# Patient Record
Sex: MALE | Race: WHITE | Employment: OTHER | URBAN - METROPOLITAN AREA
[De-identification: names, ages, dates, MRNs, and addresses within clinical notes are randomized per-mention and may not be internally consistent; named-entity substitution may affect disease eponyms.]

---

## 2017-01-03 ENCOUNTER — TELEPHONE (OUTPATIENT)
Dept: FAMILY MEDICINE CLINIC | Age: 77
End: 2017-01-03

## 2017-01-03 DIAGNOSIS — E11.9 TYPE 2 DIABETES MELLITUS WITHOUT COMPLICATION, WITHOUT LONG-TERM CURRENT USE OF INSULIN (HCC): ICD-10-CM

## 2017-01-03 RX ORDER — LANCETS
EACH MISCELLANEOUS
Qty: 100 EACH | Refills: 3 | Status: SHIPPED | OUTPATIENT
Start: 2017-01-03 | End: 2018-03-01 | Stop reason: SDUPTHER

## 2017-06-15 ENCOUNTER — OFFICE VISIT (OUTPATIENT)
Dept: FAMILY MEDICINE CLINIC | Age: 77
End: 2017-06-15

## 2017-06-15 VITALS
HEIGHT: 69 IN | WEIGHT: 173 LBS | OXYGEN SATURATION: 98 % | DIASTOLIC BLOOD PRESSURE: 70 MMHG | BODY MASS INDEX: 25.62 KG/M2 | RESPIRATION RATE: 13 BRPM | TEMPERATURE: 98.2 F | HEART RATE: 82 BPM | SYSTOLIC BLOOD PRESSURE: 128 MMHG

## 2017-06-15 DIAGNOSIS — E11.9 TYPE 2 DIABETES MELLITUS WITHOUT COMPLICATION, WITHOUT LONG-TERM CURRENT USE OF INSULIN (HCC): Primary | ICD-10-CM

## 2017-06-15 DIAGNOSIS — E78.5 HYPERLIPIDEMIA, UNSPECIFIED HYPERLIPIDEMIA TYPE: ICD-10-CM

## 2017-06-15 DIAGNOSIS — I10 ESSENTIAL HYPERTENSION: ICD-10-CM

## 2017-06-15 LAB
ALBUMIN SERPL-MCNC: 4.9 G/DL (ref 3.4–5)
ALP BLD-CCNC: 64 U/L (ref 40–129)
ALT SERPL-CCNC: 18 U/L (ref 10–40)
ANION GAP SERPL CALCULATED.3IONS-SCNC: 17 MMOL/L (ref 3–16)
AST SERPL-CCNC: 18 U/L (ref 15–37)
BILIRUB SERPL-MCNC: 1.8 MG/DL (ref 0–1)
BILIRUBIN DIRECT: 0.3 MG/DL (ref 0–0.3)
BILIRUBIN, INDIRECT: 1.5 MG/DL (ref 0–1)
BUN BLDV-MCNC: 26 MG/DL (ref 7–20)
CALCIUM SERPL-MCNC: 9.9 MG/DL (ref 8.3–10.6)
CHLORIDE BLD-SCNC: 100 MMOL/L (ref 99–110)
CHOLESTEROL, TOTAL: 183 MG/DL (ref 0–199)
CO2: 27 MMOL/L (ref 21–32)
CREAT SERPL-MCNC: 0.9 MG/DL (ref 0.8–1.3)
CREATININE URINE: 14 MG/DL (ref 39–259)
GFR AFRICAN AMERICAN: >60
GFR NON-AFRICAN AMERICAN: >60
GLUCOSE BLD-MCNC: 128 MG/DL (ref 70–99)
HDLC SERPL-MCNC: 45 MG/DL (ref 40–60)
LDL CHOLESTEROL CALCULATED: 121 MG/DL
MICROALBUMIN UR-MCNC: <1.2 MG/DL
MICROALBUMIN/CREAT UR-RTO: ABNORMAL MG/G (ref 0–30)
POTASSIUM SERPL-SCNC: 4.2 MMOL/L (ref 3.5–5.1)
SODIUM BLD-SCNC: 144 MMOL/L (ref 136–145)
TOTAL PROTEIN: 7.7 G/DL (ref 6.4–8.2)
TRIGL SERPL-MCNC: 83 MG/DL (ref 0–150)
VLDLC SERPL CALC-MCNC: 17 MG/DL

## 2017-06-15 PROCEDURE — 99214 OFFICE O/P EST MOD 30 MIN: CPT | Performed by: FAMILY MEDICINE

## 2017-06-15 PROCEDURE — 36415 COLL VENOUS BLD VENIPUNCTURE: CPT | Performed by: FAMILY MEDICINE

## 2017-06-15 RX ORDER — FUROSEMIDE 40 MG/1
TABLET ORAL
Qty: 90 TABLET | Refills: 1 | Status: SHIPPED | OUTPATIENT
Start: 2017-06-15 | End: 2018-01-30 | Stop reason: SDUPTHER

## 2017-06-15 RX ORDER — SPIRONOLACTONE 25 MG/1
TABLET ORAL
Qty: 90 TABLET | Refills: 1 | Status: SHIPPED | OUTPATIENT
Start: 2017-06-15 | End: 2018-01-30 | Stop reason: SDUPTHER

## 2017-06-15 RX ORDER — LISINOPRIL 40 MG/1
TABLET ORAL
Qty: 90 TABLET | Refills: 1 | Status: SHIPPED | OUTPATIENT
Start: 2017-06-15 | End: 2018-01-30 | Stop reason: SDUPTHER

## 2017-06-15 RX ORDER — AMLODIPINE BESYLATE 5 MG/1
TABLET ORAL
Qty: 90 TABLET | Refills: 1 | Status: CANCELLED | OUTPATIENT
Start: 2017-06-15

## 2017-06-15 RX ORDER — METOPROLOL SUCCINATE 50 MG/1
50 TABLET, EXTENDED RELEASE ORAL DAILY
Qty: 90 TABLET | Refills: 1 | Status: SHIPPED | OUTPATIENT
Start: 2017-06-15 | End: 2018-01-30 | Stop reason: SDUPTHER

## 2017-06-16 LAB
ESTIMATED AVERAGE GLUCOSE: 131.2 MG/DL
HBA1C MFR BLD: 6.2 %

## 2017-11-13 ENCOUNTER — OFFICE VISIT (OUTPATIENT)
Dept: FAMILY MEDICINE CLINIC | Age: 77
End: 2017-11-13

## 2017-11-13 VITALS
HEART RATE: 74 BPM | HEIGHT: 69 IN | WEIGHT: 173.6 LBS | DIASTOLIC BLOOD PRESSURE: 80 MMHG | SYSTOLIC BLOOD PRESSURE: 158 MMHG | BODY MASS INDEX: 25.71 KG/M2 | OXYGEN SATURATION: 97 % | RESPIRATION RATE: 16 BRPM

## 2017-11-13 DIAGNOSIS — I10 ESSENTIAL HYPERTENSION: Primary | ICD-10-CM

## 2017-11-13 PROCEDURE — 1123F ACP DISCUSS/DSCN MKR DOCD: CPT | Performed by: FAMILY MEDICINE

## 2017-11-13 PROCEDURE — G8427 DOCREV CUR MEDS BY ELIG CLIN: HCPCS | Performed by: FAMILY MEDICINE

## 2017-11-13 PROCEDURE — 1036F TOBACCO NON-USER: CPT | Performed by: FAMILY MEDICINE

## 2017-11-13 PROCEDURE — G8598 ASA/ANTIPLAT THER USED: HCPCS | Performed by: FAMILY MEDICINE

## 2017-11-13 PROCEDURE — 99213 OFFICE O/P EST LOW 20 MIN: CPT | Performed by: FAMILY MEDICINE

## 2017-11-13 PROCEDURE — 4040F PNEUMOC VAC/ADMIN/RCVD: CPT | Performed by: FAMILY MEDICINE

## 2017-11-13 PROCEDURE — G8417 CALC BMI ABV UP PARAM F/U: HCPCS | Performed by: FAMILY MEDICINE

## 2017-11-13 PROCEDURE — G8484 FLU IMMUNIZE NO ADMIN: HCPCS | Performed by: FAMILY MEDICINE

## 2017-11-13 RX ORDER — AMLODIPINE BESYLATE 5 MG/1
TABLET ORAL
Qty: 90 TABLET | Refills: 1 | Status: SHIPPED
Start: 2017-11-13 | End: 2018-01-30 | Stop reason: SDUPTHER

## 2017-11-13 ASSESSMENT — PATIENT HEALTH QUESTIONNAIRE - PHQ9
2. FEELING DOWN, DEPRESSED OR HOPELESS: 0
SUM OF ALL RESPONSES TO PHQ QUESTIONS 1-9: 0
SUM OF ALL RESPONSES TO PHQ9 QUESTIONS 1 & 2: 0
1. LITTLE INTEREST OR PLEASURE IN DOING THINGS: 0

## 2017-11-13 NOTE — PROGRESS NOTES
SUBJECTIVE:  F/U HTN. States his BP's became higher at home, so he resumed amlodipine 5 mg, but BP still elevated. No chest pain or dyspnea. Current tobacco use: None. OBJECTIVE:  BP (!) 158/80 (Site: Right Arm, Position: Sitting, Cuff Size: Medium Adult)   Pulse 74   Resp 16   Ht 5' 9\" (1.753 m)   Wt 173 lb 9.6 oz (78.7 kg)   SpO2 97%   BMI 25.64 kg/m²     Heart: Regular rhythm without murmur. Lungs: Clear to auscultation. Extremities: No edema. ASSESSMENT:  1. Essential hypertension        PLAN:  Treatment options discussed. He states he has large supply of amlodipine 5 mg, so will try 5 mg bid for now and monitor BP. 15 minutes were spent with patient . Greater than 50% continuity of care or counseling.

## 2018-01-30 ENCOUNTER — OFFICE VISIT (OUTPATIENT)
Dept: FAMILY MEDICINE CLINIC | Age: 78
End: 2018-01-30

## 2018-01-30 VITALS
RESPIRATION RATE: 16 BRPM | OXYGEN SATURATION: 91 % | HEIGHT: 69 IN | DIASTOLIC BLOOD PRESSURE: 78 MMHG | BODY MASS INDEX: 26.51 KG/M2 | SYSTOLIC BLOOD PRESSURE: 162 MMHG | HEART RATE: 86 BPM | WEIGHT: 179 LBS

## 2018-01-30 DIAGNOSIS — Z12.5 PROSTATE CANCER SCREENING: ICD-10-CM

## 2018-01-30 DIAGNOSIS — Z23 NEED FOR TDAP VACCINATION: Primary | ICD-10-CM

## 2018-01-30 DIAGNOSIS — I10 ESSENTIAL HYPERTENSION: ICD-10-CM

## 2018-01-30 PROCEDURE — G8417 CALC BMI ABV UP PARAM F/U: HCPCS | Performed by: FAMILY MEDICINE

## 2018-01-30 PROCEDURE — 4040F PNEUMOC VAC/ADMIN/RCVD: CPT | Performed by: FAMILY MEDICINE

## 2018-01-30 PROCEDURE — 90471 IMMUNIZATION ADMIN: CPT | Performed by: FAMILY MEDICINE

## 2018-01-30 PROCEDURE — 99214 OFFICE O/P EST MOD 30 MIN: CPT | Performed by: FAMILY MEDICINE

## 2018-01-30 PROCEDURE — 1123F ACP DISCUSS/DSCN MKR DOCD: CPT | Performed by: FAMILY MEDICINE

## 2018-01-30 PROCEDURE — G8484 FLU IMMUNIZE NO ADMIN: HCPCS | Performed by: FAMILY MEDICINE

## 2018-01-30 PROCEDURE — G8598 ASA/ANTIPLAT THER USED: HCPCS | Performed by: FAMILY MEDICINE

## 2018-01-30 PROCEDURE — 90715 TDAP VACCINE 7 YRS/> IM: CPT | Performed by: FAMILY MEDICINE

## 2018-01-30 PROCEDURE — 1036F TOBACCO NON-USER: CPT | Performed by: FAMILY MEDICINE

## 2018-01-30 PROCEDURE — G8427 DOCREV CUR MEDS BY ELIG CLIN: HCPCS | Performed by: FAMILY MEDICINE

## 2018-01-30 RX ORDER — FUROSEMIDE 40 MG/1
TABLET ORAL
Qty: 90 TABLET | Refills: 1 | Status: SHIPPED | OUTPATIENT
Start: 2018-01-30 | End: 2018-07-05 | Stop reason: SDUPTHER

## 2018-01-30 RX ORDER — METOPROLOL SUCCINATE 50 MG/1
50 TABLET, EXTENDED RELEASE ORAL DAILY
Qty: 90 TABLET | Refills: 1 | Status: SHIPPED | OUTPATIENT
Start: 2018-01-30 | End: 2018-07-05 | Stop reason: SDUPTHER

## 2018-01-30 RX ORDER — SPIRONOLACTONE 25 MG/1
TABLET ORAL
Qty: 90 TABLET | Refills: 1 | Status: SHIPPED | OUTPATIENT
Start: 2018-01-30 | End: 2018-07-05 | Stop reason: SDUPTHER

## 2018-01-30 RX ORDER — LISINOPRIL 40 MG/1
TABLET ORAL
Qty: 90 TABLET | Refills: 1 | Status: SHIPPED | OUTPATIENT
Start: 2018-01-30 | End: 2018-07-05 | Stop reason: SDUPTHER

## 2018-01-30 RX ORDER — AMLODIPINE BESYLATE 5 MG/1
TABLET ORAL
Qty: 90 TABLET | Refills: 1 | Status: SHIPPED | OUTPATIENT
Start: 2018-01-30 | End: 2018-07-05 | Stop reason: SDUPTHER

## 2018-01-30 ASSESSMENT — ENCOUNTER SYMPTOMS: BACK PAIN: 1

## 2018-01-30 NOTE — PROGRESS NOTES
TAKE 1 TABLET EVERY DAY Yes Derian Sullivan, DO   metoprolol succinate (TOPROL XL) 50 MG extended release tablet Take 1 tablet by mouth daily Yes Derian Sullivan, DO   spironolactone (ALDACTONE) 25 MG tablet TAKE 1 TABLET EVERY DAY Yes Derian Sullivan, DO   Accu-Chek Multiclix Lancets MISC Test blood sugar qd Yes Katelin Caldera MD   glucose blood VI test strips (ACCU-CHEK ION) strip Test blood sugar qd. Yes Katelin Caldera MD   [facility] OIL Take 1 tablet by mouth daily. Yes Historical Provider, MD   aspirin 325 MG tablet Take 325 mg by mouth daily. Yes Historical Provider, MD   Multiple Vitamin (MULTIVITAMIN PO) Take 1 tablet by mouth daily.  Yes Historical Provider, MD

## 2018-02-16 DIAGNOSIS — I10 ESSENTIAL HYPERTENSION: ICD-10-CM

## 2018-02-16 DIAGNOSIS — Z12.5 PROSTATE CANCER SCREENING: ICD-10-CM

## 2018-02-16 LAB
A/G RATIO: 2.2 (ref 1.1–2.2)
ALBUMIN SERPL-MCNC: 4.6 G/DL (ref 3.4–5)
ALP BLD-CCNC: 47 U/L (ref 40–129)
ALT SERPL-CCNC: 14 U/L (ref 10–40)
ANION GAP SERPL CALCULATED.3IONS-SCNC: 11 MMOL/L (ref 3–16)
AST SERPL-CCNC: 18 U/L (ref 15–37)
BASOPHILS ABSOLUTE: 0.1 K/UL (ref 0–0.2)
BASOPHILS RELATIVE PERCENT: 1.3 %
BILIRUB SERPL-MCNC: 1.7 MG/DL (ref 0–1)
BUN BLDV-MCNC: 27 MG/DL (ref 7–20)
CALCIUM SERPL-MCNC: 9.6 MG/DL (ref 8.3–10.6)
CHLORIDE BLD-SCNC: 103 MMOL/L (ref 99–110)
CHOLESTEROL, TOTAL: 197 MG/DL (ref 0–199)
CO2: 28 MMOL/L (ref 21–32)
CREAT SERPL-MCNC: 1 MG/DL (ref 0.8–1.3)
EOSINOPHILS ABSOLUTE: 0.2 K/UL (ref 0–0.6)
EOSINOPHILS RELATIVE PERCENT: 3.7 %
GFR AFRICAN AMERICAN: >60
GFR NON-AFRICAN AMERICAN: >60
GLOBULIN: 2.1 G/DL
GLUCOSE BLD-MCNC: 134 MG/DL (ref 70–99)
HCT VFR BLD CALC: 41.4 % (ref 40.5–52.5)
HDLC SERPL-MCNC: 41 MG/DL (ref 40–60)
HEMOGLOBIN: 14.2 G/DL (ref 13.5–17.5)
LDL CHOLESTEROL CALCULATED: 132 MG/DL
LYMPHOCYTES ABSOLUTE: 2.5 K/UL (ref 1–5.1)
LYMPHOCYTES RELATIVE PERCENT: 36 %
MCH RBC QN AUTO: 31.1 PG (ref 26–34)
MCHC RBC AUTO-ENTMCNC: 34.2 G/DL (ref 31–36)
MCV RBC AUTO: 90.8 FL (ref 80–100)
MONOCYTES ABSOLUTE: 0.7 K/UL (ref 0–1.3)
MONOCYTES RELATIVE PERCENT: 10.2 %
NEUTROPHILS ABSOLUTE: 3.3 K/UL (ref 1.7–7.7)
NEUTROPHILS RELATIVE PERCENT: 48.8 %
PDW BLD-RTO: 15.1 % (ref 12.4–15.4)
PLATELET # BLD: 185 K/UL (ref 135–450)
PMV BLD AUTO: 8.5 FL (ref 5–10.5)
POTASSIUM SERPL-SCNC: 4.5 MMOL/L (ref 3.5–5.1)
PROSTATE SPECIFIC ANTIGEN: 0.52 NG/ML (ref 0–4)
RBC # BLD: 4.56 M/UL (ref 4.2–5.9)
SODIUM BLD-SCNC: 142 MMOL/L (ref 136–145)
TOTAL PROTEIN: 6.7 G/DL (ref 6.4–8.2)
TRIGL SERPL-MCNC: 122 MG/DL (ref 0–150)
TSH SERPL DL<=0.05 MIU/L-ACNC: 0.64 UIU/ML (ref 0.27–4.2)
VLDLC SERPL CALC-MCNC: 24 MG/DL
WBC # BLD: 6.8 K/UL (ref 4–11)

## 2018-03-01 ENCOUNTER — OFFICE VISIT (OUTPATIENT)
Dept: FAMILY MEDICINE CLINIC | Age: 78
End: 2018-03-01

## 2018-03-01 ENCOUNTER — TELEPHONE (OUTPATIENT)
Dept: FAMILY MEDICINE CLINIC | Age: 78
End: 2018-03-01

## 2018-03-01 VITALS
HEIGHT: 67 IN | TEMPERATURE: 97.7 F | OXYGEN SATURATION: 95 % | SYSTOLIC BLOOD PRESSURE: 171 MMHG | WEIGHT: 180 LBS | BODY MASS INDEX: 28.25 KG/M2 | HEART RATE: 66 BPM | DIASTOLIC BLOOD PRESSURE: 86 MMHG | RESPIRATION RATE: 16 BRPM

## 2018-03-01 DIAGNOSIS — Z00.00 ENCOUNTER FOR MEDICARE ANNUAL WELLNESS EXAM: Primary | ICD-10-CM

## 2018-03-01 DIAGNOSIS — Z00.00 ROUTINE GENERAL MEDICAL EXAMINATION AT A HEALTH CARE FACILITY: ICD-10-CM

## 2018-03-01 PROCEDURE — G0438 PPPS, INITIAL VISIT: HCPCS | Performed by: FAMILY MEDICINE

## 2018-03-01 PROCEDURE — G8598 ASA/ANTIPLAT THER USED: HCPCS | Performed by: FAMILY MEDICINE

## 2018-03-01 RX ORDER — LANCETS
EACH MISCELLANEOUS
Qty: 100 EACH | Refills: 3 | Status: SHIPPED | OUTPATIENT
Start: 2018-03-01

## 2018-03-01 ASSESSMENT — PATIENT HEALTH QUESTIONNAIRE - PHQ9: SUM OF ALL RESPONSES TO PHQ QUESTIONS 1-9: 0

## 2018-03-01 ASSESSMENT — LIFESTYLE VARIABLES: HOW OFTEN DO YOU HAVE A DRINK CONTAINING ALCOHOL: 0

## 2018-03-01 ASSESSMENT — ANXIETY QUESTIONNAIRES: GAD7 TOTAL SCORE: 0

## 2018-03-01 NOTE — PROGRESS NOTES
Medicare Annual Wellness Visit  Name: Wanda Casey Date: 3/1/2018   MRN: [id number] Sex: Male   Age: 68 y.o. Ethnicity: Non-/Non    : 1940 Race: Gwynne Hammans is here for Medicare AWV    Screenings for behavioral, psychosocial and functional/safety risks, and cognitive dysfunction are all negative except as indicated below. These results, as well as other patient data from the [address] form, are documented in Flowsheets linked to this Encounter. Allergies   Allergen Reactions    Coumadin [Warfarin]     Lipitor     Omeprazole Other (See Comments)     headaches    Vioxx        Prior to Visit Medications    Medication Sig Taking? Authorizing Provider   amLODIPine (NORVASC) 5 MG tablet TAKE 1 TABLET TWICE DAILY Yes Natalie Faustin DO   furosemide (LASIX) 40 MG tablet TAKE 1 TABLET EVERY DAY Yes Cindy Gonzalez,    lisinopril (PRINIVIL;ZESTRIL) 40 MG tablet TAKE 1 TABLET EVERY DAY Yes Natalie Faustin DO   metoprolol succinate (TOPROL XL) 50 MG extended release tablet Take 1 tablet by mouth daily Yes Natalie Faustin DO   spironolactone (ALDACTONE) 25 MG tablet TAKE 1 TABLET EVERY DAY Yes Natalie Faustin DO   Accu-Chek Multiclix Lancets MISC Test blood sugar qd Yes Lucila Montoya MD   glucose blood VI test strips (ACCU-CHEK ION) strip Test blood sugar qd. Yes Lucila Montoya MD   [facility] OIL Take 1 tablet by mouth daily. Yes Historical Provider, MD   aspirin 325 MG tablet Take 325 mg by mouth daily. Yes Historical Provider, MD   Multiple Vitamin (MULTIVITAMIN PO) Take 1 tablet by mouth daily.  Yes Historical Provider, MD       Past Medical History:   Diagnosis Date    Aortic aneurysm, abdominal ([address] 2011    Appendicitis 11    Arthritis 11    Bruises easily     CAD (coronary artery disease) 2001    MI     CHF (congestive heart failure) (HCC)     Following CABG    Chronic back pain     Chronic pain of both shoulders following problems were reviewed today and where indicated follow up appointments were made and/or referrals ordered. Positive Risk Factor Screenings with Interventions:               Health Habits/Nutrition:  Health Habits/Nutrition  Do you exercise for at least 20 minutes 2-3 times per week?: Yes  Have you lost any weight without trying in the past 3 months?: (!) Yes (over a yr and a half lost 40 lbs)  Do you eat fewer than 2 meals per day?: No  Have you seen a dentist within the past year?: (!) No (False teeth)  Body mass index is 28.19 kg/m². Health Habits/Nutrition Interventions:  · Pt was on a diet and DID NOT lose the weight unintentionally            Personalized Preventive Plan   Current Health Maintenance Status  Immunization History   Administered Date(s) Administered    Influenza Vaccine, unspecified formulation 11/02/2016    Influenza Virus Vaccine 09/25/2013    Influenza Whole 09/23/2010    Influenza, High Dose 10/17/2014, 10/08/2017    Pneumococcal Conjugate 7-valent 01/26/2007    Tdap (Boostrix, Adacel) 01/30/2018        Health Maintenance   Topic Date Due    Shingles Vaccine (1 of 2 - 2 Dose Series) 10/02/1990    Pneumococcal low/med risk (1 of 2 - PCV13) 10/02/2005    Potassium monitoring  02/16/2019    Creatinine monitoring  02/16/2019    Lipid screen  02/16/2023    DTaP/Tdap/Td vaccine (2 - Td) 01/30/2028    Flu vaccine  Completed     Recommendations for Preventive Services Due: see orders.   Recommended screening schedule for the next 5-10 years is provided to the patient in written form: see Patient Instructions/AVS.

## 2018-03-06 ENCOUNTER — TELEPHONE (OUTPATIENT)
Dept: FAMILY MEDICINE CLINIC | Age: 78
End: 2018-03-06

## 2018-03-06 NOTE — TELEPHONE ENCOUNTER
Patient upset that he received a bill for his Tdap. His insurance  requires him to bill thru Medicare part D which is thru his pharmacy benefits. We do not bill thru pharmacy benefits. He will be responsible for the bill. Patient understood.

## 2018-04-05 ENCOUNTER — OFFICE VISIT (OUTPATIENT)
Dept: FAMILY MEDICINE CLINIC | Age: 78
End: 2018-04-05

## 2018-04-05 VITALS
HEIGHT: 67 IN | SYSTOLIC BLOOD PRESSURE: 148 MMHG | DIASTOLIC BLOOD PRESSURE: 66 MMHG | BODY MASS INDEX: 27.78 KG/M2 | RESPIRATION RATE: 14 BRPM | WEIGHT: 177 LBS | OXYGEN SATURATION: 94 % | HEART RATE: 74 BPM

## 2018-04-05 DIAGNOSIS — E11.9 TYPE 2 DIABETES MELLITUS WITHOUT COMPLICATION, WITHOUT LONG-TERM CURRENT USE OF INSULIN (HCC): Primary | ICD-10-CM

## 2018-04-05 DIAGNOSIS — I50.9 CHRONIC CONGESTIVE HEART FAILURE, UNSPECIFIED CONGESTIVE HEART FAILURE TYPE: ICD-10-CM

## 2018-04-05 LAB — HBA1C MFR BLD: 6.2 %

## 2018-04-05 PROCEDURE — G8427 DOCREV CUR MEDS BY ELIG CLIN: HCPCS | Performed by: FAMILY MEDICINE

## 2018-04-05 PROCEDURE — G8598 ASA/ANTIPLAT THER USED: HCPCS | Performed by: FAMILY MEDICINE

## 2018-04-05 PROCEDURE — 99214 OFFICE O/P EST MOD 30 MIN: CPT | Performed by: FAMILY MEDICINE

## 2018-04-05 PROCEDURE — 1036F TOBACCO NON-USER: CPT | Performed by: FAMILY MEDICINE

## 2018-04-05 PROCEDURE — 83036 HEMOGLOBIN GLYCOSYLATED A1C: CPT | Performed by: FAMILY MEDICINE

## 2018-04-05 PROCEDURE — 1123F ACP DISCUSS/DSCN MKR DOCD: CPT | Performed by: FAMILY MEDICINE

## 2018-04-05 PROCEDURE — 4040F PNEUMOC VAC/ADMIN/RCVD: CPT | Performed by: FAMILY MEDICINE

## 2018-04-05 PROCEDURE — G8417 CALC BMI ABV UP PARAM F/U: HCPCS | Performed by: FAMILY MEDICINE

## 2018-06-28 DIAGNOSIS — E11.9 TYPE 2 DIABETES MELLITUS WITHOUT COMPLICATION, WITHOUT LONG-TERM CURRENT USE OF INSULIN (HCC): ICD-10-CM

## 2018-06-28 LAB
A/G RATIO: 2 (ref 1.1–2.2)
ALBUMIN SERPL-MCNC: 4.3 G/DL (ref 3.4–5)
ALP BLD-CCNC: 62 U/L (ref 40–129)
ALT SERPL-CCNC: 22 U/L (ref 10–40)
ANION GAP SERPL CALCULATED.3IONS-SCNC: 17 MMOL/L (ref 3–16)
AST SERPL-CCNC: 19 U/L (ref 15–37)
BILIRUB SERPL-MCNC: 0.7 MG/DL (ref 0–1)
BUN BLDV-MCNC: 23 MG/DL (ref 7–20)
CALCIUM SERPL-MCNC: 8.9 MG/DL (ref 8.3–10.6)
CHLORIDE BLD-SCNC: 102 MMOL/L (ref 99–110)
CHOLESTEROL, TOTAL: 204 MG/DL (ref 0–199)
CO2: 25 MMOL/L (ref 21–32)
CREAT SERPL-MCNC: 1 MG/DL (ref 0.8–1.3)
GFR AFRICAN AMERICAN: >60
GFR NON-AFRICAN AMERICAN: >60
GLOBULIN: 2.2 G/DL
GLUCOSE BLD-MCNC: 115 MG/DL (ref 70–99)
HDLC SERPL-MCNC: 55 MG/DL (ref 40–60)
LDL CHOLESTEROL CALCULATED: 129 MG/DL
POTASSIUM SERPL-SCNC: 4.6 MMOL/L (ref 3.5–5.1)
SODIUM BLD-SCNC: 144 MMOL/L (ref 136–145)
TOTAL PROTEIN: 6.5 G/DL (ref 6.4–8.2)
TRIGL SERPL-MCNC: 102 MG/DL (ref 0–150)
TSH SERPL DL<=0.05 MIU/L-ACNC: 0.77 UIU/ML (ref 0.27–4.2)
VLDLC SERPL CALC-MCNC: 20 MG/DL

## 2018-07-05 ENCOUNTER — OFFICE VISIT (OUTPATIENT)
Dept: FAMILY MEDICINE CLINIC | Age: 78
End: 2018-07-05

## 2018-07-05 VITALS
BODY MASS INDEX: 27 KG/M2 | DIASTOLIC BLOOD PRESSURE: 78 MMHG | HEART RATE: 73 BPM | HEIGHT: 67 IN | RESPIRATION RATE: 14 BRPM | OXYGEN SATURATION: 94 % | WEIGHT: 172 LBS | SYSTOLIC BLOOD PRESSURE: 172 MMHG

## 2018-07-05 DIAGNOSIS — I10 ESSENTIAL HYPERTENSION: ICD-10-CM

## 2018-07-05 DIAGNOSIS — E11.9 TYPE 2 DIABETES MELLITUS WITHOUT COMPLICATION, WITHOUT LONG-TERM CURRENT USE OF INSULIN (HCC): Primary | ICD-10-CM

## 2018-07-05 LAB
CREATININE URINE POCT: NORMAL
HBA1C MFR BLD: 6.6 %
MICROALBUMIN/CREAT 24H UR: NORMAL MG/G{CREAT}
MICROALBUMIN/CREAT UR-RTO: NORMAL

## 2018-07-05 PROCEDURE — 1036F TOBACCO NON-USER: CPT | Performed by: FAMILY MEDICINE

## 2018-07-05 PROCEDURE — 4040F PNEUMOC VAC/ADMIN/RCVD: CPT | Performed by: FAMILY MEDICINE

## 2018-07-05 PROCEDURE — G8427 DOCREV CUR MEDS BY ELIG CLIN: HCPCS | Performed by: FAMILY MEDICINE

## 2018-07-05 PROCEDURE — G8598 ASA/ANTIPLAT THER USED: HCPCS | Performed by: FAMILY MEDICINE

## 2018-07-05 PROCEDURE — G8417 CALC BMI ABV UP PARAM F/U: HCPCS | Performed by: FAMILY MEDICINE

## 2018-07-05 PROCEDURE — 1123F ACP DISCUSS/DSCN MKR DOCD: CPT | Performed by: FAMILY MEDICINE

## 2018-07-05 PROCEDURE — 99213 OFFICE O/P EST LOW 20 MIN: CPT | Performed by: FAMILY MEDICINE

## 2018-07-05 PROCEDURE — 83036 HEMOGLOBIN GLYCOSYLATED A1C: CPT | Performed by: FAMILY MEDICINE

## 2018-07-05 PROCEDURE — 82044 UR ALBUMIN SEMIQUANTITATIVE: CPT | Performed by: FAMILY MEDICINE

## 2018-07-05 RX ORDER — METOPROLOL SUCCINATE 50 MG/1
50 TABLET, EXTENDED RELEASE ORAL DAILY
Qty: 90 TABLET | Refills: 1 | Status: SHIPPED | OUTPATIENT
Start: 2018-07-05 | End: 2018-10-05 | Stop reason: SDUPTHER

## 2018-07-05 RX ORDER — LISINOPRIL 40 MG/1
TABLET ORAL
Qty: 90 TABLET | Refills: 1 | Status: SHIPPED | OUTPATIENT
Start: 2018-07-05 | End: 2018-10-05 | Stop reason: SDUPTHER

## 2018-07-05 RX ORDER — SPIRONOLACTONE 25 MG/1
TABLET ORAL
Qty: 90 TABLET | Refills: 1 | Status: SHIPPED | OUTPATIENT
Start: 2018-07-05 | End: 2018-10-05 | Stop reason: SDUPTHER

## 2018-07-05 RX ORDER — FUROSEMIDE 40 MG/1
TABLET ORAL
Qty: 90 TABLET | Refills: 1 | Status: SHIPPED | OUTPATIENT
Start: 2018-07-05 | End: 2018-10-05 | Stop reason: SDUPTHER

## 2018-07-05 RX ORDER — BLOOD PRESSURE TEST KIT
1 KIT MISCELLANEOUS 2 TIMES DAILY
Qty: 1 KIT | Refills: 0 | Status: SHIPPED | OUTPATIENT
Start: 2018-07-05 | End: 2020-01-08

## 2018-07-05 RX ORDER — AMLODIPINE BESYLATE 5 MG/1
TABLET ORAL
Qty: 90 TABLET | Refills: 1 | Status: SHIPPED | OUTPATIENT
Start: 2018-07-05 | End: 2018-10-05 | Stop reason: SDUPTHER

## 2018-07-16 ENCOUNTER — TELEPHONE (OUTPATIENT)
Dept: FAMILY MEDICINE CLINIC | Age: 78
End: 2018-07-16

## 2018-07-17 NOTE — TELEPHONE ENCOUNTER
Wife is now calling in regards to this. Pt is trying to get a bp kit ordered today and is waiting on Dr. Angel Elena recommendation. Please advise.  Thank you

## 2018-09-28 DIAGNOSIS — E11.9 TYPE 2 DIABETES MELLITUS WITHOUT COMPLICATION, WITHOUT LONG-TERM CURRENT USE OF INSULIN (HCC): ICD-10-CM

## 2018-09-28 LAB
A/G RATIO: 2.1 (ref 1.1–2.2)
ALBUMIN SERPL-MCNC: 4.5 G/DL (ref 3.4–5)
ALP BLD-CCNC: 53 U/L (ref 40–129)
ALT SERPL-CCNC: 14 U/L (ref 10–40)
ANION GAP SERPL CALCULATED.3IONS-SCNC: 15 MMOL/L (ref 3–16)
AST SERPL-CCNC: 17 U/L (ref 15–37)
BILIRUB SERPL-MCNC: 1 MG/DL (ref 0–1)
BUN BLDV-MCNC: 18 MG/DL (ref 7–20)
CALCIUM SERPL-MCNC: 9 MG/DL (ref 8.3–10.6)
CHLORIDE BLD-SCNC: 103 MMOL/L (ref 99–110)
CHOLESTEROL, TOTAL: 163 MG/DL (ref 0–199)
CO2: 24 MMOL/L (ref 21–32)
CREAT SERPL-MCNC: 0.9 MG/DL (ref 0.8–1.3)
GFR AFRICAN AMERICAN: >60
GFR NON-AFRICAN AMERICAN: >60
GLOBULIN: 2.1 G/DL
GLUCOSE BLD-MCNC: 117 MG/DL (ref 70–99)
HDLC SERPL-MCNC: 43 MG/DL (ref 40–60)
LDL CHOLESTEROL CALCULATED: 97 MG/DL
POTASSIUM SERPL-SCNC: 4.3 MMOL/L (ref 3.5–5.1)
SODIUM BLD-SCNC: 142 MMOL/L (ref 136–145)
TOTAL PROTEIN: 6.6 G/DL (ref 6.4–8.2)
TRIGL SERPL-MCNC: 116 MG/DL (ref 0–150)
TSH SERPL DL<=0.05 MIU/L-ACNC: 0.98 UIU/ML (ref 0.27–4.2)
VLDLC SERPL CALC-MCNC: 23 MG/DL

## 2018-10-05 ENCOUNTER — OFFICE VISIT (OUTPATIENT)
Dept: FAMILY MEDICINE CLINIC | Age: 78
End: 2018-10-05
Payer: MEDICARE

## 2018-10-05 VITALS
HEIGHT: 67 IN | SYSTOLIC BLOOD PRESSURE: 175 MMHG | WEIGHT: 188 LBS | HEART RATE: 59 BPM | DIASTOLIC BLOOD PRESSURE: 80 MMHG | BODY MASS INDEX: 29.51 KG/M2

## 2018-10-05 DIAGNOSIS — E11.9 TYPE 2 DIABETES MELLITUS WITHOUT COMPLICATION, WITHOUT LONG-TERM CURRENT USE OF INSULIN (HCC): Primary | ICD-10-CM

## 2018-10-05 PROCEDURE — G8417 CALC BMI ABV UP PARAM F/U: HCPCS | Performed by: FAMILY MEDICINE

## 2018-10-05 PROCEDURE — 99213 OFFICE O/P EST LOW 20 MIN: CPT | Performed by: FAMILY MEDICINE

## 2018-10-05 PROCEDURE — 1101F PT FALLS ASSESS-DOCD LE1/YR: CPT | Performed by: FAMILY MEDICINE

## 2018-10-05 PROCEDURE — 4040F PNEUMOC VAC/ADMIN/RCVD: CPT | Performed by: FAMILY MEDICINE

## 2018-10-05 PROCEDURE — G8427 DOCREV CUR MEDS BY ELIG CLIN: HCPCS | Performed by: FAMILY MEDICINE

## 2018-10-05 PROCEDURE — G8598 ASA/ANTIPLAT THER USED: HCPCS | Performed by: FAMILY MEDICINE

## 2018-10-05 PROCEDURE — 1123F ACP DISCUSS/DSCN MKR DOCD: CPT | Performed by: FAMILY MEDICINE

## 2018-10-05 PROCEDURE — 83036 HEMOGLOBIN GLYCOSYLATED A1C: CPT | Performed by: FAMILY MEDICINE

## 2018-10-05 PROCEDURE — 1036F TOBACCO NON-USER: CPT | Performed by: FAMILY MEDICINE

## 2018-10-05 PROCEDURE — G8482 FLU IMMUNIZE ORDER/ADMIN: HCPCS | Performed by: FAMILY MEDICINE

## 2018-10-05 RX ORDER — SPIRONOLACTONE 25 MG/1
TABLET ORAL
Qty: 90 TABLET | Refills: 1 | Status: SHIPPED | OUTPATIENT
Start: 2018-10-05 | End: 2019-04-05 | Stop reason: SDUPTHER

## 2018-10-05 RX ORDER — LISINOPRIL 40 MG/1
TABLET ORAL
Qty: 90 TABLET | Refills: 1 | Status: SHIPPED | OUTPATIENT
Start: 2018-10-05 | End: 2019-04-05 | Stop reason: SDUPTHER

## 2018-10-05 RX ORDER — FUROSEMIDE 40 MG/1
TABLET ORAL
Qty: 90 TABLET | Refills: 1 | Status: SHIPPED | OUTPATIENT
Start: 2018-10-05 | End: 2019-04-05 | Stop reason: SDUPTHER

## 2018-10-05 RX ORDER — METOPROLOL SUCCINATE 50 MG/1
50 TABLET, EXTENDED RELEASE ORAL DAILY
Qty: 90 TABLET | Refills: 1 | Status: SHIPPED | OUTPATIENT
Start: 2018-10-05 | End: 2019-04-05 | Stop reason: SDUPTHER

## 2018-10-05 RX ORDER — AMLODIPINE BESYLATE 5 MG/1
TABLET ORAL
Qty: 90 TABLET | Refills: 1 | Status: SHIPPED | OUTPATIENT
Start: 2018-10-05 | End: 2019-01-11 | Stop reason: SDUPTHER

## 2018-11-09 ENCOUNTER — OFFICE VISIT (OUTPATIENT)
Dept: FAMILY MEDICINE CLINIC | Age: 78
End: 2018-11-09
Payer: MEDICARE

## 2018-11-09 VITALS
BODY MASS INDEX: 28.52 KG/M2 | DIASTOLIC BLOOD PRESSURE: 80 MMHG | OXYGEN SATURATION: 95 % | HEIGHT: 68 IN | HEART RATE: 78 BPM | SYSTOLIC BLOOD PRESSURE: 140 MMHG | WEIGHT: 188.2 LBS

## 2018-11-09 DIAGNOSIS — H26.9 CATARACT OF LEFT EYE, UNSPECIFIED CATARACT TYPE: Primary | ICD-10-CM

## 2018-11-09 DIAGNOSIS — I10 ESSENTIAL HYPERTENSION: ICD-10-CM

## 2018-11-09 DIAGNOSIS — Z01.818 PREOP EXAMINATION: ICD-10-CM

## 2018-11-09 PROCEDURE — G8427 DOCREV CUR MEDS BY ELIG CLIN: HCPCS | Performed by: PHYSICIAN ASSISTANT

## 2018-11-09 PROCEDURE — G8598 ASA/ANTIPLAT THER USED: HCPCS | Performed by: PHYSICIAN ASSISTANT

## 2018-11-09 PROCEDURE — 4040F PNEUMOC VAC/ADMIN/RCVD: CPT | Performed by: PHYSICIAN ASSISTANT

## 2018-11-09 PROCEDURE — G8482 FLU IMMUNIZE ORDER/ADMIN: HCPCS | Performed by: PHYSICIAN ASSISTANT

## 2018-11-09 PROCEDURE — 1123F ACP DISCUSS/DSCN MKR DOCD: CPT | Performed by: PHYSICIAN ASSISTANT

## 2018-11-09 PROCEDURE — 93000 ELECTROCARDIOGRAM COMPLETE: CPT | Performed by: PHYSICIAN ASSISTANT

## 2018-11-09 PROCEDURE — G8417 CALC BMI ABV UP PARAM F/U: HCPCS | Performed by: PHYSICIAN ASSISTANT

## 2018-11-09 PROCEDURE — 99213 OFFICE O/P EST LOW 20 MIN: CPT | Performed by: PHYSICIAN ASSISTANT

## 2018-11-09 PROCEDURE — 1036F TOBACCO NON-USER: CPT | Performed by: PHYSICIAN ASSISTANT

## 2018-11-09 PROCEDURE — 1101F PT FALLS ASSESS-DOCD LE1/YR: CPT | Performed by: PHYSICIAN ASSISTANT

## 2018-11-11 NOTE — OP NOTE
Elidia Salomon    OPERATIVE NOTE    Preoperative Diagnosis: Cataract left eye    Postoperative Diagnosis: Cataract left eye     Procedure: Phacoemulsification with intraocular lens inplantation, left eye    Surgeon: Audra Rosen M.D., Ph.D. Anesthesia: MAC with topical    Complications: none    Specimens: none    Indications for procedure: The patient is a 66y.o. year old with decreased vision, glare and halos around lights, and trouble with activities of daily living. Examination revealed a visually significant cataract in the left eye. Risks, benefits, and alternatives to surgery were discussed with the patient and the patient elected to proceed with phacoemulsification with lens implantation. Details of the procedure: Following informed consent, the patient was taken to the operating room and placed in the supine position. The eye was prepped and draped in the usual sterile fashion using aseptic technique for cataract surgery. Following topical tetracaine drops a side port incision was made in the inferotemporal cornea. The eye was filled with Trypan blue followed by balanced salt solution. The eye was filled with 1% non-preserved lidocaine. The eye was filled with viscoelastic and a 2.2 mm keratome blade was used to make a 3-plane clear corneal incision in the superotemporal cornea. The cystitome was used to make a tear in the anterior capsule and a Utrata forceps was used to make a complete curvilinear capsulorrhexis. The lens was hydrodissected and freely rotated. Phacoemulsification was performed. Irrigation/aspiration was used to remove all cortical material from the capsular bag. The eye was filled with viscoelastic and a foldable posterior chamber intraocular lens was injected into the capsular bag. Irrigation/aspiration was used to remove all excess viscoelastic. The eye was pressurized and the wounds were check for leaks and none were found.   The patient had Betadine and Alphagan

## 2018-11-11 NOTE — H&P
The H&P was reviewed, the patient was examined, and no change has occurred in the patient's condition since the H&P was completed.     Audra Rosen MD, PhD, FACS

## 2018-11-12 ENCOUNTER — ANESTHESIA EVENT (OUTPATIENT)
Dept: OPERATING ROOM | Age: 78
End: 2018-11-12
Payer: MEDICARE

## 2018-11-13 ENCOUNTER — HOSPITAL ENCOUNTER (OUTPATIENT)
Age: 78
Setting detail: OUTPATIENT SURGERY
Discharge: HOME | End: 2018-11-13
Attending: OPHTHALMOLOGY | Admitting: OPHTHALMOLOGY
Payer: MEDICARE

## 2018-11-13 ENCOUNTER — ANESTHESIA (OUTPATIENT)
Dept: OPERATING ROOM | Age: 78
End: 2018-11-13
Payer: MEDICARE

## 2018-11-13 VITALS — OXYGEN SATURATION: 99 % | SYSTOLIC BLOOD PRESSURE: 163 MMHG | DIASTOLIC BLOOD PRESSURE: 62 MMHG

## 2018-11-13 VITALS
OXYGEN SATURATION: 96 % | BODY MASS INDEX: 28.04 KG/M2 | HEART RATE: 64 BPM | RESPIRATION RATE: 18 BRPM | TEMPERATURE: 97.1 F | SYSTOLIC BLOOD PRESSURE: 157 MMHG | HEIGHT: 68 IN | DIASTOLIC BLOOD PRESSURE: 60 MMHG | WEIGHT: 185 LBS

## 2018-11-13 PROCEDURE — 6370000000 HC RX 637 (ALT 250 FOR IP): Performed by: OPHTHALMOLOGY

## 2018-11-13 PROCEDURE — 7100000010 HC PHASE II RECOVERY - FIRST 15 MIN: Performed by: OPHTHALMOLOGY

## 2018-11-13 PROCEDURE — V2632 POST CHMBR INTRAOCULAR LENS: HCPCS | Performed by: OPHTHALMOLOGY

## 2018-11-13 PROCEDURE — 6360000002 HC RX W HCPCS: Performed by: NURSE ANESTHETIST, CERTIFIED REGISTERED

## 2018-11-13 PROCEDURE — 2500000003 HC RX 250 WO HCPCS: Performed by: OPHTHALMOLOGY

## 2018-11-13 PROCEDURE — 3700000000 HC ANESTHESIA ATTENDED CARE: Performed by: OPHTHALMOLOGY

## 2018-11-13 PROCEDURE — 3600000003 HC SURGERY LEVEL 3 BASE: Performed by: OPHTHALMOLOGY

## 2018-11-13 PROCEDURE — 2580000003 HC RX 258: Performed by: OPHTHALMOLOGY

## 2018-11-13 PROCEDURE — 2580000003 HC RX 258: Performed by: ANESTHESIOLOGY

## 2018-11-13 PROCEDURE — 7100000011 HC PHASE II RECOVERY - ADDTL 15 MIN: Performed by: OPHTHALMOLOGY

## 2018-11-13 PROCEDURE — 2709999900 HC NON-CHARGEABLE SUPPLY: Performed by: OPHTHALMOLOGY

## 2018-11-13 PROCEDURE — 6360000002 HC RX W HCPCS: Performed by: OPHTHALMOLOGY

## 2018-11-13 DEVICE — ACRYSOF(R) IQ ASPHERIC IOL SP ACRYLIC FOLDABLELENS WULTRASERT(TM) DELIVERY SYSTEM UV WBLUE LIGHT FILTER. 13.0MM LENGTH 6.0MM ANTERIORASYMMETRIC BICONVEX OPTIC PLANAR HAPTICS.
Type: IMPLANTABLE DEVICE | Status: FUNCTIONAL
Brand: ACRYSOF ULTRASERT

## 2018-11-13 RX ORDER — SODIUM CHLORIDE, SODIUM LACTATE, POTASSIUM CHLORIDE, CALCIUM CHLORIDE 600; 310; 30; 20 MG/100ML; MG/100ML; MG/100ML; MG/100ML
INJECTION, SOLUTION INTRAVENOUS CONTINUOUS
Status: DISCONTINUED | OUTPATIENT
Start: 2018-11-13 | End: 2018-11-13 | Stop reason: HOSPADM

## 2018-11-13 RX ORDER — LIDOCAINE HYDROCHLORIDE 10 MG/ML
1 INJECTION, SOLUTION EPIDURAL; INFILTRATION; INTRACAUDAL; PERINEURAL
Status: DISCONTINUED | OUTPATIENT
Start: 2018-11-13 | End: 2018-11-13 | Stop reason: HOSPADM

## 2018-11-13 RX ORDER — MAGNESIUM HYDROXIDE 1200 MG/15ML
LIQUID ORAL PRN
Status: DISCONTINUED | OUTPATIENT
Start: 2018-11-13 | End: 2018-11-13 | Stop reason: HOSPADM

## 2018-11-13 RX ORDER — MIDAZOLAM HYDROCHLORIDE 1 MG/ML
INJECTION INTRAMUSCULAR; INTRAVENOUS PRN
Status: DISCONTINUED | OUTPATIENT
Start: 2018-11-13 | End: 2018-11-13 | Stop reason: SDUPTHER

## 2018-11-13 RX ORDER — SODIUM CHLORIDE 0.9 % (FLUSH) 0.9 %
10 SYRINGE (ML) INJECTION PRN
Status: DISCONTINUED | OUTPATIENT
Start: 2018-11-13 | End: 2018-11-13 | Stop reason: HOSPADM

## 2018-11-13 RX ORDER — SODIUM CHLORIDE 0.9 % (FLUSH) 0.9 %
10 SYRINGE (ML) INJECTION EVERY 12 HOURS SCHEDULED
Status: DISCONTINUED | OUTPATIENT
Start: 2018-11-13 | End: 2018-11-13 | Stop reason: HOSPADM

## 2018-11-13 RX ORDER — FENTANYL CITRATE 50 UG/ML
INJECTION, SOLUTION INTRAMUSCULAR; INTRAVENOUS PRN
Status: DISCONTINUED | OUTPATIENT
Start: 2018-11-13 | End: 2018-11-13 | Stop reason: SDUPTHER

## 2018-11-13 RX ORDER — TETRACAINE HYDROCHLORIDE 5 MG/ML
SOLUTION OPHTHALMIC PRN
Status: DISCONTINUED | OUTPATIENT
Start: 2018-11-13 | End: 2018-11-13 | Stop reason: HOSPADM

## 2018-11-13 RX ADMIN — Medication 0.3 ML: at 07:05

## 2018-11-13 RX ADMIN — Medication 0.3 ML: at 07:15

## 2018-11-13 RX ADMIN — SODIUM CHLORIDE, POTASSIUM CHLORIDE, SODIUM LACTATE AND CALCIUM CHLORIDE: 600; 310; 30; 20 INJECTION, SOLUTION INTRAVENOUS at 07:29

## 2018-11-13 RX ADMIN — MIDAZOLAM HYDROCHLORIDE 1 MG: 2 INJECTION, SOLUTION INTRAMUSCULAR; INTRAVENOUS at 07:38

## 2018-11-13 RX ADMIN — Medication 0.3 ML: at 07:25

## 2018-11-13 RX ADMIN — FENTANYL CITRATE 50 MCG: 50 INJECTION INTRAMUSCULAR; INTRAVENOUS at 07:38

## 2018-11-13 ASSESSMENT — PULMONARY FUNCTION TESTS
PIF_VALUE: 0
PIF_VALUE: 1
PIF_VALUE: 0

## 2018-11-13 ASSESSMENT — PAIN SCALES - GENERAL: PAINLEVEL_OUTOF10: 0

## 2018-11-13 ASSESSMENT — PAIN - FUNCTIONAL ASSESSMENT: PAIN_FUNCTIONAL_ASSESSMENT: 0-10

## 2018-11-13 NOTE — ANESTHESIA PRE PROCEDURE
Department of Anesthesiology  Preprocedure Note       Name:  La Maria   Age:  66 y.o.  :  1940                                          MRN:  [id number]         Date:  2018      Surgeon: Candance Pottier):  Daija Soliman MD    Procedure: PHACOEMULSIFICATION OF CATARACT LEFT EYE WITH INTRAOCULAR LENS IMPLANT (Left Eye)    Medications prior to admission:   Prior to Admission medications    Medication Sig Start Date End Date Taking? Authorizing Provider   spironolactone (ALDACTONE) 25 MG tablet TAKE 1 TABLET EVERY DAY 10/5/18  Yes Marino Francois DO   lisinopril (PRINIVIL;ZESTRIL) 40 MG tablet TAKE 1 TABLET EVERY DAY 10/5/18  Yes Marino Francois, DO   furosemide (LASIX) 40 MG tablet TAKE 1 TABLET EVERY DAY 10/5/18  Yes Marino Francois DO   metoprolol succinate (TOPROL XL) 50 MG extended release tablet Take 1 tablet by mouth daily 10/5/18  Yes Marino Francois,    amLODIPine (NORVASC) 5 MG tablet TAKE 1 TABLET TWICE DAILY  Patient taking differently: daily  10/5/18  Yes Marino Francois DO   aspirin 325 MG tablet Take 325 mg by mouth daily. Yes Historical Provider, MD   Multiple Vitamin (MULTIVITAMIN PO) Take 1 tablet by mouth daily. 11  Yes Historical Provider, MD   Blood Pressure KIT 1 kit by Does not apply route 2 times daily 18   Marino Francois, DO   ACCU-CHEK MULTICLIX LANCETS MISC Test blood sugar qd 3/1/18   Marino Francois DO   glucose blood VI test strips (ACCU-CHEK ION) strip Test blood sugar qd.  3/1/18   Marino Francois, DO       Current medications:    Current Facility-Administered Medications   Medication Dose Route Frequency Provider Last Rate Last Dose    bupivacaine 0.75%, phenylephrine 10%, tropicamide 1%, cyclopentolate 1%, moxifloxacin 0.5%, ketorolac 0.5% in lidocaine 2% jelly ophthalmic solution  0.3 mL Left Eye Q10 Min Daija Soliman MD        lactated ringers infusion   Intravenous Continuous Tono Nguyen MD        sodium chloride flush 0.9 %

## 2018-11-13 NOTE — ANESTHESIA POSTPROCEDURE EVALUATION
09/28/2018 08:47 AM        CREATININE               0.9                 09/28/2018 08:47 AM        GLUCOSE                  117 (H)             09/28/2018 08:47 AM   COAGS  Lab Results       Component                Value               Date/Time                  PROTIME                  11.2                05/02/2011 10:10 PM        INR                      1.03                05/02/2011 10:10 PM        APTT                     25.7                05/02/2011 10:10 PM     Intake & Output:  In: 250 (I.V.:250)  Out: -     Nausea & Vomiting:  No    Level of Consciousness:  Awake    Pain Assessment:  Adequate analgesia    Anesthesia Complications:  No apparent anesthetic complications    SUMMARY      Vital signs stable  OK to discharge from Stage I post anesthesia care.   Care transferred from Anesthesiology department on discharge from perioperative area

## 2018-11-28 NOTE — PROGRESS NOTES
Carito Simple    Age 66 y.o.    male    1940    MRN [id number]    Date___________   Arrival Time_____________  OR Time____________Duration____     Procedure(s):  PHACOEMULSIFICATION OF CATARACT RIGHT  EYE WITH INTRAOCULAR LENS IMPLANT    Surgeon  ________________________________  Dacia Canard   General   [place]       Phone [phone] (home)       [address]  Cell Work  ______________________________________________________________________________________________________________________________________________________________________________________________________________________________________________________________________________________________________________________________________________________________    PCP__________________________Phone__________________________________      H&P__________________Bringing      Chart              Epic    DOS           Called_______  EKG__________________Bringing      Chart              Epic    DOS           Called_______  LAB__________________ Bringing      Chart              Epic    DOS           Called_______  CardiacClearance _______Bringing      Chart              Epic    DOS           Called_______      Cardiologist________________________ Phone___________________________      ? Confucianist concerns / Waiver on Chart            PAT Communications________________  ? Pre-op Instructions Given [place]          _________________________________  ? Directions to Surgery Center                          _________________________________  ? Transportation Home_______________      _________________________________  ?  Crutches/Walker__________________        _________________________________      ________Pre-op Orders   _______Transcribed    _______Wt.  ________Pharmacy          _______SCD  ______VTE     ______Beta Blocker  ________Consent

## 2018-12-06 ENCOUNTER — ANESTHESIA EVENT (OUTPATIENT)
Dept: OPERATING ROOM | Age: 78
End: 2018-12-06
Payer: MEDICARE

## 2018-12-06 ENCOUNTER — HOSPITAL ENCOUNTER (OUTPATIENT)
Age: 78
Setting detail: OUTPATIENT SURGERY
Discharge: HOME | End: 2018-12-06
Attending: OPHTHALMOLOGY | Admitting: OPHTHALMOLOGY
Payer: MEDICARE

## 2018-12-06 ENCOUNTER — ANESTHESIA (OUTPATIENT)
Dept: OPERATING ROOM | Age: 78
End: 2018-12-06
Payer: MEDICARE

## 2018-12-06 VITALS
TEMPERATURE: 96.8 F | DIASTOLIC BLOOD PRESSURE: 58 MMHG | HEIGHT: 68 IN | RESPIRATION RATE: 16 BRPM | OXYGEN SATURATION: 95 % | HEART RATE: 45 BPM | WEIGHT: 188 LBS | SYSTOLIC BLOOD PRESSURE: 160 MMHG | BODY MASS INDEX: 28.49 KG/M2

## 2018-12-06 VITALS — DIASTOLIC BLOOD PRESSURE: 71 MMHG | OXYGEN SATURATION: 98 % | SYSTOLIC BLOOD PRESSURE: 148 MMHG

## 2018-12-06 PROCEDURE — 2580000003 HC RX 258: Performed by: ANESTHESIOLOGY

## 2018-12-06 PROCEDURE — 6370000000 HC RX 637 (ALT 250 FOR IP): Performed by: OPHTHALMOLOGY

## 2018-12-06 PROCEDURE — 3600000003 HC SURGERY LEVEL 3 BASE: Performed by: OPHTHALMOLOGY

## 2018-12-06 PROCEDURE — 7100000011 HC PHASE II RECOVERY - ADDTL 15 MIN: Performed by: OPHTHALMOLOGY

## 2018-12-06 PROCEDURE — 2709999900 HC NON-CHARGEABLE SUPPLY: Performed by: OPHTHALMOLOGY

## 2018-12-06 PROCEDURE — 2580000003 HC RX 258: Performed by: OPHTHALMOLOGY

## 2018-12-06 PROCEDURE — 3700000001 HC ADD 15 MINUTES (ANESTHESIA): Performed by: OPHTHALMOLOGY

## 2018-12-06 PROCEDURE — 7100000010 HC PHASE II RECOVERY - FIRST 15 MIN: Performed by: OPHTHALMOLOGY

## 2018-12-06 PROCEDURE — 6360000002 HC RX W HCPCS: Performed by: NURSE ANESTHETIST, CERTIFIED REGISTERED

## 2018-12-06 PROCEDURE — 3600000013 HC SURGERY LEVEL 3 ADDTL 15MIN: Performed by: OPHTHALMOLOGY

## 2018-12-06 PROCEDURE — 3700000000 HC ANESTHESIA ATTENDED CARE: Performed by: OPHTHALMOLOGY

## 2018-12-06 PROCEDURE — 2500000003 HC RX 250 WO HCPCS: Performed by: OPHTHALMOLOGY

## 2018-12-06 PROCEDURE — V2632 POST CHMBR INTRAOCULAR LENS: HCPCS | Performed by: OPHTHALMOLOGY

## 2018-12-06 DEVICE — ACRYSOF(R) IQ ASPHERIC IOL SP ACRYLIC FOLDABLELENS WULTRASERT(TM) DELIVERY SYSTEM UV WBLUE LIGHT FILTER. 13.0MM LENGTH 6.0MM ANTERIORASYMMETRIC BICONVEX OPTIC PLANAR HAPTICS.
Type: IMPLANTABLE DEVICE | Status: FUNCTIONAL
Brand: ACRYSOF ULTRASERT

## 2018-12-06 RX ORDER — MIDAZOLAM HYDROCHLORIDE 1 MG/ML
INJECTION INTRAMUSCULAR; INTRAVENOUS PRN
Status: DISCONTINUED | OUTPATIENT
Start: 2018-12-06 | End: 2018-12-06 | Stop reason: SDUPTHER

## 2018-12-06 RX ORDER — SODIUM CHLORIDE 0.9 % (FLUSH) 0.9 %
10 SYRINGE (ML) INJECTION PRN
Status: DISCONTINUED | OUTPATIENT
Start: 2018-12-06 | End: 2018-12-06 | Stop reason: HOSPADM

## 2018-12-06 RX ORDER — SODIUM CHLORIDE 0.9 % (FLUSH) 0.9 %
10 SYRINGE (ML) INJECTION EVERY 12 HOURS SCHEDULED
Status: DISCONTINUED | OUTPATIENT
Start: 2018-12-06 | End: 2018-12-06 | Stop reason: HOSPADM

## 2018-12-06 RX ORDER — FENTANYL CITRATE 50 UG/ML
INJECTION, SOLUTION INTRAMUSCULAR; INTRAVENOUS PRN
Status: DISCONTINUED | OUTPATIENT
Start: 2018-12-06 | End: 2018-12-06 | Stop reason: SDUPTHER

## 2018-12-06 RX ORDER — SODIUM CHLORIDE, SODIUM LACTATE, POTASSIUM CHLORIDE, CALCIUM CHLORIDE 600; 310; 30; 20 MG/100ML; MG/100ML; MG/100ML; MG/100ML
INJECTION, SOLUTION INTRAVENOUS CONTINUOUS
Status: DISCONTINUED | OUTPATIENT
Start: 2018-12-06 | End: 2018-12-06 | Stop reason: HOSPADM

## 2018-12-06 RX ORDER — LIDOCAINE HYDROCHLORIDE 10 MG/ML
0.3 INJECTION, SOLUTION EPIDURAL; INFILTRATION; INTRACAUDAL; PERINEURAL
Status: DISCONTINUED | OUTPATIENT
Start: 2018-12-06 | End: 2018-12-06 | Stop reason: HOSPADM

## 2018-12-06 RX ORDER — MAGNESIUM HYDROXIDE 1200 MG/15ML
LIQUID ORAL PRN
Status: DISCONTINUED | OUTPATIENT
Start: 2018-12-06 | End: 2018-12-06 | Stop reason: HOSPADM

## 2018-12-06 RX ORDER — TETRACAINE HYDROCHLORIDE 5 MG/ML
SOLUTION OPHTHALMIC PRN
Status: DISCONTINUED | OUTPATIENT
Start: 2018-12-06 | End: 2018-12-06 | Stop reason: HOSPADM

## 2018-12-06 RX ADMIN — MIDAZOLAM HYDROCHLORIDE 2 MG: 2 INJECTION, SOLUTION INTRAMUSCULAR; INTRAVENOUS at 08:57

## 2018-12-06 RX ADMIN — SODIUM CHLORIDE, POTASSIUM CHLORIDE, SODIUM LACTATE AND CALCIUM CHLORIDE: 600; 310; 30; 20 INJECTION, SOLUTION INTRAVENOUS at 08:34

## 2018-12-06 RX ADMIN — Medication 0.3 ML: at 08:36

## 2018-12-06 RX ADMIN — Medication 0.3 ML: at 08:17

## 2018-12-06 RX ADMIN — Medication 0.3 ML: at 08:26

## 2018-12-06 RX ADMIN — FENTANYL CITRATE 25 MCG: 50 INJECTION INTRAMUSCULAR; INTRAVENOUS at 08:57

## 2018-12-06 ASSESSMENT — PULMONARY FUNCTION TESTS
PIF_VALUE: 0
PIF_VALUE: 1
PIF_VALUE: 0
PIF_VALUE: 1

## 2018-12-06 ASSESSMENT — PAIN - FUNCTIONAL ASSESSMENT: PAIN_FUNCTIONAL_ASSESSMENT: 0-10

## 2018-12-06 ASSESSMENT — PAIN SCALES - GENERAL: PAINLEVEL_OUTOF10: 0

## 2019-01-11 ENCOUNTER — OFFICE VISIT (OUTPATIENT)
Dept: FAMILY MEDICINE CLINIC | Age: 79
End: 2019-01-11
Payer: MEDICARE

## 2019-01-11 VITALS
BODY MASS INDEX: 27.85 KG/M2 | OXYGEN SATURATION: 96 % | DIASTOLIC BLOOD PRESSURE: 68 MMHG | SYSTOLIC BLOOD PRESSURE: 164 MMHG | WEIGHT: 188 LBS | HEIGHT: 69 IN | RESPIRATION RATE: 16 BRPM | HEART RATE: 53 BPM | TEMPERATURE: 97.8 F

## 2019-01-11 DIAGNOSIS — E11.9 TYPE 2 DIABETES MELLITUS WITHOUT COMPLICATION, WITHOUT LONG-TERM CURRENT USE OF INSULIN (HCC): Primary | ICD-10-CM

## 2019-01-11 DIAGNOSIS — Z12.5 PROSTATE CANCER SCREENING: Primary | ICD-10-CM

## 2019-01-11 DIAGNOSIS — Z00.00 ENCOUNTER FOR MEDICARE ANNUAL WELLNESS EXAM: ICD-10-CM

## 2019-01-11 LAB — HBA1C MFR BLD: 6.5 %

## 2019-01-11 PROCEDURE — 99213 OFFICE O/P EST LOW 20 MIN: CPT | Performed by: FAMILY MEDICINE

## 2019-01-11 PROCEDURE — 1036F TOBACCO NON-USER: CPT | Performed by: FAMILY MEDICINE

## 2019-01-11 PROCEDURE — 1101F PT FALLS ASSESS-DOCD LE1/YR: CPT | Performed by: FAMILY MEDICINE

## 2019-01-11 PROCEDURE — 1123F ACP DISCUSS/DSCN MKR DOCD: CPT | Performed by: FAMILY MEDICINE

## 2019-01-11 PROCEDURE — G8417 CALC BMI ABV UP PARAM F/U: HCPCS | Performed by: FAMILY MEDICINE

## 2019-01-11 PROCEDURE — G8482 FLU IMMUNIZE ORDER/ADMIN: HCPCS | Performed by: FAMILY MEDICINE

## 2019-01-11 PROCEDURE — 4040F PNEUMOC VAC/ADMIN/RCVD: CPT | Performed by: FAMILY MEDICINE

## 2019-01-11 PROCEDURE — G8598 ASA/ANTIPLAT THER USED: HCPCS | Performed by: FAMILY MEDICINE

## 2019-01-11 PROCEDURE — 83036 HEMOGLOBIN GLYCOSYLATED A1C: CPT | Performed by: FAMILY MEDICINE

## 2019-01-11 PROCEDURE — G8427 DOCREV CUR MEDS BY ELIG CLIN: HCPCS | Performed by: FAMILY MEDICINE

## 2019-01-11 RX ORDER — AMLODIPINE BESYLATE 5 MG/1
TABLET ORAL
Qty: 90 TABLET | Refills: 1 | Status: SHIPPED | OUTPATIENT
Start: 2019-01-11 | End: 2019-04-05 | Stop reason: SDUPTHER

## 2019-01-11 ASSESSMENT — PATIENT HEALTH QUESTIONNAIRE - PHQ9
1. LITTLE INTEREST OR PLEASURE IN DOING THINGS: 0
SUM OF ALL RESPONSES TO PHQ9 QUESTIONS 1 & 2: 0
2. FEELING DOWN, DEPRESSED OR HOPELESS: 0
SUM OF ALL RESPONSES TO PHQ QUESTIONS 1-9: 0
SUM OF ALL RESPONSES TO PHQ QUESTIONS 1-9: 0

## 2019-03-28 DIAGNOSIS — E11.9 TYPE 2 DIABETES MELLITUS WITHOUT COMPLICATION, WITHOUT LONG-TERM CURRENT USE OF INSULIN (HCC): ICD-10-CM

## 2019-03-28 DIAGNOSIS — Z12.5 PROSTATE CANCER SCREENING: ICD-10-CM

## 2019-03-28 DIAGNOSIS — Z00.00 ENCOUNTER FOR MEDICARE ANNUAL WELLNESS EXAM: ICD-10-CM

## 2019-03-28 LAB
A/G RATIO: 1.6 (ref 1.1–2.2)
ALBUMIN SERPL-MCNC: 4.4 G/DL (ref 3.4–5)
ALP BLD-CCNC: 67 U/L (ref 40–129)
ALT SERPL-CCNC: 24 U/L (ref 10–40)
ANION GAP SERPL CALCULATED.3IONS-SCNC: 16 MMOL/L (ref 3–16)
AST SERPL-CCNC: 23 U/L (ref 15–37)
BASOPHILS ABSOLUTE: 0 K/UL (ref 0–0.2)
BASOPHILS RELATIVE PERCENT: 0.5 %
BILIRUB SERPL-MCNC: 0.7 MG/DL (ref 0–1)
BUN BLDV-MCNC: 19 MG/DL (ref 7–20)
CALCIUM SERPL-MCNC: 9.7 MG/DL (ref 8.3–10.6)
CHLORIDE BLD-SCNC: 103 MMOL/L (ref 99–110)
CHOLESTEROL, TOTAL: 197 MG/DL (ref 0–199)
CO2: 26 MMOL/L (ref 21–32)
CREAT SERPL-MCNC: 0.8 MG/DL (ref 0.8–1.3)
EOSINOPHILS ABSOLUTE: 0.2 K/UL (ref 0–0.6)
EOSINOPHILS RELATIVE PERCENT: 3.1 %
GFR AFRICAN AMERICAN: >60
GFR NON-AFRICAN AMERICAN: >60
GLOBULIN: 2.8 G/DL
GLUCOSE BLD-MCNC: 129 MG/DL (ref 70–99)
HCT VFR BLD CALC: 46.9 % (ref 40.5–52.5)
HDLC SERPL-MCNC: 42 MG/DL (ref 40–60)
HEMOGLOBIN: 15.4 G/DL (ref 13.5–17.5)
LDL CHOLESTEROL CALCULATED: 125 MG/DL
LYMPHOCYTES ABSOLUTE: 2.5 K/UL (ref 1–5.1)
LYMPHOCYTES RELATIVE PERCENT: 34.9 %
MCH RBC QN AUTO: 30.1 PG (ref 26–34)
MCHC RBC AUTO-ENTMCNC: 32.9 G/DL (ref 31–36)
MCV RBC AUTO: 91.5 FL (ref 80–100)
MONOCYTES ABSOLUTE: 0.6 K/UL (ref 0–1.3)
MONOCYTES RELATIVE PERCENT: 8.6 %
NEUTROPHILS ABSOLUTE: 3.9 K/UL (ref 1.7–7.7)
NEUTROPHILS RELATIVE PERCENT: 52.9 %
PDW BLD-RTO: 14.6 % (ref 12.4–15.4)
PLATELET # BLD: 218 K/UL (ref 135–450)
PMV BLD AUTO: 8.3 FL (ref 5–10.5)
POTASSIUM SERPL-SCNC: 4.4 MMOL/L (ref 3.5–5.1)
PROSTATE SPECIFIC ANTIGEN: 0.73 NG/ML (ref 0–4)
RBC # BLD: 5.13 M/UL (ref 4.2–5.9)
SODIUM BLD-SCNC: 145 MMOL/L (ref 136–145)
TOTAL PROTEIN: 7.2 G/DL (ref 6.4–8.2)
TRIGL SERPL-MCNC: 151 MG/DL (ref 0–150)
TSH SERPL DL<=0.05 MIU/L-ACNC: 1.35 UIU/ML (ref 0.27–4.2)
VLDLC SERPL CALC-MCNC: 30 MG/DL
WBC # BLD: 7.3 K/UL (ref 4–11)

## 2019-04-05 ENCOUNTER — OFFICE VISIT (OUTPATIENT)
Dept: FAMILY MEDICINE CLINIC | Age: 79
End: 2019-04-05
Payer: MEDICARE

## 2019-04-05 ENCOUNTER — TELEPHONE (OUTPATIENT)
Dept: FAMILY MEDICINE CLINIC | Age: 79
End: 2019-04-05

## 2019-04-05 VITALS
HEART RATE: 84 BPM | DIASTOLIC BLOOD PRESSURE: 86 MMHG | SYSTOLIC BLOOD PRESSURE: 136 MMHG | HEIGHT: 69 IN | OXYGEN SATURATION: 97 % | BODY MASS INDEX: 26.81 KG/M2 | WEIGHT: 181 LBS

## 2019-04-05 DIAGNOSIS — E11.9 TYPE 2 DIABETES MELLITUS WITHOUT COMPLICATION, WITHOUT LONG-TERM CURRENT USE OF INSULIN (HCC): ICD-10-CM

## 2019-04-05 DIAGNOSIS — I25.10 CORONARY ARTERY DISEASE INVOLVING NATIVE CORONARY ARTERY OF NATIVE HEART WITHOUT ANGINA PECTORIS: ICD-10-CM

## 2019-04-05 DIAGNOSIS — Z00.00 ROUTINE GENERAL MEDICAL EXAMINATION AT A HEALTH CARE FACILITY: Primary | ICD-10-CM

## 2019-04-05 DIAGNOSIS — Z79.82 ASPIRIN LONG-TERM USE: Primary | ICD-10-CM

## 2019-04-05 PROCEDURE — G8598 ASA/ANTIPLAT THER USED: HCPCS | Performed by: FAMILY MEDICINE

## 2019-04-05 PROCEDURE — 4040F PNEUMOC VAC/ADMIN/RCVD: CPT | Performed by: FAMILY MEDICINE

## 2019-04-05 PROCEDURE — G0439 PPPS, SUBSEQ VISIT: HCPCS | Performed by: FAMILY MEDICINE

## 2019-04-05 RX ORDER — FUROSEMIDE 40 MG/1
TABLET ORAL
Qty: 90 TABLET | Refills: 1 | Status: SHIPPED | OUTPATIENT
Start: 2019-04-05 | End: 2019-10-08 | Stop reason: SDUPTHER

## 2019-04-05 RX ORDER — MULTIVIT-MIN/IRON FUM/FOLIC AC 7.5 MG-4
1 TABLET ORAL DAILY
Qty: 30 TABLET | Refills: 3 | Status: SHIPPED | OUTPATIENT
Start: 2019-04-05

## 2019-04-05 RX ORDER — SPIRONOLACTONE 25 MG/1
TABLET ORAL
Qty: 90 TABLET | Refills: 1 | Status: SHIPPED | OUTPATIENT
Start: 2019-04-05 | End: 2019-10-08 | Stop reason: SDUPTHER

## 2019-04-05 RX ORDER — METOPROLOL SUCCINATE 50 MG/1
50 TABLET, EXTENDED RELEASE ORAL DAILY
Qty: 90 TABLET | Refills: 1 | Status: SHIPPED | OUTPATIENT
Start: 2019-04-05 | End: 2019-10-08 | Stop reason: SDUPTHER

## 2019-04-05 RX ORDER — ASPIRIN 325 MG
325 TABLET ORAL DAILY
Qty: 30 TABLET | Refills: 2 | Status: ON HOLD | OUTPATIENT
Start: 2019-04-05 | End: 2022-09-11 | Stop reason: HOSPADM

## 2019-04-05 RX ORDER — AMLODIPINE BESYLATE 5 MG/1
TABLET ORAL
Qty: 90 TABLET | Refills: 1 | Status: SHIPPED | OUTPATIENT
Start: 2019-04-05 | End: 2019-07-09 | Stop reason: SDUPTHER

## 2019-04-05 RX ORDER — LISINOPRIL 40 MG/1
TABLET ORAL
Qty: 90 TABLET | Refills: 1 | Status: SHIPPED | OUTPATIENT
Start: 2019-04-05 | End: 2019-10-08 | Stop reason: SDUPTHER

## 2019-04-05 RX ORDER — CALCIUM CARB/VITAMIN D3/VIT K1 500-500-40
TABLET,CHEWABLE ORAL DAILY
Status: CANCELLED | OUTPATIENT
Start: 2019-04-05

## 2019-04-05 ASSESSMENT — LIFESTYLE VARIABLES: HOW OFTEN DO YOU HAVE A DRINK CONTAINING ALCOHOL: 0

## 2019-04-05 ASSESSMENT — PATIENT HEALTH QUESTIONNAIRE - PHQ9
SUM OF ALL RESPONSES TO PHQ QUESTIONS 1-9: 0
SUM OF ALL RESPONSES TO PHQ QUESTIONS 1-9: 0

## 2019-04-05 ASSESSMENT — ANXIETY QUESTIONNAIRES: GAD7 TOTAL SCORE: 0

## 2019-04-05 NOTE — TELEPHONE ENCOUNTER
Spoke to Dr. Crys Sesay office. They stated he is no longer a patient.  He has not been seen since 2011

## 2019-04-05 NOTE — PROGRESS NOTES
Medicare Annual Wellness Visit  Name: Reyes Dodson Date: 2019   MRN: [id number] Sex: Male   Age: 66 y.o. Ethnicity: Non-/Non    : 1940 Race: Heriberto Fuller is here for Medicare AWV    Screenings for behavioral, psychosocial and functional/safety risks, and cognitive dysfunction are all negative except as indicated below. These results, as well as other patient data from the [address] form, are documented in Flowsheets linked to this Encounter. Allergies   Allergen Reactions    Coumadin [Warfarin] Other (See Comments)     Makes feel funny    Lipitor      Muscle aches    Omeprazole Other (See Comments)     headaches    Vioxx      Prior to Visit Medications    Medication Sig Taking? Authorizing Provider   amLODIPine (NORVASC) 5 MG tablet TAKE 1 TABLET TWICE DAILY Yes Bola Alexis, DO   spironolactone (ALDACTONE) 25 MG tablet TAKE 1 TABLET EVERY DAY Yes Jemal Gonzalez, DO   lisinopril (PRINIVIL;ZESTRIL) 40 MG tablet TAKE 1 TABLET EVERY DAY Yes Bola Alexis DO   furosemide (LASIX) 40 MG tablet TAKE 1 TABLET EVERY DAY Yes Bola Alexis DO   metoprolol succinate (TOPROL XL) 50 MG extended release tablet Take 1 tablet by mouth daily Yes Bola Alexis, DO   Blood Pressure KIT 1 kit by Does not apply route 2 times daily Yes Bola Alexis DO   ACCU-CHEK MULTICLIX LANCETS MISC Test blood sugar qd Yes Bola Alexis DO   glucose blood VI test strips (ACCU-CHEK ION) strip Test blood sugar qd. Yes Bola Alexis DO   aspirin 325 MG tablet Take 325 mg by mouth daily. Yes Historical Provider, MD   Multiple Vitamin (MULTIVITAMIN PO) Take 1 tablet by mouth daily.  Yes Historical Provider, MD     Past Medical History:   Diagnosis Date    Aortic aneurysm, abdominal ([facility] [address] 2011    Appendicitis 11    Arthritis 11    Bruises easily     CAD (coronary artery disease) 2001    MI     CHF (congestive heart failure) ([facility] [address] Following CABG    Chronic back pain     Chronic pain of both shoulders 12-06-11    joint - the shoulders hurt    Complication of anesthesia     woke up during appendectomy    Heart disease 12-06-11    Hernia 12-    HIGH CHOLESTEROL 12-6-11    Hyperlipidemia     Hypertension     Measles 12-    Pancreatitis 2006    History of     Persistent cough     Ringing in ears     Sinus disorder     Sleep deprivation 12-    loss of sleep     Past Surgical History:   Procedure Laterality Date    ABDOMINAL AORTIC ANEURYSM REPAIR      ABDOMINAL AORTIC ANEURYSM REPAIR, ENDOVASCULAR  1/28/2011    Endomvascular abdominal aortic aneurysm repair with insertion of cardiomems pressure sensor    APPENDECTOMY      40-50 years ago   [address]  2008    CABG    CHOLECYSTECTOMY, LAPAROSCOPIC  2006    CORONARY ANGIOPLASTY WITH STENT PLACEMENT  2001 and 2002    CORONARY ARTERY BYPASS GRAFT      GALLBLADDER SURGERY      gallbladder/pancreatitis    HERNIA REPAIR      20+ years ago    INTRACAPSULAR CATARACT EXTRACTION Right 12/6/2018    PHACOEMULSIFICATION OF CATARACT RIGHT  EYE WITH INTRAOCULAR LENS IMPLANT performed by Sukhjinder Tellez MD at [place] [address]      panceastitis [place]     [facility] CATARACT EXTRACAP,INSERT LENS Left 11/13/2018    PHACOEMULSIFICATION OF CATARACT LEFT EYE WITH INTRAOCULAR LENS IMPLANT performed by Sukhjinder Tellez MD at [address] [place] [address]      as child     Family History   Problem Relation Age of Onset    Heart Disease Father     Heart Failure Father     Diabetes Mother     Heart Failure Mother     Cancer Mother     Heart Disease Mother     High Blood Pressure Mother     Heart Disease Paternal Uncle     Stroke Sister     Stroke Brother        CareTeam (Including outside providers/suppliers regularly involved in providing care):   Patient Care Team:  Nohemi Field DO as PCP - General (Family Medicine)  Brodie Connor MD as 7-valent 01/26/2007    Tdap (Boostrix, Adacel) 01/30/2018        Health Maintenance   Topic Date Due    Shingles Vaccine (1 of 2) 10/02/1990    Pneumococcal 65+ years Vaccine (2 of 2 - PPSV23) 09/20/2019    Potassium monitoring  03/28/2020    Creatinine monitoring  03/28/2020    DTaP/Tdap/Td vaccine (2 - Td) 01/30/2028    Flu vaccine  Completed     Recommendations for Preventive Services Due: see orders and patient instructions/AVS.  .   Recommended screening schedule for the next 5-10 years is provided to the patient in written form: see Patient Instructions/AVS.

## 2019-04-05 NOTE — TELEPHONE ENCOUNTER
Please call Dr. Allison Gave office and ask if he still wants him to take aspirin 325 mg. Can he be switched to 81 mg? Thank you!

## 2019-04-09 NOTE — TELEPHONE ENCOUNTER
Please call pt and let him know that, even though he hasn't seen cardiology in 8 years and is asymptomatic, it would be a good idea to touch base with them and to have them evaluate the use of the 325 mg aspirin use vs 81 mg. I have placed a referral. Thank you!

## 2019-04-16 ENCOUNTER — HOSPITAL ENCOUNTER (OUTPATIENT)
Age: 79
Discharge: HOME | End: 2019-04-16
Payer: MEDICARE

## 2019-04-16 LAB — URIC ACID, SERUM: 7.6 MG/DL (ref 3.5–7.2)

## 2019-04-16 PROCEDURE — 84550 ASSAY OF BLOOD/URIC ACID: CPT

## 2019-04-16 PROCEDURE — 36415 COLL VENOUS BLD VENIPUNCTURE: CPT

## 2019-07-02 DIAGNOSIS — E11.9 TYPE 2 DIABETES MELLITUS WITHOUT COMPLICATION, WITHOUT LONG-TERM CURRENT USE OF INSULIN (HCC): ICD-10-CM

## 2019-07-02 LAB — GLUCOSE BLD-MCNC: 127 MG/DL (ref 70–99)

## 2019-07-09 ENCOUNTER — OFFICE VISIT (OUTPATIENT)
Dept: FAMILY MEDICINE CLINIC | Age: 79
End: 2019-07-09
Payer: MEDICARE

## 2019-07-09 VITALS
WEIGHT: 188 LBS | SYSTOLIC BLOOD PRESSURE: 156 MMHG | RESPIRATION RATE: 14 BRPM | OXYGEN SATURATION: 93 % | HEART RATE: 78 BPM | HEIGHT: 69 IN | BODY MASS INDEX: 27.85 KG/M2 | DIASTOLIC BLOOD PRESSURE: 74 MMHG

## 2019-07-09 DIAGNOSIS — E11.9 TYPE 2 DIABETES MELLITUS WITHOUT COMPLICATION, WITHOUT LONG-TERM CURRENT USE OF INSULIN (HCC): Primary | ICD-10-CM

## 2019-07-09 DIAGNOSIS — M1A.9XX0 CHRONIC GOUT INVOLVING TOE WITHOUT TOPHUS, UNSPECIFIED CAUSE, UNSPECIFIED LATERALITY: ICD-10-CM

## 2019-07-09 LAB — HBA1C MFR BLD: 6.3 %

## 2019-07-09 PROCEDURE — 1036F TOBACCO NON-USER: CPT | Performed by: FAMILY MEDICINE

## 2019-07-09 PROCEDURE — 1123F ACP DISCUSS/DSCN MKR DOCD: CPT | Performed by: FAMILY MEDICINE

## 2019-07-09 PROCEDURE — 83036 HEMOGLOBIN GLYCOSYLATED A1C: CPT | Performed by: FAMILY MEDICINE

## 2019-07-09 PROCEDURE — G8417 CALC BMI ABV UP PARAM F/U: HCPCS | Performed by: FAMILY MEDICINE

## 2019-07-09 PROCEDURE — G8598 ASA/ANTIPLAT THER USED: HCPCS | Performed by: FAMILY MEDICINE

## 2019-07-09 PROCEDURE — 99214 OFFICE O/P EST MOD 30 MIN: CPT | Performed by: FAMILY MEDICINE

## 2019-07-09 PROCEDURE — 4040F PNEUMOC VAC/ADMIN/RCVD: CPT | Performed by: FAMILY MEDICINE

## 2019-07-09 PROCEDURE — G8427 DOCREV CUR MEDS BY ELIG CLIN: HCPCS | Performed by: FAMILY MEDICINE

## 2019-07-09 RX ORDER — AMLODIPINE BESYLATE 5 MG/1
TABLET ORAL
Qty: 90 TABLET | Refills: 1 | Status: SHIPPED | OUTPATIENT
Start: 2019-07-09 | End: 2019-10-08 | Stop reason: SDUPTHER

## 2019-07-09 ASSESSMENT — PATIENT HEALTH QUESTIONNAIRE - PHQ9
SUM OF ALL RESPONSES TO PHQ9 QUESTIONS 1 & 2: 0
SUM OF ALL RESPONSES TO PHQ QUESTIONS 1-9: 0
SUM OF ALL RESPONSES TO PHQ QUESTIONS 1-9: 0
1. LITTLE INTEREST OR PLEASURE IN DOING THINGS: 0
2. FEELING DOWN, DEPRESSED OR HOPELESS: 0

## 2019-07-09 ASSESSMENT — ENCOUNTER SYMPTOMS: ABDOMINAL PAIN: 0

## 2019-07-09 NOTE — PROGRESS NOTES
7/9/2019    This is a 66 y.o. male   Chief Complaint   Patient presents with    Hyperglycemia     Needs A1C Last 6.2 in 2018   . HPI  Presents today for glucose follow-up. He is currently taking no medication for elevated glucose and his last glucose lab reading from the eye second 2019 was 127 his previous glucose lab from March 20, 2019 was 129. Has a diagnoses of type 2 diabetes from 3 years ago from Dr. Tanya Crowder. States that he had 2 episodes of gout since his last visit and Stevena wants to ut him on Uloric acid but his cost will be $217.02. Has Indomethacin at home. Denie EtoH use and eats staeak occasionally. Pt's glucose morning readings have ranged from 99 to 135 - average 115. States that his home BP readings have averaged 115/74. Walks in the morning and has switched to a bicycle in the afternoon. Eats oats fr breakfast with strawberries, blue berries, and blackberries.      Today;s A1C = 6.3    Past Medical History:   Diagnosis Date    Aortic aneurysm, abdominal ([address] 1/2011    Appendicitis 12-06-11    Arthritis 12-06-11    Bruises easily     CAD (coronary artery disease) 9/2001    MI     CHF (congestive heart failure) (HCC)     Following CABG    Chronic back pain     Chronic pain of both shoulders 12-06-11    joint - the shoulders hurt    Complication of anesthesia     woke up during appendectomy    Gout     Heart disease 12-06-11    Hernia 12-    HIGH CHOLESTEROL 12-6-11    Hyperlipidemia     Hypertension     Measles 12-    Pancreatitis 2006    History of     Persistent cough     Ringing in ears     Sinus disorder     Sleep deprivation 12-    loss of sleep       Past Surgical History:   Procedure Laterality Date    ABDOMINAL AORTIC ANEURYSM REPAIR      ABDOMINAL AORTIC ANEURYSM REPAIR, ENDOVASCULAR  1/28/2011    Endomvascular abdominal aortic aneurysm repair with insertion of cardiomems pressure sensor    APPENDECTOMY      40-50 years ago flare ups since his last visit                            Return in about 3 months (around 10/9/2019) for Diabetis F/U. Prior to Visit Medications    Medication Sig Taking? Authorizing Provider   amLODIPine (NORVASC) 5 MG tablet TAKE 1 TABLET TWICE DAILY Yes Nereyda Bread, DO   furosemide (LASIX) 40 MG tablet TAKE 1 TABLET EVERY DAY Yes Mikie Gonzalez, DO   lisinopril (PRINIVIL;ZESTRIL) 40 MG tablet TAKE 1 TABLET EVERY DAY Yes Nereyda Bread, DO   metoprolol succinate (TOPROL XL) 50 MG extended release tablet Take 1 tablet by mouth daily Yes Nereyda Bread, DO   spironolactone (ALDACTONE) 25 MG tablet TAKE 1 TABLET EVERY DAY Yes Nereyda Bread, DO   aspirin 325 MG tablet Take 1 tablet by mouth daily Yes Nereyda Bread, DO   Multiple Vitamins-Minerals (MULTIVITAMIN WITH MINERALS) tablet Take 1 tablet by mouth daily Yes Nereyda Bread, DO   Blood Pressure KIT 1 kit by Does not apply route 2 times daily Yes Nereyda Bread, DO   ACCU-CHEK MULTICLIX LANCETS MISC Test blood sugar qd Yes Nereyda Bread, DO   glucose blood VI test strips (ACCU-CHEK ION) strip Test blood sugar qd. Yes Nereyda Bread, DO   Multiple Vitamin (MULTIVITAMIN PO) Take 1 tablet by mouth daily.  Yes Historical Provider, MD

## 2019-07-19 ENCOUNTER — OFFICE VISIT (OUTPATIENT)
Dept: ENDOCRINOLOGY | Age: 79
End: 2019-07-19
Payer: MEDICARE

## 2019-07-19 DIAGNOSIS — E11.9 TYPE 2 DIABETES MELLITUS WITHOUT COMPLICATION, WITHOUT LONG-TERM CURRENT USE OF INSULIN (HCC): Primary | ICD-10-CM

## 2019-07-19 PROCEDURE — 97802 MEDICAL NUTRITION INDIV IN: CPT | Performed by: DIETITIAN, REGISTERED

## 2019-10-01 DIAGNOSIS — E11.9 TYPE 2 DIABETES MELLITUS WITHOUT COMPLICATION, WITHOUT LONG-TERM CURRENT USE OF INSULIN (HCC): ICD-10-CM

## 2019-10-01 LAB
A/G RATIO: 1.9 (ref 1.1–2.2)
ALBUMIN SERPL-MCNC: 4.3 G/DL (ref 3.4–5)
ALP BLD-CCNC: 61 U/L (ref 40–129)
ALT SERPL-CCNC: 16 U/L (ref 10–40)
ANION GAP SERPL CALCULATED.3IONS-SCNC: 17 MMOL/L (ref 3–16)
AST SERPL-CCNC: 17 U/L (ref 15–37)
BILIRUB SERPL-MCNC: 0.9 MG/DL (ref 0–1)
BUN BLDV-MCNC: 21 MG/DL (ref 7–20)
CALCIUM SERPL-MCNC: 9.5 MG/DL (ref 8.3–10.6)
CHLORIDE BLD-SCNC: 99 MMOL/L (ref 99–110)
CHOLESTEROL, TOTAL: 166 MG/DL (ref 0–199)
CO2: 23 MMOL/L (ref 21–32)
CREAT SERPL-MCNC: 1 MG/DL (ref 0.8–1.3)
GFR AFRICAN AMERICAN: >60
GFR NON-AFRICAN AMERICAN: >60
GLOBULIN: 2.3 G/DL
GLUCOSE BLD-MCNC: 127 MG/DL (ref 70–99)
HDLC SERPL-MCNC: 42 MG/DL (ref 40–60)
LDL CHOLESTEROL CALCULATED: 107 MG/DL
POTASSIUM SERPL-SCNC: 4.5 MMOL/L (ref 3.5–5.1)
SODIUM BLD-SCNC: 139 MMOL/L (ref 136–145)
TOTAL PROTEIN: 6.6 G/DL (ref 6.4–8.2)
TRIGL SERPL-MCNC: 85 MG/DL (ref 0–150)
TSH SERPL DL<=0.05 MIU/L-ACNC: 1.18 UIU/ML (ref 0.27–4.2)
VLDLC SERPL CALC-MCNC: 17 MG/DL

## 2019-10-08 ENCOUNTER — OFFICE VISIT (OUTPATIENT)
Dept: FAMILY MEDICINE CLINIC | Age: 79
End: 2019-10-08
Payer: MEDICARE

## 2019-10-08 VITALS
TEMPERATURE: 97.9 F | RESPIRATION RATE: 14 BRPM | DIASTOLIC BLOOD PRESSURE: 78 MMHG | HEART RATE: 69 BPM | SYSTOLIC BLOOD PRESSURE: 180 MMHG | WEIGHT: 179 LBS | BODY MASS INDEX: 26.51 KG/M2 | HEIGHT: 69 IN | OXYGEN SATURATION: 93 %

## 2019-10-08 DIAGNOSIS — E11.9 TYPE 2 DIABETES MELLITUS WITHOUT COMPLICATION, WITHOUT LONG-TERM CURRENT USE OF INSULIN (HCC): Primary | ICD-10-CM

## 2019-10-08 DIAGNOSIS — I10 ESSENTIAL HYPERTENSION: Primary | ICD-10-CM

## 2019-10-08 DIAGNOSIS — Z12.5 PROSTATE CANCER SCREENING: ICD-10-CM

## 2019-10-08 DIAGNOSIS — E78.5 HYPERLIPIDEMIA, UNSPECIFIED HYPERLIPIDEMIA TYPE: ICD-10-CM

## 2019-10-08 LAB
CREATININE URINE POCT: 300
HBA1C MFR BLD: 6.9 %
MICROALBUMIN/CREAT 24H UR: 150 MG/G{CREAT}
MICROALBUMIN/CREAT UR-RTO: ABNORMAL

## 2019-10-08 PROCEDURE — G8417 CALC BMI ABV UP PARAM F/U: HCPCS | Performed by: FAMILY MEDICINE

## 2019-10-08 PROCEDURE — G8598 ASA/ANTIPLAT THER USED: HCPCS | Performed by: FAMILY MEDICINE

## 2019-10-08 PROCEDURE — 82044 UR ALBUMIN SEMIQUANTITATIVE: CPT | Performed by: FAMILY MEDICINE

## 2019-10-08 PROCEDURE — 83036 HEMOGLOBIN GLYCOSYLATED A1C: CPT | Performed by: FAMILY MEDICINE

## 2019-10-08 PROCEDURE — G8484 FLU IMMUNIZE NO ADMIN: HCPCS | Performed by: FAMILY MEDICINE

## 2019-10-08 PROCEDURE — 1036F TOBACCO NON-USER: CPT | Performed by: FAMILY MEDICINE

## 2019-10-08 PROCEDURE — 4040F PNEUMOC VAC/ADMIN/RCVD: CPT | Performed by: FAMILY MEDICINE

## 2019-10-08 PROCEDURE — 99214 OFFICE O/P EST MOD 30 MIN: CPT | Performed by: FAMILY MEDICINE

## 2019-10-08 PROCEDURE — 1123F ACP DISCUSS/DSCN MKR DOCD: CPT | Performed by: FAMILY MEDICINE

## 2019-10-08 PROCEDURE — G8427 DOCREV CUR MEDS BY ELIG CLIN: HCPCS | Performed by: FAMILY MEDICINE

## 2019-10-08 RX ORDER — METOPROLOL SUCCINATE 50 MG/1
50 TABLET, EXTENDED RELEASE ORAL DAILY
Qty: 90 TABLET | Refills: 1 | Status: SHIPPED | OUTPATIENT
Start: 2019-10-08 | End: 2020-01-08 | Stop reason: SDUPTHER

## 2019-10-08 RX ORDER — FUROSEMIDE 40 MG/1
TABLET ORAL
Qty: 90 TABLET | Refills: 1 | Status: SHIPPED | OUTPATIENT
Start: 2019-10-08 | End: 2020-01-08 | Stop reason: SDUPTHER

## 2019-10-08 RX ORDER — AMLODIPINE BESYLATE 5 MG/1
TABLET ORAL
Qty: 90 TABLET | Refills: 1 | Status: SHIPPED | OUTPATIENT
Start: 2019-10-08 | End: 2020-01-08 | Stop reason: SDUPTHER

## 2019-10-08 RX ORDER — SPIRONOLACTONE 25 MG/1
TABLET ORAL
Qty: 90 TABLET | Refills: 1 | Status: SHIPPED | OUTPATIENT
Start: 2019-10-08 | End: 2020-01-08 | Stop reason: SDUPTHER

## 2019-10-08 RX ORDER — INDOMETHACIN 50 MG/1
50 CAPSULE ORAL
COMMUNITY
End: 2020-07-14 | Stop reason: ALTCHOICE

## 2019-10-08 RX ORDER — LISINOPRIL 40 MG/1
TABLET ORAL
Qty: 90 TABLET | Refills: 1 | Status: SHIPPED | OUTPATIENT
Start: 2019-10-08 | End: 2020-01-08 | Stop reason: SDUPTHER

## 2019-10-08 ASSESSMENT — PATIENT HEALTH QUESTIONNAIRE - PHQ9
1. LITTLE INTEREST OR PLEASURE IN DOING THINGS: 0
2. FEELING DOWN, DEPRESSED OR HOPELESS: 0
SUM OF ALL RESPONSES TO PHQ9 QUESTIONS 1 & 2: 0
SUM OF ALL RESPONSES TO PHQ QUESTIONS 1-9: 0
SUM OF ALL RESPONSES TO PHQ QUESTIONS 1-9: 0

## 2020-01-07 DIAGNOSIS — Z12.5 PROSTATE CANCER SCREENING: ICD-10-CM

## 2020-01-07 DIAGNOSIS — E78.5 HYPERLIPIDEMIA, UNSPECIFIED HYPERLIPIDEMIA TYPE: ICD-10-CM

## 2020-01-07 DIAGNOSIS — I10 ESSENTIAL HYPERTENSION: ICD-10-CM

## 2020-01-07 LAB
A/G RATIO: 2.1 (ref 1.1–2.2)
ALBUMIN SERPL-MCNC: 4.4 G/DL (ref 3.4–5)
ALP BLD-CCNC: 49 U/L (ref 40–129)
ALT SERPL-CCNC: 18 U/L (ref 10–40)
ANION GAP SERPL CALCULATED.3IONS-SCNC: 19 MMOL/L (ref 3–16)
AST SERPL-CCNC: 22 U/L (ref 15–37)
BASOPHILS ABSOLUTE: 0 K/UL (ref 0–0.2)
BASOPHILS RELATIVE PERCENT: 0.6 %
BILIRUB SERPL-MCNC: 1.6 MG/DL (ref 0–1)
BUN BLDV-MCNC: 22 MG/DL (ref 7–20)
CALCIUM SERPL-MCNC: 9.5 MG/DL (ref 8.3–10.6)
CHLORIDE BLD-SCNC: 101 MMOL/L (ref 99–110)
CHOLESTEROL, TOTAL: 183 MG/DL (ref 0–199)
CO2: 21 MMOL/L (ref 21–32)
CREAT SERPL-MCNC: 1 MG/DL (ref 0.8–1.3)
EOSINOPHILS ABSOLUTE: 0.3 K/UL (ref 0–0.6)
EOSINOPHILS RELATIVE PERCENT: 4.9 %
GFR AFRICAN AMERICAN: >60
GFR NON-AFRICAN AMERICAN: >60
GLOBULIN: 2.1 G/DL
GLUCOSE BLD-MCNC: 143 MG/DL (ref 70–99)
HCT VFR BLD CALC: 43.3 % (ref 40.5–52.5)
HDLC SERPL-MCNC: 39 MG/DL (ref 40–60)
HEMOGLOBIN: 14.5 G/DL (ref 13.5–17.5)
LDL CHOLESTEROL CALCULATED: 120 MG/DL
LYMPHOCYTES ABSOLUTE: 2.2 K/UL (ref 1–5.1)
LYMPHOCYTES RELATIVE PERCENT: 33.9 %
MCH RBC QN AUTO: 30.4 PG (ref 26–34)
MCHC RBC AUTO-ENTMCNC: 33.6 G/DL (ref 31–36)
MCV RBC AUTO: 90.4 FL (ref 80–100)
MONOCYTES ABSOLUTE: 0.7 K/UL (ref 0–1.3)
MONOCYTES RELATIVE PERCENT: 10.8 %
NEUTROPHILS ABSOLUTE: 3.2 K/UL (ref 1.7–7.7)
NEUTROPHILS RELATIVE PERCENT: 49.8 %
PDW BLD-RTO: 16.7 % (ref 12.4–15.4)
PLATELET # BLD: 192 K/UL (ref 135–450)
PMV BLD AUTO: 8.4 FL (ref 5–10.5)
POTASSIUM SERPL-SCNC: 4.2 MMOL/L (ref 3.5–5.1)
PROSTATE SPECIFIC ANTIGEN: 0.47 NG/ML (ref 0–4)
RBC # BLD: 4.78 M/UL (ref 4.2–5.9)
SODIUM BLD-SCNC: 141 MMOL/L (ref 136–145)
TOTAL PROTEIN: 6.5 G/DL (ref 6.4–8.2)
TRIGL SERPL-MCNC: 118 MG/DL (ref 0–150)
TSH SERPL DL<=0.05 MIU/L-ACNC: 1.33 UIU/ML (ref 0.27–4.2)
VLDLC SERPL CALC-MCNC: 24 MG/DL
WBC # BLD: 6.4 K/UL (ref 4–11)

## 2020-01-08 ENCOUNTER — OFFICE VISIT (OUTPATIENT)
Dept: FAMILY MEDICINE CLINIC | Age: 80
End: 2020-01-08
Payer: MEDICARE

## 2020-01-08 VITALS
BODY MASS INDEX: 28.58 KG/M2 | RESPIRATION RATE: 16 BRPM | WEIGHT: 193 LBS | OXYGEN SATURATION: 94 % | SYSTOLIC BLOOD PRESSURE: 172 MMHG | HEIGHT: 69 IN | DIASTOLIC BLOOD PRESSURE: 80 MMHG | HEART RATE: 56 BPM | TEMPERATURE: 97 F

## 2020-01-08 PROCEDURE — G0439 PPPS, SUBSEQ VISIT: HCPCS | Performed by: FAMILY MEDICINE

## 2020-01-08 PROCEDURE — 4040F PNEUMOC VAC/ADMIN/RCVD: CPT | Performed by: FAMILY MEDICINE

## 2020-01-08 PROCEDURE — 1123F ACP DISCUSS/DSCN MKR DOCD: CPT | Performed by: FAMILY MEDICINE

## 2020-01-08 PROCEDURE — G8482 FLU IMMUNIZE ORDER/ADMIN: HCPCS | Performed by: FAMILY MEDICINE

## 2020-01-08 RX ORDER — LISINOPRIL 40 MG/1
TABLET ORAL
Qty: 90 TABLET | Refills: 1 | Status: SHIPPED | OUTPATIENT
Start: 2020-01-08 | End: 2020-07-14 | Stop reason: SDUPTHER

## 2020-01-08 RX ORDER — FUROSEMIDE 40 MG/1
TABLET ORAL
Qty: 90 TABLET | Refills: 1 | Status: SHIPPED | OUTPATIENT
Start: 2020-01-08 | End: 2020-07-14 | Stop reason: SDUPTHER

## 2020-01-08 RX ORDER — METFORMIN HYDROCHLORIDE 500 MG/1
500 TABLET, EXTENDED RELEASE ORAL
Qty: 90 TABLET | Refills: 1 | Status: SHIPPED | OUTPATIENT
Start: 2020-01-08 | End: 2020-07-14 | Stop reason: SDUPTHER

## 2020-01-08 RX ORDER — METOPROLOL SUCCINATE 50 MG/1
50 TABLET, EXTENDED RELEASE ORAL DAILY
Qty: 90 TABLET | Refills: 1 | Status: SHIPPED | OUTPATIENT
Start: 2020-01-08 | End: 2020-07-14 | Stop reason: SDUPTHER

## 2020-01-08 RX ORDER — AMLODIPINE BESYLATE 5 MG/1
TABLET ORAL
Qty: 180 TABLET | Refills: 1 | Status: SHIPPED | OUTPATIENT
Start: 2020-01-08 | End: 2020-07-14 | Stop reason: SDUPTHER

## 2020-01-08 RX ORDER — SPIRONOLACTONE 25 MG/1
TABLET ORAL
Qty: 90 TABLET | Refills: 1 | Status: SHIPPED | OUTPATIENT
Start: 2020-01-08 | End: 2020-07-14 | Stop reason: SDUPTHER

## 2020-01-08 ASSESSMENT — LIFESTYLE VARIABLES: HOW OFTEN DO YOU HAVE A DRINK CONTAINING ALCOHOL: 0

## 2020-01-08 ASSESSMENT — PATIENT HEALTH QUESTIONNAIRE - PHQ9
SUM OF ALL RESPONSES TO PHQ QUESTIONS 1-9: 0
SUM OF ALL RESPONSES TO PHQ QUESTIONS 1-9: 0

## 2020-01-08 NOTE — PROGRESS NOTES
Medicare Annual Wellness Visit  Name: Diaz Field Date: 2020   MRN: <[id number]> Sex: Male   Age: 78 y.o. Ethnicity: Non-/Non    : 1940 Race: Xin Mosqueda is here for Medicare AWV    Screenings for behavioral, psychosocial and functional/safety risks, and cognitive dysfunction are all negative except as indicated below. These results, as well as other patient data from the [address] form, are documented in Flowsheets linked to this Encounter. Allergies   Allergen Reactions    Coumadin [Warfarin] Other (See Comments)     Makes feel funny    Lipitor      Muscle aches    Omeprazole Other (See Comments)     headaches    Vioxx          Prior to Visit Medications    Medication Sig Taking? Authorizing Provider   amLODIPine (NORVASC) 5 MG tablet TAKE 1 TABLET TWICE DAILY Yes Amanuel Ward, DO   spironolactone (ALDACTONE) 25 MG tablet TAKE 1 TABLET EVERY DAY Yes Amanuel Ward, DO   furosemide (LASIX) 40 MG tablet TAKE 1 TABLET EVERY DAY Yes Amanuel Ward DO   metoprolol succinate (TOPROL XL) 50 MG extended release tablet Take 1 tablet by mouth daily Yes Amanuel Ward DO   lisinopril (PRINIVIL;ZESTRIL) 40 MG tablet TAKE 1 TABLET EVERY DAY Yes Edson Gonzalez DO   metFORMIN (GLUCOPHAGE-XR) 500 MG extended release tablet Take 1 tablet by mouth daily (with breakfast) Yes Amanuel Ward DO   indomethacin (INDOCIN) 50 MG capsule Take 50 mg by mouth 3 times daily (with meals) Yes Historical Provider, MD   aspirin 325 MG tablet Take 1 tablet by mouth daily Yes Amanuel Ward DO   Multiple Vitamins-Minerals (MULTIVITAMIN WITH MINERALS) tablet Take 1 tablet by mouth daily Yes Amanuel Ward DO   ACCU-CHEK MULTICLIX LANCETS MISC Test blood sugar qd Yes Amanuel Ward DO   glucose blood VI test strips (ACCU-CHEK ION) strip Test blood sugar qd.  Yes Amanuel Ward DO         Past Medical History:   Diagnosis Date    Aortic aneurysm, abdominal ([address] 1/2011    Appendicitis 12-06-11    Arthritis 12-06-11    Bruises easily     CAD (coronary artery disease) 9/2001    MI     CHF [facility]congestive heart failure) ([facility])     Following CABG    Chronic back pain     Chronic pain of both shoulders 12-06-11    joint - the shoulders hurt    Complication of anesthesia     woke up during appendectomy    Gout     Heart disease 12-06-11    Hernia 12-    HIGH CHOLESTEROL 12-6-11    Hyperlipidemia     Hypertension     Measles 12-    Pancreatitis 2006    History of     Persistent cough     Ringing in ears     Sinus disorder     Sleep deprivation 12-    loss of sleep       Past Surgical History:   Procedure Laterality Date    ABDOMINAL AORTIC ANEURYSM REPAIR      ABDOMINAL AORTIC ANEURYSM REPAIR, ENDOVASCULAR  1/28/2011    Endomvascular abdominal aortic aneurysm repair with insertion of cardiomems pressure sensor    APPENDECTOMY      40-50 years ago   [address]  2008    CABG    CHOLECYSTECTOMY, LAPAROSCOPIC  2006    CORONARY ANGIOPLASTY WITH STENT PLACEMENT  2001 and 2002    CORONARY ARTERY BYPASS GRAFT      GALLBLADDER SURGERY      gallbladder/pancreatitis    HERNIA REPAIR      20+ years ago    INTRACAPSULAR CATARACT EXTRACTION Right 12/6/2018    PHACOEMULSIFICATION OF CATARACT RIGHT  EYE WITH INTRAOCULAR LENS IMPLANT performed by Radha Gallego MD at [place] [address]      panceastitis Keegan Minphillip    [place] XCAPSL CTRC RMVL INSJ IO LENS PROSTH W/O ECP Left 11/13/2018    PHACOEMULSIFICATION OF CATARACT LEFT EYE WITH INTRAOCULAR LENS IMPLANT performed by Radha Gallego MD at [address] [place] [address]      as child         Family History   Problem Relation Age of Onset    Heart Disease Father     Heart Failure Father     Diabetes Mother     Heart Failure Mother     Cancer Mother     Heart Disease Mother     High Blood Pressure Mother     Heart Disease Paternal Luis F Saltness Stroke Sister

## 2020-01-08 NOTE — PATIENT INSTRUCTIONS
Personalized Preventive Plan for Ursula Shen - 1/8/2020  Medicare offers a range of preventive health benefits. Some of the tests and screenings are paid in full while other may be subject to a deductible, co-insurance, and/or copay. Some of these benefits include a comprehensive review of your medical history including lifestyle, illnesses that may run in your family, and various assessments and screenings as appropriate. After reviewing your medical record and screening and assessments performed today your provider may have ordered immunizations, labs, imaging, and/or referrals for you. A list of these orders (if applicable) as well as your Preventive Care list are included within your After Visit Summary for your review. Other Preventive Recommendations:    · A preventive eye exam performed by an eye specialist is recommended every 1-2 years to screen for glaucoma; cataracts, macular degeneration, and other eye disorders. · A preventive dental visit is recommended every 6 months. · Try to get at least 150 minutes of exercise per week or 10,000 steps per day on a pedometer . · Order or download the FREE \"Exercise & Physical Activity: Your Everyday Guide\" from The [employer]. Call 3-361.300.3961 or search The [employer] online. · You need 3633-5353 mg of calcium and 7805-0411 IU of vitamin D per day. It is possible to meet your calcium requirement with diet alone, but a vitamin D supplement is usually necessary to meet this goal.  · When exposed to the sun, use a sunscreen that protects against both UVA and UVB radiation with an SPF of 30 or greater. Reapply every 2 to 3 hours or after sweating, drying off with a towel, or swimming. · Always wear a seat belt when traveling in a car. Always wear a helmet when riding a bicycle or motorcycle.

## 2020-02-06 NOTE — PROGRESS NOTES
Obstructive Sleep Apnea (SUNDEEP) Screening     Patient:  Shanna Lee    YOB: 1940      Medical Record #:  [id number]                     Date:  11/6/2018     1. Are you a loud and/or regular snorer? []  Yes       [x] No    2. Have you been observed to gasp or stop breathing during sleep? []  Yes       [x] No    3. Do you feel tired or groggy upon awakening or do you awaken with a headache?           []  Yes       [] No    4. Are you often tired or fatigued during the wake time hours? []  Yes       [] No    5. Do you fall asleep sitting, reading, watching TV or driving? []  Yes       [] No    6. Do you often have problems with memory or concentration? []  Yes       [] No    **If patient's score is ? 3 they are considered high risk for SUNDEEP. Notify the anesthesiologist of the high risk and document in focus note. Note:  If the patient's BMI is more than 35 kg m¯² , has neck circumference > 40 cm, and/or high blood pressure the risk is greater (© American Sleep Apnea Association, 2006).
0

## 2020-02-10 NOTE — PROGRESS NOTES
Yakov Mi is a 78 y.o. male who presents for follow-up for diabetes. Current symptoms include: headaches and weight gain since starting Metformin. Patient denies fatigue, polydipsia, or polyphagia. Evaluation to date has been: fasting blood sugar, fasting lipid panel, hemoglobin A1C and microalbuminuria. Home sugars: morning glucose ranging between 100-120. Current treatments: Metformin 500 mg XR daily  Last dilated eye exam - January 2020 - negative for DR   Last foot exam 10/07/19. Last urine microalbumin 10/07/19. Today's A1C = 6.3  10/08/19 A1C = 6.9  07/09/19 A1C = 6.3    States hat his BP has averaged 115/70 at home.   Past Medical History:   Diagnosis Date    Aortic aneurysm, abdominal ([address] 1/2011    Appendicitis 12-06-11    Arthritis 12-06-11    Bruises easily     CAD (coronary artery disease) 9/2001    MI     CHF (congestive heart failure) (HCC)     Following CABG    Chronic back pain     Chronic pain of both shoulders 12-06-11    joint - the shoulders hurt    Complication of anesthesia     woke up during appendectomy    Gout     Heart disease 12-06-11    Hernia 12-    HIGH CHOLESTEROL 12-6-11    Hyperlipidemia     Hypertension     Measles 12-    Pancreatitis 2006    History of     Persistent cough     Ringing in ears     Sinus disorder     Sleep deprivation 12-    loss of sleep       Current Outpatient Medications   Medication Sig Dispense Refill    amLODIPine (NORVASC) 5 MG tablet TAKE 1 TABLET TWICE DAILY 180 tablet 1    spironolactone (ALDACTONE) 25 MG tablet TAKE 1 TABLET EVERY DAY 90 tablet 1    furosemide (LASIX) 40 MG tablet TAKE 1 TABLET EVERY DAY 90 tablet 1    metoprolol succinate (TOPROL XL) 50 MG extended release tablet Take 1 tablet by mouth daily 90 tablet 1    lisinopril (PRINIVIL;ZESTRIL) 40 MG tablet TAKE 1 TABLET EVERY DAY 90 tablet 1    metFORMIN (GLUCOPHAGE-XR) 500 MG extended release tablet Take 1 tablet by mouth daily

## 2020-02-11 ENCOUNTER — OFFICE VISIT (OUTPATIENT)
Dept: FAMILY MEDICINE CLINIC | Age: 80
End: 2020-02-11
Payer: MEDICARE

## 2020-02-11 VITALS
BODY MASS INDEX: 27.99 KG/M2 | DIASTOLIC BLOOD PRESSURE: 84 MMHG | SYSTOLIC BLOOD PRESSURE: 210 MMHG | OXYGEN SATURATION: 96 % | TEMPERATURE: 97.6 F | HEART RATE: 61 BPM | HEIGHT: 69 IN | RESPIRATION RATE: 16 BRPM | WEIGHT: 189 LBS

## 2020-02-11 LAB — HBA1C MFR BLD: 6.3 %

## 2020-02-11 PROCEDURE — G8417 CALC BMI ABV UP PARAM F/U: HCPCS | Performed by: FAMILY MEDICINE

## 2020-02-11 PROCEDURE — 1123F ACP DISCUSS/DSCN MKR DOCD: CPT | Performed by: FAMILY MEDICINE

## 2020-02-11 PROCEDURE — 99214 OFFICE O/P EST MOD 30 MIN: CPT | Performed by: FAMILY MEDICINE

## 2020-02-11 PROCEDURE — G8482 FLU IMMUNIZE ORDER/ADMIN: HCPCS | Performed by: FAMILY MEDICINE

## 2020-02-11 PROCEDURE — 83036 HEMOGLOBIN GLYCOSYLATED A1C: CPT | Performed by: FAMILY MEDICINE

## 2020-02-11 PROCEDURE — 4040F PNEUMOC VAC/ADMIN/RCVD: CPT | Performed by: FAMILY MEDICINE

## 2020-02-11 PROCEDURE — G8427 DOCREV CUR MEDS BY ELIG CLIN: HCPCS | Performed by: FAMILY MEDICINE

## 2020-02-11 PROCEDURE — 1036F TOBACCO NON-USER: CPT | Performed by: FAMILY MEDICINE

## 2020-02-11 ASSESSMENT — ENCOUNTER SYMPTOMS
ABDOMINAL PAIN: 0
DIARRHEA: 0

## 2020-03-11 NOTE — PATIENT INSTRUCTIONS
Chief Complaint   Patient presents with    Arthritis     1. Have you been to the ER, urgent care clinic since your last visit? Hospitalized since your last visit? No    2. Have you seen or consulted any other health care providers outside of the [address] [place] [address] since your last visit? Include any pap smears or colon screening.  No Personalized Preventive Plan for Jeferson Basurto - 4/5/2019  Medicare offers a range of preventive health benefits. Some of the tests and screenings are paid in full while other may be subject to a deductible, co-insurance, and/or copay. Some of these benefits include a comprehensive review of your medical history including lifestyle, illnesses that may run in your family, and various assessments and screenings as appropriate. After reviewing your medical record and screening and assessments performed today your provider may have ordered immunizations, labs, imaging, and/or referrals for you. A list of these orders (if applicable) as well as your Preventive Care list are included within your After Visit Summary for your review. Other Preventive Recommendations:    · A preventive eye exam performed by an eye specialist is recommended every 1-2 years to screen for glaucoma; cataracts, macular degeneration, and other eye disorders. · A preventive dental visit is recommended every 6 months. · Try to get at least 150 minutes of exercise per week or 10,000 steps per day on a pedometer . · Order or download the FREE \"Exercise & Physical Activity: Your Everyday Guide\" from [employer]. Call 7-143.454.6348 or search The [employer] online. · You need 6341-8514 mg of calcium and 5016-5114 IU of vitamin D per day. It is possible to meet your calcium requirement with diet alone, but a vitamin D supplement is usually necessary to meet this goal.  · When exposed to the sun, use a sunscreen that protects against both UVA and UVB radiation with an SPF of 30 or greater. Reapply every 2 to 3 hours or after sweating, drying off with a towel, or swimming. · Always wear a seat belt when traveling in a car. Always wear a helmet when riding a bicycle or motorcycle.

## 2020-07-14 ENCOUNTER — OFFICE VISIT (OUTPATIENT)
Dept: FAMILY MEDICINE CLINIC | Age: 80
End: 2020-07-14
Payer: MEDICARE

## 2020-07-14 VITALS
DIASTOLIC BLOOD PRESSURE: 70 MMHG | SYSTOLIC BLOOD PRESSURE: 208 MMHG | BODY MASS INDEX: 27.91 KG/M2 | TEMPERATURE: 97.5 F | OXYGEN SATURATION: 95 % | HEART RATE: 60 BPM | RESPIRATION RATE: 16 BRPM | WEIGHT: 189 LBS

## 2020-07-14 LAB — HBA1C MFR BLD: 6.7 %

## 2020-07-14 PROCEDURE — 99214 OFFICE O/P EST MOD 30 MIN: CPT | Performed by: FAMILY MEDICINE

## 2020-07-14 PROCEDURE — 1036F TOBACCO NON-USER: CPT | Performed by: FAMILY MEDICINE

## 2020-07-14 PROCEDURE — 83036 HEMOGLOBIN GLYCOSYLATED A1C: CPT | Performed by: FAMILY MEDICINE

## 2020-07-14 PROCEDURE — G8417 CALC BMI ABV UP PARAM F/U: HCPCS | Performed by: FAMILY MEDICINE

## 2020-07-14 PROCEDURE — 1123F ACP DISCUSS/DSCN MKR DOCD: CPT | Performed by: FAMILY MEDICINE

## 2020-07-14 PROCEDURE — 4040F PNEUMOC VAC/ADMIN/RCVD: CPT | Performed by: FAMILY MEDICINE

## 2020-07-14 PROCEDURE — G8427 DOCREV CUR MEDS BY ELIG CLIN: HCPCS | Performed by: FAMILY MEDICINE

## 2020-07-14 RX ORDER — METOPROLOL SUCCINATE 50 MG/1
50 TABLET, EXTENDED RELEASE ORAL DAILY
Qty: 90 TABLET | Refills: 1 | Status: SHIPPED | OUTPATIENT
Start: 2020-07-14 | End: 2020-11-30

## 2020-07-14 RX ORDER — FUROSEMIDE 40 MG/1
TABLET ORAL
Qty: 90 TABLET | Refills: 1 | Status: SHIPPED | OUTPATIENT
Start: 2020-07-14 | End: 2020-11-30

## 2020-07-14 RX ORDER — METFORMIN HYDROCHLORIDE 500 MG/1
500 TABLET, EXTENDED RELEASE ORAL
Qty: 90 TABLET | Refills: 1 | Status: SHIPPED | OUTPATIENT
Start: 2020-07-14 | End: 2020-12-16 | Stop reason: SINTOL

## 2020-07-14 RX ORDER — AMLODIPINE BESYLATE 5 MG/1
TABLET ORAL
Qty: 180 TABLET | Refills: 1 | Status: SHIPPED | OUTPATIENT
Start: 2020-07-14 | End: 2020-11-30

## 2020-07-14 RX ORDER — LISINOPRIL 40 MG/1
TABLET ORAL
Qty: 90 TABLET | Refills: 1 | Status: SHIPPED | OUTPATIENT
Start: 2020-07-14 | End: 2020-11-30

## 2020-07-14 RX ORDER — SPIRONOLACTONE 25 MG/1
TABLET ORAL
Qty: 90 TABLET | Refills: 1 | Status: SHIPPED | OUTPATIENT
Start: 2020-07-14 | End: 2020-11-30

## 2020-07-14 NOTE — PROGRESS NOTES
7/14/2020    This is a 78 y.o. male   Chief Complaint   Patient presents with    Diabetes   . HPI  Pt presents today for a diabetic follow-up. Currently not taking diabetes medication, Formerly taking Metformin 500 mg XR daily but gave him HA's. Morning glucose readings:     Last dilated eye exam - January 2020 - negative for DR   Last foot exam 10/07/19. Last urine microalbumin 10/07/19.     Today's A1C = 6.7  02/11/2020 A1C = 6.3  10/08/19 A1C = 6.9  07/09/19 A1C = 6.3    Blood pressure elevated today, 208/70. Patient currently taking amlodipine 5 mg Spironolactone 25 mg Lasix 40 mg metoprolol 50 mg and lisinopril 40 mg, states that the highest it has been at home is 140/80, admits to [facility] Coat Syndrome.   Past Medical History:   Diagnosis Date    Aortic aneurysm, abdominal ([address] 1/2011    Appendicitis 12-06-11    Arthritis 12-06-11    Bruises easily     CAD (coronary artery disease) 9/2001    MI     CHF (congestive heart failure) (HCC)     Following CABG    Chronic back pain     Chronic pain of both shoulders 12-06-11    joint - the shoulders hurt    Complication of anesthesia     woke up during appendectomy    Gout     Heart disease 12-06-11    Hernia 12-    HIGH CHOLESTEROL 12-6-11    Hyperlipidemia     Hypertension     Measles 12-    Pancreatitis 2006    History of     Persistent cough     Ringing in ears     Sinus disorder     Sleep deprivation 12-    loss of sleep       Past Surgical History:   Procedure Laterality Date    ABDOMINAL AORTIC ANEURYSM REPAIR      ABDOMINAL AORTIC ANEURYSM REPAIR, ENDOVASCULAR  1/28/2011    Endomvascular abdominal aortic aneurysm repair with insertion of cardiomems pressure sensor    APPENDECTOMY      40-50 years ago   [address]  2008    CABG    CHOLECYSTECTOMY, LAPAROSCOPIC  2006    CORONARY ANGIOPLASTY WITH STENT PLACEMENT  2001 and 2002    CORONARY ARTERY BYPASS GRAFT      GALLBLADDER SURGERY gallbladder/pancreatitis    HERNIA REPAIR      20+ years ago    INTRACAPSULAR CATARACT EXTRACTION Right 2018    PHACOEMULSIFICATION OF CATARACT RIGHT  EYE WITH INTRAOCULAR LENS IMPLANT performed by Surjit Valiente MD at [place] [address]      panceastitis Viviana Rad    [place] XCAPSL CTRC RMVL INSJ IO LENS PROSTH W/O ECP Left 2018    PHACOEMULSIFICATION OF CATARACT LEFT EYE WITH INTRAOCULAR LENS IMPLANT performed by Surjit Valiente MD at [address] [place] [address]      as child       Social History     Socioeconomic History    Marital status:      Spouse name: Not on file    Number of children: Not on file    Years of education: Not on file    Highest education level: Not on file   Occupational History    Not on file   Social Needs    Financial resource strain: Not on file    Food insecurity     Worry: Not on file     Inability: Not on file    Transportation needs     Medical: Not on file     Non-medical: Not on file   Tobacco Use    Smoking status: Former Smoker     Packs/day: 3.00     Years: 15.00     Pack years: 45.00     Last attempt to quit: 1978     Years since quittin.4    Smokeless tobacco: Never Used   Substance and Sexual Activity    Alcohol use: No     Comment: patient drinks coffee/caffeine 2 cups a day     Drug use: No    Sexual activity: Yes     Partners: Female   Lifestyle    Physical activity     Days per week: Not on file     Minutes per session: Not on file    Stress: Not on file   Relationships    Social connections     Talks on phone: Not on file     Gets together: Not on file     Attends [employer] service: Not on file     Active member of club or organization: Not on file     Attends meetings of clubs or organizations: Not on file     Relationship status: Not on file    Intimate partner violence     Fear of current or ex partner: Not on file     Emotionally abused: Not on file     Physically abused: Not on file     Forced sexual activity: Not on file   Other Topics Concern    Not on file   Social History Narrative    Not on file       Family History   Problem Relation Age of Onset    Heart Disease Father     Heart Failure Father     Diabetes Mother     Heart Failure Mother     Cancer Mother     Heart Disease Mother     High Blood Pressure Mother     Heart Disease Paternal Uncle     Stroke Sister     Stroke Brother        Current Outpatient Medications   Medication Sig Dispense Refill    amLODIPine (NORVASC) 5 MG tablet TAKE 1 TABLET TWICE DAILY 180 tablet 1    spironolactone (ALDACTONE) 25 MG tablet TAKE 1 TABLET EVERY DAY 90 tablet 1    furosemide (LASIX) 40 MG tablet TAKE 1 TABLET EVERY DAY 90 tablet 1    metoprolol succinate (TOPROL XL) 50 MG extended release tablet Take 1 tablet by mouth daily 90 tablet 1    lisinopril (PRINIVIL;ZESTRIL) 40 MG tablet TAKE 1 TABLET EVERY DAY 90 tablet 1    metFORMIN (GLUCOPHAGE-XR) 500 MG extended release tablet Take 1 tablet by mouth daily (with breakfast) 90 tablet 1    aspirin 325 MG tablet Take 1 tablet by mouth daily 30 tablet 2    Multiple Vitamins-Minerals (MULTIVITAMIN WITH MINERALS) tablet Take 1 tablet by mouth daily 30 tablet 3    ACCU-CHEK MULTICLIX LANCETS MISC Test blood sugar qd 100 each 3    glucose blood VI test strips (ACCU-CHEK ION) strip Test blood sugar qd. 100 strip 3     No current facility-administered medications for this visit.         Immunization History   Administered Date(s) Administered    Influenza 09/25/2013    Influenza Vaccine, unspecified formulation 11/02/2016    Influenza Whole 09/23/2010    Influenza, High Dose (Fluzone 65 yrs and older) 10/17/2014, 10/08/2017, 09/20/2018    Influenza, Triv, inactivated, subunit, adjuvanted, IM (Fluad 65 yrs and older) 09/20/2018, 09/19/2019    Pneumococcal Conjugate 13-valent ([id number]) 09/20/2018    Pneumococcal Conjugate 7-valent (Prevnar7) 01/26/2007    Tdap (Boostrix, Adacel) 01/30/2018       Allergies Allergen Reactions    Coumadin [Warfarin] Other (See Comments)     Makes feel funny    Lipitor      Muscle aches    Omeprazole Other (See Comments)     headaches    Vioxx        Office Visit on 02/11/2020   Component Date Value Ref Range Status    Hemoglobin A1C 02/11/2020 6.3  % Final       Review of Systems   Constitutional: Negative for fatigue. Eyes: Negative for visual disturbance. Endocrine: Negative for polydipsia, polyphagia and polyuria. Genitourinary: Negative for difficulty urinating. Neurological: Negative for dizziness, light-headedness and numbness. Psychiatric/Behavioral: Positive for sleep disturbance. Positive for increased stress d/t wife's health       BP (!) 208/70 (Site: Right Upper Arm, Position: Sitting)   Pulse 60   Temp 97.5 °F (36.4 °C) (Temporal)   Resp 16   Wt 189 lb (85.7 kg)   SpO2 95%   BMI 27.91 kg/m²     Physical Exam  Vitals signs reviewed. Constitutional:       Appearance: He is well-developed. HENT:      Head: Normocephalic and atraumatic. Eyes:      Pupils: Pupils are equal, round, and reactive to light. Neck:      Musculoskeletal: Normal range of motion. Cardiovascular:      Rate and Rhythm: Normal rate and regular rhythm. Heart sounds: Normal heart sounds. Pulmonary:      Effort: Pulmonary effort is normal.      Breath sounds: Normal breath sounds. Abdominal:      General: Bowel sounds are normal.   Neurological:      Mental Status: He is alert and oriented to person, place, and time. Psychiatric:         Behavior: Behavior normal.         Thought Content: Thought content normal.         Judgment: Judgment normal.         Plan   Diagnosis Orders   1. Essential hypertension  Controlled at home, continue current regimen, BP BID x 1 week   2.  Type 2 diabetes mellitus without complication, without long-term current use of insulin (HCC)  metFORMIN (GLUCOPHAGE-XR) 500 MG extended release tablet    POCT glycosylated hemoglobin (Hb A1C)       Return in about 3 months (around 10/14/2020) for Diabetis F/U. Prior to Visit Medications    Medication Sig Taking? Authorizing Provider   amLODIPine (NORVASC) 5 MG tablet TAKE 1 TABLET TWICE DAILY Yes Porsche Marking, DO   spironolactone (ALDACTONE) 25 MG tablet TAKE 1 TABLET EVERY DAY Yes Porsche Marking, DO   furosemide (LASIX) 40 MG tablet TAKE 1 TABLET EVERY DAY Yes Porsche Marking, DO   metoprolol succinate (TOPROL XL) 50 MG extended release tablet Take 1 tablet by mouth daily Yes Porsche Marking, DO   lisinopril (PRINIVIL;ZESTRIL) 40 MG tablet TAKE 1 TABLET EVERY DAY Yes Porsche Marking, DO   metFORMIN (GLUCOPHAGE-XR) 500 MG extended release tablet Take 1 tablet by mouth daily (with breakfast) Yes Porsche Marking, DO   aspirin 325 MG tablet Take 1 tablet by mouth daily Yes Porsche Marking, DO   Multiple Vitamins-Minerals (MULTIVITAMIN WITH MINERALS) tablet Take 1 tablet by mouth daily Yes Porsche Marking, DO   ACCU-CHEK MULTICLIX LANCETS MISC Test blood sugar qd Yes Porsche Marking, DO   glucose blood VI test strips (ACCU-CHEK ION) strip Test blood sugar qd.  Yes Porsche Marking, DO

## 2020-11-30 RX ORDER — SPIRONOLACTONE 25 MG/1
TABLET ORAL
Qty: 90 TABLET | Refills: 1 | Status: SHIPPED | OUTPATIENT
Start: 2020-11-30 | End: 2021-09-07 | Stop reason: SDUPTHER

## 2020-11-30 RX ORDER — METOPROLOL SUCCINATE 50 MG/1
TABLET, EXTENDED RELEASE ORAL
Qty: 90 TABLET | Refills: 1 | Status: SHIPPED | OUTPATIENT
Start: 2020-11-30 | End: 2021-09-07 | Stop reason: SDUPTHER

## 2020-11-30 RX ORDER — AMLODIPINE BESYLATE 5 MG/1
TABLET ORAL
Qty: 180 TABLET | Refills: 1 | Status: SHIPPED | OUTPATIENT
Start: 2020-11-30 | End: 2021-09-07 | Stop reason: SDUPTHER

## 2020-11-30 RX ORDER — FUROSEMIDE 40 MG/1
TABLET ORAL
Qty: 90 TABLET | Refills: 1 | Status: SHIPPED | OUTPATIENT
Start: 2020-11-30 | End: 2021-09-07 | Stop reason: SDUPTHER

## 2020-11-30 RX ORDER — LISINOPRIL 40 MG/1
TABLET ORAL
Qty: 90 TABLET | Refills: 1 | Status: SHIPPED | OUTPATIENT
Start: 2020-11-30 | End: 2021-09-07 | Stop reason: SDUPTHER

## 2020-11-30 NOTE — TELEPHONE ENCOUNTER
Please let pt know that I refilled their medication, and that I'd like for him to make a video or office appointment for a diabetes follow-up and have his fasting labs, including A1C, done beforehand. Thank you.

## 2020-11-30 NOTE — TELEPHONE ENCOUNTER
Future Appointments   Date Time Provider Leticia Michelle   12/16/2020 11:30 AM DO GRIFFIN Palma  [address]

## 2020-12-08 DIAGNOSIS — E11.9 TYPE 2 DIABETES MELLITUS WITHOUT COMPLICATION, WITHOUT LONG-TERM CURRENT USE OF INSULIN (HCC): ICD-10-CM

## 2020-12-08 LAB
A/G RATIO: 1.8 (ref 1.1–2.2)
ALBUMIN SERPL-MCNC: 4.3 G/DL (ref 3.4–5)
ALP BLD-CCNC: 61 U/L (ref 40–129)
ALT SERPL-CCNC: 27 U/L (ref 10–40)
ANION GAP SERPL CALCULATED.3IONS-SCNC: 16 MMOL/L (ref 3–16)
AST SERPL-CCNC: 24 U/L (ref 15–37)
BILIRUB SERPL-MCNC: 1 MG/DL (ref 0–1)
BUN BLDV-MCNC: 22 MG/DL (ref 7–20)
CALCIUM SERPL-MCNC: 9.7 MG/DL (ref 8.3–10.6)
CHLORIDE BLD-SCNC: 100 MMOL/L (ref 99–110)
CHOLESTEROL, TOTAL: 206 MG/DL (ref 0–199)
CO2: 24 MMOL/L (ref 21–32)
CREAT SERPL-MCNC: 1.1 MG/DL (ref 0.8–1.3)
GFR AFRICAN AMERICAN: >60
GFR NON-AFRICAN AMERICAN: >60
GLOBULIN: 2.4 G/DL
GLUCOSE BLD-MCNC: 152 MG/DL (ref 70–99)
HDLC SERPL-MCNC: 46 MG/DL (ref 40–60)
LDL CHOLESTEROL CALCULATED: 125 MG/DL
POTASSIUM SERPL-SCNC: 4.4 MMOL/L (ref 3.5–5.1)
SODIUM BLD-SCNC: 140 MMOL/L (ref 136–145)
TOTAL PROTEIN: 6.7 G/DL (ref 6.4–8.2)
TRIGL SERPL-MCNC: 176 MG/DL (ref 0–150)
TSH SERPL DL<=0.05 MIU/L-ACNC: 1.31 UIU/ML (ref 0.27–4.2)
VLDLC SERPL CALC-MCNC: 35 MG/DL

## 2020-12-16 ENCOUNTER — VIRTUAL VISIT (OUTPATIENT)
Dept: FAMILY MEDICINE CLINIC | Age: 80
End: 2020-12-16
Payer: MEDICARE

## 2020-12-16 PROCEDURE — G2012 BRIEF CHECK IN BY MD/QHP: HCPCS | Performed by: FAMILY MEDICINE

## 2020-12-16 ASSESSMENT — PATIENT HEALTH QUESTIONNAIRE - PHQ9
SUM OF ALL RESPONSES TO PHQ QUESTIONS 1-9: 0
SUM OF ALL RESPONSES TO PHQ QUESTIONS 1-9: 0
2. FEELING DOWN, DEPRESSED OR HOPELESS: 0
SUM OF ALL RESPONSES TO PHQ QUESTIONS 1-9: 0
SUM OF ALL RESPONSES TO PHQ9 QUESTIONS 1 & 2: 0
1. LITTLE INTEREST OR PLEASURE IN DOING THINGS: 0

## 2020-12-16 NOTE — PROGRESS NOTES
Trina Mcgregor is a [de-identified] y.o. male evaluated via telephone on 12/16/2020. Consent:  He and/or health care decision maker is aware that that he may receive a bill for this telephone service, depending on his insurance coverage, and has provided verbal consent to proceed: Yes      Documentation:  I communicated with the patient and/or health care decision maker about Diabetes F/U. Details of this discussion including any medical advice provided:     Pt presents today via telephone visit for a diabetes follow-up. Currently not taking diabetic medication. Morning glucose readings: hasn't checked often but averaging 100-130    Recent labs from 12/08/2020 wnl except for glucose of 152, total cholesterol 206, , and . Denies fatigue, Vision changes, polydipsia/uria/phagia, or foot numbness. Still walking but not as much d/t a fracture 2 a few months ago. Doesn't want to medication for his cholesterol or diabetes, admits to eating cookies. A/P:  1) X2CS without complication, without long-term current use of insulin  - pt wishes to make dietary changes vs start cholesterol/diabetes medication  - will recheck in 3 months  - Follow-up in 3 months for Diabetes    I affirm this is a Patient Initiated Episode with a Patient who has not had a related appointment within my department in the past 7 days or scheduled within the next 24 hours.     Patient identification was verified at the start of the visit: Yes    Total Time: minutes: 11-20 minutes    Note: not billable if this call serves to triage the patient into an appointment for the relevant concern      Harman Meth

## 2021-09-07 ENCOUNTER — OFFICE VISIT (OUTPATIENT)
Dept: FAMILY MEDICINE CLINIC | Age: 81
End: 2021-09-07
Payer: MEDICARE

## 2021-09-07 VITALS
DIASTOLIC BLOOD PRESSURE: 80 MMHG | OXYGEN SATURATION: 94 % | TEMPERATURE: 97.3 F | RESPIRATION RATE: 16 BRPM | HEIGHT: 68 IN | SYSTOLIC BLOOD PRESSURE: 180 MMHG | BODY MASS INDEX: 30.01 KG/M2 | WEIGHT: 198 LBS | HEART RATE: 78 BPM

## 2021-09-07 DIAGNOSIS — I10 ESSENTIAL HYPERTENSION: ICD-10-CM

## 2021-09-07 DIAGNOSIS — Z12.5 PROSTATE CANCER SCREENING: ICD-10-CM

## 2021-09-07 DIAGNOSIS — E11.9 TYPE 2 DIABETES MELLITUS WITHOUT COMPLICATION, WITHOUT LONG-TERM CURRENT USE OF INSULIN (HCC): ICD-10-CM

## 2021-09-07 DIAGNOSIS — Z00.00 ROUTINE GENERAL MEDICAL EXAMINATION AT A HEALTH CARE FACILITY: Primary | ICD-10-CM

## 2021-09-07 LAB
A/G RATIO: 1.6 (ref 1.1–2.2)
ALBUMIN SERPL-MCNC: 4.7 G/DL (ref 3.4–5)
ALP BLD-CCNC: 67 U/L (ref 40–129)
ALT SERPL-CCNC: 21 U/L (ref 10–40)
ANION GAP SERPL CALCULATED.3IONS-SCNC: 15 MMOL/L (ref 3–16)
AST SERPL-CCNC: 22 U/L (ref 15–37)
BASOPHILS ABSOLUTE: 0 K/UL (ref 0–0.2)
BASOPHILS RELATIVE PERCENT: 0.4 %
BILIRUB SERPL-MCNC: 1.2 MG/DL (ref 0–1)
BUN BLDV-MCNC: 20 MG/DL (ref 7–20)
CALCIUM SERPL-MCNC: 10.5 MG/DL (ref 8.3–10.6)
CHLORIDE BLD-SCNC: 96 MMOL/L (ref 99–110)
CHOLESTEROL, TOTAL: 218 MG/DL (ref 0–199)
CO2: 26 MMOL/L (ref 21–32)
CREAT SERPL-MCNC: 1 MG/DL (ref 0.8–1.3)
EOSINOPHILS ABSOLUTE: 0.2 K/UL (ref 0–0.6)
EOSINOPHILS RELATIVE PERCENT: 2.4 %
GFR AFRICAN AMERICAN: >60
GFR NON-AFRICAN AMERICAN: >60
GLOBULIN: 3 G/DL
GLUCOSE BLD-MCNC: 154 MG/DL (ref 70–99)
HCT VFR BLD CALC: 46.2 % (ref 40.5–52.5)
HDLC SERPL-MCNC: 46 MG/DL (ref 40–60)
HEMOGLOBIN: 15.5 G/DL (ref 13.5–17.5)
LDL CHOLESTEROL CALCULATED: 144 MG/DL
LYMPHOCYTES ABSOLUTE: 2.1 K/UL (ref 1–5.1)
LYMPHOCYTES RELATIVE PERCENT: 26.4 %
MCH RBC QN AUTO: 31.2 PG (ref 26–34)
MCHC RBC AUTO-ENTMCNC: 33.6 G/DL (ref 31–36)
MCV RBC AUTO: 92.7 FL (ref 80–100)
MONOCYTES ABSOLUTE: 0.8 K/UL (ref 0–1.3)
MONOCYTES RELATIVE PERCENT: 9.8 %
NEUTROPHILS ABSOLUTE: 4.9 K/UL (ref 1.7–7.7)
NEUTROPHILS RELATIVE PERCENT: 61 %
PDW BLD-RTO: 15.2 % (ref 12.4–15.4)
PLATELET # BLD: 206 K/UL (ref 135–450)
PMV BLD AUTO: 8.8 FL (ref 5–10.5)
POTASSIUM SERPL-SCNC: 4.8 MMOL/L (ref 3.5–5.1)
PROSTATE SPECIFIC ANTIGEN: 0.45 NG/ML (ref 0–4)
RBC # BLD: 4.99 M/UL (ref 4.2–5.9)
SODIUM BLD-SCNC: 137 MMOL/L (ref 136–145)
TOTAL PROTEIN: 7.7 G/DL (ref 6.4–8.2)
TRIGL SERPL-MCNC: 139 MG/DL (ref 0–150)
TSH SERPL DL<=0.05 MIU/L-ACNC: 1.17 UIU/ML (ref 0.27–4.2)
VLDLC SERPL CALC-MCNC: 28 MG/DL
WBC # BLD: 8.1 K/UL (ref 4–11)

## 2021-09-07 PROCEDURE — 4040F PNEUMOC VAC/ADMIN/RCVD: CPT | Performed by: FAMILY MEDICINE

## 2021-09-07 PROCEDURE — 1123F ACP DISCUSS/DSCN MKR DOCD: CPT | Performed by: FAMILY MEDICINE

## 2021-09-07 PROCEDURE — 36415 COLL VENOUS BLD VENIPUNCTURE: CPT | Performed by: FAMILY MEDICINE

## 2021-09-07 PROCEDURE — G0439 PPPS, SUBSEQ VISIT: HCPCS | Performed by: FAMILY MEDICINE

## 2021-09-07 RX ORDER — LISINOPRIL 40 MG/1
TABLET ORAL
Qty: 90 TABLET | Refills: 1 | Status: SHIPPED | OUTPATIENT
Start: 2021-09-07 | End: 2022-02-08

## 2021-09-07 RX ORDER — SPIRONOLACTONE 25 MG/1
TABLET ORAL
Qty: 90 TABLET | Refills: 1 | Status: SHIPPED | OUTPATIENT
Start: 2021-09-07 | End: 2022-02-08

## 2021-09-07 RX ORDER — FUROSEMIDE 40 MG/1
TABLET ORAL
Qty: 90 TABLET | Refills: 1 | Status: SHIPPED | OUTPATIENT
Start: 2021-09-07 | End: 2022-02-08

## 2021-09-07 RX ORDER — AMLODIPINE BESYLATE 5 MG/1
TABLET ORAL
Qty: 180 TABLET | Refills: 1 | Status: SHIPPED | OUTPATIENT
Start: 2021-09-07 | End: 2022-02-08

## 2021-09-07 RX ORDER — METOPROLOL SUCCINATE 50 MG/1
TABLET, EXTENDED RELEASE ORAL
Qty: 90 TABLET | Refills: 1 | Status: SHIPPED | OUTPATIENT
Start: 2021-09-07 | End: 2022-02-08

## 2021-09-07 SDOH — ECONOMIC STABILITY: TRANSPORTATION INSECURITY
IN THE PAST 12 MONTHS, HAS THE LACK OF TRANSPORTATION KEPT YOU FROM MEDICAL APPOINTMENTS OR FROM GETTING MEDICATIONS?: NO

## 2021-09-07 SDOH — ECONOMIC STABILITY: INCOME INSECURITY: IN THE LAST 12 MONTHS, WAS THERE A TIME WHEN YOU WERE NOT ABLE TO PAY THE MORTGAGE OR RENT ON TIME?: NO

## 2021-09-07 SDOH — ECONOMIC STABILITY: FOOD INSECURITY: WITHIN THE PAST 12 MONTHS, THE FOOD YOU BOUGHT JUST DIDN'T LAST AND YOU DIDN'T HAVE MONEY TO GET MORE.: NEVER TRUE

## 2021-09-07 SDOH — ECONOMIC STABILITY: TRANSPORTATION INSECURITY
IN THE PAST 12 MONTHS, HAS LACK OF TRANSPORTATION KEPT YOU FROM MEETINGS, WORK, OR FROM GETTING THINGS NEEDED FOR DAILY LIVING?: NO

## 2021-09-07 SDOH — ECONOMIC STABILITY: FOOD INSECURITY: WITHIN THE PAST 12 MONTHS, YOU WORRIED THAT YOUR FOOD WOULD RUN OUT BEFORE YOU GOT MONEY TO BUY MORE.: NEVER TRUE

## 2021-09-07 SDOH — ECONOMIC STABILITY: HOUSING INSECURITY
IN THE LAST 12 MONTHS, WAS THERE A TIME WHEN YOU DID NOT HAVE A STEADY PLACE TO SLEEP OR SLEPT IN A SHELTER (INCLUDING NOW)?: NO

## 2021-09-07 ASSESSMENT — PATIENT HEALTH QUESTIONNAIRE - PHQ9
1. LITTLE INTEREST OR PLEASURE IN DOING THINGS: 0
SUM OF ALL RESPONSES TO PHQ QUESTIONS 1-9: 0
2. FEELING DOWN, DEPRESSED OR HOPELESS: 0
SUM OF ALL RESPONSES TO PHQ QUESTIONS 1-9: 0
SUM OF ALL RESPONSES TO PHQ9 QUESTIONS 1 & 2: 0
SUM OF ALL RESPONSES TO PHQ QUESTIONS 1-9: 0

## 2021-09-07 ASSESSMENT — SOCIAL DETERMINANTS OF HEALTH (SDOH): HOW HARD IS IT FOR YOU TO PAY FOR THE VERY BASICS LIKE FOOD, HOUSING, MEDICAL CARE, AND HEATING?: NOT HARD AT ALL

## 2021-09-07 NOTE — PATIENT INSTRUCTIONS
Personalized Preventive Plan for Soren Ramírez - 9/7/2021  Medicare offers a range of preventive health benefits. Some of the tests and screenings are paid in full while other may be subject to a deductible, co-insurance, and/or copay. Some of these benefits include a comprehensive review of your medical history including lifestyle, illnesses that may run in your family, and various assessments and screenings as appropriate. After reviewing your medical record and screening and assessments performed today your provider may have ordered immunizations, labs, imaging, and/or referrals for you. A list of these orders (if applicable) as well as your Preventive Care list are included within your After Visit Summary for your review. Other Preventive Recommendations:    · A preventive eye exam performed by an eye specialist is recommended every 1-2 years to screen for glaucoma; cataracts, macular degeneration, and other eye disorders. · A preventive dental visit is recommended every 6 months. · Try to get at least 150 minutes of exercise per week or 10,000 steps per day on a pedometer . · Order or download the FREE \"Exercise & Physical Activity: Your Everyday Guide\" from [employer]. Call 7-343.431.9562 or search [employer] online. · You need 8519-3517 mg of calcium and 4438-5501 IU of vitamin D per day. It is possible to meet your calcium requirement with diet alone, but a vitamin D supplement is usually necessary to meet this goal.  · When exposed to the sun, use a sunscreen that protects against both UVA and UVB radiation with an SPF of 30 or greater. Reapply every 2 to 3 hours or after sweating, drying off with a towel, or swimming. · Always wear a seat belt when traveling in a car. Always wear a helmet when riding a bicycle or motorcycle.

## 2021-09-07 NOTE — PROGRESS NOTES
Medicare Annual Wellness Visit  Name: Inessa Duncan Date: 2021   MRN: <[id number]> Sex: Male   Age: [de-identified] y.o. Ethnicity: Non- / Non    : 1940 Race: White (non-)      Sheba Elizabeth is here for Medicare AWV    Screenings for behavioral, psychosocial and functional/safety risks, and cognitive dysfunction are all negative except as indicated below. These results, as well as other patient data from the [address] form, are documented in Flowsheets linked to this Encounter. Allergies   Allergen Reactions    Coumadin [Warfarin] Other (See Comments)     Makes feel funny    Lipitor      Muscle aches    Omeprazole Other (See Comments)     headaches    Vioxx        Prior to Visit Medications    Medication Sig Taking? Authorizing Provider   metoprolol succinate (TOPROL XL) 50 MG extended release tablet TAKE 1 TABLET EVERY DAY Yes Arnold Ganser, DO   spironolactone (ALDACTONE) 25 MG tablet TAKE 1 TABLET EVERY DAY Yes Aris Gonzalez DO   amLODIPine (NORVASC) 5 MG tablet TAKE 1 TABLET TWICE DAILY Yes Arnold Ganser, DO   lisinopril (PRINIVIL;ZESTRIL) 40 MG tablet TAKE 1 TABLET EVERY DAY Yes Arnold Ganser, DO   furosemide (LASIX) 40 MG tablet TAKE 1 TABLET EVERY DAY Yes Arnold Ganser, DO   aspirin 325 MG tablet Take 1 tablet by mouth daily Yes Arnold Ganser, DO   Multiple Vitamins-Minerals (MULTIVITAMIN WITH MINERALS) tablet Take 1 tablet by mouth daily Yes Arnold Ganser, DO   ACCU-CHEK MULTICLIX LANCETS MISC Test blood sugar qd Yes Arnold Ganser, DO   glucose blood VI test strips (ACCU-CHEK ION) strip Test blood sugar qd.  Yes Arnold Ganser, DO       Past Medical History:   Diagnosis Date    Aortic aneurysm, abdominal ([facility] [address] 2011    Appendicitis 11    Arthritis 11    Bruises easily     CAD (coronary artery disease) 2001    MI     CHF (congestive heart failure) (HCC)     Following CABG    Chronic back pain     Chronic Provider  Priscilla Mata MD as Consulting Physician (Cardiology)    Wt Readings from Last 3 Encounters:   09/07/21 198 lb (89.8 kg)   07/14/20 189 lb (85.7 kg)   02/11/20 189 lb (85.7 kg)     Vitals:    09/07/21 0931 09/07/21 0941   BP: (!) 178/84 (!) 180/80   Site: Left Upper Arm Right Upper Arm   Position: Sitting Sitting   Pulse: 78    Resp: 16    Temp: 97.3 °F (36.3 °C)    TempSrc: Temporal    SpO2: 94%    Weight: 198 lb (89.8 kg)    Height: 5' 8\" (1.727 m)      Body mass index is 30.11 kg/m². Based upon direct observation of the patient, evaluation of cognition reveals recent and remote memory intact. Patient's complete Health Risk Assessment and screening values have been reviewed and are found in Flowsheets. The following problems were reviewed today and where indicated follow up appointments were made and/or referrals ordered. Positive Risk Factor Screenings with Interventions:          General Health and ACP:  General  In general, how would you say your health is?: Good  In the past 7 days, have you experienced any of the following? New or Increased Pain, New or Increased Fatigue, Loneliness, Social Isolation, Stress or Anger?: None of These  Do you get the social and emotional support that you need?: Yes  Do you have a Living Will?: Yes  Advance Directives     Power of [address] [place] [address] Will ACP-Advance Directive ACP-Power of     Not on File Not on File Not on File Not on File      General Health Risk Interventions:  · No interventions needed in this area at this time.     Health Habits/Nutrition:  Health Habits/Nutrition  Do you exercise for at least 20 minutes 2-3 times per week?: (!) No (not since covid)  Have you lost any weight without trying in the past 3 months?: No  Do you eat only one meal per day?: No  Have you seen the dentist within the past year?: N/A - wear dentures  Body mass index: (!) 30.1  Health Habits/Nutrition Interventions:  · Inadequate physical activity:  Encouraged patient to start exercising again as he formally did       Personalized Preventive Plan   Current Health Maintenance Status  Immunization History   Administered Date(s) Administered    COVID-19, Moderna, PF, 100mcg/0.5mL 01/21/2021, 02/24/2021    Influenza 09/25/2013    Influenza Vaccine, unspecified formulation 11/02/2016    Influenza Whole 09/23/2010    Influenza, High Dose (Fluzone 65 yrs and older) 10/17/2014, 10/08/2017, 09/20/2018    Influenza, Quadv, adjuvanted, 65 yrs +, IM, PF (Fluad) 09/14/2020    Influenza, Triv, inactivated, subunit, adjuvanted, IM (Fluad 65 yrs and older) 09/20/2018, 09/19/2019    Pneumococcal Conjugate 13-valent ([id number]) 09/20/2018    Pneumococcal Conjugate 7-valent (Prevnar7) 01/26/2007    Tdap (Boostrix, Adacel) 01/30/2018        Health Maintenance   Topic Date Due    Shingles Vaccine (1 of 2) Never done   [facility] Annual Wellness Visit (AWV)  Never done    Pneumococcal 65+ years Vaccine (2 of 2 - PPSV23) 09/20/2019    Flu vaccine (1) 09/01/2021    Potassium monitoring  12/08/2021    Creatinine monitoring  12/08/2021    DTaP/Tdap/Td vaccine (2 - Td or Tdap) 01/30/2028    COVID-19 Vaccine  Completed    Hepatitis A vaccine  Aged Out    Hib vaccine  Aged Out    Meningococcal (ACWY) vaccine  Aged Out     Recommendations for [employer] Due: see orders and patient instructions/AVS.  . Recommended screening schedule for the next 5-10 years is provided to the patient in written form: see Patient Instructions/AVS.    Seth Wall was seen today for medicare awv.     Diagnoses and all orders for this visit:    Routine general medical examination at a health care facility    Other orders  -     metoprolol succinate (TOPROL XL) 50 MG extended release tablet; TAKE 1 TABLET EVERY DAY  -     spironolactone (ALDACTONE) 25 MG tablet; TAKE 1 TABLET EVERY DAY  -     amLODIPine (NORVASC) 5 MG tablet; TAKE 1 TABLET TWICE DAILY  -     lisinopril (PRINIVIL;ZESTRIL) 40 MG tablet; TAKE 1

## 2022-02-08 RX ORDER — SPIRONOLACTONE 25 MG/1
TABLET ORAL
Qty: 90 TABLET | Refills: 1 | Status: SHIPPED | OUTPATIENT
Start: 2022-02-08

## 2022-02-08 RX ORDER — AMLODIPINE BESYLATE 5 MG/1
TABLET ORAL
Qty: 180 TABLET | Refills: 1 | Status: ON HOLD | OUTPATIENT
Start: 2022-02-08 | End: 2022-09-11 | Stop reason: SDUPTHER

## 2022-02-08 RX ORDER — FUROSEMIDE 40 MG/1
TABLET ORAL
Qty: 90 TABLET | Refills: 1 | Status: ON HOLD | OUTPATIENT
Start: 2022-02-08 | End: 2022-09-11 | Stop reason: SDUPTHER

## 2022-02-08 RX ORDER — LISINOPRIL 40 MG/1
TABLET ORAL
Qty: 90 TABLET | Refills: 1 | Status: SHIPPED | OUTPATIENT
Start: 2022-02-08 | End: 2022-09-30 | Stop reason: ALTCHOICE

## 2022-02-08 RX ORDER — METOPROLOL SUCCINATE 50 MG/1
TABLET, EXTENDED RELEASE ORAL
Qty: 90 TABLET | Refills: 1 | Status: ON HOLD | OUTPATIENT
Start: 2022-02-08 | End: 2022-09-11 | Stop reason: HOSPADM

## 2022-08-15 NOTE — PROGRESS NOTES
8/16/2022    This is a 80 y.o. male   Chief Complaint   Patient presents with    Dizziness     Unsteady on feet, Vomiting, cramping in L leg,    . HPI  Presents today for unsteadiness on his feet. Home blood pressures averaging between 140 a-150/75, fasting glucose ranging from 112 to 127. Sx began: 1 weeks ago while helping neighbor work in his yard, had left LE cramp when he got home, took a nap and instantly hit the door when he got up but denies feeling dizzy or LH, vomited in bathroom for 2-3 hours, took Dramamine, went out and cut grass and felt fine, worked in the the yard the next morning and sx returned afterwards, was drinking a lot of water. Admits to sinus issues with post nasal drainage and sinus drainage, using Neti Pot. Productive cough in the AM. Last episode of dizziness was last Thursday, not currently dizzy, denies CP or unusual SOB. No longer walks daily d/t foot pain, sees podiatry. Had bypass in 2008, heart good since then.  States that sometime BP was 100/80 and 100/60 when he was doing a lot of walking, has hx of bradycardia    Previous episodes: had vertigo several years ago but current sx did not feel like vertigo  SOB/CP: no   Dietary/lifestyle changes: no    Today's A1C = 6.3  EKG today - Sinus bradycardia, ST depression, non-specific T wave abnormality,   Past Medical History:   Diagnosis Date    Aortic aneurysm, abdominal ([address] 1/2011    Appendicitis 12-06-11    Arthritis 12-06-11    Bruises easily     CAD (coronary artery disease) 9/2001    MI     CHF (congestive heart failure) (HCC)     Following CABG    Chronic back pain     Chronic pain of both shoulders 12-06-11    joint - the shoulders hurt    Complication of anesthesia     woke up during appendectomy    Gout     Heart disease 12-06-11    Hernia 12-    HIGH CHOLESTEROL 12-6-11    Hyperlipidemia     Hypertension     Measles 12-    Pancreatitis 2006    History of     Persistent cough     Ringing in ears Sinus disorder     Sleep deprivation 2001    loss of sleep       Past Surgical History:   Procedure Laterality Date    ABDOMINAL AORTIC ANEURYSM REPAIR      ABDOMINAL AORTIC ANEURYSM REPAIR, ENDOVASCULAR  2011    Endomvascular abdominal aortic aneurysm repair with insertion of cardiomems pressure sensor    APPENDECTOMY      40-50 years ago    CARDIAC SURGERY      CABG    CHOLECYSTECTOMY, LAPAROSCOPIC  2006    CORONARY ANGIOPLASTY WITH STENT PLACEMENT   and     CORONARY ARTERY BYPASS GRAFT      GALLBLADDER SURGERY      gallbladder/pancreatitis    HERNIA REPAIR      20+ years ago    INTRACAPSULAR CATARACT EXTRACTION Right 2018    PHACOEMULSIFICATION OF CATARACT RIGHT  EYE WITH INTRAOCULAR LENS IMPLANT performed by Jackie Penaloza MD at [address]      panceastitis Viann Corrente    [place] XCAPSL CTRC RMVL INSJ IO LENS PROSTH W/O ECP Left 2018    PHACOEMULSIFICATION OF CATARACT LEFT EYE WITH INTRAOCULAR LENS IMPLANT performed by Jackie Penaloza MD at [facility]      as child       Social History     Socioeconomic History    Marital status:      Spouse name: Not on file    Number of children: Not on file    Years of education: Not on file    Highest education level: Not on file   Occupational History    Not on file   Tobacco Use    Smoking status: Former     Packs/day: 3.00     Years: 15.00     Pack years: 45.00     Types: Cigarettes     Quit date: 1978     Years since quittin.5    Smokeless tobacco: Never   Vaping Use    Vaping Use: Never used   Substance and Sexual Activity    Alcohol use: No     Comment: patient drinks coffee/caffeine 2 cups a day     Drug use: No    Sexual activity: Yes     Partners: Female   Other Topics Concern    Not on file   Social History Narrative    Not on file     Social Determinants of Health     Financial Resource Strain: Low Risk     Difficulty of Paying Living Expenses: Not hard at all   Food Insecurity: No Food Insecurity    Worried About Running Out of Food in the Last Year: Never true    Ran Out of Food in the Last Year: Never true   Transportation Needs: No Transportation Needs    Lack of Transportation (Medical): No    Lack of Transportation (Non-Medical): No   Physical Activity: Not on file   Stress: Not on file   Social Connections: Not on file   Intimate Partner Violence: Not on file   Housing Stability: Unknown    Unable to Pay for Housing in the Last Year: No    Number of Places Lived in the Last Year: Not on file    Unstable Housing in the Last Year: No       Family History   Problem Relation Age of Onset    Heart Disease Father     Heart Failure Father     Diabetes Mother     Heart Failure Mother     Cancer Mother     Heart Disease Mother     High Blood Pressure Mother     Heart Disease Paternal Uncle     Stroke Sister     Stroke Brother        Current Outpatient Medications   Medication Sig Dispense Refill    furosemide (LASIX) 40 MG tablet TAKE 1 TABLET EVERY DAY 90 tablet 1    lisinopril (PRINIVIL;ZESTRIL) 40 MG tablet TAKE 1 TABLET EVERY DAY 90 tablet 1    metoprolol succinate (TOPROL XL) 50 MG extended release tablet TAKE 1 TABLET EVERY DAY 90 tablet 1    amLODIPine (NORVASC) 5 MG tablet TAKE 1 TABLET TWICE DAILY 180 tablet 1    spironolactone (ALDACTONE) 25 MG tablet TAKE 1 TABLET EVERY DAY 90 tablet 1    aspirin 325 MG tablet Take 1 tablet by mouth daily 30 tablet 2    Multiple Vitamins-Minerals (MULTIVITAMIN WITH MINERALS) tablet Take 1 tablet by mouth daily 30 tablet 3    ACCU-CHEK MULTICLIX LANCETS MISC Test blood sugar qd 100 each 3    glucose blood VI test strips (ACCU-CHEK ION) strip Test blood sugar qd. 100 strip 3     No current facility-administered medications for this visit.        Immunization History   Administered Date(s) Administered    COVID-19, MODERNA BLUE border, Primary or Immunocompromised, (age 12y+), IM, 100 mcg/0.5mL 02/24/2021    Influenza 09/25/2013    Influenza Vaccine, unspecified formulation 11/02/2016    Influenza Whole 09/23/2010    Influenza, High Dose (Fluzone 65 yrs and older) 10/17/2014, 10/08/2017, 09/20/2018    Influenza, Quadv, adjuvanted, 65 yrs +, IM, PF (Fluad) 09/14/2020    Influenza, Triv, inactivated, subunit, adjuvanted, IM (Fluad 65 yrs and older) 09/20/2018, 09/19/2019    Pneumococcal Conjugate 13-valent ([id number]) 09/20/2018    Pneumococcal Conjugate 7-valent (Prevnar7) 01/26/2007    Tdap (Boostrix, Adacel) 01/30/2018       Allergies   Allergen Reactions    Coumadin [Warfarin] Other (See Comments)     Makes feel funny    Lipitor      Muscle aches    Omeprazole Other (See Comments)     headaches    Vioxx        Office Visit on 09/07/2021   Component Date Value Ref Range Status    WBC 09/07/2021 8.1  4.0 - 11.0 K/uL Final    RBC 09/07/2021 4.99  4.20 - 5.90 M/uL Final    Hemoglobin 09/07/2021 15.5  13.5 - 17.5 g/dL Final    Hematocrit 09/07/2021 46.2  40.5 - 52.5 % Final    MCV 09/07/2021 92.7  80.0 - 100.0 fL Final    MCH 09/07/2021 31.2  26.0 - 34.0 pg Final    MCHC 09/07/2021 33.6  31.0 - 36.0 g/dL Final    RDW 09/07/2021 15.2  12.4 - 15.4 % Final    Platelets 05/67/5143 206  135 - 450 K/uL Final    MPV 09/07/2021 8.8  5.0 - 10.5 fL Final    Neutrophils % 09/07/2021 61.0  % Final    Lymphocytes % 09/07/2021 26.4  % Final    Monocytes % 09/07/2021 9.8  % Final    Eosinophils % 09/07/2021 2.4  % Final    Basophils % 09/07/2021 0.4  % Final    Neutrophils Absolute 09/07/2021 4.9  1.7 - 7.7 K/uL Final    Lymphocytes Absolute 09/07/2021 2.1  1.0 - 5.1 K/uL Final    Monocytes Absolute 09/07/2021 0.8  0.0 - 1.3 K/uL Final    Eosinophils Absolute 09/07/2021 0.2  0.0 - 0.6 K/uL Final    Basophils Absolute 09/07/2021 0.0  0.0 - 0.2 K/uL Final    PSA 09/07/2021 0.45  0.00 - 4.00 ng/mL Final    Sodium 09/07/2021 137  136 - 145 mmol/L Final    Potassium 09/07/2021 4.8  3.5 - 5.1 mmol/L Final    Chloride 09/07/2021 96 (A) 99 - 110 mmol/L Final    CO2 09/07/2021 26  21 - 32 mmol/L Final    Anion Gap 09/07/2021 15  3 - 16 Final    Glucose 09/07/2021 154 (A) 70 - 99 mg/dL Final    BUN 09/07/2021 20  7 - 20 mg/dL Final    Creatinine 09/07/2021 1.0  0.8 - 1.3 mg/dL Final    GFR Non- 09/07/2021 >60  >60 Final    Comment: >60 mL/min/1.73m2 EGFR, calc. for ages 25 and older using the  MDRD formula (not corrected for weight), is valid for stable  renal function. GFR  09/07/2021 >60  >60 Final    Comment: Chronic Kidney Disease: less than 60 ml/min/1.73 sq.m. Kidney Failure: less than 15 ml/min/1.73 sq.m. Results valid for patients 18 years and older. Calcium 09/07/2021 10.5  8.3 - 10.6 mg/dL Final    Total Protein 09/07/2021 7.7  6.4 - 8.2 g/dL Final    Albumin 09/07/2021 4.7  3.4 - 5.0 g/dL Final    Albumin/Globulin Ratio 09/07/2021 1.6  1.1 - 2.2 Final    Total Bilirubin 09/07/2021 1.2 (A) 0.0 - 1.0 mg/dL Final    Alkaline Phosphatase 09/07/2021 67  40 - 129 U/L Final    ALT 09/07/2021 21  10 - 40 U/L Final    AST 09/07/2021 22  15 - 37 U/L Final    Globulin 09/07/2021 3.0  g/dL Final    Cholesterol, Total 09/07/2021 218 (A) 0 - 199 mg/dL Final    Triglycerides 09/07/2021 139  0 - 150 mg/dL Final    HDL 09/07/2021 46  40 - 60 mg/dL Final    LDL Calculated 09/07/2021 144 (A) <100 mg/dL Final    VLDL Cholesterol Calculated 09/07/2021 28  Not Established mg/dL Final    TSH 09/07/2021 1.17  0.27 - 4.20 uIU/mL Final       Review of Systems   Respiratory:  Positive for cough. Gastrointestinal:  Negative for nausea and vomiting. Neurological:  Negative for dizziness and light-headedness. BP (!) 145/80 Comment: Home reading  Pulse 58   Temp 97.1 °F (36.2 °C) (Temporal)   Wt 190 lb (86.2 kg)   SpO2 96%   BMI 28.89 kg/m²     Physical Exam  Vitals reviewed. Constitutional:       General: He is not in acute distress. Appearance: Normal appearance. He is well-developed. HENT:      Head: Normocephalic and atraumatic.    Eyes: Pupils: Pupils are equal, round, and reactive to light. Cardiovascular:      Rate and Rhythm: Normal rate and regular rhythm. Heart sounds: Normal heart sounds. No murmur heard. Pulmonary:      Effort: Pulmonary effort is normal.      Breath sounds: Normal breath sounds. No wheezing. Abdominal:      General: Bowel sounds are normal.      Tenderness: There is no abdominal tenderness. Musculoskeletal:      Cervical back: Normal range of motion. Neurological:      Mental Status: He is alert and oriented to person, place, and time. Psychiatric:         Behavior: Behavior normal.         Thought Content: Thought content normal.         Judgment: Judgment normal.       Plan   Diagnosis Orders   1. Type 2 diabetes mellitus without complication, without long-term current use of insulin (HCC)  POCT glycosylated hemoglobin (Hb A1C)      2. Unsteadiness  EKG 12 Lead    [facility]      3. Non-intractable vomiting with nausea, unspecified vomiting type  EKG 12 [facility]is[facility]ruggs [facility]      4. Coronary artery disease involving native heart without angina pectoris, unspecified vessel or lesion type  Wal[facility]        - no medication changes until pt sees Cardiology  - BP BID x 1 week and send readings via [email or web address]  - counseled pt to go to ED for evaluation with any future similar sx that do not resolve quickly. Return in about 1 month (around 9/16/2022). Prior to Visit Medications    Medication Sig Taking?  Authorizing Provider   furosemide (LASIX) 40 MG tablet TAKE 1 TABLET EVERY DAY Yes Lebron Gonzalez, DO   lisinopril (PRINIVIL;ZESTRIL) 40 MG tablet TAKE 1 TABLET EVERY DAY Yes Сергей Almeida, DO   metoprolol succinate (TOPROL XL) 50 MG extended release tablet TAKE 1 TABLET EVERY DAY Yes Lebron Gonzalez, DO   amLODIPine (NORVASC) 5 MG tablet TAKE 1 TABLET TWICE DAILY Yes Donny Stevens, DO   spironolactone (ALDACTONE) 25 MG tablet TAKE 1 TABLET EVERY DAY Yes Donny Stevens DO   aspirin 325 MG tablet Take 1 tablet by mouth daily Yes Donny Stevens DO   Multiple Vitamins-Minerals (MULTIVITAMIN WITH MINERALS) tablet Take 1 tablet by mouth daily Yes Donny Stevens DO   ACCU-CHEK MULTICLIX LANCETS MISC Test blood sugar qd Yes Donny Stevens DO   glucose blood VI test strips (ACCU-CHEK ION) strip Test blood sugar qd.  Yes Donny Stevens DO

## 2022-08-16 ENCOUNTER — OFFICE VISIT (OUTPATIENT)
Dept: FAMILY MEDICINE CLINIC | Age: 82
End: 2022-08-16
Payer: MEDICARE

## 2022-08-16 VITALS
DIASTOLIC BLOOD PRESSURE: 80 MMHG | WEIGHT: 190 LBS | SYSTOLIC BLOOD PRESSURE: 145 MMHG | HEART RATE: 58 BPM | OXYGEN SATURATION: 96 % | BODY MASS INDEX: 28.89 KG/M2 | TEMPERATURE: 97.1 F

## 2022-08-16 DIAGNOSIS — R11.2 NON-INTRACTABLE VOMITING WITH NAUSEA, UNSPECIFIED VOMITING TYPE: ICD-10-CM

## 2022-08-16 DIAGNOSIS — R26.81 UNSTEADINESS: ICD-10-CM

## 2022-08-16 DIAGNOSIS — E11.9 TYPE 2 DIABETES MELLITUS WITHOUT COMPLICATION, WITHOUT LONG-TERM CURRENT USE OF INSULIN (HCC): Primary | ICD-10-CM

## 2022-08-16 DIAGNOSIS — I25.10 CORONARY ARTERY DISEASE INVOLVING NATIVE HEART WITHOUT ANGINA PECTORIS, UNSPECIFIED VESSEL OR LESION TYPE: ICD-10-CM

## 2022-08-16 LAB — HBA1C MFR BLD: 6.3 %

## 2022-08-16 PROCEDURE — G8427 DOCREV CUR MEDS BY ELIG CLIN: HCPCS | Performed by: FAMILY MEDICINE

## 2022-08-16 PROCEDURE — 3044F HG A1C LEVEL LT 7.0%: CPT | Performed by: FAMILY MEDICINE

## 2022-08-16 PROCEDURE — G8417 CALC BMI ABV UP PARAM F/U: HCPCS | Performed by: FAMILY MEDICINE

## 2022-08-16 PROCEDURE — 1036F TOBACCO NON-USER: CPT | Performed by: FAMILY MEDICINE

## 2022-08-16 PROCEDURE — 1123F ACP DISCUSS/DSCN MKR DOCD: CPT | Performed by: FAMILY MEDICINE

## 2022-08-16 PROCEDURE — 83036 HEMOGLOBIN GLYCOSYLATED A1C: CPT | Performed by: FAMILY MEDICINE

## 2022-08-16 PROCEDURE — 93000 ELECTROCARDIOGRAM COMPLETE: CPT | Performed by: FAMILY MEDICINE

## 2022-08-16 PROCEDURE — 99213 OFFICE O/P EST LOW 20 MIN: CPT | Performed by: FAMILY MEDICINE

## 2022-08-16 ASSESSMENT — PATIENT HEALTH QUESTIONNAIRE - PHQ9
SUM OF ALL RESPONSES TO PHQ QUESTIONS 1-9: 0
2. FEELING DOWN, DEPRESSED OR HOPELESS: 0
SUM OF ALL RESPONSES TO PHQ9 QUESTIONS 1 & 2: 0
SUM OF ALL RESPONSES TO PHQ QUESTIONS 1-9: 0
1. LITTLE INTEREST OR PLEASURE IN DOING THINGS: 0

## 2022-08-16 ASSESSMENT — ENCOUNTER SYMPTOMS
NAUSEA: 0
VOMITING: 0
COUGH: 1

## 2022-09-08 ENCOUNTER — APPOINTMENT (OUTPATIENT)
Dept: CT IMAGING | Age: 82
DRG: 065 | End: 2022-09-08
Payer: MEDICARE

## 2022-09-08 ENCOUNTER — APPOINTMENT (OUTPATIENT)
Dept: GENERAL RADIOLOGY | Age: 82
DRG: 065 | End: 2022-09-08
Payer: MEDICARE

## 2022-09-08 ENCOUNTER — HOSPITAL ENCOUNTER (INPATIENT)
Age: 82
LOS: 3 days | Discharge: HOME | DRG: 065 | End: 2022-09-11
Attending: EMERGENCY MEDICINE | Admitting: INTERNAL MEDICINE
Payer: MEDICARE

## 2022-09-08 ENCOUNTER — APPOINTMENT (OUTPATIENT)
Dept: MRI IMAGING | Age: 82
DRG: 065 | End: 2022-09-08
Payer: MEDICARE

## 2022-09-08 DIAGNOSIS — R29.90 STROKE-LIKE SYMPTOMS: Primary | ICD-10-CM

## 2022-09-08 DIAGNOSIS — R00.1 BRADYCARDIA: ICD-10-CM

## 2022-09-08 DIAGNOSIS — G45.9 TIA (TRANSIENT ISCHEMIC ATTACK): ICD-10-CM

## 2022-09-08 DIAGNOSIS — I48.91 ATRIAL FIBRILLATION, UNSPECIFIED TYPE (HCC): ICD-10-CM

## 2022-09-08 PROBLEM — E87.6 HYPOKALEMIA: Status: ACTIVE | Noted: 2022-09-08

## 2022-09-08 LAB
A/G RATIO: 1.5 (ref 1.1–2.2)
ALBUMIN SERPL-MCNC: 4.7 G/DL (ref 3.4–5)
ALP BLD-CCNC: 80 U/L (ref 40–129)
ALT SERPL-CCNC: 32 U/L (ref 10–40)
ANION GAP SERPL CALCULATED.3IONS-SCNC: 14 MMOL/L (ref 3–16)
AST SERPL-CCNC: 26 U/L (ref 15–37)
BASOPHILS ABSOLUTE: 0.1 K/UL (ref 0–0.2)
BASOPHILS RELATIVE PERCENT: 0.6 %
BILIRUB SERPL-MCNC: 1.2 MG/DL (ref 0–1)
BUN BLDV-MCNC: 23 MG/DL (ref 7–20)
CALCIUM SERPL-MCNC: 9.5 MG/DL (ref 8.3–10.6)
CHLORIDE BLD-SCNC: 100 MMOL/L (ref 99–110)
CO2: 24 MMOL/L (ref 21–32)
CREAT SERPL-MCNC: 1.1 MG/DL (ref 0.8–1.3)
EKG ATRIAL RATE: 41 BPM
EKG DIAGNOSIS: NORMAL
EKG Q-T INTERVAL: 518 MS
EKG QRS DURATION: 76 MS
EKG QTC CALCULATION (BAZETT): 477 MS
EKG R AXIS: -16 DEGREES
EKG T AXIS: -15 DEGREES
EKG VENTRICULAR RATE: 51 BPM
EOSINOPHILS ABSOLUTE: 0.2 K/UL (ref 0–0.6)
EOSINOPHILS RELATIVE PERCENT: 1.6 %
GFR AFRICAN AMERICAN: >60
GFR NON-AFRICAN AMERICAN: >60
GLUCOSE BLD-MCNC: 121 MG/DL (ref 70–99)
GLUCOSE BLD-MCNC: 181 MG/DL (ref 70–99)
GLUCOSE BLD-MCNC: 184 MG/DL (ref 70–99)
HCT VFR BLD CALC: 40.7 % (ref 40.5–52.5)
HEMOGLOBIN: 13.9 G/DL (ref 13.5–17.5)
INR BLD: 1.17 (ref 0.87–1.14)
LYMPHOCYTES ABSOLUTE: 2.8 K/UL (ref 1–5.1)
LYMPHOCYTES RELATIVE PERCENT: 26.8 %
MAGNESIUM: 1.9 MG/DL (ref 1.8–2.4)
MCH RBC QN AUTO: 31.4 PG (ref 26–34)
MCHC RBC AUTO-ENTMCNC: 34.1 G/DL (ref 31–36)
MCV RBC AUTO: 92 FL (ref 80–100)
MONOCYTES ABSOLUTE: 1.1 K/UL (ref 0–1.3)
MONOCYTES RELATIVE PERCENT: 10.5 %
NEUTROPHILS ABSOLUTE: 6.2 K/UL (ref 1.7–7.7)
NEUTROPHILS RELATIVE PERCENT: 60.5 %
PDW BLD-RTO: 14.5 % (ref 12.4–15.4)
PERFORMED ON: ABNORMAL
PERFORMED ON: ABNORMAL
PLATELET # BLD: 184 K/UL (ref 135–450)
PMV BLD AUTO: 8 FL (ref 5–10.5)
POTASSIUM REFLEX MAGNESIUM: 3.4 MMOL/L (ref 3.5–5.1)
PROTHROMBIN TIME: 14.8 SEC (ref 11.7–14.5)
RBC # BLD: 4.42 M/UL (ref 4.2–5.9)
SODIUM BLD-SCNC: 138 MMOL/L (ref 136–145)
TOTAL PROTEIN: 7.8 G/DL (ref 6.4–8.2)
TROPONIN: <0.01 NG/ML
WBC # BLD: 10.3 K/UL (ref 4–11)

## 2022-09-08 PROCEDURE — 85025 COMPLETE CBC W/AUTO DIFF WBC: CPT

## 2022-09-08 PROCEDURE — 6360000002 HC RX W HCPCS: Performed by: INTERNAL MEDICINE

## 2022-09-08 PROCEDURE — 94761 N-INVAS EAR/PLS OXIMETRY MLT: CPT

## 2022-09-08 PROCEDURE — 1200000000 HC SEMI PRIVATE

## 2022-09-08 PROCEDURE — 4A03X5D MEASUREMENT OF ARTERIAL FLOW, INTRACRANIAL, EXTERNAL APPROACH: ICD-10-PCS | Performed by: RADIOLOGY

## 2022-09-08 PROCEDURE — 93005 ELECTROCARDIOGRAM TRACING: CPT | Performed by: INTERNAL MEDICINE

## 2022-09-08 PROCEDURE — 70496 CT ANGIOGRAPHY HEAD: CPT

## 2022-09-08 PROCEDURE — 84484 ASSAY OF TROPONIN QUANT: CPT

## 2022-09-08 PROCEDURE — 70551 MRI BRAIN STEM W/O DYE: CPT

## 2022-09-08 PROCEDURE — 93005 ELECTROCARDIOGRAM TRACING: CPT | Performed by: EMERGENCY MEDICINE

## 2022-09-08 PROCEDURE — 80053 COMPREHEN METABOLIC PANEL: CPT

## 2022-09-08 PROCEDURE — 2580000003 HC RX 258: Performed by: INTERNAL MEDICINE

## 2022-09-08 PROCEDURE — 85610 PROTHROMBIN TIME: CPT

## 2022-09-08 PROCEDURE — 83735 ASSAY OF MAGNESIUM: CPT

## 2022-09-08 PROCEDURE — 6360000004 HC RX CONTRAST MEDICATION: Performed by: EMERGENCY MEDICINE

## 2022-09-08 PROCEDURE — 70450 CT HEAD/BRAIN W/O DYE: CPT

## 2022-09-08 PROCEDURE — 99285 EMERGENCY DEPT VISIT HI MDM: CPT

## 2022-09-08 PROCEDURE — 2700000000 HC OXYGEN THERAPY PER DAY

## 2022-09-08 PROCEDURE — 71045 X-RAY EXAM CHEST 1 VIEW: CPT

## 2022-09-08 RX ORDER — SPIRONOLACTONE 25 MG/1
25 TABLET ORAL DAILY
Status: DISCONTINUED | OUTPATIENT
Start: 2022-09-09 | End: 2022-09-11 | Stop reason: HOSPADM

## 2022-09-08 RX ORDER — ENOXAPARIN SODIUM 100 MG/ML
40 INJECTION SUBCUTANEOUS DAILY
Status: DISCONTINUED | OUTPATIENT
Start: 2022-09-08 | End: 2022-09-10

## 2022-09-08 RX ORDER — FUROSEMIDE 20 MG/1
20 TABLET ORAL DAILY
Status: DISCONTINUED | OUTPATIENT
Start: 2022-09-09 | End: 2022-09-11 | Stop reason: HOSPADM

## 2022-09-08 RX ORDER — AMLODIPINE BESYLATE 5 MG/1
5 TABLET ORAL DAILY
Status: DISCONTINUED | OUTPATIENT
Start: 2022-09-09 | End: 2022-09-11

## 2022-09-08 RX ORDER — DIAZEPAM 5 MG/ML
5 INJECTION, SOLUTION INTRAMUSCULAR; INTRAVENOUS ONCE
Status: COMPLETED | OUTPATIENT
Start: 2022-09-08 | End: 2022-09-08

## 2022-09-08 RX ORDER — ONDANSETRON 2 MG/ML
4 INJECTION INTRAMUSCULAR; INTRAVENOUS EVERY 6 HOURS PRN
Status: DISCONTINUED | OUTPATIENT
Start: 2022-09-08 | End: 2022-09-11 | Stop reason: HOSPADM

## 2022-09-08 RX ORDER — METOPROLOL SUCCINATE 50 MG/1
50 TABLET, EXTENDED RELEASE ORAL DAILY
Status: DISCONTINUED | OUTPATIENT
Start: 2022-09-09 | End: 2022-09-08

## 2022-09-08 RX ORDER — ASPIRIN 325 MG
325 TABLET ORAL DAILY
Status: DISCONTINUED | OUTPATIENT
Start: 2022-09-09 | End: 2022-09-10

## 2022-09-08 RX ORDER — SODIUM CHLORIDE 9 MG/ML
INJECTION, SOLUTION INTRAVENOUS PRN
Status: DISCONTINUED | OUTPATIENT
Start: 2022-09-08 | End: 2022-09-11 | Stop reason: HOSPADM

## 2022-09-08 RX ORDER — SODIUM CHLORIDE 0.9 % (FLUSH) 0.9 %
5-40 SYRINGE (ML) INJECTION EVERY 12 HOURS SCHEDULED
Status: DISCONTINUED | OUTPATIENT
Start: 2022-09-08 | End: 2022-09-11 | Stop reason: HOSPADM

## 2022-09-08 RX ORDER — ACETAMINOPHEN 325 MG/1
650 TABLET ORAL EVERY 6 HOURS PRN
Status: DISCONTINUED | OUTPATIENT
Start: 2022-09-08 | End: 2022-09-11 | Stop reason: HOSPADM

## 2022-09-08 RX ORDER — POLYETHYLENE GLYCOL 3350 17 G/17G
17 POWDER, FOR SOLUTION ORAL DAILY PRN
Status: DISCONTINUED | OUTPATIENT
Start: 2022-09-08 | End: 2022-09-11 | Stop reason: HOSPADM

## 2022-09-08 RX ORDER — ACETAMINOPHEN 650 MG/1
650 SUPPOSITORY RECTAL EVERY 6 HOURS PRN
Status: DISCONTINUED | OUTPATIENT
Start: 2022-09-08 | End: 2022-09-11 | Stop reason: HOSPADM

## 2022-09-08 RX ORDER — LISINOPRIL 20 MG/1
40 TABLET ORAL DAILY
Status: DISCONTINUED | OUTPATIENT
Start: 2022-09-09 | End: 2022-09-11 | Stop reason: HOSPADM

## 2022-09-08 RX ORDER — ONDANSETRON 4 MG/1
4 TABLET, ORALLY DISINTEGRATING ORAL EVERY 8 HOURS PRN
Status: DISCONTINUED | OUTPATIENT
Start: 2022-09-08 | End: 2022-09-11 | Stop reason: HOSPADM

## 2022-09-08 RX ORDER — SODIUM CHLORIDE 0.9 % (FLUSH) 0.9 %
5-40 SYRINGE (ML) INJECTION PRN
Status: DISCONTINUED | OUTPATIENT
Start: 2022-09-08 | End: 2022-09-11 | Stop reason: HOSPADM

## 2022-09-08 RX ADMIN — ENOXAPARIN SODIUM 40 MG: 100 INJECTION SUBCUTANEOUS at 14:49

## 2022-09-08 RX ADMIN — SODIUM CHLORIDE, PRESERVATIVE FREE 10 ML: 5 INJECTION INTRAVENOUS at 21:32

## 2022-09-08 RX ADMIN — DIAZEPAM 5 MG: 5 INJECTION, SOLUTION INTRAMUSCULAR; INTRAVENOUS at 16:03

## 2022-09-08 RX ADMIN — IOPAMIDOL 75 ML: 755 INJECTION, SOLUTION INTRAVENOUS at 09:12

## 2022-09-08 ASSESSMENT — PAIN SCALES - GENERAL
PAINLEVEL_OUTOF10: 0

## 2022-09-08 ASSESSMENT — PAIN - FUNCTIONAL ASSESSMENT: PAIN_FUNCTIONAL_ASSESSMENT: NONE - DENIES PAIN

## 2022-09-08 NOTE — PROGRESS NOTES
Patient admitted to room 364 from ER. Patient oriented to room, call light, bed rails, phone, lights and bathroom. Patient instructed about the schedule of the day including: vital sign frequency, lab draws, possible tests, frequency of MD and staff rounds, including RN/MD rounding together at bedside, daily weights, and I &O's. Patient instructed about prescribed diet, how to use 8MENU, and television. Bed alarm in place, patient aware of placement and reason. Telemetry box 39 in place, patient aware of placement and reason. Bed locked, in lowest position, side rails up 2/4, call light within reach. Will continue to monitor.

## 2022-09-08 NOTE — PLAN OF CARE
Problem: Discharge Planning  Goal: Discharge to home or other facility with appropriate resources  Outcome: Progressing  Flowsheets  Taken 9/8/2022 1357  Discharge to home or other facility with appropriate resources: Identify barriers to discharge with patient and caregiver  Taken 9/8/2022 1350  Discharge to home or other facility with appropriate resources: Identify barriers to discharge with patient and caregiver     Problem: Chronic Conditions and Co-morbidities  Goal: Patient's chronic conditions and co-morbidity symptoms are monitored and maintained or improved  9/8/2022 1819 by Emely Blanco RN  Outcome: Progressing     Problem: ABCDS Injury Assessment  Goal: Absence of physical injury  Outcome: Progressing  Flowsheets (Taken 9/8/2022 1405)  Absence of Physical Injury: Implement safety measures based on patient assessment       Pt will have accuchecks before meals and at bedtime with sliding scale insulin in place for coverage. Will continue to monitor for signs and symptoms of hypoglycemia and hyperglycemia throughout shift.

## 2022-09-08 NOTE — H&P
Hospital Medicine History & Physical      PCP: Gavino Robles DO    Date of Admission: 9/8/2022    Date of Service: Pt seen/examined on 9 Sept and Admitted to Inpatient with expected LOS greater than two midnights due to medical therapy. Chief Complaint:  Aphasia      History Of Present Illness:      80 y.o. male w/ hx HTN/CAD who presented to [facility] with acute onset difficulty speaking, lasting approx 20 minutes then resolved w/out other associated focal neuro deficits other than mild dizziness. He has no hx of CVA/TIA. The patient denies any fever/chills or other signs/sxs of systemic illness or identifiable aggravating/alleviating factors.       Past Medical History:          Diagnosis Date    Aortic aneurysm, abdominal ([address] 1/2011    Appendicitis 12-06-11    Arthritis 12-06-11    Bruises easily     CAD (coronary artery disease) 9/2001    MI     CHF (congestive heart failure) (HCC)     Following CABG    Chronic back pain     Chronic pain of both shoulders 12-06-11    joint - the shoulders hurt    Complication of anesthesia     woke up during appendectomy    Gout     Heart disease 12-06-11    Hernia 12-    HIGH CHOLESTEROL 12-6-11    Hyperlipidemia     Hypertension     Measles 12-    Pancreatitis 2006    History of     Persistent cough     Ringing in ears     Sinus disorder     Sleep deprivation 12-    loss of sleep       Past Surgical History:          Procedure Laterality Date    ABDOMINAL AORTIC ANEURYSM REPAIR      ABDOMINAL AORTIC ANEURYSM REPAIR, ENDOVASCULAR  1/28/2011    Endomvascular abdominal aortic aneurysm repair with insertion of cardiomems pressure sensor    APPENDECTOMY      40-50 years ago    CARDIAC SURGERY  2008    CABG    CHOLECYSTECTOMY, LAPAROSCOPIC  2006    CORONARY ANGIOPLASTY WITH STENT PLACEMENT  2001 and 2002    CORONARY ARTERY BYPASS GRAFT      GALLBLADDER SURGERY      gallbladder/pancreatitis    HERNIA REPAIR      20+ years ago INTRACAPSULAR CATARACT EXTRACTION Right 12/6/2018    PHACOEMULSIFICATION OF CATARACT RIGHT  EYE WITH INTRAOCULAR LENS IMPLANT performed by Susan Gonzalez MD at [address]      panceastitis Gweneth Lawrence    MS XCAPSL CTRC RMVL INSJ IO LENS PROSTH W/O ECP Left 11/13/2018    PHACOEMULSIFICATION OF CATARACT LEFT EYE WITH INTRAOCULAR LENS IMPLANT performed by Susan Gonzalez MD at [facility]      as child       Medications Prior to Admission:      Prior to Admission medications    Medication Sig Start Date End Date Taking? Authorizing Provider   furosemide (LASIX) 40 MG tablet TAKE 1 TABLET EVERY DAY 2/8/22  Yes Hali Cruz DO   lisinopril (PRINIVIL;ZESTRIL) 40 MG tablet TAKE 1 TABLET EVERY DAY 2/8/22  Yes Hali Cruz DO   metoprolol succinate (TOPROL XL) 50 MG extended release tablet TAKE 1 TABLET EVERY DAY 2/8/22  Yes Hali Cruz DO   amLODIPine (NORVASC) 5 MG tablet TAKE 1 TABLET TWICE DAILY 2/8/22  Yes Hali Cruz DO   spironolactone (ALDACTONE) 25 MG tablet TAKE 1 TABLET EVERY DAY 2/8/22  Yes Hali Cruz DO   aspirin 325 MG tablet Take 1 tablet by mouth daily 4/5/19  Yes Hali Cruz DO   Multiple Vitamins-Minerals (MULTIVITAMIN WITH MINERALS) tablet Take 1 tablet by mouth daily 4/5/19  Yes Hali Cruz DO   ACCU-CHEK MULTICLIX LANCETS MISC Test blood sugar qd 3/1/18  Yes Hali Cruz DO   glucose blood VI test strips (ACCU-CHEK ION) strip Test blood sugar qd. 3/1/18  Yes Hali Cruz DO       Allergies:  Coumadin [warfarin], Lipitor, Omeprazole, and Vioxx    Social History:      The patient currently lives independently    TOBACCO:   reports that he quit smoking about 44 years ago. His smoking use included cigarettes. He has a 45.00 pack-year smoking history. He has never used smokeless tobacco.  ETOH:   reports no history of alcohol use.       Family History:      Reviewed in detail and positive as follows:        Problem Relation Age of Onset    Heart Disease Father     Heart Failure Father     Diabetes Mother     Heart Failure Mother     Cancer Mother     Heart Disease Mother     High Blood Pressure Mother     Heart Disease Paternal Uncle     Stroke Sister     Stroke Brother        REVIEW OF SYSTEMS:   Pertinent positives as noted in the HPI. All other systems reviewed and negative. PHYSICAL EXAM:    BP (!) 175/76   Pulse 50   Temp 99.1 °F (37.3 °C) (Oral)   Resp 18   Ht 5' 8\" (1.727 m)   Wt 188 lb 9.6 oz (85.5 kg)   SpO2 97%   BMI 28.68 kg/m²     General appearance:  No apparent distress, appears stated age and cooperative. HEENT:  Normal cephalic, atraumatic without obvious deformity. Pupils equal, round, and reactive to light. Extra ocular muscles intact. Conjunctivae/corneas clear. Neck: Supple, with full range of motion. No jugular venous distention. Trachea midline. Respiratory:  Normal respiratory effort. Clear to auscultation, bilaterally without Rales/Wheezes/Rhonchi. Cardiovascular:  Regular rate and rhythm with normal S1/S2 without murmurs, rubs or gallops. Abdomen: Soft, non-tender, non-distended with normal bowel sounds. Musculoskeletal:  No clubbing, cyanosis or edema bilaterally. Full range of motion without deformity. Skin: Skin color, texture, turgor normal.  No rashes or lesions. Neurologic:  Neurovascularly intact without any focal sensory/motor deficits. Cranial nerves: II-XII intact, grossly non-focal.  Psychiatric:  Alert and oriented, thought content appropriate, normal insight  Capillary Refill: Brisk,< 3 seconds   Peripheral Pulses: +2 palpable, equal bilaterally       CXR:  I have reviewed the CXR with the following interpretation: no acute ASD  EKG:  I have reviewed the EKG with the following interpretation: Lima Hurst w/out acute ischemic changes.      Labs:     Recent Labs     09/08/22  0900 09/09/22  0717   WBC 10.3 9.2   HGB 13.9 13.4*   HCT 40.7 40.2*    159     Recent Labs 09/08/22  0900 09/09/22  0717    138   K 3.4* 4.1    100   CO2 24 24   BUN 23* 17   CREATININE 1.1 1.0   CALCIUM 9.5 9.9   PHOS  --  2.9     Recent Labs     09/08/22  0900   AST 26   ALT 32   BILITOT 1.2*   ALKPHOS 80     Recent Labs     09/08/22  0900   INR 1.17*     Recent Labs     09/08/22  0900   TROPONINI <0.01       Urinalysis:    No results found for: NITRU, WBCUA, BACTERIA, RBCUA, BLOODU, SPECGRAV, GLUCOSEU      Consults:    IP CONSULT TO PHARMACY  PHARMACY TO CHANGE BASE FLUIDS  IP CONSULT TO NEUROLOGY  IP CONSULT TO CARDIOLOGY  IP CONSULT TO NEUROLOGY      ASSESSMENT:    Active Hospital Problems    Diagnosis Date Noted    GERD (gastroesophageal reflux disease) [K21.9] 12/20/2012     Priority: High    Hypertension [I10]      Priority: High    Hypokalemia [E87.6] 09/08/2022     Priority: Medium    TIA (transient ischemic attack) [G45.9] 09/08/2022     Priority: Medium    CHF (congestive heart failure) ([facility]) [I50.9]     Hyperlipidemia [E78.5]     CAD (coronary artery disease) [I25.10] 09/01/2001       PLAN:      CVA - acute thromboembolic likely. MRI confirmed small acute L Parietal lobe infarct. Echo ordered 8 Sept and pending. CTA H/N in ED demonstrated no flow-limiting stenosis. Neurology consulted and appreciated in advance pending further w/up. Continue ASA/Statin for 2nd prevention and risk reduction. Afib - new dx w/ bradycardia, possibly symptomatic on admission w/ etiology clinically unable to determine etiology. Normally rate controlled on Dig/BBlocker - continued/held respectively. NOT Anticoagulated at baseline. Monitored on tele. Cardiology consulted and appreciated in advance. HTN/CAD - w/ known CAD but no evidence of active signs/sxs of ischemia/failure. Currently controlled on home meds w/ vitals reviewed and documented as above. HyperLipidemia - controlled on home Statin.  Continue, w/ f/u and med adjustment w/ PCP    CHF - chronic diastolic failure w/ unknown EF - ordered and pending. Likely due to hypertensive heart disease. Patient is euvolemic w/ no evidence of acute decompensation. Continue current medical mgt on home Lasix/Aldactone w/ ACEi/BBlocker. GERD - w/out active signs/sxs of dysphagia/odynophagia. No evidence of active PUD or hx of GI bleed. Controlled on home PPI - continue. DVT Prophylaxis: LMWH  Diet: Diet NPO Exceptions are: Sips of Water with Meds  Code Status: Full Code      PT/OT Eval Status: ordered and pending. Dispo - Patient is likely to remain in-house at least until Sat/Steven 10/11 Sept pending clinical course, subspecialty recs and eventual PT/OT eval/recs. Ken Reddy MD    Thank you Vazquez Mancera DO for the opportunity to be involved in this patient's care. If you have any questions or concerns please feel free to contact me at [phone].

## 2022-09-08 NOTE — CONSULTS
Clinical Pharmacy Consult Note  IV fluids in 0.9% Sodium Chloride    Pharmacy has been asked by  to  adjust all drips to normal saline as appropriate based on compatibility to avoid fluid shifts since D5 is osmotically active. The following intermittent IV drips/infusions have been adjusted to saline:  - None at this time    The following medications must remain in D5W due to incompatibility with normal saline if ordered :   Amphotericin  Bactrim  Mycophenolate  Nitroprusside    h will follow daily to ensure all new IVPBs + drips are in NS.

## 2022-09-08 NOTE — PLAN OF CARE
Problem: Discharge Planning  Goal: Discharge to home or other facility with appropriate resources  9/8/2022 1900 by Jason Carpenter RN  Outcome: Progressing     Problem: Chronic Conditions and Co-morbidities  Goal: Patient's chronic conditions and co-morbidity symptoms are monitored and maintained or improved  9/8/2022 1900 by Jason Carpenter RN  Outcome: Progressing     Problem: ABCDS Injury Assessment  Goal: Absence of physical injury  9/8/2022 1900 by Jason Carpenter RN  Outcome: Progressing

## 2022-09-08 NOTE — PROGRESS NOTES
TODAYS DATE:  9/8/2022    Discussed personal risk factors for Stroke /TIA with patient/family, and ways to reduce the risk for a recurrent stroke. Patient's personal risk factors which were identified are:     [] Alcohol Abuse: check with your physician before any alcohol consumption. [x] Atrial fibrillation: may cause blood clots. [] Drug Abuse: Seek help, talk with your doctor  [] Clotting Disorder  [x] Diabetes  [x] Family history of stroke or heart disease  [x] High Blood Pressure/Hypertension: work with your physician.  [] High cholesterol: monitor cholesterol levels with your physician. [x] Overweight/Obesity: work with your physician for your ideal body weight.  [] Physical Inactivity: get regular exercise as directed by your physician. [] Personal history of previous TIA or stroke  [x] Poor Diet; decrease salt (sodium) in your diet, follow diet directed by physician. [] Smoking: Cigarette/Cigar: stop smoking. Reviewed the Following Education with Patient and/or Family:   -Signs and Symptoms of Stroke:     (facial droop, weakness/numbness especially on one side, speech difficulty, sudden confusion, sudden loss of vision, sudden severe headache,       sudden loss of balance or having difficulty walking, syncope or seizure)  -How to activate EMS (911)   -Importance of Follow Up Appointment at Discharge   -Importance of Compliance with Medications Prescribed at Discharge     Pt verbalized understanding.      Family Present during Education: no     Stroke Education Booklet at Bedside: yes     Electronically signed by Colleen Duran RN on 9/8/2022 at 7:27 PM

## 2022-09-08 NOTE — ED PROVIDER NOTES
CHIEF COMPLAINT  Aphasia (Starting at 0730 this morning after morning walk. States he had trouble formulating words, lasted roughly 20 minutes and resolved on EMS arrival.) and Dizziness (Starting at 0730 this morning, still persisting.)      HISTORY OF PRESENT ILLNESS  Yasmin Wheeler is a 80 y.o. male with a history of CHF, hypertension, hyperlipidemia presenting for evaluation of difficulty speaking, dizziness. Patient states that he was on his morning walk at the end of it, he had difficulty speaking. This lasted about 20 minutes. He has no history of stroke. He feels at baseline at this point in time. No chest pain, difficulty breathing. He is not on anticoagulation. No other complaints, modifying factors or associated symptoms. I have reviewed the following from the nursing documentation.    Past Medical History:   Diagnosis Date    Aortic aneurysm, abdominal ([address] 1/2011    Appendicitis 12-06-11    Arthritis 12-06-11    Bruises easily     CAD (coronary artery disease) 9/2001    MI     CHF (congestive heart failure) (HCC)     Following CABG    Chronic back pain     Chronic pain of both shoulders 12-06-11    joint - the shoulders hurt    Complication of anesthesia     woke up during appendectomy    Gout     Heart disease 12-06-11    Hernia 12-    HIGH CHOLESTEROL 12-6-11    Hyperlipidemia     Hypertension     Measles 12-    Pancreatitis 2006    History of     Persistent cough     Ringing in ears     Sinus disorder     Sleep deprivation 12-    loss of sleep     Past Surgical History:   Procedure Laterality Date    ABDOMINAL AORTIC ANEURYSM REPAIR      ABDOMINAL AORTIC ANEURYSM REPAIR, ENDOVASCULAR  1/28/2011    Endomvascular abdominal aortic aneurysm repair with insertion of cardiomems pressure sensor    APPENDECTOMY      40-50 years ago    CARDIAC SURGERY  2008    CABG    CHOLECYSTECTOMY, LAPAROSCOPIC  2006    CORONARY ANGIOPLASTY WITH STENT PLACEMENT  2001 and 2002    CORONARY Medication Dose Route Frequency Provider Last Rate Last Admin    [START ON 9/9/2022] amLODIPine (NORVASC) tablet 5 mg  5 mg Oral Daily Malvin Das MD        [START ON 9/9/2022] aspirin tablet 325 mg  325 mg Oral Daily Malvin Das MD        [START ON 9/9/2022] furosemide (LASIX) tablet 20 mg  20 mg Oral Daily Malvin Das MD        [START ON 9/9/2022] lisinopril (PRINIVIL;ZESTRIL) tablet 40 mg  40 mg Oral Daily Malvin Das MD        [START ON 9/9/2022] metoprolol succinate (TOPROL XL) extended release tablet 50 mg  50 mg Oral Daily Malvin Das MD        [START ON 9/9/2022] spironolactone (ALDACTONE) tablet 25 mg  25 mg Oral Daily Malvin Das MD        sodium chloride flush 0.9 % injection 5-40 mL  5-40 mL IntraVENous 2 times per day Malvin Das MD        sodium chloride flush 0.9 % injection 5-40 mL  5-40 mL IntraVENous PRN Malvin Das MD        0.9 % sodium chloride infusion   IntraVENous PRN Malvin Das MD        enoxaparin (LOVENOX) injection 40 mg  40 mg SubCUTAneous Daily Malvin Das MD        ondansetron (ZOFRAN-ODT) disintegrating tablet 4 mg  4 mg Oral Q8H PRN Malvin Das MD        Or    ondansetron [facility]) injection 4 mg  4 mg IntraVENous Q6H PRN Malvin Das MD        polyethylene glycol (GLYCOLAX) packet 17 g  17 g Oral Daily PRN Malvin Das MD        acetaminophen (TYLENOL) tablet 650 mg  650 mg Oral Q6H PRN Malvin Das MD        Or    acetaminophen (TYLENOL) suppository 650 mg  650 mg Rectal Q6H PRN Malvin Das MD         Allergies   Allergen Reactions    Coumadin [Warfarin] Other (See Comments)     Makes feel funny    Lipitor      Muscle aches    Omeprazole Other (See Comments)     headaches    Vioxx        REVIEW OF SYSTEMS  Positive and pertinent negatives as per HPI. All other systems were reviewed and are negative.     PHYSICAL EXAM  BP (!) 167/71   Pulse 58   Temp 97.6 °F (36.4 °C) (Oral)   Resp 18   Ht 5' 8\" (1.727 m)   Wt 191 lb 11.2 oz (87 kg)   SpO2 91%   BMI 29.15 kg/m²   GENERAL APPEARANCE: Awake and alert. Cooperative. HEAD: Normocephalic. Atraumatic. EYES: PERRL. EOM's grossly intact. HEART: RRR. No harsh murmurs. Intact radial pulses 2+ bilaterally. LUNGS: Respirations unlabored without accessory muscle use. Speaking comfortably in full sentences. ABDOMEN: Soft. Non-distended. Non-tender. No guarding or rebound. EXTREMITIES: No peripheral edema. No acute deformities. SKIN: Warm and dry. No acute rashes. NEUROLOGICAL: Alert and oriented X 3. CN II-XII intact. There is a very very slight right-sided angulation to the angle of the mouth but no flattening of the nasolabial fold no gross facial drooping. Strength 5/5, sensation intact. No pronator drift. No focal deficits  PSYCHIATRIC: Normal mood and affect. LABS  I have reviewed all labs for this visit.    Results for orders placed or performed during the hospital encounter of 09/08/22   CBC with Auto Differential   Result Value Ref Range    WBC 10.3 4.0 - 11.0 K/uL    RBC 4.42 4.20 - 5.90 M/uL    Hemoglobin 13.9 13.5 - 17.5 g/dL    Hematocrit 40.7 40.5 - 52.5 %    MCV 92.0 80.0 - 100.0 fL    MCH 31.4 26.0 - 34.0 pg    MCHC 34.1 31.0 - 36.0 g/dL    RDW 14.5 12.4 - 15.4 %    Platelets 497 464 - 454 K/uL    MPV 8.0 5.0 - 10.5 fL    Neutrophils % 60.5 %    Lymphocytes % 26.8 %    Monocytes % 10.5 %    Eosinophils % 1.6 %    Basophils % 0.6 %    Neutrophils Absolute 6.2 1.7 - 7.7 K/uL    Lymphocytes Absolute 2.8 1.0 - 5.1 K/uL    Monocytes Absolute 1.1 0.0 - 1.3 K/uL    Eosinophils Absolute 0.2 0.0 - 0.6 K/uL    Basophils Absolute 0.1 0.0 - 0.2 K/uL   Comprehensive Metabolic Panel w/ Reflex to MG   Result Value Ref Range    Sodium 138 136 - 145 mmol/L    Potassium reflex Magnesium 3.4 (L) 3.5 - 5.1 mmol/L    Chloride 100 99 - 110 mmol/L    CO2 24 21 - 32 mmol/L    Anion Gap 14 3 - 16    Glucose 184 (H) 70 - 99 mg/dL    BUN 23 (H) 7 - 20 mg/dL    Creatinine 1.1 0.8 - 1.3 mg/dL    GFR Non-African American >60 >60    GFR African American >60 >60    Calcium 9.5 8.3 - 10.6 mg/dL    Total Protein 7.8 6.4 - 8.2 g/dL    Albumin 4.7 3.4 - 5.0 g/dL    Albumin/Globulin Ratio 1.5 1.1 - 2.2    Total Bilirubin 1.2 (H) 0.0 - 1.0 mg/dL    Alkaline Phosphatase 80 40 - 129 U/L    ALT 32 10 - 40 U/L    AST 26 15 - 37 U/L   Troponin   Result Value Ref Range    Troponin <0.01 <0.01 ng/mL   Protime-INR   Result Value Ref Range    Protime 14.8 (H) 11.7 - 14.5 sec    INR 1.17 (H) 0.87 - 1.14   Magnesium   Result Value Ref Range    Magnesium 1.90 1.80 - 2.40 mg/dL   POCT Glucose   Result Value Ref Range    POC Glucose 181 (H) 70 - 99 mg/dl    Performed on ACCU-CHEK    EKG 12 Lead   Result Value Ref Range    Ventricular Rate 51 BPM    Atrial Rate 41 BPM    QRS Duration 76 ms    Q-T Interval 518 ms    QTc Calculation (Bazett) 477 ms    R Axis -16 degrees    T Axis -15 degrees    Diagnosis       Atrial fibrillation with slow ventricular responseInferior infarct , age undeterminedAbnormal ECGWhen compared with ECG of 02-MAY-2011 23:50,Atrial fibrillation has replaced Sinus rhythmInferior infarct is now PresentST now depressed in Inferior leadsT wave inversion now evident in Inferior leads         EKG  The Ekg interpreted by myself in the emergency department in the absence of a cardiologist.  atrial fibrillation with a rate of 51  Axis is   Normal  QTc is   477  Intervals and Durations are unremarkable. No specific ST-T wave changes appreciated. No evidence of acute ischemia. No significant change from prior EKG dated 8/16/2022      RADIOLOGY  X-RAYS:  I have reviewed radiologic plain film image(s). ALL OTHER NON-PLAIN FILM IMAGES SUCH AS CT, ULTRASOUND AND MRI HAVE BEEN READ BY THE RADIOLOGIST. CTA HEAD NECK W CONTRAST   Final Result   1. Atherosclerosis contributes to less than 50% stenosis involving the   proximal right cervical ICA by NASCET criteria.    2. Atherosclerosis at the origin of both vertebral arteries, which   contributes to mild stenosis on the right and moderate stenosis on the left. 3. No significant stenosis seen of the [id number]. 4. Nonspecific borderline prominent precarinal mediastinal lymph node   measuring 13 mm. CT HEAD WO CONTRAST   Final Result   No hemorrhage or mass identified      Atrophy and moderate to severe small-vessel ischemic change, increased   compared to 2013      Results discussed with [facility] by Timur Francois. Delvin Jefferson MD at 9:19 am on   9/8/2022         XR CHEST PORTABLE   Final Result   1. No acute abnormality. MRI BRAIN WO CONTRAST    (Results Pending)          No results found. Critical Care: Total critical care time is 25 minutes, which excludes separately billable procedures and updating family. Time spent is specifically for management of the presenting complaint and symptoms initially, direct bedside care, reevaluation, review of records, and consultation. There was a high probability of clinically significant life-threatening deterioration in the patient's condition, which required my urgent intervention.            During the patient's ED course, the patient was given:  Medications   amLODIPine (NORVASC) tablet 5 mg (has no administration in time range)   aspirin tablet 325 mg (has no administration in time range)   furosemide (LASIX) tablet 20 mg (has no administration in time range)   lisinopril (PRINIVIL;ZESTRIL) tablet 40 mg (has no administration in time range)   metoprolol succinate (TOPROL XL) extended release tablet 50 mg (has no administration in time range)   spironolactone (ALDACTONE) tablet 25 mg (has no administration in time range)   sodium chloride flush 0.9 % injection 5-40 mL (has no administration in time range)   sodium chloride flush 0.9 % injection 5-40 mL (has no administration in time range)   0.9 % sodium chloride infusion (has no administration in time range)   enoxaparin (LOVENOX) injection 40 mg (has no administration in time range)   ondansetron (ZOFRAN-ODT) disintegrating tablet 4 mg (has no administration in time range)     Or   ondansetron (ZOFRAN) injection 4 mg (has no administration in time range)   polyethylene glycol (GLYCOLAX) packet 17 g (has no administration in time range)   acetaminophen (TYLENOL) tablet 650 mg (has no administration in time range)     Or   acetaminophen (TYLENOL) suppository 650 mg (has no administration in time range)   iopamidol (ISOVUE-370) 76 % injection 75 mL (75 mLs IntraVENous Given 9/8/22 0912)        ED COURSE/MDM  Patient seen and evaluated. Old records reviewed. Labs and imaging reviewed and results discussed with patient. 80-year-old male presenting for evaluation of TIA symptoms, strokelike symptoms that last for about 20 minutes now asymptomatic. He may be has a very slight right-sided facial droop. Code stroke was called, I spoke with the  stroke team agreed on CT head, CTA of the head and neck. This was unremarkable except for small vessel disease no large vessel occlusion. He has moderately elevated ABCD squared score of 5 which recommends inpatient hospitalization because of increased risk of stroke in the next several weeks. Patient is agreeable with plan for admission. Patient was given scripts for the following medications. I counseled patient how to take these medications. Current Discharge Medication List          CLINICAL IMPRESSION  1. Stroke-like symptoms    2. TIA (transient ischemic attack)        Blood pressure (!) 167/71, pulse 58, temperature 97.6 °F (36.4 °C), temperature source Oral, resp. rate 18, height 5' 8\" (1.727 m), weight 191 lb 11.2 oz (87 kg), SpO2 91 %. DISPOSITION  Sangeetha Rubi was admitted in stable condition.      This chart was generated in part by using Dragon Dictation system and may contain errors related to that system including errors in grammar, punctuation, and spelling, as well as words and phrases that may be inappropriate. If there are any questions or concerns please feel free to contact the dictating provider for clarification.        Elle Rabago MD  09/08/22 [id number]

## 2022-09-08 NOTE — ED NOTES
Report given to FRAN Ba on B3. Pt transported by RN to room, NIHSS hand-off completed at bedside.      Jose Cartagena RN  09/08/22 [id number]

## 2022-09-08 NOTE — ED NOTES
Pt returned to room from CT via stretcher. Pt informed RN in CT that he was feeling dizzy, worse when standing. Pt states he has episodes of dizziness intermittently for last month. Episode this morning started at the same time as aphasia. Pt resting a this time in bed with monitors in place and in position of comfort.      Kirt Laguerre RN  09/08/22 [id number]

## 2022-09-08 NOTE — ED NOTES
Pt NIHSS scored at 1045, new facial droop to right side of mouth noted by RN. Pt's score remains otherwise unchanged. Pt denies difficulty speaking, states minimal lightheadedness. Dr Muñoz informed.      Amina Hardin RN  09/08/22 1715

## 2022-09-08 NOTE — PLAN OF CARE
STROKE TEAM PLAN OF CARE    Name:  Sangeetha Rubi  : 1940  MRN:  [id number]  Today's Date: 2022    Stroke team contacted at: 08:56  History obtained from: emergency physician    Sangeetha Rubi is a 80 y.o. male who presented with difficulty speaking. Pt reports that he was LKW at 07:30 when he had a sudden onset of difficulty speaking. This lasted 20 minutes, and then he returned to his baseline. In the ED, no focal deficits noted. CTA HEAD NECK W CONTRAST   Final Result   1. Atherosclerosis contributes to less than 50% stenosis involving the   proximal right cervical ICA by NASCET criteria. 2. Atherosclerosis at the origin of both vertebral arteries, which   contributes to mild stenosis on the right and moderate stenosis on the left. 3. No significant stenosis seen of the eevsrd-uz-Wcikym. 4. Nonspecific borderline prominent precarinal mediastinal lymph node   measuring 13 mm. CT HEAD WO CONTRAST   Final Result   No hemorrhage or mass identified      Atrophy and moderate to severe small-vessel ischemic change, increased   compared to 2013      Results discussed with [facility] by Michael Regan. Araceli Angel MD at 9:19 am on   2022       Acute Ischemic Stroke Clinical Decision Making:    (1) Intravenous thrombolysis:    The patient is not a candidate for IV thrombolysis based on:  Symptoms nondisabling / resolved    (2) Angiography / Thrombectomy:  The patient does not have evidence of a proximal large vessel occlusion on CTA, and therefore is not an EVT candidate. For any additional questions regarding acute stroke care, please feel free to contact the  Stroke Team at [phone]. Consultation performed with stroke fellow, Dr. Ashleigh Ribera.      MD Anel   Stroke Team  Late entry at:   2022 3:35 PM

## 2022-09-09 ENCOUNTER — APPOINTMENT (OUTPATIENT)
Dept: NUCLEAR MEDICINE | Age: 82
DRG: 065 | End: 2022-09-09
Payer: MEDICARE

## 2022-09-09 PROBLEM — R00.1 BRADYCARDIA: Status: ACTIVE | Noted: 2022-09-09

## 2022-09-09 PROBLEM — R29.90 STROKE-LIKE SYMPTOMS: Status: ACTIVE | Noted: 2022-09-09

## 2022-09-09 PROBLEM — I63.9 CVA (CEREBRAL VASCULAR ACCIDENT) (HCC): Status: ACTIVE | Noted: 2022-09-09

## 2022-09-09 LAB
ALBUMIN SERPL-MCNC: 3.9 G/DL (ref 3.4–5)
ANION GAP SERPL CALCULATED.3IONS-SCNC: 14 MMOL/L (ref 3–16)
BUN BLDV-MCNC: 17 MG/DL (ref 7–20)
CALCIUM SERPL-MCNC: 9.9 MG/DL (ref 8.3–10.6)
CHLORIDE BLD-SCNC: 100 MMOL/L (ref 99–110)
CO2: 24 MMOL/L (ref 21–32)
CREAT SERPL-MCNC: 1 MG/DL (ref 0.8–1.3)
EKG ATRIAL RATE: 326 BPM
EKG DIAGNOSIS: NORMAL
EKG Q-T INTERVAL: 550 MS
EKG QRS DURATION: 86 MS
EKG QTC CALCULATION (BAZETT): 511 MS
EKG R AXIS: -16 DEGREES
EKG T AXIS: 161 DEGREES
EKG VENTRICULAR RATE: 52 BPM
GFR AFRICAN AMERICAN: >60
GFR NON-AFRICAN AMERICAN: >60
GLUCOSE BLD-MCNC: 137 MG/DL (ref 70–99)
HCT VFR BLD CALC: 40.2 % (ref 40.5–52.5)
HEMOGLOBIN: 13.4 G/DL (ref 13.5–17.5)
LV EF: 53 %
LV EF: 60 %
LVEF MODALITY: NORMAL
LVEF MODALITY: NORMAL
MAGNESIUM: 1.8 MG/DL (ref 1.8–2.4)
MCH RBC QN AUTO: 31.2 PG (ref 26–34)
MCHC RBC AUTO-ENTMCNC: 33.4 G/DL (ref 31–36)
MCV RBC AUTO: 93.5 FL (ref 80–100)
PDW BLD-RTO: 14.3 % (ref 12.4–15.4)
PHOSPHORUS: 2.9 MG/DL (ref 2.5–4.9)
PLATELET # BLD: 159 K/UL (ref 135–450)
PMV BLD AUTO: 8.4 FL (ref 5–10.5)
POTASSIUM SERPL-SCNC: 4.1 MMOL/L (ref 3.5–5.1)
RBC # BLD: 4.3 M/UL (ref 4.2–5.9)
SODIUM BLD-SCNC: 138 MMOL/L (ref 136–145)
TSH REFLEX FT4: 0.52 UIU/ML (ref 0.27–4.2)
WBC # BLD: 9.2 K/UL (ref 4–11)

## 2022-09-09 PROCEDURE — 93306 TTE W/DOPPLER COMPLETE: CPT

## 2022-09-09 PROCEDURE — 99223 1ST HOSP IP/OBS HIGH 75: CPT | Performed by: NURSE PRACTITIONER

## 2022-09-09 PROCEDURE — 3430000000 HC RX DIAGNOSTIC RADIOPHARMACEUTICAL: Performed by: INTERNAL MEDICINE

## 2022-09-09 PROCEDURE — 2580000003 HC RX 258: Performed by: INTERNAL MEDICINE

## 2022-09-09 PROCEDURE — 84443 ASSAY THYROID STIM HORMONE: CPT

## 2022-09-09 PROCEDURE — 85027 COMPLETE CBC AUTOMATED: CPT

## 2022-09-09 PROCEDURE — 93017 CV STRESS TEST TRACING ONLY: CPT

## 2022-09-09 PROCEDURE — 6370000000 HC RX 637 (ALT 250 FOR IP): Performed by: NURSE PRACTITIONER

## 2022-09-09 PROCEDURE — 97166 OT EVAL MOD COMPLEX 45 MIN: CPT

## 2022-09-09 PROCEDURE — 6360000002 HC RX W HCPCS: Performed by: INTERNAL MEDICINE

## 2022-09-09 PROCEDURE — 97535 SELF CARE MNGMENT TRAINING: CPT

## 2022-09-09 PROCEDURE — 78452 HT MUSCLE IMAGE SPECT MULT: CPT

## 2022-09-09 PROCEDURE — A9502 TC99M TETROFOSMIN: HCPCS | Performed by: INTERNAL MEDICINE

## 2022-09-09 PROCEDURE — 99223 1ST HOSP IP/OBS HIGH 75: CPT | Performed by: INTERNAL MEDICINE

## 2022-09-09 PROCEDURE — 1200000000 HC SEMI PRIVATE

## 2022-09-09 PROCEDURE — 2700000000 HC OXYGEN THERAPY PER DAY

## 2022-09-09 PROCEDURE — 94761 N-INVAS EAR/PLS OXIMETRY MLT: CPT

## 2022-09-09 PROCEDURE — 83735 ASSAY OF MAGNESIUM: CPT

## 2022-09-09 PROCEDURE — 97110 THERAPEUTIC EXERCISES: CPT

## 2022-09-09 PROCEDURE — 6360000002 HC RX W HCPCS: Performed by: NURSE PRACTITIONER

## 2022-09-09 PROCEDURE — 97530 THERAPEUTIC ACTIVITIES: CPT

## 2022-09-09 PROCEDURE — 6370000000 HC RX 637 (ALT 250 FOR IP): Performed by: INTERNAL MEDICINE

## 2022-09-09 PROCEDURE — 36415 COLL VENOUS BLD VENIPUNCTURE: CPT

## 2022-09-09 PROCEDURE — 97161 PT EVAL LOW COMPLEX 20 MIN: CPT

## 2022-09-09 PROCEDURE — 80069 RENAL FUNCTION PANEL: CPT

## 2022-09-09 RX ORDER — FUROSEMIDE 10 MG/ML
20 INJECTION INTRAMUSCULAR; INTRAVENOUS ONCE
Status: COMPLETED | OUTPATIENT
Start: 2022-09-09 | End: 2022-09-09

## 2022-09-09 RX ADMIN — TETROFOSMIN 31 MILLICURIE: 1.38 INJECTION, POWDER, LYOPHILIZED, FOR SOLUTION INTRAVENOUS at 14:00

## 2022-09-09 RX ADMIN — SODIUM CHLORIDE, PRESERVATIVE FREE 10 ML: 5 INJECTION INTRAVENOUS at 21:32

## 2022-09-09 RX ADMIN — FUROSEMIDE 20 MG: 10 INJECTION, SOLUTION INTRAMUSCULAR; INTRAVENOUS at 01:12

## 2022-09-09 RX ADMIN — REGADENOSON 0.4 MG: 0.08 INJECTION, SOLUTION INTRAVENOUS at 14:00

## 2022-09-09 RX ADMIN — TETROFOSMIN 11.3 MILLICURIE: 1.38 INJECTION, POWDER, LYOPHILIZED, FOR SOLUTION INTRAVENOUS at 12:33

## 2022-09-09 RX ADMIN — SPIRONOLACTONE 25 MG: 25 TABLET ORAL at 08:32

## 2022-09-09 RX ADMIN — SODIUM CHLORIDE, PRESERVATIVE FREE 10 ML: 5 INJECTION INTRAVENOUS at 08:34

## 2022-09-09 RX ADMIN — FUROSEMIDE 20 MG: 20 TABLET ORAL at 08:33

## 2022-09-09 RX ADMIN — AMLODIPINE BESYLATE 5 MG: 5 TABLET ORAL at 08:33

## 2022-09-09 RX ADMIN — LISINOPRIL 40 MG: 20 TABLET ORAL at 08:33

## 2022-09-09 RX ADMIN — ASPIRIN 325 MG: 325 TABLET ORAL at 10:22

## 2022-09-09 RX ADMIN — POTASSIUM BICARBONATE 40 MEQ: 782 TABLET, EFFERVESCENT ORAL at 01:12

## 2022-09-09 ASSESSMENT — PAIN SCALES - GENERAL
PAINLEVEL_OUTOF10: 0

## 2022-09-09 NOTE — PLAN OF CARE
Patient's EF (Ejection Fraction) is greater than 40%    Heart Failure Medications:  Diuretics[de-identified] Furosemide and Spironolactone    (One of the following REQUIRED for EF </= 40%/SYSTOLIC FAILURE but MAY be used in EF% >40%/DIASTOLIC FAILURE)        ACE[de-identified] Lisinopril        ARB[de-identified] None         ARNI[de-identified] None    (Beta Blockers)  NON- Evidenced Based Beta Blocker (for EF% >40%/DIASTOLIC FAILURE): None    Evidenced Based Beta Blocker::(REQUIRED for EF% <40%/SYSTOLIC FAILURE) Metoprolol SUCCinate- Toprol XL- HELD FOR LOW HR  . .................................................................................................................................................. Patient's weights and intake/output reviewed: No    Patient's Last Weight: 188 lbs obtained by standing scale. Difference of 3 lbs less than last documented weight. Intake/Output Summary (Last 24 hours) at 9/9/2022 1225  Last data filed at 9/9/2022 0442  Gross per 24 hour   Intake 480 ml   Output 900 ml   Net -420 ml       Comorbidities Reviewed Yes    Patient has a past medical history of Aortic aneurysm, abdominal ([address] Appendicitis, Arthritis, Bruises easily, CAD (coronary artery disease), CHF (congestive heart failure) (HCC), Chronic back pain, Chronic pain of both shoulders, Complication of anesthesia, Gout, Heart disease, Hernia, HIGH CHOLESTEROL, Hyperlipidemia, Hypertension, Measles, Pancreatitis, Persistent cough, Ringing in ears, Sinus disorder, and Sleep deprivation. >>For CHF and Comorbidity documentation on Education Time and Topics, please see Education Tab    Progressive Mobility Assessment:  What is this patient's Current Level of Mobility?: Ambulatory-Up Ad Marisol  How was this patient Mobilized today?: Mario of Bed, Up to Chair, and Up in Room, ambulated 20 ft                 With Whom? Nurse and Self                 Level of Difficulty/Assistance: Independent     Pt resting in bed at this time on  2 L O2. Pt denies shortness of breath. Pt without lower extremity edema.      Patient and/or Family's stated Goal of Care this Admission: reduce shortness of breath, better understand heart failure and disease management, and be more comfortable prior to discharge        :

## 2022-09-09 NOTE — CARE COORDINATION
CASE MANAGEMENT INITIAL ASSESSMENT      Reviewed chart and completed assessment with patient:bedside  Family present: none  Explained Case Management role/services. Primary contact information:Elma/wife    Health Care Decision Maker :   Primary Decision Maker: New [place] - Spouse - [phone]          Can this person be reached and be able to respond quickly, such as within a few minutes or hours? Yes      Admit date/status:9/8/22 INPT  Diagnosis:TIA, bradycardia   Is this a Readmission?:  No      Insurance:[employer]   Precert required for SNF: Yes       3 night stay required: No    Living arrangements, Adls, care needs, prior to admission:lives in a 1 story house with wife. 2 JANNETTE    Durable Medical Equipment at home:  cane for standing support only  Services in the home and/or outpatient, prior to admission:none    Current PCP:Foreign Gonzalez, DO                                Medications: Prescription coverage? Yes Will pt require financial assistance with medications No     Transportation needs: none. Private vehicle. Pt is an active          PT/OT recs:none    Hospital Exemption Notification (HEN):needed for SNF    Barriers to discharge:none    Plan/comments: pt having periods of bradycardia. Needs cardiac workup. Pt from home with wife. Pt is active at home, still cuts his lawn and does metal detecting as a hobby. Currently requiring oxygen, does not wear home O2. CM will follow for O2 needs, and any other dcp needs that may arise.  Tammy Taft Severs, RN     ECOC on chart for MD signature

## 2022-09-09 NOTE — CONSULTS
[address] [address]                                  CONSULTATION    PATIENT NAME: Viky Mahoney                       :        1940  MED REC NO:   [id number]                          ROOM:       2147  ACCOUNT NO:   [de-identified]                           ADMIT DATE: 2022  PROVIDER:     Christianne Price MD    CONSULT DATE:  2022    CARDIAC ELECTROPHYSIOLOGY CONSULTATION    REASON FOR CONSULTATION:  Atrial fibrillation. HISTORY OF PRESENT ILLNESS:  The patient is an 79-year-old man with  history of hypertension, hyperlipidemia, and coronary artery disease who  is admitted for evaluation of a brief episode of aphasia on 2022. He has sought attention about 3 weeks ago for an episode of imbalance  and ataxia. This seemed to resolve spontaneously. On 2022 while  walking, he had an episode of aphasia, which was evident to him _____. He then presented to the emergency department. By the time he arrived,  his symptoms had resolved. ECG at the time of admission demonstrates  atrial fibrillation with an average ventricular rate of 51 beats per  minute. There are ST-segment abnormalities in the anterolateral leads. ECG from 2022 also demonstrates atrial fibrillation with abnormal  ventricular conduction, heart rate of 46 beats per minute. The  previously mentioned ST-segment abnormalities are evident on that ECG as  well. ECG from 2018 demonstrates sinus rhythm at 57 beats per  minute with a markedly prolonged MI interval at 350 milliseconds. The  patient _____ chest pain. He has had some recent increase in dyspnea on  exertion. Laboratory evaluation at the time of admission demonstrates a  troponin level of less than 0.01. The patient underwent coronary artery bypass surgery in . He has  not had cardiology followup since then.     PAST MEDICAL HISTORY:  1. Hypertension. 2.  Hyperlipidemia. 3.  Coronary artery disease status post remote coronary artery bypass  surgery. 4.  Osteoarthritis. 5.  Long MT interval.    MEDICATIONS:  At the time of admission include, amlodipine 5 mg p.o.  b.i.d., aspirin 325 mg p.o. daily, Lasix 40 mg p.o. daily, lisinopril 40  mg p.o. daily, metoprolol succinate 50 mg p.o. daily, and Aldactone 25  mg p.o. daily. ALLERGIES:  Include WARFARIN, LIPITOR, VIOXX and OMEPRAZOLE. SOCIAL HISTORY:  The patient is a former cigarette smoker having stopped  smoking about 50 years ago. He does not consume alcohol at this time. FAMILY HISTORY:  Remarkable for hypertension, diabetes and heart disease  in the mother. There is history of heart disease and heart failure in  the father. History of stroke in two siblings. REVIEW OF SYSTEMS:  Demonstrates no recent change in appetite or change  in weight. The patient has not had recent fever or chills. He does not  describe chest pain. He does have some increasing shortness of breath  recently. He maintains very active lifestyle and walks one to two miles  per day. All other components of the 14-system review are negative. PHYSICAL EXAMINATION:  VITAL SIGNS:  Blood pressure is 175/76, heart rate is 50 beats per  minute and irregular. The patient has a temperature of 99.1 degrees  Fahrenheit. HEENT:  Exam demonstrates normocephalic and atraumatic head. There is  no scleral icterus. Pupils are round and reactive. NECK:  Supple without thyromegaly. There is no cervical  lymphadenopathy. LUNGS:  Clear to auscultation. CARDIOVASCULAR:  Exam reveals an irregular rhythm. The apical impulse  is discrete. S1 and S2 are normal.  There is no audible murmur or  gallop. The jugular venous pressure does not appear elevated. ABDOMEN:  Lean, soft and nontender. EXTREMITIES:  Demonstrate no pitting, pretibial or dependent edema. There is no cyanosis.   There is no evidence for chronic venous stasis. SKIN:  Otherwise warm and dry without skin rash. DIAGNOSTIC DATA:  A 12-lead ECG from 09/08/2022 at 06:40 p.m.  demonstrates atrial fibrillation with an average ventricular rate of 52  beats per minute. There are ST-segment depressions in the anterolateral  leads, which may be consistent with myocardial ischemia. IMPRESSIONS:  1. Transient aphasia suggesting TIA. 2.  Atrial fibrillation of unknown duration. 3.  AV block. 4.  Abnormal ECG. 5.  History of coronary artery disease. The patient presents after an episode of aphasia, which lasted about 20  minutes and resolved spontaneously. He is in atrial fibrillation at the  time of admission, the duration of which is not known. He also has some  evidence for AV block with heart rates in the 40 to 50 beat per minute  range. He is on a small dose of metoprolol succinate, which he last  took in the morning of 09/08/2022. We will continue to watch his AV  conduction as this medication clears his system. He will require full  dose anticoagulation for stroke prophylaxis. Based on his history of  coronary artery disease and abnormal ECG, stress testing will be of  benefit. This will also allow us to evaluate his AV conduction with  exercise. RECOMMENDATIONS:  1. Initiate anticoagulation with factor Xa inhibitor. 2.  Myoview GXT to evaluate for inducible myocardial ischemia and  evaluate AV conduction. 3.  2-D echo. Thanks for the opportunity to assist in the care of the patient. Please  contact me, if you have any questions regarding his evaluation.         Luci Ayon MD    D: 09/09/2022 10:34:17       T: 09/09/2022 10:39:17     [email or web address]  Job#: [id number]     Doc#: [id number]    CC:

## 2022-09-09 NOTE — PROGRESS NOTES
Patient stopped on treadmill at 2min d/t 9/10 chest pain, mid sternum. Afib on monitor with increased ectopy, BP stable. Dr. Francheska Graf reviewed ekg, Dr at bedside to assess patient - ok to proceed with Anita Ojeda per MD.    Liliam Cornelia stress test was completed on this patient. The patient tolerated the procedure well. Awaiting stress imaging at this time.

## 2022-09-09 NOTE — PROGRESS NOTES
2040: Patient's oxygen saturation in the low 80s on RA at vital check. Oxygen titrated up to 5L NC to achieve 92% saturation. HR low in the 30s-40s. Patient asymptomatic. Crackles noted in the bases, not charted previously. Incentive spirometer given to patient and educated on use - patient verbalized and demonstrated understanding     0015: Patient continues to need 5L of oxygen to maintain saturation, states he hasn't missed a dose of lasix. NP Clarissa Mcfadden messaged - IV lasix ordered along with K replacement. Cardiology already consulted. Will continue to monitor and wean oxygen as tolerated.

## 2022-09-09 NOTE — PROGRESS NOTES
Physical Therapy  Facility/Department: [place] [id number] - [facility] SURG  Physical Therapy Initial Assessment/ Discharge    Name: Gary Guy  : 1940  MRN: [id number]  Date of Service: 2022    Discharge Recommendations:  Home with assist PRN   PT Equipment Recommendations  Equipment Needed: No      Patient Diagnosis(es): The primary encounter diagnosis was Stroke-like symptoms. A diagnosis of TIA (transient ischemic attack) was also pertinent to this visit. Past Medical History:  has a past medical history of Aortic aneurysm, abdominal ([address] Appendicitis, Arthritis, Bruises easily, CAD (coronary artery disease), CHF (congestive heart failure) ([facility]), Chronic back pain, Chronic pain of both shoulders, Complication of anesthesia, Gout, Heart disease, Hernia, HIGH CHOLESTEROL, Hyperlipidemia, Hypertension, Measles, Pancreatitis, Persistent cough, Ringing in ears, Sinus disorder, and Sleep deprivation. Past Surgical History:  has a past surgical history that includes Coronary angioplasty with stent ( and ); Cardiac surgery (); Cholecystectomy, laparoscopic (); Appendectomy; Tonsillectomy; hernia repair; AAA repair, endovascular (2011); Pancreas surgery; Gallbladder surgery; Coronary artery bypass graft; Abdominal aortic aneurysm repair; pr xcapsl ctrc rmvl insj io lens prosth w/o ecp (Left, 2018); and Intracapsular cataract extraction (Right, 2018). Assessment   Body Structures, Functions, Activity Limitations Requiring Skilled Therapeutic Intervention: Decreased endurance (need for oxygen at 4L)  Assessment: 79 yo male adm for TIA with difficulty speaking. Pt also requiring 4L O2 and having episodes of bradycardia. He does not use O2 at baseline. PLOF: lives with wife, indep ambulating, owns cane. Speech WNL today.  Today pt demonstrates baseline independence with mobility and ambulation, manages O2 tube well, demonstrates good safety awareness, voices understanding of signs/symptoms of stroke and importance of HEP. No additional PT needed. Recommend home assist as needed at discharge  Treatment Diagnosis: decreased endurance  Therapy Prognosis: Good  Decision Making: Low Complexity  Barriers to Learning: none  Requires PT Follow-Up: No  Activity Tolerance  Activity Tolerance: Patient tolerated evaluation without incident;Patient tolerated treatment well  Activity Tolerance Comments: During session on 4L O2 , SpO2 remained 94 to 98%,  HR 68 to 79     Plan   Plan  Plan: Discharge  Current Treatment Recommendations: Safety education & training  Safety Devices  Type of Devices:  All fall risk precautions in place, Call light within reach, Left in bed, Patient at risk for falls, Nurse notified (Face to face handoff to RN)     Restrictions  Restrictions/Precautions  Restrictions/Precautions: Up as Tolerated, General Precautions     Subjective   Chart Reviewed: Yes  Patient assessed for rehabilitation services?: Yes  Family / Caregiver Present: No  Referring Practitioner: Malathi Echevarria  Referral Date : 09/09/22  Diagnosis: TIA, difficulty speaking  Follows Commands: Within Functional Limits  Comments: RN cleared pt for therapy, pt sitting EOB on approach  Subjective: Pt agrees to therapy, denies pain         Social/Functional History  Social/Functional History  Lives With: Spouse  Type of Home: House  Home Layout: One level  Home Access: Stairs to enter with rails  Entrance Stairs - Number of Steps: 2-3  Bathroom Shower/Tub: Tub/Shower unit  Bathroom Toilet: Standard  Home Equipment: Cane  Has the patient had two or more falls in the past year or any fall with injury in the past year?: Yes (fell one month ago between bed and door when fatigued after mowing lawn)  ADL Assistance: Independent  Ambulation Assistance: Independent  Transfer Assistance: Independent  Active : Yes  Vision/Hearing  Vision: Impaired: Wears glasses for reading  Hearing: Within functional limits    Cognition   Overall Orientation Status: Within Normal Limits  Overall Cognitive Status: WNL     Objective   Heart Rate: 50  Heart Rate Source: Monitor  BP: (!) 175/76  BP Location: Left upper arm  BP Method: Automatic  Patient Position: Sitting  MAP (Calculated): 109  Resp: 18  SpO2: 97 %  O2 Device: Nasal cannula        Gross Assessment  AROM: Within functional limits  Strength: Within functional limits      Bed mobility  Supine to Sit: Independent  Sit to Supine: Independent    Transfers  Sit to Stand: Independent  Stand to sit:  Independent    Ambulation  Surface: level tile  Device: No Device  Assistance: Independent  Quality of Gait: WFL  Distance: 120 ft pacing in room  Comments: pt managed O2 tubing well, good safety awareness     Balance  Sitting - Static: Good  Sitting - Dynamic: Good  Standing - Static: Good  Standing - Dynamic: Good  Comments: No loss of balance turning in [place], picking up object from floor , reaching overhead, or to sternal nudge eyes open/closed    Exercise Treatment:   Sit><stand 5 x in succession within 15 seconds     Standing B heel raise 10 x     Standing slow march 10 x B     Standing side steps 10 x B                 AM-PAC Score  -PAC Inpatient Mobility Raw Score : 24 (09/09/22 1128)  AM-PAC Inpatient T-Scale Score : 61.14 (09/09/22 1128)  Mobility Inpatient CMS 0-100% Score: 0 (09/09/22 1128)  Mobility Inpatient CMS G-Code Modifier : CH (09/09/22 1128)     Goals  Short Term Goals  Time Frame for Short term goals: 1 visit  Short term goal 1: pt to voice understanding of signs/symptoms of stroke and need to seek immediate medical attn: MET  Short term goal 2: pt to demonstrate indep supine><sit><stand: MET  Short term goal 3: pt to demonstrate indep amb at least 80 ft: MET  Short term goal 4: pt to voice understanding of basic HEP/walking: MET  Patient Goals   Patient goals : \"to return to my usual walking exercise program\"     Education  Patient Education  Education Given To: Patient  Education Provided: Role of Therapy;Plan of Care  Education Provided Comments: Pt educated in signs/symptoms of stroke, he voices understanding  Barriers to Learning: None    Therapy Time   Individual Concurrent Group Co-treatment   Time In 1052         Time Out 1125         Minutes 33         Timed Code Treatment Minutes: [address] [employer] [place] NIMA Garcia

## 2022-09-09 NOTE — PROGRESS NOTES
Spoke with Dr. Inge Smith regarding orders on GXT stress test.  Patient is on 4L O2 and is afib on monitor. Dr. Inge Smith wants patient walked on treadmill, ok to convert to [address] [facility] if unable to reach target HR.

## 2022-09-09 NOTE — CONSULTS
Consult placed    Who: Leticia Hoyos   Date:9/9/2022,  Time:7:38 AM        Electronically signed by Sotero Herrera on 9/9/2022 at 7:38 AM

## 2022-09-09 NOTE — PLAN OF CARE
TODAYS DATE:  9/9/2022    Discussed personal risk factors for Stroke /TIA with patient/family, and ways to reduce the risk for a recurrent stroke. Patient's personal risk factors which were identified are:     [] Alcohol Abuse: check with your physician before any alcohol consumption. [x] Atrial fibrillation: may cause blood clots. [] Drug Abuse: Seek help, talk with your doctor  [] Clotting Disorder  [x] Diabetes  [x] Family history of stroke or heart disease  [x] High Blood Pressure/Hypertension: work with your physician. [x] High cholesterol: monitor cholesterol levels with your physician.   [] Overweight/Obesity: work with your physician for your ideal body weight.  [] Physical Inactivity: get regular exercise as directed by your physician. [] Personal history of previous TIA or stroke  [] Poor Diet; decrease salt (sodium) in your diet, follow diet directed by physician. [x] Smoking: Cigarette/Cigar: stop smoking. HAS QUIT FOR 50 YEARS      Reviewed the Following Education with Patient and/or Family:   -Signs and Symptoms of Stroke:     (facial droop, weakness/numbness especially on one side, speech difficulty, sudden confusion, sudden loss of vision, sudden severe headache,       sudden loss of balance or having difficulty walking, syncope or seizure)  -How to activate EMS (911)   -Importance of Follow Up Appointment at Discharge   -Importance of Compliance with Medications Prescribed at Discharge     Pt verbalized understanding.      Family Present during Education: no     Stroke Education Booklet at Bedside: no     Electronically signed by Ashlee Davidson RN on 9/9/2022 at 8:49 AM

## 2022-09-09 NOTE — PROGRESS NOTES
Occupational Therapy  Facility/Department: [place] [id number] - MED SURG  Occupational Therapy Initial Assessment and Treatment Note 1x    Name: Deann Llamas  : 1940  MRN: [id number]  Date of Service: 2022    Discharge Recommendations:  Home with assist PRN     Patient Diagnosis(es): The primary encounter diagnosis was Stroke-like symptoms. A diagnosis of TIA (transient ischemic attack) was also pertinent to this visit. Past Medical History:  has a past medical history of Aortic aneurysm, abdominal ([address] Appendicitis, Arthritis, Bruises easily, CAD (coronary artery disease), CHF (congestive heart failure) ([facility]), Chronic back pain, Chronic pain of both shoulders, Complication of anesthesia, Gout, Heart disease, Hernia, HIGH CHOLESTEROL, Hyperlipidemia, Hypertension, Measles, Pancreatitis, Persistent cough, Ringing in ears, Sinus disorder, and Sleep deprivation. Past Surgical History:  has a past surgical history that includes Coronary angioplasty with stent ( and ); Cardiac surgery (); Cholecystectomy, laparoscopic (); Appendectomy; Tonsillectomy; hernia repair; AAA repair, endovascular (2011); Pancreas surgery; Gallbladder surgery; Coronary artery bypass graft; Abdominal aortic aneurysm repair; pr xcapsl ctrc rmvl insj io lens prosth w/o ecp (Left, 2018); and Intracapsular cataract extraction (Right, 2018). Assessment   Assessment: OT eval and tx completed. Pt admitted for CVA with c/o dizziness and aphasia. During OT session, pt demonstrated independence with basic self-care. E is [facility] for strength and coordination. Pt is able to stand to complete ADL activity. Supervision provided for mobility primarily d/t O2 line. No further OT needed.   Decision Making: Medium Complexity  Activity Tolerance  Activity Tolerance: Patient Tolerated treatment well      Restrictions  Restrictions/Precautions  Restrictions/Precautions: Up as Tolerated, General Precautions  Position Activity Restriction  Other position/activity restrictions: 2LO2  Subjective   General  Chart Reviewed: Yes  Patient assessed for rehabilitation services?: Yes  Family / Caregiver Present: No  Referring Practitioner: CHACHA Norton  Diagnosis: CVA  Subjective  Subjective: Pt is agreeable to OT. Denies pain  General Comment  Comments: RN approved therapy     Social/Functional History  Social/Functional History  Lives With: Spouse  Type of Home: House  Home Layout: One level  Home Access: Stairs to enter with rails  Entrance Stairs - Number of Steps: 2-3  Bathroom Shower/Tub: Tub/Shower unit  Bathroom Toilet: Standard  Home Equipment: Cane  Has the patient had two or more falls in the past year or any fall with injury in the past year?: Yes (fell one month ago between bed and door when fatigued after mowing lawn)  ADL Assistance: Independent  Ambulation Assistance: Independent  Transfer Assistance: Independent  Active : Yes     Objective   Heart Rate: 54  Heart Rate Source: Monitor  BP: (!) 167/57  BP Location: Left Arm  BP Method: Automatic  Patient Position: Sitting  MAP (Calculated): 93.67  Resp: 18  SpO2: 95 %  O2 Device: Nasal cannula     Safety Devices  Type of Devices: All fall risk precautions in place;Call light within reach; Left in bed;Patient at risk for falls;Nurse notified     Toilet Transfers  Toilet - Technique: Ambulating  Equipment Used: Standard toilet  Toilet Transfer: Supervision  AROM: Within functional limits  Strength:  Within functional limits  Coordination: Within functional limits  Tone: Normal  Sensation: Intact  ADL  Feeding: Independent  Grooming: Independent  LE Dressing: Independent  Toileting: Independent     Activity Tolerance  Activity Tolerance: Patient tolerated evaluation without incident;Patient tolerated treatment well  Activity Tolerance Comments: During session on 4L O2 , SpO2 remained 94 to 98%,  HR 68 to 79  Bed mobility  Supine to Sit: Modified independent (HOB elevated)  Sit to Supine: Modified independent  Transfers  Stand Pivot Transfers: Supervision (Supervision)  Sit to stand: Supervision  Stand to sit: Supervision  Transfer Comments: no device  Vision  Vision: Impaired  Vision Exceptions: Wears glasses for reading  Hearing  Hearing: Within functional limits  Cognition  Overall Cognitive Status: WNL  Orientation  Overall Orientation Status: Within Normal Limits  Orientation Level: Oriented X4      Education Given To: Patient  Education Provided: Role of Therapy;IADL Safety; Fall Prevention Strategies  Education Method: Verbal  Education Outcome: Verbalized understanding      Disease Specific Education: Pt educated on importance of OOB mobility, prevention of complications of bedrest, and general safety during hospitalization.  Pt verbalized understanding  AM-PAC Score      AM-[facility] Inpatient Daily Activity Raw Score: 24 (09/09/22 1353)  -PAC Inpatient ADL T-Scale Score : 57.54 (09/09/22 1353)  ADL Inpatient CMS 0-100% Score: 0 (09/09/22 1353)  ADL Inpatient CMS G-Code Modifier : CH (09/09/22 1353)    Goals  Short Term Goals  Time Frame for Short term goals: 1x  Short Term Goal 1: Perform functional transfers with supervision--goal met 9/9  Short Term Goal 2: Perform UE/LE ADL tasks independently--goal met 9/9     Therapy Time   Individual Concurrent Group Co-treatment   Time In 1130         Time Out 1204         Minutes 34         Timed Code Treatment Minutes: 24 Minutes (10 min eval)     Aliza Grissom OT

## 2022-09-09 NOTE — PROGRESS NOTES
TODAYS DATE:  9/9/2022    Discussed personal risk factors for Stroke /TIA with patient/family, and ways to reduce the risk for a recurrent stroke. Patient's personal risk factors which were identified are:     [] Alcohol Abuse: check with your physician before any alcohol consumption. [x] Atrial fibrillation: may cause blood clots. [] Drug Abuse: Seek help, talk with your doctor  [] Clotting Disorder  [] Diabetes  [x] Family history of stroke or heart disease  [x] High Blood Pressure/Hypertension: work with your physician. [x] High cholesterol: monitor cholesterol levels with your physician.   [] Overweight/Obesity: work with your physician for your ideal body weight.  [] Physical Inactivity: get regular exercise as directed by your physician. [] Personal history of previous TIA or stroke  [] Poor Diet; decrease salt (sodium) in your diet, follow diet directed by physician. [] Smoking: Cigarette/Cigar: stop smoking. Reviewed the Following Education with Patient and/or Family:   -Signs and Symptoms of Stroke:     (facial droop, weakness/numbness especially on one side, speech difficulty, sudden confusion, sudden loss of vision, sudden severe headache,       sudden loss of balance or having difficulty walking, syncope or seizure)  -How to activate EMS (911)   -Importance of Follow Up Appointment at Discharge   -Importance of Compliance with Medications Prescribed at Discharge     Pt verbalized understanding.      Family Present during Education: no     Stroke Education Booklet at Bedside: yes     Electronically signed by Wendy Reyes RN on 9/9/2022 at 4:38 AM

## 2022-09-09 NOTE — CONSULTS
In patient Neurology consult        [facility] Neurology      MD Caitlin Lo  1940    Date of Service: 9/9/2022    Referring Physician: Mary Lazar MD      Reason for the consult and CC: Acute expressive aphasia    HPI:   The patient is a 80 y.o.   male, with a past medical history of hypertension, hyperlipidemia, CAD, atrial fibrillation not on anticoagulation, who presented to [facility] with acute expressive aphasia. Patient is a very active gentleman, walking 2 miles twice a day almost every day. He had just finished walking with a friend, when suddenly he was unable to speak. What did come out was gibberish. He went ahead and drove home and when he arrived his wife noticed the symptoms. The degree was severe and symptoms were persistent. He notes the entire episode lasted approximately 2 hours. He has never had anything like this before. He denies fever, chills, headache, dizziness, vision changes,  dysphagia, tinnitus, focal weakness, and paresthesias.         Family History   Problem Relation Age of Onset    Heart Disease Father     Heart Failure Father     Diabetes Mother     Heart Failure Mother     Cancer Mother     Heart Disease Mother     High Blood Pressure Mother     Heart Disease Paternal Uncle     Stroke Sister     Stroke Brother      Past Surgical History:   Procedure Laterality Date    ABDOMINAL AORTIC ANEURYSM REPAIR      ABDOMINAL AORTIC ANEURYSM REPAIR, ENDOVASCULAR  1/28/2011    Endomvascular abdominal aortic aneurysm repair with insertion of cardiomems pressure sensor    APPENDECTOMY      40-50 years ago    CARDIAC SURGERY  2008    CABG    CHOLECYSTECTOMY, LAPAROSCOPIC  2006    CORONARY ANGIOPLASTY WITH STENT PLACEMENT  2001 and 2002    CORONARY ARTERY BYPASS GRAFT      GALLBLADDER SURGERY      gallbladder/pancreatitis    HERNIA REPAIR      20+ years ago    INTRACAPSULAR CATARACT EXTRACTION Right 12/6/2018    PHACOEMULSIFICATION OF CATARACT RIGHT  EYE WITH INTRAOCULAR LENS IMPLANT performed by Catherine Black MD at [address]      panceastitis Leopoldo Clas    SC XCAPSL [place] RMVL INSJ IO LENS PROSTH W/O ECP Left 2018    PHACOEMULSIFICATION OF CATARACT LEFT EYE WITH INTRAOCULAR LENS IMPLANT performed by Catherine Black MD at [address] [facility] [address]      as child        Past Medical History:   Diagnosis Date    Aortic aneurysm, abdominal ([address] 2011    Appendicitis 11    Arthritis 11    Bruises easily     CAD (coronary artery disease) 2001    MI     CHF (congestive heart failure) ([facility])     Following CABG    Chronic back pain     Chronic pain of both shoulders 11    joint - the shoulders hurt    Complication of anesthesia     woke up during appendectomy    Gout     Heart disease 11    Hernia 2011    HIGH CHOLESTEROL 11    Hyperlipidemia     Hypertension     Measles 2011    Pancreatitis 2006    History of     Persistent cough     Ringing in ears     Sinus disorder     Sleep deprivation 2001    loss of sleep     Social History     Tobacco Use    Smoking status: Former     Packs/day: 3.00     Years: 15.00     Pack years: 45.00     Types: Cigarettes     Quit date: 1978     Years since quittin.6    Smokeless tobacco: Never   Vaping Use    Vaping Use: Never used   Substance Use Topics    Alcohol use: No     Comment: patient drinks coffee/caffeine 2 cups a day     Drug use: No     Allergies   Allergen Reactions    Coumadin [Warfarin] Other (See Comments)     Makes feel funny    Lipitor      Muscle aches    Omeprazole Other (See Comments)     headaches    Vioxx      Current Facility-Administered Medications   Medication Dose Route Frequency Provider Last Rate Last Admin    potassium bicarb-citric acid (EFFER-K) effervescent tablet 40 mEq  40 mEq Oral Daily RUPERTO Schreiber - CNP   40 mEq at 22 0112    amLODIPine (NORVASC) tablet 5 mg  5 mg Oral Daily Pal Casey MD   5 mg at 09/09/22 7983    aspirin tablet 325 mg  325 mg Oral Daily Hansen , MD   325 mg at 09/09/22 1022    furosemide (LASIX) tablet 20 mg  20 mg Oral Daily Hansen , MD   20 mg at 09/09/22 0833    lisinopril (PRINIVIL;ZESTRIL) tablet 40 mg  40 mg Oral Daily Hansen , MD   40 mg at 09/09/22 7104    spironolactone (ALDACTONE) tablet 25 mg  25 mg Oral Daily Hansen , MD   25 mg at 09/09/22 0967    sodium chloride flush 0.9 % injection 5-40 mL  5-40 mL IntraVENous 2 times per day Hansen , MD   10 mL at 09/09/22 0834    sodium chloride flush 0.9 % injection 5-40 mL  5-40 mL IntraVENous PRN Hansen , MD        0.9 % sodium chloride infusion   IntraVENous PRN Hansen , MD        enoxaparin (LOVENOX) injection 40 mg  40 mg SubCUTAneous Daily Hansen , MD   40 mg at 09/08/22 1449    ondansetron (ZOFRAN-ODT) disintegrating tablet 4 mg  4 mg Oral Q8H PRN Hansen , MD        Or    ondansetron [facility]) injection 4 mg  4 mg IntraVENous Q6H PRN Hansen , MD        polyethylene glycol (GLYCOLAX) packet 17 g  17 g Oral Daily PRN Hansen , MD        acetaminophen (TYLENOL) tablet 650 mg  650 mg Oral Q6H PRN Hansen , MD        Or    acetaminophen (TYLENOL) suppository 650 mg  650 mg Rectal Q6H PRN Hansen , MD        perflutren lipid microspheres (DEFINITY) injection 1.65 mg  1.5 mL IntraVENous ONCE PRN Hansen , MD           ROS : A 10-14 system review of constitutional, cardiovascular, respiratory, eyes, musculoskeletal, endocrine, GI, ENT, skin, hematological, genitourinary, psychiatric and neurologic systems was obtained and updated today and is unremarkable except as mentioned in my HPI      Exam:     Constitutional:   Vitals:    09/09/22 0442 09/09/22 0730 09/09/22 1134 09/09/22 1738   BP: (!) 168/74 (!) 175/76 (!) 167/57 (!) 152/65   Pulse: (!) 49 50 54 59   Resp: 18 18  18   Temp: 98.3 °F (36.8 °C) 99.1 °F (37.3 °C) 97.7 °F (36.5 °C) 98.2 °F (36.8 °C)   TempSrc: Oral Oral Oral Oral   SpO2: 96% 97% 95% 94%   Weight: 188 lb 9.6 oz (85.5 kg)      Height:           General appearance and observation: Normal development and appear in no acute distress. Eye:  Fundus: No blurring of optic disc. Neck: supple  Cardiovascular: No lower leg edema with good pulsation. Irregularly irregular. Mental Status:   Oriented to person, place, problem, and time. Memory: Good immediate recall. Intact remote memory  Normal attention span and concentration. Language: intact naming, repeating and fluency   Good fund of Knowledge. Aware of current events and vocabulary   Cranial Nerves:   II: Visual fields: Full. Pupils: equal, round, reactive to light  III,IV,VI: Extra Ocular Movements are intact. No nystagmus  V: Facial sensation is intact  VII: Facial strength and movements: Mild right facial droop. VIII: Hearing: Intact  IX: Palate elevation is symmetric  XI: Shoulder shrug is intact  XII: Tongue movements are normal  Musculoskeletal: 5/5 in all 4 extremities. Tone: Normal tone. Reflexes: Symmetric 2+ in the arms and 2+ in the legs   Planters: flexor bilaterally. Coordination: no pronator drift, no dysmetria with FNF in upper extremities. Normal REM. Sensation: normal to all modalities in both arms and legs. Gait/Posture: steady gait and normal posturing and station.      Data:  LABS:   Lab Results   Component Value Date/Time     09/09/2022 07:17 AM    K 4.1 09/09/2022 07:17 AM    K 3.4 09/08/2022 09:00 AM     09/09/2022 07:17 AM    CO2 24 09/09/2022 07:17 AM    BUN 17 09/09/2022 07:17 AM    CREATININE 1.0 09/09/2022 07:17 AM    GFRAA >60 09/09/2022 07:17 AM    GFRAA >60 01/15/2013 09:09 AM    LABGLOM >60 09/09/2022 07:17 AM    GLUCOSE 137 09/09/2022 07:17 AM    PHOS 2.9 09/09/2022 07:17 AM    MG 1.80 09/09/2022 07:17 AM    CALCIUM 9.9 09/09/2022 07:17 AM     Lab Results   Component Value Date/Time    WBC 9.2 09/09/2022 07:17 AM    RBC 4.30 09/09/2022 07:17 AM    HGB 13.4 09/09/2022 07:17 AM    HCT 40.2 09/09/2022 07:17 AM    MCV 93.5 09/09/2022 07:17 AM    RDW 14.3 09/09/2022 07:17 AM     09/09/2022 07:17 AM     Lab Results   Component Value Date    INR 1.17 (H) 09/08/2022    PROTIME 14.8 (H) 09/08/2022       Neuroimaging was independently reviewed by myself and discussed results with the patient and/or family  Reviewed notes from different physicians  Reviewed lab and blood testing    Impression:     Acute infarct left parietal lobe -likely cardioembolic from atrial fibrillation. 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 Hypertension, controlled  Hyperlipidemia  A. Fib with slow ventricular response    Recommendation:     Echo. Cardiology consulted for A. Fib with slow ventricular response. Monitor on telemetry  Continue aspirin and statin. Continue blood pressure medications  Follow-up A1c and lipid panel  PT/OT/SLP    [id number] Score for Atrial Fibrillation Stroke Risk is 7. Recommend anticoagulation to reduce risk of stroke in the setting of A. fib. His stroke is small and his neurological symptoms have resolved, so okay to start anticoagulation when okay with cardiology. We will follow. Thank you for referring such patient. If you have any questions regarding my consult note, please don't hesitate to call me. Valdez Gregorio CNP    Attending Supervising Physician's Attestation Statement   I reviewed and agree with the findings and plan as documented in NP consult note   Patient was admitted for acute aphasia. TIA/CVA  History of A. fib was not taking anticoagulation  Agree with starting anticoagulation  MRI  Will follow    Electronically signed by Nubia Ronquillo MD on 9/10/22 at 2:36 PM EDT      This dictation was generated by voice recognition computer software.  Although all attempts are made to edit the dictation for accuracy, there may be errors in the  transcription that are not intended

## 2022-09-09 NOTE — PLAN OF CARE
Problem: Discharge Planning  Goal: Discharge to home or other facility with appropriate resources  9/9/2022 0619 by Alan Singh RN  Outcome: Progressing  9/8/2022 1900 by Jewell Mandel RN  Outcome: Progressing  9/8/2022 1819 by Jewell Mandel RN  Outcome: Progressing  Flowsheets  Taken 9/8/2022 1357  Discharge to home or other facility with appropriate resources: Identify barriers to discharge with patient and caregiver  Taken 9/8/2022 1350  Discharge to home or other facility with appropriate resources: Identify barriers to discharge with patient and caregiver     Problem: Chronic Conditions and Co-morbidities  Goal: Patient's chronic conditions and co-morbidity symptoms are monitored and maintained or improved  9/9/2022 0619 by Alan Singh RN  Outcome: Progressing  9/8/2022 1900 by Jewell Mandel RN  Outcome: Progressing  9/8/2022 1819 by Jewell Mandel RN  Outcome: Progressing  9/8/2022 1818 by Jewell Mandel RN  Outcome: Progressing  Flowsheets (Taken 9/8/2022 1350)  Care Plan - Patient's Chronic Conditions and Co-Morbidity Symptoms are Monitored and Maintained or Improved: Monitor and assess patient's chronic conditions and comorbid symptoms for stability, deterioration, or improvement     Problem: ABCDS Injury Assessment  Goal: Absence of physical injury  9/9/2022 0619 by Alan Singh RN  Outcome: Progressing  9/8/2022 1900 by Jewell Mandel RN  Outcome: Progressing  9/8/2022 1819 by Jewell Mandel RN  Outcome: Progressing  Flowsheets (Taken 9/8/2022 1405)  Absence of Physical Injury: Implement safety measures based on patient assessment     Problem: Pain  Goal: Verbalizes/displays adequate comfort level or baseline comfort level  Outcome: Progressing

## 2022-09-09 NOTE — CONSULTS
Consult placed    [facility]: [address] Cardiology   Date:9/9/2022,  Dorinda Queen AM        Electronically signed by Farhan Moseley on 9/9/2022 at 7:36 AM

## 2022-09-09 NOTE — PLAN OF CARE
Problem: Discharge Planning  Goal: Discharge to home or other facility with appropriate resources  9/9/2022 1054 by Charleen Zaragoza RN  Outcome: Progressing  9/9/2022 1054 by Charleen Zaragoza RN  Outcome: Progressing  Flowsheets (Taken 9/9/2022 7383)  Discharge to home or other facility with appropriate resources: Identify barriers to discharge with patient and caregiver  9/9/2022 0619 by Wanda Hassan RN  Outcome: Progressing     Problem: Chronic Conditions and Co-morbidities  Goal: Patient's chronic conditions and co-morbidity symptoms are monitored and maintained or improved  9/9/2022 1054 by Charleen Zaragoza RN  Outcome: Progressing  9/9/2022 1054 by Charleen Zaragoza RN  Outcome: Progressing  Flowsheets (Taken 9/9/2022 0071)  Care Plan - Patient's Chronic Conditions and Co-Morbidity Symptoms are Monitored and Maintained or Improved: Monitor and assess patient's chronic conditions and comorbid symptoms for stability, deterioration, or improvement  9/9/2022 0619 by Wanda Hassan RN  Outcome: Progressing     Problem: ABCDS Injury Assessment  Goal: Absence of physical injury  9/9/2022 1054 by Charleen Zaragoza RN  Outcome: Progressing  9/9/2022 1054 by Charleen Zaragoza RN  Outcome: Progressing  Flowsheets (Taken 9/9/2022 1045)  Absence of Physical Injury: Implement safety measures based on patient assessment  9/9/2022 0619 by Wanda Hassan RN  Outcome: Progressing     Problem: Pain  Goal: Verbalizes/displays adequate comfort level or baseline comfort level  9/9/2022 1054 by Charleen Zaragoza RN  Outcome: Progressing  9/9/2022 1054 by Charleen Zaragoza RN  Outcome: Progressing  Flowsheets (Taken 9/9/2022 0730)  Verbalizes/displays adequate comfort level or baseline comfort level: Encourage patient to monitor pain and request assistance  9/9/2022 0619 by Wanda Hassan RN  Outcome: Progressing     Problem: Neurosensory - Adult  Goal: Achieves stable or improved neurological status  Outcome: Progressing  Goal: Achieves maximal functionality and self care  Outcome: Progressing

## 2022-09-10 PROBLEM — I48.91 ATRIAL FIBRILLATION (HCC): Status: ACTIVE | Noted: 2022-09-10

## 2022-09-10 LAB
ALBUMIN SERPL-MCNC: 3.6 G/DL (ref 3.4–5)
ANION GAP SERPL CALCULATED.3IONS-SCNC: 14 MMOL/L (ref 3–16)
BUN BLDV-MCNC: 20 MG/DL (ref 7–20)
CALCIUM SERPL-MCNC: 9.5 MG/DL (ref 8.3–10.6)
CHLORIDE BLD-SCNC: 99 MMOL/L (ref 99–110)
CO2: 24 MMOL/L (ref 21–32)
CREAT SERPL-MCNC: 1 MG/DL (ref 0.8–1.3)
GFR AFRICAN AMERICAN: >60
GFR NON-AFRICAN AMERICAN: >60
GLUCOSE BLD-MCNC: 123 MG/DL (ref 70–99)
HCT VFR BLD CALC: 36.5 % (ref 40.5–52.5)
HEMOGLOBIN: 12.4 G/DL (ref 13.5–17.5)
MCH RBC QN AUTO: 31.6 PG (ref 26–34)
MCHC RBC AUTO-ENTMCNC: 33.9 G/DL (ref 31–36)
MCV RBC AUTO: 93 FL (ref 80–100)
PDW BLD-RTO: 14.2 % (ref 12.4–15.4)
PHOSPHORUS: 2.7 MG/DL (ref 2.5–4.9)
PLATELET # BLD: 161 K/UL (ref 135–450)
PMV BLD AUTO: 8.2 FL (ref 5–10.5)
POTASSIUM SERPL-SCNC: 3.7 MMOL/L (ref 3.5–5.1)
RBC # BLD: 3.93 M/UL (ref 4.2–5.9)
SODIUM BLD-SCNC: 137 MMOL/L (ref 136–145)
WBC # BLD: 8.8 K/UL (ref 4–11)

## 2022-09-10 PROCEDURE — 2580000003 HC RX 258: Performed by: INTERNAL MEDICINE

## 2022-09-10 PROCEDURE — 80069 RENAL FUNCTION PANEL: CPT

## 2022-09-10 PROCEDURE — 6370000000 HC RX 637 (ALT 250 FOR IP): Performed by: INTERNAL MEDICINE

## 2022-09-10 PROCEDURE — 6360000002 HC RX W HCPCS: Performed by: INTERNAL MEDICINE

## 2022-09-10 PROCEDURE — 99233 SBSQ HOSP IP/OBS HIGH 50: CPT | Performed by: PSYCHIATRY & NEUROLOGY

## 2022-09-10 PROCEDURE — 1200000000 HC SEMI PRIVATE

## 2022-09-10 PROCEDURE — 6370000000 HC RX 637 (ALT 250 FOR IP)

## 2022-09-10 PROCEDURE — 85027 COMPLETE CBC AUTOMATED: CPT

## 2022-09-10 PROCEDURE — 6370000000 HC RX 637 (ALT 250 FOR IP): Performed by: NURSE PRACTITIONER

## 2022-09-10 PROCEDURE — 94761 N-INVAS EAR/PLS OXIMETRY MLT: CPT

## 2022-09-10 PROCEDURE — 99233 SBSQ HOSP IP/OBS HIGH 50: CPT

## 2022-09-10 PROCEDURE — 2700000000 HC OXYGEN THERAPY PER DAY

## 2022-09-10 PROCEDURE — 36415 COLL VENOUS BLD VENIPUNCTURE: CPT

## 2022-09-10 RX ORDER — HEPARIN SODIUM 10000 [USP'U]/100ML
5-30 INJECTION, SOLUTION INTRAVENOUS CONTINUOUS
Status: CANCELLED | OUTPATIENT
Start: 2022-09-10

## 2022-09-10 RX ORDER — ASPIRIN 81 MG/1
81 TABLET, CHEWABLE ORAL DAILY
Status: DISCONTINUED | OUTPATIENT
Start: 2022-09-11 | End: 2022-09-11 | Stop reason: HOSPADM

## 2022-09-10 RX ORDER — HEPARIN SODIUM 1000 [USP'U]/ML
30 INJECTION, SOLUTION INTRAVENOUS; SUBCUTANEOUS PRN
Status: CANCELLED | OUTPATIENT
Start: 2022-09-10

## 2022-09-10 RX ORDER — HEPARIN SODIUM 1000 [USP'U]/ML
60 INJECTION, SOLUTION INTRAVENOUS; SUBCUTANEOUS PRN
Status: CANCELLED | OUTPATIENT
Start: 2022-09-10

## 2022-09-10 RX ORDER — HEPARIN SODIUM 1000 [USP'U]/ML
60 INJECTION, SOLUTION INTRAVENOUS; SUBCUTANEOUS ONCE
Status: CANCELLED | OUTPATIENT
Start: 2022-09-10 | End: 2022-09-10

## 2022-09-10 RX ADMIN — FUROSEMIDE 20 MG: 20 TABLET ORAL at 09:51

## 2022-09-10 RX ADMIN — APIXABAN 5 MG: 5 TABLET, FILM COATED ORAL at 20:52

## 2022-09-10 RX ADMIN — ASPIRIN 325 MG: 325 TABLET ORAL at 09:51

## 2022-09-10 RX ADMIN — SPIRONOLACTONE 25 MG: 25 TABLET ORAL at 09:52

## 2022-09-10 RX ADMIN — SODIUM CHLORIDE, PRESERVATIVE FREE 10 ML: 5 INJECTION INTRAVENOUS at 09:54

## 2022-09-10 RX ADMIN — ENOXAPARIN SODIUM 40 MG: 100 INJECTION SUBCUTANEOUS at 09:51

## 2022-09-10 RX ADMIN — SODIUM CHLORIDE, PRESERVATIVE FREE 10 ML: 5 INJECTION INTRAVENOUS at 20:52

## 2022-09-10 RX ADMIN — LISINOPRIL 40 MG: 20 TABLET ORAL at 09:51

## 2022-09-10 RX ADMIN — AMLODIPINE BESYLATE 5 MG: 5 TABLET ORAL at 09:52

## 2022-09-10 RX ADMIN — APIXABAN 5 MG: 5 TABLET, FILM COATED ORAL at 13:19

## 2022-09-10 RX ADMIN — POTASSIUM BICARBONATE 40 MEQ: 782 TABLET, EFFERVESCENT ORAL at 09:52

## 2022-09-10 ASSESSMENT — PAIN SCALES - GENERAL: PAINLEVEL_OUTOF10: 0

## 2022-09-10 NOTE — PROGRESS NOTES
[facility]     Electrophysiology                                     Progress Note    Admission date:  2022    Reason for follow up visit: AF    HPI/CC: Luis F Reyes was admitted on 2022 following an episode of aphasia. Symptoms had resolved by the time he arrived to the ED. EKG at admission showed AF. On 2022 echo showed EF of 50-55% with basal inferior hypokinesis, grade II DD, moderate MR, mild AR, moderate TR, moderate HTN, and small PFO. Stress test showed paradoxic septal motion, enlarged RV and small to moderate area of anterior and basal inferior ischemia. Neurology following for acute CVA. Rhythm has been AF with rates in the 40s-50s. Subjective: He denies chest pain, palpitations, shortness of breath, and dizziness. Vitals:  Blood pressure (!) 144/65, pulse 66, temperature 98 °F (36.7 °C), temperature source Oral, resp. rate 18, height 5' 8\" (1.727 m), weight 186 lb 11.2 oz (84.7 kg), SpO2 92 %.   Temp  Av.3 °F (36.8 °C)  Min: 98 °F (36.7 °C)  Max: 98.7 °F (37.1 °C)  Pulse  Av.3  Min: 49  Max: 66  BP  Min: 144/65  Max: 155/62  SpO2  Av.4 %  Min: 92 %  Max: 94 %    24 hour I/O    Intake/Output Summary (Last 24 hours) at 9/10/2022 1501  Last data filed at 9/10/2022 0957  Gross per 24 hour   Intake 480 ml   Output --   Net 480 ml     Current Facility-Administered Medications   Medication Dose Route Frequency Provider Last Rate Last Admin    apixaban (ELIQUIS) tablet 5 mg  5 mg Oral BID RUPERTO Tolentino CNP   5 mg at 09/10/22 1319    [START ON 2022] aspirin chewable tablet 81 mg  81 mg Oral Daily RUPERTO Flynn CNP        potassium bicarb-citric acid (EFFER-K) effervescent tablet 40 mEq  40 mEq Oral Daily RUPERTO Gutierrez - CNP   40 mEq at 09/10/22 0980    amLODIPine (NORVASC) tablet 5 mg  5 mg Oral Daily Sophia Barrera MD   5 mg at 09/10/22 8819    furosemide (LASIX) tablet 20 mg  20 mg Oral Daily Sophia Barrera MD   20 mg at 09/10/22 3482    lisinopril (PRINIVIL;ZESTRIL) tablet 40 mg  40 mg Oral Daily Yaa Villanueva MD   40 mg at 09/10/22 3342    spironolactone (ALDACTONE) tablet 25 mg  25 mg Oral Daily Yaa Villanueva MD   25 mg at 09/10/22 4641    sodium chloride flush 0.9 % injection 5-40 mL  5-40 mL IntraVENous 2 times per day Yaa Villanueva MD   10 mL at 09/10/22 0954    sodium chloride flush 0.9 % injection 5-40 mL  5-40 mL IntraVENous PRN Yaa Villanueva MD        0.9 % sodium chloride infusion   IntraVENous PRN Yaa Villanueva MD        ondansetron (ZOFRAN-ODT) disintegrating tablet 4 mg  4 mg Oral Q8H PRN Yaa Villanueva MD        Or    ondansetron [facility]) injection 4 mg  4 mg IntraVENous Q6H PRN Yaa Villanueva MD        polyethylene glycol (GLYCOLAX) packet 17 g  17 g Oral Daily PRN Yaa Villanueva MD        acetaminophen (TYLENOL) tablet 650 mg  650 mg Oral Q6H PRN Yaa Villanueva MD        Or    acetaminophen (TYLENOL) suppository 650 mg  650 mg Rectal Q6H PRN Yaa Villanueva MD        perflutren lipid microspheres (DEFINITY) injection 1.65 mg  1.5 mL IntraVENous ONCE PRN Yaa Villanueva MD           Objective:     Telemetry monitor: AF    Physical Exam:  Constitutional and general appearance: alert, cooperative, no distress, and appears stated age  HEENT: PERRL, no cervical lymphadenopathy. No masses palpable. Normal oral mucosa  Respiratory:  Normal excursion and expansion without use of accessory muscles  Resp auscultation: Normal breath sounds without wheezing, rhonchi, and rales  Cardiovascular:   The apical impulse is not displaced  Heart tones are crisp and normal. irregular S1 and S2.  Jugular venous pulsation Normal  The carotid upstroke is normal in amplitude and contour without delay or bruit  Peripheral pulses are symmetrical and full   Abdomen:  No masses or tenderness  Bowel sounds present  Extremities:   No cyanosis or clubbing   No lower extremity edema   Skin: warm and dry  Neurological:  Alert and oriented  Moves all extremities well  No abnormalities of mood, affect, memory, mentation, or behavior are noted    Data    Stress test 9/09/2022:  Conclusions        Summary    Normal LVEF >60%    Paradoxic septal motion    Enlarged RV    Small to moderate area of anterior and basal inferior ischemia        Overall, this would be considered, intermediate risk study         Echo 9/09/2022:   Conclusions      Summary   Left ventricular systolic function is normal with ejection fraction   estimated at 50-55%. Basal inferior hypokinesis   There is mild concentric left ventricular hypertrophy. Grade II diastolic dysfunction with elevated left ventricular filling   pressure. The left atrium is severely dilated. The right atrium is mildly dilated. Moderate mitral regurgitation. Mild aortic regurgitation. Moderate tricuspid regurgitation. Systolic pulmonary artery pressure (SPAP) is estimated at 58 mmHg consistent   with moderate pulmonary hypertension (Right atrial pressure of 3 mmHg). Small PFO       All labs and testing reviewed.   Lab Review     Renal Profile:   Lab Results   Component Value Date/Time    CREATININE 1.0 09/10/2022 06:35 AM    BUN 20 09/10/2022 06:35 AM     09/10/2022 06:35 AM    K 3.7 09/10/2022 06:35 AM    K 3.4 09/08/2022 09:00 AM    CL 99 09/10/2022 06:35 AM    CO2 24 09/10/2022 06:35 AM     CBC:    Lab Results   Component Value Date/Time    WBC 8.8 09/10/2022 06:35 AM    RBC 3.93 09/10/2022 06:35 AM    HGB 12.4 09/10/2022 06:35 AM    HCT 36.5 09/10/2022 06:35 AM    MCV 93.0 09/10/2022 06:35 AM    RDW 14.2 09/10/2022 06:35 AM     09/10/2022 06:35 AM     BNP:    Lab Results   Component Value Date/Time    BNP 92.9 02/03/2010 07:06 PM     Fasting Lipid Panel:    Lab Results   Component Value Date/Time    CHOL 218 09/07/2021 10:07 AM    HDL 46 09/07/2021 10:07 AM    HDL 37 03/21/2011 09:43 AM    TRIG 139 09/07/2021 10:07 AM     Cardiac Enzymes:  CK/MbTroponin  Lab Results   Component Value Date/Time    TROPONINI <0.01 09/08/2022 09:00 AM     PT/ INR   Lab Results   Component Value Date/Time    INR 1.17 09/08/2022 09:00 AM    INR 1.03 05/02/2011 10:10 PM    INR 1.00 01/26/2011 10:00 AM    PROTIME 14.8 09/08/2022 09:00 AM    PROTIME 11.2 05/02/2011 10:10 PM    PROTIME 10.9 01/26/2011 10:00 AM     PTT No results found for: PTT   Lab Results   Component Value Date/Time    MG 1.80 09/09/2022 07:17 AM      Lab Results   Component Value Date/Time    TSH 1.17 09/07/2021 10:07 AM     Assessment:  Atrial fibrillation of unknown duration:    -[id number] score: 5 (age, HTN, CVA)    -TSH normal   AV block: stable  Acute CVA  HTN: suboptimal  HLD  CAD:   -s/p CABG 2008   -Lost to cardiology follow up  OA      Plan:   1. We discussed stroke risk related to atrial fibrillation. Reviewed options for anticoagulation. He opted to start Eliquis. Will start Eliquis 5 mg BID. 2. Decrease aspirin to 81 mg PO daily  3. Due to acute CVA, will defer left heart catheterization this admission. Will plan to have him follow up with interventional cardiology outpatient to discuss timing of procedure   4. Continue amlodipine, lisinopril, Lasix, and aldactone   5. Will monitor heart rates overnight, if they remain stable, he will be able to discharge home tomorrow  6.  Will order ambulatory cardiac monitor at discharge to monitor heart rates       Discussed plan of care with Dr. Saran Ambrocio and Dr. Rojelio Chan, APRN-CNP  [address]  [phone]

## 2022-09-10 NOTE — PLAN OF CARE
Problem: Discharge Planning  Goal: Discharge to home or other facility with appropriate resources  Outcome: Progressing     Problem: Chronic Conditions and Co-morbidities  Goal: Patient's chronic conditions and co-morbidity symptoms are monitored and maintained or improved  Outcome: Progressing     Problem: ABCDS Injury Assessment  Goal: Absence of physical injury  Outcome: Progressing     Problem: Pain  Goal: Verbalizes/displays adequate comfort level or baseline comfort level  Outcome: Progressing  Flowsheets (Taken 9/9/2022 1738 by Lan Parekh RN)  Verbalizes/displays adequate comfort level or baseline comfort level: Encourage patient to monitor pain and request assistance     Problem: Neurosensory - Adult  Goal: Achieves stable or improved neurological status  Outcome: Progressing  Goal: Achieves maximal functionality and self care  Outcome: Progressing     Problem: Cardiovascular - Adult  Goal: Maintains optimal cardiac output and hemodynamic stability  Outcome: Progressing  Goal: Absence of cardiac dysrhythmias or at baseline  Outcome: Progressing

## 2022-09-10 NOTE — PLAN OF CARE
Problem: Chronic Conditions and Co-morbidities  Goal: Patient's chronic conditions and co-morbidity symptoms are monitored and maintained or improved  9/10/2022 1405 by Jesus Branham RN  Outcome: Progressing  Flowsheets (Taken 9/10/2022 1229)  Care Plan - Patient's Chronic Conditions and Co-Morbidity Symptoms are Monitored and Maintained or Improved: Monitor and assess patient's chronic conditions and comorbid symptoms for stability, deterioration, or improvement     Problem: Pain  Goal: Verbalizes/displays adequate comfort level or baseline comfort level  9/10/2022 1405 by Jesus Branham RN  Outcome: Progressing

## 2022-09-10 NOTE — PROGRESS NOTES
[facility]  Neurology Follow-up  [facility]    Date of Service: 9/10/2022    Subjective:   CC: Follow up today regarding: Acute aphasia and acute stroke    Events noted. Chart and lab reviewed. The patient denies any new symptoms today. Feels back to his baseline. MRI showed a small left ischemic parietal stroke. He was started on Eliquis. He denies any headache, dysphagia or dysarthria, weakness or numbness or tingling or chest pain. Other review of system was unremarkable. ROS : A 10-12 system review obtained and updated today and is unremarkable except as mentioned  in my interval history. family history includes Cancer in his mother; Diabetes in his mother; Heart Disease in his father, mother, and paternal uncle; Heart Failure in his father and mother; High Blood Pressure in his mother; Stroke in his brother and sister.     Past Medical History:   Diagnosis Date    Aortic aneurysm, abdominal ([facility]) 1/2011    Appendicitis 12-06-11    Arthritis 12-06-11    Bruises easily     CAD (coronary artery disease) 9/2001    MI     CHF (congestive heart failure) (HCC)     Following CABG    Chronic back pain     Chronic pain of both shoulders 12-06-11    joint - the shoulders hurt    Complication of anesthesia     woke up during appendectomy    Gout     Heart disease 12-06-11    Hernia 12-    HIGH CHOLESTEROL 12-6-11    Hyperlipidemia     Hypertension     Measles 12-    Pancreatitis 2006    History of     Persistent cough     Ringing in ears     Sinus disorder     Sleep deprivation 12-    loss of sleep     Current Facility-Administered Medications   Medication Dose Route Frequency Provider Last Rate Last Admin    apixaban (ELIQUIS) tablet 5 mg  5 mg Oral BID RUPERTO Marte CNP   5 mg at 09/10/22 1319    [START ON 9/11/2022] aspirin chewable tablet 81 mg  81 mg Oral Daily RUPERTO Flynn CNP        potassium bicarb-citric acid (EFFER-K) effervescent tablet 40 mEq  40 mEq Oral Daily Christ Manners, APRN - CNP   40 mEq at 09/10/22 0952    amLODIPine (NORVASC) tablet 5 mg  5 mg Oral Daily Sunita Chase MD   5 mg at 09/10/22 9707    furosemide (LASIX) tablet 20 mg  20 mg Oral Daily Sunita Chase MD   20 mg at 09/10/22 0951    lisinopril (PRINIVIL;ZESTRIL) tablet 40 mg  40 mg Oral Daily Sunita Chase MD   40 mg at 09/10/22 2492    spironolactone (ALDACTONE) tablet 25 mg  25 mg Oral Daily Sunita Chase MD   25 mg at 09/10/22 5854    sodium chloride flush 0.9 % injection 5-40 mL  5-40 mL IntraVENous 2 times per day Sunita Chase MD   10 mL at 09/10/22 0954    sodium chloride flush 0.9 % injection 5-40 mL  5-40 mL IntraVENous PRN Sunita Chase MD        0.9 % sodium chloride infusion   IntraVENous PRN Sunita Chase MD        ondansetron (ZOFRAN-ODT) disintegrating tablet 4 mg  4 mg Oral Q8H PRN Sunita Chase MD        Or    ondansetron [facility]) injection 4 mg  4 mg IntraVENous Q6H PRN Sunita Chase MD        polyethylene glycol (GLYCOLAX) packet 17 g  17 g Oral Daily PRN Sunita Chase MD        acetaminophen (TYLENOL) tablet 650 mg  650 mg Oral Q6H PRN Sunita Chase MD        Or    acetaminophen (TYLENOL) suppository 650 mg  650 mg Rectal Q6H PRN Sunita Chase MD        perflutren lipid microspheres (DEFINITY) injection 1.65 mg  1.5 mL IntraVENous ONCE PRN Sunita Chase MD         Allergies   Allergen Reactions    Coumadin [Warfarin] Other (See Comments)     Makes feel funny    Lipitor      Muscle aches    Omeprazole Other (See Comments)     headaches    Vioxx       reports that he quit smoking about 44 years ago. His smoking use included cigarettes. He has a 45.00 pack-year smoking history. He has never used smokeless tobacco. He reports that he does not drink alcohol and does not use drugs.        Objective:  Exam:   Constitutional:   Vitals:    09/10/22 0302 09/10/22 0732 09/10/22 0945 09/10/22 1214   BP: (!) 154/67  (!) 155/62 (!) 144/65   Pulse: (!) 31 82 66   Resp: 18  20 18   Temp: 98.5 °F (36.9 °C)  98.2 °F (36.8 °C) 98 °F (36.7 °C)   TempSrc: Oral  Oral Oral   SpO2: 92% 92% 92% 92%   Weight: 186 lb 11.2 oz (84.7 kg)      Height:         General appearance:  Normal development and appear in no acute distress. Mental Status:   Oriented to person, place, problem, and time. Memory: Good immediate recall. Intact remote memory  Normal attention span and concentration. Language: intact naming, repeating and fluency   Good fund of Knowledge. Cranial Nerves:   II: Visual fields: Full. Pupils: equal, round, reactive to light  III,IV,VI: Extra Ocular Movements are intact. No nystagmus  V: Facial sensation is intact  VII: Facial strength and movements: intact and symmetric  IX: Palate elevation is symmetric  XI:/ shrug is intact  XII: Tongue movements are normal  Musculoskeletal: 5/5 in all 4 extremities. Tone: Normal tone. Reflexes: Symmetric 2+ in both arms and legs. Coordination: no pronator drift, no dysmetria with FNF  Sensation: normal to all modalities in both arms and legs.   Gait/Posture: steady gait        Data:  LABS:   Lab Results   Component Value Date/Time     09/10/2022 06:35 AM    K 3.7 09/10/2022 06:35 AM    K 3.4 09/08/2022 09:00 AM    CL 99 09/10/2022 06:35 AM    CO2 24 09/10/2022 06:35 AM    BUN 20 09/10/2022 06:35 AM    CREATININE 1.0 09/10/2022 06:35 AM    GFRAA >60 09/10/2022 06:35 AM    GFRAA >60 01/15/2013 09:09 AM    LABGLOM >60 09/10/2022 06:35 AM    GLUCOSE 123 09/10/2022 06:35 AM    PHOS 2.7 09/10/2022 06:35 AM    MG 1.80 09/09/2022 07:17 AM    CALCIUM 9.5 09/10/2022 06:35 AM     Lab Results   Component Value Date/Time    WBC 8.8 09/10/2022 06:35 AM    RBC 3.93 09/10/2022 06:35 AM    HGB 12.4 09/10/2022 06:35 AM    HCT 36.5 09/10/2022 06:35 AM    MCV 93.0 09/10/2022 06:35 AM    RDW 14.2 09/10/2022 06:35 AM     09/10/2022 06:35 AM     Lab Results   Component Value Date    INR 1.17 (H) 09/08/2022    PROTIME 14.8 (H) 09/08/2022       Neuroimaging was independently reviewed by me and discussed results with the patient  I reviewed blood testing and other test results and discussed results with the patient      Impression:  Acute aphasia secondary to small left ischemic MCA stroke  A. fib  Hypertension  Hyperlipidemia      Recommendation  Agree with Eliquis  Speech  PT and OT  Statin  Continue current blood pressure medications  Telemetry  Stroke education and secondary prevention discussed today with the patient  Discussed risk of recurrence and risk of bleeding or falling on blood thinners  DC planning when medically stable             Sarah Leroy MD   [phone]      This dictation was generated by voice recognition computer software. Although all attempts are made to edit the dictation for accuracy, there may be errors in the transcription that are not intended.

## 2022-09-11 VITALS
DIASTOLIC BLOOD PRESSURE: 74 MMHG | BODY MASS INDEX: 28.3 KG/M2 | TEMPERATURE: 98.1 F | WEIGHT: 186.7 LBS | HEIGHT: 68 IN | RESPIRATION RATE: 18 BRPM | HEART RATE: 60 BPM | SYSTOLIC BLOOD PRESSURE: 169 MMHG | OXYGEN SATURATION: 93 %

## 2022-09-11 LAB
CHOLESTEROL, TOTAL: 154 MG/DL (ref 0–199)
HDLC SERPL-MCNC: 35 MG/DL (ref 40–60)
LDL CHOLESTEROL CALCULATED: 102 MG/DL
TRIGL SERPL-MCNC: 83 MG/DL (ref 0–150)
VLDLC SERPL CALC-MCNC: 17 MG/DL

## 2022-09-11 PROCEDURE — 6370000000 HC RX 637 (ALT 250 FOR IP)

## 2022-09-11 PROCEDURE — 80061 LIPID PANEL: CPT

## 2022-09-11 PROCEDURE — 6370000000 HC RX 637 (ALT 250 FOR IP): Performed by: INTERNAL MEDICINE

## 2022-09-11 PROCEDURE — 6370000000 HC RX 637 (ALT 250 FOR IP): Performed by: NURSE PRACTITIONER

## 2022-09-11 PROCEDURE — 36415 COLL VENOUS BLD VENIPUNCTURE: CPT

## 2022-09-11 PROCEDURE — 2580000003 HC RX 258: Performed by: INTERNAL MEDICINE

## 2022-09-11 PROCEDURE — 99233 SBSQ HOSP IP/OBS HIGH 50: CPT

## 2022-09-11 RX ORDER — AMLODIPINE BESYLATE 5 MG/1
10 TABLET ORAL DAILY
Status: DISCONTINUED | OUTPATIENT
Start: 2022-09-11 | End: 2022-09-11 | Stop reason: HOSPADM

## 2022-09-11 RX ORDER — ATORVASTATIN CALCIUM 40 MG/1
40 TABLET, FILM COATED ORAL NIGHTLY
Status: DISCONTINUED | OUTPATIENT
Start: 2022-09-11 | End: 2022-09-11 | Stop reason: HOSPADM

## 2022-09-11 RX ORDER — ATORVASTATIN CALCIUM 40 MG/1
40 TABLET, FILM COATED ORAL NIGHTLY
Qty: 30 TABLET | Refills: 3 | Status: SHIPPED | OUTPATIENT
Start: 2022-09-11 | End: 2022-09-22 | Stop reason: SDUPTHER

## 2022-09-11 RX ORDER — FUROSEMIDE 20 MG/1
20 TABLET ORAL DAILY
Qty: 30 TABLET | Refills: 1 | Status: SHIPPED | OUTPATIENT
Start: 2022-09-11 | End: 2022-09-30 | Stop reason: SDUPTHER

## 2022-09-11 RX ORDER — ASPIRIN 81 MG/1
81 TABLET, CHEWABLE ORAL DAILY
Qty: 30 TABLET | Refills: 3 | Status: SHIPPED | OUTPATIENT
Start: 2022-09-12

## 2022-09-11 RX ORDER — AMLODIPINE BESYLATE 10 MG/1
10 TABLET ORAL DAILY
Qty: 30 TABLET | Refills: 1 | Status: SHIPPED | OUTPATIENT
Start: 2022-09-11

## 2022-09-11 RX ADMIN — SODIUM CHLORIDE, PRESERVATIVE FREE 10 ML: 5 INJECTION INTRAVENOUS at 08:08

## 2022-09-11 RX ADMIN — AMLODIPINE BESYLATE 10 MG: 5 TABLET ORAL at 08:08

## 2022-09-11 RX ADMIN — SPIRONOLACTONE 25 MG: 25 TABLET ORAL at 08:08

## 2022-09-11 RX ADMIN — LISINOPRIL 40 MG: 20 TABLET ORAL at 08:08

## 2022-09-11 RX ADMIN — FUROSEMIDE 20 MG: 20 TABLET ORAL at 08:08

## 2022-09-11 RX ADMIN — POTASSIUM BICARBONATE 40 MEQ: 782 TABLET, EFFERVESCENT ORAL at 08:08

## 2022-09-11 RX ADMIN — ASPIRIN 81 MG 81 MG: 81 TABLET ORAL at 08:08

## 2022-09-11 RX ADMIN — APIXABAN 5 MG: 5 TABLET, FILM COATED ORAL at 08:08

## 2022-09-11 NOTE — PROGRESS NOTES
upstroke is normal in amplitude and contour without delay or bruit  Peripheral pulses are symmetrical and full   Abdomen:  No masses or tenderness  Bowel sounds present  Extremities:   No cyanosis or clubbing   No lower extremity edema   Skin: warm and dry  Neurological:  Alert and oriented  Moves all extremities well  No abnormalities of mood, affect, memory, mentation, or behavior are noted    Data    Stress test 9/09/2022:  Conclusions        Summary    Normal LVEF >60%    Paradoxic septal motion    Enlarged RV    Small to moderate area of anterior and basal inferior ischemia        Overall, this would be considered, intermediate risk study         Echo 9/09/2022:   Conclusions      Summary   Left ventricular systolic function is normal with ejection fraction   estimated at 50-55%. Basal inferior hypokinesis   There is mild concentric left ventricular hypertrophy. Grade II diastolic dysfunction with elevated left ventricular filling   pressure. The left atrium is severely dilated. The right atrium is mildly dilated. Moderate mitral regurgitation. Mild aortic regurgitation. Moderate tricuspid regurgitation. Systolic pulmonary artery pressure (SPAP) is estimated at 58 mmHg consistent   with moderate pulmonary hypertension (Right atrial pressure of 3 mmHg). Small PFO     All labs and testing reviewed.   Lab Review     Renal Profile:   Lab Results   Component Value Date/Time    CREATININE 1.0 09/10/2022 06:35 AM    BUN 20 09/10/2022 06:35 AM     09/10/2022 06:35 AM    K 3.7 09/10/2022 06:35 AM    K 3.4 09/08/2022 09:00 AM    CL 99 09/10/2022 06:35 AM    CO2 24 09/10/2022 06:35 AM     CBC:    Lab Results   Component Value Date/Time    WBC 8.8 09/10/2022 06:35 AM    RBC 3.93 09/10/2022 06:35 AM    HGB 12.4 09/10/2022 06:35 AM    HCT 36.5 09/10/2022 06:35 AM    MCV 93.0 09/10/2022 06:35 AM    RDW 14.2 09/10/2022 06:35 AM     09/10/2022 06:35 AM     BNP:    Lab Results   Component Value Date/Time    BNP 92.9 02/03/2010 07:06 PM     Fasting Lipid Panel:    Lab Results   Component Value Date/Time    CHOL 218 09/07/2021 10:07 AM    HDL 46 09/07/2021 10:07 AM    HDL 37 03/21/2011 09:43 AM    TRIG 139 09/07/2021 10:07 AM     Cardiac Enzymes:  CK/MbTroponin  Lab Results   Component Value Date/Time    TROPONINI <0.01 09/08/2022 09:00 AM     PT/ INR   Lab Results   Component Value Date/Time    INR 1.17 09/08/2022 09:00 AM    INR 1.03 05/02/2011 10:10 PM    INR 1.00 01/26/2011 10:00 AM    PROTIME 14.8 09/08/2022 09:00 AM    PROTIME 11.2 05/02/2011 10:10 PM    PROTIME 10.9 01/26/2011 10:00 AM     PTT No results found for: PTT   Lab Results   Component Value Date/Time    MG 1.80 09/09/2022 07:17 AM      Lab Results   Component Value Date/Time    TSH 1.17 09/07/2021 10:07 AM     Assessment:  Atrial fibrillation of unknown duration:    -[id number] score: 5 (age, HTN, CVA)    -TSH normal   AV block: stable  Acute CVA  HTN: suboptimal  HLD  CAD:   -s/p CABG 2008   -Lost to cardiology follow up  OA      Plan:   1. Increase amlodipine to 10 mg due to elevated BP  2. Reviewed most recent lipid panels. LDL has been > 100 mg/dL. We discussed restarting atorvastatin. He reports he believes he experienced headaches in the past with this, but he was willing to re-try the medication. Can discuss PCSK9 inhibitors at follow up appointment if atorvastatin is not well tolerated. Updated lipid panel pending but will not result until later in the day. 3. Continue Eliquis 5 mg BID, aspirin, Lasix, lisinopril and aldactone  4. We discussed the importance of him monitoring BP and keeping a log. He is to bring this his next appointments. 5. Cardiology follow up arranged with Dr. Jimi Verdin on 9/30/2022 to further discuss left heart catheterization  6. 2 week cardiac monitor ordered at discharge to further assess heart rates  7. Office to arrange EP follow up   8.  Okay for discharge from a cardiology standpoint       Discussed plan of care with Dr. Arnie Santillan and Dr. Ban Reyez, APRN-CNP  [address]  [phone]

## 2022-09-11 NOTE — DISCHARGE SUMMARY
Hospital Medicine Discharge Summary    Patient ID: Jyothi Gregorio      Patient's PCP: Britany Heath DO    Admit Date: 9/8/2022     Discharge Date: 09/11/22    Admitting Provider: Carmin Lesch, MD     Discharge Provider: Reny De La Garza MD     Discharge Diagnoses: Active Hospital Problems    Diagnosis     GERD (gastroesophageal reflux disease) [K21.9]      Priority: High    Hypertension [I10]      Priority: High    Atrial fibrillation (HCC) [I48.91]      Priority: Medium    CVA (cerebral vascular accident) ([address] [I63.9]      Priority: Medium    Bradycardia [R00.1]      Priority: Medium    Stroke-like symptoms [R29.90]      Priority: Medium    Hypokalemia [E87.6]      Priority: Medium    CHF (congestive heart failure) (HCC) [I50.9]     Hyperlipidemia [E78.5]     CAD (coronary artery disease) [I25.10]        The patient was seen and examined on day of discharge and this discharge summary is in conjunction with any daily progress note from day of discharge. Hospital Course: By problem list  CVA - acute thromboembolic likely. MRI confirmed small acute L Parietal lobe infarct. Echo ordered 8 Sept -showed LVEF 50 to 55%; basal inferior hypokinesis; grade 2 diastolic dysfunction. CTA H/N in ED demonstrated no flow-limiting stenosis. Neurology followed and assisted with management. Continue ASA/Statin for 2nd prevention and risk reduction. Afib - new dx w/ bradycardia, possibly symptomatic on admission w/ etiology clinically unable to determine  . Normally rate controlled on Dig/BBlocker - continued/held respectively. NOT Anticoagulated at baseline. Monitored on tele. Cardiology consulted and started on Eliquis 5 mg twice daily on 9/10/2022.  -Obtained NM stress test on 9/9/2022 which showed small to moderate area of anterior and basal inferior ischemia. Intermediate risk study.   Cardiology felt that patient not a candidate for C at this time and recommended event monitor at discharge and outpatient follow-up with Dr. Dani Broussard on 9/30/2022 to further discuss treatment options. HTN/CAD - w/ known CAD but no evidence of active signs/sxs of ischemia/failure. Currently controlled on home meds w/ vitals reviewed and documented as above. HyperLipidemia - controlled on home Statin. Continue, w/ f/u and med adjustment w/ PCP     CHF - chronic diastolic failure w/ unknown EF - ordered and pending. Likely due to hypertensive heart disease. Patient is euvolemic w/ no evidence of acute decompensation. Continue current medical mgt on home Lasix/Aldactone w/ ACEi/no beta-blocker secondary to bradycardia     GERD - w/out active signs/sxs of dysphagia/odynophagia. No evidence of active PUD or hx of GI bleed. Controlled on home PPI - continue. Patient discharged home in stable medical condition. Physical Exam Performed:   BP (!) 169/74   Pulse 60   Temp 98.1 °F (36.7 °C) (Oral)   Resp 18   Ht 5' 8\" (1.727 m)   Wt 186 lb 11.2 oz (84.7 kg)   SpO2 93%   BMI 28.39 kg/m²       General appearance:  No apparent distress, appears stated age and cooperative. HEENT:  Normal cephalic, atraumatic without obvious deformity. Pupils equal, round, and reactive to light. Extra ocular muscles intact. Conjunctivae/corneas clear. Neck: Supple, with full range of motion. No jugular venous distention. Trachea midline. Respiratory:  Normal respiratory effort. Clear to auscultation, bilaterally without Rales/Wheezes/Rhonchi. Cardiovascular:  Regular rate and rhythm with normal S1/S2 without murmurs, rubs or gallops. Abdomen: Soft, non-tender, non-distended with normal bowel sounds. Musculoskeletal:  No clubbing, cyanosis or edema bilaterally. Full range of motion without deformity. Skin: Skin color, texture, turgor normal.  No rashes or lesions. Neurologic:  Neurovascularly intact without any focal sensory/motor deficits.  Cranial nerves: II-XII intact, grossly non-focal.  Psychiatric:  Alert and oriented, thought content appropriate, normal insight  Capillary Refill: Brisk,< 3 seconds   Peripheral Pulses: +2 palpable, equal bilaterally       Labs: For convenience and continuity at follow-up the following most recent labs are provided:      CBC:    Lab Results   Component Value Date/Time    WBC 8.8 09/10/2022 06:35 AM    HGB 12.4 09/10/2022 06:35 AM    HCT 36.5 09/10/2022 06:35 AM     09/10/2022 06:35 AM       Renal:    Lab Results   Component Value Date/Time     09/10/2022 06:35 AM    K 3.7 09/10/2022 06:35 AM    K 3.4 09/08/2022 09:00 AM    CL 99 09/10/2022 06:35 AM    CO2 24 09/10/2022 06:35 AM    BUN 20 09/10/2022 06:35 AM    CREATININE 1.0 09/10/2022 06:35 AM    CALCIUM 9.5 09/10/2022 06:35 AM    PHOS 2.7 09/10/2022 06:35 AM         Significant Diagnostic Studies    Radiology:   NM Cardiac Stress Test Nuclear Imaging   Final Result      MRI BRAIN WO CONTRAST   Final Result   1. Small acute infarct of the left parietal lobe, series 4, image 20.   2. Cerebral parenchymal volume loss with moderate-severe chronic   microvascular white matter ischemic disease. 3. Chronic infarcts are noted in the right occipital lobe and left cerebellar   hemisphere. CTA HEAD NECK W CONTRAST   Final Result   1. Atherosclerosis contributes to less than 50% stenosis involving the   proximal right cervical ICA by NASCET criteria. 2. Atherosclerosis at the origin of both vertebral arteries, which   contributes to mild stenosis on the right and moderate stenosis on the left. 3. No significant stenosis seen of the [id number]-Llemcn. 4. Nonspecific borderline prominent precarinal mediastinal lymph node   measuring 13 mm. CT HEAD WO CONTRAST   Final Result   No hemorrhage or mass identified      Atrophy and moderate to severe small-vessel ischemic change, increased   compared to 2013      Results discussed with [facility] [place] Shepherd.  Shana Moreira MD at 9:19 am on   9/8/2022         XR CHEST PORTABLE   Final Result   1. No acute abnormality. Consults:   IP CONSULT TO PHARMACY  PHARMACY TO CHANGE BASE FLUIDS  IP CONSULT TO NEUROLOGY  IP CONSULT TO CARDIOLOGY    Disposition: Home    Condition at Discharge: Stable    Discharge Instructions/Follow-up: PCP and cardiology as instructed    Code Status:  Full Code    Activity: activity as tolerated    Diet: cardiac diet      Discharge Medications:   Current Discharge Medication List             Details   apixaban (ELIQUIS) 5 MG TABS tablet Take 1 tablet by mouth 2 times daily  Qty: 120 tablet, Refills: 2      aspirin 81 MG chewable tablet Take 1 tablet by mouth daily  Qty: 30 tablet, Refills: 3      atorvastatin (LIPITOR) 40 MG tablet Take 1 tablet by mouth nightly  Qty: 30 tablet, Refills: 3                Details   amLODIPine (NORVASC) 10 MG tablet Take 1 tablet by mouth daily  Qty: 30 tablet, Refills: 1      furosemide (LASIX) 20 MG tablet Take 1 tablet by mouth daily  Qty: 30 tablet, Refills: 1                Details   lisinopril (PRINIVIL;ZESTRIL) 40 MG tablet TAKE 1 TABLET EVERY DAY  Qty: 90 tablet, Refills: 1      spironolactone (ALDACTONE) 25 MG tablet TAKE 1 TABLET EVERY DAY  Qty: 90 tablet, Refills: 1      Multiple Vitamins-Minerals (MULTIVITAMIN WITH MINERALS) tablet Take 1 tablet by mouth daily  Qty: 30 tablet, Refills: 3      ACCU-CHEK MULTICLIX LANCETS MISC Test blood sugar qd  Qty: 100 each, Refills: 3    Comments: DX: E11.9      glucose blood VI test strips (ACCU-CHEK ION) strip Test blood sugar qd. Qty: 100 strip, Refills: 3    Comments: DX: E11.9             Time Spent on discharge is more than 30 minutes in the examination, evaluation, counseling and review of medications and discharge plan. Signed:    Pavel Robb MD   9/11/2022      Thank you Vielka Hughes DO for the opportunity to be involved in this patient's care.  If you have any questions or concerns, please feel free to contact me at [phone].

## 2022-09-11 NOTE — DISCHARGE INSTRUCTIONS
Check your blood pressure at least a couple times a week. Keep a log and bring to next appointment    Will re-try atorvastatin for cholesterol.  If you start having headaches, can discuss other options at appointment with Dr. Hardeep Quiroz Health Maintenance Due   Topic Date Due    Influenza Vaccine  08/01/2017

## 2022-09-11 NOTE — PROGRESS NOTES
Tele removed and returned. CMU aware. IVs removed without complication. Event monitor placed. Patient and wife verbalize understanding of event monitor usage. Aware of medication changes. Patient and wife deny further needs at this time.

## 2022-09-11 NOTE — PLAN OF CARE
Problem: Discharge Planning  Goal: Discharge to home or other facility with appropriate resources  Outcome: Progressing  Flowsheets (Taken 9/10/2022 1229 by Pancho Sheppard RN)  Discharge to home or other facility with appropriate resources:   Identify barriers to discharge with patient and caregiver   Arrange for needed discharge resources and transportation as appropriate   Identify discharge learning needs (meds, wound care, etc)     Problem: Chronic Conditions and Co-morbidities  Goal: Patient's chronic conditions and co-morbidity symptoms are monitored and maintained or improved  9/11/2022 0037 by Carol Thorpe RN  Outcome: Progressing  9/10/2022 1405 by Pancho Sheppard RN  Outcome: Progressing  Flowsheets (Taken 9/10/2022 1229)  Care Plan - Patient's Chronic Conditions and Co-Morbidity Symptoms are Monitored and Maintained or Improved: Monitor and assess patient's chronic conditions and comorbid symptoms for stability, deterioration, or improvement     Problem: ABCDS Injury Assessment  Goal: Absence of physical injury  Outcome: Progressing     Problem: Pain  Goal: Verbalizes/displays adequate comfort level or baseline comfort level  9/11/2022 0037 by Carol Thorpe RN  Outcome: Progressing  9/10/2022 1405 by Pancho Sheppard RN  Outcome: Progressing     Problem: Neurosensory - Adult  Goal: Achieves stable or improved neurological status  Outcome: Progressing  Flowsheets (Taken 9/10/2022 1229 by Pancho Sheppard RN)  Achieves stable or improved neurological status: Assess for and report changes in neurological status  Goal: Achieves maximal functionality and self care  Outcome: Progressing  Flowsheets (Taken 9/10/2022 1229 by Pancho Sheppard RN)  Achieves maximal functionality and self care: Encourage and assist patient to increase activity and self care with guidance from physical therapy/occupational therapy     Problem: Cardiovascular - Adult  Goal: Maintains optimal cardiac output and hemodynamic stability  Outcome: Progressing  Flowsheets (Taken 9/10/2022 1229 by Raleigh Robin RN)  Maintains optimal cardiac output and hemodynamic stability: Monitor blood pressure and heart rate  Goal: Absence of cardiac dysrhythmias or at baseline  Outcome: Progressing  Flowsheets (Taken 9/10/2022 1229 by Raleigh Robin RN)  Absence of cardiac dysrhythmias or at baseline: Monitor cardiac rate and rhythm     Problem: Respiratory - Adult  Goal: Achieves optimal ventilation and oxygenation  Outcome: Progressing     Problem: Metabolic/Fluid and Electrolytes - Adult  Goal: Electrolytes maintained within normal limits  Outcome: Progressing  Goal: Hemodynamic stability and optimal renal function maintained  Outcome: Progressing  Goal: Glucose maintained within prescribed range  Outcome: Progressing     Problem: Hematologic - Adult  Goal: Maintains hematologic stability  Outcome: Progressing

## 2022-09-15 ENCOUNTER — OFFICE VISIT (OUTPATIENT)
Dept: FAMILY MEDICINE CLINIC | Age: 82
End: 2022-09-15
Payer: MEDICARE

## 2022-09-15 ENCOUNTER — TELEPHONE (OUTPATIENT)
Dept: FAMILY MEDICINE CLINIC | Age: 82
End: 2022-09-15

## 2022-09-15 DIAGNOSIS — Z09 HOSPITAL DISCHARGE FOLLOW-UP: Primary | ICD-10-CM

## 2022-09-15 PROCEDURE — 1111F DSCHRG MED/CURRENT MED MERGE: CPT | Performed by: FAMILY MEDICINE

## 2022-09-15 PROCEDURE — 99215 OFFICE O/P EST HI 40 MIN: CPT | Performed by: FAMILY MEDICINE

## 2022-09-15 NOTE — TELEPHONE ENCOUNTER
----- Message from Jonathan Sutherland sent at 9/15/2022  1:40 PM EDT -----  Subject: Message to Provider    QUESTIONS  Information for Provider? Pt called in to verify his appt should be on   11/15. Pt stated on his office discharge papers it states to be seen in   one month around 10/15 but the office scheduled him for 11/15. Pt would   like clarification if his appts needs moved to 10/15. Please advise   ---------------------------------------------------------------------------  --------------  Aly ORNELAS  [phone]; OK to leave message on voicemail  ---------------------------------------------------------------------------  --------------  SCRIPT ANSWERS  Relationship to Patient?  Self

## 2022-09-15 NOTE — TELEPHONE ENCOUNTER
LVM to change appt. to approx 10/15. Scheduled for 11/15/22 and should be 10/15/22.     Future Appointments   Date Time Provider Leticia Michelle   9/30/2022  8:30 AM MD ANTWAN Quintanilla [facility]   10/31/2022 10:30 AM MD Thiago Stock [facility]   11/15/2022  1:00 PM Pal Cisneros DO [place] [address] [facility]

## 2022-09-15 NOTE — TELEPHONE ENCOUNTER
----- Message from Allison Coburn sent at 9/15/2022  1:40 PM EDT -----  Subject: Message to Provider    QUESTIONS  Information for Provider? Pt called in to verify his appt should be on   11/15. Pt stated on his office discharge papers it states to be seen in   one month around 10/15 but the office scheduled him for 11/15. Pt would   like clarification if his appts needs moved to 10/15. Please advise   ---------------------------------------------------------------------------  --------------  Zofia Bee INFO  [phone]; OK to leave message on voicemail  ---------------------------------------------------------------------------  --------------  SCRIPT ANSWERS  Relationship to Patient?  Self

## 2022-09-15 NOTE — TELEPHONE ENCOUNTER
----- Message from Shilpa Louis sent at 9/15/2022  1:40 PM EDT -----  Subject: Message to Provider    QUESTIONS  Information for Provider? Pt called in to verify his appt should be on   11/15. Pt stated on his office discharge papers it states to be seen in   one month around 10/15 but the office scheduled him for 11/15. Pt would   like clarification if his appts needs moved to 10/15. Please advise   ---------------------------------------------------------------------------  --------------  Tova Gaviria INFO  [phone]; OK to leave message on voicemail  ---------------------------------------------------------------------------  --------------  SCRIPT ANSWERS  Relationship to Patient?  Self

## 2022-09-15 NOTE — TELEPHONE ENCOUNTER
Spoke with patient. It is to be 2 months. Dr. Joseph Norton had originally said 1 month but then said to make it 2. Patient notified. Keep appointment as scheduled.   Future Appointments   Date Time Provider Leticia Michelle   9/30/2022  8:30 AM Walt Negron MD [facility]   10/31/2022 10:30 AM MD Agapito Zamora [facility]   11/15/2022  1:00 PM DO FRANKI Palafox [id number] [address] [facility]

## 2022-09-15 NOTE — PROGRESS NOTES
Post-Discharge Transitional Care  Follow Up      Gary Guy   YOB: 1940    Date of Office Visit:  9/15/2022  Date of Hospital Admission: 9/8/22  Date of Hospital Discharge: 9/11/22  Risk of hospital readmission (high >=14%. Medium >=10%) :Readmission Risk Score: 9.2      Care management risk score Rising risk (score 2-5) and Complex Care (Scores >=6): No Risk Score On File     Non face to face  following discharge, date last encounter closed (first attempt may have been earlier): *No documented post hospital discharge outreach found in the last 14 days    Call initiated 2 business days of discharge: *No response recorded in the last 14 days    ASSESSMENT/PLAN:   Hospital discharge follow-up  -     [place] DISCHARGE MEDS RECONCILED W/ CURRENT OUTPATIENT MED LIST    Medical Decision Making: moderate complexity  Return in 1 month (on 10/15/2022). Subjective:   HPI:  Follow up of Hospital problems/diagnosis(es): CVA    Inpatient course: Discharge summary reviewed- see chart. Interval history/Current status: Stable, denies fatigue, walked today, home BP readings avg 150's/70's AM and 140/60's PM    Repeat BP in office today 178/80  Patient Active Problem List   Diagnosis    Hypertension    CAD (coronary artery disease)    Hyperlipidemia    CHF (congestive heart failure) (HCC)    Aortic aneurysm, abdominal (HCC)    Sleep deprivation    Chronic pain of both shoulders    Arthritis    Hernia    Ringing in ears    GERD (gastroesophageal reflux disease)    Leg length inequality    DM (diabetes mellitus) ([address]    Hypokalemia    TIA (transient ischemic attack)    CVA (cerebral vascular accident) ([address]    Bradycardia    Stroke-like symptoms    Atrial fibrillation ([address]       Medications listed as ordered at the time of discharge from hospital     Medication List            Accurate as of September 15, 2022 12:27 PM. If you have any questions, ask your nurse or doctor.                 CONTINUE taking these medications      Accu-Chek Multiclix Lancets Misc  Test blood sugar qd     amLODIPine 10 MG tablet  Commonly known as: NORVASC  Take 1 tablet by mouth daily     apixaban 5 MG Tabs tablet  Commonly known as: ELIQUIS  Take 1 tablet by mouth 2 times daily     aspirin 81 MG chewable tablet  Take 1 tablet by mouth daily     atorvastatin 40 MG tablet  Commonly known as: LIPITOR  Take 1 tablet by mouth nightly     blood glucose test strips strip  Commonly known as: Accu-Chek Dulce  Test blood sugar qd.     furosemide 20 MG tablet  Commonly known as: LASIX  Take 1 tablet by mouth daily     lisinopril 40 MG tablet  Commonly known as: PRINIVIL;ZESTRIL  TAKE 1 TABLET EVERY DAY     multivitamin with minerals tablet  Take 1 tablet by mouth daily     spironolactone 25 MG tablet  Commonly known as: ALDACTONE  TAKE 1 TABLET EVERY DAY                Medications marked \"taking\" at this time  No outpatient medications have been marked as taking for the 9/15/22 encounter (Office Visit) with Karen Rayo DO.        Medications patient taking as of now reconciled against medications ordered at time of hospital discharge: Yes    A comprehensive review of systems was negative except for what was noted in the HPI. Objective: There were no vitals taken for this visit. General Appearance: alert and oriented to person, place and time, well-developed and well-nourished, in no acute distress  Pulmonary/Chest: clear to auscultation bilaterally- no wheezes, rales or rhonchi, normal air movement, no respiratory distress  Cardiovascular: normal rate and normal S1 and S2  Abdomen: soft, non-tender, non-distended, normal bowel sounds, no masses or organomegaly  Extremities: +1 pitting edema right ankle, taking Lasix 20 mg daily      -Pt Instructions:  Continue current med regimen  - elevated LE's when not standing  - continue to monitor and wear higher athletic socks  -continue avoiding salt.   - BP BID x 1 week and send readings via [employer]    An electronic signature was used to authenticate this note.   --Christopher Kumari, DO

## 2022-09-15 NOTE — TELEPHONE ENCOUNTER
----- Message from Omega Naranjo sent at 9/15/2022  1:40 PM EDT -----  Subject: Message to Provider    QUESTIONS  Information for Provider? Pt called in to verify his appt should be on   11/15. Pt stated on his office discharge papers it states to be seen in   one month around 10/15 but the office scheduled him for 11/15. Pt would   like clarification if his appts needs moved to 10/15. Please advise   ---------------------------------------------------------------------------  --------------  Amanda ORNELAS  [phone]; OK to leave message on voicemail  ---------------------------------------------------------------------------  --------------  SCRIPT ANSWERS  Relationship to Patient?  Self

## 2022-09-16 ENCOUNTER — TELEPHONE (OUTPATIENT)
Dept: CARDIOLOGY | Age: 82
End: 2022-09-16

## 2022-09-16 NOTE — TELEPHONE ENCOUNTER
9/11/22  Monitor company [employer]  Length of monitor 14 days  Monitor ordered by Chen Carlos CNP  Patch ID [id number]  Device number [id number]  Zachary Hinson EKG tech registered and given to  to Nurse Iris Vera. Nurse to apply at the time of discharge.

## 2022-09-21 NOTE — TELEPHONE ENCOUNTER
NPNR OOT     Pt requesting Rx's be sent to new pharmacy     Last saw [facility] [place] 9/11/22   Upcoming OV [facility] 10/31/22  EKG 9/8/22  CBC 9/10/22  Lipids 9/11/22

## 2022-09-22 RX ORDER — ATORVASTATIN CALCIUM 40 MG/1
40 TABLET, FILM COATED ORAL NIGHTLY
Qty: 30 TABLET | Refills: 3 | Status: SHIPPED | OUTPATIENT
Start: 2022-09-22

## 2022-09-29 NOTE — PROGRESS NOTES
[address]   Cardiac Consult     Referring Provider:  Vazquez Mancera DO     Chief Complaint   Patient presents with    Abnormal Test Results     Abnormal EKG    Palpitations     While at the hospital heart rate would go down. New Patient      Sammy Cobos   1940    History of Present Illness:    Sammy Cobos is a 80 y.o. male who is here today as a new patient consult at the request of Dr. William Waters for an abnormal stress test and echocardiogram. He has a past medical history of hypertension, hyperlipidemia, and coronary artery disease- CABG surgery 2008, who is admitted for evaluation of a brief episode of aphasia on 09/08/2022. Symptoms had resolved by the time he arrived to the ED. EKG at admission showed AF. On 9/09/2022 echo showed EF of 50-55% with basal inferior hypokinesis, grade II DD, moderate MR, mild AR, moderate TR, moderate HTN, and small PFO. Stress test showed paradoxic septal motion, enlarged RV and small to moderate area of anterior and basal inferior ischemia. Brain MRI showed a small acute infarct of the left parietal lobe and chronic infarcts in right occipital lobe and left cerebellar hemisphere. Today he states prior to going to the hospital he started to have trouble speaking while walking with a friend. States he feels like he is mostly back to normal. Blood pressures at home is running 140-160's/80's. He states he is back to walking 1.5 miles daily. A few years ago he had been walking around 5 miles daily. Some fatigue in the afternoon. Notes some VIGIL and decreased exercise capacity last few months. He continues to do yard work and weed eat. He states he feels fine when he walks. He has occasional upper chest pressure and numbness that does not radiate. Started a few months ago, occurs 1-2 times per week, last for less than one minute and resolves. No assoc SOB noted. He just sent back his cardiac event monitor. Scheduled to see EP 10/31/2022.  He is tolerating his medications and is taking them as prescribed. Patient currently denies any weight gain, edema, shortness of breath, dizziness, and syncope. States he does not sleep well. Was told in the hospital his heart rate was low at night. Past Medical History:   has a past medical history of Aortic aneurysm, abdominal ([address] Appendicitis, Arthritis, Bruises easily, CAD (coronary artery disease), CHF (congestive heart failure) (formerly [facility]), Chronic back pain, Chronic pain of both shoulders, Complication of anesthesia, Gout, Heart disease, Hernia, HIGH CHOLESTEROL, Hyperlipidemia, Hypertension, Measles, Pancreatitis, Persistent cough, Ringing in ears, Sinus disorder, and Sleep deprivation. Surgical History:   has a past surgical history that includes Coronary angioplasty with stent (2001 and 2002); Cardiac surgery (2008); Cholecystectomy, laparoscopic (2006); Appendectomy; Tonsillectomy; hernia repair; AAA repair, endovascular (1/28/2011); Pancreas surgery; Gallbladder surgery; Coronary artery bypass graft; Abdominal aortic aneurysm repair; pr xcapsl ctrc rmvl insj io lens prosth w/o ecp (Left, 11/13/2018); and Intracapsular cataract extraction (Right, 12/6/2018). Social History:   reports that he quit smoking about 44 years ago. His smoking use included cigarettes. He has a 45.00 pack-year smoking history. He has never used smokeless tobacco. He reports that he does not drink alcohol and does not use drugs. Family History:  family history includes Cancer in his mother; Diabetes in his mother; Heart Disease in his father, mother, and paternal uncle; Heart Failure in his father and mother; Heart Surgery in his father and paternal uncle; High Blood Pressure in his mother; Stroke in his brother and sister. Home Medications:  Prior to Admission medications    Medication Sig Start Date End Date Taking?  Authorizing Provider   atorvastatin (LIPITOR) 40 MG tablet Take 1 tablet by mouth nightly 9/22/22  Yes Niecy gait, balance, coordination, mood, affect, memory, mentation, behavior. Psychiatric: No anxiety, no depression. Endocrine: No malaise, fatigue or temperature intolerance. No excessive thirst, fluid intake, or urination. No tremor. Hematologic/Lymphatic: No abnormal bruising or bleeding, blood clots or swollen lymph nodes. Allergic/Immunologic: No nasal congestion or hives. Physical Examination:    BP (!) 182/64   Pulse 64   Ht 5' 8\" (1.727 m)   Wt 193 lb (87.5 kg)   SpO2 98%   BMI 29.35 kg/m²    Vitals:    09/30/22 0813   BP: (!) 182/64   Pulse:    SpO2:         Constitutional and General Appearance: NAD   Respiratory:  Normal excursion and expansion without use of accessory muscles  Resp Auscultation: Normal breath sounds without dullness  Cardiovascular: The apical impulses not displaced  Heart tones are crisp and normal, irregular rhythm   Cervical veins are not engorged  The carotid upstroke is normal in amplitude and contour without delay or bruit  Normal S1S2, No S3, No Murmur  Peripheral pulses are symmetrical and full  There is no clubbing, cyanosis of the extremities. No edema  Femoral Arteries: 2+ and equal  Pedal Pulses: 2+ and equal   Abdomen:  No masses or tenderness  Liver/Spleen: No Abnormalities Noted  Neurological/Psychiatric:  Alert and oriented in all spheres  Moves all extremities well  Exhibits normal gait balance and coordination  No abnormalities of mood, affect, memory, mentation, or behavior are noted    CTA head and neck 9/8/2022  1. Atherosclerosis contributes to less than 50% stenosis involving the proximal right cervical ICA by NASCET criteria. 2. Atherosclerosis at the origin of both vertebral arteries, which contributes to mild stenosis on the right and moderate stenosis on the left. 3. No significant stenosis seen of the [id number]. 4. Nonspecific borderline prominent precarinal mediastinal lymph node measuring 13 mm.      Echocardiogram 9/9/2022  Summary   Left ventricular systolic function is normal with ejection fraction   estimated at 50-55%. Basal inferior hypokinesis   There is mild concentric left ventricular hypertrophy. Grade II diastolic dysfunction with elevated left ventricular filling   pressure. The left atrium is severely dilated. The right atrium is mildly dilated. Moderate mitral regurgitation. Mild aortic regurgitation. Moderate tricuspid regurgitation. Systolic pulmonary artery pressure (SPAP) is estimated at 58 mmHg consistent   with moderate pulmonary hypertension (Right atrial pressure of 3 mmHg). Small PFO    Stress test 9/9/2022  Conclusions    Summary  Normal LVEF >60%  Paradoxic septal motion  Enlarged RV  Small to moderate area of anterior and basal inferior ischemia    Overall, this would be considered, intermediate risk study     MRI brain 9/8/2022  1. Small acute infarct of the left parietal lobe, series 4, image 20. 2. Cerebral parenchymal volume loss with moderate-severe chronic microvascular white matter ischemic disease. 3. Chronic infarcts are noted in the right occipital lobe and left cerebellar hemisphere. Latest Reference Range & Units 9/11/22 05:54   CHOLESTEROL, TOTAL, 513578 0 - 199 mg/dL 154   HDL Cholesterol 40 - 60 mg/dL 35 (L)   LDL Calculated <100 mg/dL 102 (H)   Triglycerides 0 - 150 mg/dL 83   VLDL Cholesterol Calculated Not Established mg/dL 17         Assessment:   Chest pain - typical/atypical featyes  VIGIL - possible angina equivalent vs AF vs Poor control HTN vs  diastolic CHF   Abnormal stress test - discussed med mgmt vs cath. R/B/A/E discussed. Prefers cath   Coronary artery disease- history of CABG surgery 2008  Carotid artery diease   Atrial fibrillation - new onset 9/2022  Abnormal EKG  Moderate mitral regurgitation. Mild aortic regurgitation. Moderate tricuspid regurgitation. Moderate pulmonary hypertension  Small PFO - now on [facility]  CVA 9/8/22. Suspect due to AF.  Recovering  Hypertension -

## 2022-09-30 ENCOUNTER — OFFICE VISIT (OUTPATIENT)
Dept: CARDIOLOGY CLINIC | Age: 82
End: 2022-09-30
Payer: MEDICARE

## 2022-09-30 VITALS
DIASTOLIC BLOOD PRESSURE: 64 MMHG | BODY MASS INDEX: 29.25 KG/M2 | HEIGHT: 68 IN | WEIGHT: 193 LBS | SYSTOLIC BLOOD PRESSURE: 182 MMHG | HEART RATE: 64 BPM | OXYGEN SATURATION: 98 %

## 2022-09-30 DIAGNOSIS — I10 PRIMARY HYPERTENSION: ICD-10-CM

## 2022-09-30 DIAGNOSIS — G47.30 SLEEP APNEA, UNSPECIFIED TYPE: ICD-10-CM

## 2022-09-30 DIAGNOSIS — I25.10 CORONARY ARTERY DISEASE INVOLVING NATIVE CORONARY ARTERY OF NATIVE HEART WITHOUT ANGINA PECTORIS: Primary | ICD-10-CM

## 2022-09-30 DIAGNOSIS — E78.5 HYPERLIPIDEMIA, UNSPECIFIED HYPERLIPIDEMIA TYPE: ICD-10-CM

## 2022-09-30 PROCEDURE — 1036F TOBACCO NON-USER: CPT | Performed by: INTERNAL MEDICINE

## 2022-09-30 PROCEDURE — 99204 OFFICE O/P NEW MOD 45 MIN: CPT | Performed by: INTERNAL MEDICINE

## 2022-09-30 PROCEDURE — 1123F ACP DISCUSS/DSCN MKR DOCD: CPT | Performed by: INTERNAL MEDICINE

## 2022-09-30 PROCEDURE — 1111F DSCHRG MED/CURRENT MED MERGE: CPT | Performed by: INTERNAL MEDICINE

## 2022-09-30 PROCEDURE — G8427 DOCREV CUR MEDS BY ELIG CLIN: HCPCS | Performed by: INTERNAL MEDICINE

## 2022-09-30 PROCEDURE — G8417 CALC BMI ABV UP PARAM F/U: HCPCS | Performed by: INTERNAL MEDICINE

## 2022-09-30 RX ORDER — OLMESARTAN MEDOXOMIL 40 MG/1
40 TABLET ORAL DAILY
Qty: 90 TABLET | Refills: 3 | Status: SHIPPED | OUTPATIENT
Start: 2022-09-30

## 2022-09-30 RX ORDER — CLONIDINE HYDROCHLORIDE 0.1 MG/1
0.1 TABLET ORAL 2 TIMES DAILY
Qty: 180 TABLET | Refills: 3 | Status: SHIPPED
Start: 2022-09-30 | End: 2022-10-03 | Stop reason: SINTOL

## 2022-09-30 RX ORDER — FUROSEMIDE 40 MG/1
40 TABLET ORAL DAILY
Qty: 90 TABLET | Refills: 3 | Status: SHIPPED | OUTPATIENT
Start: 2022-09-30

## 2022-09-30 NOTE — PATIENT INSTRUCTIONS
Plan:  Stop Lisinopril   Start Olmesartan 40 mg daily   Start clonidine 0.1 mg twice a day   Increase Lasix to 40 mg daily   Referral for sleep study   Discussed angiogram VS medication therapy. Plan for angiogram in 2 weeks. Hold Eliquis for 24 hours (2 doses) prior to the procedure. Also hold Lasix the morning of the procedure   Cardiac medications reviewed including indications and pertinent side effects. Medication list updated at this visit.    Check blood pressure and heart rate at home a few times per week- keep a log with dates and times and bring to office visit   Regular exercise and following a healthy diet encouraged   Follow up with me in mid November   Follow up with EP as scheduled 10/31/2022

## 2022-10-02 ENCOUNTER — PATIENT MESSAGE (OUTPATIENT)
Dept: CARDIOLOGY CLINIC | Age: 82
End: 2022-10-02

## 2022-10-03 RX ORDER — DOXAZOSIN 2 MG/1
2 TABLET ORAL NIGHTLY
Qty: 90 TABLET | Refills: 3 | Status: SHIPPED | OUTPATIENT
Start: 2022-10-03 | End: 2022-10-14

## 2022-10-03 NOTE — TELEPHONE ENCOUNTER
From: Bhavin Man  To: Dr. Jersey Sauceda: 10/2/2022 5:20 PM EDT  Subject: Heart Rate    After changing my meds Friday 9/30 heart rate dropped to mid 30's while at rest and middle 60's while walking. Risa Fleming Lower than before.-------Has dropped blood pressure below 140/90   Change made  Stop Lisinopril  Start Olmesartan 40 mg daily  Start clonidine 0.1 mg twice a day  Increase Lasix to 40mg daily    Regards  Fabiana Carrasquillo

## 2022-10-06 ENCOUNTER — TELEPHONE (OUTPATIENT)
Dept: CARDIOLOGY CLINIC | Age: 82
End: 2022-10-06

## 2022-10-06 ENCOUNTER — OFFICE VISIT (OUTPATIENT)
Dept: PULMONOLOGY | Age: 82
End: 2022-10-06
Payer: MEDICARE

## 2022-10-06 VITALS
BODY MASS INDEX: 29.46 KG/M2 | TEMPERATURE: 98.2 F | WEIGHT: 194.4 LBS | HEIGHT: 68 IN | SYSTOLIC BLOOD PRESSURE: 140 MMHG | HEART RATE: 72 BPM | DIASTOLIC BLOOD PRESSURE: 63 MMHG | RESPIRATION RATE: 16 BRPM | OXYGEN SATURATION: 97 %

## 2022-10-06 DIAGNOSIS — G47.33 OSA (OBSTRUCTIVE SLEEP APNEA): Primary | ICD-10-CM

## 2022-10-06 DIAGNOSIS — I50.9 CONGESTIVE HEART FAILURE, UNSPECIFIED HF CHRONICITY, UNSPECIFIED HEART FAILURE TYPE (HCC): ICD-10-CM

## 2022-10-06 DIAGNOSIS — I63.9 CEREBROVASCULAR ACCIDENT (CVA), UNSPECIFIED MECHANISM (HCC): ICD-10-CM

## 2022-10-06 DIAGNOSIS — I48.91 ATRIAL FIBRILLATION, UNSPECIFIED TYPE (HCC): ICD-10-CM

## 2022-10-06 DIAGNOSIS — E66.3 OVERWEIGHT (BMI 25.0-29.9): ICD-10-CM

## 2022-10-06 PROCEDURE — 1123F ACP DISCUSS/DSCN MKR DOCD: CPT | Performed by: NURSE PRACTITIONER

## 2022-10-06 PROCEDURE — G8417 CALC BMI ABV UP PARAM F/U: HCPCS | Performed by: NURSE PRACTITIONER

## 2022-10-06 PROCEDURE — G8484 FLU IMMUNIZE NO ADMIN: HCPCS | Performed by: NURSE PRACTITIONER

## 2022-10-06 PROCEDURE — 99213 OFFICE O/P EST LOW 20 MIN: CPT | Performed by: NURSE PRACTITIONER

## 2022-10-06 PROCEDURE — 1111F DSCHRG MED/CURRENT MED MERGE: CPT | Performed by: NURSE PRACTITIONER

## 2022-10-06 PROCEDURE — 93272 ECG/REVIEW INTERPRET ONLY: CPT | Performed by: INTERNAL MEDICINE

## 2022-10-06 PROCEDURE — 1036F TOBACCO NON-USER: CPT | Performed by: NURSE PRACTITIONER

## 2022-10-06 PROCEDURE — G8427 DOCREV CUR MEDS BY ELIG CLIN: HCPCS | Performed by: NURSE PRACTITIONER

## 2022-10-06 ASSESSMENT — ENCOUNTER SYMPTOMS
SORE THROAT: 0
SHORTNESS OF BREATH: 0
RHINORRHEA: 0
ABDOMINAL PAIN: 0
CHEST TIGHTNESS: 0
COUGH: 0
WHEEZING: 0
EYE PAIN: 0

## 2022-10-06 ASSESSMENT — SLEEP AND FATIGUE QUESTIONNAIRES
HOW LIKELY ARE YOU TO NOD OFF OR FALL ASLEEP WHILE LYING DOWN TO REST IN THE AFTERNOON WHEN CIRCUMSTANCES PERMIT: 3
NECK CIRCUMFERENCE (INCHES): 16
ESS TOTAL SCORE: 5
HOW LIKELY ARE YOU TO NOD OFF OR FALL ASLEEP WHILE SITTING AND READING: 0
HOW LIKELY ARE YOU TO NOD OFF OR FALL ASLEEP WHILE SITTING AND TALKING TO SOMEONE: 0
HOW LIKELY ARE YOU TO NOD OFF OR FALL ASLEEP WHILE SITTING INACTIVE IN A PUBLIC PLACE: 0
HOW LIKELY ARE YOU TO NOD OFF OR FALL ASLEEP WHILE SITTING QUIETLY AFTER LUNCH WITHOUT ALCOHOL: 0
HOW LIKELY ARE YOU TO NOD OFF OR FALL ASLEEP IN A CAR, WHILE STOPPED FOR A FEW MINUTES IN TRAFFIC: 0
HOW LIKELY ARE YOU TO NOD OFF OR FALL ASLEEP WHILE WATCHING TV: 2
HOW LIKELY ARE YOU TO NOD OFF OR FALL ASLEEP WHEN YOU ARE A PASSENGER IN A CAR FOR AN HOUR WITHOUT A BREAK: 0

## 2022-10-06 NOTE — PROGRESS NOTES
Subjective: New sleep evaluation         Preeti Romero is a 80y.o. year old,male, with PMH significant for recent CVA, HTN,  Afib, DM,  CHF with hx of CABG in 2008. He  presents today for initial sleep evaluation. Patient was referred by Lurdes Hollingsworth MD, cardiologist. Pt reports poor sleep quality for many years and that it is getting worse. He is on Eliquis for his Afib. States he is weighing himself daily, no recent weight changes or increased edema. Pt reports does snore mild for for many years. Patient's partner does not complain of pt snoring however she does sleep in another room due to her snoring. Pt's partner does not notice that he   stops breathing during sleep. Pt does wake themselves with the  feeling of drowning. Pt does report fatigue or tiredness frequently. Pt sleeps more than 7 hours, and  overwhelming sleepiness attacks. Wakes multiple times a night. Has a hard time going back to sleep. Pt  does doze unintentionally while watching TV or on the computer. While driving  does not feel drowsy / nod off / fall sleep at stop lights. Pt does not have h/o sleepiness associated wrecks/near wrecks. Pt does nod off while  sitting quietly and naps almost daily. Pt does report having restless legs 0-1 times a week. This is often accompanied by leg jerks during sleep, numbness in legs or feet, aches/burning/cramps in legs, feet. He does report having nasal congestion. Negative for use of nasal sprays, nose or sinus surgery. Positive for broken nose, positive tonsillectomy, sleeping w/ chest raised. He does not  sleep with oxygen. Pt does not have a dental appliance or braces on teeth. Denies teeth grinding. He does not report sleep walking, dreaming during naps. Does have a recurrent nightmare at times. When angry or laughing Preeti Romero does not report cataplexy. He  does report hallucinations when dozing off or immediately upon awakening. does not report sleep paralysis. does not have parasomnia. Patient's [place] Sleepiness score: 5. Patient  is not CONSISTENT with mild daytime sleepiness. [place] Sleepiness Scale:     Sleep Medicine 10/6/2022   Sitting and reading 0   Watching TV 2   Sitting, inactive in a public place (e.g. a theatre or a meeting) 0   As a passenger in a car for an hour without a break 0   Lying down to rest in the afternoon when circumstances permit 3   Sitting and talking to someone 0   Sitting quietly after a lunch without alcohol 0   In a car, while stopped for a few minutes in traffic 0   [place] Sleepiness Score 5   Neck circumference (Inches) 16     0 = no chance of dozing  1 = slight chance of dozing  2 = moderate chance of dozing  3 = high chance of dozing    Interpretation:   0-7: It is unlikely that you are abnormally sleepy. 8-9:     You have an average amount of daytime sleepiness. 10-15: You may be excessively sleepy depending on the situation. You may want to consider seeking medical attention. 16-24: You are excessively sleepy and should consider seeking medical attention    Exercise/diet:Walks 2.5 miles every morning.       Past Medical History:   Diagnosis Date    Aortic aneurysm, abdominal 1/2011    Appendicitis 12-06-11    Arthritis 12-06-11    Bruises easily     CAD (coronary artery disease) 9/2001    MI     CHF (congestive heart failure) (HCC)     Following CABG    Chronic back pain     Chronic pain of both shoulders 12-06-11    joint - the shoulders hurt    Complication of anesthesia     woke up during appendectomy    Gout     Heart disease 12-06-11    Hernia 12-    HIGH CHOLESTEROL 12-6-11    Hyperlipidemia     Hypertension     Measles 12-    Pancreatitis 2006    History of     Persistent cough     Ringing in ears     Sinus disorder     Sleep deprivation 12-    loss of sleep        Past Surgical History:   Procedure Laterality Date    ABDOMINAL AORTIC ANEURYSM REPAIR      ABDOMINAL AORTIC ANEURYSM REPAIR, ENDOVASCULAR  1/28/2011    Endomvascular abdominal aortic aneurysm repair with insertion of cardiomems pressure sensor    APPENDECTOMY      40-50 years ago    CARDIAC SURGERY  2008    CABG    CHOLECYSTECTOMY, LAPAROSCOPIC  2006    CORONARY ANGIOPLASTY WITH STENT PLACEMENT  2001 and 2002    CORONARY ARTERY BYPASS GRAFT      GALLBLADDER SURGERY      gallbladder/pancreatitis    HERNIA REPAIR      20+ years ago    INTRACAPSULAR CATARACT EXTRACTION Right 12/6/2018    PHACOEMULSIFICATION OF CATARACT RIGHT  EYE WITH INTRAOCULAR LENS IMPLANT performed by Yane Mancia MD at [address]      panceastitis Jennie Pock    ME XCAPSL [place] RMVL INSJ IO LENS PROSTH W/O ECP Left 11/13/2018    PHACOEMULSIFICATION OF CATARACT LEFT EYE WITH INTRAOCULAR LENS IMPLANT performed by Yane Mancia MD at [address]      as child        Family History   Problem Relation Age of Onset    Diabetes Mother     Heart Failure Mother     Cancer Mother     Heart Disease Mother     High Blood Pressure Mother     Heart Surgery Father     Heart Disease Father     Heart Failure Father     Stroke Sister     Stroke Brother     Heart Surgery Paternal Uncle     Heart Disease Paternal Uncle         Allergies   Allergen Reactions    Coumadin [Warfarin] Other (See Comments)     Makes feel funny    Omeprazole Other (See Comments)     headaches    Vioxx        Current Outpatient Medications   Medication Sig Dispense Refill    doxazosin (CARDURA) 2 MG tablet Take 1 tablet by mouth nightly 90 tablet 3    olmesartan (BENICAR) 40 MG tablet Take 1 tablet by mouth daily 90 tablet 3    furosemide (LASIX) 40 MG tablet Take 1 tablet by mouth daily 90 tablet 3    atorvastatin (LIPITOR) 40 MG tablet Take 1 tablet by mouth nightly 30 tablet 3    apixaban (ELIQUIS) 5 MG TABS tablet Take 1 tablet by mouth 2 times daily 120 tablet 2    aspirin 81 MG chewable tablet Take 1 tablet by mouth daily 30 tablet 3    amLODIPine (NORVASC) 10 MG tablet Take 1 tablet by mouth daily 30 tablet 1    spironolactone (ALDACTONE) 25 MG tablet TAKE 1 TABLET EVERY DAY 90 tablet 1    Multiple Vitamins-Minerals (MULTIVITAMIN WITH MINERALS) tablet Take 1 tablet by mouth daily 30 tablet 3    ACCU-CHEK MULTICLIX LANCETS MISC Test blood sugar qd (Patient taking differently: Test blood sugar qd    AS NEEDED) 100 each 3    glucose blood VI test strips (ACCU-CHEK ION) strip Test blood sugar qd. (Patient taking differently: daily as needed Test blood sugar qd.) 100 strip 3     No current facility-administered medications for this visit. Review of Systems   Constitutional:  Negative for chills, fatigue and fever. HENT:  Negative for congestion, postnasal drip, rhinorrhea and sore throat. Eyes:  Negative for pain and visual disturbance. Respiratory:  Negative for cough, chest tightness, shortness of breath and wheezing. Cardiovascular:  Negative for chest pain, palpitations and leg swelling. Gastrointestinal:  Negative for abdominal pain. Genitourinary:  Negative for difficulty urinating. Up 1-3 times a night to urinate. Musculoskeletal: Negative. Skin: Negative. Neurological:  Positive for dizziness (with low HR). Negative for numbness and headaches. Psychiatric/Behavioral:  The patient is not nervous/anxious. BP (!) 140/63 (Site: Left Upper Arm, Position: Sitting, Cuff Size: Medium Adult)   Pulse 72   Temp 98.2 °F (36.8 °C) (Temporal)   Resp 16   Ht 5' 8\" (1.727 m)   Wt 194 lb 6.4 oz (88.2 kg)   SpO2 97%   BMI 29.56 kg/m²     Additional Measurements    10/06/22 1259   Neck circumference (Inches): 16       Physical Exam  Vitals reviewed. Constitutional:       General: He is not in acute distress. Appearance: Normal appearance. He is not ill-appearing, toxic-appearing or diaphoretic. HENT:      Head: Normocephalic and atraumatic. Nose: Nose normal. No congestion or rhinorrhea.       Mouth/Throat: Mouth: Mucous membranes are moist.      Pharynx: Oropharynx is clear. No oropharyngeal exudate or posterior oropharyngeal erythema. Comments: Mallampati 3   Eyes:      Pupils: Pupils are equal, round, and reactive to light. Cardiovascular:      Rate and Rhythm: Normal rate. Pulmonary:      Effort: Pulmonary effort is normal. No respiratory distress. Breath sounds: Normal breath sounds. No stridor. No wheezing, rhonchi or rales. Chest:      Chest wall: No tenderness. Abdominal:      Palpations: Abdomen is soft. Tenderness: There is no abdominal tenderness. There is no guarding or rebound. Musculoskeletal:         General: Normal range of motion. Cervical back: Neck supple. Right lower leg: Edema (+1 to knee) present. Left lower leg: Edema (+1 to knee) present. Lymphadenopathy:      Cervical: No cervical adenopathy. Skin:     General: Skin is warm and dry. Capillary Refill: Capillary refill takes less than 2 seconds. Neurological:      Mental Status: He is alert and oriented to person, place, and time. Psychiatric:         Mood and Affect: Mood normal.         Behavior: Behavior normal.         Thought Content: Thought content normal.         Judgment: Judgment normal.        Assessment/Plan:     1. SUNDEEP (obstructive sleep apnea)  2. Cerebrovascular accident (CVA), unspecified mechanism ([address]  3. Overweight (BMI 25.0-29.9)  4. Atrial fibrillation, unspecified type ([address]  5. Congestive heart failure, unspecified HF chronicity, unspecified heart failure type [facility])            RECOMMENDATIONS:     Val Gerard will be scheduled for polysomnography in order to evaluate for the presence and severity of obstructive sleep apnea. He was given a discussion of the pathophysiology, evaluation and treatment of apnea. Declines in lab study. Discussed differences of HST and in lab testing. May need to consider in lab if HST is negative. He understands this.       Advised to avoid driving when too sleepy to function safely. Discussed the risks of untreated apnea such as accidents, cognitive impairment, mood impairment, high blood pressure, various cardiac diseases and stroke.      Dr. Clemente Quiroz will call you with the results     Return to clinic in 4 months with Dr. Jake Montemayor, APRN - CNP

## 2022-10-06 NOTE — PATIENT INSTRUCTIONS
Jeni Tomas will be scheduled for polysomnography in order to evaluate for the presence and severity of obstructive sleep apnea. He was given a discussion of the pathophysiology, evaluation and treatment of apnea. Advised to avoid driving when too sleepy to function safely. Discussed the risks of untreated apnea such as accidents, cognitive impairment, mood impairment, high blood pressure, various cardiac diseases and stroke.      Dr. Kt Rico will call you with the results     Return to clinic in 4 months with Dr. Kt Rico

## 2022-10-06 NOTE — PROGRESS NOTES
MA Communication:   The following orders are received by verbal communication from Jonny Rivera MD    Orders include:  HST     FOLLOW UP IN 4 MONTHS

## 2022-10-06 NOTE — TELEPHONE ENCOUNTER
Pt stated [place] [facility] [place] discussed medication therapy or a cath. At that time pt proceeded with a cath. Cath is scheduled for 10/17. After talking with family pt has decided that he does not want to proceed with the cath at this time and will continue with medication therapy. If we need to write a new prescription the [facility] in [place] is preferred [phone].

## 2022-10-06 NOTE — TELEPHONE ENCOUNTER
Call  [id number]  Already on the appropriate meds  Sched F/U in 3 months is fine

## 2022-10-07 NOTE — TELEPHONE ENCOUNTER
Spoke with pt relayed message per [address]. Pt cancelled 11/18 appt. He scheudled 3 month follow up in [place] in January. He would like to make sure cath gets cancelled. I will send this to RN Becka Monroy to get it cancelled.

## 2022-10-11 DIAGNOSIS — I63.9 CEREBROVASCULAR ACCIDENT (CVA), UNSPECIFIED MECHANISM (HCC): ICD-10-CM

## 2022-10-11 DIAGNOSIS — I48.91 ATRIAL FIBRILLATION, UNSPECIFIED TYPE (HCC): Primary | ICD-10-CM

## 2022-10-13 ENCOUNTER — PATIENT MESSAGE (OUTPATIENT)
Dept: CARDIOLOGY CLINIC | Age: 82
End: 2022-10-13

## 2022-10-14 NOTE — TELEPHONE ENCOUNTER
From: Cheli Jacobs  To: Dr. Frye Stake: 10/13/2022 9:24 PM EDT  Subject: Blood pressure drop    After morning walk I was working in yard, got what I call light headed. Jaspreet Bui Took blood pressure----As about noon    12 noon 79/43 Heart rate 43  12.:15 87/56 HR 48  12:30 91/52 HR 52  1:00 101/60 HR 52  1:30  101/59 HR 57  1:45 112/62 HR 60  Blood pressures was doubled checked by wife with 2nd blood pressure cuff.   Morning meds was taken at 7:30 am  Tejas Arriaza

## 2022-10-18 ENCOUNTER — TELEPHONE (OUTPATIENT)
Dept: CARDIOLOGY CLINIC | Age: 82
End: 2022-10-18

## 2022-10-18 NOTE — TELEPHONE ENCOUNTER
Spoke with the patient. Relayed cardiac event monitor results. Patient told to follow up as scheduled.  They V/U.

## 2022-10-31 ENCOUNTER — OFFICE VISIT (OUTPATIENT)
Dept: CARDIOLOGY CLINIC | Age: 82
End: 2022-10-31
Payer: MEDICARE

## 2022-10-31 VITALS
WEIGHT: 192 LBS | DIASTOLIC BLOOD PRESSURE: 60 MMHG | HEART RATE: 59 BPM | BODY MASS INDEX: 29.1 KG/M2 | SYSTOLIC BLOOD PRESSURE: 160 MMHG | OXYGEN SATURATION: 97 % | HEIGHT: 68 IN

## 2022-10-31 DIAGNOSIS — I48.91 ATRIAL FIBRILLATION, UNSPECIFIED TYPE (HCC): ICD-10-CM

## 2022-10-31 DIAGNOSIS — R00.1 BRADYCARDIA: Primary | ICD-10-CM

## 2022-10-31 DIAGNOSIS — I63.9 CEREBROVASCULAR ACCIDENT (CVA), UNSPECIFIED MECHANISM (HCC): ICD-10-CM

## 2022-10-31 PROCEDURE — 93000 ELECTROCARDIOGRAM COMPLETE: CPT | Performed by: INTERNAL MEDICINE

## 2022-10-31 PROCEDURE — 3078F DIAST BP <80 MM HG: CPT | Performed by: INTERNAL MEDICINE

## 2022-10-31 PROCEDURE — G8417 CALC BMI ABV UP PARAM F/U: HCPCS | Performed by: INTERNAL MEDICINE

## 2022-10-31 PROCEDURE — 99214 OFFICE O/P EST MOD 30 MIN: CPT | Performed by: INTERNAL MEDICINE

## 2022-10-31 PROCEDURE — G8427 DOCREV CUR MEDS BY ELIG CLIN: HCPCS | Performed by: INTERNAL MEDICINE

## 2022-10-31 PROCEDURE — 1036F TOBACCO NON-USER: CPT | Performed by: INTERNAL MEDICINE

## 2022-10-31 PROCEDURE — G8484 FLU IMMUNIZE NO ADMIN: HCPCS | Performed by: INTERNAL MEDICINE

## 2022-10-31 PROCEDURE — 3074F SYST BP LT 130 MM HG: CPT | Performed by: INTERNAL MEDICINE

## 2022-10-31 PROCEDURE — 1123F ACP DISCUSS/DSCN MKR DOCD: CPT | Performed by: INTERNAL MEDICINE

## 2022-10-31 NOTE — PATIENT INSTRUCTIONS
Plan:  Recommend rescheduling cardiac cath. We will discuss with Dr Breanna Charles. Can consider cardioversion after cardiac cath procedure. Continue Eliquis. Follow up in 3 months.

## 2022-11-02 NOTE — PROGRESS NOTES
[facility]   Cardiac Consultation    Date: 11/2/22  Patient Name: Anastasia Ramsey  YOB: 1940    Primary Care Physician: Ofelia Rose DO    CHIEF COMPLAINT:   Chief Complaint   Patient presents with    Follow-up    Atrial Fibrillation    Other     Bradycardia      HPI:  Anastasia Ramsey is a 80 y.o. male with a PMH significant for HTN, CAD. Patient was admitted in 9/2022 with aphasia. His ECG in the emergency department showed AF with controlled rate. On 9/9/2020, an echo showed EF of 50-55%, small PFO. A stress test showed paradoxic septal motion, enlarged RV an small to moderate area of anterior and basal inferior ischemia. Brain MRI showed a small acute infarct. Rhythm was AF with rates 40-60 while inpatient. Patient wore a cardiac event monitor from 9/11/2022 to 9/25/2022 which demonstrated  AF with an average HR of 48 (33-93). Occasional PVC's, brief NSVT which was polymorphic in nature. Today, 10/31/2022, he reports that he has had dizziness. He denies any chest pain. He stopped doxazosin on 10/13 due to low BP. He opted for medical management instead of cardiac cath. He is taking his medications as prescribed. Patient denies current edema, chest pain, sob, palpitations, or syncope. Past Medical History:   has a past medical history of Aortic aneurysm, abdominal, Appendicitis, Arthritis, Bruises easily, CAD (coronary artery disease), CHF (congestive heart failure) (HCC), Chronic back pain, Chronic pain of both shoulders, Complication of anesthesia, Gout, Heart disease, Hernia, HIGH CHOLESTEROL, Hyperlipidemia, Hypertension, Measles, Pancreatitis, Persistent cough, Ringing in ears, Sinus disorder, and Sleep deprivation. Surgical History:   has a past surgical history that includes Coronary angioplasty with stent (2001 and 2002); Cardiac surgery (2008); Cholecystectomy, laparoscopic (2006); Appendectomy;  Tonsillectomy; hernia repair; AAA repair, endovascular (1/28/2011); Pancreas surgery; Gallbladder surgery; Coronary artery bypass graft; Abdominal aortic aneurysm repair; pr xcapsl ctrc rmvl insj io lens prosth w/o ecp (Left, 11/13/2018); and Intracapsular cataract extraction (Right, 12/6/2018). Social History:   reports that he quit smoking about 44 years ago. His smoking use included cigarettes. He has a 45.00 pack-year smoking history. He has never used smokeless tobacco. He reports that he does not drink alcohol and does not use drugs. Family History:  family history includes Cancer in his mother; Diabetes in his mother; Heart Disease in his father, mother, and paternal uncle; Heart Failure in his father and mother; Heart Surgery in his father and paternal uncle; High Blood Pressure in his mother; Stroke in his brother and sister. Home Medications:  Outpatient Encounter Medications as of 10/31/2022   Medication Sig Dispense Refill    olmesartan (BENICAR) 40 MG tablet Take 1 tablet by mouth daily 90 tablet 3    furosemide (LASIX) 40 MG tablet Take 1 tablet by mouth daily 90 tablet 3    atorvastatin (LIPITOR) 40 MG tablet Take 1 tablet by mouth nightly 30 tablet 3    apixaban (ELIQUIS) 5 MG TABS tablet Take 1 tablet by mouth 2 times daily 120 tablet 2    aspirin 81 MG chewable tablet Take 1 tablet by mouth daily 30 tablet 3    amLODIPine (NORVASC) 10 MG tablet Take 1 tablet by mouth daily 30 tablet 1    spironolactone (ALDACTONE) 25 MG tablet TAKE 1 TABLET EVERY DAY 90 tablet 1    Multiple Vitamins-Minerals (MULTIVITAMIN WITH MINERALS) tablet Take 1 tablet by mouth daily 30 tablet 3    ACCU-CHEK MULTICLIX LANCETS MISC Test blood sugar qd (Patient taking differently: Test blood sugar qd    AS NEEDED) 100 each 3    glucose blood VI test strips (ACCU-CHEK ION) strip Test blood sugar qd.  (Patient taking differently: daily as needed Test blood sugar qd.) 100 strip 3    [DISCONTINUED] metoprolol succinate (TOPROL XL) 50 MG extended release tablet TAKE 1 TABLET EVERY DAY 90 tablet 1     No facility-administered encounter medications on file as of 10/31/2022. Data:  ECG 11/2/22: Personally reviewed. All current cardiac medications reviewed and adjusted accordingly  11/2/22    Stress test 9/9/2022  Summary Normal LVEF >60% Paradoxic septal motion  Enlarged RV  Small to moderate area of anterior and basal inferior ischemia   Overall, this would be considered, intermediate risk study      Echo 9/9/2022  Summary   Left ventricular systolic function is normal with ejection fraction   estimated at 50-55%. Basal inferior hypokinesis   There is mild concentric left ventricular hypertrophy. Grade II diastolic dysfunction with elevated left ventricular filling   pressure. The left atrium is severely dilated. The right atrium is mildly dilated. Moderate mitral regurgitation. Mild aortic regurgitation. Moderate tricuspid regurgitation. Systolic pulmonary artery pressure (SPAP) is estimated at 58 mmHg consistent   with moderate pulmonary hypertension (Right atrial pressure of 3 mmHg). Small PFO    Allergies:  Coumadin [warfarin], Omeprazole, and Vioxx     Review of Systems   Constitutional: Negative. HENT: Negative. Eyes: Negative. Respiratory: Negative. Cardiovascular: Negative. Gastrointestinal: Negative. Genitourinary: Negative. Musculoskeletal: Negative. Skin: Negative. Neurological: Negative. Hematological: Negative. Psychiatric/Behavioral: Negative. BP (!) 160/60   Pulse 59   Ht 5' 8\" (1.727 m)   Wt 192 lb (87.1 kg)   SpO2 97%   BMI 29.19 kg/m²       Objective:  Physical Exam   Constitutional: He is oriented to person, place, and time. He appears well-developed and well-nourished. HENT:   Head: Normocephalic and atraumatic. Eyes: Pupils are equal, round, and reactive to light. Neck: Normal range of motion. Cardiovascular: Normal rate, regular rhythm and normal heart sounds.     Pulmonary/Chest: Effort normal and breath sounds normal.   Abdominal: Soft. No tenderness. Musculoskeletal: Normal range of motion. He exhibits no edema. Neurological: He is alert and oriented to person, place, and time. Skin: Skin is warm and dry. Psychiatric: He has a normal mood and affect. Assessment:  CAD- s/p CABS 2008. ECG demonstrates some ST segment depression which may reflect myocardial ischemia. I also have some concerns about the polymorphic nature of the nonsustained ventricular tachycardia as this may also be a consequence of transient myocardial ischemia. In view of the abnormal stress test as well as the above findings, we have encouraged him to proceed with left heart catheterization and coronary angiography. AF-persistent      Plan:  Recommend pursuing cardiac cath. Can consider CV after cardiac cath procedure. Continue Eliquis. Follow up in 3 months with EP NP.     QUALITY MEASURES  1. Tobacco Cessation Counseling: NA  2. Retake of BP if >140/90:   Yes  3. Documentation to PCP/referring for new patient:  Sent to PCP at close of office visit  4. CAD patient on anti-platelet: NA  5. CAD patient on STATIN therapy:  Yes  6. Patient with CHF and aFib on anticoagulation:  Yes    Cristian Savage M.D.     Rabia Thomas RN, am scribing for and in the presence of Dr. Cristian Savage. 11/02/22 5:02 PM   Tayla Topete RN    I, Dr. Cristian Savage, personally performed the services described in this documentation as scribed by Tayla Topete RN in my presence, and it is both accurate and complete.

## 2022-11-11 SDOH — HEALTH STABILITY: PHYSICAL HEALTH: ON AVERAGE, HOW MANY MINUTES DO YOU ENGAGE IN EXERCISE AT THIS LEVEL?: 150+ MIN

## 2022-11-11 SDOH — HEALTH STABILITY: PHYSICAL HEALTH: ON AVERAGE, HOW MANY DAYS PER WEEK DO YOU ENGAGE IN MODERATE TO STRENUOUS EXERCISE (LIKE A BRISK WALK)?: 7 DAYS

## 2022-11-11 ASSESSMENT — PATIENT HEALTH QUESTIONNAIRE - PHQ9
SUM OF ALL RESPONSES TO PHQ QUESTIONS 1-9: 1
SUM OF ALL RESPONSES TO PHQ QUESTIONS 1-9: 1
2. FEELING DOWN, DEPRESSED OR HOPELESS: 0
SUM OF ALL RESPONSES TO PHQ QUESTIONS 1-9: 1
1. LITTLE INTEREST OR PLEASURE IN DOING THINGS: 1
SUM OF ALL RESPONSES TO PHQ9 QUESTIONS 1 & 2: 1
SUM OF ALL RESPONSES TO PHQ QUESTIONS 1-9: 1

## 2022-11-11 ASSESSMENT — LIFESTYLE VARIABLES
HOW OFTEN DO YOU HAVE A DRINK CONTAINING ALCOHOL: 1
HOW MANY STANDARD DRINKS CONTAINING ALCOHOL DO YOU HAVE ON A TYPICAL DAY: PATIENT DOES NOT DRINK
HOW OFTEN DO YOU HAVE A DRINK CONTAINING ALCOHOL: NEVER
HOW OFTEN DO YOU HAVE SIX OR MORE DRINKS ON ONE OCCASION: 1
HOW MANY STANDARD DRINKS CONTAINING ALCOHOL DO YOU HAVE ON A TYPICAL DAY: 0

## 2022-11-15 ENCOUNTER — OFFICE VISIT (OUTPATIENT)
Dept: FAMILY MEDICINE CLINIC | Age: 82
End: 2022-11-15
Payer: MEDICARE

## 2022-11-15 VITALS
WEIGHT: 193.8 LBS | DIASTOLIC BLOOD PRESSURE: 68 MMHG | BODY MASS INDEX: 28.71 KG/M2 | SYSTOLIC BLOOD PRESSURE: 132 MMHG | TEMPERATURE: 96.9 F | HEIGHT: 69 IN | OXYGEN SATURATION: 96 % | RESPIRATION RATE: 16 BRPM | HEART RATE: 63 BPM

## 2022-11-15 DIAGNOSIS — Z12.5 PROSTATE CANCER SCREENING: Primary | ICD-10-CM

## 2022-11-15 DIAGNOSIS — E11.9 TYPE 2 DIABETES MELLITUS WITHOUT COMPLICATION, WITHOUT LONG-TERM CURRENT USE OF INSULIN (HCC): Primary | ICD-10-CM

## 2022-11-15 DIAGNOSIS — Z00.00 MEDICARE ANNUAL WELLNESS VISIT, SUBSEQUENT: ICD-10-CM

## 2022-11-15 LAB — HBA1C MFR BLD: 6.9 %

## 2022-11-15 PROCEDURE — 3078F DIAST BP <80 MM HG: CPT | Performed by: FAMILY MEDICINE

## 2022-11-15 PROCEDURE — 3044F HG A1C LEVEL LT 7.0%: CPT | Performed by: FAMILY MEDICINE

## 2022-11-15 PROCEDURE — 3074F SYST BP LT 130 MM HG: CPT | Performed by: FAMILY MEDICINE

## 2022-11-15 PROCEDURE — G8484 FLU IMMUNIZE NO ADMIN: HCPCS | Performed by: FAMILY MEDICINE

## 2022-11-15 PROCEDURE — 83036 HEMOGLOBIN GLYCOSYLATED A1C: CPT | Performed by: FAMILY MEDICINE

## 2022-11-15 PROCEDURE — 1123F ACP DISCUSS/DSCN MKR DOCD: CPT | Performed by: FAMILY MEDICINE

## 2022-11-15 PROCEDURE — G0439 PPPS, SUBSEQ VISIT: HCPCS | Performed by: FAMILY MEDICINE

## 2022-11-15 NOTE — PROGRESS NOTES
Medicare Annual Wellness Visit    Jessica Xiong is here for Medicare AWV (awv)    Assessment & Plan   Type 2 diabetes mellitus without complication, without long-term current use of insulin (HCC)  -     POCT glycosylated hemoglobin (Hb A1C)  Medicare annual wellness visit, subsequent    Recommendations for Preventive Services Due: see orders and patient instructions/AVS.  Recommended screening schedule for the next 5-10 years is provided to the patient in written form: see Patient Instructions/AVS.     Return in 3 months (on 2/15/2023) for Medicare Annual Wellness Visit in 1 year, Diabetis F/U. Subjective     Patient's complete Health Risk Assessment and screening values have been reviewed and are found in Flowsheets. The following problems were reviewed today and where indicated follow up appointments were made and/or referrals ordered. Positive Risk Factor Screenings with Interventions:    Fall Risk:  Do you feel unsteady or are you worried about falling? : (!) yes  2 or more falls in past year?: no  Fall with injury in past year?: no   Fall Risk Interventions:    Home safety tips provided  Had CVA in September 2022, now makes sure he sits up in bed in the AM before moving, admits to increased joint pain since Cardiology stopped Cardura. Taking Lipitor. General Health and ACP:  General  In general, how would you say your health is?: Fair  In the past 7 days, have you experienced any of the following: New or Increased Pain, New or Increased Fatigue, Loneliness, Social Isolation, Stress or Anger?: No  Do you get the social and emotional support that you need?: Yes  Do you have a Living Will?: Yes    Advance Directives       Power of [address] [place] [address] Will ACP-Advance Directive ACP-Power of     Not on File Not on File Not on File Not on File          General Health Risk Interventions:  No interventions needed in this area at this time.        Safety:  Do you have working smoke detectors?: Yes  Do you have any tripping hazards - loose or unsecured carpets or rugs?: No  Do you have any tripping hazards - clutter in doorways, halls, or stairs?: No  Do you have either shower bars, grab bars, non-slip mats or non-slip surfaces in your shower or bathtub?: (!) No  Do all of your stairways have a railing or banister?: Yes  Do you always fasten your seatbelt when you are in a car?: Yes  Safety Interventions:  Home safety tips provided           Objective   Vitals:    11/15/22 1302 11/15/22 1309   BP: (!) 167/69 132/68   Site: Left Upper Arm Left Upper Arm   Position: Sitting Sitting   Cuff Size: Large Adult Large Adult   Pulse: 63    Resp: 16    Temp: 96.9 °F (36.1 °C)    TempSrc: Temporal    SpO2: 96%    Weight: 193 lb 12.8 oz (87.9 kg)    Height: 5' 8.9\" (1.75 m)       Body mass index is 28.7 kg/m². General Appearance: alert and oriented to person, place and time, well-developed and well-nourished, in no acute distress  Pulmonary/Chest: clear to auscultation bilaterally- no wheezes, rales or rhonchi, normal air movement, no respiratory distress  Cardiovascular: Systolic murmur, normal rate    Abdomen: soft, non-tender, non-distended, normal bowel sounds, no masses or organomegaly       Allergies   Allergen Reactions    Coumadin [Warfarin] Other (See Comments)     Makes feel funny    Omeprazole Other (See Comments)     headaches    Vioxx      Prior to Visit Medications    Medication Sig Taking?  Authorizing Provider   olmesartan (BENICAR) 40 MG tablet Take 1 tablet by mouth daily Yes Jade Jha MD   furosemide (LASIX) 40 MG tablet Take 1 tablet by mouth daily Yes Jade Jha MD   atorvastatin (LIPITOR) 40 MG tablet Take 1 tablet by mouth nightly Yes RUPERTO Weller CNP   apixaban (ELIQUIS) 5 MG TABS tablet Take 1 tablet by mouth 2 times daily Yes RUPERTO Weller CNP   aspirin 81 MG chewable tablet Take 1 tablet by mouth daily Yes RUPERTO Arreola CNP   amLODIPine (NORVASC) 10 MG tablet Take 1 tablet by mouth daily Yes Isaías Gibbs MD   spironolactone (ALDACTONE) 25 MG tablet TAKE 1 TABLET EVERY DAY Yes Nilton Drummond DO   Multiple Vitamins-Minerals (MULTIVITAMIN WITH MINERALS) tablet Take 1 tablet by mouth daily Yes Nilton Drummond DO   ACCU-CHEK MULTICLIX LANCETS MISC Test blood sugar qd  Patient taking differently: Test blood sugar qd    AS NEEDED Yes Nilton Drummond DO   glucose blood VI test strips (ACCU-CHEK ION) strip Test blood sugar qd. Patient taking differently: daily as needed Test blood sugar qd. Yes Nilton Drummond DO   metoprolol succinate (TOPROL XL) 50 MG extended release tablet TAKE 1 TABLET EVERY DAY  Nilton Drummond DO       CareTeam (Including outside providers/suppliers regularly involved in providing care):   Patient Care Team:  Nilton Drummond DO as PCP - General (Family Medicine)  Nilton Drummond DO as PCP - [facility] Empaneled Provider  Nii Krause MD as Consulting Physician (Cardiology)     Reviewed and updated this visit:  Tobacco  Allergies  Meds  Problems  Med Hx  Surg Hx  Soc Hx  Fam Hx        - Instructed pt to contact Dr. Joceline Shepard office regarding possibly switching to a different statin as he has been having leg pain.

## 2022-11-15 NOTE — PATIENT INSTRUCTIONS
Personalized Preventive Plan for Val Gerard - 11/15/2022  Medicare offers a range of preventive health benefits. Some of the tests and screenings are paid in full while other may be subject to a deductible, co-insurance, and/or copay. Some of these benefits include a comprehensive review of your medical history including lifestyle, illnesses that may run in your family, and various assessments and screenings as appropriate. After reviewing your medical record and screening and assessments performed today your provider may have ordered immunizations, labs, imaging, and/or referrals for you. A list of these orders (if applicable) as well as your Preventive Care list are included within your After Visit Summary for your review. Other Preventive Recommendations:    A preventive eye exam performed by an eye specialist is recommended every 1-2 years to screen for glaucoma; cataracts, macular degeneration, and other eye disorders. A preventive dental visit is recommended every 6 months. Try to get at least 150 minutes of exercise per week or 10,000 steps per day on a pedometer . Order or download the FREE \"Exercise & Physical Activity: Your Everyday Guide\" from [employer]. Call 2-738.504.2942 or search [employer] online. You need 0759-6228 mg of calcium and 2776-8495 IU of vitamin D per day. It is possible to meet your calcium requirement with diet alone, but a vitamin D supplement is usually necessary to meet this goal.  When exposed to the sun, use a sunscreen that protects against both UVA and UVB radiation with an SPF of 30 or greater. Reapply every 2 to 3 hours or after sweating, drying off with a towel, or swimming. Always wear a seat belt when traveling in a car. Always wear a helmet when riding a bicycle or motorcycle.

## 2022-11-22 ENCOUNTER — HOSPITAL ENCOUNTER (OUTPATIENT)
Dept: SLEEP CENTER | Age: 82
Discharge: HOME | End: 2022-11-22
Payer: MEDICARE

## 2022-11-22 DIAGNOSIS — E66.3 OVERWEIGHT (BMI 25.0-29.9): ICD-10-CM

## 2022-11-22 DIAGNOSIS — G47.33 OSA (OBSTRUCTIVE SLEEP APNEA): ICD-10-CM

## 2022-11-22 PROCEDURE — 95806 SLEEP STUDY UNATT&RESP EFFT: CPT

## 2022-11-23 ENCOUNTER — TELEPHONE (OUTPATIENT)
Dept: PULMONOLOGY | Age: 82
End: 2022-11-23

## 2022-11-23 PROCEDURE — 95806 SLEEP STUDY UNATT&RESP EFFT: CPT | Performed by: INTERNAL MEDICINE

## 2022-11-23 NOTE — TELEPHONE ENCOUNTER
I have talked to the patient about the sleep study it showing a moderate form of sleep apnea  With his history of CVA atrial fibrillation congestive heart failure he would greatly benefit from an AutoPap  We will go ahead and order an [facility]ap from [facility]  Patient will call us once he gets his machine to have a appointment for follow-up               [address] [facility]  [address] [place] [id number]  Dept: [phone]  Loc: [phone]     Diagnosis:  [x] SUNDEEP (ICD-10: G47.33)  o CSA (ICD-10: G47.31)  [] Complex Sleep Apnea (ICD-10: G47.37)  []  (ICD-10: G47.37)  [] Hypoxemia (ICD-10: R09.02)  [] COPD (J44.90)  [] Chronic Respiratory Failure with hypoxemia (J96.11)  [] Chronicr Respiratory Failure with hypercapnia (J96.12)  [] Restrictive Lung Disease (J98.4)      [x] New Rx (Device Preference: ______autoresmed___________________)     [] Change Order       [] Replace ___________  [] Clinical assessments and may include IN-Check device, spirometry and ETCO2 PRN    [] Discontinue Order -  [] CPAP    [] BPAP    [] PAP    [] Oxygen   /   AMA?  [] Yes   [] No              Therapy    AHI: ________ PRIYA: ________  LOW SpO2: ________%      DME: [facility]    DEVICE / SETTINGS RAMP / COMFORT INTERFACE   []  CPAP () Pressure    Ramp: _________ min's  [] Ramp to patient preference General PAP Supply Kit  Medicaid does not cover heated tubing  ([employer])  PAP Tubing:  [x] Heated ()- 1/3 mos                         [x] Regular () - 1/3 mos  [x] Disposable Water Chamber () - 1/6 mos  [x] Disposable Filter () - 2/mo  [x] Non-Disposable Filter () - 1/6 mos   []  BiLevel PAP ()           IPAP        []  BiLevel PAP with   ()       Backup Rate ()      EPAP Rate  [] Adjust FLEX to patient comfort       SUPPLEMENTAL OXYGEN  [] OXYGEN:      Liter/min: _________  [] Continuous        [] Nocturnal  [] Bleed into PAP Device [x]  Desk 5                  Max Press 20  Mask Interface Kit    [x] Mask interface () - 1/3 mos  [x] Nasal Cushion () - 2/mo  [x] Nasal Pillows ()  -2/mo  [x] Headgear () - 1/6 mos   []  AutoBiLevel () Pressure  ()      Support           []  ResMed® IVAPS EPAP  [] Overnight Oximetry on Room Air  [] Overnight Oximetry on PAP Therapy  [] Overnight Oximetry on ___ LPM of oxygen  []  Overnight Oximetry on  PAP therapy with _____ lpm of O2    Target Pt Rate  Min PS      Target Va  Patient Ht  Ti Max                Ti Min        Rise time  Max PS  Trigger  Cycle  Epap  Epap max  Epap min  The patient has a history of:  [] Excessive Daytime Sleepiness  [] Insomnia  [] Impaired Cognition  [] Ischemic Heart Disease  [] Hypertension  [] Mood Disorders          [] History of Stroke  Additional Orders:______________________________________________________________________________________________________________    [] Titrate to comfort  [x] Add cloud access via [employer] or [facility] Full Face Mask Kit    [] Full face mask () - 1/3 mos  [] Full Face Cushion () - 1/mo  [] Headgear () - 1/6 mos                                           [] Respironics® ASV Advanced ()     EPAP Min  PS Min      EPAP Max  PS Max   Additional Supplies    [] Chin Strap ()  [] Heated Humidifier Kit ()  Mask Specifications:   [] Patient Preference      -or-            Brand:______________ Size:_______________   Type: __________________________________   [] Mask Refit:___________________________  [x] Mask Fitting:  [] Full     [x] Nasal                [x]  Pillows                                Max Press  Ramp Time      Rate  Bi-flex: []1  []2  []3     [] Respironics® AVAPS: ()     IPAP Min  IPAP Max  Pressure Max  Epap max  Epap min  Rise time                      Avaps rate  EPAP   Additional Services    [] Annual PRN service and check of equipment  [] Routine service and check of equipment  [] Download and report compliance data   Tidal volume      Tigger                                     Rate                                         Inspiratory time                                                         The following equipment is Medically Necessary for the above stated patient. It is reasonable and necessary in reference to acceptable standards of medical practice for this condition, and is not prescribed as a convenience. Frequency of Use:    Daily                 Length of Need: 13 Months              o The patient requires BiLevel PAP and the following apply: []  The patient requires a Respiratory Assist Device (RAD) and the following apply:   o CPAP was tried and failed to meet therapeutic goals. [] CPAP was tried, but failed to meet therapeutic goals   o The prescribed mask interface has been properly fit, is the most comfortable to the patient and will be used with the BPAP device. [] The prescribed mask interface has been properly fit, is the most comfortable to the patient and will be used with the RAD.   o Current CPAP setting prevents patient from tolerating the therapy and lower CPAP settings fail to adequately control the symptoms of SUNDEEP, improve sleep quality, or reduce AHI to acceptable levels. [] Current CPAP setting prevent patient from tolerating the therapy and lower PAP settings fail to  adequately control SUNDEEP symptoms, improve sleep quality, or reduce AHI to acceptable levels.          [] There is significant improvement of the respiratory events on the RAD                                                                                                                                                                                                                                  Hong Mendieta MD               NPI- [id number]     [facility]- [phone]                    11/23/22       ____________________________ _______________________           Physician Signature                                                         Date

## 2022-12-22 RX ORDER — ATORVASTATIN CALCIUM 40 MG/1
TABLET, FILM COATED ORAL
Qty: 90 TABLET | Refills: 3 | Status: SHIPPED | OUTPATIENT
Start: 2022-12-22

## 2022-12-22 NOTE — TELEPHONE ENCOUNTER
Last OV [facility] 10/31/22  Upcoming OV NPNR 2/6/23  Lipid 9/11/22    Pt follows general cardiology as well (Last OV [facility] 9/30/22) - Do you still want to manage this medication?

## 2022-12-22 NOTE — TELEPHONE ENCOUNTER
Future Appointments   Date Time Provider Leticia Viviana   1/20/2023  7:30 AM Angélica Franco MD [facility]   2/6/2023 10:00 AM RUPERTO Gardner - PEGGY Peterson [facility]   2/7/2023  1:30 PM Yo Warren MD [facility]   2/15/2023  1:00 PM Andreia Ng DO [address] 11/15/2022

## 2022-12-22 NOTE — TELEPHONE ENCOUNTER
Last OV [facility] 9/30/22  Upcoming OV 1/20/23  Lipid 9/11/22 Xray at bedside      Prakash Drew RN  04/18/19 [id number]

## 2022-12-24 RX ORDER — SPIRONOLACTONE 25 MG/1
TABLET ORAL
Qty: 90 TABLET | Refills: 1 | Status: SHIPPED | OUTPATIENT
Start: 2022-12-24 | End: 2023-02-17 | Stop reason: ALTCHOICE

## 2022-12-24 RX ORDER — AMLODIPINE BESYLATE 5 MG/1
TABLET ORAL
Qty: 180 TABLET | Refills: 1 | OUTPATIENT
Start: 2022-12-24

## 2022-12-24 RX ORDER — FUROSEMIDE 40 MG/1
TABLET ORAL
Qty: 90 TABLET | Refills: 1 | Status: SHIPPED | OUTPATIENT
Start: 2022-12-24

## 2022-12-24 RX ORDER — AMLODIPINE BESYLATE 10 MG/1
10 TABLET ORAL DAILY
Qty: 90 TABLET | Refills: 1 | Status: SHIPPED | OUTPATIENT
Start: 2022-12-24

## 2023-01-10 ENCOUNTER — TELEPHONE (OUTPATIENT)
Dept: PULMONOLOGY | Age: 83
End: 2023-01-10

## 2023-01-10 NOTE — TELEPHONE ENCOUNTER
The AutoPap was set at a minimum of 5 and a maximum of 20  Reviewed the data and it looks like 11 looked pretty good however you have had a lot of leak  We will go ahead and change the AutoPap to a minimum of 5 and a maximum of 11 and have also turned on the exhale so that it would not feel as strong after you exhale

## 2023-01-10 NOTE — TELEPHONE ENCOUNTER
Patient called stating that his mask loosens during the night and the machine is pushing more air causing him to have stomach aches. Patient stated that he talked to [facility] ([facility]) and she told him to call the doctor and have him call and change the setting.

## 2023-01-11 NOTE — TELEPHONE ENCOUNTER
BRIGHT pt and informed him MD changed pressure. He said he had the best night sleep last night ever.

## 2023-01-19 NOTE — PROGRESS NOTES
[facility]   Cardiac Consult     Referring Provider:  José Arevalo DO     Chief Complaint   Patient presents with    Follow-up     Started wearing cpap    Atrial Fibrillation    Coronary Artery Disease    Congestive Heart Failure    Hyperlipidemia    Edema     ankles        Adelaide Padilla   1940    History of Present Illness:    Adelaide Padilla is a 80 y.o. male who is here is here today for follow up for a past medical history of an abnormal stress test, AF, coronary artery disease- CABG surgery 2008, CVA, hypertension, hyperlipidemia, valvular heart diease, small PFO. Patient was initially seen as a new patient at the request of Dr. Naveen Ying for an abnormal stress test and chest pain. Admitted for evaluation of a brief episode of aphasia on 09/08/2022. Symptoms had resolved by the time he arrived to the ED. EKG at admission showed AF. On 9/09/2022 echo showed EF of 50-55% with basal inferior hypokinesis, grade II DD, moderate MR, mild AR, moderate TR, moderate HTN, and small PFO. Stress test showed paradoxic septal motion, enlarged RV and small to moderate area of anterior and basal inferior ischemia. Brain MRI showed a small acute infarct of the left parietal lobe and chronic infarcts in right occipital lobe and left cerebellar hemisphere. Last visit stopped Lisinopril, started Olmesartan 40, clonidine 0.1 BID, increased Lasix. Patient wanted to proceed with angiogram , later called into the office and stated he would like to try medical management. Patient had a sleep study and has been diagnosed with sleep apnea. Today he states he has been feeling well since his last visit. States he has no chest pain or SOB. Has been on his CPAP therapy for 4 weeks now. States his first 2 weeks he had some issues and went back for adjustments. States he has less daytime fatigue and has not needed a afternoon nap. States he has some leg swelling that is better when he gets up in the mornings.  Blood pressure at home is running 145/150's. He is tolerating his medications and taking them as prescribed. States he has never passed out. He has had some dizziness in the past. Patient currently denies any weight gain, palpitations, chest pain, dizziness, and syncope. Mild VIGIL unchanged. Past Medical History:   has a past medical history of Aortic aneurysm, abdominal, Appendicitis, Arthritis, Bruises easily, CAD (coronary artery disease), CHF (congestive heart failure) ([facility]), Chronic back pain, Chronic pain of both shoulders, Complication of anesthesia, Gout, Heart disease, Hernia, HIGH CHOLESTEROL, Hyperlipidemia, Hypertension, Measles, Pancreatitis, Persistent cough, Ringing in ears, Sinus disorder, and Sleep deprivation. Surgical History:   has a past surgical history that includes Coronary angioplasty with stent (2001 and 2002); Cardiac surgery (2008); Cholecystectomy, laparoscopic (2006); Appendectomy; Tonsillectomy; hernia repair; AAA repair, endovascular (1/28/2011); Pancreas surgery; Gallbladder surgery; Coronary artery bypass graft; Abdominal aortic aneurysm repair; pr xcapsl ctrc rmvl insj io lens prosth w/o ecp (Left, 11/13/2018); and Intracapsular cataract extraction (Right, 12/6/2018). Social History:   reports that he quit smoking about 45 years ago. His smoking use included cigarettes. He started smoking about 68 years ago. He has a 37.50 pack-year smoking history. He has never used smokeless tobacco. He reports that he does not drink alcohol and does not use drugs. Family History:  family history includes Cancer in his mother; Diabetes in his mother; Heart Attack in his father; Heart Disease in his father, mother, paternal uncle, and paternal uncle; Heart Failure in his father and mother; Heart Surgery in his father and paternal uncle; High Blood Pressure in his mother; Stroke in his brother, sister, and sister.      Home Medications:  Prior to Admission medications    Medication Sig Start Date End Date Taking? Authorizing Provider   furosemide (LASIX) 40 MG tablet TAKE 1 TABLET EVERY DAY 12/24/22  Yes Carlos Carlisle DO   spironolactone (ALDACTONE) 25 MG tablet TAKE 1 TABLET EVERY DAY 12/24/22  Yes Carlos Carlisle DO   amLODIPine (NORVASC) 10 MG tablet Take 1 tablet by mouth daily 12/24/22  Yes Carlos Carlisle DO   atorvastatin (LIPITOR) 40 MG tablet TAKE 1 TABLET EVERY NIGHT 12/22/22  Yes Cr Mendez MD   olmesartan (BENICAR) 40 MG tablet Take 1 tablet by mouth daily 9/30/22  Yes Cr Mendez MD   apixaban (ELIQUIS) 5 MG TABS tablet Take 1 tablet by mouth 2 times daily 9/22/22  Yes RUPERTO Weller CNP   aspirin 81 MG chewable tablet Take 1 tablet by mouth daily 9/12/22  Yes RUPERTO Rock CNP   Multiple Vitamins-Minerals (MULTIVITAMIN WITH MINERALS) tablet Take 1 tablet by mouth daily 4/5/19  Yes Carlos Carlisle DO   ACCU-CHEK MULTICLIX LANCETS MISC Test blood sugar qd  Patient taking differently: Test blood sugar qd    AS NEEDED 3/1/18  Yes Carlos Carlisle DO   glucose blood VI test strips (ACCU-CHEK ION) strip Test blood sugar qd. Patient taking differently: daily as needed Test blood sugar qd. 3/1/18  Yes Carlos Carlisle DO   metoprolol succinate (TOPROL XL) 50 MG extended release tablet TAKE 1 TABLET EVERY DAY 2/8/22 9/11/22  Carlos Carlisle DO        Allergies:  Coumadin [warfarin], Omeprazole, and Vioxx     Review of Systems:   Constitutional: there has been no unanticipated weight loss. There's been no change in energy level, sleep pattern, or activity level. Eyes: No visual changes or diplopia. No scleral icterus. ENT: No Headaches, hearing loss or vertigo. No mouth sores or sore throat. Cardiovascular: Reviewed in HPI  Respiratory: No cough or wheezing, no sputum production. No hematemesis. Gastrointestinal: No abdominal pain, appetite loss, blood in stools. No change in bowel or bladder habits.   Genitourinary: No dysuria, trouble voiding, or hematuria. Musculoskeletal:  No gait disturbance, weakness or joint complaints. Integumentary: No rash or pruritis. Neurological: No headache, diplopia, change in muscle strength, numbness or tingling. No change in gait, balance, coordination, mood, affect, memory, mentation, behavior. Psychiatric: No anxiety, no depression. Endocrine: No malaise, fatigue or temperature intolerance. No excessive thirst, fluid intake, or urination. No tremor. Hematologic/Lymphatic: No abnormal bruising or bleeding, blood clots or swollen lymph nodes. Allergic/Immunologic: No nasal congestion or hives. Physical Examination:    BP (!) 180/64   Pulse 60   Ht 5' 8\" (1.727 m)   Wt 197 lb (89.4 kg)   SpO2 99%   BMI 29.95 kg/m²    Vitals:    01/20/23 0717   BP: (!) 180/64   Pulse:    SpO2:           Constitutional and General Appearance: NAD   Respiratory:  Normal excursion and expansion without use of accessory muscles  Resp Auscultation: Normal breath sounds without dullness  Cardiovascular: The apical impulses not displaced  Heart tones are crisp and normal, irregular rhythm   Cervical veins are not engorged  The carotid upstroke is normal in amplitude and contour without delay or bruit  Normal S1S2, No S3, No Murmur  Peripheral pulses are symmetrical and full  There is no clubbing, cyanosis of the extremities. No edema  Femoral Arteries: 2+ and equal  Pedal Pulses: 2+ and equal   Abdomen:  No masses or tenderness  Liver/Spleen: No Abnormalities Noted  Neurological/Psychiatric:  Alert and oriented in all spheres  Moves all extremities well  Exhibits normal gait balance and coordination  No abnormalities of mood, affect, memory, mentation, or behavior are noted    CTA head and neck 9/8/2022  1. Atherosclerosis contributes to less than 50% stenosis involving the proximal right cervical ICA by NASCET criteria.  2. Atherosclerosis at the origin of both vertebral arteries, which contributes to mild stenosis on the right and moderate stenosis on the left. 3. No significant stenosis seen of the [id number]. 4. Nonspecific borderline prominent precarinal mediastinal lymph node measuring 13 mm. Echocardiogram 9/9/2022  Summary   Left ventricular systolic function is normal with ejection fraction   estimated at 50-55%. Basal inferior hypokinesis   There is mild concentric left ventricular hypertrophy. Grade II diastolic dysfunction with elevated left ventricular filling   pressure. The left atrium is severely dilated. The right atrium is mildly dilated. Moderate mitral regurgitation. Mild aortic regurgitation. Moderate tricuspid regurgitation. Systolic pulmonary artery pressure (SPAP) is estimated at 58 mmHg consistent   with moderate pulmonary hypertension (Right atrial pressure of 3 mmHg). Small PFO    Stress test 9/9/2022  Conclusions    Summary  Normal LVEF >60%  Paradoxic septal motion  Enlarged RV  Small to moderate area of anterior and basal inferior ischemia    Overall, this would be considered, intermediate risk study     MRI brain 9/8/2022  1. Small acute infarct of the left parietal lobe, series 4, image 20. 2. Cerebral parenchymal volume loss with moderate-severe chronic microvascular white matter ischemic disease. 3. Chronic infarcts are noted in the right occipital lobe and left cerebellar hemisphere. Latest Reference Range & Units 9/11/22 05:54   CHOLESTEROL, TOTAL, 384822 0 - 199 mg/dL 154   HDL Cholesterol 40 - 60 mg/dL 35 (L)   LDL Calculated <100 mg/dL 102 (H)   Triglycerides 0 - 150 mg/dL 83   VLDL Cholesterol Calculated Not Established mg/dL 17         Assessment:   Chest pain - no recent  NSVT - polymorphic. Suggests ischemia. 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 stable  Abnormal EKG  Moderate mitral regurgitation. Mild aortic regurgitation. Moderate tricuspid regurgitation. Moderate pulmonary hypertension  [facility]  CVA 9/8/22. Suspect due to AF. Hypertension - poor control   Hyperlipidemia   Bradycardia - BBlk was contributing   Fatigue - decreased post CPAP  Sleep apnea     Plan:  Restart Cardura 1 mg in the morning and 1 mg in the evenings for better blood pressure control   Continue Olmesartan- take this in the mornings   Continue amlodipine- take this at night    Continue CPAP therapy   Recommend an angiogram, plan for next week. High risk CAD/ischemia due to NSVT. Hold Eliquis for 2 days prior to procedure. R/B/A/E discussed. Cardiac medications reviewed including indications and pertinent side effects. Medication list updated at this visit. Check blood pressure and heart rate at home a few times per week- keep a log with dates and times and bring to office visit   Regular exercise and following a healthy diet encouraged   Follow up with me in one month   Follow up with Ep as scheduled in February     Wendy's attestation: This note was scribed in the presence of Dr. Eduar Mcfadden M.D. By Elis Carrera RN    The scribes documentation has been prepared under my direction and personally reviewed by me in its entirety. I confirm that the note above accurately reflects all work, treatment, procedures, and medical decision making performed by me. Dr. Eduar Mcfadden MD    Thank you for allowing me to participate in the care of this individual.      Alvarado Carbone.  Iban Trevino M.D., [facility]

## 2023-01-20 ENCOUNTER — TELEPHONE (OUTPATIENT)
Dept: CARDIOLOGY CLINIC | Age: 83
End: 2023-01-20

## 2023-01-20 ENCOUNTER — OFFICE VISIT (OUTPATIENT)
Dept: CARDIOLOGY CLINIC | Age: 83
End: 2023-01-20

## 2023-01-20 VITALS
WEIGHT: 197 LBS | HEART RATE: 60 BPM | BODY MASS INDEX: 29.86 KG/M2 | SYSTOLIC BLOOD PRESSURE: 180 MMHG | HEIGHT: 68 IN | OXYGEN SATURATION: 99 % | DIASTOLIC BLOOD PRESSURE: 64 MMHG

## 2023-01-20 DIAGNOSIS — I47.29 NSVT (NONSUSTAINED VENTRICULAR TACHYCARDIA): Primary | ICD-10-CM

## 2023-01-20 RX ORDER — DOXAZOSIN MESYLATE 1 MG/1
1 TABLET ORAL NIGHTLY
Qty: 180 TABLET | Refills: 3 | Status: SHIPPED | OUTPATIENT
Start: 2023-01-20

## 2023-01-20 NOTE — TELEPHONE ENCOUNTER
Patient seen by Julia Arenas today. He would like to proceed with angiogram. Please call patient to schedule for next week. Hold Elquis for 2 day prior. Hold lasix the morning of. Take take all other meds the morning of with a small sip of water.

## 2023-01-23 NOTE — TELEPHONE ENCOUNTER
Spoke with patient. Patient is scheduled with Dr. Carie White for Left Heart Cath on 1/26/23 at [facility], arrival time of 6:30am to the Cath Lab. Please have patient arrive to the main entrance of [facility] and check in with the registration desk. Remind patient to be NPO after midnight (8 hours prior). Do not apply lotions/creams on skin the day of procedure. [facility], [address] fyi only.

## 2023-01-23 NOTE — TELEPHONE ENCOUNTER
Sanket    Patent w/ h/o CABG. Had abnormal stress test 9/2022 but opted med mgmt. Now having polymorphic VT. Discussed w/ EP and concern VT driven by ischemia. Patient not wants to proceed w/ cath. Had AF - Eliquis to hold x 2 days prior.     Thanks  [employer]

## 2023-01-26 ENCOUNTER — HOSPITAL ENCOUNTER (INPATIENT)
Dept: CARDIAC CATH/INVASIVE PROCEDURES | Age: 83
LOS: 6 days | Discharge: HOME | DRG: 246 | End: 2023-02-01
Attending: INTERNAL MEDICINE | Admitting: INTERNAL MEDICINE
Payer: MEDICARE

## 2023-01-26 DIAGNOSIS — K92.2 UGI BLEED: Primary | ICD-10-CM

## 2023-01-26 PROBLEM — R07.9 CHEST PAIN: Status: ACTIVE | Noted: 2023-01-26

## 2023-01-26 PROBLEM — R06.02 SHORTNESS OF BREATH: Status: ACTIVE | Noted: 2023-01-26

## 2023-01-26 PROBLEM — I25.10 CAD IN NATIVE ARTERY: Status: ACTIVE | Noted: 2023-01-26

## 2023-01-26 PROBLEM — R94.39 ABNORMAL STRESS TEST: Status: ACTIVE | Noted: 2023-01-26

## 2023-01-26 LAB
ANION GAP SERPL CALCULATED.3IONS-SCNC: 12 MMOL/L (ref 3–16)
BUN BLDV-MCNC: 33 MG/DL (ref 7–20)
CALCIUM SERPL-MCNC: 9.3 MG/DL (ref 8.3–10.6)
CHLORIDE BLD-SCNC: 105 MMOL/L (ref 99–110)
CHOLESTEROL, TOTAL: 94 MG/DL (ref 0–199)
CO2: 21 MMOL/L (ref 21–32)
CREAT SERPL-MCNC: 1.1 MG/DL (ref 0.8–1.3)
EKG ATRIAL RATE: 48 BPM
EKG ATRIAL RATE: 53 BPM
EKG DIAGNOSIS: NORMAL
EKG DIAGNOSIS: NORMAL
EKG Q-T INTERVAL: 486 MS
EKG Q-T INTERVAL: 492 MS
EKG QRS DURATION: 80 MS
EKG QRS DURATION: 84 MS
EKG QTC CALCULATION (BAZETT): 486 MS
EKG QTC CALCULATION (BAZETT): 487 MS
EKG R AXIS: -11 DEGREES
EKG R AXIS: 6 DEGREES
EKG T AXIS: 76 DEGREES
EKG T AXIS: 80 DEGREES
EKG VENTRICULAR RATE: 59 BPM
EKG VENTRICULAR RATE: 60 BPM
GFR SERPL CREATININE-BSD FRML MDRD: >60 ML/MIN/{1.73_M2}
GLUCOSE BLD-MCNC: 164 MG/DL (ref 70–99)
HCT VFR BLD CALC: 37.3 % (ref 40.5–52.5)
HDLC SERPL-MCNC: 40 MG/DL (ref 40–60)
HEMOGLOBIN: 12.5 G/DL (ref 13.5–17.5)
LDL CHOLESTEROL CALCULATED: 42 MG/DL
MCH RBC QN AUTO: 30.7 PG (ref 26–34)
MCHC RBC AUTO-ENTMCNC: 33.6 G/DL (ref 31–36)
MCV RBC AUTO: 91.1 FL (ref 80–100)
PDW BLD-RTO: 15.8 % (ref 12.4–15.4)
PLATELET # BLD: 163 K/UL (ref 135–450)
PMV BLD AUTO: 8 FL (ref 5–10.5)
POC ACT LR: 162 SEC
POC ACT LR: 189 SEC
POC ACT LR: 232 SEC
POTASSIUM REFLEX MAGNESIUM: 3.8 MMOL/L (ref 3.5–5.1)
RBC # BLD: 4.09 M/UL (ref 4.2–5.9)
SODIUM BLD-SCNC: 138 MMOL/L (ref 136–145)
TRIGL SERPL-MCNC: 60 MG/DL (ref 0–150)
VLDLC SERPL CALC-MCNC: 12 MG/DL
WBC # BLD: 7.5 K/UL (ref 4–11)

## 2023-01-26 PROCEDURE — B2111ZZ FLUOROSCOPY OF MULTIPLE CORONARY ARTERIES USING LOW OSMOLAR CONTRAST: ICD-10-PCS | Performed by: INTERNAL MEDICINE

## 2023-01-26 PROCEDURE — C1874 STENT, COATED/COV W/DEL SYS: HCPCS

## 2023-01-26 PROCEDURE — 93005 ELECTROCARDIOGRAM TRACING: CPT | Performed by: INTERNAL MEDICINE

## 2023-01-26 PROCEDURE — 80048 BASIC METABOLIC PNL TOTAL CA: CPT

## 2023-01-26 PROCEDURE — B2181ZZ FLUOROSCOPY OF LEFT INTERNAL MAMMARY BYPASS GRAFT USING LOW OSMOLAR CONTRAST: ICD-10-PCS | Performed by: INTERNAL MEDICINE

## 2023-01-26 PROCEDURE — 6360000004 HC RX CONTRAST MEDICATION

## 2023-01-26 PROCEDURE — 6360000002 HC RX W HCPCS: Performed by: INTERNAL MEDICINE

## 2023-01-26 PROCEDURE — 2500000003 HC RX 250 WO HCPCS

## 2023-01-26 PROCEDURE — C1725 CATH, TRANSLUMIN NON-LASER: HCPCS

## 2023-01-26 PROCEDURE — 93459 L HRT ART/GRFT ANGIO: CPT | Performed by: INTERNAL MEDICINE

## 2023-01-26 PROCEDURE — 85027 COMPLETE CBC AUTOMATED: CPT

## 2023-01-26 PROCEDURE — 80061 LIPID PANEL: CPT

## 2023-01-26 PROCEDURE — 2700000000 HC OXYGEN THERAPY PER DAY

## 2023-01-26 PROCEDURE — 85347 COAGULATION TIME ACTIVATED: CPT

## 2023-01-26 PROCEDURE — C9604 PERC D-E COR REVASC T CABG S: HCPCS

## 2023-01-26 PROCEDURE — 6370000000 HC RX 637 (ALT 250 FOR IP)

## 2023-01-26 PROCEDURE — C1887 CATHETER, GUIDING: HCPCS

## 2023-01-26 PROCEDURE — 92937 PRQ TRLUML REVSC CAB GRF 1: CPT | Performed by: INTERNAL MEDICINE

## 2023-01-26 PROCEDURE — 6360000002 HC RX W HCPCS

## 2023-01-26 PROCEDURE — B2151ZZ FLUOROSCOPY OF LEFT HEART USING LOW OSMOLAR CONTRAST: ICD-10-PCS | Performed by: INTERNAL MEDICINE

## 2023-01-26 PROCEDURE — 2709999900 HC NON-CHARGEABLE SUPPLY

## 2023-01-26 PROCEDURE — C1894 INTRO/SHEATH, NON-LASER: HCPCS

## 2023-01-26 PROCEDURE — 027034Z DILATION OF CORONARY ARTERY, ONE ARTERY WITH DRUG-ELUTING INTRALUMINAL DEVICE, PERCUTANEOUS APPROACH: ICD-10-PCS | Performed by: INTERNAL MEDICINE

## 2023-01-26 PROCEDURE — 2580000003 HC RX 258

## 2023-01-26 PROCEDURE — 2000000000 HC ICU R&B

## 2023-01-26 PROCEDURE — 4A023N7 MEASUREMENT OF CARDIAC SAMPLING AND PRESSURE, LEFT HEART, PERCUTANEOUS APPROACH: ICD-10-PCS | Performed by: INTERNAL MEDICINE

## 2023-01-26 PROCEDURE — 2580000003 HC RX 258: Performed by: INTERNAL MEDICINE

## 2023-01-26 PROCEDURE — 93010 ELECTROCARDIOGRAM REPORT: CPT | Performed by: INTERNAL MEDICINE

## 2023-01-26 PROCEDURE — 94761 N-INVAS EAR/PLS OXIMETRY MLT: CPT

## 2023-01-26 PROCEDURE — 6370000000 HC RX 637 (ALT 250 FOR IP): Performed by: INTERNAL MEDICINE

## 2023-01-26 PROCEDURE — 93459 L HRT ART/GRFT ANGIO: CPT

## 2023-01-26 PROCEDURE — B3121ZZ FLUOROSCOPY OF LEFT SUBCLAVIAN ARTERY USING LOW OSMOLAR CONTRAST: ICD-10-PCS | Performed by: INTERNAL MEDICINE

## 2023-01-26 PROCEDURE — C1769 GUIDE WIRE: HCPCS

## 2023-01-26 PROCEDURE — B41F1ZZ FLUOROSCOPY OF RIGHT LOWER EXTREMITY ARTERIES USING LOW OSMOLAR CONTRAST: ICD-10-PCS | Performed by: INTERNAL MEDICINE

## 2023-01-26 RX ORDER — ASPIRIN 325 MG
325 TABLET ORAL ONCE
Status: DISCONTINUED | OUTPATIENT
Start: 2023-01-26 | End: 2023-01-27

## 2023-01-26 RX ORDER — EPTIFIBATIDE 0.75 MG/ML
2 INJECTION, SOLUTION INTRAVENOUS CONTINUOUS
Status: DISCONTINUED | OUTPATIENT
Start: 2023-01-26 | End: 2023-01-27

## 2023-01-26 RX ORDER — SODIUM CHLORIDE 0.9 % (FLUSH) 0.9 %
5-40 SYRINGE (ML) INJECTION PRN
Status: DISCONTINUED | OUTPATIENT
Start: 2023-01-26 | End: 2023-01-27 | Stop reason: SDUPTHER

## 2023-01-26 RX ORDER — SPIRONOLACTONE 25 MG/1
25 TABLET ORAL DAILY
Status: DISCONTINUED | OUTPATIENT
Start: 2023-01-27 | End: 2023-02-01 | Stop reason: HOSPADM

## 2023-01-26 RX ORDER — MIDAZOLAM HYDROCHLORIDE 1 MG/ML
INJECTION INTRAMUSCULAR; INTRAVENOUS
Status: COMPLETED | OUTPATIENT
Start: 2023-01-26 | End: 2023-01-26

## 2023-01-26 RX ORDER — SODIUM CHLORIDE 9 MG/ML
INJECTION, SOLUTION INTRAVENOUS PRN
Status: DISCONTINUED | OUTPATIENT
Start: 2023-01-26 | End: 2023-02-01 | Stop reason: HOSPADM

## 2023-01-26 RX ORDER — SODIUM CHLORIDE 0.9 % (FLUSH) 0.9 %
5-40 SYRINGE (ML) INJECTION EVERY 12 HOURS SCHEDULED
Status: DISCONTINUED | OUTPATIENT
Start: 2023-01-26 | End: 2023-01-27 | Stop reason: SDUPTHER

## 2023-01-26 RX ORDER — LORAZEPAM 0.5 MG/1
0.5 TABLET ORAL
Status: COMPLETED | OUTPATIENT
Start: 2023-01-26 | End: 2023-01-26

## 2023-01-26 RX ORDER — HEPARIN SODIUM 1000 [USP'U]/ML
INJECTION, SOLUTION INTRAVENOUS; SUBCUTANEOUS
Status: COMPLETED | OUTPATIENT
Start: 2023-01-26 | End: 2023-01-26

## 2023-01-26 RX ORDER — LOSARTAN POTASSIUM 100 MG/1
100 TABLET ORAL DAILY
Status: DISCONTINUED | OUTPATIENT
Start: 2023-01-27 | End: 2023-02-01 | Stop reason: HOSPADM

## 2023-01-26 RX ORDER — DOXAZOSIN 2 MG/1
1 TABLET ORAL 2 TIMES DAILY
Status: DISCONTINUED | OUTPATIENT
Start: 2023-01-26 | End: 2023-02-01 | Stop reason: HOSPADM

## 2023-01-26 RX ORDER — ACETAMINOPHEN 325 MG/1
650 TABLET ORAL EVERY 4 HOURS PRN
Status: DISCONTINUED | OUTPATIENT
Start: 2023-01-26 | End: 2023-02-01 | Stop reason: HOSPADM

## 2023-01-26 RX ORDER — ATORVASTATIN CALCIUM 40 MG/1
40 TABLET, FILM COATED ORAL NIGHTLY
Status: DISCONTINUED | OUTPATIENT
Start: 2023-01-26 | End: 2023-02-01 | Stop reason: HOSPADM

## 2023-01-26 RX ORDER — AMLODIPINE BESYLATE 5 MG/1
10 TABLET ORAL DAILY
Status: DISCONTINUED | OUTPATIENT
Start: 2023-01-27 | End: 2023-01-27

## 2023-01-26 RX ORDER — SODIUM CHLORIDE 0.9 % (FLUSH) 0.9 %
5-40 SYRINGE (ML) INJECTION PRN
Status: DISCONTINUED | OUTPATIENT
Start: 2023-01-26 | End: 2023-02-01 | Stop reason: HOSPADM

## 2023-01-26 RX ORDER — SODIUM CHLORIDE 9 MG/ML
INJECTION, SOLUTION INTRAVENOUS PRN
Status: DISCONTINUED | OUTPATIENT
Start: 2023-01-26 | End: 2023-01-27 | Stop reason: SDUPTHER

## 2023-01-26 RX ORDER — FUROSEMIDE 40 MG/1
40 TABLET ORAL DAILY
Status: DISCONTINUED | OUTPATIENT
Start: 2023-01-27 | End: 2023-01-27

## 2023-01-26 RX ORDER — SODIUM CHLORIDE 9 MG/ML
INJECTION, SOLUTION INTRAVENOUS CONTINUOUS
Status: ACTIVE | OUTPATIENT
Start: 2023-01-26 | End: 2023-01-26

## 2023-01-26 RX ORDER — ONDANSETRON 2 MG/ML
4 INJECTION INTRAMUSCULAR; INTRAVENOUS EVERY 6 HOURS PRN
Status: DISCONTINUED | OUTPATIENT
Start: 2023-01-26 | End: 2023-02-01 | Stop reason: HOSPADM

## 2023-01-26 RX ORDER — FENTANYL CITRATE 50 UG/ML
INJECTION, SOLUTION INTRAMUSCULAR; INTRAVENOUS
Status: COMPLETED | OUTPATIENT
Start: 2023-01-26 | End: 2023-01-26

## 2023-01-26 RX ORDER — CLOPIDOGREL BISULFATE 75 MG/1
75 TABLET ORAL DAILY
Status: DISCONTINUED | OUTPATIENT
Start: 2023-01-27 | End: 2023-02-01 | Stop reason: HOSPADM

## 2023-01-26 RX ORDER — SODIUM CHLORIDE 0.9 % (FLUSH) 0.9 %
5-40 SYRINGE (ML) INJECTION EVERY 12 HOURS SCHEDULED
Status: DISCONTINUED | OUTPATIENT
Start: 2023-01-26 | End: 2023-02-01 | Stop reason: HOSPADM

## 2023-01-26 RX ORDER — ATROPINE SULFATE 0.1 MG/ML
INJECTION INTRAVENOUS
Status: COMPLETED | OUTPATIENT
Start: 2023-01-26 | End: 2023-01-26

## 2023-01-26 RX ORDER — ASPIRIN 81 MG/1
81 TABLET, CHEWABLE ORAL DAILY
Status: DISCONTINUED | OUTPATIENT
Start: 2023-01-27 | End: 2023-02-01 | Stop reason: HOSPADM

## 2023-01-26 RX ORDER — CLOPIDOGREL 300 MG/1
TABLET, FILM COATED ORAL
Status: COMPLETED | OUTPATIENT
Start: 2023-01-26 | End: 2023-01-26

## 2023-01-26 RX ORDER — NITROGLYCERIN 20 MG/100ML
5-200 INJECTION INTRAVENOUS CONTINUOUS
Status: DISCONTINUED | OUTPATIENT
Start: 2023-01-26 | End: 2023-01-27

## 2023-01-26 RX ADMIN — FENTANYL CITRATE 50 MCG: 50 INJECTION, SOLUTION INTRAMUSCULAR; INTRAVENOUS at 11:32

## 2023-01-26 RX ADMIN — MIDAZOLAM HYDROCHLORIDE 1 MG: 1 INJECTION INTRAMUSCULAR; INTRAVENOUS at 09:43

## 2023-01-26 RX ADMIN — MIDAZOLAM HYDROCHLORIDE 1 MG: 1 INJECTION INTRAMUSCULAR; INTRAVENOUS at 09:51

## 2023-01-26 RX ADMIN — Medication 10 ML: at 20:25

## 2023-01-26 RX ADMIN — NITROGLYCERIN 10 MCG/MIN: 20 INJECTION INTRAVENOUS at 11:59

## 2023-01-26 RX ADMIN — FENTANYL CITRATE 25 MCG: 50 INJECTION, SOLUTION INTRAMUSCULAR; INTRAVENOUS at 10:39

## 2023-01-26 RX ADMIN — EPTIFIBATIDE 2 MCG/KG/MIN: 0.75 INJECTION INTRAVENOUS at 21:25

## 2023-01-26 RX ADMIN — MIDAZOLAM HYDROCHLORIDE 1 MG: 1 INJECTION INTRAMUSCULAR; INTRAVENOUS at 11:10

## 2023-01-26 RX ADMIN — FENTANYL CITRATE 25 MCG: 50 INJECTION, SOLUTION INTRAMUSCULAR; INTRAVENOUS at 09:43

## 2023-01-26 RX ADMIN — LORAZEPAM 0.5 MG: 0.5 TABLET ORAL at 07:22

## 2023-01-26 RX ADMIN — ATROPINE SULFATE 1 MG: 0.1 INJECTION INTRAVENOUS at 11:31

## 2023-01-26 RX ADMIN — MIDAZOLAM HYDROCHLORIDE 1 MG: 1 INJECTION INTRAMUSCULAR; INTRAVENOUS at 10:39

## 2023-01-26 RX ADMIN — MIDAZOLAM HYDROCHLORIDE 1 MG: 1 INJECTION INTRAMUSCULAR; INTRAVENOUS at 10:08

## 2023-01-26 RX ADMIN — ATORVASTATIN CALCIUM 40 MG: 40 TABLET, FILM COATED ORAL at 20:24

## 2023-01-26 RX ADMIN — EPTIFIBATIDE 2 MCG/KG/MIN: 0.75 INJECTION INTRAVENOUS at 11:48

## 2023-01-26 RX ADMIN — HEPARIN SODIUM 3000 UNITS: 1000 INJECTION, SOLUTION INTRAVENOUS; SUBCUTANEOUS at 11:10

## 2023-01-26 RX ADMIN — CLOPIDOGREL 600 MG: 300 TABLET, FILM COATED ORAL at 11:11

## 2023-01-26 RX ADMIN — HEPARIN SODIUM 8000 UNITS: 1000 INJECTION, SOLUTION INTRAVENOUS; SUBCUTANEOUS at 10:58

## 2023-01-26 RX ADMIN — SODIUM CHLORIDE: 9 INJECTION, SOLUTION INTRAVENOUS at 14:20

## 2023-01-26 RX ADMIN — EPTIFIBATIDE 2 MCG/KG/MIN: 0.75 INJECTION INTRAVENOUS at 14:20

## 2023-01-26 RX ADMIN — FENTANYL CITRATE 25 MCG: 50 INJECTION, SOLUTION INTRAMUSCULAR; INTRAVENOUS at 10:08

## 2023-01-26 NOTE — PROCEDURES
CARDIAC CATHETERIZATION REPORT    Date of Procedure: 1/26/2023  : Adri Noble DO  Primary Indication: Chest pain, shortness of breath, abnormal nuclear SPECT stress test, NSVT, CAD with remote CABG    Procedures Performed:  1. Coronary angiography  2. Left heart catheterization  3. Left subclavian angiography  4. LIMA angiography  5. Saphenous vein graft angiography  6. PCI of distal SVG to OM1 with sequential to OM2  7. Right femoral angiography  8. Moderate conscious sedation    Procedural Details:  Access: Local anesthetic was given and access was obtained in the right femoral artery using a micropuncture technique and ultrasound guidance and a 6F sheath was placed without difficulty. Diagnostic: 5F JR4 and 5F JL4 catheters were used to perform selective right and left coronary angiography, respectively. The 5F JR4 catheter was used to engage the SVG to RCA and to perform left subclavian angiography. A 5F LCB catheter was used to engage the SVG to OM1 and OM2. Both the 5F JR4 catheter and a 5F AJIT catheter were used to perform LIMA angiography. The 5F JR4 catheter was used to perform the left heart catheterization. No significant gradient was observed on pull-back of the catheter across the aortic valve. Intervention: Therapeutic ACT was achieved with the administration of IV heparin. Using an AL1 guide catheter, a 0.014 Prow[place] wire was advanced across the distal saphenous vein graft lesion and into the distal OM2. The vein graft was pretreated with IC adenosine and IC nipride and then the distal vein graft lesion was pre-dilated with a 3.0 x 15 mm balloon. After this, there was very sluggish flow in the vein graft and OM2. Copious intracoronary vasodilators were given and the lesion was then stented with an [place] 3.5 x 22 mm AMBER. Following this, there remained poor flow in the vein graft and into the native OM2.   Additional IC nitroglycerin, IC nicardipine, IC adenosine, and IC nipride were given which ultimately resulted in JAYLA flow throughout the vein graft and proximal and mid-portions of the OM2. The very distal OM2 remained occluded with debris where the vessel measured <2 mm in diameter  The patient was then given a double bolus of IV Integrilin and started on an IV Integrilin infusion and additional IC vasodilators were given artery. Final angiography showed <10% residual stenosis with JAYLA 3 flow throughout the vein graft and all but the very distal small caliber segment of the native OM2 which still had no flow, but overall flow through the distal segment appeared to be gradually improving. The SVG appears to have initially been a jump graft from the OM1 to OM2. The OM1 appeared to be occluded, was felt to be much smaller than the OM2, and was overall not an adequate target for percutaneous coronary intervention. Therefore, the decision was made to proceed with stenting across the anastomosis with the OM1. Hemostasis: At the end of the pr to procedure, the right femoral arterial sheath was sutured in placed and will be removed once the ACT is less than 180 seconds. Findings:  Hemodynamics:     A. Opening arterial pressure: 118/40 (59) mmHg      B. LVEDP: 20 mmHg    2. Coronary anatomy:  A. Left main artery: The left main artery trifurcates into the left anterior descending artery, ramus intermedius artery, and left circumflex artery. The left main artery is mildly calcified with a 20% ostial stenosis and 10% distal stenosis. B. Left anterior descending artery: The LAD is calcified and has a 30% ostial stenosis, 50% proximal stenosis, and 100% mid-vessel stenosis immediately after the bifurcation of the first septal . The occlusion appears to be at the proximal edge of a previously placed stent. C. Ramus intermedius artery: Small to medium caliber vessel with 90% ostial stenosis.   D. Left circumflex artery: The LCx is calcified and has a 100% ostial stenosis. E. Right coronary artery: Dominant vessel. The RCA is calcified with a 50% proximal stenosis and 100% mid-vessel stenosis. 3. Bypass graft anatomy:  A. Left subclavian artery: Patent with midl disease. B. LIMA to LAD: The LIMA is widely patent. There is a 70-80% stenosis in the distal LAD beyond the anastomosis. C. SVG to OM1 with sequential to OM2: The vein graft has a 30% proximal stenosis and 90% distal stenosis near what was previously likely the anastomosis of the OM1. The OM1 appears to have been a smaller vessel than the OM2 and is now occluded. The OM2 has a 30% stenosis beyond its anastomosis with the vein graft. D. SVG to distal RCA: The vein graft has a 30% ostial/proximal stenosis. The native distal RCA has a 50% stenosis after the anastomosis. There RPDA has a 30% ostial stenosis. The RPLB has a 30% proximal stenosis and 30% mid-vessel stenosis. Results of Intervention (PCI of distal SVG to OM1 with sequential to OM2):  Pre - 90% stenosis. JAYLA 3 flow. Post - <10% stenosis. There was initially poor re-flow following PTCA and stenting of the distal SVG. Copious IC vasodilators were given and the patient was started on IV Integrilin. Final angiography showed <10% residual stenosis with JAYLA 3 flow throughout the vein graft and all but the very distal small caliber segment of the native OM2 which still had no flow, but overall flow through the distal segment appeared to be gradually improving. Technical Factors:  Complications:  None.   Estimated blood loss: Minimal.  Radiation:  Air kerma 1,864 mGy and 31.7 minutes of fluoroscopy  Sedation: Moderate conscious sedation was administered by qualified nursing personnel under continuous hemodynamic monitoring, starting at 9:46 AM and ending at 11:55 AM.  Medications: 5 mg IV Versed, 125 mcg IV Fentanyl, 600 mg PO Plavix, 11,000 units IV heparin, double bolus IV Integrilin followed by infusion, 600 mcg IC nipride, 400 mcg IC nicardipine, 800 mcg IC adenosine, 400 mcg IC nitroglycerin, 100 mcg IV phenylephrine, 1 mg IV atropine  Contrast: 220 cc of Isovue     Impression:  1. Severe native multivessel coronary artery disease with  of native mid-LAD,  of ostial LCx, and  of mid-RCA. 2. Patent LIMA to LAD. There is a severe stenosis in the distal LAD after the anastomosis. 3. Severely disease SVG to OM1 with sequential to OM2. The vein graft had a 90% distal stenosis near what was previously likely the anastomosis of the OM1. The OM1 appears to have been a smaller vessel than the OM2 and is now occluded. 4. Patent SVG to distal RCA. 5. PCI was performed to the distal SVG to OM1 with sequential to OM2 with an Stanley [place] 3.5 x 22 mm AMBER. There was initially poor re-flow following PTCA and stenting of the distal SVG. Copious IC vasodilators were given and the patient was started on IV Integrilin. Final angiography showed <10% residual stenosis with JAYLA 3 flow throughout the vein graft and all but the very distal small caliber segment of the native OM2 which still had no flow, but overall flow through the distal segment appeared to be gradually improving. The OM1 appeared to be occluded, was felt to be much smaller than the OM2, and was overall not an adequate target for percutaneous coronary intervention. Therefore, the decision was made to proceed with stenting across the anastomosis with the OM1.  6. LVEDP 20 mmHg. Plan:  1. Monitor overnight. 2. Continue IV Integrilin infusion x12 hours. 3. Start IV nitroglycerin infusion and titrate as needed for angina. 4. Plavix 75 mg daily for at least one year. 5. Continue aspirin 81 mg daily. 6. Wait to resume Eliquis until Integrilin infusion is completed. Would then plan for triple therapy for one month. After one month, aspirin can be stopped and the patient should continue on Plavix and Eliquis to complete at least one year of Plavix therapy.   If Plavix is discontinued after one year, aspirin 81 mg daily should be resumed indefinitely. 7. Resume atorvastatin 40 mg qHS, amlodipine 10 mg daily, losartan 100 mg daily, spironolactone 25 mg daily, and lasix 40 mg daily. 8. Hold off on beta-blocker in setting of bradycardia. 9. Will arrange for staged PCI of native distal LAD through LIMA in 2 weeks. There was difficulty engaging the LIMA from the femoral artery approach and may consider performing PCI from left radial artery approach. Can plan to re-evaluate SVG stent and flow and OM2 at that time.       Funmi Joshi, [facility] [facility]

## 2023-01-26 NOTE — DISCHARGE INSTRUCTIONS
FOLLOW-UP APPOINTMENTS    Follow-up appointment on 2/7 at 11:30 AM with Mona CLAY. [address]: [phone]. If you are unable to make this appointment, please call to reschedule [phone]. Please arrive 15 minutes early to complete necessary paperwork. Directions to [place] [address] towards [place]. [address] [facility] exit. Right off exit. Cross over [employer]. Right on [address] Left into hospital. Follow the signs to the emergency room ( turn left toward the Emergency room). Go right at the first stop sign. Just past the Emergency room at the second stop sign turn right and go up the ramp and park on the top level if possible. Go in the glass doors of the [facility] we on the top level of the garage [address]. As soon as you get in the door turn left and our office is the one with the glass doors. FOLLOW-UP APPOINTMENTS    [place] OFFICE - Keep follow-up appointment on February 17th at 8:15am with Dr. Xenia Perez, [facility]. You and your one visitor will need to have your mouth and nose covered with a mask. No children please. [address] office. [address] [facility], [address] [place] [address], [facility], [facility]. Office #: [phone]. If you are unable to make this appointment, please call to reschedule. CBC in 1 week      Mediterranean diet. Limit sodium to 2000 mg daily and fluids to 60-64 ounces daily      Post Angiogram/ Intervention Discharge Instructions    Activity:  You may drive in [address] unless instructed by your doctor   Resume normal activity in one week  Avoid lifting, pushing, or pulling more than 10 lbs. For one week. (A gallon of milk is 7 lbs)  Limit stair climbing to once a day for two weeks. You may walk at an easy pace on ground level. Plan rest periods during the day. Avoid tension and stress. Learn to manage your stress.   Avoid work that increases muscle tension.        (straining with bowel movements, moving furniture)    Wound Care: You may shower, but no bathtubs, pools, or hot tubs for one week. Inspect area daily. Normal observations:  Soreness and tenderness that may last for one week, mild pink to red oozing from incision site for up to 24 hrs after discharge,    bruising that could last up to two weeks. Clean with soap and water. NO lotion or powder. Dry area thoroughly. Apply band    aide for 48 hrs. Nutrition:   Low fat, low salt diet (guidelines for Heart Healthy eating)  Limit caffeine to 1-2 cups per day (coffee, tea, chocolate, soda)  Eat high fiber to avoid constipation and straining during bowel movements (fresh veggies and fruit, whole grain)  Limit alcohol to two servings a day ( 8 oz beer, 1 oz liquor, 4 oz wine)    Depression: It is not unusual to have feelings of anxiety, fear, or depression after your procedure. If you need help with these feelings, call your primary care physician. There are medications to help you and healing usually occurs sooner if you are not depressed. Call your Doctor if you have:   Increased shortness of breath, weakness, or increased fatigue  A fast or a slow heartbeat  Problems or questions with your medications  Bleeding that is not stopped after holding pressure for 10 min  Feeling lightheaded or dizzy  Increased swelling or bruising of the groin or leg  Unusual pain, numbness or tingling at the groin or down the leg  Any signs of infection ( redness, yellow drainage, swelling, pain, fever)  Unusual swelling of lower legs/feet, chest discomfort, unusual weakness or fatigue

## 2023-01-26 NOTE — H&P
Brief Pre-Op Note/Sedation Assessment      Nader Mancilla  1940  [id number]  12:51 PM    Planned Procedure: Cardiac Catheterization Procedure  Post Procedure Plan: Return to same level of care  Consent: I have discussed with the patient and/or the patient representative the indication, alternatives, and the possible risks and/or complications of the planned procedure and the anesthesia methods. The patient and/or patient representative appear to understand and agree to proceed. Chief Complaint:   Chest pain, shortness of breath, abnormal nuclear SPECT stress test, NSVT, CAD with remote CABG    Indications for Cath Procedure:  Presentation:  Worsening Angina, Suspected CAD, and Cardiac Arrythmia  2. Anginal Classification within 2 weeks:  CCS III - Symptoms with everyday living activities, i.e., moderate limitation  3. Angina Symptoms Assessment:  Atypical Chest Pain  4. Heart Failure Class within last 2 weeks:  No symptoms  5. Cardiovascular Instability:  No    Prior Ischemic Workup/Eval:  Pre-Procedural Medications: Yes: ACE/ARB/ARNI, Aspirin, Ca Channel Blockers, and STATIN  2. Stress Test Completed? Yes:  Stress or Imaging Studies Performed (within ANY time period):   Type:  Stress Nuclear  Results:  Positive:  Myocardial Perfusion Defects (Nuclear) Extent of Ischemia:  Intermediate    Does Patient need surgery? Cath Valve Surgery:  No    Pre-Procedure Medical History:  Vital Signs:  BP (!) 92/53   Pulse 59   Ht 5' 9\" (1.753 m)   Wt 198 lb 8 oz (90 kg)   BMI 29.31 kg/m²     Allergies:     Allergies   Allergen Reactions    Coumadin [Warfarin] Other (See Comments)     Makes feel funny    Omeprazole Other (See Comments)     headaches    Vioxx      Medications:    Current Facility-Administered Medications   Medication Dose Route Frequency Provider Last Rate Last Admin    sodium chloride flush 0.9 % injection 5-40 mL  5-40 mL IntraVENous 2 times per day Sanket Galeana, DO        sodium chloride flush 0.9 % injection 5-40 mL  5-40 mL IntraVENous PRN Sanket Haddox, DO        0.9 % sodium chloride infusion   IntraVENous PRN Sanket Haddox, DO        aspirin tablet 325 mg  325 mg Oral Once Sanket Haddox, DO        ondansetron (ZOFRAN) injection 4 mg  4 mg IntraVENous Q6H PRN Sanket Haddox, DO        eptifibatide (INTEGRILIN) 0.75 mg/mL infusion  2 mcg/kg/min IntraVENous Continuous Sanket Haddox, DO 14.4 mL/hr at 01/26/23 1148 2 mcg/kg/min at 01/26/23 1148    nitroGLYCERIN 50 mg in dextrose 5% 250 mL infusion  5-200 mcg/min IntraVENous Continuous Sanket Haddox, DO 3 mL/hr at 01/26/23 1159 10 mcg/min at 01/26/23 1159    [START ON 1/27/2023] clopidogrel (PLAVIX) tablet 75 mg  75 mg Oral Daily Sanket Haddox, DO        [START ON 1/27/2023] aspirin chewable tablet 81 mg  81 mg Oral Daily Sanket Haddox, DO        0.9 % sodium chloride infusion   IntraVENous Continuous Sanket Haddox, DO           Past Medical History:    Past Medical History:   Diagnosis Date    Aortic aneurysm, abdominal 1/2011    Appendicitis 12-06-11    Arthritis 12-06-11    Bruises easily     CAD (coronary artery disease) 9/2001    MI     CHF (congestive heart failure) (HCC)     Following CABG    Chronic back pain     Chronic pain of both shoulders 12-06-11    joint - the shoulders hurt    Complication of anesthesia     woke up during appendectomy    Gout     Heart disease 12-06-11    Hernia 12-    HIGH CHOLESTEROL 12-6-11    Hyperlipidemia     Hypertension     Measles 12-    Pancreatitis 2006    History of     Persistent cough     Ringing in ears     Sinus disorder     Sleep deprivation 12-    loss of sleep       Surgical History:    Past Surgical History:   Procedure Laterality Date    ABDOMINAL AORTIC ANEURYSM REPAIR      ABDOMINAL AORTIC ANEURYSM REPAIR, ENDOVASCULAR  1/28/2011    Endomvascular abdominal aortic aneurysm repair with insertion of cardiomems pressure sensor    APPENDECTOMY      40-50 years ago    [address] [place] [address] CABG    CHOLECYSTECTOMY, LAPAROSCOPIC  2006    CORONARY ANGIOPLASTY WITH STENT PLACEMENT  2001 and 2002    CORONARY ARTERY BYPASS GRAFT      GALLBLADDER SURGERY      gallbladder/pancreatitis    HERNIA REPAIR      20+ years ago    INTRACAPSULAR CATARACT EXTRACTION Right 12/6/2018    PHACOEMULSIFICATION OF CATARACT RIGHT  EYE WITH INTRAOCULAR LENS IMPLANT performed by Ninoska Mota MD at [address]      panceastitis Karyn Treadwell    WI XCAPSL CTRC RMVL INSJ IO LENS PROSTH W/O ECP Left 11/13/2018    PHACOEMULSIFICATION OF CATARACT LEFT EYE WITH INTRAOCULAR LENS IMPLANT performed by Ninoska Mota MD at [facility]      as child             Pre-Sedation:  Pre-Sedation Documentation and Exam:  I have assessed the patient and reviewed the H&P on the chart. Prior History of Anesthesia Complications:   none    Modified Mallampati:  II (soft palate, uvula, fauces visible)    ASA Classification:  Class 3 - A patient with severe systemic disease that limits activity but is not incapacitating    Francisca Scale: Activity:  2 - Able to move 4 extremities voluntarily on command  Respiration:  2 - Able to breathe deeply and cough freely  Circulation:  2 - BP+/- 20mmHg of normal  Consciousness:  2 - Fully awake  Oxygen Saturation (color):  2 - Able to maintain oxygen saturation >92% on room air    Sedation/Anesthesia Plan:  Guard the patient's safety and welfare. Minimize physical discomfort and pain. Minimize negative psychological responses to treatment by providing sedation and analgesia and maximize the potential amnesia. Patient to meet pre-procedure discharge plan.     Medication Planned:  midazolam intravenously and fentanyl intravenously    Patient is an appropriate candidate for plan of sedation:   yes      Electronically signed by Tony Galeana DO on 1/26/2023 at 12:51 PM

## 2023-01-26 NOTE — PROGRESS NOTES
1400 Received via stretcher from cath lab by cath lab staff. Vitals done, teaching done concerning laying still and keep head on pillow, right fem sheath remains in place, right groin site soft, no bleeding or bruising. Pulses to RLE weak and palpatable. IV fluids NS at kvo, Nitro gtt at 10 mcg, Eptifibatide at 2 mcg, IV site to right ac, slight old bloody drainage noted. Skin intact.   Rolly Merlin, RN

## 2023-01-26 NOTE — PROGRESS NOTES
Repeat ACT - 232. Will repeat in 2 hours. Oxygen at 6L d/t SA & sat running 85- 87%  Orders reviewed.  Radha Mccracken RN

## 2023-01-26 NOTE — PLAN OF CARE
Problem: Chronic Conditions and Co-morbidities  Goal: Patient's chronic conditions and co-morbidity symptoms are monitored and maintained or improved  Outcome: Progressing   Sheath to right groin, post cath lab procedure  Problem: Discharge Planning  Goal: Discharge to home or other facility with appropriate resources  Outcome: Progressing  Home when discharged     Problem: Skin/Tissue Integrity  Goal: Absence of new skin breakdown  Description: 1. Monitor for areas of redness and/or skin breakdown  2. Assess vascular access sites hourly  3. Every 4-6 hours minimum:  Change oxygen saturation probe site  4. Every 4-6 hours:  If on nasal continuous positive airway pressure, respiratory therapy assess nares and determine need for appliance change or resting period.   Outcome: Progressing   Skin intact

## 2023-01-27 ENCOUNTER — APPOINTMENT (OUTPATIENT)
Dept: CT IMAGING | Age: 83
DRG: 246 | End: 2023-01-27
Attending: INTERNAL MEDICINE
Payer: MEDICARE

## 2023-01-27 ENCOUNTER — APPOINTMENT (OUTPATIENT)
Dept: GENERAL RADIOLOGY | Age: 83
DRG: 246 | End: 2023-01-27
Attending: INTERNAL MEDICINE
Payer: MEDICARE

## 2023-01-27 ENCOUNTER — ANESTHESIA (OUTPATIENT)
Dept: ENDOSCOPY | Age: 83
End: 2023-01-27
Payer: MEDICARE

## 2023-01-27 ENCOUNTER — TELEPHONE (OUTPATIENT)
Dept: CARDIOLOGY CLINIC | Age: 83
End: 2023-01-27

## 2023-01-27 ENCOUNTER — ANESTHESIA EVENT (OUTPATIENT)
Dept: ENDOSCOPY | Age: 83
End: 2023-01-27
Payer: MEDICARE

## 2023-01-27 LAB
ANION GAP SERPL CALCULATED.3IONS-SCNC: 12 MMOL/L (ref 3–16)
BUN BLDV-MCNC: 34 MG/DL (ref 7–20)
CALCIUM SERPL-MCNC: 9 MG/DL (ref 8.3–10.6)
CHLORIDE BLD-SCNC: 107 MMOL/L (ref 99–110)
CHOLESTEROL, TOTAL: 89 MG/DL (ref 0–199)
CO2: 22 MMOL/L (ref 21–32)
CREAT SERPL-MCNC: 1.1 MG/DL (ref 0.8–1.3)
GFR SERPL CREATININE-BSD FRML MDRD: >60 ML/MIN/{1.73_M2}
GLUCOSE BLD-MCNC: 186 MG/DL (ref 70–99)
HCT VFR BLD CALC: 34.2 % (ref 40.5–52.5)
HCT VFR BLD CALC: 36.1 % (ref 40.5–52.5)
HDLC SERPL-MCNC: 36 MG/DL (ref 40–60)
HEMOGLOBIN: 11.4 G/DL (ref 13.5–17.5)
HEMOGLOBIN: 11.6 G/DL (ref 13.5–17.5)
LDL CHOLESTEROL CALCULATED: 37 MG/DL
LV EF: 48 %
LVEF MODALITY: NORMAL
MCH RBC QN AUTO: 29.7 PG (ref 26–34)
MCH RBC QN AUTO: 31 PG (ref 26–34)
MCHC RBC AUTO-ENTMCNC: 32.1 G/DL (ref 31–36)
MCHC RBC AUTO-ENTMCNC: 33.4 G/DL (ref 31–36)
MCV RBC AUTO: 92.5 FL (ref 80–100)
MCV RBC AUTO: 92.5 FL (ref 80–100)
OCCULT BLOOD SCREENING: ABNORMAL
PDW BLD-RTO: 15.4 % (ref 12.4–15.4)
PDW BLD-RTO: 15.6 % (ref 12.4–15.4)
PLATELET # BLD: 154 K/UL (ref 135–450)
PLATELET # BLD: 165 K/UL (ref 135–450)
PMV BLD AUTO: 8.1 FL (ref 5–10.5)
PMV BLD AUTO: 8.3 FL (ref 5–10.5)
POTASSIUM SERPL-SCNC: 4.3 MMOL/L (ref 3.5–5.1)
PROCALCITONIN: 0.2 NG/ML (ref 0–0.15)
RBC # BLD: 3.69 M/UL (ref 4.2–5.9)
RBC # BLD: 3.9 M/UL (ref 4.2–5.9)
SODIUM BLD-SCNC: 141 MMOL/L (ref 136–145)
TRIGL SERPL-MCNC: 79 MG/DL (ref 0–150)
VLDLC SERPL CALC-MCNC: 16 MG/DL
WBC # BLD: 13.1 K/UL (ref 4–11)
WBC # BLD: 14.5 K/UL (ref 4–11)

## 2023-01-27 PROCEDURE — C9113 INJ PANTOPRAZOLE SODIUM, VIA: HCPCS | Performed by: NURSE PRACTITIONER

## 2023-01-27 PROCEDURE — 3700000000 HC ANESTHESIA ATTENDED CARE: Performed by: INTERNAL MEDICINE

## 2023-01-27 PROCEDURE — 2000000000 HC ICU R&B

## 2023-01-27 PROCEDURE — 71250 CT THORAX DX C-: CPT

## 2023-01-27 PROCEDURE — C9113 INJ PANTOPRAZOLE SODIUM, VIA: HCPCS | Performed by: INTERNAL MEDICINE

## 2023-01-27 PROCEDURE — 6360000002 HC RX W HCPCS: Performed by: NURSE PRACTITIONER

## 2023-01-27 PROCEDURE — 2709999900 HC NON-CHARGEABLE SUPPLY: Performed by: INTERNAL MEDICINE

## 2023-01-27 PROCEDURE — 6370000000 HC RX 637 (ALT 250 FOR IP): Performed by: INTERNAL MEDICINE

## 2023-01-27 PROCEDURE — 6360000002 HC RX W HCPCS: Performed by: NURSE ANESTHETIST, CERTIFIED REGISTERED

## 2023-01-27 PROCEDURE — 2580000003 HC RX 258: Performed by: NURSE ANESTHETIST, CERTIFIED REGISTERED

## 2023-01-27 PROCEDURE — 36415 COLL VENOUS BLD VENIPUNCTURE: CPT

## 2023-01-27 PROCEDURE — 84145 PROCALCITONIN (PCT): CPT

## 2023-01-27 PROCEDURE — 82270 OCCULT BLOOD FECES: CPT

## 2023-01-27 PROCEDURE — 99233 SBSQ HOSP IP/OBS HIGH 50: CPT | Performed by: INTERNAL MEDICINE

## 2023-01-27 PROCEDURE — 80048 BASIC METABOLIC PNL TOTAL CA: CPT

## 2023-01-27 PROCEDURE — 0DJ08ZZ INSPECTION OF UPPER INTESTINAL TRACT, VIA NATURAL OR ARTIFICIAL OPENING ENDOSCOPIC: ICD-10-PCS | Performed by: INTERNAL MEDICINE

## 2023-01-27 PROCEDURE — 71045 X-RAY EXAM CHEST 1 VIEW: CPT

## 2023-01-27 PROCEDURE — 80061 LIPID PANEL: CPT

## 2023-01-27 PROCEDURE — 85027 COMPLETE CBC AUTOMATED: CPT

## 2023-01-27 PROCEDURE — 2580000003 HC RX 258: Performed by: INTERNAL MEDICINE

## 2023-01-27 PROCEDURE — 93306 TTE W/DOPPLER COMPLETE: CPT

## 2023-01-27 PROCEDURE — 3700000001 HC ADD 15 MINUTES (ANESTHESIA): Performed by: INTERNAL MEDICINE

## 2023-01-27 PROCEDURE — 3609017100 HC EGD: Performed by: INTERNAL MEDICINE

## 2023-01-27 PROCEDURE — 94660 CPAP INITIATION&MGMT: CPT

## 2023-01-27 PROCEDURE — 6360000002 HC RX W HCPCS: Performed by: INTERNAL MEDICINE

## 2023-01-27 PROCEDURE — 2500000003 HC RX 250 WO HCPCS: Performed by: NURSE ANESTHETIST, CERTIFIED REGISTERED

## 2023-01-27 PROCEDURE — 94761 N-INVAS EAR/PLS OXIMETRY MLT: CPT

## 2023-01-27 PROCEDURE — 2580000003 HC RX 258: Performed by: NURSE PRACTITIONER

## 2023-01-27 PROCEDURE — 2700000000 HC OXYGEN THERAPY PER DAY

## 2023-01-27 RX ORDER — LIDOCAINE HYDROCHLORIDE 20 MG/ML
INJECTION, SOLUTION INFILTRATION; PERINEURAL PRN
Status: DISCONTINUED | OUTPATIENT
Start: 2023-01-27 | End: 2023-01-27 | Stop reason: SDUPTHER

## 2023-01-27 RX ORDER — FUROSEMIDE 10 MG/ML
40 INJECTION INTRAMUSCULAR; INTRAVENOUS 2 TIMES DAILY
Status: COMPLETED | OUTPATIENT
Start: 2023-01-27 | End: 2023-01-27

## 2023-01-27 RX ORDER — PANTOPRAZOLE SODIUM 40 MG/10ML
40 INJECTION, POWDER, LYOPHILIZED, FOR SOLUTION INTRAVENOUS 2 TIMES DAILY
Status: DISCONTINUED | OUTPATIENT
Start: 2023-01-27 | End: 2023-01-27

## 2023-01-27 RX ORDER — FUROSEMIDE 10 MG/ML
60 INJECTION INTRAMUSCULAR; INTRAVENOUS ONCE
Status: COMPLETED | OUTPATIENT
Start: 2023-01-27 | End: 2023-01-27

## 2023-01-27 RX ORDER — PROPOFOL 10 MG/ML
INJECTION, EMULSION INTRAVENOUS PRN
Status: DISCONTINUED | OUTPATIENT
Start: 2023-01-27 | End: 2023-01-27 | Stop reason: SDUPTHER

## 2023-01-27 RX ORDER — SODIUM CHLORIDE, SODIUM LACTATE, POTASSIUM CHLORIDE, CALCIUM CHLORIDE 600; 310; 30; 20 MG/100ML; MG/100ML; MG/100ML; MG/100ML
INJECTION, SOLUTION INTRAVENOUS CONTINUOUS PRN
Status: DISCONTINUED | OUTPATIENT
Start: 2023-01-27 | End: 2023-01-27 | Stop reason: SDUPTHER

## 2023-01-27 RX ORDER — PHENYLEPHRINE HCL IN 0.9% NACL 1 MG/10 ML
SYRINGE (ML) INTRAVENOUS PRN
Status: DISCONTINUED | OUTPATIENT
Start: 2023-01-27 | End: 2023-01-27 | Stop reason: SDUPTHER

## 2023-01-27 RX ADMIN — FUROSEMIDE 60 MG: 10 INJECTION, SOLUTION INTRAMUSCULAR; INTRAVENOUS at 03:16

## 2023-01-27 RX ADMIN — Medication 10 ML: at 20:10

## 2023-01-27 RX ADMIN — CEFTRIAXONE SODIUM 1000 MG: 1 INJECTION, POWDER, FOR SOLUTION INTRAMUSCULAR; INTRAVENOUS at 14:52

## 2023-01-27 RX ADMIN — Medication 100 MCG: at 15:41

## 2023-01-27 RX ADMIN — SODIUM CHLORIDE 8 MG/HR: 9 INJECTION, SOLUTION INTRAVENOUS at 17:12

## 2023-01-27 RX ADMIN — CLOPIDOGREL BISULFATE 75 MG: 75 TABLET ORAL at 08:56

## 2023-01-27 RX ADMIN — MUPIROCIN: 20 OINTMENT TOPICAL at 08:57

## 2023-01-27 RX ADMIN — LIDOCAINE HYDROCHLORIDE 80 MG: 20 INJECTION, SOLUTION INFILTRATION; PERINEURAL at 15:41

## 2023-01-27 RX ADMIN — SODIUM CHLORIDE, SODIUM LACTATE, POTASSIUM CHLORIDE, AND CALCIUM CHLORIDE: .6; .31; .03; .02 INJECTION, SOLUTION INTRAVENOUS at 15:25

## 2023-01-27 RX ADMIN — ASPIRIN 81 MG 81 MG: 81 TABLET ORAL at 08:56

## 2023-01-27 RX ADMIN — LOSARTAN POTASSIUM 100 MG: 100 TABLET, FILM COATED ORAL at 10:53

## 2023-01-27 RX ADMIN — Medication 100 MCG: at 15:47

## 2023-01-27 RX ADMIN — MUPIROCIN: 20 OINTMENT TOPICAL at 20:10

## 2023-01-27 RX ADMIN — DOXAZOSIN 1 MG: 2 TABLET ORAL at 10:52

## 2023-01-27 RX ADMIN — FUROSEMIDE 40 MG: 10 INJECTION, SOLUTION INTRAMUSCULAR; INTRAVENOUS at 08:55

## 2023-01-27 RX ADMIN — PROPOFOL 20 MG: 10 INJECTION, EMULSION INTRAVENOUS at 15:47

## 2023-01-27 RX ADMIN — ATORVASTATIN CALCIUM 40 MG: 40 TABLET, FILM COATED ORAL at 20:06

## 2023-01-27 RX ADMIN — PROPOFOL 40 MG: 10 INJECTION, EMULSION INTRAVENOUS at 15:41

## 2023-01-27 RX ADMIN — AMLODIPINE BESYLATE 10 MG: 5 TABLET ORAL at 10:52

## 2023-01-27 RX ADMIN — FUROSEMIDE 40 MG: 10 INJECTION, SOLUTION INTRAMUSCULAR; INTRAVENOUS at 17:08

## 2023-01-27 RX ADMIN — DOXAZOSIN 1 MG: 2 TABLET ORAL at 20:06

## 2023-01-27 RX ADMIN — SPIRONOLACTONE 25 MG: 25 TABLET ORAL at 10:52

## 2023-01-27 RX ADMIN — PANTOPRAZOLE SODIUM 40 MG: 40 INJECTION, POWDER, FOR SOLUTION INTRAVENOUS at 08:55

## 2023-01-27 ASSESSMENT — ENCOUNTER SYMPTOMS: SHORTNESS OF BREATH: 1

## 2023-01-27 NOTE — ANESTHESIA POSTPROCEDURE EVALUATION
Department of Anesthesiology  Postprocedure Note    Patient: Alexander Hill  MRN: [id number]  YOB: 1940  Date of evaluation: 1/27/2023      Procedure Summary     Date: 01/27/23 Room / Location: [facility]    Anesthesia Start: 1559 Anesthesia Stop: 6503    Procedure: EGD DIAGNOSTIC ONLY Diagnosis:       Melena      (Melena [K92.1])    Surgeons: Abraham Rosales MD Responsible Provider: Cayetano Peterson MD    Anesthesia Type: general ASA Status: 3 - Emergent          Anesthesia Type: No value filed.     Francisca Phase I: Francisca Score: 9    Francisca Phase II:        Anesthesia Post Evaluation    Comments: Postoperative Anesthesia Note    Name:    Alexander Hill  MRN:      [id number]    Patient Vitals in the past 12 hrs:  01/27/23 1400, BP:(!) 128/52, Pulse:(!) 48, Resp:26, SpO2:99 %  01/27/23 1350, BP:130/62, Pulse:54, Resp:26, SpO2:97 %  01/27/23 1330, BP:127/68, Temp:98.7 °F (37.1 °C), Temp src:Oral, Pulse:50, Resp:23, SpO2:97 %  01/27/23 1306, Resp:23  01/27/23 1300, Pulse:62, Resp:27, SpO2:(!) 86 %  01/27/23 1200, BP:(!) 133/44, Pulse:51, Resp:23, SpO2:91 %  01/27/23 1100, BP:(!) 157/134, Pulse:76, Resp:28, SpO2:91 %  01/27/23 1053, BP:(!) 136/56  01/27/23 1000, BP:(!) 133/48, Pulse:60, Resp:(!) 35, SpO2:97 %  01/27/23 0900, BP:(!) 123/48, Pulse:56, Resp:28  01/27/23 0800, BP:(!) 133/51, Pulse:57, Resp:22, SpO2:98 %  01/27/23 0700, BP:(!) 100/39, Pulse:57, SpO2:90 %  01/27/23 0600, BP:(!) 156/56, Pulse:59  01/27/23 0500, BP:120/62, Pulse:69, SpO2:95 %     LABS:    CBC  Lab Results       Component                Value               Date/Time                  WBC                      14.5 (H)            01/27/2023 12:07 PM        HGB                      11.4 (L)            01/27/2023 12:07 PM        HCT                      34.2 (L)            01/27/2023 12:07 PM        PLT                      154                 01/27/2023 12:07 PM   RENAL  Lab Results       Component Value               Date/Time                  NA                       141                 01/27/2023 06:05 AM        K                        4.3                 01/27/2023 06:05 AM        K                        3.8                 01/26/2023 07:02 AM        CL                       107                 01/27/2023 06:05 AM        CO2                      22                  01/27/2023 06:05 AM        BUN                      34 (H)              01/27/2023 06:05 AM        CREATININE               1.1                 01/27/2023 06:05 AM        GLUCOSE                  186 (H)             01/27/2023 06:05 AM   COAGS  Lab Results       Component                Value               Date/Time                  PROTIME                  14.8 (H)            09/08/2022 09:00 AM        INR                      1.17 (H)            09/08/2022 09:00 AM        APTT                     25.7                05/02/2011 10:10 PM     Intake & Output:  @56QKIR@    Nausea & Vomiting:  No    Level of Consciousness:  Awake    Pain Assessment:  Adequate analgesia    Anesthesia Complications:  No apparent anesthetic complications    SUMMARY      Vital signs stable  OK to discharge from Stage I post anesthesia care.   Care transferred from Anesthesiology department on discharge from perioperative area

## 2023-01-27 NOTE — PROGRESS NOTES
Pt had large black tarry stool. Occult stool sent. Dr Chinmay Montero notified of positive result and also increasing shortness of breath. See mar for orders.

## 2023-01-27 NOTE — PROGRESS NOTES
Patient had increased respiratory effort/need. HFNC increased from 6L to 13L. Spoke to MD, ordered Chest CT.  Per results, MD ordered 60 mg Lasix   P/t HFNL need ranging from 7-13 L

## 2023-01-27 NOTE — PROGRESS NOTES
1852 Sheath pulled and manual pressure held for 10 minutes. Site soft. Pedal pulse present and no numbness or tingling down leg per patient, full sensation. Patient remains in supine position. Vitals stable.  Dioni Yeager RN

## 2023-01-27 NOTE — OP NOTE
Esophagogastroduodenoscopy Note    Patient:   Dank Santamaria    YOB: 1940    Facility:   [facility] [Inpatient]   Referring/PCP: Rocael Ware DO    Procedure:   Esophagogastroduodenoscopy --diagnostic  Date:     1/27/2023   Endoscopist:  Sundeep Cooper MD     Preoperative Diagnosis:   79yo M s/p invasive coronary angiography with PCI and AMBER placement yesterday involving the use of IV Integrilin and subsequent ASA and Plavix. Course has been c/b: pneumonia,  acute LV systolic HF in the setting of ischemic cardiomyopathy as well melena overnight. IV PPI BID was started and Integrilin stopped. ASA and Plavix continue. Hgb on admission was 12.5, today 11.4. Pt admits to outpatient NSAID use. Denies h/o PUD. Postoperative Diagnosis:  esophageal ulcer, Schatzki's ring, hiatal hernia, gastric ulcers, duodenal stricture (exam limited due to the presence of old blood)    Anesthesia:  per anesthesia    Estimated blood loss: None    Complications: None    Description of Procedure:  Informed consent was obtained from the patient after explanation of the procedure including indications, description of the procedure,  benefits and possible risks and complications of the procedure, and alternatives. Questions were answered. The patient's history was reviewed and a directed physical examination was performed prior to the procedure. Patient was monitored throughout the procedure with pulse oximetry and periodic assessment of vital signs. Patient was sedated as noted above. The Nursing staff and I performed a time out. With the patient in the left lateral decubitus position, the Olympus videoendoscope was placed in the patient's mouth and under direct visualization passed into the esophagus. The scope was ultimately passed to the duodenal sweep.   Visualization was performed during both introduction and withdrawal of the endoscope and retroflexed view of the proximal stomach was obtained.     Findings::   Esophagus: 5mm clean based ulcer located at the GE junction, patulous Schatzki's ring. The findings do not support a diagnosis of Aguirre's Esophagus.  Stomach: large amount of old blood in the stomach, diminutive clean based ulcers in the antrum  Duodenum: benign appearing stricture involving the duodenal sweep the endoscope could not traverse, old blood in bulb    Recommendations: The patient remains at high risk for recurrent bleeding given the presence of these ulcers  - change PPI dosing to gtts  - follow Hgb closely and transfuse as indicated  - obtain H pylori fecal Ag  - avoid NSAIDs  - start clear liquids  - a call has been placed to Dr Galeana to review the case    Steffen Arenas MD, MD

## 2023-01-27 NOTE — TELEPHONE ENCOUNTER
Yobani Troncoso 1940 bed [address] Dr. Bridgette Borden would like to speak with [facility] regarding pt, pt is currently in icu [phone].

## 2023-01-27 NOTE — H&P
Pre-sedation Assessment    History and Physical / Pre-Sedation Assessment  Patient:  Anuj Briscoe   :   1940     Intended Procedure: EGD      HPI: Melena    Past Medical History:   Diagnosis Date    Aortic aneurysm, abdominal 2011    Appendicitis 11    Arthritis 11    Bruises easily     CAD (coronary artery disease) 2001    MI     CHF (congestive heart failure) (HCC)     Following CABG    Chronic back pain     Chronic pain of both shoulders 11    joint - the shoulders hurt    Complication of anesthesia     woke up during appendectomy    Gout     Heart disease 11    Hernia 2011    HIGH CHOLESTEROL 11    Hyperlipidemia     Hypertension     Measles 2011    Pancreatitis 2006    History of     Persistent cough     Ringing in ears     Sinus disorder     Sleep deprivation 2001    loss of sleep     No current facility-administered medications on file prior to encounter.      Current Outpatient Medications on File Prior to Encounter   Medication Sig Dispense Refill    doxazosin (CARDURA) 1 MG tablet Take 1 tablet by mouth nightly (Patient taking differently: Take 1 mg by mouth 2 times daily) 180 tablet 3    furosemide (LASIX) 40 MG tablet TAKE 1 TABLET EVERY DAY 90 tablet 1    spironolactone (ALDACTONE) 25 MG tablet TAKE 1 TABLET EVERY DAY 90 tablet 1    amLODIPine (NORVASC) 10 MG tablet Take 1 tablet by mouth daily 90 tablet 1    atorvastatin (LIPITOR) 40 MG tablet TAKE 1 TABLET EVERY NIGHT 90 tablet 3    olmesartan (BENICAR) 40 MG tablet Take 1 tablet by mouth daily 90 tablet 3    apixaban (ELIQUIS) 5 MG TABS tablet Take 1 tablet by mouth 2 times daily 120 tablet 2    aspirin 81 MG chewable tablet Take 1 tablet by mouth daily 30 tablet 3    [DISCONTINUED] metoprolol succinate (TOPROL XL) 50 MG extended release tablet TAKE 1 TABLET EVERY DAY 90 tablet 1    Multiple Vitamins-Minerals (MULTIVITAMIN WITH MINERALS) tablet Take 1 tablet by mouth daily 30 tablet 3 ACCU-CHEK MULTICLIX LANCETS MISC Test blood sugar qd (Patient taking differently: Test blood sugar qd    AS NEEDED) 100 each 3    glucose blood VI test strips (ACCU-CHEK ION) strip Test blood sugar qd. (Patient taking differently: daily as needed Test blood sugar qd.) 100 strip 3       Nurses notes reviewed and agreed. Medications reviewed  Allergies: Allergies   Allergen Reactions    Coumadin [Warfarin] Other (See Comments)     Makes feel funny    Omeprazole Other (See Comments)     headaches    Vioxx            Physical Exam:  Vital Signs: BP (!) 128/52   Pulse (!) 48   Temp 98.7 °F (37.1 °C) (Oral)   Resp 26   Ht 5' 9\" (1.753 m)   Wt 198 lb 8 oz (90 kg)   SpO2 99%   BMI 29.31 kg/m²  Body mass index is 29.31 kg/m². Airway:Normal  Cardiac:Normal  Pulmonary:Normal  Abdomen:Normal  Specific to procedure: Mallampati 3      Pre-Procedure Assessment/Plan:  ASA 3 - Patient with moderate systemic disease with functional limitations    Level of Sedation Plan:Deep sedation    Post Procedure plan: Return to same level of care    I assessed the patient and find that the patient is in satisfactory condition to proceed with the planned procedure and sedation plan. I have explained the risk, benefits, and alternatives to the procedure. The patient understands and agrees to proceed.   Yes    Lainey Gutierres MD  3:36 PM 1/27/2023

## 2023-01-27 NOTE — PROGRESS NOTES
01/27/23 1306   NIV Type   $NIV $Daily Charge   Skin Protection for O2 Device Yes   Location Nose   NIV Started/Stopped On   Equipment Type v60   Mode CPAP   Mask Type Full face mask   Mask Size Medium   Bonnet size Medium   Settings/Measurements   PIP Observed 9 cm H20   CPAP/EPAP 7 cmH2O   Vt (Measured) 595 mL   Resp 23   FiO2  50 %  (decreased to 40%)   Minute Volume (L/min) 13.7 Liters   Comfort Level Good   Using Accessory Muscles No   SpO2 100   Patient's Home Machine No   Alarm Settings   Alarms On Y   Low Pressure (cmH2O) 6 cmH2O   High Pressure (cmH2O) 30 cmH2O   Apnea (secs) 20 secs   RR Low (bpm) 6   RR High (bpm) 40 br/min

## 2023-01-27 NOTE — ANESTHESIA PRE PROCEDURE
Department of Anesthesiology  Preprocedure Note       Name:  Gary Guy   Age:  80 y.o.  :  1940                                          MRN:  [id number]         Date:  2023      Surgeon: Pasquale Faust):  Adri Villasenor MD    Procedure: Procedure(s):  EGD DIAGNOSTIC ONLY    Medications prior to admission:   Prior to Admission medications    Medication Sig Start Date End Date Taking? Authorizing Provider   doxazosin (CARDURA) 1 MG tablet Take 1 tablet by mouth nightly  Patient taking differently: Take 1 mg by mouth 2 times daily 23   Chris Robertson MD   furosemide (LASIX) 40 MG tablet TAKE 1 TABLET EVERY DAY 22   Stella Chavez DO   spironolactone (ALDACTONE) 25 MG tablet TAKE 1 TABLET EVERY DAY 22   Stella Chavez, DO   amLODIPine (NORVASC) 10 MG tablet Take 1 tablet by mouth daily 22   Stella Chavez DO   atorvastatin (LIPITOR) 40 MG tablet TAKE 1 TABLET EVERY NIGHT 22   Chris Robertson MD   olmesartan (BENICAR) 40 MG tablet Take 1 tablet by mouth daily 22   Chris Robertson MD   apixaban (ELIQUIS) 5 MG TABS tablet Take 1 tablet by mouth 2 times daily 22   Niecy Frank APRN - CNP   aspirin 81 MG chewable tablet Take 1 tablet by mouth daily 22   Geena Guerra APRN - CNP   metoprolol succinate (TOPROL XL) 50 MG extended release tablet TAKE 1 TABLET EVERY DAY 22  Stella Chavez DO   Multiple Vitamins-Minerals (MULTIVITAMIN WITH MINERALS) tablet Take 1 tablet by mouth daily 19   Stella Chavez DO   ACCU-CHEK MULTICLIX LANCETS MISC Test blood sugar qd  Patient taking differently: Test blood sugar qd    AS NEEDED 3/1/18   Stella Chavez DO   glucose blood VI test strips (ACCU-CHEK ION) strip Test blood sugar qd. Patient taking differently: daily as needed Test blood sugar qd.  3/1/18   Stella Chavez DO       Current medications:    Current Facility-Administered Medications   Medication Dose Route Frequency Provider Last Rate Last Admin    mupirocin (BACTROBAN) 2 % ointment   Nasal BID Sanket Haddox, DO   Given at 01/27/23 0857    pantoprazole (PROTONIX) injection 40 mg  40 mg IntraVENous BID Sanket Donisdox, DO   40 mg at 01/27/23 0855    furosemide (LASIX) injection 40 mg  40 mg IntraVENous BID Sanket Haddox, DO   40 mg at 01/27/23 0855    perflutren lipid microspheres (DEFINITY) injection 1.5 mL  1.5 mL IntraVENous ONCE PRN Sanket Donisdox, DO        cefTRIAXone (ROCEPHIN) 1,000 mg in sodium chloride 0.9 % 50 mL IVPB (mini-bag)  1,000 mg IntraVENous Q24H Sanket Donisdox,  mL/hr at 01/27/23 1452 1,000 mg at 01/27/23 1452    sodium chloride flush 0.9 % injection 5-40 mL  5-40 mL IntraVENous 2 times per day Sanket Donisdox, DO   10 mL at 01/26/23 2025    sodium chloride flush 0.9 % injection 5-40 mL  5-40 mL IntraVENous PRN Sanket Donisdox, DO        ondansetron (ZOFRAN) injection 4 mg  4 mg IntraVENous Q6H PRN Sanket Donisdox, DO        clopidogrel (PLAVIX) tablet 75 mg  75 mg Oral Daily Sanket Haddox, DO   75 mg at 01/27/23 0856    aspirin chewable tablet 81 mg  81 mg Oral Daily Sanket Haddox, DO   81 mg at 01/27/23 0856    0.9 % sodium chloride infusion   IntraVENous PRN Sanket Donisdox, DO        acetaminophen (TYLENOL) tablet 650 mg  650 mg Oral Q4H PRN Sanket Donisdox, DO        atorvastatin (LIPITOR) tablet 40 mg  40 mg Oral Nightly Sanket Haddox, DO   40 mg at 01/26/23 2024    doxazosin (CARDURA) tablet 1 mg  1 mg Oral BID Sanket Donisdox, DO   1 mg at 01/27/23 1052    losartan (COZAAR) tablet 100 mg  100 mg Oral Daily Sanket Haddox, DO   100 mg at 01/27/23 1053    spironolactone (ALDACTONE) tablet 25 mg  25 mg Oral Daily Sanket Haddox, DO   25 mg at 01/27/23 1052     Facility-Administered Medications Ordered in Other Encounters   Medication Dose Route Frequency Provider Last Rate Last Admin    lactated ringers IV soln infusion   IntraVENous Continuous PRN RUPERTO Rojas - CRNA   New Bag at 01/27/23 8834 Allergies: Allergies   Allergen Reactions    Coumadin [Warfarin] Other (See Comments)     Makes feel funny    Omeprazole Other (See Comments)     headaches    Vioxx        Problem List:    Patient Active Problem List   Diagnosis Code    Hypertension I10    CAD (coronary artery disease) I25.10    Hyperlipidemia E78.5    CHF (congestive heart failure) ([facility] I50.9    Aortic aneurysm, abdominal I71.40    Sleep deprivation Z72.820    Chronic pain of both shoulders M25.511, G89.29, M25.512    Arthritis M19.90    Hernia K46.9    Ringing in ears H93.19    GERD (gastroesophageal reflux disease) K21.9    Leg length inequality M21.70    DM (diabetes mellitus) ([facility] E11.9    Hypokalemia E87.6    TIA (transient ischemic attack) G45.9    CVA (cerebral vascular accident) ([facility] I63.9    Bradycardia R00.1    Stroke-like symptoms R29.90    Atrial fibrillation (HCC) I48.91    Nuclear senile cataract H25.10    SUNDEEP (obstructive sleep apnea) G47.33    Overweight (BMI 25.0-29. 9) E66.3    CAD in native artery I25.10    Chest pain R07.9    Shortness of breath R06.02    Abnormal stress test R94.39       Past Medical History:        Diagnosis Date    Aortic aneurysm, abdominal 1/2011    Appendicitis 12-06-11    Arthritis 12-06-11    Bruises easily     CAD (coronary artery disease) 9/2001    MI     CHF (congestive heart failure) (HCC)     Following CABG    Chronic back pain     Chronic pain of both shoulders 12-06-11    joint - the shoulders hurt    Complication of anesthesia     woke up during appendectomy    Gout     Heart disease 12-06-11    Hernia 12-    HIGH CHOLESTEROL 12-6-11    Hyperlipidemia     Hypertension     Measles 12-    Pancreatitis 2006    History of     Persistent cough     Ringing in ears     Sinus disorder     Sleep deprivation 12-    loss of sleep       Past Surgical History:        Procedure Laterality Date    ABDOMINAL AORTIC ANEURYSM REPAIR  ABDOMINAL AORTIC ANEURYSM REPAIR, ENDOVASCULAR  2011    Endomvascular abdominal aortic aneurysm repair with insertion of cardiomems pressure sensor    APPENDECTOMY      40-50 years ago   [address]  2008    CABG    CHOLECYSTECTOMY, LAPAROSCOPIC  2006    CORONARY ANGIOPLASTY WITH STENT PLACEMENT   and     CORONARY ARTERY BYPASS GRAFT      GALLBLADDER SURGERY      gallbladder/pancreatitis    HERNIA REPAIR      20+ years ago    INTRACAPSULAR CATARACT EXTRACTION Right 2018    PHACOEMULSIFICATION OF CATARACT RIGHT  EYE WITH INTRAOCULAR LENS IMPLANT performed by Anahi Cronin MD at [place] [address]      panceastitis Stormyn Paul    OR XCAPSL CTRC RMVL INSJ IO LENS PROSTH W/O ECP Left 2018    PHACOEMULSIFICATION OF CATARACT LEFT EYE WITH INTRAOCULAR LENS IMPLANT performed by Anahi Cronin MD at [address] [place] [address]      as child       Social History:    Social History     Tobacco Use    Smoking status: Former     Packs/day: 1.50     Years: 25.00     Pack years: 37.50     Types: Cigarettes     Start date: 1955     Quit date: 1978     Years since quittin.0    Smokeless tobacco: Never   Substance Use Topics    Alcohol use: No     Comment: patient drinks coffee/caffeine 2 cups a day                                 Counseling given: Not Answered      Vital Signs (Current):   Vitals:    23 1306 23 1330 23 1350 23 1400   BP:  127/68 130/62 (!) 128/52   Pulse:  50 54 (!) 48   Resp:    Temp:  98.7 °F (37.1 °C)     TempSrc:  Oral     SpO2:  97% 97% 99%   Weight:       Height:                                                  BP Readings from Last 3 Encounters:   23 (!) 128/52   23 (!) 180/64   11/15/22 132/68       NPO Status: Time of last liquid consumption: 1000                        Time of last solid consumption: 1000                        Date of last liquid consumption: 23 Date of last solid food consumption: 01/27/23    BMI:   Wt Readings from Last 3 Encounters:   01/26/23 198 lb 8 oz (90 kg)   01/20/23 197 lb (89.4 kg)   11/15/22 193 lb 12.8 oz (87.9 kg)     Body mass index is 29.31 kg/m². CBC:   Lab Results   Component Value Date/Time    WBC 14.5 01/27/2023 12:07 PM    RBC 3.69 01/27/2023 12:07 PM    HGB 11.4 01/27/2023 12:07 PM    HCT 34.2 01/27/2023 12:07 PM    MCV 92.5 01/27/2023 12:07 PM    RDW 15.6 01/27/2023 12:07 PM     01/27/2023 12:07 PM       CMP:   Lab Results   Component Value Date/Time     01/27/2023 06:05 AM    K 4.3 01/27/2023 06:05 AM    K 3.8 01/26/2023 07:02 AM     01/27/2023 06:05 AM    CO2 22 01/27/2023 06:05 AM    BUN 34 01/27/2023 06:05 AM    CREATININE 1.1 01/27/2023 06:05 AM    GFRAA >60 09/10/2022 06:35 AM    GFRAA >60 01/15/2013 09:09 AM    AGRATIO 1.5 09/08/2022 09:00 AM    LABGLOM >60 01/27/2023 06:05 AM    GLUCOSE 186 01/27/2023 06:05 AM    PROT 7.8 09/08/2022 09:00 AM    PROT 7.2 01/15/2013 09:09 AM    CALCIUM 9.0 01/27/2023 06:05 AM    BILITOT 1.2 09/08/2022 09:00 AM    ALKPHOS 80 09/08/2022 09:00 AM    AST 26 09/08/2022 09:00 AM    ALT 32 09/08/2022 09:00 AM       POC Tests: No results for input(s): POCGLU, POCNA, POCK, POCCL, POCBUN, POCHEMO, POCHCT in the last 72 hours.     Coags:   Lab Results   Component Value Date/Time    PROTIME 14.8 09/08/2022 09:00 AM    INR 1.17 09/08/2022 09:00 AM    APTT 25.7 05/02/2011 10:10 PM       HCG (If Applicable): No results found for: PREGTESTUR, PREGSERUM, HCG, HCGQUANT     ABGs: No results found for: PHART, PO2ART, HUA5BPI, UUX4QYF, BEART, Y4WIFGTC     Type & Screen (If Applicable):  Lab Results   Component Value Date    LABABO O 01/26/2011    [address] Positive 01/26/2011       Drug/Infectious Status (If Applicable):  No results found for: HIV, HEPCAB    COVID-19 Screening (If Applicable): No results found for: COVID19        Anesthesia Evaluation  Patient summary reviewed and Nursing notes reviewed no history of anesthetic complications:   Airway: Mallampati: II     Neck ROM: full     Dental:          Pulmonary:Negative Pulmonary ROS and normal exam    (+) shortness of breath:  sleep apnea:                             Cardiovascular:Negative CV ROS    (+) hypertension:, CAD:, CABG/stent (s/p ):, CHF:, hyperlipidemia    (-)  angina             ROS comment: AAA       Neuro/Psych:   Negative Neuro/Psych ROS  (+) CVA:, TIA,             GI/Hepatic/Renal: Neg GI/Hepatic/Renal ROS  (+) GERD:,      (-) hiatal hernia       Endo/Other: Negative Endo/Other ROS   (+) Diabetes, . Abdominal:             Vascular: Other Findings:           Anesthesia Plan      general     ASA 3 - emergent     (I discussed with the patient the risks and benefits of PIV, general anesthesia, IV Narcotics, PACU. All questions were answered the patient agrees with the plan and wishes to proceed.  )  Induction: intravenous. IMPRESSION-  Relative stable appearance of changes associated with   prior endovascular stent repair of abdominal aortic aneurysm. Persistent visualization of type II endoleak in association with   the native aneurysm sac as described above. Continued short-term   followup examination would be helpful for reevaluation.           Dictated on- 09/28/2012 10-29-18     1/26  Impression:  1. Severe native multivessel coronary artery disease with  of native mid-LAD,  of ostial LCx, and  of mid-RCA. 2. Patent LIMA to LAD. There is a severe stenosis in the distal LAD after the anastomosis. 3. Severely disease SVG to OM1 with sequential to OM2. The vein graft had a 90% distal stenosis near what was previously likely the anastomosis of the OM1. The OM1 appears to have been a smaller vessel than the OM2 and is now occluded. 4. Patent SVG to distal RCA.   5. PCI was performed to the distal SVG to OM1 with sequential to OM2 with an Ecorse Ovid 3.5 x 22 mm AMBER.  There was initially poor re-flow following PTCA and stenting of the distal SVG. Copious IC vasodilators were given and the patient was started on IV Integrilin. Final angiography showed <10% residual stenosis with JAYLA 3 flow throughout the vein graft and all but the very distal small caliber segment of the native OM2 which still had no flow, but overall flow through the distal segment appeared to be gradually improving. The OM1 appeared to be occluded, was felt to be much smaller than the OM2, and was overall not an adequate target for percutaneous coronary intervention. Therefore, the decision was made to proceed with stenting across the anastomosis with the OM1.  6. LVEDP 20 mmHg.          Plan:  1. Monitor overnight. 2. Continue IV Integrilin infusion x12 hours. 3. Start IV nitroglycerin infusion and titrate as needed for angina. 4. Plavix 75 mg daily for at least one year. 5. Continue aspirin 81 mg daily. 6. Wait to resume Eliquis until Integrilin infusion is completed. Would then plan for triple therapy for one month. After one month, aspirin can be stopped and the patient should continue on Plavix and Eliquis to complete at least one year of Plavix therapy. If Plavix is discontinued after one year, aspirin 81 mg daily should be resumed indefinitely. 7. Resume atorvastatin 40 mg qHS, amlodipine 10 mg daily, losartan 100 mg daily, spironolactone 25 mg daily, and lasix 40 mg daily. 8. Hold off on beta-blocker in setting of bradycardia. 9. Will arrange for staged PCI of native distal LAD through LIMA in 2 weeks. There was difficulty engaging the LIMA from the femoral artery approach and may consider performing PCI from left radial artery approach. Can plan to re-evaluate SVG stent and flow and OM2 at that time.       DO [address]    Pre-Operative Diagnosis: Abnormal result of other cardiovascular function study [R94.39];CAD in native artery [I25.10]    80 y.o. BMI:  Body mass index is 29.31 kg/m².      Vitals:    23 1306 23 1330 23 1350 23 1400   BP:  127/68 130/62 (!) 128/52   Pulse:  50 54 (!) 48   Resp:    Temp:  98.7 °F (37.1 °C)     TempSrc:  Oral     SpO2:  97% 97% 99%   Weight:       Height:           Allergies   Allergen Reactions    Coumadin [Warfarin] Other (See Comments)     Makes feel funny    Omeprazole Other (See Comments)     headaches    Vioxx        Social History     Tobacco Use    Smoking status: Former     Packs/day: 1.50     Years: 25.00     Pack years: 37.50     Types: Cigarettes     Start date: 1955     Quit date: 1978     Years since quittin.0    Smokeless tobacco: Never   Substance Use Topics    Alcohol use: No     Comment: patient drinks coffee/caffeine 2 cups a day        LABS:    CBC  Lab Results   Component Value Date/Time    WBC 14.5 (H) 2023 12:07 PM    HGB 11.4 (L) 2023 12:07 PM    HCT 34.2 (L) 2023 12:07 PM     2023 12:07 PM     RENAL  Lab Results   Component Value Date/Time     2023 06:05 AM    K 4.3 2023 06:05 AM    K 3.8 2023 07:02 AM     2023 06:05 AM    CO2 22 2023 06:05 AM    BUN 34 (H) 2023 06:05 AM    CREATININE 1.1 2023 06:05 AM    GLUCOSE 186 (H) 2023 06:05 AM     COAGS  Lab Results   Component Value Date/Time    PROTIME 14.8 (H) 2022 09:00 AM    INR 1.17 (H) 2022 09:00 AM    APTT 25.7 2011 10:10 PM       Summary    Normal LVEF >60%    Paradoxic septal motion    Enlarged RV    Small to moderate area of anterior and basal inferior ischemia        Overall, this would be considered, intermediate risk study         Jostin Tierney MD   2023

## 2023-01-27 NOTE — CARE COORDINATION
CASE MANAGEMENT INITIAL ASSESSMENT      Reviewed chart and completed assessment with patient:at bedside  Family present: no  Explained Case Management role/services. yes    Primary contact information:    Health Care Decision Maker :   Primary Decision Maker: Yajaira Guy - Spouse - [phone]          Can this person be reached and be able to respond quickly, such as within a few minutes or hours? Yes  Who would be your back-up decision maker? Name none named  Phone Number:    Admit date/status:1/26/2023  Diagnosis:CP   Is this a Readmission?:  No    Readmission Screening completed?: No     Insurance:[employer] MCR   Precert required for SNF: Yes       3 night stay required: No    Living arrangements, Adls, care needs, prior to admission:Lives w spouse in a 1 story home. IPTA- no DME except CPAP or services. Pt drives. Durable Medical Equipment at home:  Walker__Cane__RTS__ BSC__Shower Chair__  02__ HHN__ CPAP_x_  BiPap__  Hospital Bed__ W/C___ Other_____    Services in the home and/or outpatient, prior to admission:none    Current PCP: Israel Hernadez, DO                               Medications: Prescription coverage? Yes Will pt require financial assistance with medications No     Transportation needs: family         PT/OT recs:none    Hospital Exemption Notification (HEN):na    Barriers to discharge:none known    Plan/comments:plans home- denies needs at present     [address] on chart for MD lex Purvis RN

## 2023-01-27 NOTE — CONSULTS
Patrice Schmid is a 80 y.o. male asked to see us in consultation by  Karol Ansari, [facility] Svetlana Galeana DO for evaluation of GIB. Mr. Janna Zepeda is an 80-year-old male with past medical history of AAA s/p repair, CAD, CABG, CHF, valvular heart disease, CVA, gout, hernia repair, biliary pancreatitis s/p ERCP 2007 and cholecystectomy. He was recently admitted in September with aphasia and found to have a small acute infarct on MRI of the brain. He was seen by a cardiologist last week for chest pain abnormal stress test.  He was scheduled for coronary angiogram which took place yesterday with findings of severe native multivessel coronary artery disease. He was admitted to ICU on IV Integrilin and started on Plavix and aspirin. Of note he had been on Eliquis prior to admission but this has not been resumed here. Around midnight last night he had a large black bowel movement. He had another melenic stool this morning. His Integrilin drip has been turned off. Patient states he also has been coughing up blood but not vomiting. He denies abdominal pain. No history of GI bleed. He admits to taking ibuprofen 3-4 times per week for generalized pains. He does not take antiacid medication. He has never had an EGD. He had a colonoscopy in 2004 with Dr. Lisbeth Delaney with findings of colonic tubular adenomas. His Hgb was 12.5 on admission yesterday, down to 11.6 this morning. His BUN is elevated at 34. He is hemodynamically stable.           Medications Prior to Admission: doxazosin (CARDURA) 1 MG tablet, Take 1 tablet by mouth nightly (Patient taking differently: Take 1 mg by mouth 2 times daily)  furosemide (LASIX) 40 MG tablet, TAKE 1 TABLET EVERY DAY  spironolactone (ALDACTONE) 25 MG tablet, TAKE 1 TABLET EVERY DAY  amLODIPine (NORVASC) 10 MG tablet, Take 1 tablet by mouth daily  atorvastatin (LIPITOR) 40 MG tablet, TAKE 1 TABLET EVERY NIGHT  olmesartan (BENICAR) 40 MG tablet, Take 1 tablet by mouth daily  apixaban (ELIQUIS) 5 MG TABS tablet, Take 1 tablet by mouth 2 times daily  aspirin 81 MG chewable tablet, Take 1 tablet by mouth daily  Multiple Vitamins-Minerals (MULTIVITAMIN WITH MINERALS) tablet, Take 1 tablet by mouth daily  ACCU-CHEK MULTICLIX LANCETS MISC, Test blood sugar qd (Patient taking differently: Test blood sugar qd  AS NEEDED)  glucose blood VI test strips (ACCU-CHEK ION) strip, Test blood sugar qd. (Patient taking differently: daily as needed Test blood sugar qd.)    Medication:   mupirocin   Nasal BID    pantoprazole  40 mg IntraVENous BID    furosemide  40 mg IntraVENous BID    sodium chloride flush  5-40 mL IntraVENous 2 times per day    aspirin  325 mg Oral Once    clopidogrel  75 mg Oral Daily    aspirin  81 mg Oral Daily    amLODIPine  10 mg Oral Daily    atorvastatin  40 mg Oral Nightly    doxazosin  1 mg Oral BID    furosemide  40 mg Oral Daily    losartan  100 mg Oral Daily    spironolactone  25 mg Oral Daily      nitroGLYCERIN Stopped (01/26/23 1800)    sodium chloride         Allergies:   Allergies   Allergen Reactions    Coumadin [Warfarin] Other (See Comments)     Makes feel funny    Omeprazole Other (See Comments)     headaches    Vioxx        Immunizations:  Immunization History   Administered Date(s) Administered    COVID-19, MODERNA BLUE border, Primary or Immunocompromised, (age 12y+), IM, 100 mcg/0.5mL 02/24/2021    COVID-19, MODERNA Bivalent BOOSTER, (age 12y+), IM, 48 mcg/0.5 mL 09/27/2022    COVID-19, [place] Vaccine, Vaccine Unspecified 01/21/2021, 10/26/2021    Influenza 09/25/2013    Influenza Vaccine, unspecified formulation 11/02/2016    Influenza Whole 09/23/2010    Influenza, FLUAD, (age 72 y+), Adjuvanted, 0.5mL 09/14/2020, 09/13/2021    Influenza, FLUZONE (age 72 y+), High Dose, 0.7mL 09/27/2022    Influenza, High Dose (Fluzone 65 yrs and older) 10/17/2014, 10/08/2017, 09/20/2018    Influenza, Triv, inactivated, subunit, adjuvanted, IM (Fluad 65 yrs and older) 09/20/2018, 09/19/2019    Pneumococcal Conjugate 13-valent ([id number]) 09/20/2018    Pneumococcal Conjugate 7-valent (Prevnar7) 01/26/2007    Pneumococcal Polysaccharide ([id number]) 09/13/2021    Tdap (Boostrix, Adacel) 01/30/2018       Family history, past medical history, and  social  history  are  reviewed as  below.   Past Medical  History:  Past Medical History:   Diagnosis Date    Aortic aneurysm, abdominal 1/2011    Appendicitis 12-06-11    Arthritis 12-06-11    Bruises easily     CAD (coronary artery disease) 9/2001    MI     CHF (congestive heart failure) ([facility])     Following CABG    Chronic back pain     Chronic pain of both shoulders 12-06-11    joint - the shoulders hurt    Complication of anesthesia     woke up during appendectomy    Gout     Heart disease 12-06-11    Hernia 12-    HIGH CHOLESTEROL 12-6-11    Hyperlipidemia     Hypertension     Measles 12-    Pancreatitis 2006    History of     Persistent cough     Ringing in ears     Sinus disorder     Sleep deprivation 12-    loss of sleep       Past  Surgical History:  Past Surgical History:   Procedure Laterality Date    ABDOMINAL AORTIC ANEURYSM REPAIR      ABDOMINAL AORTIC ANEURYSM REPAIR, ENDOVASCULAR  1/28/2011    Endomvascular abdominal aortic aneurysm repair with insertion of cardiomems pressure sensor    APPENDECTOMY      40-50 years ago    CARDIAC SURGERY  2008    CABG    CHOLECYSTECTOMY, LAPAROSCOPIC  2006    CORONARY ANGIOPLASTY WITH STENT PLACEMENT  2001 and 2002    CORONARY ARTERY BYPASS GRAFT      GALLBLADDER SURGERY      gallbladder/pancreatitis    HERNIA REPAIR      20+ years ago    INTRACAPSULAR CATARACT EXTRACTION Right 12/6/2018    PHACOEMULSIFICATION OF CATARACT RIGHT  EYE WITH INTRAOCULAR LENS IMPLANT performed by Poncho Billings MD at [address]      panceastitis Dilcia Mcintyre INSJ IO LENS PROSTH W/O ECP Left 2018    PHACOEMULSIFICATION OF CATARACT LEFT EYE WITH INTRAOCULAR LENS IMPLANT performed by Talib Gates MD at [address] [place] [address]      as child       Family  History:  Family History   Problem Relation Age of Onset    Diabetes Mother     Heart Failure Mother     Cancer Mother     Heart Disease Mother     High Blood Pressure Mother     Heart Surgery Father     Heart Disease Father     Heart Failure Father     Heart Attack Father     Stroke Sister     Stroke Brother     Heart Surgery Paternal Uncle     Heart Disease Paternal Uncle     Heart Disease Paternal Uncle     Stroke Sister        Social History:  Social History     Tobacco Use    Smoking status: Former     Packs/day: 1.50     Years: 25.00     Pack years: 37.50     Types: Cigarettes     Start date: 1955     Quit date: 1978     Years since quittin.0    Smokeless tobacco: Never   Vaping Use    Vaping Use: Never used   Substance Use Topics    Alcohol use: No     Comment: patient drinks coffee/caffeine 2 cups a day     Drug use: No       ROS  Constitutional:  Denies fever,sweats, chills or weight loss  Eyes:  Denies change in visual acuity   HENT:  Denies hearing loss or dizziness  Respiratory:  +  cough   Cardiovascular:  Denies edema or chest pain  :  Denies dysuria, hematuria, urgency or frequency  Musculoskeletal:  Denies back pain or joint pain   Integument:  Denies rash   Neurologic:  Denies headache, previous stroke, TIA, confusion   Endocrine:  Denies polyuria or polydipsia   Lymphatic:  Denies swollen glands   Psychiatric:  Denies depression or anxiety   Hematologic: +  previous anemia or easy bruising    All other review of systems negative, except for those noted.       PHYSICAL EXAM:   VITAL SIGNS: BP (!) 100/39   Pulse 57   Temp 98.6 °F (37 °C) (Axillary)   Resp 27   Ht 5' 9\" (1.753 m)   Wt 198 lb 8 oz (90 kg)   SpO2 95%   BMI 29.31 kg/m²   Date 23 0000 - 23 2358 Shift 9129-8219 9303-1577 8625-3030 24 Hour Total   INTAKE   Shift Total(mL/kg)       OUTPUT   Urine(mL/kg/hr) 700(1)   700   Shift Total(mL/kg) 700(7.8)   700(7.8)   Weight (kg) 90 90 90 90       Constitutional: Well developed. Well nourished. Non-toxic appearance. No acute distress. HENT: Normocephalic. Atraumatic. Bilateral external ears normal, Oropharynx moist.  No oral exudate. Nose normal.   Eyes: No  Scleral icterus   Cardiovascular: RRR  Thorax & Lungs: Non labored at rest, no respiratory distress  Abdomen: soft, NT. No guarding, rebound tenderness. No hepatomegaly. No splenomegaly. No ascites  Rectal:  Deferred. Skin: Warm, dry. No erythema. No rash. Extremities: Intact distal pulses, No deformity. No edema. Neurologic:  No Asterixis    RESULTS    Lab Results   Component Value Date    ALT 32 09/08/2022    AST 26 09/08/2022    ALKPHOS 80 09/08/2022    BILIDIR 0.3 06/15/2017    PROT 7.8 09/08/2022    LABALBU 3.6 09/10/2022    INR 1.17 (H) 09/08/2022     Lab Results   Component Value Date    WBC 13.1 (H) 01/27/2023    HGB 11.6 (L) 01/27/2023    HCT 36.1 (L) 01/27/2023    MCV 92.5 01/27/2023     01/27/2023     Lab Results   Component Value Date    CREATININE 1.1 01/27/2023    BUN 34 (H) 01/27/2023     01/27/2023    K 4.3 01/27/2023     01/27/2023    CO2 22 01/27/2023       IMAGES  XR CHEST PORTABLE    Result Date: 1/27/2023  Mild-to-moderate cardiomegaly with status post CABG. Currently there is evidence of congestive heart failure with moderate to marked pulmonary vascular congestion and moderate pulmonary edema. 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 In addition, there are new infiltrates, and/or atelectatic changes in the right lower lung field. Mild left basilar atelectasis. No obvious pleural effusion. No pneumothorax. Assessment:  1. Melena. In setting of Anti-platelt medications. Possible bleeding from NSAID induced gastritis, PUD. Integrelin drip has been turned off. He has had Plavix today. 2. CAD, s/p coronary cath 1/26, s/p CABG, and hx recent CVA. Plan:  1. IV PPI bid. 2. EGD this afternoon. 3. NPO. Thank you for permitting us to participate in the care of your patient. Please do not hesitate to call with questions or concerns. 1.  The patient indicates understanding of these issues and agrees with the plan. 2.  I reviewed the patient's medical information and medical history. 3.  I have reviewed the past medical, family, and social history sections including the medications and allergies listed in the above medical record.         Electronically signed by: RUPERTO Cabrera 1/27/2023 10:02 AM

## 2023-01-27 NOTE — PROGRESS NOTES
[facility]   PROGRESS NOTE  [phone]      Attending Physician: Oliver Ramirez DO  Reason for Consultation/Chief Complaint: S/p LHC and PCI    Subjective     Patient reports that his chest pain has resolved. He had some orthopnea overnight and a chest x-ray was obtained and showed moderate pulmonary edema. He was given 60 mg IV Lasix and is currently on 8 L supplemental O2. He reportedly had a dark tarry bowel movement earlier and has been coughing up some small amounts of blood. He is now off the IV Integrilin infusion. CURRENT Medications:  mupirocin (BACTROBAN) 2 % ointment, BID  pantoprazole (PROTONIX) injection 40 mg, BID  furosemide (LASIX) injection 40 mg, BID  perflutren lipid microspheres (DEFINITY) injection 1.5 mL, ONCE PRN  sodium chloride flush 0.9 % injection 5-40 mL, 2 times per day  sodium chloride flush 0.9 % injection 5-40 mL, PRN  aspirin tablet 325 mg, Once  ondansetron (ZOFRAN) injection 4 mg, Q6H PRN  nitroGLYCERIN 50 mg in dextrose 5% 250 mL infusion, Continuous  clopidogrel (PLAVIX) tablet 75 mg, Daily  aspirin chewable tablet 81 mg, Daily  0.9 % sodium chloride infusion, PRN  acetaminophen (TYLENOL) tablet 650 mg, Q4H PRN  amLODIPine (NORVASC) tablet 10 mg, Daily  atorvastatin (LIPITOR) tablet 40 mg, Nightly  doxazosin (CARDURA) tablet 1 mg, BID  furosemide (LASIX) tablet 40 mg, Daily  losartan (COZAAR) tablet 100 mg, Daily  spironolactone (ALDACTONE) tablet 25 mg, Daily        Allergies:  Coumadin [warfarin], Omeprazole, and Vioxx     Review of Systems:   Negative except as noted above. Objective   PHYSICAL EXAM:    Vitals:    01/27/23 0700   BP: (!) 100/39   Pulse: 57   Resp: 27   Temp: 98.6F   SpO2: 95% on 8L    Weight: 198 lb 8 oz (90 kg)      General: Adult male in no acute distress. Pleasant and interactive on exam.  HEENT: Dried blood noted on back of throat. Neck: JVP mildly elevated. Heart: Irregular rate and rhythm.  Normal S1 and S2. Grade II/VI holosystolic murmur. No rubs or gallops. Lungs: Normal respiratory effort. Bilateral crackles present. No rubs or gallops. Abdomen: Soft, non-tender. Normoactive bowel sounds. No masses or organomegaly. Skin: No rashes. Extremities: Right femoral artery access site is soft and non-tender without evidence of hematoma. Right groin ecchymosis present. No clubbing, cyanosis, or edema. Psych: Normal mood and affect. Neuro: Alert and oriented to person, place, and time. No focal deficits noted. Labs   CBC:   Lab Results   Component Value Date/Time    WBC 13.1 01/27/2023 06:05 AM    RBC 3.90 01/27/2023 06:05 AM    HGB 11.6 01/27/2023 06:05 AM    HCT 36.1 01/27/2023 06:05 AM    MCV 92.5 01/27/2023 06:05 AM    RDW 15.4 01/27/2023 06:05 AM     01/27/2023 06:05 AM     CMP:  Lab Results   Component Value Date/Time     01/27/2023 06:05 AM    K 4.3 01/27/2023 06:05 AM    K 3.8 01/26/2023 07:02 AM     01/27/2023 06:05 AM    CO2 22 01/27/2023 06:05 AM    BUN 34 01/27/2023 06:05 AM    CREATININE 1.1 01/27/2023 06:05 AM    GFRAA >60 09/10/2022 06:35 AM    GFRAA >60 01/15/2013 09:09 AM    AGRATIO 1.5 09/08/2022 09:00 AM    LABGLOM >60 01/27/2023 06:05 AM    GLUCOSE 186 01/27/2023 06:05 AM    PROT 7.8 09/08/2022 09:00 AM    PROT 7.2 01/15/2013 09:09 AM    CALCIUM 9.0 01/27/2023 06:05 AM    BILITOT 1.2 09/08/2022 09:00 AM    ALKPHOS 80 09/08/2022 09:00 AM    AST 26 09/08/2022 09:00 AM    ALT 32 09/08/2022 09:00 AM     PT/INR:  No results found for: PTINR  HgBA1c:  Lab Results   Component Value Date    LABA1C 6.9 11/15/2022     Lab Results   Component Value Date    TROPONINI <0.01 09/08/2022         Cardiac Data     Last EKG: Atrial fibrillation with slow ventricular response, anterior ST-T wave abnormality, prolonged QT    Echo:  TTE 9/9/22:   Conclusions   Summary   Left ventricular systolic function is normal with ejection fraction   estimated at 50-55%.    Basal inferior hypokinesis   There is mild concentric left ventricular hypertrophy. Grade II diastolic dysfunction with elevated left ventricular filling   pressure. The left atrium is severely dilated. The right atrium is mildly dilated. Moderate mitral regurgitation. Mild aortic regurgitation. Moderate tricuspid regurgitation. Systolic pulmonary artery pressure (SPAP) is estimated at 58 mmHg consistent   with moderate pulmonary hypertension (Right atrial pressure of 3 mmHg). Small PFO    Findings:  Hemodynamics:              A. Opening arterial pressure: 118/40 (59) mmHg              B. LVEDP: 20 mmHg     2. Coronary anatomy:  A. Left main artery: The left main artery trifurcates into the left anterior descending artery, ramus intermedius artery, and left circumflex artery. The left main artery is mildly calcified with a 20% ostial stenosis and 10% distal stenosis. B. Left anterior descending artery: The LAD is calcified and has a 30% ostial stenosis, 50% proximal stenosis, and 100% mid-vessel stenosis immediately after the bifurcation of the first septal . The occlusion appears to be at the proximal edge of a previously placed stent. C. Ramus intermedius artery: Small to medium caliber vessel with 90% ostial stenosis. D. Left circumflex artery: The LCx is calcified and has a 100% ostial stenosis. E. Right coronary artery: Dominant vessel. The RCA is calcified with a 50% proximal stenosis and 100% mid-vessel stenosis. 3. Bypass graft anatomy:  A. Left subclavian artery: Patent with midl disease. B. LIMA to LAD: The LIMA is widely patent. There is a 70-80% stenosis in the distal LAD beyond the anastomosis. C. SVG to OM1 with sequential to OM2: The vein graft has a 30% proximal stenosis and 90% distal stenosis near what was previously likely the anastomosis of the OM1. The OM1 appears to have been a smaller vessel than the OM2 and is now occluded.   The OM2 has a 30% stenosis beyond its anastomosis with the vein graft.  D. SVG to distal RCA: The vein graft has a 30% ostial/proximal stenosis. The native distal RCA has a 50% stenosis after the anastomosis. There RPDA has a 30% ostial stenosis. The RPLB has a 30% proximal stenosis and 30% mid-vessel stenosis. Results of Intervention (PCI of distal SVG to OM1 with sequential to OM2):  Pre - 90% stenosis. JAYLA 3 flow. Post - <10% stenosis. There was initially poor re-flow following PTCA and stenting of the distal SVG. Copious IC vasodilators were given and the patient was started on IV Integrilin. Final angiography showed <10% residual stenosis with JAYLA 3 flow throughout the vein graft and all but the very distal small caliber segment of the native OM2 which still had no flow, but overall flow through the distal segment appeared to be gradually improving. Assessment:      1. CAD - S/p remote 4-vessel CABG. Underwent invasive coronary angiography yesterday demonstrating severe native multivessel coronary artery disease with patent LIMA to LAD, severely diseased SVG to OM1 with sequential to OM2, and patent SVG to distal RCA. At that time, PCI was performed to the distal SVG to OM1 with sequential to OM 2 with an [place] 3.5 x 20 mm AMBER. There was initially poor re-flow following PTCA and stenting of the distal SVG. Copious IC vasodilators were given and the patient was started on IV Integrilin. Final angiography showed <10% residual stenosis with JAYLA 3 flow throughout the vein graft and all but the very distal small caliber segment of the native OM2 which still had no flow, but overall flow through the distal segment appeared to be gradually improving. The OM1 appeared to be occluded, was felt to be much smaller than the OM2, and was overall not an adequate target for percutaneous coronary intervention.   Therefore, the decision was made to proceed with stenting across the anastomosis with the OM1.  2. Acute LV systolic heart failure/ischemic cardiomyopathy - Imaging suggests pulmonary edema and has 8L oxygen requirement.  Will diurese and repeat TTE to re-evaluate cardiac function.  3. Melena/concern for upper GI bleed - FOBT positive.  Starting IV PPI and consulting GI.  4. Hemoptysis - Could possibly be related to upper GI bleed.  Non-contrast chest CT suggestive of possible multifocal pneumonia. No other concerning masses were seen.  5. Possible community-acquired pneumonia - Starting IV ceftriaxone.  Avoid macrolide and flouroquinolone due to prolonged QT.  6. Acute hypoxic respiratory failure - Likely secondary to combination of acute heart failure and possible pneumonia.  7. Persistent atrial fibrillation - Ventricular rate currently controlled.  8. Essential hypertension  9. Hyperlipidemia  10. AAA s/p endovascular stent repair with known type II endoleak  11. SUNDEEP  12. Prolonged QT    Plan:     1. Off Integrilin.  Will consult GI due to concern for melena/possible upper GI bleed and start IV pantoprazole 40 mg twice daily.  Hemoglobin is only mildly decreased from previous and given recent vein graft stent, will recommend continuing dual antiplatelet therapy at this time, cut continue to hold off on restarting Eilquis for now.  2. We will start IV ceftriaxone for treatment of possible superimposed community-acquired pneumonia.  We will avoid macrolide and fluoroquinolone for now due to prolonged QT. Will also consult hospitalist to assist with treatment of non-cardiac issues.  3. Diurese with IV lasix 40 mg BID.  4. Continue aspirin 81 mg daily, Plavix 75 mg daily, atorvastatin 40 mg qHS, losartan 100 mg daily, and spironolactone 25 mg daily.  5. Discontinue amlodipine due to potential for calcium channel blocker to worsen heart failure.  Can add hydralazine/imdur if needed for additional afterload reduction.  6. Continue to hold off on beta-blocker given lower heart rate.  7. Monitor strict I/Os, obtain daily standing weights.  8. 2L per day  fluid restriction, low sodium diet. 9. Will repeat TTE to re-evaluate cardiac function. Thank you for allowing us to participate in the care of Pamela Nuno. Please call me with any questions [phone]. Jillian Carrel, DO  [facility]  [phone] [facility]  [phone] [address] [place] [address]  1/27/2023 10:19 AM    I will address the patient's cardiac risk factors and adjusted pharmacologic treatment as needed. In addition, I have reinforced the need for patient directed risk factor modification. All questions and concerns were addressed to the patient/family. Alternatives to my treatment were discussed. The note was completed using EMR. Every effort was made to ensure accuracy; however, inadvertent computerized transcription errors may be present.

## 2023-01-27 NOTE — PROGRESS NOTES
Shift: 0700 -1900    Admitting diagnosis: NSTEMI    Presentation to hospital: Chest pain, SOB, left shoulder pain    Surgery: no , Cath Lab    Nursing assessment at handoff  stable    Emergency Contact/POA: wife  Family updated: yes - wife at bedside    Most recent vitals: BP (!) 130/50   Pulse 55   Temp 98.3 °F (36.8 °C) (Axillary)   Resp 17   Ht 5' 9\" (1.753 m)   Wt 198 lb 8 oz (90 kg)   SpO2 92%   BMI 29.31 kg/m²      Rhythm: sinus bradycardia    NC/HFNC-  6 lpm  Respiratory support: - No ventilator support    Vent days: Day none    Increased O2 requirements: no/ has Severe SA & uses CPAP at home    Admission weight Weight: 198 lb 8 oz (90 kg)  Today's weight   Wt Readings from Last 1 Encounters:   01/26/23 198 lb 8 oz (90 kg)         UOP >30ml/hr: yes - void per urinal     Varela need assessed each shift: no    Restraints: no  Order current and documentation up to date? Lines/Drains  LDA Insertion Date Discontinued Date Dressing Changes   PIV  1/26 x2     TLC       Arterial  1/26 sheath  1/26    Varela       Vas Cath      ETT       Surgical drains        Night Shift Hospitalist Interventions    Problem(Brief) Date Time Intervention Physician contacted                                               Drip rates at handoff:    sodium chloride      eptifibatide 2 mcg/kg/min (01/26/23 1545)    nitroGLYCERIN Stopped (01/26/23 1800)    sodium chloride      sodium chloride 100 mL/hr at 01/26/23 [id number] [facility] Course Daily Updates:  Admit Day# 1 1/23  - admitted from cath lab  -stent placed OM  -Nitro gtt off 1800  - Integrilin gtt til 0014  -sheath pulled 1852, slight bruising, site soft  - IV fluids til 2300      Lab Data:   CBC:   Recent Labs     01/26/23  0702   WBC 7.5   HGB 12.5*   HCT 37.3*   MCV 91.1        BMP:    Recent Labs     01/26/23  0702      K 3.8   CO2 21   BUN 33*   CREATININE 1.1     LIVR: No results for input(s): AST, ALT in the last 72 hours.   PT/INR: No results for input(s): PROT, INR in the last 72 hours. APTT: No results for input(s): APTT in the last 72 hours. ABG: No results for input(s): PHART, BHW1MVC, PO2ART in the last 72 hours.   Consults (if GI or Nephrology- which group?)-  cardiology

## 2023-01-27 NOTE — CONSULTS
Hospital Medicine  Consult History & Physical        Chief Complaint: Chest pain    Date of Service: Pt seen/examined in consultation on 1/27/2023    History Of Present Illness:      80 y.o. male who we are asked to see/evaluate by Vishnu Montesinos DO for medical management of GI bleed, hemoptysis Anemia     Past Medical History:        Diagnosis Date    Aortic aneurysm, abdominal 1/2011    Appendicitis 12-06-11    Arthritis 12-06-11    Bruises easily     CAD (coronary artery disease) 9/2001    MI     CHF (congestive heart failure) (HCC)     Following CABG    Chronic back pain     Chronic pain of both shoulders 12-06-11    joint - the shoulders hurt    Complication of anesthesia     woke up during appendectomy    Gout     Heart disease 12-06-11    Hernia 12-    HIGH CHOLESTEROL 12-6-11    Hyperlipidemia     Hypertension     Measles 12-    Pancreatitis 2006    History of     Persistent cough     Ringing in ears     Sinus disorder     Sleep deprivation 12-    loss of sleep       Past Surgical History:        Procedure Laterality Date    ABDOMINAL AORTIC ANEURYSM REPAIR      ABDOMINAL AORTIC ANEURYSM REPAIR, ENDOVASCULAR  1/28/2011    Endomvascular abdominal aortic aneurysm repair with insertion of cardiomems pressure sensor    APPENDECTOMY      40-50 years ago    CARDIAC SURGERY  2008    CABG    CHOLECYSTECTOMY, LAPAROSCOPIC  2006    CORONARY ANGIOPLASTY WITH STENT PLACEMENT  2001 and 2002    CORONARY ARTERY BYPASS GRAFT      GALLBLADDER SURGERY      gallbladder/pancreatitis    HERNIA REPAIR      20+ years ago    INTRACAPSULAR CATARACT EXTRACTION Right 12/6/2018    PHACOEMULSIFICATION OF CATARACT RIGHT  EYE WITH INTRAOCULAR LENS IMPLANT performed by Sheri Zendejas MD at [address]      panceastitis [facility]    AZ XCAPSL CTRC RMVL INSJ IO LENS PROSTH W/O ECP Left 11/13/2018    PHACOEMULSIFICATION OF CATARACT LEFT EYE WITH INTRAOCULAR LENS IMPLANT performed by Sheri Zendejas MD at [address] [place] [address]      as child       Medications Prior to Admission:    Prior to Admission medications    Medication Sig Start Date End Date Taking? Authorizing Provider   doxazosin (CARDURA) 1 MG tablet Take 1 tablet by mouth nightly  Patient taking differently: Take 1 mg by mouth 2 times daily 1/20/23   Jeimy Rob MD   furosemide (LASIX) 40 MG tablet TAKE 1 TABLET EVERY DAY 12/24/22   Milon Go, DO   spironolactone (ALDACTONE) 25 MG tablet TAKE 1 TABLET EVERY DAY 12/24/22   Milon Go, DO   amLODIPine (NORVASC) 10 MG tablet Take 1 tablet by mouth daily 12/24/22   Milon Go, DO   atorvastatin (LIPITOR) 40 MG tablet TAKE 1 TABLET EVERY NIGHT 12/22/22   Jeimy Rob MD   olmesartan (BENICAR) 40 MG tablet Take 1 tablet by mouth daily 9/30/22   Jeimy Rob MD   apixaban (ELIQUIS) 5 MG TABS tablet Take 1 tablet by mouth 2 times daily 9/22/22   RUPERTO Weller - CNP   aspirin 81 MG chewable tablet Take 1 tablet by mouth daily 9/12/22   Cheryle Mattock, APRN - CNP   metoprolol succinate (TOPROL XL) 50 MG extended release tablet TAKE 1 TABLET EVERY DAY 2/8/22 9/11/22  Milon Go, DO   Multiple Vitamins-Minerals (MULTIVITAMIN WITH MINERALS) tablet Take 1 tablet by mouth daily 4/5/19   Milon Go, DO   ACCU-CHEK MULTICLIX LANCETS MISC Test blood sugar qd  Patient taking differently: Test blood sugar qd    AS NEEDED 3/1/18   Milon Go, DO   glucose blood VI test strips (ACCU-CHEK ION) strip Test blood sugar qd. Patient taking differently: daily as needed Test blood sugar qd. 3/1/18   Milon Go, DO       Allergies:  Coumadin [warfarin], Omeprazole, and Vioxx    Social History:      The patient currently lives at home    TOBACCO:   reports that he quit smoking about 45 years ago. His smoking use included cigarettes. He started smoking about 68 years ago. He has a 37.50 pack-year smoking history.  He has never used smokeless tobacco.  ETOH:   reports no history of alcohol use. Family History:      Reviewed in detail and negative for DM, CAD, Cancer, CVA. Positive as follows:        Problem Relation Age of Onset    Diabetes Mother     Heart Failure Mother     Cancer Mother     Heart Disease Mother     High Blood Pressure Mother     Heart Surgery Father     Heart Disease Father     Heart Failure Father     Heart Attack Father     Stroke Sister     Stroke Brother     Heart Surgery Paternal Uncle     Heart Disease Paternal Uncle     Heart Disease Paternal Uncle     Stroke Sister        REVIEW OF SYSTEMS COMPLETED:   Pertinent positives as noted in the HPI. All other systems reviewed and negative. PHYSICAL EXAM PERFORMED:  BP (!) 136/56   Pulse 57   Temp 98.6 °F (37 °C) (Axillary)   Resp 27   Ht 5' 9\" (1.753 m)   Wt 198 lb 8 oz (90 kg)   SpO2 90%   BMI 29.31 kg/m²   General appearance: Mild distress, appears stated age and cooperative. HEENT: Normal cephalic, atraumatic without obvious deformity. Pupils equal, round, and reactive to light. Extra ocular muscles intact. Conjunctivae/corneas clear. Neck: Supple, with full range of motion. Trachea midline. Respiratory: Increase respiratory effort. Rhonchi bilaterally cardiovascular: Regular rate and rhythm with normal S1/S2 without murmurs, rubs or gallops. Abdomen: Soft, non-tender, non-distended with normal bowel sounds. Musculoskeletal:  No clubbing, cyanosis or edema bilaterally. Skin: Skin color, texture, turgor normal.  No rashes or lesions. Neurologic:  Neurovascularly intact without any focal sensory/motor deficits.  Cranial nerves: II-XII intact, grossly non-focal.  Psychiatric: Alert and oriented, thought content appropriate, normal insight  Capillary Refill: Brisk,3 seconds, normal   Peripheral Pulses: +2 palpable, equal bilaterally     Labs:     Recent Labs     01/26/23  0702 01/27/23  0605 01/27/23  1207   WBC 7.5 13.1* 14.5*   HGB 12.5* 11.6* 11.4*   HCT 37.3* 36.1* 34.2*    165 154     Recent Labs     01/26/23  0702 01/27/23  0605    141   K 3.8 4.3    107   CO2 21 22   BUN 33* 34*   CREATININE 1.1 1.1   CALCIUM 9.3 9.0     No results for input(s): AST, ALT, BILIDIR, BILITOT, ALKPHOS in the last 72 hours. No results for input(s): INR in the last 72 hours. No results for input(s): [place] Bounds in the last 72 hours. Urinalysis:  No results found for: Melvena Fridge, BACTERIA, RBCUA, BLOODU, [address], [address]    Radiology: I have reviewed the radiology reports with the following interpretation:     CT CHEST WO CONTRAST   Final Result   Multifocal pneumonia as above. XR CHEST PORTABLE   Final Result   Mild-to-moderate cardiomegaly with status post CABG. Currently there is evidence of congestive heart failure with moderate to   marked pulmonary vascular congestion and moderate pulmonary edema. 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 In addition, there   are new infiltrates, and/or atelectatic changes in the right lower lung   field. Mild left basilar atelectasis. No obvious pleural effusion. No pneumothorax. EKG:  I have reviewed the EKG with the following interpretation:    @ekgread@      ASSESSMENT:    Active Hospital Problems    Diagnosis Date Noted    CAD in native artery [I25.10] 01/26/2023     Priority: Medium    Chest pain [R07.9] 01/26/2023     Priority: Medium    Shortness of breath [R06.02] 01/26/2023     Priority: Medium    Abnormal stress test [R94.39] 01/26/2023     Priority: Medium       PLAN:  Anemia, melena  GI consulted and plan on EGD today. Continue to monitor hemoglobin    Possible community-acquired pneumonia. CT chest personally reviewed with patchy groundglass opacities bilateral however much of it is perihilar and could be secondary the CHF exacerbation. Will obtain Pro-Arsenio.  On ceftriaxone at this time.   Avoiding macrolide or fluoroquinolone due to prolonged QT    Coronary disease status post remote four-vessel CABG and underwent invasive coronary angiogram during this admission that showed severe multivessel coronary disease and PCI performed. Management per cardiology    Acute systolic heart failure. Diuresing per cardiology. Acute hypoxic respiratory failure. Nurse reports that patient had significant increase in oxygen needs. He has underlying acute heart failure and now possible pneumonia.   Management as above    SUNDEEP -start CPAP intermittent here      DVT Prophylaxis: Per primary  Diet: Diet NPO Exceptions are: Sips of Water with Meds  Code Status: Full Code    PT/OT Eval Status: Active and ongoing    Dispo -Per primary    Thank you for the consultation, will follow up as needed    Electronically signed by Edward Barrientos DO on 1/27/23 at 12:50 PM EST

## 2023-01-28 PROBLEM — K92.2 UGI BLEED: Status: ACTIVE | Noted: 2023-01-28

## 2023-01-28 LAB
ANION GAP SERPL CALCULATED.3IONS-SCNC: 9 MMOL/L (ref 3–16)
BASOPHILS ABSOLUTE: 0 K/UL (ref 0–0.2)
BASOPHILS RELATIVE PERCENT: 0.3 %
BUN BLDV-MCNC: 34 MG/DL (ref 7–20)
CALCIUM SERPL-MCNC: 8.7 MG/DL (ref 8.3–10.6)
CHLORIDE BLD-SCNC: 106 MMOL/L (ref 99–110)
CO2: 25 MMOL/L (ref 21–32)
CREAT SERPL-MCNC: 1.1 MG/DL (ref 0.8–1.3)
EOSINOPHILS ABSOLUTE: 0 K/UL (ref 0–0.6)
EOSINOPHILS RELATIVE PERCENT: 0.5 %
GFR SERPL CREATININE-BSD FRML MDRD: >60 ML/MIN/{1.73_M2}
GLUCOSE BLD-MCNC: 150 MG/DL (ref 70–99)
HCT VFR BLD CALC: 30.2 % (ref 40.5–52.5)
HCT VFR BLD CALC: 31.5 % (ref 40.5–52.5)
HCT VFR BLD CALC: 31.5 % (ref 40.5–52.5)
HCT VFR BLD CALC: 31.7 % (ref 40.5–52.5)
HEMOGLOBIN: 10 G/DL (ref 13.5–17.5)
HEMOGLOBIN: 10.2 G/DL (ref 13.5–17.5)
HEMOGLOBIN: 10.6 G/DL (ref 13.5–17.5)
HEMOGLOBIN: 10.7 G/DL (ref 13.5–17.5)
INR BLD: 1.3 (ref 0.87–1.14)
LYMPHOCYTES ABSOLUTE: 1.4 K/UL (ref 1–5.1)
LYMPHOCYTES RELATIVE PERCENT: 13.1 %
MCH RBC QN AUTO: 31.2 PG (ref 26–34)
MCHC RBC AUTO-ENTMCNC: 33.8 G/DL (ref 31–36)
MCV RBC AUTO: 92.1 FL (ref 80–100)
MONOCYTES ABSOLUTE: 1 K/UL (ref 0–1.3)
MONOCYTES RELATIVE PERCENT: 9.5 %
NEUTROPHILS ABSOLUTE: 8 K/UL (ref 1.7–7.7)
NEUTROPHILS RELATIVE PERCENT: 76.6 %
PDW BLD-RTO: 15.4 % (ref 12.4–15.4)
PLATELET # BLD: 112 K/UL (ref 135–450)
PMV BLD AUTO: 8.4 FL (ref 5–10.5)
POTASSIUM SERPL-SCNC: 4.1 MMOL/L (ref 3.5–5.1)
PROTHROMBIN TIME: 16.1 SEC (ref 11.7–14.5)
RBC # BLD: 3.44 M/UL (ref 4.2–5.9)
SODIUM BLD-SCNC: 140 MMOL/L (ref 136–145)
WBC # BLD: 10.4 K/UL (ref 4–11)

## 2023-01-28 PROCEDURE — 94660 CPAP INITIATION&MGMT: CPT

## 2023-01-28 PROCEDURE — 36415 COLL VENOUS BLD VENIPUNCTURE: CPT

## 2023-01-28 PROCEDURE — 85014 HEMATOCRIT: CPT

## 2023-01-28 PROCEDURE — 2700000000 HC OXYGEN THERAPY PER DAY

## 2023-01-28 PROCEDURE — 2580000003 HC RX 258: Performed by: NURSE PRACTITIONER

## 2023-01-28 PROCEDURE — 2000000000 HC ICU R&B

## 2023-01-28 PROCEDURE — 85610 PROTHROMBIN TIME: CPT

## 2023-01-28 PROCEDURE — 85018 HEMOGLOBIN: CPT

## 2023-01-28 PROCEDURE — 85025 COMPLETE CBC W/AUTO DIFF WBC: CPT

## 2023-01-28 PROCEDURE — C9113 INJ PANTOPRAZOLE SODIUM, VIA: HCPCS | Performed by: NURSE PRACTITIONER

## 2023-01-28 PROCEDURE — 94761 N-INVAS EAR/PLS OXIMETRY MLT: CPT

## 2023-01-28 PROCEDURE — 6360000002 HC RX W HCPCS: Performed by: INTERNAL MEDICINE

## 2023-01-28 PROCEDURE — 6360000002 HC RX W HCPCS: Performed by: NURSE PRACTITIONER

## 2023-01-28 PROCEDURE — 99291 CRITICAL CARE FIRST HOUR: CPT | Performed by: INTERNAL MEDICINE

## 2023-01-28 PROCEDURE — 6370000000 HC RX 637 (ALT 250 FOR IP): Performed by: INTERNAL MEDICINE

## 2023-01-28 PROCEDURE — 2580000003 HC RX 258: Performed by: INTERNAL MEDICINE

## 2023-01-28 PROCEDURE — 80048 BASIC METABOLIC PNL TOTAL CA: CPT

## 2023-01-28 RX ADMIN — LOSARTAN POTASSIUM 100 MG: 100 TABLET, FILM COATED ORAL at 09:04

## 2023-01-28 RX ADMIN — SPIRONOLACTONE 25 MG: 25 TABLET ORAL at 09:04

## 2023-01-28 RX ADMIN — ATORVASTATIN CALCIUM 40 MG: 40 TABLET, FILM COATED ORAL at 19:46

## 2023-01-28 RX ADMIN — CLOPIDOGREL BISULFATE 75 MG: 75 TABLET ORAL at 09:04

## 2023-01-28 RX ADMIN — DOXAZOSIN 1 MG: 2 TABLET ORAL at 09:04

## 2023-01-28 RX ADMIN — CEFTRIAXONE SODIUM 1000 MG: 1 INJECTION, POWDER, FOR SOLUTION INTRAMUSCULAR; INTRAVENOUS at 13:28

## 2023-01-28 RX ADMIN — MUPIROCIN: 20 OINTMENT TOPICAL at 19:46

## 2023-01-28 RX ADMIN — SODIUM CHLORIDE 8 MG/HR: 9 INJECTION, SOLUTION INTRAVENOUS at 03:08

## 2023-01-28 RX ADMIN — DOXAZOSIN 1 MG: 2 TABLET ORAL at 19:46

## 2023-01-28 RX ADMIN — ASPIRIN 81 MG 81 MG: 81 TABLET ORAL at 09:04

## 2023-01-28 NOTE — PROGRESS NOTES
01/28/23 0356   NIV Type   Skin Protection for O2 Device Yes   Location Nose   Equipment Type V60   Mode CPAP   Mask Type Full face mask   Mask Size Medium   Settings/Measurements   PIP Observed 8 cm H20   CPAP/EPAP 6 cmH2O   Vt (Measured) 550 mL   Resp (!) 34   FiO2  40 %   Minute Volume (L/min) 19 Liters   Mask Leak (lpm) 2 lpm   Comfort Level Good   Using Accessory Muscles No   SpO2 96   Patient's Home Machine No

## 2023-01-28 NOTE — PROGRESS NOTES
[facility]   Daily Cardiovascular Progress Note    Admit Date: 1/26/2023    CC: s/p PCI with AMBER and subsequent GI bleed requiring EGD. HPI: Jessica Xiong has no complaints today. Medications/Labs all Reviewed:  Patient Active Problem List   Diagnosis    Hypertension    CAD (coronary artery disease)    Hyperlipidemia    CHF (congestive heart failure) ([facility])    Aortic aneurysm, abdominal    Sleep deprivation    Chronic pain of both shoulders    Arthritis    Hernia    Ringing in ears    GERD (gastroesophageal reflux disease)    Leg length inequality    DM (diabetes mellitus) ([facility])    Hypokalemia    TIA (transient ischemic attack)    CVA (cerebral vascular accident) ([facility]    Bradycardia    Stroke-like symptoms    Atrial fibrillation ([facility]    Nuclear senile cataract    SUNDEEP (obstructive sleep apnea)    Overweight (BMI 25.0-29. 9)    CAD in native artery    Chest pain    Shortness of breath    Abnormal stress test    UGI bleed       Medications:   mupirocin   Nasal BID    cefTRIAXone (ROCEPHIN) IV  1,000 mg IntraVENous Q24H    sodium chloride flush  5-40 mL IntraVENous 2 times per day    clopidogrel  75 mg Oral Daily    aspirin  81 mg Oral Daily    atorvastatin  40 mg Oral Nightly    doxazosin  1 mg Oral BID    losartan  100 mg Oral Daily    spironolactone  25 mg Oral Daily       Infusion Medications:   pantoprozole (PROTONIX) infusion 8 mg/hr (01/28/23 0308)    sodium chloride         Labs:  Lab Results   Component Value Date    WBC 10.4 01/28/2023    HGB 10.7 (L) 01/28/2023    HCT 31.7 (L) 01/28/2023    MCV 92.1 01/28/2023     (L) 01/28/2023     Lab Results   Component Value Date    CREATININE 1.1 01/28/2023    BUN 34 (H) 01/28/2023     01/28/2023    K 4.1 01/28/2023     01/28/2023    CO2 25 01/28/2023     Lab Results   Component Value Date    INR 1.30 (H) 01/28/2023    PROTIME 16.1 (H) 01/28/2023        Physical Examination:    BP (!) 125/41   Pulse (!) 45   Temp 98 °F (36.7 °C) (Oral)   Resp 25   Ht 5' 9\" (1.753 m)   Wt 198 lb 8 oz (90 kg)   SpO2 95%   BMI 29.31 kg/m²    Wt Readings from Last 3 Encounters:   01/26/23 198 lb 8 oz (90 kg)   01/20/23 197 lb (89.4 kg)   11/15/22 193 lb 12.8 oz (87.9 kg)       Intake/Output Summary (Last 24 hours) at 1/28/2023 8845  Last data filed at 1/28/2023 0308  Gross per 24 hour   Intake --   Output 2200 ml   Net -2200 ml       Respiratory:  Resp Assessment: Normal respiratory effort  Resp Auscultation: Clear to auscultation bilaterally   Cardiovascular: Auscultation: regular rhythm and normal rate, normal S1S2, no murmur, rub or gallop  Palpation:  Nl PMI  JVP:  normal  Extremities: No Edema  Abdomen:  Soft, non-tender  Normal bowel sounds  Extremities:   No Cyanosis or Clubbing  Neurological/Psychiatric:  Oriented to time, place, and person  Non-anxious  Skin Warm and dry    Assessment:    Principal Problem:    UGI bleed  Active Problems:    CAD in native artery    Chest pain    Shortness of breath    Abnormal stress test  Resolved Problems:    * No resolved hospital problems. *      Plan:  1. Patient remains without overt evidence for GI bleed   ~remains on PPI infusion   ~defer to medical team and GI regarding transition to oral  2. Due to recent PCI , he will need to remain on DAPT unless absolute contraindication from GI team  3. Possible transfer to the floor today      Due to the high probability of clinically significant life threating deterioration of the patient's condition that required my urgent intervention, a total critical care time 35  minutes was used. This time excludes any time that may have been spent performing procedures. This includes but not limited to vital sign monitoring, telemetry monitoring, continuous pulse oximety, IV medication, clinical response to the IV medications, documentation time , consultation time, interpretation of lab data, review of nursing notes and old record review.                All questions and concerns were addressed to the patient/family. Alternatives to my treatment were discussed. The note was completed using EMR. Every effort was made to ensure accuracy; however, inadvertent computerized transcription errors may be present.     Yannick Álvarez MD, [place], [facility], [address] [place] [address]  1/28/2023 6:21 AM

## 2023-01-28 NOTE — PROGRESS NOTES
01/27/23 2205   NIV Type   Skin Assessment Clean, dry, & intact   Skin Protection for O2 Device Yes   Location Nose   Equipment Type V60   Mode CPAP   Mask Type Full face mask   Mask Size Medium   Settings/Measurements   PIP Observed 8 cm H20   CPAP/EPAP 7 cmH2O   Vt (Measured) 703 mL   Resp 29   FiO2  40 %   Minute Volume (L/min) 19 Liters   Mask Leak (lpm) 9 lpm   Comfort Level Good   Using Accessory Muscles No   SpO2 99   Patient's Home Machine No

## 2023-01-28 NOTE — PROGRESS NOTES
01/28/23 0002   NIV Type   $NIV $Daily Charge   Skin Protection for O2 Device Yes   Location Nose   Equipment Type V60   Mode CPAP   Mask Type Full face mask   Mask Size Medium   Settings/Measurements   PIP Observed 7 cm H20   CPAP/EPAP 6 cmH2O   Vt (Measured) 510 mL   Resp 27   FiO2  40 %   Minute Volume (L/min) 14 Liters   Mask Leak (lpm) 1 lpm   Comfort Level Good   Using Accessory Muscles No   SpO2 96   Patient's Home Machine No

## 2023-01-28 NOTE — PROGRESS NOTES
GI Progress Note      SUBJECTIVE:  Denies melena, hematochezia or hematemesis    OBJECTIVE      Medications    Current Facility-Administered Medications: mupirocin (BACTROBAN) 2 % ointment, , Nasal, BID  perflutren lipid microspheres (DEFINITY) injection 1.5 mL, 1.5 mL, IntraVENous, ONCE PRN  cefTRIAXone (ROCEPHIN) 1,000 mg in sodium chloride 0.9 % 50 mL IVPB (mini-bag), 1,000 mg, IntraVENous, Q24H  pantoprazole (PROTONIX) 80 mg in sodium chloride 0.9 % 100 mL infusion, 8 mg/hr, IntraVENous, Continuous  sodium chloride flush 0.9 % injection 5-40 mL, 5-40 mL, IntraVENous, 2 times per day  sodium chloride flush 0.9 % injection 5-40 mL, 5-40 mL, IntraVENous, PRN  ondansetron (ZOFRAN) injection 4 mg, 4 mg, IntraVENous, Q6H PRN  clopidogrel (PLAVIX) tablet 75 mg, 75 mg, Oral, Daily  aspirin chewable tablet 81 mg, 81 mg, Oral, Daily  0.9 % sodium chloride infusion, , IntraVENous, PRN  acetaminophen (TYLENOL) tablet 650 mg, 650 mg, Oral, Q4H PRN  atorvastatin (LIPITOR) tablet 40 mg, 40 mg, Oral, Nightly  doxazosin (CARDURA) tablet 1 mg, 1 mg, Oral, BID  losartan (COZAAR) tablet 100 mg, 100 mg, Oral, Daily  spironolactone (ALDACTONE) tablet 25 mg, 25 mg, Oral, Daily  Physical    VITALS:  BP (!) 131/54   Pulse 63   Temp 98 °F (36.7 °C) (Oral)   Resp 19   Ht 5' 9\" (1.753 m)   Wt 198 lb 8 oz (90 kg)   SpO2 92%   BMI 29.31 kg/m²   ABD: soft, NT, ND, NABs  Data    Recent Labs     01/27/23  0605 01/27/23  1207 01/28/23  0050 01/28/23  0420 01/28/23  0848   WBC 13.1* 14.5*  --  10.4  --    HGB 11.6* 11.4* 10.6* 10.7* 10.2*   HCT 36.1* 34.2* 31.5* 31.7* 31.5*   MCV 92.5 92.5  --  92.1  --     154  --  112*  --          ASSESSMENT AND PLAN   79yo M s/p invasive coronary angiography with PCI and AMBER placement yesterday involving the use of IV Integrilin and subsequent ASA and Plavix. Course has been c/b: pneumonia,  acute LV systolic HF in the setting of ischemic cardiomyopathy as well melena. Integrilin stopped.   ASA and Plavix continue. EGD (1/27) demonstrated small clean based esophageal and antral ulcers as well old blood in the stomach obscuring visibility. On IV PPI gtts despite ongoing anticoagulation signs of active bleeding are not appreciated.   - IV PPI gtts for 72 hours  - follow Hgb closely  - advance diet  - await fecal H pylori Ag

## 2023-01-29 LAB
ANION GAP SERPL CALCULATED.3IONS-SCNC: 9 MMOL/L (ref 3–16)
BASOPHILS ABSOLUTE: 0 K/UL (ref 0–0.2)
BASOPHILS RELATIVE PERCENT: 0.2 %
BUN BLDV-MCNC: 32 MG/DL (ref 7–20)
CALCIUM SERPL-MCNC: 8.7 MG/DL (ref 8.3–10.6)
CHLORIDE BLD-SCNC: 105 MMOL/L (ref 99–110)
CO2: 26 MMOL/L (ref 21–32)
CREAT SERPL-MCNC: 1.3 MG/DL (ref 0.8–1.3)
EOSINOPHILS ABSOLUTE: 0.1 K/UL (ref 0–0.6)
EOSINOPHILS RELATIVE PERCENT: 0.7 %
GFR SERPL CREATININE-BSD FRML MDRD: 55 ML/MIN/{1.73_M2}
GLUCOSE BLD-MCNC: 152 MG/DL (ref 70–99)
HCT VFR BLD CALC: 28.2 % (ref 40.5–52.5)
HCT VFR BLD CALC: 29 % (ref 40.5–52.5)
HCT VFR BLD CALC: 30.7 % (ref 40.5–52.5)
HCT VFR BLD CALC: 34.1 % (ref 40.5–52.5)
HEMOGLOBIN: 10.2 G/DL (ref 13.5–17.5)
HEMOGLOBIN: 10.7 G/DL (ref 13.5–17.5)
HEMOGLOBIN: 9.7 G/DL (ref 13.5–17.5)
HEMOGLOBIN: 9.9 G/DL (ref 13.5–17.5)
LYMPHOCYTES ABSOLUTE: 1 K/UL (ref 1–5.1)
LYMPHOCYTES RELATIVE PERCENT: 10 %
MCH RBC QN AUTO: 31.3 PG (ref 26–34)
MCHC RBC AUTO-ENTMCNC: 34 G/DL (ref 31–36)
MCV RBC AUTO: 91.9 FL (ref 80–100)
MONOCYTES ABSOLUTE: 0.7 K/UL (ref 0–1.3)
MONOCYTES RELATIVE PERCENT: 7.5 %
NEUTROPHILS ABSOLUTE: 7.9 K/UL (ref 1.7–7.7)
NEUTROPHILS RELATIVE PERCENT: 81.6 %
PDW BLD-RTO: 15.2 % (ref 12.4–15.4)
PLATELET # BLD: 104 K/UL (ref 135–450)
PMV BLD AUTO: 8.2 FL (ref 5–10.5)
POTASSIUM SERPL-SCNC: 3.8 MMOL/L (ref 3.5–5.1)
RBC # BLD: 3.15 M/UL (ref 4.2–5.9)
SODIUM BLD-SCNC: 140 MMOL/L (ref 136–145)
WBC # BLD: 9.7 K/UL (ref 4–11)

## 2023-01-29 PROCEDURE — 2000000000 HC ICU R&B

## 2023-01-29 PROCEDURE — 2700000000 HC OXYGEN THERAPY PER DAY

## 2023-01-29 PROCEDURE — 2580000003 HC RX 258: Performed by: INTERNAL MEDICINE

## 2023-01-29 PROCEDURE — 6360000002 HC RX W HCPCS: Performed by: INTERNAL MEDICINE

## 2023-01-29 PROCEDURE — 99291 CRITICAL CARE FIRST HOUR: CPT | Performed by: INTERNAL MEDICINE

## 2023-01-29 PROCEDURE — 94761 N-INVAS EAR/PLS OXIMETRY MLT: CPT

## 2023-01-29 PROCEDURE — 94660 CPAP INITIATION&MGMT: CPT

## 2023-01-29 PROCEDURE — 6370000000 HC RX 637 (ALT 250 FOR IP): Performed by: INTERNAL MEDICINE

## 2023-01-29 PROCEDURE — 6360000002 HC RX W HCPCS: Performed by: NURSE PRACTITIONER

## 2023-01-29 PROCEDURE — 85018 HEMOGLOBIN: CPT

## 2023-01-29 PROCEDURE — 80048 BASIC METABOLIC PNL TOTAL CA: CPT

## 2023-01-29 PROCEDURE — 36415 COLL VENOUS BLD VENIPUNCTURE: CPT

## 2023-01-29 PROCEDURE — C9113 INJ PANTOPRAZOLE SODIUM, VIA: HCPCS | Performed by: NURSE PRACTITIONER

## 2023-01-29 PROCEDURE — 85014 HEMATOCRIT: CPT

## 2023-01-29 PROCEDURE — 2580000003 HC RX 258: Performed by: NURSE PRACTITIONER

## 2023-01-29 PROCEDURE — 85025 COMPLETE CBC W/AUTO DIFF WBC: CPT

## 2023-01-29 RX ORDER — FUROSEMIDE 10 MG/ML
20 INJECTION INTRAMUSCULAR; INTRAVENOUS ONCE
Status: COMPLETED | OUTPATIENT
Start: 2023-01-29 | End: 2023-01-29

## 2023-01-29 RX ORDER — AMOXICILLIN 250 MG/1
1000 CAPSULE ORAL EVERY 8 HOURS SCHEDULED
Status: DISCONTINUED | OUTPATIENT
Start: 2023-01-30 | End: 2023-02-01 | Stop reason: HOSPADM

## 2023-01-29 RX ADMIN — MUPIROCIN: 20 OINTMENT TOPICAL at 21:26

## 2023-01-29 RX ADMIN — SODIUM CHLORIDE 8 MG/HR: 9 INJECTION, SOLUTION INTRAVENOUS at 22:23

## 2023-01-29 RX ADMIN — FUROSEMIDE 20 MG: 10 INJECTION, SOLUTION INTRAMUSCULAR; INTRAVENOUS at 14:45

## 2023-01-29 RX ADMIN — CLOPIDOGREL BISULFATE 75 MG: 75 TABLET ORAL at 08:21

## 2023-01-29 RX ADMIN — SODIUM CHLORIDE 8 MG/HR: 9 INJECTION, SOLUTION INTRAVENOUS at 01:24

## 2023-01-29 RX ADMIN — CEFTRIAXONE SODIUM 1000 MG: 1 INJECTION, POWDER, FOR SOLUTION INTRAMUSCULAR; INTRAVENOUS at 15:20

## 2023-01-29 RX ADMIN — SPIRONOLACTONE 25 MG: 25 TABLET ORAL at 08:21

## 2023-01-29 RX ADMIN — ASPIRIN 81 MG 81 MG: 81 TABLET ORAL at 08:21

## 2023-01-29 RX ADMIN — SODIUM CHLORIDE 8 MG/HR: 9 INJECTION, SOLUTION INTRAVENOUS at 10:46

## 2023-01-29 RX ADMIN — Medication 10 ML: at 08:22

## 2023-01-29 RX ADMIN — MUPIROCIN: 20 OINTMENT TOPICAL at 08:23

## 2023-01-29 RX ADMIN — ATORVASTATIN CALCIUM 40 MG: 40 TABLET, FILM COATED ORAL at 21:25

## 2023-01-29 RX ADMIN — DOXAZOSIN 1 MG: 2 TABLET ORAL at 21:26

## 2023-01-29 RX ADMIN — Medication 10 ML: at 21:26

## 2023-01-29 RX ADMIN — DOXAZOSIN 1 MG: 2 TABLET ORAL at 08:21

## 2023-01-29 RX ADMIN — LOSARTAN POTASSIUM 100 MG: 100 TABLET, FILM COATED ORAL at 08:21

## 2023-01-29 NOTE — PROGRESS NOTES
01/29/23 0406   NIV Type   Equipment Type V60   Mode CPAP   Mask Type Full face mask   Mask Size Medium   Settings/Measurements   PIP Observed 7 cm H20   CPAP/EPAP 6 cmH2O   Vt (Measured) 590 mL   Resp 30   FiO2  40 %   Minute Volume (L/min) 17 Liters   Mask Leak (lpm) 3 lpm   Comfort Level Good   Using Accessory Muscles No   SpO2 96   Patient's Home Machine No

## 2023-01-29 NOTE — PROGRESS NOTES
01/28/23 0430   NIV Type   NIV Started/Stopped On   Equipment Type v60   Mode CPAP   Mask Type Full face mask   Mask Size Medium   Settings/Measurements   PIP Observed 7 cm H20   CPAP/EPAP 6 cmH2O   Vt (Measured) 932 mL   Resp 23   FiO2  40 %   Minute Volume (L/min) 16.7 Liters   Mask Leak (lpm) 8 lpm   Comfort Level Good   Using Accessory Muscles No   SpO2 95   Patient's Home Machine No   Alarm Settings   Alarms On Y   Low Pressure (cmH2O) 6 cmH2O   High Pressure (cmH2O) 30 cmH2O   RR Low (bpm) 6   RR High (bpm) 40 br/min Pt spontaneously went into SVT with rates in the 200-220 range prior to any adjustment on the vent. In fact, the only change done was to increase fentanyl from 50 mcg/hr to 75 mcg/hr.She spontaneously converted back to NSR after a BP was taken.  This SVT lasted for about 3 minutes.  Pulses were palpable at approximately 80 beats a minute and became irregular prior to converting to NSR. Pt [facility] on SIM[facility] mode [facility] 1500 [facility]  FiO2 titrated to 40%.  Fentanyl gtt increased to 100 mcg/hr to assist with comfort. Family at bedside and made aware of the situation.  Will continue according to the vent weaning protocol.

## 2023-01-29 NOTE — PLAN OF CARE
Problem: Chronic Conditions and Co-morbidities  Goal: Patient's chronic conditions and co-morbidity symptoms are monitored and maintained or improved  Outcome: Progressing  Flowsheets (Taken 1/29/2023 1746)  Care Plan - Patient's Chronic Conditions and Co-Morbidity Symptoms are Monitored and Maintained or Improved:   Monitor and assess patient's chronic conditions and comorbid symptoms for stability, deterioration, or improvement   Collaborate with multidisciplinary team to address chronic and comorbid conditions and prevent exacerbation or deterioration   Update acute care plan with appropriate goals if chronic or comorbid symptoms are exacerbated and prevent overall improvement and discharge     Problem: Discharge Planning  Goal: Discharge to home or other facility with appropriate resources  Outcome: Progressing  Flowsheets (Taken 1/29/2023 6746)  Discharge to home or other facility with appropriate resources: Refer to discharge planning if patient needs post-hospital services based on physician order or complex needs related to functional status, cognitive ability or social support system

## 2023-01-29 NOTE — PROGRESS NOTES
01/29/23 0019   NIV Type   $NIV $Daily Charge   Equipment Type V60   Mode CPAP   Mask Type Full face mask   Mask Size Medium   Settings/Measurements   PIP Observed 7 cm H20   CPAP/EPAP 6 cmH2O   Vt (Measured) 510 mL   Resp 28   FiO2  40 %   Minute Volume (L/min) 16 Liters   Mask Leak (lpm) 18 lpm   Comfort Level Good   Using Accessory Muscles No   SpO2 95   Patient's Home Machine No

## 2023-01-29 NOTE — PROGRESS NOTES
Hospitalist Progress Note      PCP: Antonio Otoole DO    Date of Admission: 1/26/2023    Chief Complaint: Chest pain     Hospital Course:   80 y.o. male who we are asked to see/evaluate by Cha Peralta DO for medical management of GI bleed, hemoptysis Anemia      Subjective: Patient is doing better respiratory wise. No abdominal pain. Hgb stable       Medications:  Reviewed    Infusion Medications    pantoprozole (PROTONIX) infusion 8 mg/hr (01/29/23 1046)    sodium chloride       Scheduled Medications    mupirocin   Nasal BID    cefTRIAXone (ROCEPHIN) IV  1,000 mg IntraVENous Q24H    sodium chloride flush  5-40 mL IntraVENous 2 times per day    clopidogrel  75 mg Oral Daily    aspirin  81 mg Oral Daily    atorvastatin  40 mg Oral Nightly    doxazosin  1 mg Oral BID    losartan  100 mg Oral Daily    spironolactone  25 mg Oral Daily     PRN Meds: perflutren lipid microspheres, sodium chloride flush, ondansetron, sodium chloride, acetaminophen      Intake/Output Summary (Last 24 hours) at 1/29/2023 1410  Last data filed at 1/29/2023 0826  Gross per 24 hour   Intake 220 ml   Output 1300 ml   Net -1080 ml       Physical Exam Performed:    BP (!) 142/78   Pulse 69   Temp 98 °F (36.7 °C) (Axillary)   Resp 17   Ht 5' 9\" (1.753 m)   Wt 200 lb (90.7 kg)   SpO2 96%   BMI 29.53 kg/m²     General appearance: no distress, appears stated age and cooperative. HEENT: Normal cephalic, atraumatic without obvious deformity. Pupils equal, round, and reactive to light. Extra ocular muscles intact. Conjunctivae/corneas clear. Neck: Supple, with full range of motion. Trachea midline. Respiratory: no increased respiratory effort. Rhonchi bilaterally improved   cardiovascular: Regular rate and rhythm with normal S1/S2 without murmurs, rubs or gallops. Abdomen: Soft, non-tender, non-distended with normal bowel sounds. Musculoskeletal:  No clubbing, cyanosis or edema bilaterally.   Skin: Skin color, texture, turgor normal.  No rashes or lesions. Neurologic:  Neurovascularly intact without any focal sensory/motor deficits. Cranial nerves: II-XII intact, grossly non-focal.  Psychiatric: Alert and oriented, thought content appropriate, normal insight  Capillary Refill: Brisk,3 seconds, normal   Peripheral Pulses: +2 palpable, equal bilaterally       Labs:   Recent Labs     01/27/23  1207 01/28/23  0050 01/28/23  0420 01/28/23  0848 01/29/23  0032 01/29/23  0419 01/29/23  0850   WBC 14.5*  --  10.4  --   --  9.7  --    HGB 11.4*   < > 10.7*   < > 9.7* 9.9* 10.7*   HCT 34.2*   < > 31.7*   < > 28.2* 29.0* 34.1*     --  112*  --   --  104*  --     < > = values in this interval not displayed. Recent Labs     01/27/23  0605 01/28/23  0420 01/29/23  0419    140 140   K 4.3 4.1 3.8    106 105   CO2 22 25 26   BUN 34* 34* 32*   CREATININE 1.1 1.1 1.3   CALCIUM 9.0 8.7 8.7     No results for input(s): AST, ALT, BILIDIR, BILITOT, ALKPHOS in the last 72 hours. Recent Labs     01/28/23  0420   INR 1.30*     No results for input(s): Kaity Gabrielladeion in the last 72 hours. Urinalysis:    No results found for: Dacia Trujillo, BACTERIA, RBCUA, BLOODU, SPECGRAV, [address]    Radiology:  CT CHEST WO CONTRAST   Final Result   Multifocal pneumonia as above. XR CHEST PORTABLE   Final Result   Mild-to-moderate cardiomegaly with status post CABG. Currently there is evidence of congestive heart failure with moderate to   marked pulmonary vascular congestion and moderate pulmonary edema. 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 In addition, there   are new infiltrates, and/or atelectatic changes in the right lower lung   field. Mild left basilar atelectasis. No obvious pleural effusion. No pneumothorax.              IP CONSULT TO CARDIAC REHAB  IP CONSULT TO CARDIAC REHAB  IP CONSULT TO GI  IP CONSULT TO HOSPITALIST    Assessment/Plan:    Active Hospital Problems    Diagnosis     UGI bleed [K92.2]      Priority: Medium    CAD in native artery [I25.10]      Priority: Medium    Chest pain [R07.9]      Priority: Medium    Shortness of breath [R06.02]      Priority: Medium    Abnormal stress test [R94.39]      Priority: Medium     Anemia, melena  EGD completed and showed small clean based esophageal and antral ulcers   Continue IV PPI gtts for 72 hours  Continue to follow Hgb   Advance diet   Awaiting fecal H pylori Ag      community-acquired pneumonia. Procal elevated. On ceftriaxone at this time. Avoiding macrolide or fluoroquinolone due to prolonged QT. Recommend 5 days of abx      Coronary disease status post remote four-vessel CABG and underwent invasive coronary angiogram during this admission that showed severe multivessel coronary disease and PCI performed. Management per cardiology     Acute systolic heart failure. Diuresing per cardiology. Acute hypoxic respiratory failure. Nurse reports that patient had significant increase in oxygen needs. He has underlying acute heart failure and now pneumonia. Management as above     SUNEDEP -start CPAP intermittent here      Diet: ADULT DIET;  Regular  Code Status: Full Code      Dispo - per cardiology  Will continue to follow       Lora Rivera,

## 2023-01-29 NOTE — PROGRESS NOTES
Hospitalist Progress Note      PCP: Yudith Orozco DO    Date of Admission: 1/26/2023    Chief Complaint: Chest pain     Hospital Course:   80 y.o. male who we are asked to see/evaluate by Cande Serrato DO for medical management of GI bleed, hemoptysis Anemia      Subjective: Patient is doing better respiratory wise. No abdominal pain. Hgb stable       Medications:  Reviewed    Infusion Medications    pantoprozole (PROTONIX) infusion 8 mg/hr (01/29/23 1046)    sodium chloride       Scheduled Medications    furosemide  20 mg IntraVENous Once    mupirocin   Nasal BID    cefTRIAXone (ROCEPHIN) IV  1,000 mg IntraVENous Q24H    sodium chloride flush  5-40 mL IntraVENous 2 times per day    clopidogrel  75 mg Oral Daily    aspirin  81 mg Oral Daily    atorvastatin  40 mg Oral Nightly    doxazosin  1 mg Oral BID    losartan  100 mg Oral Daily    spironolactone  25 mg Oral Daily     PRN Meds: perflutren lipid microspheres, sodium chloride flush, ondansetron, sodium chloride, acetaminophen      Intake/Output Summary (Last 24 hours) at 1/29/2023 1418  Last data filed at 1/29/2023 0826  Gross per 24 hour   Intake 220 ml   Output 1300 ml   Net -1080 ml         Physical Exam Performed:    BP (!) 142/78   Pulse 69   Temp 98 °F (36.7 °C) (Axillary)   Resp 17   Ht 5' 9\" (1.753 m)   Wt 200 lb (90.7 kg)   SpO2 96%   BMI 29.53 kg/m²     General appearance: no distress, appears stated age and cooperative. HEENT: Normal cephalic, atraumatic without obvious deformity. Pupils equal, round, and reactive to light. Extra ocular muscles intact. Conjunctivae/corneas clear. Neck: Supple, with full range of motion. Trachea midline. Respiratory: no increased respiratory effort. Rhonchi bilaterally improved   cardiovascular: Regular rate and rhythm with normal S1/S2 without murmurs, rubs or gallops. Abdomen: Soft, non-tender, non-distended with normal bowel sounds.   Musculoskeletal:  No clubbing, cyanosis or edema bilaterally. Skin: Skin color, texture, turgor normal.  No rashes or lesions. Neurologic:  Neurovascularly intact without any focal sensory/motor deficits. Cranial nerves: II-XII intact, grossly non-focal.  Psychiatric: Alert and oriented, thought content appropriate, normal insight  Capillary Refill: Brisk,3 seconds, normal   Peripheral Pulses: +2 palpable, equal bilaterally       Labs:   Recent Labs     01/27/23  1207 01/28/23  0050 01/28/23  0420 01/28/23  0848 01/29/23  0032 01/29/23  0419 01/29/23  0850   WBC 14.5*  --  10.4  --   --  9.7  --    HGB 11.4*   < > 10.7*   < > 9.7* 9.9* 10.7*   HCT 34.2*   < > 31.7*   < > 28.2* 29.0* 34.1*     --  112*  --   --  104*  --     < > = values in this interval not displayed. Recent Labs     01/27/23  0605 01/28/23  0420 01/29/23  0419    140 140   K 4.3 4.1 3.8    106 105   CO2 22 25 26   BUN 34* 34* 32*   CREATININE 1.1 1.1 1.3   CALCIUM 9.0 8.7 8.7       No results for input(s): AST, ALT, BILIDIR, BILITOT, ALKPHOS in the last 72 hours. Recent Labs     01/28/23  0420   INR 1.30*       No results for input(s): Shy Barbosa in the last 72 hours. Urinalysis:    No results found for: [place] Guardian, BACTERIA, RBCUA, BLOODU, SPECGRAV, [address]    Radiology:  CT CHEST WO CONTRAST   Final Result   Multifocal pneumonia as above. XR CHEST PORTABLE   Final Result   Mild-to-moderate cardiomegaly with status post CABG. Currently there is evidence of congestive heart failure with moderate to   marked pulmonary vascular congestion and moderate pulmonary edema. 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 In addition, there   are new infiltrates, and/or atelectatic changes in the right lower lung   field. Mild left basilar atelectasis. No obvious pleural effusion. No pneumothorax.              IP CONSULT TO CARDIAC REHAB  IP CONSULT TO CARDIAC REHAB  IP CONSULT TO GI  IP CONSULT TO HOSPITALIST    Assessment/Plan:    Active Hospital Problems    Diagnosis     UGI bleed [K92.2]      Priority: Medium    CAD in native artery [I25.10]      Priority: Medium    Chest pain [R07.9]      Priority: Medium    Shortness of breath [R06.02]      Priority: Medium    Abnormal stress test [R94.39]      Priority: Medium     Anemia, melena  EGD completed and showed small clean based esophageal and antral ulcers   Continue IV PPI gtts for 72 hours  Continue to follow Hgb   Advance diet   Awaiting fecal H pylori Ag      community-acquired pneumonia. Procal elevated. On ceftriaxone at this time. Avoiding macrolide or fluoroquinolone due to prolonged QT. Recommend 5 days of abx      Coronary disease status post remote four-vessel CABG and underwent invasive coronary angiogram during this admission that showed severe multivessel coronary disease and PCI performed. Management per cardiology     Acute systolic heart failure. Diuresing per cardiology. Acute hypoxic respiratory failure. Nurse reports that patient had significant increase in oxygen needs. He has underlying acute heart failure and now pneumonia. Management as above     SUNDEEP -start CPAP intermittent here      Diet: ADULT DIET;  Regular  Code Status: Full Code      Dispo - per cardiology  Will continue to follow       Cody Butler DO

## 2023-01-29 NOTE — PROGRESS NOTES
GI Progress Note      SUBJECTIVE:  Denies melena, hematochezia or hematemesis. Tolerating oral intake. Passing flatus    OBJECTIVE      Medications    Current Facility-Administered Medications: mupirocin (BACTROBAN) 2 % ointment, , Nasal, BID  perflutren lipid microspheres (DEFINITY) injection 1.5 mL, 1.5 mL, IntraVENous, ONCE PRN  cefTRIAXone (ROCEPHIN) 1,000 mg in sodium chloride 0.9 % 50 mL IVPB (mini-bag), 1,000 mg, IntraVENous, Q24H  pantoprazole (PROTONIX) 80 mg in sodium chloride 0.9 % 100 mL infusion, 8 mg/hr, IntraVENous, Continuous  sodium chloride flush 0.9 % injection 5-40 mL, 5-40 mL, IntraVENous, 2 times per day  sodium chloride flush 0.9 % injection 5-40 mL, 5-40 mL, IntraVENous, PRN  ondansetron (ZOFRAN) injection 4 mg, 4 mg, IntraVENous, Q6H PRN  clopidogrel (PLAVIX) tablet 75 mg, 75 mg, Oral, Daily  aspirin chewable tablet 81 mg, 81 mg, Oral, Daily  0.9 % sodium chloride infusion, , IntraVENous, PRN  acetaminophen (TYLENOL) tablet 650 mg, 650 mg, Oral, Q4H PRN  atorvastatin (LIPITOR) tablet 40 mg, 40 mg, Oral, Nightly  doxazosin (CARDURA) tablet 1 mg, 1 mg, Oral, BID  losartan (COZAAR) tablet 100 mg, 100 mg, Oral, Daily  spironolactone (ALDACTONE) tablet 25 mg, 25 mg, Oral, Daily  Physical    VITALS:  BP (!) 142/78   Pulse 69   Temp 98 °F (36.7 °C) (Axillary)   Resp 17   Ht 5' 9\" (1.753 m)   Wt 200 lb (90.7 kg)   SpO2 96%   BMI 29.53 kg/m²   ABD: soft, NT, NABs, ND  Data    Recent Labs     01/27/23  1207 01/28/23  0050 01/28/23  0420 01/28/23  0848 01/29/23  0032 01/29/23  0419 01/29/23  0850   WBC 14.5*  --  10.4  --   --  9.7  --    HGB 11.4*   < > 10.7*   < > 9.7* 9.9* 10.7*   HCT 34.2*   < > 31.7*   < > 28.2* 29.0* 34.1*   MCV 92.5  --  92.1  --   --  91.9  --      --  112*  --   --  104*  --     < > = values in this interval not displayed.          ASSESSMENT AND PLAN  79yo M s/p invasive coronary angiography with PCI and AMBER placement involving the use of IV Integrilin and subsequent ASA and Plavix. Course has been c/b: pneumonia,  acute LV systolic HF in the setting of ischemic cardiomyopathy as well melena. Integrilin stopped. ASA and Plavix continue. EGD (1/27) demonstrated small clean based esophageal and antral ulcers as well old blood in the stomach obscuring visibility. On IV PPI gtts despite ongoing anticoagulation signs of active bleeding are not appreciated.   - IV PPI gtts for 72 hours then IV BID  - follow Hgb closely  - diet as tolerated  - await fecal H pylori Ag

## 2023-01-29 NOTE — PLAN OF CARE
Patient's EF (Ejection Fraction) is greater than 40%    Heart Failure Medications:  Diuretics[de-identified] Spironolactone    (One of the following REQUIRED for EF </= 40%/SYSTOLIC FAILURE but MAY be used in EF% >40%/DIASTOLIC FAILURE)        ACE[de-identified] None        ARB[de-identified] Losartan         ARNI[de-identified] None    (Beta Blockers)  NON- Evidenced Based Beta Blocker (for EF% >40%/DIASTOLIC FAILURE): None    Evidenced Based Beta Blocker::(REQUIRED for EF% <40%/SYSTOLIC FAILURE) None  . .................................................................................................................................................. Patient's weights and intake/output reviewed: Yes    Patient's Last Weight: 200 lbs obtained by standing scale. Difference of 1.2 lbs more than last documented weight. Intake/Output Summary (Last 24 hours) at 1/29/2023 1742  Last data filed at 1/29/2023 3579  Gross per 24 hour   Intake 220 ml   Output 1050 ml   Net -830 ml       Education Booklet Provided: yes    Comorbidities Reviewed Yes    Patient has a past medical history of Aortic aneurysm, abdominal, Appendicitis, Arthritis, Bruises easily, CAD (coronary artery disease), CHF (congestive heart failure) ([address] Chronic back pain, Chronic pain of both shoulders, Complication of anesthesia, Gout, Heart disease, Hernia, HIGH CHOLESTEROL, Hyperlipidemia, Hypertension, Measles, Pancreatitis, Persistent cough, Ringing in ears, Sinus disorder, and Sleep deprivation. >>For CHF and Comorbidity documentation on Education Time and Topics, please see Education Tab    Progressive Mobility Assessment:  What is this patient's Current Level of Mobility?: Ambulatory-Up Ad Marisol  How was this patient Mobilized today?: Edge of Bed, Up to Chair,  Up to Toilet/Shower, Up in Room, and Up in Hallway, ambulated 200 ft                 With Whom? Nurse                 Level of Difficulty/Assistance:  SBA      Pt resting in bed at this time on  2 L O2.  Pt with complaints of shortness of breath. Pt with pitting lower extremity edema.      Patient and/or Family's stated Goal of Care this Admission: reduce shortness of breath, increase activity tolerance, better understand heart failure and disease management, be more comfortable, and reduce lower extremity edema prior to discharge        :

## 2023-01-29 NOTE — PROGRESS NOTES
Shift: [id number]    Admitting diagnosis: NSTEMI    Presentation to hospital: Chest pain, SOB, left shoulder pain    Surgery: no , Cath Lab    Nursing assessment at handoff  stable    Emergency Contact/POA: wife  Family updated: yes - wife at bedside    Most recent vitals: BP (!) 155/53   Pulse 62   Temp 98.2 °F (36.8 °C) (Oral)   Resp 24   Ht 5' 9\" (1.753 m)   Wt 200 lb (90.7 kg)   SpO2 94%   BMI 29.53 kg/m²      Rhythm: sinus bradycardia    NC/HFNC-  2lpm  Respiratory support: - No ventilator support    Vent days: Day none    Increased O2 requirements: no/ has Severe SA & uses CPAP at home    Admission weight Weight: 198 lb 8 oz (90 kg)  Today's weight   Wt Readings from Last 1 Encounters:   01/29/23 200 lb (90.7 kg)         UOP >30ml/hr: yes - void per urinal     Varela need assessed each shift: no    Restraints: no  Order current and documentation up to date? Lines/Drains  LDA Insertion Date Discontinued Date Dressing Changes   PIV   PIV 1/26 1/29 1/29    TLC       Arterial  1/26 sheath  1/26    Varela       Vas Cath      ETT       Surgical drains        Night Shift Hospitalist Interventions    Problem(Brief) Date Time Intervention Physician contacted                                               Drip rates at handoff:    pantoprozole (PROTONIX) infusion 8 mg/hr (01/29/23 1046)    sodium chloride         Hospital Course Daily Updates:  Admit Day# 1 1/23  - admitted from cath lab  -stent placed OM  -Nitro gtt off 1800  - Integrilin gtt til 0014  -sheath pulled 1852, slight bruising, site soft  - IV fluids til 2300    Admit day 2  -black tarry stool and coughing blood, eeg showed ulcers.  Protonix drip started, trending H&H  -cards clear to go home, waiting GI to clear    Admit day 3  -protonix drip  -no s/s of bleeding    Admit Day 4  - PPI infusion, stable H&H  -Stable for transfer to the floor per MD  -CHF Single lasix 20mg IV today for diuresis  -pna-IV abx, switch to PO tomorrow  -weaned O2 down to 2L  -walked hallway        Lab Data:   CBC:   Recent Labs     01/28/23  0420 01/28/23  0848 01/29/23  0419 01/29/23  0850 01/29/23  1706   WBC 10.4  --  9.7  --   --    HGB 10.7*   < > 9.9* 10.7* 10.2*   HCT 31.7*   < > 29.0* 34.1* 30.7*   MCV 92.1  --  91.9  --   --    *  --  104*  --   --     < > = values in this interval not displayed. BMP:    Recent Labs     01/28/23  0420 01/29/23  0419    140   K 4.1 3.8   CO2 25 26   BUN 34* 32*   CREATININE 1.1 1.3       LIVR: No results for input(s): AST, ALT in the last 72 hours. PT/INR:   Recent Labs     01/28/23 0420   INR 1.30*       APTT: No results for input(s): APTT in the last 72 hours. ABG: No results for input(s): PHART, HYZ0RUK, PO2ART in the last 72 hours.   Consults (if GI or Nephrology- which group?)-  cardiology

## 2023-01-29 NOTE — PROGRESS NOTES
Shift: [id number]    Admitting diagnosis: NSTEMI    Presentation to hospital: Chest pain, SOB, left shoulder pain    Surgery: no , Cath Lab    Nursing assessment at handoff  stable    Emergency Contact/POA: wife  Family updated: yes - wife at bedside    Most recent vitals: BP (!) 134/56   Pulse 52   Temp 98.4 °F (36.9 °C) (Axillary)   Resp 28   Ht 5' 9\" (1.753 m)   Wt 198 lb 8 oz (90 kg)   SpO2 97%   BMI 29.31 kg/m²      Rhythm: sinus bradycardia    NC/HFNC-  6 lpm  Respiratory support: - No ventilator support    Vent days: Day none    Increased O2 requirements: no/ has Severe SA & uses CPAP at home    Admission weight Weight: 198 lb 8 oz (90 kg)  Today's weight   Wt Readings from Last 1 Encounters:   01/26/23 198 lb 8 oz (90 kg)         UOP >30ml/hr: yes - void per urinal     Varela need assessed each shift: no    Restraints: no  Order current and documentation up to date? Lines/Drains  LDA Insertion Date Discontinued Date Dressing Changes   PIV  1/26 x2     TLC       Arterial  1/26 sheath  1/26    Varela       Vas Cath      ETT       Surgical drains        Night Shift Hospitalist Interventions    Problem(Brief) Date Time Intervention Physician contacted                                               Drip rates at handoff:    pantoprozole ([address]) infusion 8 mg/hr (01/29/23 0124)    sodium chloride         Hospital Course Daily Updates:  Admit Day# 1 1/23  - admitted from cath lab  -stent placed OM  -Nitro gtt off 1800  - Integrilin gtt til 0014  -sheath pulled 1852, slight bruising, site soft  - IV fluids til 2300    Admit day 2  -black tarry stool and coughing blood, eeg showed ulcers.  Protonix drip started, trending H&H  -cards clear to go home, waiting GI to clear    Admit day 3  -protonix drip  -no s/s of bleeding      Lab Data:   CBC:   Recent Labs     01/28/23  0420 01/28/23  0848 01/29/23  0032 01/29/23  0419   WBC 10.4  --   --  9.7   HGB 10.7*   < > 9.7* 9.9*   HCT 31.7*   < > 28.2* 29.0*   MCV 92.1  --   --  91.9   *  --   --  104*    < > = values in this interval not displayed. BMP:    Recent Labs     01/28/23 0420 01/29/23 0419    140   K 4.1 3.8   CO2 25 26   BUN 34* 32*   CREATININE 1.1 1.3       LIVR: No results for input(s): AST, ALT in the last 72 hours. PT/INR:   Recent Labs     01/28/23 0420   INR 1.30*     APTT: No results for input(s): APTT in the last 72 hours. ABG: No results for input(s): PHART, JVE4PCB, PO2ART in the last 72 hours.   Consults (if GI or Nephrology- which group?)-  cardiology

## 2023-01-29 NOTE — PROGRESS NOTES
Hospitalist Progress Note      PCP: Lesa Carlson DO    Date of Admission: 1/26/2023    Chief Complaint: Chest pain     Hospital Course:   80 y.o. male who we are asked to see/evaluate by Manoj Villanueva DO for medical management of GI bleed, hemoptysis Anemia      Subjective: Patient is doing better respiratory wise and down to 2L. No abdominal pain. Hgb stable       Medications:  Reviewed    Infusion Medications    pantoprozole (PROTONIX) infusion 8 mg/hr (01/29/23 1046)    sodium chloride       Scheduled Medications    furosemide  20 mg IntraVENous Once    mupirocin   Nasal BID    cefTRIAXone (ROCEPHIN) IV  1,000 mg IntraVENous Q24H    sodium chloride flush  5-40 mL IntraVENous 2 times per day    clopidogrel  75 mg Oral Daily    aspirin  81 mg Oral Daily    atorvastatin  40 mg Oral Nightly    doxazosin  1 mg Oral BID    losartan  100 mg Oral Daily    spironolactone  25 mg Oral Daily     PRN Meds: perflutren lipid microspheres, sodium chloride flush, ondansetron, sodium chloride, acetaminophen      Intake/Output Summary (Last 24 hours) at 1/29/2023 1442  Last data filed at 1/29/2023 0826  Gross per 24 hour   Intake 220 ml   Output 1300 ml   Net -1080 ml         Physical Exam Performed:    BP (!) 132/54   Pulse (!) 48   Temp 98.4 °F (36.9 °C)   Resp 17   Ht 5' 9\" (1.753 m)   Wt 200 lb (90.7 kg)   SpO2 91%   BMI 29.53 kg/m²     General appearance: no distress, appears stated age and cooperative. HEENT: Normal cephalic, atraumatic without obvious deformity. Pupils equal, round, and reactive to light. Extra ocular muscles intact. Conjunctivae/corneas clear. Neck: Supple, with full range of motion. Trachea midline. Respiratory: no increased respiratory effort. Rhonchi bilaterally improved   cardiovascular: Regular rate and rhythm with normal S1/S2 without murmurs, rubs or gallops. Abdomen: Soft, non-tender, non-distended with normal bowel sounds.   Musculoskeletal:  No clubbing, cyanosis or edema bilaterally. Skin: Skin color, texture, turgor normal.  No rashes or lesions. Neurologic:  Neurovascularly intact without any focal sensory/motor deficits. Cranial nerves: II-XII intact, grossly non-focal.  Psychiatric: Alert and oriented, thought content appropriate, normal insight  Capillary Refill: Brisk,3 seconds, normal   Peripheral Pulses: +2 palpable, equal bilaterally       Labs:   Recent Labs     01/27/23  1207 01/28/23  0050 01/28/23  0420 01/28/23  0848 01/29/23  0032 01/29/23  0419 01/29/23  0850   WBC 14.5*  --  10.4  --   --  9.7  --    HGB 11.4*   < > 10.7*   < > 9.7* 9.9* 10.7*   HCT 34.2*   < > 31.7*   < > 28.2* 29.0* 34.1*     --  112*  --   --  104*  --     < > = values in this interval not displayed. Recent Labs     01/27/23  0605 01/28/23  0420 01/29/23  0419    140 140   K 4.3 4.1 3.8    106 105   CO2 22 25 26   BUN 34* 34* 32*   CREATININE 1.1 1.1 1.3   CALCIUM 9.0 8.7 8.7       No results for input(s): AST, ALT, BILIDIR, BILITOT, ALKPHOS in the last 72 hours. Recent Labs     01/28/23  0420   INR 1.30*       No results for input(s): Soledad Fuse in the last 72 hours. Urinalysis:    No results found for: Sydelle Yamel, BACTERIA, RBCUA, BLOODU, SPECGRAV, [address]    Radiology:  CT CHEST WO CONTRAST   Final Result   Multifocal pneumonia as above. XR CHEST PORTABLE   Final Result   Mild-to-moderate cardiomegaly with status post CABG. Currently there is evidence of congestive heart failure with moderate to   marked pulmonary vascular congestion and moderate pulmonary edema. 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 In addition, there   are new infiltrates, and/or atelectatic changes in the right lower lung   field. Mild left basilar atelectasis. No obvious pleural effusion. No pneumothorax.              IP CONSULT TO CARDIAC REHAB  IP CONSULT TO CARDIAC REHAB  IP CONSULT TO GI  IP CONSULT TO HOSPITALIST    Assessment/Plan:    Active Hospital Problems    Diagnosis     UGI bleed [K92.2]      Priority: Medium    CAD in native artery [I25.10]      Priority: Medium    Chest pain [R07.9]      Priority: Medium    Shortness of breath [R06.02]      Priority: Medium    Abnormal stress test [R94.39]      Priority: Medium     Anemia, melena  EGD completed and showed small clean based esophageal and antral ulcers   Continue IV PPI gtts for 72 hours  Continue to follow Hgb   Advance diet   Awaiting fecal H pylori Ag      community-acquired pneumonia. Procal elevated. On ceftriaxone at this time. Avoiding macrolide or fluoroquinolone due to prolonged QT. Recommend 5 days of abx. I will transition him over to oral amoxicillin for an additional 2 days to complete antibiotic course. Coronary disease status post remote four-vessel CABG and underwent invasive coronary angiogram during this admission that showed severe multivessel coronary disease and PCI performed. Management per cardiology     Acute systolic heart failure. Diuresing per cardiology. Acute hypoxic respiratory failure. Nurse reports that patient had significant increase in oxygen needs. He has underlying acute heart failure and now pneumonia. Management as above     SUNDEEP -start CPAP intermittent here      Diet: ADULT DIET;  Regular  Code Status: Full Code      Dispo - per cardiology  Will continue to follow       Guy Gan DO

## 2023-01-29 NOTE — PROGRESS NOTES
[facility]   Daily Cardiovascular Progress Note    Admit Date: 1/26/2023    CC: s/p PCI with AMBER and subsequent GI bleed requiring EGD. HPI: Arleth Sheridan has no complaints today. Medications/Labs all Reviewed:  Patient Active Problem List   Diagnosis    Hypertension    CAD (coronary artery disease)    Hyperlipidemia    CHF (congestive heart failure) ([facility])    Aortic aneurysm, abdominal    Sleep deprivation    Chronic pain of both shoulders    Arthritis    Hernia    Ringing in ears    GERD (gastroesophageal reflux disease)    Leg length inequality    DM (diabetes mellitus) ([facility])    Hypokalemia    TIA (transient ischemic attack)    CVA (cerebral vascular accident) ([facility] [address]    Bradycardia    Stroke-like symptoms    Atrial fibrillation ([facility] [address]    Nuclear senile cataract    SUNDEEP (obstructive sleep apnea)    Overweight (BMI 25.0-29. 9)    CAD in native artery    Chest pain    Shortness of breath    Abnormal stress test    UGI bleed       Medications:   mupirocin   Nasal BID    cefTRIAXone (ROCEPHIN) IV  1,000 mg IntraVENous Q24H    sodium chloride flush  5-40 mL IntraVENous 2 times per day    clopidogrel  75 mg Oral Daily    aspirin  81 mg Oral Daily    atorvastatin  40 mg Oral Nightly    doxazosin  1 mg Oral BID    losartan  100 mg Oral Daily    spironolactone  25 mg Oral Daily       Infusion Medications:   pantoprozole (PROTONIX) infusion 8 mg/hr (01/29/23 1046)    sodium chloride         Labs:  Lab Results   Component Value Date    WBC 9.7 01/29/2023    HGB 10.7 (L) 01/29/2023    HCT 34.1 (L) 01/29/2023    MCV 91.9 01/29/2023     (L) 01/29/2023     Lab Results   Component Value Date    CREATININE 1.3 01/29/2023    BUN 32 (H) 01/29/2023     01/29/2023    K 3.8 01/29/2023     01/29/2023    CO2 26 01/29/2023     Lab Results   Component Value Date    INR 1.30 (H) 01/28/2023    PROTIME 16.1 (H) 01/28/2023        Physical Examination:    BP (!) 142/78   Pulse 69   Temp 98 °F (36.7 °C) (Axillary)   Resp 17   Ht 5' 9\" (1.753 m)   Wt 200 lb (90.7 kg)   SpO2 96%   BMI 29.53 kg/m²    Wt Readings from Last 3 Encounters:   01/29/23 200 lb (90.7 kg)   01/20/23 197 lb (89.4 kg)   11/15/22 193 lb 12.8 oz (87.9 kg)       Intake/Output Summary (Last 24 hours) at 1/29/2023 1207  Last data filed at 1/29/2023 0002  Gross per 24 hour   Intake 220 ml   Output 1300 ml   Net -1080 ml         Respiratory:  Resp Assessment: Normal respiratory effort  Resp Auscultation: Clear to auscultation bilaterally   Cardiovascular: Auscultation: regular rhythm and normal rate, normal S1S2, no murmur, rub or gallop  Palpation:  Nl PMI  JVP:  normal  Extremities: No Edema  Abdomen:  Soft, non-tender  Normal bowel sounds  Extremities:   No Cyanosis or Clubbing  Neurological/Psychiatric:  Oriented to time, place, and person  Non-anxious  Skin Warm and dry    Assessment:    Principal Problem:    UGI bleed  Active Problems:    CAD in native artery    Chest pain    Shortness of breath    Abnormal stress test  Resolved Problems:    * No resolved hospital problems. *      Plan:  1. Patient remains without overt evidence for GI bleed   ~remains on PPI infusion -per GI this needs to continue for a total of 72 hours. ~defer to medical team and GI regarding transition to oral  2. Due to recent PCI , he will need to remain on DAPT unless absolute contraindication from GI team  3. Stable for transfer to the floor. ~Orders been placed. 4. Single lasix 20mg IV today for diuresis      Due to the high probability of clinically significant life threating deterioration of the patient's condition that required my urgent intervention, a total critical care time 35  minutes was used. This time excludes any time that may have been spent performing procedures.  This includes but not limited to vital sign monitoring, telemetry monitoring, continuous pulse oximety, IV medication, clinical response to the IV medications, documentation time , consultation time, interpretation of lab data, review of nursing notes and old record review. All questions and concerns were addressed to the patient/family. Alternatives to my treatment were discussed. The note was completed using EMR. Every effort was made to ensure accuracy; however, inadvertent computerized transcription errors may be present.     Venancio Arnold MD, [place], [facility], [address] [place] [address]  1/29/2023 12:07 PM

## 2023-01-30 LAB
ANION GAP SERPL CALCULATED.3IONS-SCNC: 9 MMOL/L (ref 3–16)
BASOPHILS ABSOLUTE: 0 K/UL (ref 0–0.2)
BASOPHILS RELATIVE PERCENT: 0.6 %
BUN BLDV-MCNC: 35 MG/DL (ref 7–20)
CALCIUM SERPL-MCNC: 8.3 MG/DL (ref 8.3–10.6)
CHLORIDE BLD-SCNC: 104 MMOL/L (ref 99–110)
CO2: 24 MMOL/L (ref 21–32)
CREAT SERPL-MCNC: 1.2 MG/DL (ref 0.8–1.3)
EOSINOPHILS ABSOLUTE: 0.2 K/UL (ref 0–0.6)
EOSINOPHILS RELATIVE PERCENT: 3.2 %
GFR SERPL CREATININE-BSD FRML MDRD: >60 ML/MIN/{1.73_M2}
GLUCOSE BLD-MCNC: 141 MG/DL (ref 70–99)
HCT VFR BLD CALC: 25.6 % (ref 40.5–52.5)
HCT VFR BLD CALC: 26.8 % (ref 40.5–52.5)
HCT VFR BLD CALC: 29.4 % (ref 40.5–52.5)
HEMOGLOBIN: 10 G/DL (ref 13.5–17.5)
HEMOGLOBIN: 8.6 G/DL (ref 13.5–17.5)
HEMOGLOBIN: 9.2 G/DL (ref 13.5–17.5)
LYMPHOCYTES ABSOLUTE: 1.1 K/UL (ref 1–5.1)
LYMPHOCYTES RELATIVE PERCENT: 17.1 %
MCH RBC QN AUTO: 30.4 PG (ref 26–34)
MCHC RBC AUTO-ENTMCNC: 33.4 G/DL (ref 31–36)
MCV RBC AUTO: 91 FL (ref 80–100)
MONOCYTES ABSOLUTE: 0.6 K/UL (ref 0–1.3)
MONOCYTES RELATIVE PERCENT: 8.8 %
NEUTROPHILS ABSOLUTE: 4.6 K/UL (ref 1.7–7.7)
NEUTROPHILS RELATIVE PERCENT: 70.3 %
PDW BLD-RTO: 15.2 % (ref 12.4–15.4)
PLATELET # BLD: 105 K/UL (ref 135–450)
PMV BLD AUTO: 8.4 FL (ref 5–10.5)
POTASSIUM SERPL-SCNC: 3.6 MMOL/L (ref 3.5–5.1)
RBC # BLD: 2.81 M/UL (ref 4.2–5.9)
SODIUM BLD-SCNC: 137 MMOL/L (ref 136–145)
WBC # BLD: 6.6 K/UL (ref 4–11)

## 2023-01-30 PROCEDURE — 2580000003 HC RX 258: Performed by: NURSE PRACTITIONER

## 2023-01-30 PROCEDURE — 6370000000 HC RX 637 (ALT 250 FOR IP): Performed by: INTERNAL MEDICINE

## 2023-01-30 PROCEDURE — 36415 COLL VENOUS BLD VENIPUNCTURE: CPT

## 2023-01-30 PROCEDURE — 85018 HEMOGLOBIN: CPT

## 2023-01-30 PROCEDURE — 6360000002 HC RX W HCPCS: Performed by: NURSE PRACTITIONER

## 2023-01-30 PROCEDURE — C9113 INJ PANTOPRAZOLE SODIUM, VIA: HCPCS | Performed by: NURSE PRACTITIONER

## 2023-01-30 PROCEDURE — 2000000000 HC ICU R&B

## 2023-01-30 PROCEDURE — 87338 HPYLORI STOOL AG IA: CPT

## 2023-01-30 PROCEDURE — 94761 N-INVAS EAR/PLS OXIMETRY MLT: CPT

## 2023-01-30 PROCEDURE — 85025 COMPLETE CBC W/AUTO DIFF WBC: CPT

## 2023-01-30 PROCEDURE — 85014 HEMATOCRIT: CPT

## 2023-01-30 PROCEDURE — 2700000000 HC OXYGEN THERAPY PER DAY

## 2023-01-30 PROCEDURE — 99232 SBSQ HOSP IP/OBS MODERATE 35: CPT | Performed by: INTERNAL MEDICINE

## 2023-01-30 PROCEDURE — 2580000003 HC RX 258: Performed by: INTERNAL MEDICINE

## 2023-01-30 PROCEDURE — 80048 BASIC METABOLIC PNL TOTAL CA: CPT

## 2023-01-30 PROCEDURE — 6360000002 HC RX W HCPCS: Performed by: INTERNAL MEDICINE

## 2023-01-30 PROCEDURE — 94660 CPAP INITIATION&MGMT: CPT

## 2023-01-30 RX ORDER — FUROSEMIDE 10 MG/ML
40 INJECTION INTRAMUSCULAR; INTRAVENOUS ONCE
Status: COMPLETED | OUTPATIENT
Start: 2023-01-30 | End: 2023-01-30

## 2023-01-30 RX ORDER — PANTOPRAZOLE SODIUM 40 MG/10ML
40 INJECTION, POWDER, LYOPHILIZED, FOR SOLUTION INTRAVENOUS 2 TIMES DAILY
Status: DISCONTINUED | OUTPATIENT
Start: 2023-01-30 | End: 2023-01-31

## 2023-01-30 RX ADMIN — ASPIRIN 81 MG 81 MG: 81 TABLET ORAL at 08:51

## 2023-01-30 RX ADMIN — FUROSEMIDE 40 MG: 10 INJECTION, SOLUTION INTRAMUSCULAR; INTRAVENOUS at 09:39

## 2023-01-30 RX ADMIN — CLOPIDOGREL BISULFATE 75 MG: 75 TABLET ORAL at 08:51

## 2023-01-30 RX ADMIN — AMOXICILLIN 1000 MG: 250 CAPSULE ORAL at 13:52

## 2023-01-30 RX ADMIN — SODIUM CHLORIDE 8 MG/HR: 9 INJECTION, SOLUTION INTRAVENOUS at 07:38

## 2023-01-30 RX ADMIN — Medication 10 ML: at 20:45

## 2023-01-30 RX ADMIN — DOXAZOSIN 1 MG: 2 TABLET ORAL at 08:51

## 2023-01-30 RX ADMIN — DOXAZOSIN 1 MG: 2 TABLET ORAL at 20:44

## 2023-01-30 RX ADMIN — MUPIROCIN: 20 OINTMENT TOPICAL at 08:52

## 2023-01-30 RX ADMIN — AMOXICILLIN 1000 MG: 250 CAPSULE ORAL at 06:17

## 2023-01-30 RX ADMIN — PANTOPRAZOLE SODIUM 40 MG: 40 INJECTION, POWDER, FOR SOLUTION INTRAVENOUS at 14:59

## 2023-01-30 RX ADMIN — MUPIROCIN: 20 OINTMENT TOPICAL at 20:44

## 2023-01-30 RX ADMIN — SPIRONOLACTONE 25 MG: 25 TABLET ORAL at 08:51

## 2023-01-30 RX ADMIN — PANTOPRAZOLE SODIUM 40 MG: 40 INJECTION, POWDER, FOR SOLUTION INTRAVENOUS at 20:44

## 2023-01-30 RX ADMIN — LOSARTAN POTASSIUM 100 MG: 100 TABLET, FILM COATED ORAL at 08:51

## 2023-01-30 RX ADMIN — ATORVASTATIN CALCIUM 40 MG: 40 TABLET, FILM COATED ORAL at 20:44

## 2023-01-30 RX ADMIN — AMOXICILLIN 1000 MG: 250 CAPSULE ORAL at 20:58

## 2023-01-30 ASSESSMENT — PAIN SCALES - GENERAL: PAINLEVEL_OUTOF10: 1

## 2023-01-30 NOTE — PLAN OF CARE
Patient's EF (Ejection Fraction) is greater than 40%    Heart Failure Medications:  Diuretics[de-identified] Spironolactone    (One of the following REQUIRED for EF </= 40%/SYSTOLIC FAILURE but MAY be used in EF% >40%/DIASTOLIC FAILURE)        ACE[de-identified] None        ARB[de-identified] Losartan         ARNI[de-identified] None    (Beta Blockers)  NON- Evidenced Based Beta Blocker (for EF% >40%/DIASTOLIC FAILURE): None    Evidenced Based Beta Blocker::(REQUIRED for EF% <40%/SYSTOLIC FAILURE) None  . .................................................................................................................................................. Patient's weights and intake/output reviewed: Yes    Patient's Last Weight: 194 lbs obtained by standing scale. Difference of 1.2 lbs more than last documented weight. Intake/Output Summary (Last 24 hours) at 1/30/2023 6955  Last data filed at 1/30/2023 9825  Gross per 24 hour   Intake 1554.28 ml   Output 1300 ml   Net 254.28 ml         Education Booklet Provided: yes    Comorbidities Reviewed Yes    Patient has a past medical history of Aortic aneurysm, abdominal, Appendicitis, Arthritis, Bruises easily, CAD (coronary artery disease), CHF (congestive heart failure) (HCC), Chronic back pain, Chronic pain of both shoulders, Complication of anesthesia, Gout, Heart disease, Hernia, HIGH CHOLESTEROL, Hyperlipidemia, Hypertension, Measles, Pancreatitis, Persistent cough, Ringing in ears, Sinus disorder, and Sleep deprivation. >>For CHF and Comorbidity documentation on Education Time and Topics, please see Education Tab    Progressive Mobility Assessment:  What is this patient's Current Level of Mobility?: Ambulatory-Up Ad Marisol  How was this patient Mobilized today?: Edge of Bed, Up to Chair,  Up to Toilet/Shower, Up in Room, and Up in Hallway, ambulated 200 ft                 With Whom? Nurse                 Level of Difficulty/Assistance:  SBA      Pt resting in bed at this time on  2 L O2.  Pt with complaints of shortness of breath. Pt with pitting lower extremity edema.      Patient and/or Family's stated Goal of Care this Admission: reduce shortness of breath, increase activity tolerance, better understand heart failure and disease management, be more comfortable, and reduce lower extremity edema prior to discharge        :

## 2023-01-30 NOTE — PLAN OF CARE
Patient's EF (Ejection Fraction) is greater than 40%    Heart Failure Medications:  Diuretics[de-identified] Spironolactone    (One of the following REQUIRED for EF </= 40%/SYSTOLIC FAILURE but MAY be used in EF% >40%/DIASTOLIC FAILURE)        ACE[de-identified] None        ARB[de-identified] Losartan         ARNI[de-identified] None    (Beta Blockers)  NON- Evidenced Based Beta Blocker (for EF% >40%/DIASTOLIC FAILURE): None    Evidenced Based Beta Blocker::(REQUIRED for EF% <40%/SYSTOLIC FAILURE) None  . .................................................................................................................................................. Patient's weights and intake/output reviewed: Yes    Patient's Last Weight: 194 lbs obtained by standing scale. Difference of 6lbs less than last documented weight. Intake/Output Summary (Last 24 hours) at 1/30/2023 1737  Last data filed at 1/30/2023 1400  Gross per 24 hour   Intake 1374.28 ml   Output 1800 ml   Net -425.72 ml         Education Booklet Provided: yes    Comorbidities Reviewed Yes    Patient has a past medical history of Aortic aneurysm, abdominal, Appendicitis, Arthritis, Bruises easily, CAD (coronary artery disease), CHF (congestive heart failure) (HCC), Chronic back pain, Chronic pain of both shoulders, Complication of anesthesia, Gout, Heart disease, Hernia, HIGH CHOLESTEROL, Hyperlipidemia, Hypertension, Measles, Pancreatitis, Persistent cough, Ringing in ears, Sinus disorder, and Sleep deprivation. >>For CHF and Comorbidity documentation on Education Time and Topics, please see Education Tab    Progressive Mobility Assessment:  What is this patient's Current Level of Mobility?: Ambulatory-Up Ad Marisol  How was this patient Mobilized today?: Edge of Bed, Up to Chair,  Up to Toilet/Shower, Up in Room, and Up in Hallway                 With Whom? Nurse                 Level of Difficulty/Assistance:  SBA      Pt resting in bed at this time on  2 L O2. Pt with no complaints of shortness of breath. Pt without pitting lower extremity edema.      Patient and/or Family's stated Goal of Care this Admission: reduce shortness of breath, increase activity tolerance, better understand heart failure and disease management, be more comfortable, and reduce lower extremity edema prior to discharge        :

## 2023-01-30 NOTE — PROGRESS NOTES
[facility]   PROGRESS NOTE  [phone]      Attending Physician: Naveen Chatterjee DO  Reason for Consultation/Chief Complaint: S/p LHC and PCI    Subjective     Denies any further melena, but hemoglobin continues to gradually downtrend and was 8.6 this morning. Still has some shortness of breath when up moving around and when trying to lay flat. Denies chest pain. CURRENT Medications:  amoxicillin (AMOXIL) capsule 1,000 mg, 3 times per day  mupirocin (BACTROBAN) 2 % ointment, BID  perflutren lipid microspheres (DEFINITY) injection 1.5 mL, ONCE PRN  pantoprazole (PROTONIX) 80 mg in sodium chloride 0.9 % 100 mL infusion, Continuous  sodium chloride flush 0.9 % injection 5-40 mL, 2 times per day  sodium chloride flush 0.9 % injection 5-40 mL, PRN  ondansetron (ZOFRAN) injection 4 mg, Q6H PRN  clopidogrel (PLAVIX) tablet 75 mg, Daily  aspirin chewable tablet 81 mg, Daily  0.9 % sodium chloride infusion, PRN  acetaminophen (TYLENOL) tablet 650 mg, Q4H PRN  atorvastatin (LIPITOR) tablet 40 mg, Nightly  doxazosin (CARDURA) tablet 1 mg, BID  losartan (COZAAR) tablet 100 mg, Daily  spironolactone (ALDACTONE) tablet 25 mg, Daily      Allergies:  Coumadin [warfarin], Omeprazole, and Vioxx     Review of Systems:   Negative except as noted above. Objective   PHYSICAL EXAM:    Vitals:    01/30/23 1000   BP: 97/63   Pulse: 61   Resp: 18   Temp: 98.7F   SpO2: 96%   Weight: 194 lb 7.1 oz (88.2 kg)      General: Adult male in no acute distress. Pleasant and interactive on exam.  Neck: No significant JVD. Heart: Irregular rate and rhythm. Normal S1 and S2. Grade II/VI holosystolic murmur. No rubs or gallops. Lungs: Normal respiratory effort. Bilateral crackles present. No rubs or gallops. Abdomen: Soft, non-tender. Normoactive bowel sounds. No masses or organomegaly. Skin: No rashes. Extremities: No clubbing, cyanosis, or edema. Psych: Normal mood and affect.   Neuro: Alert and oriented to person, place, and time. No focal deficits noted. Labs   CBC:   Lab Results   Component Value Date/Time    WBC 6.6 01/30/2023 04:15 AM    RBC 2.81 01/30/2023 04:15 AM    HGB 8.6 01/30/2023 04:15 AM    HCT 25.6 01/30/2023 04:15 AM    MCV 91.0 01/30/2023 04:15 AM    RDW 15.2 01/30/2023 04:15 AM     01/30/2023 04:15 AM     CMP:  Lab Results   Component Value Date/Time     01/30/2023 04:15 AM    K 3.6 01/30/2023 04:15 AM    K 3.8 01/26/2023 07:02 AM     01/30/2023 04:15 AM    CO2 24 01/30/2023 04:15 AM    BUN 35 01/30/2023 04:15 AM    CREATININE 1.2 01/30/2023 04:15 AM    GFRAA >60 09/10/2022 06:35 AM    GFRAA >60 01/15/2013 09:09 AM    AGRATIO 1.5 09/08/2022 09:00 AM    LABGLOM >60 01/30/2023 04:15 AM    GLUCOSE 141 01/30/2023 04:15 AM    PROT 7.8 09/08/2022 09:00 AM    PROT 7.2 01/15/2013 09:09 AM    CALCIUM 8.3 01/30/2023 04:15 AM    BILITOT 1.2 09/08/2022 09:00 AM    ALKPHOS 80 09/08/2022 09:00 AM    AST 26 09/08/2022 09:00 AM    ALT 32 09/08/2022 09:00 AM     PT/INR:  No results found for: PTINR  HgBA1c:  Lab Results   Component Value Date    LABA1C 6.9 11/15/2022     Lab Results   Component Value Date    TROPONINI <0.01 09/08/2022         Cardiac Data     Echo:  TTE 1/27/23:  Conclusions   Summary   Left ventricular systolic function is mildly reduced with an estimated   ejection fraction of 45-50%. 3D calculated EF of 48%. The left ventricle is normal in size with normal wall thickness. There is hypokinesis of the inferior, inferolateral, and apical septal   walls. Indeterminate diastolic function. Patient was in atrial fibrillation during   study. The right ventricle is normal in size with reduced systolic function. Moderate right atrial dilatation. Severe left atrial dilatation. The aortic valve leaflets appear calcified with no hemodynamically   significant stenosis. Mild mitral regurgitation. Mild-moderate tricuspid regurgitation.    Systolic pulmonary artery pressure (SPAP) is elevated and estimated at 74   mmHg (right atrial pressure 15 mmHg), consistent with severe pulmonary   hypertension . IVC size is dilated (>2.1 cm) and collapses < 50% with respiration   consistent with elevated RA pressure (15 mmHg). Findings:  Hemodynamics:              A. Opening arterial pressure: 118/40 (59) mmHg              B. LVEDP: 20 mmHg     2. Coronary anatomy:  A. Left main artery: The left main artery trifurcates into the left anterior descending artery, ramus intermedius artery, and left circumflex artery. The left main artery is mildly calcified with a 20% ostial stenosis and 10% distal stenosis. B. Left anterior descending artery: The LAD is calcified and has a 30% ostial stenosis, 50% proximal stenosis, and 100% mid-vessel stenosis immediately after the bifurcation of the first septal . The occlusion appears to be at the proximal edge of a previously placed stent. C. Ramus intermedius artery: Small to medium caliber vessel with 90% ostial stenosis. D. Left circumflex artery: The LCx is calcified and has a 100% ostial stenosis. E. Right coronary artery: Dominant vessel. The RCA is calcified with a 50% proximal stenosis and 100% mid-vessel stenosis. 3. Bypass graft anatomy:  A. Left subclavian artery: Patent with midl disease. B. LIMA to LAD: The LIMA is widely patent. There is a 70-80% stenosis in the distal LAD beyond the anastomosis. C. SVG to OM1 with sequential to OM2: The vein graft has a 30% proximal stenosis and 90% distal stenosis near what was previously likely the anastomosis of the OM1. The OM1 appears to have been a smaller vessel than the OM2 and is now occluded. The OM2 has a 30% stenosis beyond its anastomosis with the vein graft. D. SVG to distal RCA: The vein graft has a 30% ostial/proximal stenosis. The native distal RCA has a 50% stenosis after the anastomosis. There RPDA has a 30% ostial stenosis.  The RPLB has a 30% proximal stenosis and 30% mid-vessel stenosis. Results of Intervention (PCI of distal SVG to OM1 with sequential to OM2):  Pre - 90% stenosis. JAYLA 3 flow. Post - <10% stenosis. There was initially poor re-flow following PTCA and stenting of the distal SVG. Copious IC vasodilators were given and the patient was started on IV Integrilin. Final angiography showed <10% residual stenosis with JAYLA 3 flow throughout the vein graft and all but the very distal small caliber segment of the native OM2 which still had no flow, but overall flow through the distal segment appeared to be gradually improving. Assessment:      1. CAD - S/p remote 4-vessel CABG. Underwent invasive coronary angiography on 1/26/23 demonstrating severe native multivessel coronary artery disease with patent LIMA to LAD, severely diseased SVG to OM1 with sequential to OM2, and patent SVG to distal RCA. At that time, PCI was performed to the distal SVG to OM1 with sequential to OM2 with an [place] 3.5 x 20 mm AMBER. There was initially poor re-flow following PTCA and stenting of the distal SVG. Copious IC vasodilators were given and the patient was started on IV Integrilin. Final angiography showed <10% residual stenosis with JAYLA 3 flow throughout the vein graft and all but the very distal small caliber segment of the native OM2 which still had no flow, but overall flow through the distal segment appeared to be gradually improving. The OM1 appeared to be occluded, was felt to be much smaller than the OM2, and was overall not an adequate target for percutaneous coronary intervention. Therefore, the decision was made to proceed with stenting across the anastomosis with the OM1. He currently denies any significant angina. 2. Acute LV systolic heart failure/ischemic cardiomyopathy - LVEF 45-50% on most recent TTE. Continues to improve with diuresis, but still appears to be mildly hypervolemic.   3. Melena/acute upper GI bleed - EGD on 1/27/23 showed clean based esophageal ulcer at GE junction, patulous Schatzki's ring, and large amount of old blood in the stomach with diminutive clean based ulcers in the antrum. On IV PPI. 4. Hemoptysis - Improved. Could possibly be related to upper GI bleed. Non-contrast chest CT suggestive of possible multifocal pneumonia. No other concerning masses were seen. 5. Community-acquired pneumonia - Now on amoxicillin. 6. Acute hypoxic respiratory failure - Likely secondary to combination of acute heart failure and pneumonia above. 7. Persistent atrial fibrillation - Ventricular rate currently controlled. 8. Essential hypertension  9. Hyperlipidemia  10. AAA s/p endovascular stent repair with known type II endoleak  11. SUNDEEP  12. Prolonged QT    Plan:     1. Melena seems to have resolved, but hemoglobin still gradually downtrending. Given that patient remains hemodynamically stable and is s/p recent saphenous vein graft PCI with no obvious clinical evidence of significant active bleeding, will recommend continuing dual antiplatelet therapy at this time. 2. Continue aspirin 81 mg daily, Plavix 75 mg daily, atorvastatin 40 mg qHS, losartan 100 mg daily, and spironolactone 25 mg daily. 3. Not on beta-blocker due to underlying bradycardia. 4. Diurese with IV lasix 40 mg x1 today. 5. Can continue to hold off on restarting Eilquis for now. 6. Continue IV PPI per GI recommendations, appreciate assistance. 7. Recommend spacing out H&H draws to BID to help further minimize iatrogenic blood losses. 8. Further antibiotic management per primary team.  9. Monitor strict I/Os, obtain daily standing weights. 10. 2L per day fluid restriction, low sodium diet. 11. Cardiology will continue to follow. Thank you for allowing us to participate in the care of Valnetino Herrera. Please call me with any questions [phone].       Caprice Marcelo, [address] [facility]  [phone] [facility]  [phone] Salvatore Thompson Office  1/30/2023 11:58 AM      I will address the patient's cardiac risk factors and adjusted pharmacologic treatment as needed. In addition, I have reinforced the need for patient directed risk factor modification. All questions and concerns were addressed to the patient/family. Alternatives to my treatment were discussed. The note was completed using EMR. Every effort was made to ensure accuracy; however, inadvertent computerized transcription errors may be present.

## 2023-01-30 NOTE — PROGRESS NOTES
PROGRESS NOTE    HPI: Paulette Singletary is a(n)82 y.o. male admitted for work-up and treatment for Abnormal result of other cardiovascular function study [R94.39]  CAD in native artery [I25.10]. We are following for UGIB. Subjective:     No further evidence of bleeding. No new GI complaints. Objective:     I/O last 3 completed shifts: In: 1554.3 [P.O.:830; I.V.:578.3; IV Piggyback:146]  Out: 2200 [Urine:2200]      /64   Pulse 60   Temp 98.7 °F (37.1 °C) (Axillary)   Resp 18   Ht 5' 9\" (1.753 m)   Wt 194 lb 7.1 oz (88.2 kg)   SpO2 91%   BMI 28.71 kg/m²     Physical Exam:  HEENT: anicteric sclera, oropharyngeal membranes pink and moist.  Cor: RRR  Lungs: non-labored, no respiratory distress  Abdomen: soft, NT. No ascites. No hepatomegaly or splenomegaly  Extremities: no edema  Neuro: alert and oriented x 3      Results:   Lab Results   Component Value Date    ALT 32 09/08/2022    AST 26 09/08/2022    ALKPHOS 80 09/08/2022    BILIDIR 0.3 06/15/2017    PROT 7.8 09/08/2022    LABALBU 3.6 09/10/2022    INR 1.30 (H) 01/28/2023     Lab Results   Component Value Date    WBC 6.6 01/30/2023    HGB 8.6 (L) 01/30/2023    HCT 25.6 (L) 01/30/2023    MCV 91.0 01/30/2023     (L) 01/30/2023     BUN/Cr/glu/ALT/AST/amyl/lip:  35/1.2/--/--/--/--/-- (01/30 0415)  CT CHEST WO CONTRAST    Result Date: 1/27/2023  Multifocal pneumonia as above. XR CHEST PORTABLE    Result Date: 1/27/2023  Mild-to-moderate cardiomegaly with status post CABG. Currently there is evidence of congestive heart failure with moderate to marked pulmonary vascular congestion and moderate pulmonary edema. 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 In addition, there are new infiltrates, and/or atelectatic changes in the right lower lung field. Mild left basilar atelectasis. No obvious pleural effusion. No pneumothorax. Impression:  UGIB, melena.  S/p EGD 1/27 with small clear based esophageal ulcers and antral ulcers. No overt bleeding since procedure. Slow downtrend in Hgb. Has been getting it checked qid however there may be related in some to frequent blood draws. 2. CAD s/p PCI AMBER. Has been on Integrilin drip. On ASA and Plavix. Plan:  Stop PPI drip, switch to IV bid. Continue to monitor for overt bleeding. Noted H&H changed to bid blood draws. In absence of overt bleeding, risk of holding ASA and Plavix outweighs benefit. H. Pylori stool antigen ordered, but not yet collected. Please do not hesitate to call with questions or concerns.       Electronically signed by: RUPERTO Huynh 1/30/2023 10:13 AM

## 2023-01-30 NOTE — PLAN OF CARE
Problem: Chronic Conditions and Co-morbidities  Goal: Patient's chronic conditions and co-morbidity symptoms are monitored and maintained or improved  1/29/2023 2145 by Moira Jim RN  Outcome: Progressing  Flowsheets (Taken 1/29/2023 2000)  Care Plan - Patient's Chronic Conditions and Co-Morbidity Symptoms are Monitored and Maintained or Improved: Monitor and assess patient's chronic conditions and comorbid symptoms for stability, deterioration, or improvement  1/29/2023 1748 by Eden Noonan RN  Outcome: Progressing  Flowsheets (Taken 1/29/2023 1748)  Care Plan - Patient's Chronic Conditions and Co-Morbidity Symptoms are Monitored and Maintained or Improved:   Monitor and assess patient's chronic conditions and comorbid symptoms for stability, deterioration, or improvement   Collaborate with multidisciplinary team to address chronic and comorbid conditions and prevent exacerbation or deterioration   Update acute care plan with appropriate goals if chronic or comorbid symptoms are exacerbated and prevent overall improvement and discharge     Problem: Discharge Planning  Goal: Discharge to home or other facility with appropriate resources  1/29/2023 2145 by Moira Jim RN  Outcome: Progressing  Flowsheets (Taken 1/29/2023 2000)  Discharge to home or other facility with appropriate resources:   Identify barriers to discharge with patient and caregiver   Arrange for needed discharge resources and transportation as appropriate   Identify discharge learning needs (meds, wound care, etc)   Arrange for interpreters to assist at discharge as needed   Refer to discharge planning if patient needs post-hospital services based on physician order or complex needs related to functional status, cognitive ability or social support system  1/29/2023 1748 by Eden Noonan RN  Outcome: Progressing  Flowsheets (Taken 1/29/2023 1748)  Discharge to home or other facility with appropriate resources: Refer to discharge planning if patient needs post-hospital services based on physician order or complex needs related to functional status, cognitive ability or social support system     Problem: Skin/Tissue Integrity  Goal: Absence of new skin breakdown  Description: 1. Monitor for areas of redness and/or skin breakdown  2. Assess vascular access sites hourly  3. Every 4-6 hours minimum:  Change oxygen saturation probe site  4. Every 4-6 hours:  If on nasal continuous positive airway pressure, respiratory therapy assess nares and determine need for appliance change or resting period.   Outcome: Progressing

## 2023-01-30 NOTE — PLAN OF CARE
Problem: Skin/Tissue Integrity  Goal: Absence of new skin breakdown  Description: 1. Monitor for areas of redness and/or skin breakdown  2. Assess vascular access sites hourly  3. Every 4-6 hours minimum:  Change oxygen saturation probe site  4. Every 4-6 hours:  If on nasal continuous positive airway pressure, respiratory therapy assess nares and determine need for appliance change or resting period.   Outcome: Progressing     Problem: Chronic Conditions and Co-morbidities  Goal: Patient's chronic conditions and co-morbidity symptoms are monitored and maintained or improved  Care Plan - Patient's Chronic Conditions and Co-Morbidity Symptoms are Monitored and Maintained or Improved: Monitor and assess patient's chronic conditions and comorbid symptoms for stability, deterioration, or improvement

## 2023-01-30 NOTE — PROGRESS NOTES
01/30/23 0001   NIV Type   Equipment Type v60   Mode CPAP   Mask Type Full face mask   Mask Size Medium   Settings/Measurements   CPAP/EPAP 5 cmH2O   Vt (Measured) 542 mL   Resp 27   FiO2  30 %   Minute Volume (L/min) 14.7 Liters   Mask Leak (lpm) 4 lpm   Comfort Level Good   Using Accessory Muscles No   SpO2 96   Patient's Home Machine No   Alarm Settings   Alarms On Y   Low Pressure (cmH2O) 4 cmH2O   High Pressure (cmH2O) 30 cmH2O   Delay Alarm 20 sec(s)   RR Low (bpm) 6   RR High (bpm) 40 br/min

## 2023-01-30 NOTE — PROGRESS NOTES
01/29/23 2215   NIV Type   Skin Assessment Clean, dry, & intact   Skin Protection for O2 Device Yes   NIV Started/Stopped On   Equipment Type v60   Mode CPAP   Mask Type Full face mask   Mask Size Medium   Settings/Measurements   CPAP/EPAP 5 cmH2O  (adjusted for pt comfort.)   Vt (Measured) 695 mL   Resp 21   FiO2  30 %   Minute Volume (L/min) 14.6 Liters   Mask Leak (lpm) 4 lpm   Comfort Level Good   Using Accessory Muscles No   SpO2 98   Patient's Home Machine No   Alarm Settings   Alarms On Y   Low Pressure (cmH2O) 4 cmH2O   High Pressure (cmH2O) 30 cmH2O   Delay Alarm 20 sec(s)   RR Low (bpm) 6   RR High (bpm) 40 br/min

## 2023-01-30 NOTE — PROGRESS NOTES
Hospitalist Progress Note      PCP: Yudith Orozco DO    Date of Admission: 1/26/2023    Chief Complaint: Chest pain     Hospital Course:   80 y.o. male who we are asked to see/evaluate by Cande Serrato DO for medical management of GI bleed, hemoptysis Anemia      Subjective: Patient states he is doing better. He denies any evidence of GI bleed. No further hemoptysis. Patient denies chest pain. Medications:  Reviewed    Infusion Medications    sodium chloride       Scheduled Medications    pantoprazole  40 mg IntraVENous BID    amoxicillin  1,000 mg Oral 3 times per day    mupirocin   Nasal BID    sodium chloride flush  5-40 mL IntraVENous 2 times per day    clopidogrel  75 mg Oral Daily    aspirin  81 mg Oral Daily    atorvastatin  40 mg Oral Nightly    doxazosin  1 mg Oral BID    losartan  100 mg Oral Daily    spironolactone  25 mg Oral Daily     PRN Meds: perflutren lipid microspheres, sodium chloride flush, ondansetron, sodium chloride, acetaminophen      Intake/Output Summary (Last 24 hours) at 1/30/2023 1740  Last data filed at 1/30/2023 1400  Gross per 24 hour   Intake 1374.28 ml   Output 1800 ml   Net -425.72 ml         Physical Exam Performed:    BP (!) 144/72   Pulse 65   Temp 98.9 °F (37.2 °C) (Oral)   Resp 22   Ht 5' 9\" (1.753 m)   Wt 194 lb 7.1 oz (88.2 kg)   SpO2 96%   BMI 28.71 kg/m²     General appearance: no distress, appears stated age and cooperative. HEENT: Normal cephalic, atraumatic without obvious deformity. Pupils equal, round, and reactive to light. Extra ocular muscles intact. Conjunctivae/corneas clear. Neck: Supple, with full range of motion. Trachea midline. Respiratory: no increased respiratory effort. No rhonchi.  cardiovascular: Regular rate and rhythm with normal S1/S2 without murmurs, rubs or gallops. Abdomen: Soft, non-tender, non-distended with normal bowel sounds. Musculoskeletal:  No clubbing, cyanosis or edema bilaterally.   Skin: Skin color, texture, turgor normal.  No rashes or lesions. Neurologic:  Neurovascularly intact without any focal sensory/motor deficits. Cranial nerves: II-XII intact, grossly non-focal.  Psychiatric: Alert and oriented, thought content appropriate, normal insight  Capillary Refill: Brisk,3 seconds, normal   Peripheral Pulses: +2 palpable, equal bilaterally       Labs:   Recent Labs     01/28/23  0420 01/28/23  0848 01/29/23  0419 01/29/23  0850 01/29/23  1706 01/30/23  0042 01/30/23  0415   WBC 10.4  --  9.7  --   --   --  6.6   HGB 10.7*   < > 9.9*   < > 10.2* 9.2* 8.6*   HCT 31.7*   < > 29.0*   < > 30.7* 26.8* 25.6*   *  --  104*  --   --   --  105*    < > = values in this interval not displayed. Recent Labs     01/28/23  0420 01/29/23  0419 01/30/23  0415    140 137   K 4.1 3.8 3.6    105 104   CO2 25 26 24   BUN 34* 32* 35*   CREATININE 1.1 1.3 1.2   CALCIUM 8.7 8.7 8.3       No results for input(s): AST, ALT, BILIDIR, BILITOT, ALKPHOS in the last 72 hours. Recent Labs     01/28/23  0420   INR 1.30*       No results for input(s): Donzella Rands in the last 72 hours. Urinalysis:    No results found for: Kt Fabry, BACTERIA, RBCUA, BLOODU, SPECGRAV, [address]    Radiology:  CT CHEST WO CONTRAST   Final Result   Multifocal pneumonia as above. XR CHEST PORTABLE   Final Result   Mild-to-moderate cardiomegaly with status post CABG. Currently there is evidence of congestive heart failure with moderate to   marked pulmonary vascular congestion and moderate pulmonary edema. 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 In addition, there   are new infiltrates, and/or atelectatic changes in the right lower lung   field. Mild left basilar atelectasis. No obvious pleural effusion. No pneumothorax.              IP CONSULT TO CARDIAC REHAB  IP CONSULT TO CARDIAC REHAB  IP CONSULT TO GI  IP CONSULT TO HOSPITALIST    Assessment/Plan:    Active Hospital Problems Diagnosis     UGI bleed [K92.2]      Priority: Medium    CAD in native artery [I25.10]      Priority: Medium    Chest pain [R07.9]      Priority: Medium    Shortness of breath [R06.02]      Priority: Medium    Abnormal stress test [R94.39]      Priority: Medium         Anemia/melena.  EGD demonstrated esophageal and antral ulcers.  Continue PPI.  Monitor serial H&H's.  GI is following.  Community-acquired pneumonia.  Procalcitonin was elevated.  Patient on ceftriaxone.  We will continue to monitor for fever.  CAD status post CABG.  Cardiology following.  Acute systolic heart failure.  Continue to diurese.  Monitor I's and O's.  Acute hypoxic respiratory failure.  Patient on room air.    Diet: ADULT DIET; Regular  Code Status: Full Code      Dispo -  Per cardiology    AUSTEN MCFARLANE MD

## 2023-01-30 NOTE — CARE COORDINATION
LOS 4. Care managed by [facility], [address], GI. Here w CP- S/P Cath. Also GIB  annmarie Anemia. CPAP @ HS, 2L during day. Has home CPAP but not 02. Spoke to pt at bedside about DC plans- denies needs- declines HHC. States he believes he will not need home 02 but CM following for that possibility. From home w spouse and IPTA.  Fox Alcazar RN

## 2023-01-30 NOTE — PROGRESS NOTES
Shift: [id number]    Admitting diagnosis: NSTEMI    Presentation to hospital: Chest pain, SOB, left shoulder pain    Surgery: no , Cath Lab    Nursing assessment at handoff  stable    Emergency Contact/POA: wife  Family updated: yes - wife at bedside    Most recent vitals: BP (!) 126/55   Pulse (!) 42   Temp 98.9 °F (37.2 °C) (Oral)   Resp 22   Ht 5' 9\" (1.753 m)   Wt 194 lb 7.1 oz (88.2 kg)   SpO2 96%   BMI 28.71 kg/m²      Rhythm: sinus bradycardia, Afib  to 35-50s    NC/HFNC-  2lpm  Respiratory support: - No ventilator support    Vent days: Day none    Increased O2 requirements: no/ has Severe SA & uses CPAP at home    Admission weight Weight: 198 lb 8 oz (90 kg)  Today's weight   Wt Readings from Last 1 Encounters:   01/30/23 194 lb 7.1 oz (88.2 kg)         UOP >30ml/hr: yes - void per urinal     Varela need assessed each shift: no    Restraints: no  Order current and documentation up to date? Lines/Drains  LDA Insertion Date Discontinued Date Dressing Changes   PIV   PIV 1/26 1/29 1/29    TLC       Arterial  1/26 sheath  1/26    Varela       Vas Cath      ETT       Surgical drains        Night Shift Hospitalist Interventions    Problem(Brief) Date Time Intervention Physician contacted                                               Drip rates at handoff:    sodium chloride         Hospital Course Daily Updates:  Admit Day# 1 1/23  - admitted from cath lab  -stent placed OM  -Nitro gtt off 1800  - Integrilin gtt til 0014  -sheath pulled 1852, slight bruising, site soft  - IV fluids til 2300    Admit day 2  -black tarry stool and coughing blood, eeg showed ulcers.  Protonix drip started, trending H&H  -cards clear to go home, waiting GI to clear    Admit day 3  -protonix drip  -no s/s of bleeding    Admit Day 4  - PPI infusion, stable H&H  -Stable for transfer to the floor per MD  -CHF Single lasix 20mg IV today for diuresis  -pna-IV abx, switch to PO tomorrow  -weaned O2 down to 2L  -walked hallway    Admit Day 4 (1/29) PM  - Tolerated CPAP ([id number])  - UOP total 800 for 12 hours  - Still on PPI drip  - NC 2 lpm    Admit day 5 1/30 days  -Protonix gtt off; IVP BID  -Transfer orders still in place  -VSS  -Lasix 40mg IVP once   -H&H labs Q12; stable         Lab Data:   CBC:   Recent Labs     01/29/23  0419 01/29/23  0850 01/30/23  0042 01/30/23  0415   WBC 9.7  --   --  6.6   HGB 9.9*   < > 9.2* 8.6*   HCT 29.0*   < > 26.8* 25.6*   MCV 91.9  --   --  91.0   *  --   --  105*    < > = values in this interval not displayed. BMP:    Recent Labs     01/29/23  0419 01/30/23  0415    137   K 3.8 3.6   CO2 26 24   BUN 32* 35*   CREATININE 1.3 1.2       LIVR: No results for input(s): AST, ALT in the last 72 hours. PT/INR:   Recent Labs     01/28/23  0420   INR 1.30*       APTT: No results for input(s): APTT in the last 72 hours. ABG: No results for input(s): PHART, GCB8YYM, PO2ART in the last 72 hours.   Consults (if GI or Nephrology- which group?)-  cardiology

## 2023-01-30 NOTE — PROGRESS NOTES
01/30/23 0421   NIV Type   Skin Protection for O2 Device Yes   Equipment Type v60   Mode CPAP   Mask Type Full face mask   Mask Size Medium   Settings/Measurements   CPAP/EPAP 5 cmH2O   Vt (Measured) 644 mL   Resp 23   FiO2  30 %   Minute Volume (L/min) 14.9 Liters   Mask Leak (lpm) 20 lpm   Comfort Level Good   Using Accessory Muscles No   SpO2 96   Patient's Home Machine No   Alarm Settings   Alarms On Y   Low Pressure (cmH2O) 4 cmH2O   High Pressure (cmH2O) 30 cmH2O   Delay Alarm 20 sec(s)   RR Low (bpm) 6   RR High (bpm) 40 br/min

## 2023-01-30 NOTE — PROGRESS NOTES
Shift: [id number]    Admitting diagnosis: NSTEMI    Presentation to hospital: Chest pain, SOB, left shoulder pain    Surgery: no , Cath Lab    Nursing assessment at handoff  stable    Emergency Contact/POA: wife  Family updated: yes - wife at bedside    Most recent vitals: BP (!) 145/62   Pulse 62   Temp 98.7 °F (37.1 °C) (Axillary)   Resp 20   Ht 5' 9\" (1.753 m)   Wt 194 lb 7.1 oz (88.2 kg)   SpO2 95%   BMI 28.71 kg/m²      Rhythm: sinus bradycardia, Afib  to 35-50s    NC/HFNC-  2lpm  Respiratory support: - No ventilator support    Vent days: Day none    Increased O2 requirements: no/ has Severe SA & uses CPAP at home    Admission weight Weight: 198 lb 8 oz (90 kg)  Today's weight   Wt Readings from Last 1 Encounters:   01/30/23 194 lb 7.1 oz (88.2 kg)         UOP >30ml/hr: yes - void per urinal     Varela need assessed each shift: no    Restraints: no  Order current and documentation up to date?    Lines/Drains  LDA Insertion Date Discontinued Date Dressing Changes   PIV   PIV 1/26 1/29 1/29    TLC       Arterial  1/26 sheath  1/26    Varela       Vas Cath      ETT       Surgical drains        Night Shift Hospitalist Interventions    Problem(Brief) Date Time Intervention Physician contacted                                               Drip rates at handoff:    pantoprozole (PROTONIX) infusion 8 mg/hr (01/30/23 0627)    sodium chloride         Hospital Course Daily Updates:  Admit Day# 1 1/23  - admitted from cath lab  -stent placed OM  -Nitro gtt off 1800  - Integrilin gtt til 0014  -sheath pulled 1852, slight bruising, site soft  - IV fluids til 2300    Admit day 2  -black tarry stool and coughing blood, eeg showed ulcers. Protonix drip started, trending H&H  -cards clear to go home, waiting GI to clear    Admit day 3  -protonix drip  -no s/s of bleeding    Admit Day 4  - PPI infusion, stable H&H  -Stable for transfer to the floor per MD  -CHF Single lasix 20mg IV today for diuresis  -pna-IV abx, switch to  PO tomorrow  -weaned O2 down to 2L  -walked hallway    Admit Day 4 (1/29) PM  - Tolerated CPAP ([id number])  - UOP total 800 for 12 hours  - Still on PPI drip  - NC 2 lpm        Lab Data:   CBC:   Recent Labs     01/29/23  0419 01/29/23  0850 01/30/23  0042 01/30/23 0415   WBC 9.7  --   --  6.6   HGB 9.9*   < > 9.2* 8.6*   HCT 29.0*   < > 26.8* 25.6*   MCV 91.9  --   --  91.0   *  --   --  105*    < > = values in this interval not displayed. BMP:    Recent Labs     01/29/23  0419 01/30/23 0415    137   K 3.8 3.6   CO2 26 24   BUN 32* 35*   CREATININE 1.3 1.2       LIVR: No results for input(s): AST, ALT in the last 72 hours. PT/INR:   Recent Labs     01/28/23  0420   INR 1.30*       APTT: No results for input(s): APTT in the last 72 hours. ABG: No results for input(s): PHART, WTI2KJC, PO2ART in the last 72 hours.   Consults (if GI or Nephrology- which group?)-  cardiology

## 2023-01-31 LAB
ANION GAP SERPL CALCULATED.3IONS-SCNC: 10 MMOL/L (ref 3–16)
BUN BLDV-MCNC: 37 MG/DL (ref 7–20)
CALCIUM SERPL-MCNC: 8.2 MG/DL (ref 8.3–10.6)
CHLORIDE BLD-SCNC: 107 MMOL/L (ref 99–110)
CO2: 24 MMOL/L (ref 21–32)
CREAT SERPL-MCNC: 1.2 MG/DL (ref 0.8–1.3)
GFR SERPL CREATININE-BSD FRML MDRD: >60 ML/MIN/{1.73_M2}
GLUCOSE BLD-MCNC: 142 MG/DL (ref 70–99)
HCT VFR BLD CALC: 27.2 % (ref 40.5–52.5)
HEMOGLOBIN: 9.4 G/DL (ref 13.5–17.5)
MAGNESIUM: 2.1 MG/DL (ref 1.8–2.4)
POTASSIUM SERPL-SCNC: 3.5 MMOL/L (ref 3.5–5.1)
SODIUM BLD-SCNC: 141 MMOL/L (ref 136–145)

## 2023-01-31 PROCEDURE — 6370000000 HC RX 637 (ALT 250 FOR IP): Performed by: INTERNAL MEDICINE

## 2023-01-31 PROCEDURE — 80048 BASIC METABOLIC PNL TOTAL CA: CPT

## 2023-01-31 PROCEDURE — 99232 SBSQ HOSP IP/OBS MODERATE 35: CPT | Performed by: INTERNAL MEDICINE

## 2023-01-31 PROCEDURE — 36415 COLL VENOUS BLD VENIPUNCTURE: CPT

## 2023-01-31 PROCEDURE — 94660 CPAP INITIATION&MGMT: CPT

## 2023-01-31 PROCEDURE — C9113 INJ PANTOPRAZOLE SODIUM, VIA: HCPCS | Performed by: NURSE PRACTITIONER

## 2023-01-31 PROCEDURE — 6360000002 HC RX W HCPCS: Performed by: INTERNAL MEDICINE

## 2023-01-31 PROCEDURE — 85014 HEMATOCRIT: CPT

## 2023-01-31 PROCEDURE — 2580000003 HC RX 258: Performed by: INTERNAL MEDICINE

## 2023-01-31 PROCEDURE — 85018 HEMOGLOBIN: CPT

## 2023-01-31 PROCEDURE — 83735 ASSAY OF MAGNESIUM: CPT

## 2023-01-31 PROCEDURE — 6370000000 HC RX 637 (ALT 250 FOR IP): Performed by: NURSE PRACTITIONER

## 2023-01-31 PROCEDURE — 6360000002 HC RX W HCPCS: Performed by: NURSE PRACTITIONER

## 2023-01-31 PROCEDURE — 2000000000 HC ICU R&B

## 2023-01-31 RX ORDER — FUROSEMIDE 10 MG/ML
40 INJECTION INTRAMUSCULAR; INTRAVENOUS ONCE
Status: COMPLETED | OUTPATIENT
Start: 2023-01-31 | End: 2023-01-31

## 2023-01-31 RX ORDER — PANTOPRAZOLE SODIUM 40 MG/1
40 TABLET, DELAYED RELEASE ORAL
Status: DISCONTINUED | OUTPATIENT
Start: 2023-01-31 | End: 2023-02-01 | Stop reason: HOSPADM

## 2023-01-31 RX ADMIN — ASPIRIN 81 MG 81 MG: 81 TABLET ORAL at 08:23

## 2023-01-31 RX ADMIN — MUPIROCIN: 20 OINTMENT TOPICAL at 08:26

## 2023-01-31 RX ADMIN — Medication 10 ML: at 08:25

## 2023-01-31 RX ADMIN — SPIRONOLACTONE 25 MG: 25 TABLET ORAL at 08:23

## 2023-01-31 RX ADMIN — MUPIROCIN: 20 OINTMENT TOPICAL at 21:06

## 2023-01-31 RX ADMIN — DOXAZOSIN 1 MG: 2 TABLET ORAL at 21:06

## 2023-01-31 RX ADMIN — DOXAZOSIN 1 MG: 2 TABLET ORAL at 08:23

## 2023-01-31 RX ADMIN — Medication 10 ML: at 21:05

## 2023-01-31 RX ADMIN — AMOXICILLIN 1000 MG: 250 CAPSULE ORAL at 05:32

## 2023-01-31 RX ADMIN — AMOXICILLIN 1000 MG: 250 CAPSULE ORAL at 21:50

## 2023-01-31 RX ADMIN — PANTOPRAZOLE SODIUM 40 MG: 40 INJECTION, POWDER, FOR SOLUTION INTRAVENOUS at 08:23

## 2023-01-31 RX ADMIN — AMOXICILLIN 1000 MG: 250 CAPSULE ORAL at 15:04

## 2023-01-31 RX ADMIN — ATORVASTATIN CALCIUM 40 MG: 40 TABLET, FILM COATED ORAL at 21:05

## 2023-01-31 RX ADMIN — PANTOPRAZOLE SODIUM 40 MG: 40 TABLET, DELAYED RELEASE ORAL at 18:27

## 2023-01-31 RX ADMIN — LOSARTAN POTASSIUM 100 MG: 100 TABLET, FILM COATED ORAL at 08:23

## 2023-01-31 RX ADMIN — FUROSEMIDE 40 MG: 10 INJECTION, SOLUTION INTRAMUSCULAR; INTRAVENOUS at 11:13

## 2023-01-31 RX ADMIN — CLOPIDOGREL BISULFATE 75 MG: 75 TABLET ORAL at 08:23

## 2023-01-31 NOTE — PROGRESS NOTES
01/31/23 0358   NIV Type   Skin Protection for O2 Device Yes   Equipment Type v60   Mode CPAP   Mask Type Full face mask   Mask Size Medium   Settings/Measurements   CPAP/EPAP 5 cmH2O   Vt (Measured) 431 mL   Resp 29   FiO2  30 %   Minute Volume (L/min) 12.4 Liters   Mask Leak (lpm) 2 lpm   Comfort Level Good   Using Accessory Muscles No   SpO2 95   Alarm Settings   Alarms On Y   Low Pressure (cmH2O) 4 cmH2O   High Pressure (cmH2O) 30 cmH2O   Delay Alarm 20 sec(s)   RR Low (bpm) 6   RR High (bpm) 40 br/min

## 2023-01-31 NOTE — PROGRESS NOTES
01/30/23 2225   NIV Type   Equipment Type v60   Mode CPAP   Mask Type Full face mask   Mask Size Medium   Settings/Measurements   PIP Observed 6 cm H20   CPAP/EPAP 5 cmH2O   Vt (Measured) 583 mL   Resp 20   FiO2  30 %   Minute Volume (L/min) 11 Liters   Mask Leak (lpm) 21 lpm   Comfort Level Good   Using Accessory Muscles No   SpO2 97   Patient's Home Machine No   Alarm Settings   Alarms On Y   Low Pressure (cmH2O) 4 cmH2O   High Pressure (cmH2O) 30 cmH2O   RR Low (bpm) 6   RR High (bpm) 40 br/min

## 2023-01-31 NOTE — PLAN OF CARE
Problem: Chronic Conditions and Co-morbidities  Goal: Patient's chronic conditions and co-morbidity symptoms are monitored and maintained or improved  Outcome: Progressing     Problem: Discharge Planning  Goal: Discharge to home or other facility with appropriate resources  Outcome: Progressing     Problem: Skin/Tissue Integrity  Goal: Absence of new skin breakdown  Description: 1. Monitor for areas of redness and/or skin breakdown  2. Assess vascular access sites hourly  3. Every 4-6 hours minimum:  Change oxygen saturation probe site  4. Every 4-6 hours:  If on nasal continuous positive airway pressure, respiratory therapy assess nares and determine need for appliance change or resting period.   Outcome: Progressing     Problem: Pain  Goal: Verbalizes/displays adequate comfort level or baseline comfort level  Outcome: Progressing  Flowsheets (Taken 1/31/2023 0800)  Verbalizes/displays adequate comfort level or baseline comfort level:   Encourage patient to monitor pain and request assistance   Administer analgesics based on type and severity of pain and evaluate response   Assess pain using appropriate pain scale   Implement non-pharmacological measures as appropriate and evaluate response

## 2023-01-31 NOTE — PROGRESS NOTES
Shift: [id number]    Admitting diagnosis: NSTEMI    Presentation to hospital: Chest pain, SOB, left shoulder pain    Surgery: no , Cath Lab    Nursing assessment at handoff  stable    Emergency Contact/POA: wife  Family updated: yes - wife at bedside    Most recent vitals: BP (!) 117/42   Pulse (!) 39   Temp 97.8 °F (36.6 °C) (Axillary)   Resp 24   Ht 5' 9\" (1.753 m)   Wt 194 lb 4.8 oz (88.1 kg)   SpO2 94%   BMI 28.69 kg/m²      Rhythm: sinus bradycardia, Afib  to 35-50s    NC/HFNC-  2lpm, cpap at night  Respiratory support: - No ventilator support    Vent days: Day none    Increased O2 requirements: no/ has Severe SA & uses CPAP at home    Admission weight Weight: 198 lb 8 oz (90 kg)  Today's weight   Wt Readings from Last 1 Encounters:   01/30/23 194 lb 7.1 oz (88.2 kg)         UOP >30ml/hr: yes - void per urinal     Varela need assessed each shift: no    Restraints: no  Order current and documentation up to date? Lines/Drains  LDA Insertion Date Discontinued Date Dressing Changes   PIV   PIV 1/26 1/29 1/29    TLC       Arterial  1/26 sheath  1/26    Varela       Vas Cath      ETT       Surgical drains        Night Shift Hospitalist Interventions    Problem(Brief) Date Time Intervention Physician contacted                                               Drip rates at handoff:    sodium chloride         Hospital Course Daily Updates:  Admit Day# 1 1/23  - admitted from cath lab  -stent placed OM  -Nitro gtt off 1800  - Integrilin gtt til 0014  -sheath pulled 1852, slight bruising, site soft  - IV fluids til 2300    Admit day 1  -black tarry stool and coughing blood, eeg showed ulcers.  Protonix drip started, trending H&H  -cards clear to go home, waiting GI to clear    Admit day 2  -protonix drip  -no s/s of bleeding    Admit Day 3  - PPI infusion, stable H&H  -Stable for transfer to the floor per MD  -CHF Single lasix 20mg IV today for diuresis  -pna-IV abx, switch to PO tomorrow  -weaned O2 down to 2L  -walked hallway    Admit Day 3 (1/29) PM  - Tolerated CPAP ([id number])  - UOP total 800 for 12 hours  - Still on PPI drip  - NC 2 lpm    Admit day 4 1/30 days  -Protonix gtt off; IVP BID  -Transfer orders still in place  -VSS  -Lasix 40mg IVP once   -H&H labs Q12; stable     Admit Day 4 Nights  -VSS, no acute events overnight    Lab Data:   CBC:   Recent Labs     01/29/23  0419 01/29/23  0850 01/30/23  0415 01/30/23  1740   WBC 9.7  --  6.6  --    HGB 9.9*   < > 8.6* 10.0*   HCT 29.0*   < > 25.6* 29.4*   MCV 91.9  --  91.0  --    *  --  105*  --     < > = values in this interval not displayed. BMP:    Recent Labs     01/29/23  0419 01/30/23  0415    137   K 3.8 3.6   CO2 26 24   BUN 32* 35*   CREATININE 1.3 1.2     LIVR: No results for input(s): AST, ALT in the last 72 hours. PT/INR:   Recent Labs     01/28/23  0420   INR 1.30*       APTT: No results for input(s): APTT in the last 72 hours. ABG: No results for input(s): PHART, VIQ9DTW, PO2ART in the last 72 hours.   Consults (if GI or Nephrology- which group?)-  cardiology

## 2023-01-31 NOTE — PROGRESS NOTES
01/30/23 2344   NIV Type   Equipment Type c60   Mode CPAP   Mask Type Full face mask   Mask Size Medium   Settings/Measurements   CPAP/EPAP 5 cmH2O   Vt (Measured) 508 mL   Resp 27   FiO2  30 %   Minute Volume (L/min) 14 Liters   Mask Leak (lpm) 4 lpm   Comfort Level Good   Using Accessory Muscles No   SpO2 95   Alarm Settings   Alarms On Y   Low Pressure (cmH2O) 4 cmH2O   High Pressure (cmH2O) 30 cmH2O   Delay Alarm 20 sec(s)   RR Low (bpm) 6   RR High (bpm) 40 br/min

## 2023-01-31 NOTE — PROGRESS NOTES
[facility]   PROGRESS NOTE  [phone]      Attending Physician: Clay Arango DO  Reason for Consultation/Chief Complaint: S/p LHC and PCI    Subjective     Patient continues to report that he is feeling better. His shortness of breath is improved. He denies any chest pain. He says that he did have a bowel movement yesterday that was initially dark and later lightened up. Hemoglobin 9.4 this morning from 8.6 yesterday morning (was 10.6 last night). CURRENT Medications:  furosemide (LASIX) injection 40 mg, Once  pantoprazole (PROTONIX) injection 40 mg, BID  amoxicillin (AMOXIL) capsule 1,000 mg, 3 times per day  mupirocin (BACTROBAN) 2 % ointment, BID  perflutren lipid microspheres (DEFINITY) injection 1.5 mL, ONCE PRN  sodium chloride flush 0.9 % injection 5-40 mL, 2 times per day  sodium chloride flush 0.9 % injection 5-40 mL, PRN  ondansetron (ZOFRAN) injection 4 mg, Q6H PRN  clopidogrel (PLAVIX) tablet 75 mg, Daily  aspirin chewable tablet 81 mg, Daily  0.9 % sodium chloride infusion, PRN  acetaminophen (TYLENOL) tablet 650 mg, Q4H PRN  atorvastatin (LIPITOR) tablet 40 mg, Nightly  doxazosin (CARDURA) tablet 1 mg, BID  losartan (COZAAR) tablet 100 mg, Daily  spironolactone (ALDACTONE) tablet 25 mg, Daily      Allergies:  Coumadin [warfarin], Omeprazole, and Vioxx     Review of Systems:   Negative except as noted above. Objective   PHYSICAL EXAM:    Vitals:    01/31/23 1700   BP: (!) 124/52   Pulse: (!) 43   Resp: 17   Temp: 98F   SpO2: 91%     Weight: 194 lb 4.8 oz (88.1 kg)      General: Adult male in no acute distress. Pleasant and interactive on exam.  Neck: No appreciable JVD. Heart: Irregular rate and rhythm. Normal S1 and S2. Grade II/VI holosystolic murmur. No rubs or gallops. Lungs: Normal respiratory effort. Mild bibasilar crackles present. No rubs or gallops. Abdomen: Soft, non-tender. Normoactive bowel sounds. No masses or organomegaly.   Skin: No rashes. Extremities: No clubbing, cyanosis, or edema. Psych: Normal mood and affect. Neuro: Alert and oriented to person, place, and time. No focal deficits noted. Labs   CBC:   Lab Results   Component Value Date/Time    WBC 6.6 01/30/2023 04:15 AM    RBC 2.81 01/30/2023 04:15 AM    HGB 9.4 01/31/2023 07:52 AM    HCT 27.2 01/31/2023 07:52 AM    MCV 91.0 01/30/2023 04:15 AM    RDW 15.2 01/30/2023 04:15 AM     01/30/2023 04:15 AM     CMP:  Lab Results   Component Value Date/Time     01/30/2023 04:15 AM    K 3.6 01/30/2023 04:15 AM    K 3.8 01/26/2023 07:02 AM     01/30/2023 04:15 AM    CO2 24 01/30/2023 04:15 AM    BUN 35 01/30/2023 04:15 AM    CREATININE 1.2 01/30/2023 04:15 AM    GFRAA >60 09/10/2022 06:35 AM    GFRAA >60 01/15/2013 09:09 AM    AGRATIO 1.5 09/08/2022 09:00 AM    LABGLOM >60 01/30/2023 04:15 AM    GLUCOSE 141 01/30/2023 04:15 AM    PROT 7.8 09/08/2022 09:00 AM    PROT 7.2 01/15/2013 09:09 AM    CALCIUM 8.3 01/30/2023 04:15 AM    BILITOT 1.2 09/08/2022 09:00 AM    ALKPHOS 80 09/08/2022 09:00 AM    AST 26 09/08/2022 09:00 AM    ALT 32 09/08/2022 09:00 AM     PT/INR:  No results found for: PTINR  HgBA1c:  Lab Results   Component Value Date    LABA1C 6.9 11/15/2022     Lab Results   Component Value Date    TROPONINI <0.01 09/08/2022         Cardiac Data     Echo:  TTE 1/27/23:  Conclusions   Summary   Left ventricular systolic function is mildly reduced with an estimated   ejection fraction of 45-50%. 3D calculated EF of 48%. The left ventricle is normal in size with normal wall thickness. There is hypokinesis of the inferior, inferolateral, and apical septal   walls. Indeterminate diastolic function. Patient was in atrial fibrillation during   study. The right ventricle is normal in size with reduced systolic function. Moderate right atrial dilatation. Severe left atrial dilatation.    The aortic valve leaflets appear calcified with no hemodynamically significant stenosis. Mild mitral regurgitation. Mild-moderate tricuspid regurgitation. Systolic pulmonary artery pressure (SPAP) is elevated and estimated at 74   mmHg (right atrial pressure 15 mmHg), consistent with severe pulmonary   hypertension . IVC size is dilated (>2.1 cm) and collapses < 50% with respiration   consistent with elevated RA pressure (15 mmHg). Findings:  Hemodynamics:              A. Opening arterial pressure: 118/40 (59) mmHg              B. LVEDP: 20 mmHg     2. Coronary anatomy:  A. Left main artery: The left main artery trifurcates into the left anterior descending artery, ramus intermedius artery, and left circumflex artery. The left main artery is mildly calcified with a 20% ostial stenosis and 10% distal stenosis. B. Left anterior descending artery: The LAD is calcified and has a 30% ostial stenosis, 50% proximal stenosis, and 100% mid-vessel stenosis immediately after the bifurcation of the first septal . The occlusion appears to be at the proximal edge of a previously placed stent. C. Ramus intermedius artery: Small to medium caliber vessel with 90% ostial stenosis. D. Left circumflex artery: The LCx is calcified and has a 100% ostial stenosis. E. Right coronary artery: Dominant vessel. The RCA is calcified with a 50% proximal stenosis and 100% mid-vessel stenosis. 3. Bypass graft anatomy:  A. Left subclavian artery: Patent with midl disease. B. LIMA to LAD: The LIMA is widely patent. There is a 70-80% stenosis in the distal LAD beyond the anastomosis. C. SVG to OM1 with sequential to OM2: The vein graft has a 30% proximal stenosis and 90% distal stenosis near what was previously likely the anastomosis of the OM1. The OM1 appears to have been a smaller vessel than the OM2 and is now occluded. The OM2 has a 30% stenosis beyond its anastomosis with the vein graft. D. SVG to distal RCA: The vein graft has a 30% ostial/proximal stenosis.   The native distal RCA has a 50% stenosis after the anastomosis. There RPDA has a 30% ostial stenosis. The RPLB has a 30% proximal stenosis and 30% mid-vessel stenosis. Results of Intervention (PCI of distal SVG to OM1 with sequential to OM2):  Pre - 90% stenosis. JAYLA 3 flow. Post - <10% stenosis. There was initially poor re-flow following PTCA and stenting of the distal SVG. Copious IC vasodilators were given and the patient was started on IV Integrilin. Final angiography showed <10% residual stenosis with JAYLA 3 flow throughout the vein graft and all but the very distal small caliber segment of the native OM2 which still had no flow, but overall flow through the distal segment appeared to be gradually improving. Assessment:      1. CAD - S/p remote 4-vessel CABG. Underwent invasive coronary angiography on 1/26/23 demonstrating severe native multivessel coronary artery disease with patent LIMA to LAD, severely diseased SVG to OM1 with sequential to OM2, and patent SVG to distal RCA. At that time, PCI was performed to the distal SVG to OM1 with sequential to OM2 with an Stanley Duryea 3.5 x 20 mm AMBER. There was initially poor re-flow following PTCA and stenting of the distal SVG. Copious IC vasodilators were given and the patient was started on IV Integrilin. Final angiography showed <10% residual stenosis with JAYLA 3 flow throughout the vein graft and all but the very distal small caliber segment of the native OM2 which still had no flow, but overall flow through the distal segment appeared to be gradually improving. The OM1 appeared to be occluded, was felt to be much smaller than the OM2, and was overall not an adequate target for percutaneous coronary intervention. Therefore, the decision was made to proceed with stenting across the anastomosis with the OM1. He currently denies any significant angina. 2. Acute LV systolic heart failure/ischemic cardiomyopathy - LVEF 45-50% on most recent TTE. Continues to improve with diuresis. 3. Melena/acute upper GI bleed - EGD on 1/27/23 showed clean based esophageal ulcer at GE junction, patulous Schatzki's ring, and large amount of old blood in the stomach with diminutive clean based ulcers in the antrum. On IV PPI. 4. Hemoptysis - Improved. Could possibly be related to upper GI bleed. Non-contrast chest CT suggestive of possible multifocal pneumonia. No other concerning masses were seen. 5. Community-acquired pneumonia - On amoxicillin. 6. Acute hypoxic respiratory failure - Likely secondary to combination of acute heart failure and pneumonia above. 7. Persistent atrial fibrillation - Ventricular rate currently controlled. 8. Essential hypertension  9. Hyperlipidemia  10. AAA s/p endovascular stent repair with known type II endoleak  11. SUNDEEP  12. Prolonged QT    Plan:     1. Hemoglobin appears to be stabilizing. Transitioning from IV to PO PPI. 2. Continue aspirin 81 mg daily, Plavix 75 mg daily, atorvastatin 40 mg qHS, losartan 100 mg daily, and spironolactone 25 mg daily. 3. Not on beta-blocker due to underlying bradycardia. 4. Will give additional 40 mg IV lasix today and plan to transition to PO diuretic tomorrow. 5. Continue to hold off on restarting Eliquis. 6. Continue amoxicillin. 7. Monitor strict I/Os, obtain daily standing weights. 8. 2L per day fluid restriction, low sodium diet. 9. Will plan to repeat CBC tomorrow morning, and if stable and no further signs of blood loss, plan to discharge home. 10. Will arrange for follow-up with [facility] in 1-2 weeks and eventually plan for staged PCI of native distal LAD as outpatient if hemoglobin continues to be stable and no further evidence of bleeding. Thank you for allowing us to participate in the care of Nanette Silva. Please call me with any questions [phone].       Tammi Storm, [address]  [phone] [facility]  [phone] [place] Office  1/31/2023 8:31 AM      I will address the patient's cardiac risk factors and adjusted pharmacologic treatment as needed. In addition, I have reinforced the need for patient directed risk factor modification. All questions and concerns were addressed to the patient/family. Alternatives to my treatment were discussed. The note was completed using EMR. Every effort was made to ensure accuracy; however, inadvertent computerized transcription errors may be present.

## 2023-01-31 NOTE — PROGRESS NOTES
Hospitalist Progress Note      PCP: Deangelo Noriega DO    Date of Admission: 1/26/2023    Chief Complaint: Chest pain     Hospital Course:   80 y.o. male who we are asked to see/evaluate by Jami Perea DO for medical management of GI bleed, hemoptysis Anemia      Subjective: Patient denies hemoptysis or melena. Hemoglobin slightly decreased. Denies chest pain. Medications:  Reviewed    Infusion Medications    sodium chloride       Scheduled Medications    pantoprazole  40 mg IntraVENous BID    amoxicillin  1,000 mg Oral 3 times per day    mupirocin   Nasal BID    sodium chloride flush  5-40 mL IntraVENous 2 times per day    clopidogrel  75 mg Oral Daily    aspirin  81 mg Oral Daily    atorvastatin  40 mg Oral Nightly    doxazosin  1 mg Oral BID    losartan  100 mg Oral Daily    spironolactone  25 mg Oral Daily     PRN Meds: perflutren lipid microspheres, sodium chloride flush, ondansetron, sodium chloride, acetaminophen      Intake/Output Summary (Last 24 hours) at 1/31/2023 1528  Last data filed at 1/31/2023 1500  Gross per 24 hour   Intake 350 ml   Output 1550 ml   Net -1200 ml         Physical Exam Performed:    BP (!) 128/56   Pulse 51   Temp 98.4 °F (36.9 °C) (Oral)   Resp 16   Ht 5' 9\" (1.753 m)   Wt 194 lb 4.8 oz (88.1 kg)   SpO2 94%   BMI 28.69 kg/m²     General appearance: no distress, appears stated age and cooperative. HEENT: Normal cephalic, atraumatic without obvious deformity. Pupils equal, round, and reactive to light. Extra ocular muscles intact. Conjunctivae/corneas clear. Neck: Supple, with full range of motion. Trachea midline. Respiratory: no increased respiratory effort. No rhonchi.  cardiovascular: Regular rate and rhythm with normal S1/S2 without murmurs, rubs or gallops. Abdomen: Soft, non-tender, non-distended with normal bowel sounds. Musculoskeletal:  No clubbing, cyanosis or edema bilaterally.   Skin: Skin color, texture, turgor normal.  No rashes or lesions. Neurologic:  Neurovascularly intact without any focal sensory/motor deficits. Cranial nerves: II-XII intact, grossly non-focal.  Psychiatric: Alert and oriented, thought content appropriate, normal insight  Capillary Refill: Brisk,3 seconds, normal   Peripheral Pulses: +2 palpable, equal bilaterally       Labs:   Recent Labs     01/29/23 0419 01/29/23  0850 01/30/23  0415 01/30/23  1740 01/31/23  0752   WBC 9.7  --  6.6  --   --    HGB 9.9*   < > 8.6* 10.0* 9.4*   HCT 29.0*   < > 25.6* 29.4* 27.2*   *  --  105*  --   --     < > = values in this interval not displayed. Recent Labs     01/29/23 0419 01/30/23 0415 01/31/23  0752    137 141   K 3.8 3.6 3.5    104 107   CO2 26 24 24   BUN 32* 35* 37*   CREATININE 1.3 1.2 1.2   CALCIUM 8.7 8.3 8.2*       No results for input(s): AST, ALT, BILIDIR, BILITOT, ALKPHOS in the last 72 hours. No results for input(s): INR in the last 72 hours. No results for input(s): Blank [place] in the last 72 hours. Urinalysis:    No results found for: Jose G Castles, BACTERIA, RBCUA, BLOODU, SPECGRAV, [address] [place] [address]    Radiology:  CT CHEST WO CONTRAST   Final Result   Multifocal pneumonia as above. XR CHEST PORTABLE   Final Result   Mild-to-moderate cardiomegaly with status post CABG. Currently there is evidence of congestive heart failure with moderate to   marked pulmonary vascular congestion and moderate pulmonary edema. 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 In addition, there   are new infiltrates, and/or atelectatic changes in the right lower lung   field. Mild left basilar atelectasis. No obvious pleural effusion. No pneumothorax.              IP CONSULT TO CARDIAC REHAB  IP CONSULT TO CARDIAC REHAB  IP CONSULT TO GI  IP CONSULT TO HOSPITALIST    Assessment/Plan:    Active Hospital Problems    Diagnosis     UGI bleed [K92.2]      Priority: Medium    CAD in native artery [I25.10]      Priority: Medium Chest pain [R07.9]      Priority: Medium    Shortness of breath [R06.02]      Priority: Medium    Abnormal stress test [R94.39]      Priority: Medium         Anemia/melena. EGD demonstrated esophageal and antral ulcers. Continue PPI. Monitor serial H&H's.  GI is following. Community-acquired pneumonia. Procalcitonin was elevated. Monitor for fever. Ceftriaxone transition to Amoxil. CAD status post CABG. Cardiology following. Acute systolic heart failure. Patient on Aldactone. Monitor potassium. Monitor I's and O's. Acute hypoxic respiratory failure. Patient on room air. Diet: ADULT DIET;  Regular  Code Status: Full Code      Dispo -  Per cardiology    Temi Davalos MD

## 2023-01-31 NOTE — PROGRESS NOTES
PROGRESS NOTE    HPI: Bill Crow is a(n)82 y.o. male admitted for work-up and treatment for Abnormal result of other cardiovascular function study [R94.39]  CAD in native artery [I25.10].     We are following for UGIB.    Subjective:     No reports of black stools. Feeling well overall.  No new GI complaints.      Objective:     I/O last 3 completed shifts:  In: 1858.2 [P.O.:990; I.V.:722.2; IV Piggyback:146]  Out: 2300 [Urine:2300]      BP (!) 125/55   Pulse (!) 38   Temp 98 °F (36.7 °C) (Oral)   Resp 17   Ht 5' 9\" (1.753 m)   Wt 194 lb 4.8 oz (88.1 kg)   SpO2 91%   BMI 28.69 kg/m²     Physical Exam:  HEENT: anicteric sclera, oropharyngeal membranes pink and moist.  Cor: RRR  Lungs: non-labored, no respiratory distress  Abdomen: soft,  NT. No ascites.  No hepatomegaly or splenomegaly  Extremities: no edema  Neuro: alert and oriented x 3      Results:   Lab Results   Component Value Date    ALT 32 09/08/2022    AST 26 09/08/2022    ALKPHOS 80 09/08/2022    BILIDIR 0.3 06/15/2017    PROT 7.8 09/08/2022    LABALBU 3.6 09/10/2022    INR 1.30 (H) 01/28/2023     Lab Results   Component Value Date    WBC 6.6 01/30/2023    HGB 9.4 (L) 01/31/2023    HCT 27.2 (L) 01/31/2023    MCV 91.0 01/30/2023     (L) 01/30/2023     BUN/Cr/glu/ALT/AST/amyl/lip:  37/1.2/--/--/--/--/-- (01/31 0752)  CT CHEST WO CONTRAST    Result Date: 1/27/2023  Multifocal pneumonia as above.     XR CHEST PORTABLE    Result Date: 1/27/2023  Mild-to-moderate cardiomegaly with status post CABG. Currently there is evidence of congestive heart failure with moderate to marked pulmonary vascular congestion and moderate pulmonary edema. 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  In addition, there are new infiltrates, and/or atelectatic changes in the right lower lung field.  Mild left basilar atelectasis. No obvious pleural effusion.  No pneumothorax.         Impression:  UGIB,  melena. S/p EGD 1/27 with small clear based esophageal ulcers and antral ulcers. No overt bleeding since procedure. Slow downtrend in Hgb. Has been getting it checked qid however there may be related in some to frequent blood draws. 2. CAD s/p PCI AMBER. Has been on Integrilin drip. On ASA and Plavix. Plan:  Continue PPI bid. Can switch to oral.  Follow-up H. Pylori stool antigen test.  Follow-up in office in 6-8 weeks. GI will sign off. Please do not hesitate to call with questions or concerns.       Electronically signed by: RUPERTO Kowalski 1/31/2023 4:58 PM

## 2023-01-31 NOTE — PLAN OF CARE
Problem: Chronic Conditions and Co-morbidities  Goal: Patient's chronic conditions and co-morbidity symptoms are monitored and maintained or improved  1/30/2023 2120 by Usman Avendaño RN  Outcome: Progressing  Flowsheets (Taken 1/30/2023 2000)  Care Plan - Patient's Chronic Conditions and Co-Morbidity Symptoms are Monitored and Maintained or Improved:   Monitor and assess patient's chronic conditions and comorbid symptoms for stability, deterioration, or improvement   Collaborate with multidisciplinary team to address chronic and comorbid conditions and prevent exacerbation or deterioration   Update acute care plan with appropriate goals if chronic or comorbid symptoms are exacerbated and prevent overall improvement and discharge  1/30/2023 1735 by Robb Fenton, [place] (Taken 1/29/2023 2000 by Consuelo Weston RN)  Care Plan - Patient's Chronic Conditions and Co-Morbidity Symptoms are Monitored and Maintained or Improved: Monitor and assess patient's chronic conditions and comorbid symptoms for stability, deterioration, or improvement     Problem: Discharge Planning  Goal: Discharge to home or other facility with appropriate resources  Outcome: Progressing  Flowsheets (Taken 1/30/2023 2000)  Discharge to home or other facility with appropriate resources:   Identify barriers to discharge with patient and caregiver   Arrange for needed discharge resources and transportation as appropriate   Identify discharge learning needs (meds, wound care, etc)   Arrange for interpreters to assist at discharge as needed   Refer to discharge planning if patient needs post-hospital services based on physician order or complex needs related to functional status, cognitive ability or social support system     Problem: Skin/Tissue Integrity  Goal: Absence of new skin breakdown  Description: 1. Monitor for areas of redness and/or skin breakdown  2. Assess vascular access sites hourly  3.   Every 4-6 hours minimum: Change oxygen saturation probe site  4. Every 4-6 hours:  If on nasal continuous positive airway pressure, respiratory therapy assess nares and determine need for appliance change or resting period.   1/30/2023 2120 by Orquidea Fong RN  Outcome: Progressing  1/30/2023 1735 by Gómez Menchaca RN  Outcome: Progressing     Problem: Pain  Goal: Verbalizes/displays adequate comfort level or baseline comfort level  Outcome: Progressing

## 2023-02-01 VITALS
TEMPERATURE: 98.5 F | RESPIRATION RATE: 18 BRPM | SYSTOLIC BLOOD PRESSURE: 128 MMHG | BODY MASS INDEX: 28.21 KG/M2 | HEIGHT: 69 IN | OXYGEN SATURATION: 92 % | DIASTOLIC BLOOD PRESSURE: 48 MMHG | HEART RATE: 62 BPM | WEIGHT: 190.48 LBS

## 2023-02-01 PROBLEM — J18.9 CAP (COMMUNITY ACQUIRED PNEUMONIA): Status: ACTIVE | Noted: 2023-02-01

## 2023-02-01 PROBLEM — I50.21 ACUTE SYSTOLIC HF (HEART FAILURE) (HCC): Status: ACTIVE | Noted: 2023-02-01

## 2023-02-01 LAB
ANION GAP SERPL CALCULATED.3IONS-SCNC: 11 MMOL/L (ref 3–16)
BUN BLDV-MCNC: 32 MG/DL (ref 7–20)
CALCIUM SERPL-MCNC: 8.6 MG/DL (ref 8.3–10.6)
CHLORIDE BLD-SCNC: 105 MMOL/L (ref 99–110)
CO2: 25 MMOL/L (ref 21–32)
CREAT SERPL-MCNC: 1.1 MG/DL (ref 0.8–1.3)
GFR SERPL CREATININE-BSD FRML MDRD: >60 ML/MIN/{1.73_M2}
GLUCOSE BLD-MCNC: 128 MG/DL (ref 70–99)
H PYLORI ANTIGEN STOOL: NEGATIVE
HCT VFR BLD CALC: 27.4 % (ref 40.5–52.5)
HEMOGLOBIN: 9.1 G/DL (ref 13.5–17.5)
MAGNESIUM: 2.1 MG/DL (ref 1.8–2.4)
MCH RBC QN AUTO: 30.1 PG (ref 26–34)
MCHC RBC AUTO-ENTMCNC: 33.1 G/DL (ref 31–36)
MCV RBC AUTO: 90.9 FL (ref 80–100)
PDW BLD-RTO: 15.2 % (ref 12.4–15.4)
PLATELET # BLD: 138 K/UL (ref 135–450)
PMV BLD AUTO: 8 FL (ref 5–10.5)
POTASSIUM SERPL-SCNC: 3.5 MMOL/L (ref 3.5–5.1)
RBC # BLD: 3.02 M/UL (ref 4.2–5.9)
SODIUM BLD-SCNC: 141 MMOL/L (ref 136–145)
WBC # BLD: 5 K/UL (ref 4–11)

## 2023-02-01 PROCEDURE — 94660 CPAP INITIATION&MGMT: CPT

## 2023-02-01 PROCEDURE — 2580000003 HC RX 258: Performed by: INTERNAL MEDICINE

## 2023-02-01 PROCEDURE — 6370000000 HC RX 637 (ALT 250 FOR IP): Performed by: INTERNAL MEDICINE

## 2023-02-01 PROCEDURE — 99239 HOSP IP/OBS DSCHRG MGMT >30: CPT | Performed by: NURSE PRACTITIONER

## 2023-02-01 PROCEDURE — 36415 COLL VENOUS BLD VENIPUNCTURE: CPT

## 2023-02-01 PROCEDURE — 85027 COMPLETE CBC AUTOMATED: CPT

## 2023-02-01 PROCEDURE — 6370000000 HC RX 637 (ALT 250 FOR IP): Performed by: NURSE PRACTITIONER

## 2023-02-01 PROCEDURE — 83735 ASSAY OF MAGNESIUM: CPT

## 2023-02-01 PROCEDURE — 80048 BASIC METABOLIC PNL TOTAL CA: CPT

## 2023-02-01 RX ORDER — CLOPIDOGREL BISULFATE 75 MG/1
75 TABLET ORAL DAILY
Qty: 30 TABLET | Refills: 3 | Status: SHIPPED | OUTPATIENT
Start: 2023-02-02

## 2023-02-01 RX ORDER — AMOXICILLIN 500 MG/1
1000 CAPSULE ORAL EVERY 8 HOURS SCHEDULED
Qty: 8 CAPSULE | Refills: 0 | Status: SHIPPED | OUTPATIENT
Start: 2023-02-01 | End: 2023-02-03

## 2023-02-01 RX ORDER — FUROSEMIDE 40 MG/1
40 TABLET ORAL DAILY
Status: DISCONTINUED | OUTPATIENT
Start: 2023-02-01 | End: 2023-02-01 | Stop reason: HOSPADM

## 2023-02-01 RX ORDER — PANTOPRAZOLE SODIUM 40 MG/1
40 TABLET, DELAYED RELEASE ORAL
Qty: 30 TABLET | Refills: 3 | Status: SHIPPED | OUTPATIENT
Start: 2023-02-01

## 2023-02-01 RX ORDER — DOXAZOSIN MESYLATE 1 MG/1
1 TABLET ORAL 2 TIMES DAILY
Qty: 60 TABLET | Refills: 2 | Status: SHIPPED
Start: 2023-02-01

## 2023-02-01 RX ADMIN — CLOPIDOGREL BISULFATE 75 MG: 75 TABLET ORAL at 09:33

## 2023-02-01 RX ADMIN — ASPIRIN 81 MG 81 MG: 81 TABLET ORAL at 09:35

## 2023-02-01 RX ADMIN — AMOXICILLIN 1000 MG: 250 CAPSULE ORAL at 05:43

## 2023-02-01 RX ADMIN — PANTOPRAZOLE SODIUM 40 MG: 40 TABLET, DELAYED RELEASE ORAL at 05:43

## 2023-02-01 RX ADMIN — SPIRONOLACTONE 25 MG: 25 TABLET ORAL at 09:34

## 2023-02-01 RX ADMIN — DOXAZOSIN 1 MG: 2 TABLET ORAL at 09:35

## 2023-02-01 RX ADMIN — LOSARTAN POTASSIUM 100 MG: 100 TABLET, FILM COATED ORAL at 09:34

## 2023-02-01 RX ADMIN — PANTOPRAZOLE SODIUM 40 MG: 40 TABLET, DELAYED RELEASE ORAL at 09:34

## 2023-02-01 RX ADMIN — Medication 10 ML: at 09:35

## 2023-02-01 NOTE — PROGRESS NOTES
[facility], [place], operated by [facility]  Office Visit    Heather Lima  1940    February 7, 2023    CC:   Chief Complaint   Patient presents with    Follow-up     1 wk    Coronary Artery Disease    Shortness of Breath     Lack of sleep     HPI:  The patient is 80 y.o. male with a past medical history significant for AAA, CAD with prior MI/CABG, HLP, HTN,atrial fib, CVA, valvular heart disease, sleep apnea on CPAP and small PFO who is here for hospital follow up. He was hospitalized from 1/26/23-2/1/2. He was admitted for planned PCI. He was recently seen by Dr. Natalya Blankenship in the office at the request of  d/t an abnormal stress test and chest pain. He was admitted in September 2022 with brief episode aphasia, Symptoms resolved by arrival to ED. Echo performed on 9/9/22 showed LVEF 50-55% with WMA, grade II DD,moderate MR,mild AI, moderate TR and small PFO. Stress test showed enlarged RV and small to moderate area of anterior and basal inferior ischemia. Brain MRI showed small acute infarct of L parietal lobe and chronic infarcts in R occipital lobe and left cerebellar hemisphere. Given his abnormal stress test an angiogram was recommended. On 1/26/23 he underwent cardiac cath with PCI of distal SVG to OM1 with sequential to OM2. Post procedure he developed pulmonary edema and GI bleed which required EGD. His Hgb has been monitored and stable. He  will follow up in 6-8 weeks with GI. Overall not feeling well. Felt well x 2 days after discharged and since then he has had increased SOB and difficulty with his CPAP (he has called pulmonary). Last night had some orthopnea. Has felt increasingly worse over last 5 days. No regular walking or exercise d/t SOB. Cough productive clear sputum in the AM.  Denies chest pain/discomfort, palpitations, dizziness, syncope, edema , weight change or claudication.   BP and HR at home: 120's/50's-134/54  Pulse in the 54-56 bpm. Wt stable since discharge at 191-192# since discharge. Monitoring salt and fluid intake. Review of Systems:  Constitutional: Denies  weakness, night sweats or fever. + fatigue  HEENT: Denies new visual changes, ringing in ears, nosebleeds, nasal congestion  Respiratory: Denies new or change in SOB, PND, orthopnea or cough. Cardiovascular: see HPI  GI: Denies N/V, diarrhea, constipation, abdominal pain, change in bowel habits, melena or hematochezia  : Denies urinary frequency, urgency, incontinence, hematuria or dysuria. Skin: Denies rash, hives, or cyanosis  Musculoskeletal: Denies joint or muscle aches/pain  Neurological: Denies syncope or TIA-like symptoms.   Psychiatric: Denies anxiety, insomnia or depression      Past Medical History:   Diagnosis Date    Aortic aneurysm, abdominal 1/2011    Appendicitis 12-06-11    Arthritis 12-06-11    Bruises easily     CAD (coronary artery disease) 9/2001    MI     CHF (congestive heart failure) ([facility])     Following CABG    Chronic back pain     Chronic pain of both shoulders 12-06-11    joint - the shoulders hurt    Complication of anesthesia     woke up during appendectomy    Gout     Heart disease 12-06-11    Hernia 12-    HIGH CHOLESTEROL 12-6-11    Hyperlipidemia     Hypertension     Measles 12-    Pancreatitis 2006    History of     Persistent cough     Ringing in ears     Sinus disorder     Sleep deprivation 12-    loss of sleep     Past Surgical History:   Procedure Laterality Date    ABDOMINAL AORTIC ANEURYSM REPAIR      ABDOMINAL AORTIC ANEURYSM REPAIR, ENDOVASCULAR  01/28/2011    Endomvascular abdominal aortic aneurysm repair with insertion of cardiomems pressure sensor    APPENDECTOMY      40-50 years ago    CARDIAC SURGERY  01/01/2008    CABG    CHOLECYSTECTOMY, LAPAROSCOPIC  01/01/2006    CORONARY ANGIOPLASTY WITH STENT PLACEMENT  2001 and 2002    CORONARY ARTERY BYPASS GRAFT      DIAGNOSTIC CARDIAC CATH LAB PROCEDURE  01/26/2023    PCI of distal SVG to OM1    GALLBLADDER SURGERY      gallbladder/pancreatitis    HERNIA REPAIR      20+ years ago    INTRACAPSULAR CATARACT EXTRACTION Right 2018    PHACOEMULSIFICATION OF CATARACT RIGHT  EYE WITH INTRAOCULAR LENS IMPLANT performed by Anh Ibrahim MD at [address]      panceastitis Juana Kamara    [place] XCAPSL [address] [facility] W/O ECP Left 2018    PHACOEMULSIFICATION OF CATARACT LEFT EYE WITH INTRAOCULAR LENS IMPLANT performed by Anh Ibrahim MD at [facility] [address]      as child    UPPER GASTROINTESTINAL ENDOSCOPY N/A 2023    EGD DIAGNOSTIC ONLY performed by Malika Chan MD at [address] [facility]     Family History   Problem Relation Age of Onset    Diabetes Mother     Heart Failure Mother     Cancer Mother     Heart Disease Mother     High Blood Pressure Mother     Heart Surgery Father     Heart Disease Father     Heart Failure Father     Heart Attack Father     Stroke Sister     Stroke Brother     Heart Surgery Paternal Uncle     Heart Disease Paternal Uncle     Heart Disease Paternal Uncle     Stroke Sister      Social History     Tobacco Use    Smoking status: Former     Packs/day: 1.50     Years: 25.00     Pack years: 37.50     Types: Cigarettes     Start date: 1955     Quit date: 1978     Years since quittin.0    Smokeless tobacco: Never   Vaping Use    Vaping Use: Never used   Substance Use Topics    Alcohol use: No     Comment: patient drinks coffee/caffeine 2 cups a day     Drug use: No       Allergies   Allergen Reactions    Coumadin [Warfarin] Other (See Comments)     Makes feel funny    Omeprazole Other (See Comments)     headaches    Vioxx      Current Outpatient Medications   Medication Sig Dispense Refill    potassium chloride (KLOR-CON M) 20 MEQ extended release tablet Take 1 tablet by mouth daily 90 tablet 1    clopidogrel (PLAVIX) 75 MG tablet Take 1 tablet by mouth daily 30 tablet 3    pantoprazole (PROTONIX) 40 MG tablet Take 1 tablet by mouth 2 times daily (before meals) 30 tablet 3    doxazosin (CARDURA) 1 MG tablet Take 1 tablet by mouth 2 times daily 60 tablet 2    furosemide (LASIX) 40 MG tablet TAKE 1 TABLET EVERY DAY 90 tablet 1    spironolactone (ALDACTONE) 25 MG tablet TAKE 1 TABLET EVERY DAY 90 tablet 1    atorvastatin (LIPITOR) 40 MG tablet TAKE 1 TABLET EVERY NIGHT 90 tablet 3    olmesartan (BENICAR) 40 MG tablet Take 1 tablet by mouth daily 90 tablet 3    aspirin 81 MG chewable tablet Take 1 tablet by mouth daily 30 tablet 3    ACCU-CHEK MULTICLIX LANCETS MISC Test blood sugar qd (Patient taking differently: Test blood sugar qd    AS NEEDED) 100 each 3    glucose blood VI test strips (ACCU-CHEK ION) strip Test blood sugar qd. (Patient not taking: Reported on 2/7/2023) 100 strip 3     No current facility-administered medications for this visit. Physical Exam:   BP (!) 136/56   Pulse 66   Ht 5' 9\" (1.753 m)   Wt 196 lb (88.9 kg)   SpO2 94%   BMI 28.94 kg/m²   Wt Readings from Last 2 Encounters:   02/07/23 196 lb (88.9 kg)   02/01/23 190 lb 7.6 oz (86.4 kg)     Constitutional: He is oriented to person, place, and time. He is elderly and pale in appearance. Appears well-developed and well-nourished. In no acute distress. HEENT: Normocephalic and atraumatic. Sclerae anicteric. No xanthelasmas. Neck:Supple. No JVD present. Carotids with L side bruit. No  thyromegaly present. No lymphadenopathy present. Cardiovascular: RRR, normal S1 and S2; I/VI systolic murmur  Pulmonary/Chest: Effort normal.  Lungs clear to auscultation. Chest wall nontender  Abdominal: soft, nontender, nondistended. + bowel sounds; no hepatomegaly   Extremities: No edema, cyanosis, or clubbing. Pulses are 2+ radial/carotid/DP bilaterally. Cap refill brisk. Neurological: No focal deficit. Skin: Skin is warm and dry. Psychiatric: He has a normal mood and affect.  His speech is normal and behavior is normal.      Lab Review:   Lab Results   Component Value Date/Time    TRIG 79 01/27/2023 06:05 AM    HDL 36 01/27/2023 06:05 AM    HDL 37 03/21/2011 09:43 AM    LDLCALC 37 01/27/2023 06:05 AM    LABVLDL 16 01/27/2023 06:05 AM     Lab Results   Component Value Date/Time     02/01/2023 04:32 AM    K 3.5 02/01/2023 04:32 AM    K 3.8 01/26/2023 07:02 AM     02/01/2023 04:32 AM    CO2 25 02/01/2023 04:32 AM    BUN 32 02/01/2023 04:32 AM    CREATININE 1.1 02/01/2023 04:32 AM    GLUCOSE 128 02/01/2023 04:32 AM    CALCIUM 8.6 02/01/2023 04:32 AM      Lab Results   Component Value Date    WBC 5.0 02/01/2023    HGB 9.1 (L) 02/01/2023    HCT 27.4 (L) 02/01/2023    MCV 90.9 02/01/2023     02/01/2023     Echo:  TTE 1/27/23:  Conclusions   Summary   Left ventricular systolic function is mildly reduced with an estimated ejection fraction of 45-50%. 3D calculated EF of 48%. The left ventricle is normal in size with normal wall thickness. There is hypokinesis of the inferior, inferolateral, and apical septal  walls. Indeterminate diastolic function. Patient was in atrial fibrillation during  study. The right ventricle is normal in size with reduced systolic function. Moderate right atrial dilatation. Severe left atrial dilatation. The aortic valve leaflets appear calcified with no hemodynamically  significant stenosis. Mild mitral regurgitation. Mild-moderate tricuspid regurgitation. Systolic pulmonary artery pressure (SPAP) is elevated and estimated at 74  mmHg (right atrial pressure 15 mmHg), consistent with severe pulmonary  hypertension . IVC size is dilated (>2.1 cm) and collapses < 50% with respiration  consistent with elevated RA pressure (15 mmHg). 9/9/22:  Left ventricular systolic function is normal with ejection fraction estimated at 50-55%. Basal inferior hypokinesis  There is mild concentric left ventricular hypertrophy. Grade II diastolic dysfunction with elevated left ventricular filling pressure.   The left atrium is severely dilated. The right atrium is mildly dilated. Moderate mitral regurgitation. Mild aortic regurgitation. Moderate tricuspid regurgitation. Systolic pulmonary artery pressure (SPAP) is estimated at 58 mmHg consistent with moderate pulmonary hypertension (Right atrial  pressure of 3 mmHg). Small PFO        Cardiac cath/PCI:  1/26/23   Findings:  Hemodynamics:              A. Opening arterial pressure: 118/40 (59) mmHg              B. LVEDP: 20 mmHg     2. Coronary anatomy:  A. Left main artery: The left main artery trifurcates into the left anterior descending artery, ramus intermedius artery, and left circumflex artery. The left main artery is mildly calcified with a 20% ostial stenosis and 10% distal stenosis. B. Left anterior descending artery: The LAD is calcified and has a 30% ostial stenosis, 50% proximal stenosis, and 100% mid-vessel stenosis immediately after the bifurcation of the first septal . The occlusion appears to be at the proximal edge of a previously placed stent. C. Ramus intermedius artery: Small to medium caliber vessel with 90% ostial stenosis. D. Left circumflex artery: The LCx is calcified and has a 100% ostial stenosis. E. Right coronary artery: Dominant vessel. The RCA is calcified with a 50% proximal stenosis and 100% mid-vessel stenosis. 3. Bypass graft anatomy:  A. Left subclavian artery: Patent with midl disease. B. LIMA to LAD: The LIMA is widely patent. There is a 70-80% stenosis in the distal LAD beyond the anastomosis. C. SVG to OM1 with sequential to OM2: The vein graft has a 30% proximal stenosis and 90% distal stenosis near what was previously likely the anastomosis of the OM1. The OM1 appears to have been a smaller vessel than the OM2 and is now occluded. The OM2 has a 30% stenosis beyond its anastomosis with the vein graft. D. SVG to distal RCA: The vein graft has a 30% ostial/proximal stenosis.   The native distal RCA has a 50% stenosis after the anastomosis. There RPDA has a 30% ostial stenosis. The RPLB has a 30% proximal stenosis and 30% mid-vessel stenosis. Results of Intervention (PCI of distal SVG to OM1 with sequential to OM2):  Pre - 90% stenosis. JAYLA 3 flow. Post - <10% stenosis. There was initially poor re-flow following PTCA and stenting of the distal SVG. Copious IC vasodilators were given and the patient was started on IV Integrilin. Final angiography showed <10% residual stenosis with JAYLA 3 flow throughout the vein graft and all but the very distal small caliber segment of the native OM2 which still had no flow, but overall flow through the distal segment appeared to be gradually improving. Stress test:  9/9/22 Lexiscan-Myoview  Normal LVEF >60%  Paradoxic septal motion  Enlarged RV  Small to moderate area of anterior and basal inferior ischemia  Overall, this would be considered, intermediate risk study    Assessment:    1. Coronary artery disease involving native coronary artery of native heart without angina pectoris  -prior CABG  -S/P AMBER to SVG to TOVA with sequential to OM2 on 1/26/23  -plan for eventual staged PCI of native distal LAD if Hgb stable  -he does not want to proceed at this time  -continue ASA, plavix, statin, losartan and spironolactone  -no recurrent angina  -not on BB d/t underlying bradycardia    2. Bruit of left carotid artery  -has known bilateral carotid stenosis on CTA in Sept 2022  -given his bruit, will ask for carotid US to evaluate for progression  -continue ASA and statin    3. SOB (shortness of breath)  -suspect multifactorial and d/t deconditioning +/- worsening anemia  -he does not appear to be volume overloaded on exam  -? Secondary to LAD stenosis  -check BNP, CBC  -continue lasix     4.  Ischemic cardiomyopathy  -chronic systolic HF (heart failure) (HCC)  -last LVEF 45-50% on echo; NYHA class II  -continue Lasix (F/U BMP)  -continue losartan and spironolactone  -low sodium diet and fluid restriction    5. Gastrointestinal hemorrhage associated with gastric ulcer  -EGD on 1/27/23: clean based esophageal ulcer at GE junction, patulous Schatzki's ring, and large amount of old blood in the stomach with diminutive clean based ulcers in the antrum  -on PPI BID  -to follow up with GI as an outpatient    6. Recent community acquired pneumonia  -has improved      7) Persistent atrial fibrillation  -VR controlled  -underlying bradycardia, hence no rate slowing meds  -continue Eliquis     8) Essential hypertension  -BP fairly well controlled  -continue medical management    9) Hyperlipidemia  -on statin     10) AAA  -S/P endovascular repair with known type II endoleak     11) SUNDEEP  -on CPAP  -having difficulty and has followed with the sleep center       Plan:  Continue ASA, statin, plavix, doxazosin, lasix, olmesartan, spironolactone   Check CBC, BMP and BNP  Take additional dose of lasix today  Add Klor Con 20 mEq daily  Discussed low-fat/low sodium diet, monitoring of daily weights, fluid restriction, worsening signs and symptoms of heart failure and when to call, and the importance of regular exercise and activity. Will follow up with Dr. Josefina Elliott as scheduled later this  month. Pending lab results can revisit staged PCI. He is not wanting to pursue at this point    Return for scheduled 2/17/23 with Dr. Josefina Elliott. Thanks for allowing me to participate in the care of this patient.       Loistine Brunner, APRN-CNP  [facility] ([place] [address] [place] [address]  Office: [phone]  Fax: [phone]      Electronically signed by RUPERTO Leahy CNP on 2/7/2023 at 2:34 PM

## 2023-02-01 NOTE — DISCHARGE SUMMARY
[address]    DISCHARGE SUMMARY      Patient ID:  Bhavin Man  [id number] 80 y.o. 1940    Admit date: 1/26/2023    Discharge date:  2/1/23    Admitting Physician: Erna Huynh DO     Discharge Physician: RUPERTO Davidson - CNP     Admission Diagnoses: Abnormal result of other cardiovascular function study [R94.39]  CAD in native artery [I25.10]    Discharge Diagnoses:    1) Coronary artery disease  -S/P AMBER to SVG to TOVA with sequential to OM2 on 1/26/23  -plan for eventual staged PCI of native distal LAD if Hgb continues to be stable     2) Ischemic cardiomyopathy with acute LV systolic HF  -LVEF 50-31%; NYHA class II     3) Melena/Acute upper GI bleed  -EGD on 1/27/23: clean based esophageal ulcer at GE junction, patulous Schatzki's ring, and large amount of old blood in the stomach with diminutive clean based ulcers in the antrum     4) Hemoptysis  -resolved      5) Community acquired pneumonia     6) Acute hypoxic respiratory failure     7) Persistent atrial fibrillation     8) Essential hypertension     9) Hyperlipidemia     10) AAA  -S/P endovascular repair with known type II endoleak     11) SUNDEEP  -on CPAP     12) Prolonged QT    Discharged Condition: good    Hospital Course: Bhavin Man was admitted for planned cardiac cath. He is an  81 y/o male with PMH significant for AAA, CAD with prior MI/CABG, HLP, HTN,atrial fib, CVA, valvular heart disease, sleep apnea on CPAP and small PFO who was admitted for cardiac cath. He was recently seen by Dr. Asuncion Sheppard in the office at the request of  d/t an abnormal stress test and chest pain. He was admitted in September 2022 with brief episode aphasia, Symptoms resolved by arrival to ED. Echo performed on 9/9/22 showed LVEF 50-55% with WMA, grade II DD,moderate MR,mild AI, moderate TR and small PFO. Stress test showed enlarged RV and small to moderate area of anterior and basal inferior ischemia.  Brain MRI showed small acute infarct of L parietal lobe and chronic infarcts in R occipital lobe and left cerebellar hemisphere. Given his abnormal stress test an angiogram was recommended. On 1/26/23 he underwent cardiac cath with PCI of distal SVG to OM1 with sequential to OM2. Post procedure he developed pulmonary edema and GI bleed which required EGD. His Hgb has been monitored and stable. He was seen by GI and cleared for discharge. He will follow up in 6-8 weeks with them. His SOB has resolved and has been ambulated. Ready for discharge today. Consults:  IP CONSULT TO CARDIAC REHAB  IP CONSULT TO CARDIAC REHAB  IP CONSULT TO GI  IP CONSULT TO HOSPITALIST    Significant Diagnostic Studies:   Echo:  TTE 1/27/23:  Conclusions   Summary   Left ventricular systolic function is mildly reduced with an estimated   ejection fraction of 45-50%. 3D calculated EF of 48%. The left ventricle is normal in size with normal wall thickness. There is hypokinesis of the inferior, inferolateral, and apical septal   walls. Indeterminate diastolic function. Patient was in atrial fibrillation during   study. The right ventricle is normal in size with reduced systolic function. Moderate right atrial dilatation. Severe left atrial dilatation. The aortic valve leaflets appear calcified with no hemodynamically   significant stenosis. Mild mitral regurgitation. Mild-moderate tricuspid regurgitation. Systolic pulmonary artery pressure (SPAP) is elevated and estimated at 74   mmHg (right atrial pressure 15 mmHg), consistent with severe pulmonary   hypertension . IVC size is dilated (>2.1 cm) and collapses < 50% with respiration   consistent with elevated RA pressure (15 mmHg). 1/26/23 Cardiac cath/PCI:  Findings:  Hemodynamics:              A. Opening arterial pressure: 118/40 (59) mmHg              B. LVEDP: 20 mmHg     2. Coronary anatomy:  A.  Left main artery: The left main artery trifurcates into the left anterior descending artery, ramus intermedius artery, and left circumflex artery. The left main artery is mildly calcified with a 20% ostial stenosis and 10% distal stenosis. B. Left anterior descending artery: The LAD is calcified and has a 30% ostial stenosis, 50% proximal stenosis, and 100% mid-vessel stenosis immediately after the bifurcation of the first septal . The occlusion appears to be at the proximal edge of a previously placed stent. C. Ramus intermedius artery: Small to medium caliber vessel with 90% ostial stenosis. D. Left circumflex artery: The LCx is calcified and has a 100% ostial stenosis. E. Right coronary artery: Dominant vessel. The RCA is calcified with a 50% proximal stenosis and 100% mid-vessel stenosis. 3. Bypass graft anatomy:  A. Left subclavian artery: Patent with midl disease. B. LIMA to LAD: The LIMA is widely patent. There is a 70-80% stenosis in the distal LAD beyond the anastomosis. C. SVG to OM1 with sequential to OM2: The vein graft has a 30% proximal stenosis and 90% distal stenosis near what was previously likely the anastomosis of the OM1. The OM1 appears to have been a smaller vessel than the OM2 and is now occluded. The OM2 has a 30% stenosis beyond its anastomosis with the vein graft. D. SVG to distal RCA: The vein graft has a 30% ostial/proximal stenosis. The native distal RCA has a 50% stenosis after the anastomosis. There RPDA has a 30% ostial stenosis. The RPLB has a 30% proximal stenosis and 30% mid-vessel stenosis. Results of Intervention (PCI of distal SVG to OM1 with sequential to OM2):  Pre - 90% stenosis. JAYLA 3 flow. Post - <10% stenosis. There was initially poor re-flow following PTCA and stenting of the distal SVG. Copious IC vasodilators were given and the patient was started on IV Integrilin.   Final angiography showed <10% residual stenosis with JAYLA 3 flow throughout the vein graft and all but the very distal small caliber segment of the native OM2 which still had no flow, but overall flow through the distal segment appeared to be gradually improving. Labs:   Lab Results   Component Value Date    CREATININE 1.1 02/01/2023    BUN 32 (H) 02/01/2023     02/01/2023    K 3.5 02/01/2023     02/01/2023    CO2 25 02/01/2023      Lab Results   Component Value Date    WBC 5.0 02/01/2023    HGB 9.1 (L) 02/01/2023    HCT 27.4 (L) 02/01/2023    MCV 90.9 02/01/2023     02/01/2023      Lab Results   Component Value Date    INR 1.30 (H) 01/28/2023    PROTIME 16.1 (H) 01/28/2023      Lab Results   Component Value Date    BNP 92.9 02/03/2010       Disposition: home    Patient Instructions:      Medication List        START taking these medications      amoxicillin 500 MG capsule  Commonly known as: AMOXIL  Take 2 capsules by mouth every 8 hours for 4 doses     clopidogrel 75 MG tablet  Commonly known as: PLAVIX  Take 1 tablet by mouth daily  Start taking on: February 2, 2023     pantoprazole 40 MG tablet  Commonly known as: PROTONIX  Take 1 tablet by mouth 2 times daily (before meals)            CHANGE how you take these medications      Accu-Chek Multiclix Lancets Misc  Test blood sugar qd  What changed: additional instructions     blood glucose test strips strip  Commonly known as: Accu-Chek Dulce  Test blood sugar qd.   What changed:   when to take this  reasons to take this            CONTINUE taking these medications      aspirin 81 MG chewable tablet  Take 1 tablet by mouth daily     atorvastatin 40 MG tablet  Commonly known as: LIPITOR  TAKE 1 TABLET EVERY NIGHT     doxazosin 1 MG tablet  Commonly known as: Cardura  Take 1 tablet by mouth 2 times daily     furosemide 40 MG tablet  Commonly known as: LASIX  TAKE 1 TABLET EVERY DAY     olmesartan 40 MG tablet  Commonly known as: BENICAR  Take 1 tablet by mouth daily     spironolactone 25 MG tablet  Commonly known as: ALDACTONE  TAKE 1 TABLET EVERY DAY            STOP taking these medications amLODIPine 10 MG tablet  Commonly known as: NORVASC     apixaban 5 MG Tabs tablet  Commonly known as: ELIQUIS     metoprolol succinate 50 MG extended release tablet  Commonly known as: TOPROL XL     multivitamin with minerals tablet               Where to Get Your Medications        These medications were sent to [address] [facility]      Phone: [phone]   amoxicillin 500 MG capsule  clopidogrel 75 MG tablet  pantoprazole 40 MG tablet       Information about where to get these medications is not yet available    Ask your nurse or doctor about these medications  doxazosin 1 MG tablet         Follow-up with cardiology in 1 week and on 2/17/23 with Dr. Katharine Keita as scheduled    Please see today's progress note for discharge physical exam and further instructions.     55 minutes spent on pt's discharge today    Signed:  RUPERTO Ma CNP on 2/1/2023 at 11:29 AM    The [address], [address] [place] [address]  Phone: [phone]  Fax: [phone]

## 2023-02-01 NOTE — PROGRESS NOTES
02/01/23 0434   NIV Type   $NIV $Daily Charge   NIV Started/Stopped On   Equipment Type v60   Mode CPAP   Mask Type Full face mask   Mask Size Medium   Settings/Measurements   CPAP/EPAP 5 cmH2O   Vt (Measured) 539 mL   Resp 17   FiO2  30 %   Comfort Level Good   Alarm Settings   Alarms On Y   Low Pressure (cmH2O) 4 cmH2O   High Pressure (cmH2O) 30 cmH2O

## 2023-02-01 NOTE — DISCHARGE INSTR - COC
Continuity of Care Form    Patient Name: Valentino Herrera   :  1940  MRN:  [id number]    Admit date:  2023  Discharge date:  2023    Code Status Order: Full Code   Advance Directives:   Advance Care Flowsheet Documentation       Date/Time Healthcare Directive Type of Healthcare Directive Copy in [facility] [address] Agent's Name Healthcare Agent's Phone Number    [phone] Yes, patient has an advance directive for healthcare treatment Durable power of  for health care -- -- Shannan Jarrett  wife [phone]            Admitting Physician:  Caprice Marcelo DO  PCP: Xenia Avitia, [address] [place] [address] Unit/Room#: 9383/2269-58  Discharging Unit Phone Number: [phone]    Emergency Contact:   Extended Emergency Contact Information  Primary Emergency Contact: Elma Crow  Address: [address], [facility] [address] Phone: [phone]  Mobile Phone: [phone]  Relation: Spouse    Past Surgical History:  Past Surgical History:   Procedure Laterality Date    ABDOMINAL AORTIC ANEURYSM REPAIR      ABDOMINAL AORTIC ANEURYSM REPAIR, ENDOVASCULAR  2011    Endomvascular abdominal aortic aneurysm repair with insertion of cardiomems pressure sensor    APPENDECTOMY      40-50 years ago    CARDIAC SURGERY  2008    CABG    CHOLECYSTECTOMY, LAPAROSCOPIC  2006    CORONARY ANGIOPLASTY WITH STENT PLACEMENT   and     CORONARY ARTERY BYPASS GRAFT      GALLBLADDER SURGERY      gallbladder/pancreatitis    HERNIA REPAIR      20+ years ago    INTRACAPSULAR CATARACT EXTRACTION Right 2018    PHACOEMULSIFICATION OF CATARACT RIGHT  EYE WITH INTRAOCULAR LENS IMPLANT performed by Mario Light MD at [address]      panceastitis [address]    [place] XCAP[facility] [address] W/O ECP Left 2018    PHACOEMULSIFICATION OF CATARACT LEFT EYE WITH INTRAOCULAR LENS IMPLANT performed by Mario Light MD at [address] [place] [address]      as child    UPPER GASTROINTESTINAL ENDOSCOPY N/A 1/27/2023    EGD DIAGNOSTIC ONLY performed by Pavan Oliveira MD at [address] [place] [address]       Immunization History:   Immunization History   Administered Date(s) Administered    COVID-19, MODERNA BLUE border, Primary or Immunocompromised, (age 12y+), IM, 100 mcg/0.5mL 02/24/2021    COVID-19, MODERNA Bivalent BOOSTER, (age 12y+), IM, 48 mcg/0.5 mL 09/27/2022    COVID-19, [place] Vaccine, Vaccine Unspecified 01/21/2021, 10/26/2021    Influenza 09/25/2013    Influenza Vaccine, unspecified formulation 11/02/2016    Influenza Whole 09/23/2010    Influenza, FLUAD, (age 72 y+), Adjuvanted, 0.5mL 09/14/2020, 09/13/2021    Influenza, FLUZONE (age 72 y+), High Dose, 0.7mL 09/27/2022    Influenza, High Dose (Fluzone 65 yrs and older) 10/17/2014, 10/08/2017, 09/20/2018    Influenza, Triv, inactivated, subunit, adjuvanted, IM (Fluad 65 yrs and older) 09/20/2018, 09/19/2019    Pneumococcal Conjugate 13-valent (Lfdvitn89) 09/20/2018    Pneumococcal Conjugate 7-valent (Prevnar7) 01/26/2007    Pneumococcal Polysaccharide (Groogjkos78) 09/13/2021    Tdap (Boostrix, Adacel) 01/30/2018       Active Problems:  Patient Active Problem List   Diagnosis Code    Hypertension I10    CAD (coronary artery disease) I25.10    Hyperlipidemia E78.5    CHF (congestive heart failure) ([facility] I50.9    Aortic aneurysm, abdominal I71.40    Sleep deprivation Z72.820    Chronic pain of both shoulders M25.511, G89.29, M25.512    Arthritis M19.90    Hernia K46.9    Ringing in ears H93.19    GERD (gastroesophageal reflux disease) K21.9    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 9) E66.3    CAD in native artery I25.10    Chest pain R07.9    Shortness of breath R06.02    Abnormal stress test R94.39    UGI bleed L93.2    Acute systolic HF (heart failure) ([facility]) I50.21    CAP (community acquired pneumonia) J18.9       Isolation/Infection:   Isolation            No Isolation          Patient Infection Status       None to display            Nurse Assessment:  Last Vital Signs: BP (!) 159/94   Pulse 72   Temp 98.5 °F (36.9 °C) (Oral)   Resp 18   Ht 5' 9\" (1.753 m)   Wt 190 lb 7.6 oz (86.4 kg)   SpO2 92%   BMI 28.13 kg/m²     Last documented pain score (0-10 scale): Pain Level: 1  Last Weight:   Wt Readings from Last 1 Encounters:   02/01/23 190 lb 7.6 oz (86.4 kg)     Mental Status:  oriented, alert, coherent, logical, thought processes intact, and able to concentrate and follow conversation    IV Access:  - None    Nursing Mobility/ADLs:  Walking   Independent  Transfer  Independent  Bathing  Independent  Dressing  Independent  Toileting  Independent  Feeding  Independent  Med Admin  Independent  Med Delivery   none    Wound Care Documentation and Therapy:  Incision 01/28/11 Groin Right (Active)   Number of days: 4492       Incision 01/28/11 Groin Left (Active)   Number of days: 6364        Elimination:  Continence: Bowel: Yes  Bladder: Yes  Urinary Catheter: None   Colostomy/Ileostomy/Ileal Conduit: No           Intake/Output Summary (Last 24 hours) at 2/1/2023 1131  Last data filed at 2/1/2023 0545  Gross per 24 hour   Intake 60 ml   Output 1325 ml   Net -1265 ml     I/O last 3 completed shifts: In: 410 [P.O.:400;  I.V.:10]  Out: 2050 [Urine:2050]    Safety Concerns:     None    Impairments/Disabilities:      None    Nutrition Therapy:  Current Nutrition Therapy:   - Oral Diet:  Low Sodium (2gm)    Routes of Feeding: Oral  Liquids: No Restrictions  Daily Fluid Restriction: yes - amount 2L  Last Modified Barium Swallow with Video (Video Swallowing Test): not done    Treatments at the Time of Hospital Discharge:   Respiratory Treatments:     Oxygen Therapy:  is not on home oxygen therapy. Ventilator:    - CPAP   only when sleeping    Rehab Therapies:     Weight Bearing Status/Restrictions: No weight bearing restrictions  Other Medical Equipment (for information only, NOT a DME order):  ***  Other Treatments:       Patient's personal belongings (please select all that are sent with patient):  None    RN SIGNATURE:  Electronically signed by Giselle Louis RN on 2/1/23 at 11:38 AM EST    CASE MANAGEMENT/SOCIAL WORK SECTION    Inpatient Status Date: ***    Readmission Risk Assessment Score:  Readmission Risk              Risk of Unplanned Readmission:  15           Discharging to Facility/ Agency   Name:   Address:  Phone:  Fax:    Dialysis Facility (if applicable)   Name:  Address:  Dialysis Schedule:  Phone:  Fax:    / signature: {Esignature:219722104}    PHYSICIAN SECTION    Prognosis: {Prognosis:5250180334}    Condition at Discharge: Eileen Weiner Patient Condition:[id number]}    Rehab Potential (if transferring to Rehab): {Prognosis:0981173704}    Recommended Labs or Other Treatments After Discharge: ***    Physician Certification: I certify the above information and transfer of Wilene Pain  is necessary for the continuing treatment of the diagnosis listed and that he requires {Admit to Appropriate Level of Care:18193} for {GREATER/LESS:217983274} 30 days.      Update Admission H&P: {CHP DME Changes in QIR:245706954}    PHYSICIAN SIGNATURE:  {Esignature:841826012}

## 2023-02-01 NOTE — PROGRESS NOTES
Shift: 5057-8614    Admitting diagnosis: NSTEMI    Presentation to hospital: Chest pain, SOB, left shoulder pain    Surgery: no , Cath Lab    Nursing assessment at handoff  stable    Emergency Contact/POA: wife  Family updated: yes - wife at bedside    Most recent vitals: BP (!) 124/52   Pulse 73   Temp 98 °F (36.7 °C) (Oral)   Resp 17   Ht 5' 9\" (1.753 m)   Wt 194 lb 4.8 oz (88.1 kg)   SpO2 95%   BMI 28.69 kg/m²      Rhythm: sinus bradycardia, Afib  to 35-50s    NC/HFNC-  cpap at night  Respiratory support: - No ventilator support    Vent days: Day none    Increased O2 requirements: no/ has Severe SA & uses CPAP at home    Admission weight Weight: 198 lb 8 oz (90 kg)  Today's weight   Wt Readings from Last 1 Encounters:   01/31/23 194 lb 4.8 oz (88.1 kg)         UOP >30ml/hr: yes - void per urinal     Varela need assessed each shift: no    Restraints: no  Order current and documentation up to date? Lines/Drains  LDA Insertion Date Discontinued Date Dressing Changes   PIV   PIV 1/26 1/29 1/29    TLC       Arterial  1/26 sheath  1/26    Varela       Vas Cath      ETT       Surgical drains        Night Shift Hospitalist Interventions    Problem(Brief) Date Time Intervention Physician contacted                                               Drip rates at handoff:    sodium chloride         Hospital Course Daily Updates:  Admit Day# 1 1/23  - admitted from cath lab  -stent placed OM  -Nitro gtt off 1800  - Integrilin gtt til 0014  -sheath pulled 1852, slight bruising, site soft  - IV fluids til 2300    Admit day 1  -black tarry stool and coughing blood, eeg showed ulcers.  Protonix drip started, trending H&H  -cards clear to go home, waiting GI to clear    Admit day 2  -protonix drip  -no s/s of bleeding    Admit Day 3  - PPI infusion, stable H&H  -Stable for transfer to the floor per MD  -CHF Single lasix 20mg IV today for diuresis  -pna-IV abx, switch to PO tomorrow  -weaned O2 down to 2L  -walked hallway    Admit Day 3 (1/29) PM  - Tolerated CPAP (2921-5034)  - UOP total 800 for 12 hours  - Still on PPI drip  - NC 2 lpm    Admit day 4 1/30 days  -Protonix gtt off; IVP BID  -Transfer orders still in place  -VSS  -Lasix 40mg IVP once   -H&H labs Q12; stable     Admit Day 4 Nights  -VSS, no acute events overnight    Admit Day 5 Days:  - 40 mg IV lasix once. - on room air     Lab Data:   CBC:   Recent Labs     01/29/23  0419 01/29/23  0850 01/30/23  0415 01/30/23  1740 01/31/23  0752   WBC 9.7  --  6.6  --   --    HGB 9.9*   < > 8.6* 10.0* 9.4*   HCT 29.0*   < > 25.6* 29.4* 27.2*   MCV 91.9  --  91.0  --   --    *  --  105*  --   --     < > = values in this interval not displayed. BMP:    Recent Labs     01/30/23  0415 01/31/23  0752    141   K 3.6 3.5   CO2 24 24   BUN 35* 37*   CREATININE 1.2 1.2       LIVR: No results for input(s): AST, ALT in the last 72 hours. PT/INR: No results for input(s): PROT, INR in the last 72 hours. APTT: No results for input(s): APTT in the last 72 hours. ABG: No results for input(s): PHART, DSO9LBB, PO2ART in the last 72 hours.   Consults (if GI or Nephrology- which group?)-  cardiology

## 2023-02-01 NOTE — PROGRESS NOTES
Hospitalist Progress Note      PCP: Orestes Guzmán DO    Date of Admission: 1/26/2023    Chief Complaint: Chest pain     Hospital Course:   80 y.o. male who we are asked to see/evaluate by Anshu Pittman DO for medical management of GI bleed, hemoptysis Anemia      Subjective:   Patient doing well. No new complaints. Patient denies chest pain. No melena. Medications:  Reviewed    Infusion Medications    sodium chloride       Scheduled Medications    furosemide  40 mg Oral Daily    pantoprazole  40 mg Oral BID AC    amoxicillin  1,000 mg Oral 3 times per day    sodium chloride flush  5-40 mL IntraVENous 2 times per day    clopidogrel  75 mg Oral Daily    aspirin  81 mg Oral Daily    atorvastatin  40 mg Oral Nightly    doxazosin  1 mg Oral BID    losartan  100 mg Oral Daily    spironolactone  25 mg Oral Daily     PRN Meds: perflutren lipid microspheres, sodium chloride flush, ondansetron, sodium chloride, acetaminophen      Intake/Output Summary (Last 24 hours) at 2/1/2023 1427  Last data filed at 2/1/2023 0545  Gross per 24 hour   Intake 60 ml   Output 750 ml   Net -690 ml         Physical Exam Performed:    BP (!) 128/48   Pulse 62   Temp 98.5 °F (36.9 °C) (Oral)   Resp 18   Ht 5' 9\" (1.753 m)   Wt 190 lb 7.6 oz (86.4 kg)   SpO2 92%   BMI 28.13 kg/m²     General appearance: no distress, appears stated age and cooperative. HEENT: Normal cephalic, atraumatic without obvious deformity. Pupils equal, round, and reactive to light. Extra ocular muscles intact. Conjunctivae/corneas clear. Neck: Supple, with full range of motion. Trachea midline. Respiratory: no increased respiratory effort. No rhonchi.  cardiovascular: Regular rate and rhythm with normal S1/S2 without murmurs, rubs or gallops. Abdomen: Soft, non-tender, non-distended with normal bowel sounds. Musculoskeletal:  No clubbing, cyanosis or edema bilaterally.   Skin: Skin color, texture, turgor normal.  No rashes or lesions.  Neurologic:  Neurovascularly intact without any focal sensory/motor deficits. Cranial nerves: II-XII intact, grossly non-focal.  Psychiatric: Alert and oriented, thought content appropriate, normal insight  Capillary Refill: Brisk,3 seconds, normal   Peripheral Pulses: +2 palpable, equal bilaterally       Labs:   Recent Labs     01/30/23  0415 01/30/23  1740 01/31/23  0752 02/01/23 0432   WBC 6.6  --   --  5.0   HGB 8.6* 10.0* 9.4* 9.1*   HCT 25.6* 29.4* 27.2* 27.4*   *  --   --  138       Recent Labs     01/30/23  0415 01/31/23  0752 02/01/23 0432    141 141   K 3.6 3.5 3.5    107 105   CO2 24 24 25   BUN 35* 37* 32*   CREATININE 1.2 1.2 1.1   CALCIUM 8.3 8.2* 8.6       No results for input(s): AST, ALT, BILIDIR, BILITOT, ALKPHOS in the last 72 hours.  No results for input(s): INR in the last 72 hours.    No results for input(s): CKTOTAL, TROPONINI in the last 72 hours.    Urinalysis:    No results found for: NITRU, WBCUA, BACTERIA, RBCUA, BLOODU, SPECGRAV, GLUCOSEU    Radiology:  CT CHEST WO CONTRAST   Final Result   Multifocal pneumonia as above.         XR CHEST PORTABLE   Final Result   Mild-to-moderate cardiomegaly with status post CABG.      Currently there is evidence of congestive heart failure with moderate to   marked pulmonary vascular congestion and moderate pulmonary edema.      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  In addition, there   are new infiltrates, and/or atelectatic changes in the right lower lung   field.  Mild left basilar atelectasis.      No obvious pleural effusion.  No pneumothorax.             IP CONSULT TO CARDIAC REHAB  IP CONSULT TO CARDIAC REHAB  IP CONSULT TO GI  IP CONSULT TO HOSPITALIST    Assessment/Plan:    Active Hospital Problems    Diagnosis     Acute systolic HF (heart failure) ([facility]) [I50.21]      Priority: Medium    CAP (community acquired pneumonia) [J18.9]      Priority: Medium    UGI bleed [K92.2]      Priority: Medium  CAD in native artery [I25.10]      Priority: Medium    Chest pain [R07.9]      Priority: Medium    Shortness of breath [R06.02]      Priority: Medium    Abnormal stress test [R94.39]      Priority: Medium         Anemia/melena. EGD demonstrated esophageal and antral ulcers. Continue PPI. Monitor serial H&H's.  GI is following. Hemoglobin stable. Plan for discharge today per cardiology. Community-acquired pneumonia. Procalcitonin was elevated. Monitor for fever. Ceftriaxone transition to Amoxil. CAD status post CABG. Cardiology following. Acute systolic heart failure. Patient on Aldactone. Monitor potassium. Monitor I's and O's. Acute hypoxic respiratory failure. Patient on room air. Medicine service to sign off. Diet: ADULT DIET;  Regular  Code Status: Full Code      Dispo -  Per cardiology    Daniella Purvis MD

## 2023-02-01 NOTE — PROGRESS NOTES
Pt report was called to C4 however the pt is being discharge and became upset regarding the transfer for a new floor. Transfer cancelled and pt will be discharged from ICU.

## 2023-02-01 NOTE — CARE COORDINATION
LOS 6. Care managed by card, [facility] Med, GI. CP- S/P Cath. Has home CPAP- From home w spouse- denies needs. [address] soon.   Fabiola Ellsworth RN

## 2023-02-01 NOTE — PLAN OF CARE
Problem: Chronic Conditions and Co-morbidities  Goal: Patient's chronic conditions and co-morbidity symptoms are monitored and maintained or improved  Outcome: Completed     Problem: Discharge Planning  Goal: Discharge to home or other facility with appropriate resources  Outcome: Completed     Problem: Skin/Tissue Integrity  Goal: Absence of new skin breakdown  Description: 1. Monitor for areas of redness and/or skin breakdown  2. Assess vascular access sites hourly  3. Every 4-6 hours minimum:  Change oxygen saturation probe site  4. Every 4-6 hours:  If on nasal continuous positive airway pressure, respiratory therapy assess nares and determine need for appliance change or resting period.   Outcome: Completed     Problem: Pain  Goal: Verbalizes/displays adequate comfort level or baseline comfort level  Outcome: Completed

## 2023-02-01 NOTE — PROGRESS NOTES
01/31/23 2306   NIV Type   NIV Started/Stopped On   Equipment Type v60   Mode CPAP   Mask Type Full face mask   Mask Size Medium   Settings/Measurements   CPAP/EPAP 5 cmH2O   Vt (Measured) 501 mL   Resp 18   FiO2  30 %   Mask Leak (lpm) 0 lpm   Comfort Level Good   Using Accessory Muscles No   Patient's Home Machine No   Alarm Settings   Alarms On Y   Low Pressure (cmH2O) 4 cmH2O   High Pressure (cmH2O) 30 cmH2O

## 2023-02-01 NOTE — PROGRESS NOTES
Patient okay for discharge per MD. Discharge instructions and scripts to be picked up at outpatient pharmacy. IV removed and site assessment clean, dry, and intact. No questions or concerns at this time. Transported by wheelchair to personal car.

## 2023-02-01 NOTE — PROGRESS NOTES
Oma 81   Daily Progress Note      Admit Date:  1/26/2023    Reason for follow up visit: S/PPCI    CC: \"I feel great and I am getting out of here today. \"    81 y/o male with PMH significant for AAA, CAD with prior MI/CABG, HLP, HTN,atrial fib, CVA, valvular heart disease, sleep apnea on CPAP and small PFO who was admitted for cardiac cath. He was recently seen by Dr. Rin Hebert in the office at the request of  d/t an abnormal stress test and chest pain. He was admitted in September 2022 with brief episode aphasia, Symptoms resolved by arrival to ED. Echo performed on 9/9/22 showed LVEF 50-55% with WMA, grade II DD,moderate MR,mild AI, moderate TR and small PFO. Stress test showed enlarged RV and small to moderate area of anterior and basal inferior ischemia. Brain MRI showed small acute infarct of L parietal lobe and chronic infarcts in R occipital lobe and left cerebellar hemisphere. Given his abnormal stress test an angiogram was recommended. On 1/26/23 he underwent cardiac cath with PCI of distal SVG to OM1 with sequential to OM2. Post procedure he developed pulmonary edema and GI bleed which required EGD. His Hgb has been monitored and stable. He was seen by GI and cleared for discharge. He will follow up in 6-8 weeks with them. His SOB has resolved and has been ambulated. Ready for discharge today. Interval History:  Pt. seen and examined; records reviewed  BP stable. Remains on room air  Using CPAP at night  Denies chest pain or SOB  No further sxs of bleeding  Hgb 9.1 today  Eliquis remains on hold (recheck CBC in 1 week and if Hgb stable will plan to resume at that time)    Subjective:  Pt with no acute overnight cardiac events. Denies chest pain, SOB, cough, palpitations, dizziness or edema  No further signs of GI bleeding    Review of Systems:   Constitutional: no unanticipated weight loss. There's been no change in energy level, sleep pattern, or activity level.    No fevers, chills. Eyes: No visual changes or diplopia. No scleral icterus. ENT: No Headaches, hearing loss or vertigo. No mouth sores or sore throat. Cardiovascular: as reviewed in HPI  Respiratory: No cough or wheezing, no sputum production. No hemoptysis. Gastrointestinal: Denies melena or hematochezia  Genitourinary: No dysuria, trouble voiding, or hematuria. Musculoskeletal:  No gait disturbance, no joint complaints. Integumentary: No rash or pruritis. Neurological: No headache, diplopia, change in muscle strength, numbness or tingling. Psychiatric: No anxiety or depression. Endocrine: No temperature intolerance. No excessive thirst, fluid intake, or urination. No tremor. Hematologic/Lymphatic: No abnormal bruising or bleeding, blood clots or swollen lymph nodes. Allergic/Immunologic: No nasal congestion or hives. Objective:   BP (!) 159/94   Pulse 72   Temp 98.5 °F (36.9 °C) (Oral)   Resp 18   Ht 5' 9\" (1.753 m)   Wt 190 lb 7.6 oz (86.4 kg)   SpO2 92%   BMI 28.13 kg/m²     Intake/Output Summary (Last 24 hours) at 2/1/2023 1044  Last data filed at 2/1/2023 0545  Gross per 24 hour   Intake 60 ml   Output 1550 ml   Net -1490 ml     Wt Readings from Last 3 Encounters:   02/01/23 190 lb 7.6 oz (86.4 kg)   01/20/23 197 lb (89.4 kg)   11/15/22 193 lb 12.8 oz (87.9 kg)       Physical Exam:  General: In no acute distress. Awake, alert, and oriented X4. Resting in bed  HEENT: Sclerae anicteric. No xanthelasmas. Normocephalic  Skin:  Warm and dry. No new appearing rashes or lesions. Neck:  Supple. No JVD or carotid bruit appreciated. Chest: Lungs clear to auscultation. No wheezes/rhonchi/rales  Cardiovascular:  RRR. Normal S1 and S2. I/VI systolic murmur   Abdomen:  soft, nontender, nondistended, +bowel sounds. No hepatomegaly  Extremities:  No LE edema. No clubbing or cyanosis. R groin site unremarkable. 2+ bilateral radial/DP/PT pulses.  Cap refill brisk    Medications:    pantoprazole  40 mg Oral BID AC    amoxicillin  1,000 mg Oral 3 times per day    sodium chloride flush  5-40 mL IntraVENous 2 times per day    clopidogrel  75 mg Oral Daily    aspirin  81 mg Oral Daily    atorvastatin  40 mg Oral Nightly    doxazosin  1 mg Oral BID    losartan  100 mg Oral Daily    spironolactone  25 mg Oral Daily      sodium chloride       perflutren lipid microspheres, sodium chloride flush, ondansetron, sodium chloride, acetaminophen    Lab Data:  CBC:   Recent Labs     01/30/23  0415 01/30/23  1740 01/31/23  0752 02/01/23  0432   WBC 6.6  --   --  5.0   HGB 8.6* 10.0* 9.4* 9.1*   *  --   --  138     BMP:    Recent Labs     01/30/23  0415 01/31/23  0752 02/01/23 0432    141 141   K 3.6 3.5 3.5   CO2 24 24 25   BUN 35* 37* 32*   CREATININE 1.2 1.2 1.1      Latest Reference Range & Units Most Recent   CHOLESTEROL, TOTAL, [id number] 0 - 199 mg/dL 89  1/27/23 06:05   HDL Cholesterol 40 - 60 mg/dL 36 (L)  1/27/23 06:05   LDL Calculated <100 mg/dL 37  1/27/23 06:05   Triglycerides 0 - 150 mg/dL 79  1/27/23 06:05   VLDL Cholesterol Calculated Not Established mg/dL 16  1/27/23 06:05   (L): Data is abnormally low    Echo:  TTE 1/27/23:  Conclusions   Summary   Left ventricular systolic function is mildly reduced with an estimated   ejection fraction of 45-50%. 3D calculated EF of 48%. The left ventricle is normal in size with normal wall thickness. There is hypokinesis of the inferior, inferolateral, and apical septal   walls. Indeterminate diastolic function. Patient was in atrial fibrillation during   study. The right ventricle is normal in size with reduced systolic function. Moderate right atrial dilatation. Severe left atrial dilatation. The aortic valve leaflets appear calcified with no hemodynamically   significant stenosis. Mild mitral regurgitation. Mild-moderate tricuspid regurgitation.    Systolic pulmonary artery pressure (SPAP) is elevated and estimated at 74   mmHg (right atrial pressure 15 mmHg), consistent with severe pulmonary   hypertension . IVC size is dilated (>2.1 cm) and collapses < 50% with respiration   consistent with elevated RA pressure (15 mmHg). 9/9/22:  Left ventricular systolic function is normal with ejection fraction estimated at 50-55%. Basal inferior hypokinesis  There is mild concentric left ventricular hypertrophy. Grade II diastolic dysfunction with elevated left ventricular filling pressure. The left atrium is severely dilated. The right atrium is mildly dilated. Moderate mitral regurgitation. Mild aortic regurgitation. Moderate tricuspid regurgitation. Systolic pulmonary artery pressure (SPAP) is estimated at 58 mmHg consistent with moderate pulmonary hypertension (Right atrial  pressure of 3 mmHg). Small PFO      Cardiac cath/PCI:  1/26/23   Findings:  Hemodynamics:              A. Opening arterial pressure: 118/40 (59) mmHg              B. LVEDP: 20 mmHg     2. Coronary anatomy:  A. Left main artery: The left main artery trifurcates into the left anterior descending artery, ramus intermedius artery, and left circumflex artery. The left main artery is mildly calcified with a 20% ostial stenosis and 10% distal stenosis. B. Left anterior descending artery: The LAD is calcified and has a 30% ostial stenosis, 50% proximal stenosis, and 100% mid-vessel stenosis immediately after the bifurcation of the first septal . The occlusion appears to be at the proximal edge of a previously placed stent. C. Ramus intermedius artery: Small to medium caliber vessel with 90% ostial stenosis. D. Left circumflex artery: The LCx is calcified and has a 100% ostial stenosis. E. Right coronary artery: Dominant vessel. The RCA is calcified with a 50% proximal stenosis and 100% mid-vessel stenosis. 3. Bypass graft anatomy:  A. Left subclavian artery: Patent with midl disease. B. LIMA to LAD: The LIMA is widely patent.   There is a 70-80% stenosis in the distal LAD beyond the anastomosis. C. SVG to OM1 with sequential to OM2: The vein graft has a 30% proximal stenosis and 90% distal stenosis near what was previously likely the anastomosis of the OM1. The OM1 appears to have been a smaller vessel than the OM2 and is now occluded. The OM2 has a 30% stenosis beyond its anastomosis with the vein graft. D. SVG to distal RCA: The vein graft has a 30% ostial/proximal stenosis. The native distal RCA has a 50% stenosis after the anastomosis. There RPDA has a 30% ostial stenosis. The RPLB has a 30% proximal stenosis and 30% mid-vessel stenosis. Results of Intervention (PCI of distal SVG to OM1 with sequential to OM2):  Pre - 90% stenosis. JAYLA 3 flow. Post - <10% stenosis. There was initially poor re-flow following PTCA and stenting of the distal SVG. Copious IC vasodilators were given and the patient was started on IV Integrilin. Final angiography showed <10% residual stenosis with JAYLA 3 flow throughout the vein graft and all but the very distal small caliber segment of the native OM2 which still had no flow, but overall flow through the distal segment appeared to be gradually improving.       Stress test:  9/9/22 Lexiscan-Myoview  Normal LVEF >60%  Paradoxic septal motion  Enlarged RV  Small to moderate area of anterior and basal inferior ischemia  Overall, this would be considered, intermediate risk study     Telemetry: Atrial fib with controlled VR  (Personally reviewed)    Assessment/Plan:    1) Coronary artery disease  -S/P AMBER to SVG to TOVA with sequential to OM2 on 1/26/23  -prior CABG  -no recurrent angina  -continue ASA, plavix, statin, losartan and spironolactone  -not on BB d/t underlying bradycardia  -plan for eventual staged PCI of native distal LAD if Hgb continues to be stable  -follow up CBC in 1 week    2) Ischemic cardiomyopathy with acute LV systolic HF  -currently euvolemic  -LVEF 45-50%; NYHA class II  -resume po Lasix today  -continue losartan, spironolactone  -low sodium diet and fluid restriction    3) Melena/Acute upper GI bleed  -EGD on 1/27/23: clean based esophageal ulcer at GE junction, patulous Schatzki's ring, and large amount of old blood in the stomach with diminutive clean based ulcers in the antrum  -on PPI  -will be following with GI as outpatient    4) Hemoptysis  -resolved   -? Related to GI bleed  -? PNA (per CT chest):treated with abx    5) Community acquired pneumonia  -on antibiotic and improved    6) Acute hypoxic respiratory failure  -resolved  -secondary to CHF+PNA    7) Persistent atrial fibrillation  -controlled VR  -not on any rate slowing meds  -Eliquis will remain on hold; recheck CBC in 1 week and if Hgb stable will resume    8) Essential hypertension  -goal BP< 130/80  -continue medical management    9) Hyperlipidemia  -on statin    10) AAA  -S/P endovascular repair with known type II endoleak    11) SUNDEEP  -on CPAP    12) Prolonged QT  -stable and without change (similar on prior ECG's)    Plan for discharge today    Follow up with CBC and cardiology in 1 week    Follow up with  as scheduled on 2/17/23    Discussed low-fat/low sodium diet, monitoring of daily weights, fluid restriction, worsening signs and symptoms of heart failure and when to call, and the importance of regular exercise and activity.      Post angiogram/PCI instructions discussed    Discharge Meds:L  See discharge summary    Electronically signed by RUPERTO Coburn CNP on 2/1/2023 at 10:44 AM

## 2023-02-06 ENCOUNTER — TELEPHONE (OUTPATIENT)
Dept: PULMONOLOGY | Age: 83
End: 2023-02-06

## 2023-02-06 DIAGNOSIS — G47.33 OSA (OBSTRUCTIVE SLEEP APNEA): Primary | ICD-10-CM

## 2023-02-06 NOTE — TELEPHONE ENCOUNTER
Reviewed compliance report. Having higher apnic events. Will adjust to Min 5, max of 13 cm H20. Please move his appointment back for 2-3 weeks to see if the pressures work better for him. Please send order to DME.

## 2023-02-06 NOTE — TELEPHONE ENCOUNTER
Patient states since he has gotten home from hospital his cpap doesn't same pressure, says it feels like its running on 5. Please advise.

## 2023-02-07 ENCOUNTER — OFFICE VISIT (OUTPATIENT)
Dept: CARDIOLOGY CLINIC | Age: 83
End: 2023-02-07
Payer: MEDICARE

## 2023-02-07 ENCOUNTER — HOSPITAL ENCOUNTER (OUTPATIENT)
Age: 83
Discharge: HOME | End: 2023-02-07
Payer: MEDICARE

## 2023-02-07 VITALS
HEART RATE: 66 BPM | DIASTOLIC BLOOD PRESSURE: 56 MMHG | BODY MASS INDEX: 29.03 KG/M2 | SYSTOLIC BLOOD PRESSURE: 136 MMHG | WEIGHT: 196 LBS | OXYGEN SATURATION: 94 % | HEIGHT: 69 IN

## 2023-02-07 DIAGNOSIS — K25.4 GASTROINTESTINAL HEMORRHAGE ASSOCIATED WITH GASTRIC ULCER: ICD-10-CM

## 2023-02-07 DIAGNOSIS — R06.02 SOB (SHORTNESS OF BREATH): ICD-10-CM

## 2023-02-07 DIAGNOSIS — R09.89 BRUIT OF LEFT CAROTID ARTERY: ICD-10-CM

## 2023-02-07 DIAGNOSIS — I25.5 ISCHEMIC CARDIOMYOPATHY: ICD-10-CM

## 2023-02-07 DIAGNOSIS — I25.10 CORONARY ARTERY DISEASE INVOLVING NATIVE CORONARY ARTERY OF NATIVE HEART WITHOUT ANGINA PECTORIS: Primary | ICD-10-CM

## 2023-02-07 DIAGNOSIS — I50.22 CHRONIC SYSTOLIC HF (HEART FAILURE) (HCC): ICD-10-CM

## 2023-02-07 LAB
ANION GAP SERPL CALCULATED.3IONS-SCNC: 18 MMOL/L (ref 3–16)
BASOPHILS ABSOLUTE: 0.1 K/UL (ref 0–0.2)
BASOPHILS RELATIVE PERCENT: 1.2 %
BUN BLDV-MCNC: 19 MG/DL (ref 7–20)
CALCIUM SERPL-MCNC: 9.2 MG/DL (ref 8.3–10.6)
CHLORIDE BLD-SCNC: 106 MMOL/L (ref 99–110)
CO2: 22 MMOL/L (ref 21–32)
CREAT SERPL-MCNC: 1.1 MG/DL (ref 0.8–1.3)
EOSINOPHILS ABSOLUTE: 0.1 K/UL (ref 0–0.6)
EOSINOPHILS RELATIVE PERCENT: 1.1 %
GFR SERPL CREATININE-BSD FRML MDRD: >60 ML/MIN/{1.73_M2}
GLUCOSE BLD-MCNC: 183 MG/DL (ref 70–99)
HCT VFR BLD CALC: 31.7 % (ref 40.5–52.5)
HEMOGLOBIN: 10.5 G/DL (ref 13.5–17.5)
LYMPHOCYTES ABSOLUTE: 1.2 K/UL (ref 1–5.1)
LYMPHOCYTES RELATIVE PERCENT: 14.7 %
MCH RBC QN AUTO: 29.7 PG (ref 26–34)
MCHC RBC AUTO-ENTMCNC: 33.1 G/DL (ref 31–36)
MCV RBC AUTO: 89.9 FL (ref 80–100)
MONOCYTES ABSOLUTE: 0.7 K/UL (ref 0–1.3)
MONOCYTES RELATIVE PERCENT: 8.3 %
NEUTROPHILS ABSOLUTE: 5.9 K/UL (ref 1.7–7.7)
NEUTROPHILS RELATIVE PERCENT: 74.7 %
PDW BLD-RTO: 15.2 % (ref 12.4–15.4)
PLATELET # BLD: 269 K/UL (ref 135–450)
PMV BLD AUTO: 8 FL (ref 5–10.5)
POTASSIUM SERPL-SCNC: 3.8 MMOL/L (ref 3.5–5.1)
PRO-BNP: 6589 PG/ML (ref 0–449)
RBC # BLD: 3.53 M/UL (ref 4.2–5.9)
SODIUM BLD-SCNC: 146 MMOL/L (ref 136–145)
WBC # BLD: 7.9 K/UL (ref 4–11)

## 2023-02-07 PROCEDURE — 3075F SYST BP GE 130 - 139MM HG: CPT | Performed by: NURSE PRACTITIONER

## 2023-02-07 PROCEDURE — 1123F ACP DISCUSS/DSCN MKR DOCD: CPT | Performed by: NURSE PRACTITIONER

## 2023-02-07 PROCEDURE — 80048 BASIC METABOLIC PNL TOTAL CA: CPT

## 2023-02-07 PROCEDURE — 83880 ASSAY OF NATRIURETIC PEPTIDE: CPT

## 2023-02-07 PROCEDURE — 85025 COMPLETE CBC W/AUTO DIFF WBC: CPT

## 2023-02-07 PROCEDURE — G8484 FLU IMMUNIZE NO ADMIN: HCPCS | Performed by: NURSE PRACTITIONER

## 2023-02-07 PROCEDURE — 36415 COLL VENOUS BLD VENIPUNCTURE: CPT

## 2023-02-07 PROCEDURE — 99214 OFFICE O/P EST MOD 30 MIN: CPT | Performed by: NURSE PRACTITIONER

## 2023-02-07 PROCEDURE — 1036F TOBACCO NON-USER: CPT | Performed by: NURSE PRACTITIONER

## 2023-02-07 PROCEDURE — 3078F DIAST BP <80 MM HG: CPT | Performed by: NURSE PRACTITIONER

## 2023-02-07 PROCEDURE — G8427 DOCREV CUR MEDS BY ELIG CLIN: HCPCS | Performed by: NURSE PRACTITIONER

## 2023-02-07 PROCEDURE — G8417 CALC BMI ABV UP PARAM F/U: HCPCS | Performed by: NURSE PRACTITIONER

## 2023-02-07 PROCEDURE — 1111F DSCHRG MED/CURRENT MED MERGE: CPT | Performed by: NURSE PRACTITIONER

## 2023-02-07 RX ORDER — POTASSIUM CHLORIDE 20 MEQ/1
20 TABLET, EXTENDED RELEASE ORAL DAILY
Qty: 90 TABLET | Refills: 1 | Status: SHIPPED | OUTPATIENT
Start: 2023-02-07

## 2023-02-07 NOTE — Clinical Note
Dr. Danita Berrios: he has appt with you next Friday in [place]. Labs show elevated ProBNP and lasix adjusted this AM. Hgb improving. He needs staged procedure of LAD (he did not want to pursue yet). EF 45-50%. Echo shows PASP 74. Would you suggest [facility] when they do his staged procedure? Defer to you to discuss with pt next week.

## 2023-02-07 NOTE — PATIENT INSTRUCTIONS
Continue same medications    Take an additional Lasix tablet today    Lab work: BMP, BNP, CBC    Carotid US: call and schedule  Call [phone] ([phone]) to schedule

## 2023-02-08 DIAGNOSIS — I25.5 ISCHEMIC CARDIOMYOPATHY: Primary | ICD-10-CM

## 2023-02-09 ENCOUNTER — TELEPHONE (OUTPATIENT)
Dept: CARDIOLOGY CLINIC | Age: 83
End: 2023-02-09

## 2023-02-09 DIAGNOSIS — R09.89 BRUIT OF LEFT CAROTID ARTERY: Primary | ICD-10-CM

## 2023-02-09 NOTE — TELEPHONE ENCOUNTER
Central scheduling stated that the order for the [facility]  Needs to be changed to . Please follow up with pt.

## 2023-02-13 ENCOUNTER — TELEPHONE (OUTPATIENT)
Dept: PULMONOLOGY | Age: 83
End: 2023-02-13

## 2023-02-13 DIAGNOSIS — G47.33 OSA (OBSTRUCTIVE SLEEP APNEA): Primary | ICD-10-CM

## 2023-02-13 NOTE — TELEPHONE ENCOUNTER
According to the compliance report the pressures are not too low. He will need to be brought into the lab to see what is going on. He may require a different type of machine.   I will also have Dr. Mary Yun review compliance report ( scanned in Media) to see if there is anything else to suggest.

## 2023-02-13 NOTE — TELEPHONE ENCOUNTER
BRIGHT pt.  He is not sure when he can do testing because he will be going back into the hospital for another heart procedure soon. Pt will call office on Friday to let us know the dates of that. In the mean time, he said when he was in the hospital everything was good (AHI 5/night). Since he has been out, his AHI is going up every night. He is very concerned. He said he feels like the pressure is too low. He is wondering if his machine is broken? I told pt I would send message back to Ignacio Ellis to address and we will wait to find out his dates for hospitalization hoperfully by the end of this week.

## 2023-02-13 NOTE — TELEPHONE ENCOUNTER
Reviewed compliance report. Still having increased apneic events despite adjusting PAP pressures. He will need a CPAP titration study. Order placed. Please let him know.

## 2023-02-14 NOTE — TELEPHONE ENCOUNTER
I agree will probably need any CPAP titration/BiPAP titration as the patient is having both central and obstructive events, he may need a BiPAP therapy  His heart also needs to be taken care of

## 2023-02-15 ENCOUNTER — OFFICE VISIT (OUTPATIENT)
Dept: FAMILY MEDICINE CLINIC | Age: 83
End: 2023-02-15

## 2023-02-15 VITALS
OXYGEN SATURATION: 96 % | BODY MASS INDEX: 28.21 KG/M2 | DIASTOLIC BLOOD PRESSURE: 63 MMHG | RESPIRATION RATE: 16 BRPM | HEART RATE: 65 BPM | TEMPERATURE: 97.1 F | WEIGHT: 191 LBS | SYSTOLIC BLOOD PRESSURE: 134 MMHG

## 2023-02-15 DIAGNOSIS — I10 ESSENTIAL HYPERTENSION: ICD-10-CM

## 2023-02-15 DIAGNOSIS — E11.9 TYPE 2 DIABETES MELLITUS WITHOUT COMPLICATION, WITHOUT LONG-TERM CURRENT USE OF INSULIN (HCC): Primary | ICD-10-CM

## 2023-02-15 DIAGNOSIS — J18.9 COMMUNITY ACQUIRED PNEUMONIA, UNSPECIFIED LATERALITY: ICD-10-CM

## 2023-02-15 LAB — HBA1C MFR BLD: 6.7 %

## 2023-02-15 SDOH — ECONOMIC STABILITY: INCOME INSECURITY: HOW HARD IS IT FOR YOU TO PAY FOR THE VERY BASICS LIKE FOOD, HOUSING, MEDICAL CARE, AND HEATING?: NOT HARD AT ALL

## 2023-02-15 SDOH — ECONOMIC STABILITY: FOOD INSECURITY: WITHIN THE PAST 12 MONTHS, THE FOOD YOU BOUGHT JUST DIDN'T LAST AND YOU DIDN'T HAVE MONEY TO GET MORE.: NEVER TRUE

## 2023-02-15 SDOH — ECONOMIC STABILITY: FOOD INSECURITY: WITHIN THE PAST 12 MONTHS, YOU WORRIED THAT YOUR FOOD WOULD RUN OUT BEFORE YOU GOT MONEY TO BUY MORE.: NEVER TRUE

## 2023-02-15 ASSESSMENT — PATIENT HEALTH QUESTIONNAIRE - PHQ9
SUM OF ALL RESPONSES TO PHQ QUESTIONS 1-9: 0
2. FEELING DOWN, DEPRESSED OR HOPELESS: 0
SUM OF ALL RESPONSES TO PHQ QUESTIONS 1-9: 0
1. LITTLE INTEREST OR PLEASURE IN DOING THINGS: 0
SUM OF ALL RESPONSES TO PHQ9 QUESTIONS 1 & 2: 0

## 2023-02-15 ASSESSMENT — ENCOUNTER SYMPTOMS: SHORTNESS OF BREATH: 1

## 2023-02-15 NOTE — TELEPHONE ENCOUNTER
I s/w patient and explained the need for the titration study. I also notified the sleep center to call and schedule him for testing. Per patient, I asked them to wait until next week to call him. He will know what is going on in regarding to having to go back in hospital by then.

## 2023-02-16 ENCOUNTER — HOSPITAL ENCOUNTER (OUTPATIENT)
Dept: VASCULAR LAB | Age: 83
Discharge: HOME | End: 2023-02-16
Payer: MEDICARE

## 2023-02-16 DIAGNOSIS — R09.89 BRUIT OF LEFT CAROTID ARTERY: ICD-10-CM

## 2023-02-16 PROCEDURE — 93880 EXTRACRANIAL BILAT STUDY: CPT

## 2023-02-16 NOTE — PROGRESS NOTES
[facility]   [facility] Consult     Referring Provider:  Cipriano Coley DO     Chief Complaint   Patient presents with    Follow-up    Congestive Heart Failure    Atrial Fibrillation    Coronary Artery Disease    Hyperlipidemia    Edema     Feet at night    Shortness of Breath     A little at times. Fatigue     More tired than normal.           Yasmin Wheeler   1940    History of Present Illness:    Yasmin Wheeler is a 80 y.o. male who is here is here today for follow up for a past medical history of an abnormal stress test, AF, coronary artery disease- CABG surgery 2008, CVA, hypertension, hyperlipidemia, valvular heart diease, small PFO. Patient was initially seen as a new patient at the request of Dr. Lucila Hernandez for an abnormal stress test and chest pain. Admitted for evaluation of a brief episode of aphasia on 09/08/2022. Symptoms had resolved by the time he arrived to the ED. EKG at admission showed AF. On 9/09/2022 echo showed EF of 50-55% with basal inferior hypokinesis, grade II DD, moderate MR, mild AR, moderate TR, moderate HTN, and small PFO. Stress test showed paradoxic septal motion, enlarged RV and small to moderate area of anterior and basal inferior ischemia. Brain MRI showed a small acute infarct of the left parietal lobe and chronic infarcts in right occipital lobe and left cerebellar hemisphere. Given his abnormal stress test an angiogram was recommended. On 1/26/23 he underwent cardiac cath with PCI of distal SVG to OM1 with sequential to OM2. Post procedure he developed pulmonary edema and GI bleed which required EGD. His Hgb has been monitored and stable. A his hospital follow up on 2/7/2023, he was instructed to take one extra dose of Lasix that day and start Klor Con 20 mEq daily. Repeat echocardiogram 1/27/2023 showed an EF of 45-50%. Today he states he has been feeling well since he left the hospital. States he has had no further blood in his stool.  States he had been feeling dizzy and light headed when he first got home from the hospital, but this has now resolves. Blood pressure at home is running- 125-135, heart rate 45-50's. He is taking an extra Lasix every other day- started one week ago. States he has lost weight and is feeling some what better. Not sure if his SOB has decreased with increase in Lasix. States he has started to walk again and is overall feeling more energy. Past Medical History:   has a past medical history of Aortic aneurysm, abdominal, Appendicitis, Arthritis, Bruises easily, CAD (coronary artery disease), CHF (congestive heart failure) ([facility]), Chronic back pain, Chronic pain of both shoulders, Complication of anesthesia, Gout, Heart disease, Hernia, HIGH CHOLESTEROL, Hyperlipidemia, Hypertension, Measles, Pancreatitis, Persistent cough, Ringing in ears, Sinus disorder, and Sleep deprivation. Surgical History:   has a past surgical history that includes Coronary angioplasty with stent (2001 and 2002); Cardiac surgery (01/01/2008); Cholecystectomy, laparoscopic (01/01/2006); Appendectomy; Tonsillectomy; hernia repair; AAA repair, endovascular (01/28/2011); Pancreas surgery; Gallbladder surgery; Coronary artery bypass graft; Abdominal aortic aneurysm repair; pr xcapsl ctrc rmvl insj io lens prosth w/o ecp (Left, 11/13/2018); Intracapsular cataract extraction (Right, 12/06/2018); Upper gastrointestinal endoscopy (N/A, 01/27/2023); and Diagnostic Cardiac Cath Lab Procedure (01/26/2023). Social History:   reports that he quit smoking about 45 years ago. His smoking use included cigarettes. He started smoking about 68 years ago. He has a 37.50 pack-year smoking history. He has never used smokeless tobacco. He reports that he does not drink alcohol and does not use drugs.      Family History:  family history includes Cancer in his mother; Diabetes in his mother; Heart Attack in his father; Heart Disease in his father, mother, paternal uncle, and paternal uncle; Heart Failure in his father and mother; Heart Surgery in his father and paternal uncle; High Blood Pressure in his mother; Stroke in his brother, sister, and sister. Home Medications:  Prior to Admission medications    Medication Sig Start Date End Date Taking? Authorizing Provider   potassium chloride (KLOR-CON M) 20 MEQ extended release tablet Take 1 tablet by mouth daily 2/7/23  Yes RUPERTO Bullock CNP   clopidogrel (PLAVIX) 75 MG tablet Take 1 tablet by mouth daily 2/2/23  Yes RUPERTO Bullock CNP   pantoprazole (PROTONIX) 40 MG tablet Take 1 tablet by mouth 2 times daily (before meals) 2/1/23  Yes RUPERTO Bullock CNP   doxazosin (CARDURA) 1 MG tablet Take 1 tablet by mouth 2 times daily 2/1/23  Yes RUPERTO Millan CNP   furosemide (LASIX) 40 MG tablet TAKE 1 TABLET EVERY DAY 12/24/22  Yes Margarita Ratel, DO   spironolactone (ALDACTONE) 25 MG tablet TAKE 1 TABLET EVERY DAY 12/24/22  Yes Margarita Ratel, DO   atorvastatin (LIPITOR) 40 MG tablet TAKE 1 TABLET EVERY NIGHT 12/22/22  Yes Ruel Meza MD   olmesartan (BENICAR) 40 MG tablet Take 1 tablet by mouth daily 9/30/22  Yes Ruel Meza MD   aspirin 81 MG chewable tablet Take 1 tablet by mouth daily 9/12/22  Yes RUPERTO Hollins CNP   ACCU-CHEK MULTICLIX LANCETS MISC Test blood sugar qd  Patient taking differently: Test blood sugar qd    AS NEEDED 3/1/18  Yes Margarita Ratel, DO   glucose blood VI test strips (ACCU-CHEK ION) strip Test blood sugar qd. 3/1/18  Yes Margarita Ratel, DO   metoprolol succinate (TOPROL XL) 50 MG extended release tablet TAKE 1 TABLET EVERY DAY 2/8/22 9/11/22  Margarita Ratel, DO        Allergies:  Coumadin [warfarin], Omeprazole, and Vioxx     Review of Systems:   Constitutional: there has been no unanticipated weight loss. There's been no change in energy level, sleep pattern, or activity level. Eyes: No visual changes or diplopia.  No scleral icterus. ENT: No Headaches, hearing loss or vertigo. No mouth sores or sore throat. Cardiovascular: Reviewed in HPI  Respiratory: No cough or wheezing, no sputum production. No hematemesis. Gastrointestinal: No abdominal pain, appetite loss, blood in stools. No change in bowel or bladder habits. Genitourinary: No dysuria, trouble voiding, or hematuria. Musculoskeletal:  No gait disturbance, weakness or joint complaints. Integumentary: No rash or pruritis. Neurological: No headache, diplopia, change in muscle strength, numbness or tingling. No change in gait, balance, coordination, mood, affect, memory, mentation, behavior. Psychiatric: No anxiety, no depression. Endocrine: No malaise, fatigue or temperature intolerance. No excessive thirst, fluid intake, or urination. No tremor. Hematologic/Lymphatic: No abnormal bruising or bleeding, blood clots or swollen lymph nodes. Allergic/Immunologic: No nasal congestion or hives. Physical Examination:    BP (!) 148/68   Pulse 69   Ht 5' 9\" (1.753 m)   Wt 190 lb (86.2 kg)   SpO2 97%   BMI 28.06 kg/m²    Vitals:    02/17/23 0804   BP: (!) 148/68   Pulse:    SpO2:             Constitutional and General Appearance: NAD   Respiratory:  Normal excursion and expansion without use of accessory muscles  Resp Auscultation: Normal breath sounds without dullness  Cardiovascular: The apical impulses not displaced  Heart tones are crisp and normal, irregular rhythm   Cervical veins are not engorged  The carotid upstroke is normal in amplitude and contour without delay or bruit  Normal S1S2, No S3, No Murmur  Peripheral pulses are symmetrical and full  There is no clubbing, cyanosis of the extremities.   No edema  Femoral Arteries: 2+ and equal  Pedal Pulses: 2+ and equal   Abdomen:  No masses or tenderness  Liver/Spleen: No Abnormalities Noted  Neurological/Psychiatric:  Alert and oriented in all spheres  Moves all extremities well  Exhibits normal gait balance and coordination  No abnormalities of mood, affect, memory, mentation, or behavior are noted    CTA head and neck 9/8/2022  1. Atherosclerosis contributes to less than 50% stenosis involving the proximal right cervical ICA by NASCET criteria. 2. Atherosclerosis at the origin of both vertebral arteries, which contributes to mild stenosis on the right and moderate stenosis on the left. 3. No significant stenosis seen of the [id number]. 4. Nonspecific borderline prominent precarinal mediastinal lymph node measuring 13 mm. Echocardiogram 9/9/2022  Summary   Left ventricular systolic function is normal with ejection fraction   estimated at 50-55%. Basal inferior hypokinesis   There is mild concentric left ventricular hypertrophy. Grade II diastolic dysfunction with elevated left ventricular filling   pressure. The left atrium is severely dilated. The right atrium is mildly dilated. Moderate mitral regurgitation. Mild aortic regurgitation. Moderate tricuspid regurgitation. Systolic pulmonary artery pressure (SPAP) is estimated at 58 mmHg consistent   with moderate pulmonary hypertension (Right atrial pressure of 3 mmHg). Small PFO    Stress test 9/9/2022  Conclusions    Summary  Normal LVEF >60%  Paradoxic septal motion  Enlarged RV  Small to moderate area of anterior and basal inferior ischemia    Overall, this would be considered, intermediate risk study     MRI brain 9/8/2022  1. Small acute infarct of the left parietal lobe, series 4, image 20. 2. Cerebral parenchymal volume loss with moderate-severe chronic microvascular white matter ischemic disease. 3. Chronic infarcts are noted in the right occipital lobe and left cerebellar hemisphere. Left heart cath Dr. Malika Quinn 1/26/2023  Impression:  1. Severe native multivessel coronary artery disease with  of native mid-LAD,  of ostial LCx, and  of mid-RCA. 2. Patent LIMA to LAD.   There is a severe stenosis in the distal LAD after the anastomosis. 3. Severely disease SVG to OM1 with sequential to OM2. The vein graft had a 90% distal stenosis near what was previously likely the anastomosis of the OM1. The OM1 appears to have been a smaller vessel than the OM2 and is now occluded. 4. Patent SVG to distal RCA. 5. PCI was performed to the distal SVG to OM1 with sequential to OM2 with an Morgan Dawson 3.5 x 22 mm AMBER. There was initially poor re-flow following PTCA and stenting of the distal SVG. Copious IC vasodilators were given and the patient was started on IV Integrilin. Final angiography showed <10% residual stenosis with JAYLA 3 flow throughout the vein graft and all but the very distal small caliber segment of the native OM2 which still had no flow, but overall flow through the distal segment appeared to be gradually improving. The OM1 appeared to be occluded, was felt to be much smaller than the OM2, and was overall not an adequate target for percutaneous coronary intervention. Therefore, the decision was made to proceed with stenting across the anastomosis with the OM1.  6. LVEDP 20 mmHg. Plan:  1. Monitor overnight. 2. Continue IV Integrilin infusion x12 hours. 3. Start IV nitroglycerin infusion and titrate as needed for angina. 4. Plavix 75 mg daily for at least one year. 5. Continue aspirin 81 mg daily. 6. Wait to resume Eliquis until Integrilin infusion is completed. Would then plan for triple therapy for one month. After one month, aspirin can be stopped and the patient should continue on Plavix and Eliquis to complete at least one year of Plavix therapy. If Plavix is discontinued after one year, aspirin 81 mg daily should be resumed indefinitely. 7. Resume atorvastatin 40 mg qHS, amlodipine 10 mg daily, losartan 100 mg daily, spironolactone 25 mg daily, and lasix 40 mg daily. 8. Hold off on beta-blocker in setting of bradycardia.   9. Will arrange for staged PCI of native distal LAD through LIMA in 2 weeks. There was difficulty engaging the LIMA from the femoral artery approach and may consider performing PCI from left radial artery approach. Can plan to re-evaluate SVG stent and flow and OM2 at that time. Echocardiogram 1/27/2023  Summary   Left ventricular systolic function is mildly reduced with an estimated   ejection fraction of 45-50%. 3D calculated EF of 48%. The left ventricle is normal in size with normal wall thickness. There is hypokinesis of the inferior, inferolateral, and apical septal   walls. Indeterminate diastolic function. Patient was in atrial fibrillation during   study. The right ventricle is normal in size with reduced systolic function. Moderate right atrial dilatation. Severe left atrial dilatation. The aortic valve leaflets appear calcified with no hemodynamically   significant stenosis. Mild mitral regurgitation. Mild-moderate tricuspid regurgitation. Systolic pulmonary artery pressure (SPAP) is elevated and estimated at 74   mmHg (right atrial pressure 15 mmHg), consistent with severe pulmonary   hypertension . IVC size is dilated (>2.1 cm) and collapses < 50% with respiration   consistent with elevated RA pressure (15 mmHg). Latest Reference Range & Units 9/11/22 05:54   CHOLESTEROL, TOTAL, 624795 0 - 199 mg/dL 154   HDL Cholesterol 40 - 60 mg/dL 35 (L)   LDL Calculated <100 mg/dL 102 (H)   Triglycerides 0 - 150 mg/dL 83   VLDL Cholesterol Calculated Not Established mg/dL 17         Assessment:   Chest pain - no recent  NSVT on monitor 9/2022 - polymorphic. This combined w/ abnorm,al stress  to cath/PCI  VIGIL - chronic, no sig change  Abnormal stress test 9/2022  CABG surgery 2008  Carotid artery diease   Atrial fibrillation - new onset 9/2022. stable  Abnormal EKG  Moderate mitral regurgitation. Mild aortic regurgitation. Moderate tricuspid regurgitation. Moderate pulmonary hypertension  Small PFO - now on [facility]  CVA 9/8/22.  Suspect due to AF. Hypertension - improved control   Hyperlipidemia - stable  Bradycardia - BBlk was contributing. Remains HR 45-55 at home despite no BBlk  Fatigue - primarily SUNDEEP. On CPAP. anemia contributing as well  Sleep apnea   Esophageal ulcer - noted post PCI 1/2023  Anemia due to GIB  GIB post PCI - appears resolved   Dizziness - high diuretics contributin    Plan:  STAT Labs today- CBC, BMP, BNP   Restart eliquis 5 mg twice a day- wait to restart this until after we call with lab results   Stop Aspirin on 2/26/2023  ~Stop aldactone- spironolactone    ~take Lasix one tablet daily- you can take an extra pill as needed for weight gain and swelling   ~restart amlodipine, Norvasc 5 mg twice a day   Stop Cardura   Repeat rhythm moniotr - if persistent VT, will proceed w/ distal LAD PCI. If no VT, will manage medically due to high risk PCI and no angina   Cardiac medications reviewed including indications and pertinent side effects. Medication list updated at this visit. Check blood pressure and heart rate at home a few times per week- keep a log with dates and times and bring to office visit- Cloretta Nunda is less than 140/90  Regular exercise and following a healthy diet encouraged   Follow up with me in 6 weeks       Scribe's attestation: This note was scribed in the presence of Dr. Frannie Gaston M.D. By Winston Rojas RN    The scribes documentation has been prepared under my direction and personally reviewed by me in its entirety. I confirm that the note above accurately reflects all work, treatment, procedures, and medical decision making performed by me. Dr. Frannie Gaston MD      Thank you for allowing me to participate in the care of this individual.      [facility].  Francisco Sheppard M.D., Casandra Franco

## 2023-02-17 ENCOUNTER — OFFICE VISIT (OUTPATIENT)
Dept: CARDIOLOGY CLINIC | Age: 83
End: 2023-02-17
Payer: MEDICARE

## 2023-02-17 ENCOUNTER — TELEPHONE (OUTPATIENT)
Dept: CARDIOLOGY CLINIC | Age: 83
End: 2023-02-17

## 2023-02-17 VITALS
DIASTOLIC BLOOD PRESSURE: 68 MMHG | HEART RATE: 69 BPM | BODY MASS INDEX: 28.14 KG/M2 | WEIGHT: 190 LBS | HEIGHT: 69 IN | OXYGEN SATURATION: 97 % | SYSTOLIC BLOOD PRESSURE: 148 MMHG

## 2023-02-17 DIAGNOSIS — I25.10 CORONARY ARTERY DISEASE INVOLVING NATIVE CORONARY ARTERY OF NATIVE HEART WITHOUT ANGINA PECTORIS: ICD-10-CM

## 2023-02-17 DIAGNOSIS — I47.29 NSVT (NONSUSTAINED VENTRICULAR TACHYCARDIA): ICD-10-CM

## 2023-02-17 DIAGNOSIS — I47.29 NSVT (NONSUSTAINED VENTRICULAR TACHYCARDIA) (HCC): ICD-10-CM

## 2023-02-17 DIAGNOSIS — I48.91 ATRIAL FIBRILLATION, UNSPECIFIED TYPE (HCC): ICD-10-CM

## 2023-02-17 DIAGNOSIS — I10 PRIMARY HYPERTENSION: Primary | ICD-10-CM

## 2023-02-17 LAB
ANION GAP SERPL CALCULATED.3IONS-SCNC: 12 MMOL/L (ref 3–16)
BUN BLDV-MCNC: 21 MG/DL (ref 7–20)
CALCIUM SERPL-MCNC: 9.7 MG/DL (ref 8.3–10.6)
CHLORIDE BLD-SCNC: 103 MMOL/L (ref 99–110)
CO2: 28 MMOL/L (ref 21–32)
CREAT SERPL-MCNC: 1.4 MG/DL (ref 0.8–1.3)
GFR SERPL CREATININE-BSD FRML MDRD: 50 ML/MIN/{1.73_M2}
GLUCOSE BLD-MCNC: 155 MG/DL (ref 70–99)
HCT VFR BLD CALC: 35.3 % (ref 40.5–52.5)
HEMOGLOBIN: 11.8 G/DL (ref 13.5–17.5)
MCH RBC QN AUTO: 30.5 PG (ref 26–34)
MCHC RBC AUTO-ENTMCNC: 33.3 G/DL (ref 31–36)
MCV RBC AUTO: 91.5 FL (ref 80–100)
PDW BLD-RTO: 15.5 % (ref 12.4–15.4)
PLATELET # BLD: 231 K/UL (ref 135–450)
PMV BLD AUTO: 8.6 FL (ref 5–10.5)
POTASSIUM SERPL-SCNC: 4.5 MMOL/L (ref 3.5–5.1)
PRO-BNP: 2196 PG/ML (ref 0–449)
RBC # BLD: 3.86 M/UL (ref 4.2–5.9)
SODIUM BLD-SCNC: 143 MMOL/L (ref 136–145)
WBC # BLD: 6.7 K/UL (ref 4–11)

## 2023-02-17 PROCEDURE — G8484 FLU IMMUNIZE NO ADMIN: HCPCS | Performed by: INTERNAL MEDICINE

## 2023-02-17 PROCEDURE — G8417 CALC BMI ABV UP PARAM F/U: HCPCS | Performed by: INTERNAL MEDICINE

## 2023-02-17 PROCEDURE — 1111F DSCHRG MED/CURRENT MED MERGE: CPT | Performed by: INTERNAL MEDICINE

## 2023-02-17 PROCEDURE — 3078F DIAST BP <80 MM HG: CPT | Performed by: INTERNAL MEDICINE

## 2023-02-17 PROCEDURE — G8427 DOCREV CUR MEDS BY ELIG CLIN: HCPCS | Performed by: INTERNAL MEDICINE

## 2023-02-17 PROCEDURE — 99214 OFFICE O/P EST MOD 30 MIN: CPT | Performed by: INTERNAL MEDICINE

## 2023-02-17 PROCEDURE — 3077F SYST BP >= 140 MM HG: CPT | Performed by: INTERNAL MEDICINE

## 2023-02-17 PROCEDURE — 1036F TOBACCO NON-USER: CPT | Performed by: INTERNAL MEDICINE

## 2023-02-17 PROCEDURE — 1123F ACP DISCUSS/DSCN MKR DOCD: CPT | Performed by: INTERNAL MEDICINE

## 2023-02-17 RX ORDER — AMLODIPINE BESYLATE 5 MG/1
5 TABLET ORAL 2 TIMES DAILY
Qty: 180 TABLET | Refills: 3 | Status: SHIPPED | OUTPATIENT
Start: 2023-02-17

## 2023-02-17 NOTE — PATIENT INSTRUCTIONS
Plan: STAT Labs today- CBC, BMP, BNP   Restart eliquis 5 mg twice a day- wait to restart this until after we call with lab results   Stop Aspirin on 2/26/2023  ~Stop aldactone- spironolactone    ~take Lasix one tablet daily- you can take an extra pill as needed for weight gain and swelling   ~restart amlodipine, Norvasc 5 mg twice a day   Stop Cardura   Cardiac medications reviewed including indications and pertinent side effects. Medication list updated at this visit.    Check blood pressure and heart rate at home a few times per week- keep a log with dates and times and bring to office visit- Pal Garcia is less than 140/90  Regular exercise and following a healthy diet encouraged   Follow up with me in 6 weeks

## 2023-02-17 NOTE — TELEPHONE ENCOUNTER
Monitor placed by [address] [place] [address]  Length of monitor 14 day  Monitor ordered by [facility] FOR [facility]  Serial number [facility] [id number]  Activation successful prior to pt leaving office?  Yes cognitive limitations

## 2023-02-17 NOTE — TELEPHONE ENCOUNTER
----- Message from RUPERTO Luna CNP sent at 2/17/2023 11:50 AM EST -----  Carotid US shows some blockage, however does not appear to be obstructive. Continue ASA, plavix and statin. Follow up in 1 year.

## 2023-02-17 NOTE — TELEPHONE ENCOUNTER
----- Message from Milton Cantor MD sent at 2/17/2023  1:13 PM EST -----  Please notify patient that their lab results as below  Kidney function mild abnormal. Reduce sauer pills as dicussed at OV today  Blood count improved - resume Eliquis  Repeat BMP 7-10 days

## 2023-02-17 NOTE — TELEPHONE ENCOUNTER
Created telephone encounter. Spoke with Amrik Bhakta relayed message per NPDE regarding carotid. Pt verbalized understanding.

## 2023-02-20 ENCOUNTER — TELEPHONE (OUTPATIENT)
Dept: CARDIOLOGY CLINIC | Age: 83
End: 2023-02-20

## 2023-02-27 ENCOUNTER — TELEPHONE (OUTPATIENT)
Dept: CARDIOLOGY CLINIC | Age: 83
End: 2023-02-27

## 2023-02-27 ENCOUNTER — APPOINTMENT (OUTPATIENT)
Dept: GENERAL RADIOLOGY | Age: 83
DRG: 546 | End: 2023-02-27
Payer: MEDICARE

## 2023-02-27 ENCOUNTER — HOSPITAL ENCOUNTER (INPATIENT)
Age: 83
LOS: 2 days | Discharge: HOME | DRG: 546 | End: 2023-03-01
Attending: EMERGENCY MEDICINE | Admitting: INTERNAL MEDICINE
Payer: MEDICARE

## 2023-02-27 DIAGNOSIS — M02.30 REACTIVE ARTHRITIS, UNSPECIFIED SITE (HCC): ICD-10-CM

## 2023-02-27 DIAGNOSIS — R06.02 SOB (SHORTNESS OF BREATH): ICD-10-CM

## 2023-02-27 DIAGNOSIS — L03.115 CELLULITIS OF RIGHT LOWER EXTREMITY: Primary | ICD-10-CM

## 2023-02-27 DIAGNOSIS — R26.2 DIFFICULTY IN WALKING: ICD-10-CM

## 2023-02-27 PROBLEM — I50.43 CHF (CONGESTIVE HEART FAILURE), NYHA CLASS I, ACUTE ON CHRONIC, COMBINED (HCC): Status: ACTIVE | Noted: 2023-02-27

## 2023-02-27 LAB
A/G RATIO: 1.3 (ref 1.1–2.2)
ALBUMIN SERPL-MCNC: 3.7 G/DL (ref 3.4–5)
ALP BLD-CCNC: 53 U/L (ref 40–129)
ALT SERPL-CCNC: 12 U/L (ref 10–40)
ANION GAP SERPL CALCULATED.3IONS-SCNC: 11 MMOL/L (ref 3–16)
AST SERPL-CCNC: 17 U/L (ref 15–37)
BASOPHILS ABSOLUTE: 0.1 K/UL (ref 0–0.2)
BASOPHILS RELATIVE PERCENT: 0.6 %
BILIRUB SERPL-MCNC: 2.7 MG/DL (ref 0–1)
BUN BLDV-MCNC: 21 MG/DL (ref 7–20)
C-REACTIVE PROTEIN: 192.1 MG/L (ref 0–5.1)
CALCIUM SERPL-MCNC: 9 MG/DL (ref 8.3–10.6)
CHLORIDE BLD-SCNC: 99 MMOL/L (ref 99–110)
CO2: 27 MMOL/L (ref 21–32)
CREAT SERPL-MCNC: 1.2 MG/DL (ref 0.8–1.3)
EKG ATRIAL RATE: 71 BPM
EKG DIAGNOSIS: NORMAL
EKG Q-T INTERVAL: 458 MS
EKG QRS DURATION: 86 MS
EKG QTC CALCULATION (BAZETT): 501 MS
EKG R AXIS: -9 DEGREES
EKG T AXIS: 68 DEGREES
EKG VENTRICULAR RATE: 72 BPM
EOSINOPHILS ABSOLUTE: 0 K/UL (ref 0–0.6)
EOSINOPHILS RELATIVE PERCENT: 0.2 %
GFR SERPL CREATININE-BSD FRML MDRD: >60 ML/MIN/{1.73_M2}
GLUCOSE BLD-MCNC: 137 MG/DL (ref 70–99)
GLUCOSE BLD-MCNC: 170 MG/DL (ref 70–99)
HCT VFR BLD CALC: 33.2 % (ref 40.5–52.5)
HEMOGLOBIN: 11.2 G/DL (ref 13.5–17.5)
LACTIC ACID: 1.4 MMOL/L (ref 0.4–2)
LYMPHOCYTES ABSOLUTE: 1.4 K/UL (ref 1–5.1)
LYMPHOCYTES RELATIVE PERCENT: 13.6 %
MCH RBC QN AUTO: 30.7 PG (ref 26–34)
MCHC RBC AUTO-ENTMCNC: 33.6 G/DL (ref 31–36)
MCV RBC AUTO: 91.3 FL (ref 80–100)
MONOCYTES ABSOLUTE: 1.4 K/UL (ref 0–1.3)
MONOCYTES RELATIVE PERCENT: 14.3 %
NEUTROPHILS ABSOLUTE: 7.1 K/UL (ref 1.7–7.7)
NEUTROPHILS RELATIVE PERCENT: 71.3 %
PDW BLD-RTO: 14.7 % (ref 12.4–15.4)
PERFORMED ON: ABNORMAL
PLATELET # BLD: 163 K/UL (ref 135–450)
PMV BLD AUTO: 7.8 FL (ref 5–10.5)
POTASSIUM REFLEX MAGNESIUM: 3.6 MMOL/L (ref 3.5–5.1)
PRO-BNP: 4147 PG/ML (ref 0–449)
PROCALCITONIN: 0.24 NG/ML (ref 0–0.15)
RBC # BLD: 3.64 M/UL (ref 4.2–5.9)
SEDIMENTATION RATE, ERYTHROCYTE: 46 MM/HR (ref 0–20)
SODIUM BLD-SCNC: 137 MMOL/L (ref 136–145)
TOTAL CK: 196 U/L (ref 39–308)
TOTAL PROTEIN: 6.6 G/DL (ref 6.4–8.2)
TROPONIN: 0.02 NG/ML
TROPONIN: 0.03 NG/ML
TROPONIN: 0.03 NG/ML
URIC ACID, SERUM: 8.6 MG/DL (ref 3.5–7.2)
WBC # BLD: 10 K/UL (ref 4–11)

## 2023-02-27 PROCEDURE — 73630 X-RAY EXAM OF FOOT: CPT

## 2023-02-27 PROCEDURE — 85025 COMPLETE CBC W/AUTO DIFF WBC: CPT

## 2023-02-27 PROCEDURE — 6370000000 HC RX 637 (ALT 250 FOR IP): Performed by: NURSE PRACTITIONER

## 2023-02-27 PROCEDURE — 6360000002 HC RX W HCPCS: Performed by: PHYSICIAN ASSISTANT

## 2023-02-27 PROCEDURE — 85652 RBC SED RATE AUTOMATED: CPT

## 2023-02-27 PROCEDURE — 2580000003 HC RX 258: Performed by: PHYSICIAN ASSISTANT

## 2023-02-27 PROCEDURE — 87040 BLOOD CULTURE FOR BACTERIA: CPT

## 2023-02-27 PROCEDURE — 99285 EMERGENCY DEPT VISIT HI MDM: CPT

## 2023-02-27 PROCEDURE — 83880 ASSAY OF NATRIURETIC PEPTIDE: CPT

## 2023-02-27 PROCEDURE — 83605 ASSAY OF LACTIC ACID: CPT

## 2023-02-27 PROCEDURE — 80053 COMPREHEN METABOLIC PANEL: CPT

## 2023-02-27 PROCEDURE — 86140 C-REACTIVE PROTEIN: CPT

## 2023-02-27 PROCEDURE — 96365 THER/PROPH/DIAG IV INF INIT: CPT

## 2023-02-27 PROCEDURE — 84550 ASSAY OF BLOOD/URIC ACID: CPT

## 2023-02-27 PROCEDURE — 84484 ASSAY OF TROPONIN QUANT: CPT

## 2023-02-27 PROCEDURE — 93005 ELECTROCARDIOGRAM TRACING: CPT | Performed by: PHYSICIAN ASSISTANT

## 2023-02-27 PROCEDURE — 71045 X-RAY EXAM CHEST 1 VIEW: CPT

## 2023-02-27 PROCEDURE — 2580000003 HC RX 258: Performed by: NURSE PRACTITIONER

## 2023-02-27 PROCEDURE — 84145 PROCALCITONIN (PCT): CPT

## 2023-02-27 PROCEDURE — 93010 ELECTROCARDIOGRAM REPORT: CPT | Performed by: INTERNAL MEDICINE

## 2023-02-27 PROCEDURE — 36415 COLL VENOUS BLD VENIPUNCTURE: CPT

## 2023-02-27 PROCEDURE — 2060000000 HC ICU INTERMEDIATE R&B

## 2023-02-27 PROCEDURE — 96375 TX/PRO/DX INJ NEW DRUG ADDON: CPT

## 2023-02-27 PROCEDURE — 82550 ASSAY OF CK (CPK): CPT

## 2023-02-27 RX ORDER — POTASSIUM CHLORIDE 20 MEQ/1
20 TABLET, EXTENDED RELEASE ORAL DAILY
Status: DISCONTINUED | OUTPATIENT
Start: 2023-02-28 | End: 2023-03-01 | Stop reason: HOSPADM

## 2023-02-27 RX ORDER — ATORVASTATIN CALCIUM 40 MG/1
40 TABLET, FILM COATED ORAL NIGHTLY
Status: DISCONTINUED | OUTPATIENT
Start: 2023-02-27 | End: 2023-03-01 | Stop reason: HOSPADM

## 2023-02-27 RX ORDER — INSULIN LISPRO 100 [IU]/ML
0-4 INJECTION, SOLUTION INTRAVENOUS; SUBCUTANEOUS NIGHTLY
Status: DISCONTINUED | OUTPATIENT
Start: 2023-02-27 | End: 2023-02-28

## 2023-02-27 RX ORDER — DEXTROSE MONOHYDRATE 100 MG/ML
INJECTION, SOLUTION INTRAVENOUS CONTINUOUS PRN
Status: DISCONTINUED | OUTPATIENT
Start: 2023-02-27 | End: 2023-03-01 | Stop reason: HOSPADM

## 2023-02-27 RX ORDER — MORPHINE SULFATE 4 MG/ML
4 INJECTION, SOLUTION INTRAMUSCULAR; INTRAVENOUS ONCE
Status: COMPLETED | OUTPATIENT
Start: 2023-02-27 | End: 2023-02-27

## 2023-02-27 RX ORDER — POTASSIUM CHLORIDE 20 MEQ/1
40 TABLET, EXTENDED RELEASE ORAL PRN
Status: DISCONTINUED | OUTPATIENT
Start: 2023-02-27 | End: 2023-03-01 | Stop reason: HOSPADM

## 2023-02-27 RX ORDER — ACETAMINOPHEN 325 MG/1
650 TABLET ORAL EVERY 6 HOURS PRN
Status: DISCONTINUED | OUTPATIENT
Start: 2023-02-27 | End: 2023-03-01 | Stop reason: HOSPADM

## 2023-02-27 RX ORDER — ONDANSETRON 2 MG/ML
4 INJECTION INTRAMUSCULAR; INTRAVENOUS EVERY 6 HOURS PRN
Status: DISCONTINUED | OUTPATIENT
Start: 2023-02-27 | End: 2023-02-27

## 2023-02-27 RX ORDER — ONDANSETRON 2 MG/ML
4 INJECTION INTRAMUSCULAR; INTRAVENOUS ONCE
Status: COMPLETED | OUTPATIENT
Start: 2023-02-27 | End: 2023-02-27

## 2023-02-27 RX ORDER — CLOPIDOGREL BISULFATE 75 MG/1
75 TABLET ORAL DAILY
Status: DISCONTINUED | OUTPATIENT
Start: 2023-02-28 | End: 2023-03-01 | Stop reason: HOSPADM

## 2023-02-27 RX ORDER — OXYCODONE HYDROCHLORIDE AND ACETAMINOPHEN 5; 325 MG/1; MG/1
1 TABLET ORAL EVERY 4 HOURS PRN
Status: DISCONTINUED | OUTPATIENT
Start: 2023-02-27 | End: 2023-03-01 | Stop reason: HOSPADM

## 2023-02-27 RX ORDER — PANTOPRAZOLE SODIUM 40 MG/1
40 TABLET, DELAYED RELEASE ORAL
Status: DISCONTINUED | OUTPATIENT
Start: 2023-02-28 | End: 2023-03-01 | Stop reason: HOSPADM

## 2023-02-27 RX ORDER — SODIUM CHLORIDE 0.9 % (FLUSH) 0.9 %
5-40 SYRINGE (ML) INJECTION EVERY 12 HOURS SCHEDULED
Status: DISCONTINUED | OUTPATIENT
Start: 2023-02-27 | End: 2023-03-01 | Stop reason: HOSPADM

## 2023-02-27 RX ORDER — MAGNESIUM SULFATE IN WATER 40 MG/ML
2000 INJECTION, SOLUTION INTRAVENOUS PRN
Status: DISCONTINUED | OUTPATIENT
Start: 2023-02-27 | End: 2023-03-01 | Stop reason: HOSPADM

## 2023-02-27 RX ORDER — INSULIN LISPRO 100 [IU]/ML
0-4 INJECTION, SOLUTION INTRAVENOUS; SUBCUTANEOUS
Status: DISCONTINUED | OUTPATIENT
Start: 2023-02-28 | End: 2023-02-28

## 2023-02-27 RX ORDER — FUROSEMIDE 10 MG/ML
40 INJECTION INTRAMUSCULAR; INTRAVENOUS ONCE
Status: COMPLETED | OUTPATIENT
Start: 2023-02-27 | End: 2023-02-27

## 2023-02-27 RX ORDER — FUROSEMIDE 10 MG/ML
40 INJECTION INTRAMUSCULAR; INTRAVENOUS 2 TIMES DAILY
Status: DISCONTINUED | OUTPATIENT
Start: 2023-02-28 | End: 2023-02-28

## 2023-02-27 RX ORDER — DEXAMETHASONE SODIUM PHOSPHATE 4 MG/ML
8 INJECTION, SOLUTION INTRA-ARTICULAR; INTRALESIONAL; INTRAMUSCULAR; INTRAVENOUS; SOFT TISSUE ONCE
Status: COMPLETED | OUTPATIENT
Start: 2023-02-27 | End: 2023-02-27

## 2023-02-27 RX ORDER — OXYCODONE HYDROCHLORIDE AND ACETAMINOPHEN 5; 325 MG/1; MG/1
1 TABLET ORAL EVERY 4 HOURS PRN
Status: DISCONTINUED | OUTPATIENT
Start: 2023-02-27 | End: 2023-02-27

## 2023-02-27 RX ORDER — AMLODIPINE BESYLATE 5 MG/1
5 TABLET ORAL 2 TIMES DAILY
Status: DISCONTINUED | OUTPATIENT
Start: 2023-02-27 | End: 2023-03-01 | Stop reason: HOSPADM

## 2023-02-27 RX ORDER — ONDANSETRON 4 MG/1
4 TABLET, ORALLY DISINTEGRATING ORAL EVERY 8 HOURS PRN
Status: DISCONTINUED | OUTPATIENT
Start: 2023-02-27 | End: 2023-02-27

## 2023-02-27 RX ORDER — POLYETHYLENE GLYCOL 3350 17 G/17G
17 POWDER, FOR SOLUTION ORAL DAILY PRN
Status: DISCONTINUED | OUTPATIENT
Start: 2023-02-27 | End: 2023-03-01 | Stop reason: HOSPADM

## 2023-02-27 RX ORDER — SODIUM CHLORIDE 0.9 % (FLUSH) 0.9 %
5-40 SYRINGE (ML) INJECTION PRN
Status: DISCONTINUED | OUTPATIENT
Start: 2023-02-27 | End: 2023-03-01 | Stop reason: HOSPADM

## 2023-02-27 RX ORDER — ACETAMINOPHEN 650 MG/1
650 SUPPOSITORY RECTAL EVERY 6 HOURS PRN
Status: DISCONTINUED | OUTPATIENT
Start: 2023-02-27 | End: 2023-03-01 | Stop reason: HOSPADM

## 2023-02-27 RX ORDER — POTASSIUM CHLORIDE 7.45 MG/ML
10 INJECTION INTRAVENOUS PRN
Status: DISCONTINUED | OUTPATIENT
Start: 2023-02-27 | End: 2023-03-01 | Stop reason: HOSPADM

## 2023-02-27 RX ORDER — SODIUM CHLORIDE 9 MG/ML
INJECTION, SOLUTION INTRAVENOUS PRN
Status: DISCONTINUED | OUTPATIENT
Start: 2023-02-27 | End: 2023-03-01 | Stop reason: HOSPADM

## 2023-02-27 RX ADMIN — CEFEPIME 2000 MG: 2 INJECTION, POWDER, FOR SOLUTION INTRAVENOUS at 19:22

## 2023-02-27 RX ADMIN — FUROSEMIDE 40 MG: 10 INJECTION, SOLUTION INTRAMUSCULAR; INTRAVENOUS at 17:24

## 2023-02-27 RX ADMIN — ONDANSETRON 4 MG: 2 INJECTION INTRAMUSCULAR; INTRAVENOUS at 17:21

## 2023-02-27 RX ADMIN — SODIUM CHLORIDE, PRESERVATIVE FREE 10 ML: 5 INJECTION INTRAVENOUS at 23:15

## 2023-02-27 RX ADMIN — AMLODIPINE BESYLATE 5 MG: 5 TABLET ORAL at 23:14

## 2023-02-27 RX ADMIN — OXYCODONE AND ACETAMINOPHEN 1 TABLET: 5; 325 TABLET ORAL at 23:58

## 2023-02-27 RX ADMIN — APIXABAN 5 MG: 5 TABLET, FILM COATED ORAL at 23:14

## 2023-02-27 RX ADMIN — VANCOMYCIN HYDROCHLORIDE 2000 MG: 1 INJECTION, POWDER, LYOPHILIZED, FOR SOLUTION INTRAVENOUS at 20:00

## 2023-02-27 RX ADMIN — ATORVASTATIN CALCIUM 40 MG: 40 TABLET, FILM COATED ORAL at 23:14

## 2023-02-27 RX ADMIN — MORPHINE SULFATE 4 MG: 4 INJECTION, SOLUTION INTRAMUSCULAR; INTRAVENOUS at 17:22

## 2023-02-27 RX ADMIN — DEXAMETHASONE SODIUM PHOSPHATE 8 MG: 4 INJECTION, SOLUTION INTRAMUSCULAR; INTRAVENOUS at 17:24

## 2023-02-27 ASSESSMENT — PAIN DESCRIPTION - LOCATION
LOCATION: FOOT
LOCATION: LEG

## 2023-02-27 ASSESSMENT — PAIN DESCRIPTION - DESCRIPTORS
DESCRIPTORS: SHOOTING
DESCRIPTORS: ACHING

## 2023-02-27 ASSESSMENT — PAIN DESCRIPTION - ORIENTATION
ORIENTATION: RIGHT;LEFT
ORIENTATION: RIGHT;LEFT
ORIENTATION: RIGHT
ORIENTATION: RIGHT;LEFT

## 2023-02-27 ASSESSMENT — LIFESTYLE VARIABLES
HOW OFTEN DO YOU HAVE A DRINK CONTAINING ALCOHOL: NEVER
HOW MANY STANDARD DRINKS CONTAINING ALCOHOL DO YOU HAVE ON A TYPICAL DAY: PATIENT DOES NOT DRINK

## 2023-02-27 ASSESSMENT — PAIN - FUNCTIONAL ASSESSMENT
PAIN_FUNCTIONAL_ASSESSMENT: PREVENTS OR INTERFERES WITH MANY ACTIVE NOT PASSIVE ACTIVITIES
PAIN_FUNCTIONAL_ASSESSMENT: 0-10

## 2023-02-27 ASSESSMENT — PAIN SCALES - GENERAL
PAINLEVEL_OUTOF10: 4
PAINLEVEL_OUTOF10: 4
PAINLEVEL_OUTOF10: 2
PAINLEVEL_OUTOF10: 10
PAINLEVEL_OUTOF10: 2

## 2023-02-27 ASSESSMENT — PAIN DESCRIPTION - PAIN TYPE: TYPE: ACUTE PAIN

## 2023-02-27 NOTE — TELEPHONE ENCOUNTER
Patient's course reviewed. With increased pain and swelling in the lower extremities but weight that is less than his traditional 190s, unclear to me what etiology is at this point. He has had difficulty with dizziness with higher dosing of diuretics previously. I would ask for clinical evaluation with family medicine/urgent care/ER to assess his feet and ensure there is no signs of infection or venous clot or gout as these are other possibilities before we empirically treat with diuretic and cause harm. This is my best advice with [facility] [place] of [place] at the moment. Please let me know if patient can get in with someone above or if he will be proceeding to ER.   Thank you

## 2023-02-27 NOTE — TELEPHONE ENCOUNTER
Spoke with pt he weighs daily, today he weighs 186 lb. He says edema started Friday, both feet are swollen like a football and are tight. He can not move his feet due to pain. He an not walk due to pain. He says they shaun hurt to touch them. He would like something for the edema and pain.

## 2023-02-27 NOTE — ED PROVIDER NOTES
I independently performed a history and physical on Margaret Roblero. All diagnostic, treatment, and disposition decisions were made by myself in conjunction with the advanced practice provider. I personally saw the patient and performed a substantive portion of the visit including all aspects of the medical decision making. For further details of [facility] emergency department encounter, please see ARNOL Ramon's documentation. Patient is an 59-year-old male presenting today with worsening bilateral foot pain over the last 3 days since Saturday to the point where he can barely walk if at all due to the significant pain. He does report his right foot is much worse than his left. He is having more difficulty moving the right ankle compared to the left ankle. He denies any calf pain or knee pain. No falls or trauma. No fevers or chills. He is on Plavix and Eliquis. He denies any chest pain or shortness of breath. He does have a history of gout but denies any recent flareups. No urinary issues. Due to concern for worsening bilateral foot pain to the point where he can barely walk if at all today, he was brought to the ED by EMS for further evaluation. Physical:   Gen: No acute distress. AOx3.   Psych: Normal mood and affect  HEENT: NCAT  Neck: supple  Cardiac: RRR, pulses 2+ in lower extremities with DPs  Lungs: C2AB, no R/R/W  Abdomen: soft and nontender with no R/D/G  Neuro: no focal neuro deficits with strength and sensation 5/5 in all 4 extremities involving  strength bilaterally and knee extension/flexion, limited strength with right ankle due to pain and decreased ROM, normal left ankle strength, normal strength with great toe dorsiflexion/plantarflexion bilaterally  Skin: Erythema noted to bilateral feet but also extending beyond the right foot towards the ankle on exam, no crepitus on exam, no mottling  MSK: Decreased range of motion of right ankle compared to left ankle but does have some range of motion still intact, normal range of motion of toes and knees bilaterally    The Ekg interpreted by me shows  atrial fibrillation with a rate of 72  Axis is   Normal  QTc is  prolonged QT     ST Segments: no acute change and nonspecific changes  No significant change from prior EKG dated - 1/26/23  No STEMI, improvement in T wave inversions in septal leads today compared to old EKG     I was the primary provider for the patient along with ARNOL Aragon. MDM: Patient was evaluated due to significant bilateral foot pain although worsening with the right ankle over the last 3 days to the point where he can barely walk. He did have decreased range of motion of the ankle and therefore I did consider gout, septic arthritis, toxic synovitis, cellulitis, amongst other pathology. He denies any chest pain or shortness of breath and troponin was slightly elevated but this is more likely from heart failure and this can be trended. Due to having persistent pain and also having inflammatory markers suggesting possibility of septic arthritis, we did start him on broad-spectrum antibiotics. Since he did have concern for cellulitis over the joint, and it is a smaller joint, I do not feel comfortable trying to aspirate at this point and we will try antibiotics and have orthopedics see him along with hospitalist.  He was stable at time of admission and agreed with this plan.                   Chelsea Sanchez MD  02/28/23 0827

## 2023-02-27 NOTE — ED PROVIDER NOTES
[facility]  ED  EMERGENCY DEPARTMENT ENCOUNTER        Pt Name: Suzanne Kuhn  MRN: [id number]  [place] 1940  Date of evaluation: 2/27/2023  Provider: ARNOL Kim  PCP: Emily Bose DO  Note Started: 6:49 PM EST 2/27/23       I have seen and evaluated this patient with my supervising physician Maya Michael MD.      [address] [place] [address]       Chief Complaint   Patient presents with    Leg Pain     Bilateral leg pain began Saturday morning. Pt states, \"I think it's a gout attack\". Bilateral leg swelling noted. Cardiologist sent in for evaluation d/t recent stent placement and medication changes. Pt unsure what medications he takes during the time of triage. HISTORY OF PRESENT ILLNESS: 1 or more Elements     History from : Patient    Limitations to history : None    Suzanne Kuhn is a 80 y.o. male who presents complaining of bilateral leg pain. The patient states he woke up on Saturday with bilateral leg pain. Worse in his feet. He states he has significant difficulty walking. He normally is able to walk about a mile and a half each day with his dogs without any additional assistance. He states since Saturday morning he has barely been able to walk and is using crutches at home. He has associated foot swelling and redness. He states he has a history of gout and thinks that may be symptoms are due to a gout attack. He did recently have stents placed and called his cardiologist today who referred him to the emergency department for evaluation. Swelling does not extend into his ankles. Denies any fevers or chills. No chest pain, shortness of breath or cough. No lightheadedness or dizziness. Nursing Notes were all reviewed and agreed with or any disagreements were addressed in the HPI. REVIEW OF SYSTEMS :      Review of Systems    Positives and Pertinent negatives as per HPI.      SURGICAL HISTORY     Past Surgical History:   Procedure Laterality Date ABDOMINAL AORTIC ANEURYSM REPAIR      ABDOMINAL AORTIC ANEURYSM REPAIR, ENDOVASCULAR  01/28/2011    Endomvascular abdominal aortic aneurysm repair with insertion of cardiomems pressure sensor    APPENDECTOMY      40-50 years ago    CARDIAC SURGERY  01/01/2008    CABG    CHOLECYSTECTOMY, LAPAROSCOPIC  01/01/2006    CORONARY ANGIOPLASTY WITH STENT PLACEMENT  2001 and 2002    CORONARY ARTERY BYPASS GRAFT      DIAGNOSTIC CARDIAC CATH LAB PROCEDURE  01/26/2023    PCI of distal SVG to OM1    GALLBLADDER SURGERY      gallbladder/pancreatitis    HERNIA REPAIR      20+ years ago    INTRACAPSULAR CATARACT EXTRACTION Right 12/06/2018    PHACOEMULSIFICATION OF CATARACT RIGHT  EYE WITH INTRAOCULAR LENS IMPLANT performed by Nigel Acosta MD at [address]      panceastitis Jessee Aba RMVL INSJ IO LENS PROSTH W/O ECP Left 11/13/2018    PHACOEMULSIFICATION OF CATARACT LEFT EYE WITH INTRAOCULAR LENS IMPLANT performed by Nigel Acosta MD at [facility]      as child    UPPER GASTROINTESTINAL ENDOSCOPY N/A 01/27/2023    EGD DIAGNOSTIC ONLY performed by Taty Mckeon MD at [address]       Previous Medications    ACCU-CHEK MULTICLIX LANCETS MISC    Test blood sugar qd    AMLODIPINE (NORVASC) 5 MG TABLET    Take 1 tablet by mouth in the morning and at bedtime    APIXABAN (ELIQUIS) 5 MG TABS TABLET    Take by mouth 2 times daily    ASPIRIN 81 MG CHEWABLE TABLET    Take 1 tablet by mouth daily    ATORVASTATIN (LIPITOR) 40 MG TABLET    TAKE 1 TABLET EVERY NIGHT    CLOPIDOGREL (PLAVIX) 75 MG TABLET    Take 1 tablet by mouth daily    FUROSEMIDE (LASIX) 40 MG TABLET    TAKE 1 TABLET EVERY DAY    GLUCOSE BLOOD VI TEST STRIPS (ACCU-CHEK ION) STRIP    Test blood sugar qd.     OLMESARTAN (BENICAR) 40 MG TABLET    Take 1 tablet by mouth daily    PANTOPRAZOLE (PROTONIX) 40 MG TABLET    Take 1 tablet by mouth 2 times daily (before meals)    POTASSIUM CHLORIDE (KLOR-CON M) 20 MEQ EXTENDED RELEASE TABLET    Take 1 tablet by mouth daily       ALLERGIES     Coumadin [warfarin], Omeprazole, and Vioxx    FAMILYHISTORY       Family History   Problem Relation Age of Onset    Diabetes Mother     Heart Failure Mother     Cancer Mother     Heart Disease Mother     High Blood Pressure Mother     Heart Surgery Father     Heart Disease Father     Heart Failure Father     Heart Attack Father     Stroke Sister     Stroke Brother     Heart Surgery Paternal Uncle     Heart Disease Paternal Uncle     Heart Disease Paternal Uncle     Stroke Sister         SOCIAL HISTORY       Social History     Tobacco Use    Smoking status: Former     Packs/day: 1.50     Years: 25.00     Pack years: 37.50     Types: Cigarettes     Start date: 1955     Quit date: 1978     Years since quittin.1    Smokeless tobacco: Never   Vaping Use    Vaping Use: Never used   Substance Use Topics    Alcohol use: No     Comment: patient drinks coffee/caffeine 2 cups a day     Drug use: No       SCREENINGS        Vlad Coma Scale  Eye Opening: Spontaneous  Best Verbal Response: Oriented  Best Motor Response: Obeys commands  Vlad Coma Scale Score: 15                CIWA Assessment  BP: 123/60  Heart Rate: 59           PHYSICAL EXAM  1 or more Elements     ED Triage Vitals [23 1431]   BP Temp Temp Source Heart Rate Resp SpO2 Height Weight   (!) 153/64 99.2 °F (37.3 °C) Oral 75 16 97 % 5' 9\" (1.753 m) 186 lb (84.4 kg)       Physical Exam  Vitals and nursing note reviewed. Constitutional:       Appearance: Normal appearance. He is not diaphoretic. HENT:      Head: Normocephalic and atraumatic. Nose: Nose normal.      Mouth/Throat:      Mouth: Mucous membranes are moist.   Eyes:      General:         Right eye: No discharge. Left eye: No discharge. Extraocular Movements: Extraocular movements intact. Pupils: Pupils are equal, round, and reactive to light. Cardiovascular:      Rate and Rhythm: Normal rate and regular rhythm. Pulses: Normal pulses. Heart sounds: Normal heart sounds. No murmur heard. No friction rub. No gallop. Pulmonary:      Effort: Pulmonary effort is normal. No respiratory distress. Breath sounds: Normal breath sounds. No stridor. No wheezing, rhonchi or rales. Abdominal:      General: Abdomen is flat. Palpations: Abdomen is soft. Tenderness: There is no abdominal tenderness. There is no guarding or rebound. Musculoskeletal:         General: Normal range of motion. Cervical back: Normal range of motion and neck supple. Feet:      Comments: Redness noted to dorsal aspect of bilateral mid foot. He has significant pain and difficulty with range of motion of the right ankle. His pedal pulses are 2+ and cap refill less than 2 seconds. He is very sensitive to the touch on both feet although right worse than left. No skin abrasions. No crepitus noted. Skin:     General: Skin is warm and dry. Coloration: Skin is not pale. Neurological:      Mental Status: He is alert and oriented to person, place, and time.    Psychiatric:         Mood and Affect: Mood normal.         Behavior: Behavior normal.           DIAGNOSTIC RESULTS   LABS:    Labs Reviewed   CBC WITH AUTO DIFFERENTIAL - Abnormal; Notable for the following components:       Result Value    RBC 3.64 (*)     Hemoglobin 11.2 (*)     Hematocrit 33.2 (*)     Monocytes Absolute 1.4 (*)     All other components within normal limits   COMPREHENSIVE METABOLIC PANEL W/ REFLEX TO MG FOR LOW K - Abnormal; Notable for the following components:    Glucose 137 (*)     BUN 21 (*)     Total Bilirubin 2.7 (*)     All other components within normal limits   TROPONIN - Abnormal; Notable for the following components:    Troponin 0.03 (*)     All other components within normal limits   BRAIN NATRIURETIC PEPTIDE - Abnormal; Notable for the following components: Pro-BNP 4,147 (*)     All other components within normal limits   URIC ACID - Abnormal; Notable for the following components:    Uric Acid, Serum 8.6 (*)     All other components within normal limits   TROPONIN - Abnormal; Notable for the following components:    Troponin 0.03 (*)     All other components within normal limits   C-REACTIVE PROTEIN - Abnormal; Notable for the following components:    .1 (*)     All other components within normal limits   SEDIMENTATION RATE - Abnormal; Notable for the following components:    Sed Rate 46 (*)     All other components within normal limits   PROCALCITONIN - Abnormal; Notable for the following components:    Procalcitonin 0.24 (*)     All other components within normal limits   CULTURE, BLOOD 1   CULTURE, BLOOD 2   CK   LACTIC ACID       When ordered only abnormal lab results are displayed. All other labs were within normal range or not returned as of this dictation. EKG: When ordered, EKG's are interpreted by the Emergency Department Physician in the absence of a cardiologist.  Please see their note for interpretation of EKG. RADIOLOGY:   Non-plain film images such as CT, Ultrasound and MRI are read by the radiologist. Plain radiographic images are visualized and preliminarily interpreted by the ED Provider with the below findings:    No acute bony abnormality in foot. No cardiopulmonary abnormality on chest x-ray. Interpretation per the Radiologist below, if available at the time of this note:    XR FOOT RIGHT (MIN 3 VIEWS)   Final Result   No acute findings. Mild degenerative changes. XR CHEST PORTABLE   Preliminary Result   Airspace changes in the right lung base consistent with known mass. There   appears to be more changes in the right lung field which could represent a   superimposed infiltrate. Follow up to resolution is suggested.            XR FOOT RIGHT (MIN 3 VIEWS)    Result Date: 2/27/2023  EXAMINATION: XRAY VIEWS OF THE RIGHT FOOT 2/27/2023 5:11 pm COMPARISON: None. HISTORY: ORDERING SYSTEM PROVIDED HISTORY: foot pain TECHNOLOGIST PROVIDED HISTORY: Reason for exam:->foot pain Reason for Exam: right foot pain; no known injury; swelling and pain while weight bearing FINDINGS: No fracture or dislocation. Enthesopathic changes at the Achilles tendon insertion. Plantar calcaneal enthesophyte. Vascular calcifications. The interphalangeal and metatarsophalangeal joint spaces are preserved. No acute findings. Mild degenerative changes. XR CHEST PORTABLE    Result Date: 2/27/2023  EXAMINATION: ONE XRAY VIEW OF THE CHEST 2/27/2023 2:55 pm COMPARISON: 01/27/2023 HISTORY: ORDERING SYSTEM PROVIDED HISTORY: leg swelling, sob TECHNOLOGIST PROVIDED HISTORY: Reason for exam:->leg swelling, sob Reason for Exam: bilat leg edema Initial evaluation. FINDINGS: Monitor wires overlie the chest. The patient has had a median sternotomy. The trachea is midline. There is atherosclerotic calcification of the aortic knob. Cardiac silhouette is stable. There is persistent airspace changes in the right lung base consistent with a known mass. Superimposed infiltrates cannot be excluded. The left lung is relatively clear. Airspace changes in the right lung base consistent with known mass. There appears to be more changes in the right lung field which could represent a superimposed infiltrate. Follow up to resolution is suggested. No results found.     PROCEDURES   Unless otherwise noted below, none     Procedures    CRITICAL CARE TIME (.cctime)   None    PAST MEDICAL HISTORY      has a past medical history of Aortic aneurysm, abdominal (1/2011), Appendicitis (12-06-11), Arthritis (12-06-11), Bruises easily, CAD (coronary artery disease) (9/2001), CHF (congestive heart failure) ([facility] [address] Chronic back pain, Chronic pain of both shoulders (46-76-04), Complication of anesthesia, Gout, Heart disease (12-06-11), Hernia (12-), HIGH CHOLESTEROL (12-6-11), Hyperlipidemia, Hypertension, Measles (12-), Pancreatitis (2006), Persistent cough, Ringing in ears, Sinus disorder, and Sleep deprivation (12-). Chronic Conditions affecting Care: See above    EMERGENCY DEPARTMENT COURSE and DIFFERENTIAL DIAGNOSIS/MDM:   Vitals:    Vitals:    02/27/23 1734 02/27/23 1844 02/27/23 2000 02/27/23 2104   BP: (!) 144/62 136/64 (!) 124/96 123/60   Pulse: 65 59 68 59   Resp: 18 18 17 18   Temp:       TempSrc:       SpO2: 94% 100% 90% 90%   Weight:       Height:           Patient was given the following medications:  Medications   vancomycin (VANCOCIN) 2,000 mg in sodium chloride 0.9 % 500 mL IVPB (2,000 mg IntraVENous New Bag 2/27/23 2000)   morphine sulfate (PF) injection 4 mg (4 mg IntraVENous Given 2/27/23 1722)   Dexamethasone Sodium Phosphate injection 8 mg (8 mg IntraVENous Given 2/27/23 1724)   ondansetron (ZOFRAN) injection 4 mg (4 mg IntraVENous Given 2/27/23 1721)   furosemide (LASIX) injection 40 mg (40 mg IntraVENous Given 2/27/23 1724)   cefepime (MAXIPIME) 2,000 mg in sodium chloride 0.9 % 100 mL IVPB (mini-bag) (0 mg IntraVENous Stopped 2/27/23 1953)             Is this patient to be included in the SEP-1 Core Measure due to severe sepsis or septic shock? No   Exclusion criteria - the patient is NOT to be included for SEP-1 Core Measure due to:  2+ SIRS criteria are not met    CONSULTS: (Who and What was discussed)  IP CONSULT TO HOSPITALIST  Discussion with Other Profesionals : Orthopedics, Dr. Dawson Sensor    Social Determinants : None    Records Reviewed : None    CC/HPI Summary, DDx, ED Course, and Reassessment: Patient was evaluated in the emergency department today for bilateral foot pain with associated redness. He has significant difficulty with range of motion of right ankle. Presentation is a little odd. Differential includes gout, septic arthritis, cellulitis, rash, other.     Work-up results include:  CBC without evidence of leukocytosis or acute anemia  CMP without acute electrolyte abnormality maintain renal function. Total bilirubin is elevated 2.7  Troponin is elevated at 0.03 will continue to trend. BNP slightly elevated at 4147  Uric acid is 8.6  ESR is 46  Procalcitonin 0.24  CK is 196  CRP is 192.1  Repeat troponin is 0.03  Lactic acid is 1.4  X-ray of right foot shows no acute bony abnormality. X-ray of the chest with airspace changes in the right lung base consistent with known mass there appears to be more changes in the right lung field which could represent a superimposed infiltrate. EKG is interpreted by attending physician found to A-fib and prolonged QTc. The patient was given morphine for pain with good relief of symptoms however he is still unable to tolerate ambulation. He is also given 40 mg of Lasix for concern of component of fluid overload. Ortho was consulted with concerns of possible septic joint although clinically I do not feel that presentation necessarily fits typical presentation of septic joint. We did start broad-spectrum antibiotics and are planning to admit to the hospitalist.  Ortho can consult on admission. Dr. Bailee Hassan did not have any further recommendations at this time I do not feel patient is a good candidate for ankle aspiration and again clinical picture we will watch for septic joint but has not been ruled out at this point. Patient is in agreement treatment plan.   Hospitalist is consulted at this time    Disposition Considerations (include 1 Tests not done, Shared Decision Making, Pt Expectation of Test or Tx.):       Results for orders placed or performed during the hospital encounter of 02/27/23   CBC with Auto Differential   Result Value Ref Range    WBC 10.0 4.0 - 11.0 K/uL    RBC 3.64 (L) 4.20 - 5.90 M/uL    Hemoglobin 11.2 (L) 13.5 - 17.5 g/dL    Hematocrit 33.2 (L) 40.5 - 52.5 %    MCV 91.3 80.0 - 100.0 fL    MCH 30.7 26.0 - 34.0 pg    MCHC 33.6 31.0 - 36.0 g/dL    RDW 14.7 12.4 - 15.4 %    Platelets 315 783 - 885 K/uL    MPV 7.8 5.0 - 10.5 fL    Neutrophils % 71.3 %    Lymphocytes % 13.6 %    Monocytes % 14.3 %    Eosinophils % 0.2 %    Basophils % 0.6 %    Neutrophils Absolute 7.1 1.7 - 7.7 K/uL    Lymphocytes Absolute 1.4 1.0 - 5.1 K/uL    Monocytes Absolute 1.4 (H) 0.0 - 1.3 K/uL    Eosinophils Absolute 0.0 0.0 - 0.6 K/uL    Basophils Absolute 0.1 0.0 - 0.2 K/uL   CMP w/ Reflex to MG   Result Value Ref Range    Sodium 137 136 - 145 mmol/L    Potassium reflex Magnesium 3.6 3.5 - 5.1 mmol/L    Chloride 99 99 - 110 mmol/L    CO2 27 21 - 32 mmol/L    Anion Gap 11 3 - 16    Glucose 137 (H) 70 - 99 mg/dL    BUN 21 (H) 7 - 20 mg/dL    Creatinine 1.2 0.8 - 1.3 mg/dL    Est, Glom Filt Rate >60 >60    Calcium 9.0 8.3 - 10.6 mg/dL    Total Protein 6.6 6.4 - 8.2 g/dL    Albumin 3.7 3.4 - 5.0 g/dL    Albumin/Globulin Ratio 1.3 1.1 - 2.2    Total Bilirubin 2.7 (H) 0.0 - 1.0 mg/dL    Alkaline Phosphatase 53 40 - 129 U/L    ALT 12 10 - 40 U/L    AST 17 15 - 37 U/L   Troponin   Result Value Ref Range    Troponin 0.03 (H) <0.01 ng/mL   Brain Natriuretic Peptide   Result Value Ref Range    Pro-BNP 4,147 (H) 0 - 449 pg/mL   Uric Acid   Result Value Ref Range    Uric Acid, Serum 8.6 (H) 3.5 - 7.2 mg/dL   Troponin   Result Value Ref Range    Troponin 0.03 (H) <0.01 ng/mL   C-Reactive Protein   Result Value Ref Range    .1 (H) 0.0 - 5.1 mg/L   CK   Result Value Ref Range    Total  39 - 308 U/L   Sedimentation Rate   Result Value Ref Range    Sed Rate 46 (H) 0 - 20 mm/Hr   Procalcitonin   Result Value Ref Range    Procalcitonin 0.24 (H) 0.00 - 0.15 ng/mL   Lactic Acid   Result Value Ref Range    Lactic Acid 1.4 0.4 - 2.0 mmol/L   EKG 12 Lead   Result Value Ref Range    Ventricular Rate 72 BPM    Atrial Rate 71 BPM    QRS Duration 86 ms    Q-T Interval 458 ms    QTc Calculation (Bazett) 501 ms    R Axis -9 degrees    T Axis 68 degrees    Diagnosis       Atrial fibrillationNonspecific ST and T wave abnormalityProlonged QTAbnormal ECGWhen compared with ECG of 26-JAN-2023 16:37,Nonspecific T wave abnormality, worse in Lateral leadsConfirmed by Lb Mckeon ([id number]) on 2/27/2023 5:47:49 PM       I spoke with Dr. Hazel Guzman hospitalist. We discussed the history, physical exam, diagnostics, as well as their emergency department course. Based upon that discussion, we've decided to admit Marce Joyce for further observation and evaluation of Elise Crow's   1. Cellulitis of right lower extremity    2. Difficulty in walking    . I am the Primary Clinician of Record. FINAL IMPRESSION      1. Cellulitis of right lower extremity    2. Difficulty in walking          DISPOSITION/PLAN     DISPOSITION Decision To Admit 02/27/2023 07:40:35 PM      PATIENT REFERRED TO:  No follow-up provider specified.     DISCHARGE MEDICATIONS:  New Prescriptions    No medications on file       DISCONTINUED MEDICATIONS:  Discontinued Medications    No medications on file              (Please note that portions of this note were completed with a voice recognition program.  Efforts were made to edit the dictations but occasionally words are mis-transcribed.)    ARNOL Cuello (electronically signed)           ARNOL Cuello  02/27/23 0766

## 2023-02-27 NOTE — TELEPHONE ENCOUNTER
Spoke with pt relayed [facility] message. Pt v/u, he will head to the ER. Will send to [facility] as an FYI.

## 2023-02-27 NOTE — TELEPHONE ENCOUNTER
Pt stated that since 02/24/2023 his feet are very swollen and painful. Pt stated that he can't walk or touch his feet due to pain. Pt would like to know what [facility] would recommend to help with the swelling and pain? Please advise.

## 2023-02-28 PROBLEM — M10.9 ACUTE GOUT OF FOOT: Status: ACTIVE | Noted: 2023-02-27

## 2023-02-28 PROBLEM — M02.30 REACTIVE ARTHRITIS (HCC): Status: ACTIVE | Noted: 2023-02-27

## 2023-02-28 LAB
ANION GAP SERPL CALCULATED.3IONS-SCNC: 11 MMOL/L (ref 3–16)
BUN BLDV-MCNC: 28 MG/DL (ref 7–20)
CALCIUM SERPL-MCNC: 8.6 MG/DL (ref 8.3–10.6)
CHLORIDE BLD-SCNC: 98 MMOL/L (ref 99–110)
CHOLESTEROL, TOTAL: 87 MG/DL (ref 0–199)
CO2: 25 MMOL/L (ref 21–32)
CREAT SERPL-MCNC: 1.3 MG/DL (ref 0.8–1.3)
EKG ATRIAL RATE: 241 BPM
EKG DIAGNOSIS: NORMAL
EKG Q-T INTERVAL: 526 MS
EKG QRS DURATION: 86 MS
EKG QTC CALCULATION (BAZETT): 507 MS
EKG R AXIS: -21 DEGREES
EKG T AXIS: 198 DEGREES
EKG VENTRICULAR RATE: 56 BPM
GFR SERPL CREATININE-BSD FRML MDRD: 55 ML/MIN/{1.73_M2}
GLUCOSE BLD-MCNC: 211 MG/DL (ref 70–99)
GLUCOSE BLD-MCNC: 218 MG/DL (ref 70–99)
GLUCOSE BLD-MCNC: 230 MG/DL (ref 70–99)
GLUCOSE BLD-MCNC: 240 MG/DL (ref 70–99)
GLUCOSE BLD-MCNC: 246 MG/DL (ref 70–99)
HDLC SERPL-MCNC: 38 MG/DL (ref 40–60)
LDL CHOLESTEROL CALCULATED: 41 MG/DL
MAGNESIUM: 1.8 MG/DL (ref 1.8–2.4)
PERFORMED ON: ABNORMAL
POTASSIUM SERPL-SCNC: 3.7 MMOL/L (ref 3.5–5.1)
SODIUM BLD-SCNC: 134 MMOL/L (ref 136–145)
TRIGL SERPL-MCNC: 41 MG/DL (ref 0–150)
TROPONIN: 0.02 NG/ML
VLDLC SERPL CALC-MCNC: 8 MG/DL

## 2023-02-28 PROCEDURE — 2580000003 HC RX 258: Performed by: NURSE PRACTITIONER

## 2023-02-28 PROCEDURE — 6370000000 HC RX 637 (ALT 250 FOR IP): Performed by: INTERNAL MEDICINE

## 2023-02-28 PROCEDURE — 83735 ASSAY OF MAGNESIUM: CPT

## 2023-02-28 PROCEDURE — 36415 COLL VENOUS BLD VENIPUNCTURE: CPT

## 2023-02-28 PROCEDURE — 84484 ASSAY OF TROPONIN QUANT: CPT

## 2023-02-28 PROCEDURE — 93005 ELECTROCARDIOGRAM TRACING: CPT | Performed by: INTERNAL MEDICINE

## 2023-02-28 PROCEDURE — 80048 BASIC METABOLIC PNL TOTAL CA: CPT

## 2023-02-28 PROCEDURE — 1200000000 HC SEMI PRIVATE

## 2023-02-28 PROCEDURE — 80061 LIPID PANEL: CPT

## 2023-02-28 PROCEDURE — 6370000000 HC RX 637 (ALT 250 FOR IP): Performed by: NURSE PRACTITIONER

## 2023-02-28 RX ORDER — PREDNISONE 20 MG/1
40 TABLET ORAL ONCE
Status: COMPLETED | OUTPATIENT
Start: 2023-02-28 | End: 2023-02-28

## 2023-02-28 RX ORDER — COLCHICINE 0.6 MG/1
0.6 TABLET ORAL DAILY
Status: DISCONTINUED | OUTPATIENT
Start: 2023-02-28 | End: 2023-03-01 | Stop reason: HOSPADM

## 2023-02-28 RX ORDER — FUROSEMIDE 40 MG/1
40 TABLET ORAL DAILY
Status: DISCONTINUED | OUTPATIENT
Start: 2023-02-28 | End: 2023-03-01 | Stop reason: HOSPADM

## 2023-02-28 RX ORDER — INSULIN LISPRO 100 [IU]/ML
0-8 INJECTION, SOLUTION INTRAVENOUS; SUBCUTANEOUS
Status: DISCONTINUED | OUTPATIENT
Start: 2023-02-28 | End: 2023-03-01 | Stop reason: HOSPADM

## 2023-02-28 RX ORDER — INSULIN LISPRO 100 [IU]/ML
0-4 INJECTION, SOLUTION INTRAVENOUS; SUBCUTANEOUS NIGHTLY
Status: DISCONTINUED | OUTPATIENT
Start: 2023-02-28 | End: 2023-03-01 | Stop reason: HOSPADM

## 2023-02-28 RX ADMIN — PANTOPRAZOLE SODIUM 40 MG: 40 TABLET, DELAYED RELEASE ORAL at 05:07

## 2023-02-28 RX ADMIN — APIXABAN 5 MG: 5 TABLET, FILM COATED ORAL at 21:27

## 2023-02-28 RX ADMIN — PREDNISONE 40 MG: 20 TABLET ORAL at 16:17

## 2023-02-28 RX ADMIN — PANTOPRAZOLE SODIUM 40 MG: 40 TABLET, DELAYED RELEASE ORAL at 16:17

## 2023-02-28 RX ADMIN — CLOPIDOGREL BISULFATE 75 MG: 75 TABLET ORAL at 09:02

## 2023-02-28 RX ADMIN — COLCHICINE 0.6 MG: 0.6 TABLET, FILM COATED ORAL at 16:21

## 2023-02-28 RX ADMIN — INSULIN LISPRO 1 UNITS: 100 INJECTION, SOLUTION INTRAVENOUS; SUBCUTANEOUS at 12:49

## 2023-02-28 RX ADMIN — SODIUM CHLORIDE, PRESERVATIVE FREE 10 ML: 5 INJECTION INTRAVENOUS at 21:27

## 2023-02-28 RX ADMIN — SODIUM CHLORIDE, PRESERVATIVE FREE 10 ML: 5 INJECTION INTRAVENOUS at 09:02

## 2023-02-28 RX ADMIN — APIXABAN 5 MG: 5 TABLET, FILM COATED ORAL at 09:02

## 2023-02-28 RX ADMIN — POTASSIUM CHLORIDE 20 MEQ: 1500 TABLET, EXTENDED RELEASE ORAL at 09:02

## 2023-02-28 RX ADMIN — AMLODIPINE BESYLATE 5 MG: 5 TABLET ORAL at 21:27

## 2023-02-28 RX ADMIN — INSULIN LISPRO 1 UNITS: 100 INJECTION, SOLUTION INTRAVENOUS; SUBCUTANEOUS at 09:00

## 2023-02-28 RX ADMIN — FUROSEMIDE 40 MG: 40 TABLET ORAL at 09:02

## 2023-02-28 RX ADMIN — AMLODIPINE BESYLATE 5 MG: 5 TABLET ORAL at 09:02

## 2023-02-28 RX ADMIN — INSULIN LISPRO 2 UNITS: 100 INJECTION, SOLUTION INTRAVENOUS; SUBCUTANEOUS at 16:17

## 2023-02-28 ASSESSMENT — PAIN SCALES - GENERAL
PAINLEVEL_OUTOF10: 0

## 2023-02-28 NOTE — PLAN OF CARE
Problem: Discharge Planning  Goal: Discharge to home or other facility with appropriate resources  Outcome: Progressing  Flowsheets (Taken 2/28/2023 0047)  Discharge to home or other facility with appropriate resources:   Identify barriers to discharge with patient and caregiver   Arrange for needed discharge resources and transportation as appropriate     Problem: Pain  Goal: Verbalizes/displays adequate comfort level or baseline comfort level  Outcome: Progressing  Flowsheets (Taken 2/28/2023 0047)  Verbalizes/displays adequate comfort level or baseline comfort level:   Encourage patient to monitor pain and request assistance   Assess pain using appropriate pain scale   Administer analgesics based on type and severity of pain and evaluate response   Implement non-pharmacological measures as appropriate and evaluate response     Problem: Safety - Adult  Goal: Free from fall injury  Outcome: Progressing  Flowsheets (Taken 2/28/2023 0047)  Free From Fall Injury: Instruct family/caregiver on patient safety

## 2023-02-28 NOTE — PLAN OF CARE
Problem: Chronic Conditions and Co-morbidities  Goal: Patient's chronic conditions and co-morbidity symptoms are monitored and maintained or improved  Outcome: Progressing  Note:   Patient's EF (Ejection Fraction) is greater than 40%    Heart Failure Medications:  Diuretics[de-identified] Furosemide    (One of the following REQUIRED for EF </= 40%/SYSTOLIC FAILURE but MAY be used in EF% >40%/DIASTOLIC FAILURE)        ACE[de-identified] None        ARB[de-identified] None         ARNI[de-identified] None    (Beta Blockers)  NON- Evidenced Based Beta Blocker (for EF% >40%/DIASTOLIC FAILURE): None    Evidenced Based Beta Blocker::(REQUIRED for EF% <40%/SYSTOLIC FAILURE) None  . .................................................................................................................................................. Patient's weights and intake/output reviewed: Yes    Patient's Last Weight: 83.8  kg obtained by standing scale. Admit weight      Intake/Output Summary (Last 24 hours) at 2/28/2023 0545  Last data filed at 2/28/2023 0500  Gross per 24 hour   Intake 360 ml   Output 725 ml   Net -365 ml       Education Booklet Provided: yes    Comorbidities Reviewed Yes    Patient has a past medical history of Aortic aneurysm, abdominal, Appendicitis, Arthritis, Bruises easily, CAD (coronary artery disease), CHF (congestive heart failure) ([address] Chronic back pain, Chronic pain of both shoulders, Complication of anesthesia, Gout, Heart disease, Hernia, HIGH CHOLESTEROL, Hyperlipidemia, Hypertension, Measles, Pancreatitis, Persistent cough, Ringing in ears, Sinus disorder, and Sleep deprivation. >>For CHF and Comorbidity documentation on Education Time and Topics, please see Education Tab    Progressive Mobility Assessment:  What is this patient's Current Level of Mobility?: Ambulatory- with Assistance  How was this patient Mobilized today?: Edge of Bed, ambulated 0 ft                 With Whom?  Nurse                 Level of Difficulty/Assistance: Stand-by assist    Pt resting in bed at this time on room air. Pt denies shortness of breath. Pt with trace lower extremity edema.      Patient and/or Family's stated Goal of Care this Admission: increase activity tolerance, be more comfortable, and reduce lower extremity edema prior to discharge

## 2023-02-28 NOTE — PLAN OF CARE
Problem: Chronic Conditions and Co-morbidities  Goal: Patient's chronic conditions and co-morbidity symptoms are monitored and maintained or improved  Outcome: Progressing  Note:   Patient's EF (Ejection Fraction) is greater than 40%    Heart Failure Medications:  Diuretics[de-identified] Furosemide    (One of the following REQUIRED for EF </= 40%/SYSTOLIC FAILURE but MAY be used in EF% >40%/DIASTOLIC FAILURE)        ACE[de-identified] None        ARB[de-identified] None         ARNI[de-identified] None    (Beta Blockers)  NON- Evidenced Based Beta Blocker (for EF% >40%/DIASTOLIC FAILURE): None    Evidenced Based Beta Blocker::(REQUIRED for EF% <40%/SYSTOLIC FAILURE) None  . .................................................................................................................................................. Patient's weights and intake/output reviewed: Yes    Patient's Last Weight: 83.8  kg obtained by standing scale. Admit weight      Intake/Output Summary (Last 24 hours) at 2/28/2023 0545  Last data filed at 2/28/2023 0500  Gross per 24 hour   Intake 360 ml   Output 725 ml   Net -365 ml       Education Booklet Provided: yes    Comorbidities Reviewed Yes    Patient has a past medical history of Aortic aneurysm, abdominal, Appendicitis, Arthritis, Bruises easily, CAD (coronary artery disease), CHF (congestive heart failure) ([address] Chronic back pain, Chronic pain of both shoulders, Complication of anesthesia, Gout, Heart disease, Hernia, HIGH CHOLESTEROL, Hyperlipidemia, Hypertension, Measles, Pancreatitis, Persistent cough, Ringing in ears, Sinus disorder, and Sleep deprivation. >>For CHF and Comorbidity documentation on Education Time and Topics, please see Education Tab    Progressive Mobility Assessment:  What is this patient's Current Level of Mobility?: Ambulatory- with Assistance  How was this patient Mobilized today?: Edge of Bed, ambulated 0 ft                 With Whom?  Nurse                 Level of Difficulty/Assistance: 1x Assist     Pt resting in bed at this time on room air. Pt denies shortness of breath. Pt with nonpitting lower extremity edema.      Patient and/or Family's stated Goal of Care this Admission: increase activity tolerance, be more comfortable, and reduce lower extremity edema prior to discharge    Total of ~10 minutes spent educating/ going over booklet on CHF

## 2023-02-28 NOTE — DISCHARGE INSTRUCTIONS
Heart Failure Resources:    Heart Failure Interactive Workbook:   Go to www.[employer].com/aha-heartfailure for a Free Heart Failure Interactive Workbook provided by [employer]. This interactive workbook will provide information on Healthier Living with Heart Failure. Please copy and paste link into search bar. Use your mouse to scroll through the pages. HF [place] bhupinder:   Heart Failure Free smart phone bhupinder available for iPhone and Android download. Use your phone to track sodium intake, fluid intake, symptoms, and weight. Low Sodium Diet:  Go to www. [employer]. org website for [employer] which is Low Sodium! [employer] is a dialysis company, but this website offers free seasonal cookbooks. Each quarter, they will release 25-30 new recipes with a breakdown of calories, sodium, and glucose. Recipes:   Go to [email or web address] website for free recipes. Patient need to have repeat BMP in one week after discharge. This order has been placed. We will provide another monitor patch to continue cardiac event monitor for one more week. .     Patient is scheduled for follow ups-       FOLLOW-UP APPOINTMENTS    Follow-up appointment on 3/22/2023 at 10:00 AM with Monique Das, [address]: [phone]. If you are unable to make this appointment, please call to reschedule [phone]. Please arrive 15 minutes early to complete necessary paperwork. Directions to [address]. [address] [facility] exit. Right off exit. Cross over [employer]. Right on [address] Left into hospital. Follow the signs to the emergency room ( turn left toward the Emergency room). Go right at the first stop sign. Just past the Emergency room at the second stop sign turn right and go up the ramp and park on the top level if possible. Go in the glass doors of the [facility] we on the top level of the garage [address].   As soon as you get in the door turn left and our office is the one with the glass doors. FOLLOW-UP APPOINTMENTS    [place] OFFICE - Follow-up appointment on 3/31/2023 at 10:00 AM with Dr. Carolee Loaiza, [facility]. You and your one visitor will need to have your mouth and nose covered with a mask. No children please. [address] [place] [address] office. [address] [place] [address], [address] [place] [address], [facility]. Office #: [phone]. If you are unable to make this appointment, please call to reschedule.

## 2023-02-28 NOTE — PROGRESS NOTES
Hospitalist Progress Note    CC: Reactive arthritis [facility])    Hospital course:  79yo WM with PMHx sig for CAD, CHF chronic systolic, afib, GI Bleed who had  Sudden onset of pain and swelling in bilateral feet and unable to stand on feet since Saturday. Had to call 91 to get to hospital since he could not even drive. Pt was given a dose of decadron and dramatically his leg swelling improved and pain almost completely resolved. He denies any bug bites, but he was walking in wooded area. Denies any rashes except his feet were red and swollen like sausages. He said the pain was much worse than gout. He never complained of shortness of breath. Admit date: 2/27/2023  Days in hospital:  1  Status:  Inpatient       24 Hour Events: pt feels dramatically better after IV steroids    Subjective: glucose increased, but leg and foot swelling mostly resolved - pt had elevated CRP, uric acid, ESR - denies any tick bites - just had medications adjusted by Dr. Giovani Aguero - overall was doing well until sudden onset of bilateral severe pain in both feet    ROS:   A comprehensive review of systems was negative except for: bilateral foot pain and swelling improved      Objective:    /64   Pulse 60   Temp 98.2 °F (36.8 °C) (Oral)   Resp 18   Ht 5' 9\" (1.753 m)   Wt 184 lb 12.8 oz (83.8 kg)   SpO2 94%   BMI 27.29 kg/m²     Gen: NAD  HEENT: NC/AT, moist mucous membranes, no oropharyngeal erythema or exudate  Neck: supple, trachea midline, no anterior cervical or SC LAD  Heart:  Normal s1/s2, RRR, no murmurs, gallops, or rubs.  Trace edema in R foot, especially R ankle  Lungs:  mostly clear bilaterally, no wheeze, no rales, no rhonchi, no crackles, no use of accessory muscles  Abd: bowel sounds present, soft, nontender, nondistended, no masses  Extrem:  No clubbing, cyanosis,  trace  edema in R foot ankle, L swelling resolved  Skin: no rashes or lesions  Psych: A & O x3  Neuro: grossly intact, moves all four extremities. Assessment:    Principal Problem:    Reactive arthritis (HCC)  Active Problems:    Hypertension    Atrial fibrillation (HCC)    CAD in native artery    Acute systolic HF (heart failure) (HCC)    DM (diabetes mellitus) ([facility] [address]  Resolved Problems:    * No resolved hospital problems. *      Plan:   Gout vs some other type of inflammatory process in bilateral feet improved with steroid dose - elevated lactic acid, elevated CRP And ESR - swelling, erythema and edema much improved - will try oral prednisone and colchicine - anticipate home tomorrow if improved - he denies any bug bites, but it seems almost like a reactive arthritis - I do not appreciate any rashes and he does not note any tick bites  HTN - reasonable control  Chronic systolic CHF - no acute exacerbation - will have cardiology review medications  DMII - uncontrolled due to steroid dose = should improve - if we send home with steroids will add low dose glipizide for duration    Prognosis:  Good    Code status:  Full code    DVT prophylaxis: NOAC/Warfarin1  GI prophylaxis: Proton Pump Inhibitor  Antibiotic prophylaxis indicated:  No     Diet:  ADULT DIET; Regular; No Added Salt (3-4 gm)    Disposition:  Home in 1 day(s).   Appropriate for A1 discharge unit:  No    Medications:  Scheduled Meds:   furosemide  40 mg Oral Daily    colchicine  0.6 mg Oral Daily    insulin lispro  0-8 Units SubCUTAneous TID WC    insulin lispro  0-4 Units SubCUTAneous Nightly    amLODIPine  5 mg Oral BID    apixaban  5 mg Oral BID    [Held by provider] atorvastatin  40 mg Oral Nightly    clopidogrel  75 mg Oral Daily    pantoprazole  40 mg Oral BID AC    potassium chloride  20 mEq Oral Daily    sodium chloride flush  5-40 mL IntraVENous 2 times per day       PRN Meds:  sodium chloride flush, sodium chloride, polyethylene glycol, acetaminophen **OR** acetaminophen, potassium chloride **OR** potassium alternative oral replacement **OR** potassium chloride, magnesium sulfate, glucose, dextrose bolus **OR** dextrose bolus, glucagon (rDNA), dextrose, oxyCODONE-acetaminophen    IV:   sodium chloride      dextrose           Intake/Output Summary (Last 24 hours) at 2/28/2023 2226  Last data filed at 2/28/2023 2206  Gross per 24 hour   Intake 960 ml   Output 725 ml   Net 235 ml       Results:  CBC:   Recent Labs     02/27/23  1506   WBC 10.0   HGB 11.2*   HCT 33.2*   MCV 91.3        BMP:   Recent Labs     02/27/23  1506 02/28/23  0410    134*   K 3.6 3.7   CL 99 98*   CO2 27 25   BUN 21* 28*   CREATININE 1.2 1.3     Mag: No results for input(s): MAG in the last 72 hours. Phos:   Lab Results   Component Value Date    PHOS 2.7 09/10/2022     No results found for: GLU    LIVER PROFILE:   Recent Labs     02/27/23  1506   AST 17   ALT 12   BILITOT 2.7*   ALKPHOS 53     PT/INR: No results for input(s): PROTIME, INR in the last 72 hours. APTT: No results for input(s): APTT in the last 72 hours. UA:No results for input(s): NITRITE, COLORU, PHUR, LABCAST, WBCUA, RBCUA, MUCUS, TRICHOMONAS, YEAST, BACTERIA, CLARITYU, SPECGRAV, LEUKOCYTESUR, UROBILINOGEN, BILIRUBINUR, BLOODU, GLUCOSEU, AMORPHOUS in the last 72 hours.     Invalid input(s): Mauro Rodarte input(s): ABG  Lab Results   Component Value Date    CALCIUM 8.6 02/28/2023    PHOS 2.7 09/10/2022                 Electronically signed by Stuart Calvert MD on 2/28/2023 at 10:26 PM

## 2023-02-28 NOTE — ED NOTES
Blood culture set#1 from angio  Bottle tops scrubbed with alcohol pads. Site prepped with [employer] swab, 15 seconds per side, and allowed to dry for 30 seconds prior to venipuncture. Red waste tube drawn prior to collection of specimen.          Cheryl Ponce RN  02/27/23 9512

## 2023-02-28 NOTE — ED NOTES
Pt unable to ambulate at this time. Pt states, \" I have had excruciating pain in my feet to the point that I can't stand it\". Pt appeared extremely uncomfortable during xray. Thea AMBROSE aware and okay with not ambulating patient at this time.       Be Wise RN  02/27/23 1949

## 2023-02-28 NOTE — CARE COORDINATION
CASE MANAGEMENT INITIAL ASSESSMENT      Reviewed chart and completed assessment with patient:Pt  Family present: None  Explained Case Management role/services. Yes    Primary contact information:Pt's wife JOVAN [facility] Decision Maker :   Primary Decision Maker: Soraida Johnson - Spouse - [phone]        Admit date/status:2/27/23   Diagnosis:Feet pain and swelling    Is this a Readmission?:  Yes    Readmission Screening completed?: Yes     Insurance:[employer]    Precert required for SNF: Yes       3 night stay required: No    Living arrangements, Adls, care needs, prior to admission:Pt lives at home with his wife. [address] [place] [address] at home:  Walker__Cane_X_RTS__ BSC__Shower Chair__  02__ HHN__ CPAP__  BiPap__  Hospital Bed__ W/C___ Other_____    Services in the home and/or outpatient, prior to admission:None     Current PCP:Foreign Gonzalez, DO                 Medications: Prescription coverage? Yes Will pt require financial assistance with medications No     Transportation needs: Family will transport      Dialysis Facility (if applicable) NA   Name:  Address:  Dialysis Schedule:  Phone:  Fax:    PT/OT recs:NA    Hospital Exemption Notification (HEN):NA    Barriers to discharge:None    Plan/comments:2/28/23 Pt denies any discharge needs. Pt IPTA, still walks a mile a day, here for bilateral feet swelling, gout,CHF. CM does not anticipate any discharge needs at this time. Pt checks BP and weight at home and also keeps a detailed list of medications. Please notify Discharge Planner should any needs arise.        ECOC on chart for MD signature

## 2023-02-28 NOTE — H&P
[facility] Medicine History & Physical      PCP: Jade Trevino DO    Date of Admission: 2/27/2023    Date of Service: Pt seen/examined on 2/27/2023 and Admitted to Inpatient with expected LOS greater than two midnights due to medical therapy. Chief Complaint:  bilateral leg pain and swelling    History Of Present Illness:      80 y.o. male, with PMH of CHF,  HTN, CAD, atrial fibrillation and DM, who presented to [facility] with bilateral leg pain and swelling. History obtained from the patient and review of EMR. The patient stated that he walks 1.5 miles every day. He stated on Saturday morning he woke up and had some difficulty walking due to his feet hurting. He stated he has 2 dogs that he walks and was unable to do so because the pain was so bad. The patient stated he does have a history of gout and that is what his pain felt like, but he did not notice any \"gouty swelling\". He stated the bottom of his feet felt like he was \"walking on a bed of cold with pins-and-needles\". The patient stated prior to going to the emergency room this evening his left right foot was much more swollen than his left. However, since being in the hospital his right foot is now greater than his left. The patient stated for the last 2 days he has been using crutches to get around the house. However, due to the swelling in his feet he was concerned it may be a cardiac issue as well. He stated he did recently have some stents placed so he called his cardiologist today which referred him to the emergency room. In the emergency room a chest x-ray was obtained that revealed airspace changes in the right lung base consistent with known mass. Appearance to be more changes in the right lung field which could represent a superimposed infiltrate. The patient's proBNP was 4000. And he was given 20 mg IV Lasix. However, the patient denies any shortness of breath and/or chest pain.   He strictly complains of his feet hurting. Per EMR, the emergency room was concerned for cellulitis, which is unlikely due to it being bilateral and there is no redness present. The patient did have an elevated CRP and procalcitonin. Therefore he was given cefepime and vancomycin in the emergency room. This was not continued. He was also given morphine for the pain. The patient was admitted for further evaluation and treatment. He denied any other associated symptoms as well as any aggravating and/or alleviating factors. At the time of this assessment, the patient was resting comfortably in bed. He currently denies any chest pain, back pain, abdominal pain, shortness of breath, numbness, tingling, N/V/C/D, fever and/or chills.      Past Medical History:          Diagnosis Date    Aortic aneurysm, abdominal 1/2011    Appendicitis 12-06-11    Arthritis 12-06-11    Bruises easily     CAD (coronary artery disease) 9/2001    MI     CHF (congestive heart failure) (HCC)     Following CABG    Chronic back pain     Chronic pain of both shoulders 12-06-11    joint - the shoulders hurt    Complication of anesthesia     woke up during appendectomy    Gout     Heart disease 12-06-11    Hernia 12-    HIGH CHOLESTEROL 12-6-11    Hyperlipidemia     Hypertension     Measles 12-    Pancreatitis 2006    History of     Persistent cough     Ringing in ears     Sinus disorder     Sleep deprivation 12-    loss of sleep     Past Surgical History:          Procedure Laterality Date    ABDOMINAL AORTIC ANEURYSM REPAIR      ABDOMINAL AORTIC ANEURYSM REPAIR, ENDOVASCULAR  01/28/2011    Endomvascular abdominal aortic aneurysm repair with insertion of cardiomems pressure sensor    APPENDECTOMY      40-50 years ago    CARDIAC SURGERY  01/01/2008    CABG    CHOLECYSTECTOMY, LAPAROSCOPIC  01/01/2006    CORONARY ANGIOPLASTY WITH STENT PLACEMENT  2001 and 2002    CORONARY ARTERY BYPASS GRAFT      DIAGNOSTIC CARDIAC CATH LAB PROCEDURE  01/26/2023    PCI of distal SVG to OM1    GALLBLADDER SURGERY      gallbladder/pancreatitis    HERNIA REPAIR      20+ years ago    INTRACAPSULAR CATARACT EXTRACTION Right 12/06/2018    PHACOEMULSIFICATION OF CATARACT RIGHT  EYE WITH INTRAOCULAR LENS IMPLANT performed by Lorenzo Love MD at [address]      panceastitis Shawna Nabor    MT XCAPSL [id number] W/O ECP Left 11/13/2018    PHACOEMULSIFICATION OF CATARACT LEFT EYE WITH INTRAOCULAR LENS IMPLANT performed by Lorenzo Love MD at [facility]      as child    UPPER GASTROINTESTINAL ENDOSCOPY N/A 01/27/2023    EGD DIAGNOSTIC ONLY performed by Dulce Nina MD at [address] [place] [address]     Medications Prior to Admission:      Prior to Admission medications    Medication Sig Start Date End Date Taking?  Authorizing Provider   apixaban (ELIQUIS) 5 MG TABS tablet Take by mouth 2 times daily   Yes Historical Provider, MD   amLODIPine (NORVASC) 5 MG tablet Take 1 tablet by mouth in the morning and at bedtime 2/17/23   Jose Dawson MD   potassium chloride (KLOR-CON M) 20 MEQ extended release tablet Take 1 tablet by mouth daily 2/7/23   RUPERTO Clay CNP   clopidogrel (PLAVIX) 75 MG tablet Take 1 tablet by mouth daily 2/2/23   RUPERTO Clay CNP   pantoprazole (PROTONIX) 40 MG tablet Take 1 tablet by mouth 2 times daily (before meals) 2/1/23   RUPERTO Clay CNP   furosemide (LASIX) 40 MG tablet TAKE 1 TABLET EVERY DAY 12/24/22   Sara Mendoza DO   atorvastatin (LIPITOR) 40 MG tablet TAKE 1 TABLET EVERY NIGHT 12/22/22   Jose Dawson MD   olmesartan (BENICAR) 40 MG tablet Take 1 tablet by mouth daily 9/30/22   Jose Dawson MD   aspirin 81 MG chewable tablet Take 1 tablet by mouth daily  Patient not taking: Reported on 2/27/2023 9/12/22   RUPERTO So CNP   metoprolol succinate (TOPROL XL) 50 MG extended release tablet TAKE 1 TABLET EVERY DAY 2/8/22 9/11/22  Braden Burns Bernardo Hardy, DO   ACCU-CHEK MULTICLIX LANCETS MISC Test blood sugar qd  Patient taking differently: Test blood sugar qd    AS NEEDED 3/1/18   Gerard Mays DO   glucose blood VI test strips (ACCU-CHEK ION) strip Test blood sugar qd. 3/1/18   Gerard Mays DO     Allergies:  Coumadin [warfarin], Omeprazole, and Vioxx    Social History:      The patient currently lives at home    TOBACCO:   reports that he quit smoking about 45 years ago. His smoking use included cigarettes. He started smoking about 68 years ago. He has a 37.50 pack-year smoking history. He has never used smokeless tobacco.  ETOH:   reports no history of alcohol use. E-cigarette/Vaping       Questions Responses    E-cigarette/Vaping Use Never User    Start Date     Passive Exposure     Quit Date     Counseling Given     Comments           Family History:      Reviewed and negative in regards to presenting illness/complaint. Problem Relation Age of Onset    Diabetes Mother     Heart Failure Mother     Cancer Mother     Heart Disease Mother     High Blood Pressure Mother     Heart Surgery Father     Heart Disease Father     Heart Failure Father     Heart Attack Father     Stroke Sister     Stroke Brother     Heart Surgery Paternal Uncle     Heart Disease Paternal Uncle     Heart Disease Paternal Uncle     Stroke Sister      REVIEW OF SYSTEMS COMPLETED:   Pertinent positives as noted in the HPI. All other systems reviewed and negative. PHYSICAL EXAM PERFORMED:    BP (!) 124/96   Pulse 68   Temp 99.2 °F (37.3 °C) (Oral)   Resp 17   Ht 5' 9\" (1.753 m)   Wt 186 lb (84.4 kg)   SpO2 90%   BMI 27.47 kg/m²     General appearance:  Pleasant, elderly male in no apparent distress, appears stated age and cooperative. HEENT:Pupils equal, round, and reactive to light. Wears glasses  Extra ocular muscles intact. Conjunctivae/corneas clear. Neck: Supple, with full range of motion. No jugular venous distention.  Trachea midline. Respiratory:  Normal respiratory effort. Clear to auscultation, bilaterally without Rales/Wheezes/Rhonchi. Cardiovascular:  Regular rate and rhythm with normal S1/S2 without murmurs, rubs or gallops. Abdomen: Soft, non-tender, non-distended with normal bowel sounds. Musculoskeletal:  No clubbing, cyanosis or edema bilaterally. Full range of motion without deformity. Skin: Skin color, texture, turgor normal.  No significant rashes or lesions. Bilateral foot pain, +1 edema L > R  Neurologic:  Neurovascularly intact. Cranial nerves: II-XII intact, grossly non-focal.  Psychiatric:  Alert and oriented, thought content appropriate, normal insight  Capillary Refill: Brisk,3 seconds, normal  Peripheral Pulses: +2 palpable, equal bilaterally     Labs:     Recent Labs     02/27/23  1506   WBC 10.0   HGB 11.2*   HCT 33.2*        Recent Labs     02/27/23  1506      K 3.6   CL 99   CO2 27   BUN 21*   CREATININE 1.2   CALCIUM 9.0     Recent Labs     02/27/23  1506   AST 17   ALT 12   BILITOT 2.7*   ALKPHOS 53     Recent Labs     02/27/23  1506 02/27/23  1720   CKTOTAL  --  196   TROPONINI 0.03* 0.03*     Urinalysis:    No results found for: Tricia Grout, BACTERIA, RBCUA, BLOODU, [address], [address]    Radiology:     CXR: I have reviewed the CXR with the following interpretation: Airspace changes in right lung base consistent with known mass. Appears to be more changes in right lung field which could represent a superimposed infiltrate    EKG:  I have reviewed the EKG with the following interpretation: Atrial fibrillation. Nonspecific ST and T wave abnormality. Prolonged QT. Abnormal ECG. When compared with ECG of 26-JAN-2023 16:37, Nonspecific T wave abnormality, worse in Lateral leads. Confirmed by Jared Jacobo ([id number]) on 2/27/2023 5:47:49 PM    XR FOOT RIGHT (MIN 3 VIEWS)   Final Result   No acute findings. Mild degenerative changes.          XR CHEST PORTABLE   Preliminary Result   Airspace changes in the right lung base consistent with known mass. There   appears to be more changes in the right lung field which could represent a   superimposed infiltrate. Follow up to resolution is suggested. Consults:    IP CONSULT TO HOSPITALIST    ASSESSMENT:    Active Hospital Problems    Diagnosis Date Noted    Hypertension [I10]      Priority: High    Acute systolic HF (heart failure) ([facility] [I50.21] 02/01/2023     Priority: Medium    CAD in native artery [I25.10] 01/26/2023     Priority: Medium    Atrial fibrillation ([facility] [I48.91] 09/10/2022     Priority: Medium    DM (diabetes mellitus) ([facility] [E11.9] 01/03/2017     PLAN:    BLE pain and swelling likely 2/2 GOUT,  ED concern for cellulitis - unlikely since bilateral  -blood cultures ordered in ED  -lactic negative, WBC WNL  -CRP elevated   -patient given cefepime and vancomycin in ED, not continued at this time  -morphine given in ED  -elevate extremities    Systolic CHF, stable  -does not appear to be in acute exacerbation at this time  -CXR revealed:Airspace changes in right lung base consistent with known mass. Appears to be more changes in right lung field which could represent a superimposed infiltrate  -ProbBNP 4,147  -20 mg IV lasix given in ED  -strict I&O  -daily weights  -continue home lasix  ECHO 1/27/2023:   Left ventricular systolic function is mildly reduced with an estimated   ejection fraction of 45-50%. 3D calculated EF of 48%. The left ventricle is normal in size with normal wall thickness. There is hypokinesis of the inferior, inferolateral, and apical septal   walls. Indeterminate diastolic function. Patient was in atrial fibrillation during   study. The right ventricle is normal in size with reduced systolic function. Moderate right atrial dilatation. Severe left atrial dilatation. The aortic valve leaflets appear calcified with no hemodynamically   significant stenosis. Mild mitral regurgitation.    Mild-moderate tricuspid regurgitation. Systolic pulmonary artery pressure (SPAP) is elevated and estimated at 74   mmHg (right atrial pressure 15 mmHg), consistent with severe pulmonary   hypertension . IVC size is dilated (>2.1 cm) and collapses < 50% with respiration   consistent with elevated RA pressure (15 mmHg). Essential HTN in setting of known CAD  -continue amlodipine  -continue plavix    HLD  -continue atorvastatin    Atrial fibrillation   -anticoagulated on Eliquis    DM2, controlled  -  -hemglobin a1c 6.7 on 2/15/2023  -ldssi  -poct ac/hs  -hypoglycemia protocol  -carb control diet    Elevated troponin, 0.03   -repeat 0.03  -likely 2/2 elevated BNP, CHF exacerbation  -no c/o chest pain at this time  -tele monitoring  -trend troponin    Chronic normocytic anemia  -no s/s of bleeding at this time  -cbc in am    DVT Prophylaxis: eliquis    Diet: No diet orders on file    Code Status: Prior    PT/OT Eval Status: no indication for need at this time    Dispo - 2-3 days pending clinical improvement     RUPERTO Singleton - CNP    Thank you Justin Haile DO for the opportunity to be involved in this patient's care.  If you have any questions or concerns please feel free to contact me at [phone].  ------------------------------Anticipated Dr. Precious dumont-----------------------------

## 2023-02-28 NOTE — PROGRESS NOTES
Call from [facility] stating patient is cedrick on the heart monitor. HR 48-54. Remains in Afib. Patient is sleeping. Will monitor closely.

## 2023-02-28 NOTE — PLAN OF CARE
Problem: Discharge Planning  Goal: Discharge to home or other facility with appropriate resources  2/28/2023 1357 by Madison Duran RN  Outcome: Progressing  2/28/2023 0047 by Ju Melchor RN  Outcome: Progressing  Flowsheets (Taken 2/28/2023 0047)  Discharge to home or other facility with appropriate resources:   Identify barriers to discharge with patient and caregiver   Arrange for needed discharge resources and transportation as appropriate     Problem: Pain  Goal: Verbalizes/displays adequate comfort level or baseline comfort level  2/28/2023 1357 by Madison Duran RN  Outcome: Progressing  2/28/2023 0047 by Ju Melchor RN  Outcome: Progressing  Flowsheets (Taken 2/28/2023 0047)  Verbalizes/displays adequate comfort level or baseline comfort level:   Encourage patient to monitor pain and request assistance   Assess pain using appropriate pain scale   Administer analgesics based on type and severity of pain and evaluate response   Implement non-pharmacological measures as appropriate and evaluate response     Problem: Chronic Conditions and Co-morbidities  Goal: Patient's chronic conditions and co-morbidity symptoms are monitored and maintained or improved  2/28/2023 0550 by Ju Melchor RN  Outcome: Progressing  Note:   Patient's EF (Ejection Fraction) is greater than 40%    Heart Failure Medications:  Diuretics[de-identified] Furosemide    (One of the following REQUIRED for EF </= 40%/SYSTOLIC FAILURE but MAY be used in EF% >40%/DIASTOLIC FAILURE)        ACE[de-identified] None        ARB[de-identified] None         ARNI[de-identified] None    (Beta Blockers)  NON- Evidenced Based Beta Blocker (for EF% >40%/DIASTOLIC FAILURE): None    Evidenced Based Beta Blocker::(REQUIRED for EF% <40%/SYSTOLIC FAILURE) None  . ..................................................................................................................................................   Patient's weights and intake/output reviewed: Yes    Patient's Last Weight: 83.8  kg obtained by standing scale. Admit weight      Intake/Output Summary (Last 24 hours) at 2/28/2023 0502  Last data filed at 2/28/2023 0500  Gross per 24 hour   Intake 360 ml   Output 725 ml   Net -365 ml       Education Booklet Provided: yes    Comorbidities Reviewed Yes    Patient has a past medical history of Aortic aneurysm, abdominal, Appendicitis, Arthritis, Bruises easily, CAD (coronary artery disease), CHF (congestive heart failure) ([facility]), Chronic back pain, Chronic pain of both shoulders, Complication of anesthesia, Gout, Heart disease, Hernia, HIGH CHOLESTEROL, Hyperlipidemia, Hypertension, Measles, Pancreatitis, Persistent cough, Ringing in ears, Sinus disorder, and Sleep deprivation.     >>For CHF and Comorbidity documentation on Education Time and Topics, please see Education Tab    Progressive Mobility Assessment:  What is this patient's Current Level of Mobility?: Ambulatory- with Assistance  How was this patient Mobilized today?: Edge of Bed, ambulated 0 ft                 With Whom? Nurse                 Level of Difficulty/Assistance: 1x Assist     Pt resting in bed at this time on room air. Pt denies shortness of breath. Pt with nonpitting lower extremity edema.     Patient and/or Family's stated Goal of Care this Admission: increase activity tolerance, be more comfortable, and reduce lower extremity edema prior to discharge    Total of ~10 minutes spent educating/ going over booklet on CHF

## 2023-02-28 NOTE — PROGRESS NOTES
4 Eyes Skin Assessment     The patient is being assess for  Admission    I agree that 2 RN's have performed a thorough Head to Toe Skin Assessment on the patient. ALL assessment sites listed below have been assessed. Areas assessed by both nurses:   [x]   Head, Face, and Ears   [x]   Shoulders, Back, and Chest  [x]   Arms, Elbows, and Hands   [x]   Coccyx, Sacrum, and Ischum  [x]   Legs, Feet, and Heels        Does the Patient have Skin Breakdown?   No         Anastacio Prevention initiated:  No   Wound Care Orders initiated:  No      [facility] nurse consulted for Pressure Injury (Stage 3,4, Unstageable, DTI, NWPT, and Complex wounds):  No      Nurse 1 eSignature: Electronically signed by Caprice Quinn RN on 2/28/23 at 12:35 AM EST    **SHARE this note so that the co-signing nurse is able to place an eSignature**    Nurse 2 eSignature: {Esignature:[id number]}

## 2023-02-28 NOTE — ED NOTES
Blood culture set #2 drawn from left forearm with butterfly. Bottle tops scrubbed with alcohol pads. Site prepped with Prevantics swab, 15 seconds per side, and allowed to dry for 30 seconds prior to venipuncture. Red waste tube drawn prior to collection of specimen.          Velvet Nicole RN  02/27/23 1920

## 2023-03-01 VITALS
TEMPERATURE: 97.5 F | DIASTOLIC BLOOD PRESSURE: 65 MMHG | BODY MASS INDEX: 27.52 KG/M2 | SYSTOLIC BLOOD PRESSURE: 129 MMHG | WEIGHT: 185.8 LBS | OXYGEN SATURATION: 91 % | RESPIRATION RATE: 18 BRPM | HEIGHT: 69 IN | HEART RATE: 56 BPM

## 2023-03-01 DIAGNOSIS — I25.10 CORONARY ARTERY DISEASE INVOLVING NATIVE CORONARY ARTERY OF NATIVE HEART WITHOUT ANGINA PECTORIS: Primary | ICD-10-CM

## 2023-03-01 PROBLEM — I25.5 ISCHEMIC CARDIOMYOPATHY: Status: ACTIVE | Noted: 2023-03-01

## 2023-03-01 LAB
ANION GAP SERPL CALCULATED.3IONS-SCNC: 15 MMOL/L (ref 3–16)
BUN BLDV-MCNC: 52 MG/DL (ref 7–20)
CALCIUM SERPL-MCNC: 9 MG/DL (ref 8.3–10.6)
CHLORIDE BLD-SCNC: 97 MMOL/L (ref 99–110)
CO2: 21 MMOL/L (ref 21–32)
CREAT SERPL-MCNC: 1.7 MG/DL (ref 0.8–1.3)
EKG ATRIAL RATE: 47 BPM
EKG DIAGNOSIS: NORMAL
EKG Q-T INTERVAL: 564 MS
EKG QRS DURATION: 86 MS
EKG QTC CALCULATION (BAZETT): 465 MS
EKG R AXIS: -10 DEGREES
EKG T AXIS: 158 DEGREES
EKG VENTRICULAR RATE: 41 BPM
GFR SERPL CREATININE-BSD FRML MDRD: 40 ML/MIN/{1.73_M2}
GLUCOSE BLD-MCNC: 220 MG/DL (ref 70–99)
GLUCOSE BLD-MCNC: 227 MG/DL (ref 70–99)
GLUCOSE BLD-MCNC: 255 MG/DL (ref 70–99)
MAGNESIUM: 2.2 MG/DL (ref 1.8–2.4)
PERFORMED ON: ABNORMAL
PERFORMED ON: ABNORMAL
POTASSIUM SERPL-SCNC: 4.1 MMOL/L (ref 3.5–5.1)
POTASSIUM SERPL-SCNC: 6.2 MMOL/L (ref 3.5–5.1)
SODIUM BLD-SCNC: 133 MMOL/L (ref 136–145)

## 2023-03-01 PROCEDURE — 36415 COLL VENOUS BLD VENIPUNCTURE: CPT

## 2023-03-01 PROCEDURE — 6370000000 HC RX 637 (ALT 250 FOR IP): Performed by: INTERNAL MEDICINE

## 2023-03-01 PROCEDURE — 2580000003 HC RX 258: Performed by: NURSE PRACTITIONER

## 2023-03-01 PROCEDURE — 84132 ASSAY OF SERUM POTASSIUM: CPT

## 2023-03-01 PROCEDURE — 83735 ASSAY OF MAGNESIUM: CPT

## 2023-03-01 PROCEDURE — 93005 ELECTROCARDIOGRAM TRACING: CPT | Performed by: INTERNAL MEDICINE

## 2023-03-01 PROCEDURE — 6370000000 HC RX 637 (ALT 250 FOR IP): Performed by: NURSE PRACTITIONER

## 2023-03-01 PROCEDURE — 80048 BASIC METABOLIC PNL TOTAL CA: CPT

## 2023-03-01 RX ORDER — FUROSEMIDE 40 MG/1
40 TABLET ORAL DAILY
Qty: 90 TABLET | Refills: 1 | Status: SHIPPED
Start: 2023-03-04

## 2023-03-01 RX ORDER — PREDNISONE 20 MG/1
20 TABLET ORAL DAILY
Qty: 2 TABLET | Refills: 0 | Status: SHIPPED | OUTPATIENT
Start: 2023-03-02 | End: 2023-03-03 | Stop reason: SDUPTHER

## 2023-03-01 RX ORDER — POTASSIUM CHLORIDE 20 MEQ/1
20 TABLET, EXTENDED RELEASE ORAL DAILY
Qty: 90 TABLET | Refills: 1 | Status: SHIPPED
Start: 2023-03-04

## 2023-03-01 RX ORDER — PREDNISONE 20 MG/1
40 TABLET ORAL DAILY
Qty: 4 TABLET | Refills: 0 | Status: SHIPPED | OUTPATIENT
Start: 2023-03-02 | End: 2023-03-01 | Stop reason: SDUPTHER

## 2023-03-01 RX ORDER — PREDNISONE 20 MG/1
20 TABLET ORAL ONCE
Status: COMPLETED | OUTPATIENT
Start: 2023-03-01 | End: 2023-03-01

## 2023-03-01 RX ORDER — SODIUM CHLORIDE 9 MG/ML
INJECTION, SOLUTION INTRAVENOUS CONTINUOUS
Status: CANCELLED | OUTPATIENT
Start: 2023-03-01 | End: 2023-03-01

## 2023-03-01 RX ORDER — GLIPIZIDE 5 MG/1
5 TABLET ORAL DAILY
Qty: 2 TABLET | Refills: 0 | Status: SHIPPED | OUTPATIENT
Start: 2023-03-02 | End: 2023-03-01 | Stop reason: SDUPTHER

## 2023-03-01 RX ORDER — GLIPIZIDE 5 MG/1
5 TABLET ORAL DAILY
Qty: 2 TABLET | Refills: 0 | Status: SHIPPED | OUTPATIENT
Start: 2023-03-02 | End: 2023-03-03 | Stop reason: SDUPTHER

## 2023-03-01 RX ORDER — FUROSEMIDE 40 MG/1
40 TABLET ORAL DAILY
Status: CANCELLED | OUTPATIENT
Start: 2023-03-01

## 2023-03-01 RX ADMIN — CLOPIDOGREL BISULFATE 75 MG: 75 TABLET ORAL at 08:56

## 2023-03-01 RX ADMIN — APIXABAN 5 MG: 5 TABLET, FILM COATED ORAL at 08:56

## 2023-03-01 RX ADMIN — COLCHICINE 0.6 MG: 0.6 TABLET, FILM COATED ORAL at 08:56

## 2023-03-01 RX ADMIN — PANTOPRAZOLE SODIUM 40 MG: 40 TABLET, DELAYED RELEASE ORAL at 14:03

## 2023-03-01 RX ADMIN — PANTOPRAZOLE SODIUM 40 MG: 40 TABLET, DELAYED RELEASE ORAL at 05:56

## 2023-03-01 RX ADMIN — PREDNISONE 20 MG: 20 TABLET ORAL at 14:29

## 2023-03-01 RX ADMIN — INSULIN LISPRO 2 UNITS: 100 INJECTION, SOLUTION INTRAVENOUS; SUBCUTANEOUS at 08:59

## 2023-03-01 RX ADMIN — FUROSEMIDE 40 MG: 40 TABLET ORAL at 08:56

## 2023-03-01 RX ADMIN — POTASSIUM CHLORIDE 20 MEQ: 1500 TABLET, EXTENDED RELEASE ORAL at 08:57

## 2023-03-01 RX ADMIN — INSULIN LISPRO 4 UNITS: 100 INJECTION, SOLUTION INTRAVENOUS; SUBCUTANEOUS at 12:24

## 2023-03-01 RX ADMIN — AMLODIPINE BESYLATE 5 MG: 5 TABLET ORAL at 08:57

## 2023-03-01 RX ADMIN — SODIUM CHLORIDE, PRESERVATIVE FREE 10 ML: 5 INJECTION INTRAVENOUS at 09:45

## 2023-03-01 NOTE — DISCHARGE INSTR - MEDS
Hold lasix and potassium per cardiology.   Restart both on 3/4/2023    Take prednisone 20mg and glipizide 5mg daily for two days starting 3/2

## 2023-03-01 NOTE — PROGRESS NOTES
Reviewed avs with pt he verbalized understanding and was able to teach back. Also, pt requested that we place his home telemetry monitor, complied. Activated. And verified calling the service. Wife at bedside. Pt has all his belongings and was assisted out by staff. Via wheel chair.

## 2023-03-01 NOTE — PLAN OF CARE
Problem: Discharge Planning  Goal: Discharge to home or other facility with appropriate resources  3/1/2023 0454 by Ju Melchor RN  Outcome: Progressing  Flowsheets (Taken 3/1/2023 0454)  Discharge to home or other facility with appropriate resources:   Identify barriers to discharge with patient and caregiver   Arrange for needed discharge resources and transportation as appropriate  3/1/2023 0450 by Ju Melchor RN  Flowsheets (Taken 3/1/2023 1048)  Discharge to home or other facility with appropriate resources:   Identify barriers to discharge with patient and caregiver   Arrange for needed discharge resources and transportation as appropriate     Problem: Pain  Goal: Verbalizes/displays adequate comfort level or baseline comfort level  3/1/2023 0454 by Ju Melchor RN  Outcome: Progressing  Flowsheets (Taken 3/1/2023 0454)  Verbalizes/displays adequate comfort level or baseline comfort level:   Encourage patient to monitor pain and request assistance   Assess pain using appropriate pain scale  3/1/2023 0450 by Ju Melchor RN  Flowsheets (Taken 3/1/2023 9275)  Verbalizes/displays adequate comfort level or baseline comfort level:   Encourage patient to monitor pain and request assistance   Assess pain using appropriate pain scale     Problem: Safety - Adult  Goal: Free from fall injury  3/1/2023 0454 by Ju Melchor RN  Outcome: Progressing  Flowsheets (Taken 3/1/2023 0454)  Free From Fall Injury: Instruct family/caregiver on patient safety  3/1/2023 0450 by Ju Melchor RN  Flowsheets (Taken 3/1/2023 0450)  Free From Fall Injury: Instruct family/caregiver on patient safety     Problem: Chronic Conditions and Co-morbidities  Goal: Patient's chronic conditions and co-morbidity symptoms are monitored and maintained or improved  3/1/2023 0454 by Ju Melchor RN  Outcome: Progressing  Flowsheets (Taken 3/1/2023 0454)  Care Plan - Patient's Chronic Conditions and Co-Morbidity Symptoms are Monitored and Maintained or Improved: Monitor and assess patient's chronic conditions and comorbid symptoms for stability, deterioration, or improvement  Note:   Patient's EF (Ejection Fraction) is greater than 40%    Heart Failure Medications:  Diuretics[de-identified] Furosemide    (One of the following REQUIRED for EF </= 40%/SYSTOLIC FAILURE but MAY be used in EF% >40%/DIASTOLIC FAILURE)        ACE[de-identified] None        ARB[de-identified] None         ARNI[de-identified] None    (Beta Blockers)  NON- Evidenced Based Beta Blocker (for EF% >40%/DIASTOLIC FAILURE): None    Evidenced Based Beta Blocker::(REQUIRED for EF% <40%/SYSTOLIC FAILURE) None  . .................................................................................................................................................. Patient's weights and intake/output reviewed: Yes    Patient's Last Weight: 84.3 kg obtained by standing scale. Difference of 1.1 lbs more than last documented weight. Intake/Output Summary (Last 24 hours) at 3/1/2023 0451  Last data filed at 3/1/2023 0411  Gross per 24 hour   Intake 1140 ml   Output 600 ml   Net 540 ml       Education Booklet Provided: yes    Comorbidities Reviewed Yes    Patient has a past medical history of Aortic aneurysm, abdominal, Appendicitis, Arthritis, Bruises easily, CAD (coronary artery disease), CHF (congestive heart failure) (HCC), Chronic back pain, Chronic pain of both shoulders, Complication of anesthesia, Gout, Heart disease, Hernia, HIGH CHOLESTEROL, Hyperlipidemia, Hypertension, Measles, Pancreatitis, Persistent cough, Ringing in ears, Sinus disorder, and Sleep deprivation. >>For CHF and Comorbidity documentation on Education Time and Topics, please see Education Tab    Progressive Mobility Assessment:  What is this patient's Current Level of Mobility?: Ambulatory- with Assistance  How was this patient Mobilized today?: Edge of Bed and  Up to Toilet/Shower, ambulated 15 ft                 With Whom?  Nurse and PCA                 Level of Difficulty/Assistance: 1x Assist     Pt resting in bed at this time on room air. Pt denies shortness of breath. Pt without lower extremity edema.      Patient and/or Family's stated Goal of Care this Admission: increase activity tolerance and be more comfortable prior to discharge      3/1/2023 0450 by Ju Melchor RN  Flowsheets (Taken 3/1/2023 9602)  Care Plan - Patient's Chronic Conditions and Co-Morbidity Symptoms are Monitored and Maintained or Improved: Monitor and assess patient's chronic conditions and comorbid symptoms for stability, deterioration, or improvement

## 2023-03-01 NOTE — PROGRESS NOTES
Pt admitted with BLE leg pain and swelling with extensive heart history. Hx of a CABG with stents placed along with plan for future PCI's. Currently has heart holter monitor that was issued outpt. from his cardiologist that he is supposed to wear for 2 weeks for bradycardia during the night (Last day would be 3/3). When I admitted pt last night his HR dropped to 36 one time and pt was asymptomatic. Today, when I came on around 1900 and I got report on him- pt states that his monitor lost contact with the phone/ device that keeps track of the information. Tonight- later in the shift pt's HR is more frequently dropping and practically sustaining in the 30's. I am obtaining EKG to be safe but pt is not complaining of any dizziness/ lightheadedness/ etc. Will continue to monitor and make note of any changes going forward.

## 2023-03-01 NOTE — DISCHARGE SUMMARY
Hospitalist Discharge Summary    Patient ID:  Babatunde Robin  [id number]  80 y.o.  1940    Admit date: 2/27/2023    Discharge date: 3/1/2023    Disposition: home    Admission Diagnoses:   Patient Active Problem List   Diagnosis    Hypertension    CAD (coronary artery disease)    Hyperlipidemia    CHF (congestive heart failure) (HCC)    Aortic aneurysm, abdominal    Sleep deprivation    Chronic pain of both shoulders    Arthritis    Hernia    Ringing in ears    GERD (gastroesophageal reflux disease)    Leg length inequality    DM (diabetes mellitus) (HCC)    Hypokalemia    TIA (transient ischemic attack)    CVA (cerebral vascular accident) ([address]    Bradycardia    Stroke-like symptoms    Atrial fibrillation ([address]    Nuclear senile cataract    SUNDEEP (obstructive sleep apnea)    Overweight (BMI 25.0-29. 9)    CAD in native artery    Chest pain    Shortness of breath    Abnormal stress test    UGI bleed    Acute systolic HF (heart failure) (HCC)    CAP (community acquired pneumonia)    Reactive arthritis ([address]    Ischemic cardiomyopathy       Discharge Diagnoses: Principal Problem:    Reactive arthritis ([address]  Active Problems:    Hypertension    Atrial fibrillation (HCC)    CAD in native artery    Acute systolic HF (heart failure) (HCC)    Ischemic cardiomyopathy    DM (diabetes mellitus) ([address]  Resolved Problems:    * No resolved hospital problems. *      Code Status:  Full Code    Condition:  Stable    Discharge Diet: Diet:  ADULT DIET; Regular; No Added Salt (3-4 gm)    PCP to do list:  BMP in one week. Holding lasix and potassium until Saturday.     Future Appointments   Date Time Provider Leticia Bradfordi   3/7/2023  2:15 PM Nona Hsu DO [place] [id number]   3/22/2023 10:00 AM RUPERTO Gaviria - [facility]   3/31/2023 10:00 AM Ernie Rivas MD [facility]   5/16/2023  1:00 PM Nona Hsu DO [facility] [address]   [address] Course: 89Z[facility] with PMHx sig for CAD, CHF chronic systolic, afib, GI Bleed who had  Sudden onset of pain and swelling in bilateral feet and unable to stand on feet since Saturday. Had to call 911 to get to hospital since he could not even drive. Pt was given a dose of decadron and dramatically his leg swelling improved and pain almost completely resolved. He denies any bug bites, but he was walking in [place]ed area. Denies any rashes except his feet were red and swollen like sausages. He said the pain was much worse than gout. He never complained of shortness of breath. On further investigation, the sudden onset and sudden improvement of sx after steroids really did not go along with gout as it was bilateral feet with swollen digits and joint pain with markedly elevated CRP and ESR. Pt did have overdiuresis when he first arrived in ED as it was initially thought that he had CHF exacerbation. Mild dehydration and KEON today. Will hold lasix and potassium until Saturday, then restart. Pt will also have prednisone and glipizide for another 2 days to help with reactive arthritis. I asked if pt may have been bitten by something, but he is not sure. Pt does have secondary hypercoagulable state due to afib which is mitigated with eliquis. Pt was seen by cardiology who said to continue current meds with the above changes.     Discharge Medications:   Current Discharge Medication List        START taking these medications    Details   glipiZIDE (GLUCOTROL) 5 MG tablet Take 1 tablet by mouth daily for 2 days  Qty: 2 tablet, Refills: 0      predniSONE (DELTASONE) 20 MG tablet Take 1 tablet by mouth daily for 2 days  Qty: 2 tablet, Refills: 0           Current Discharge Medication List        CONTINUE these medications which have CHANGED    Details   furosemide (LASIX) 40 MG tablet Take 1 tablet by mouth daily  Qty: 90 tablet, Refills: 1      potassium chloride (KLOR-CON M) 20 MEQ extended release tablet Take 1 tablet by mouth daily  Qty: 90 tablet, Refills: 1    Associated Diagnoses: SOB (shortness of breath)           Current Discharge Medication List        CONTINUE these medications which have NOT CHANGED    Details   apixaban (ELIQUIS) 5 MG TABS tablet Take by mouth 2 times daily      amLODIPine (NORVASC) 5 MG tablet Take 1 tablet by mouth in the morning and at bedtime  Qty: 180 tablet, Refills: 3      clopidogrel (PLAVIX) 75 MG tablet Take 1 tablet by mouth daily  Qty: 30 tablet, Refills: 3      pantoprazole (PROTONIX) 40 MG tablet Take 1 tablet by mouth 2 times daily (before meals)  Qty: 30 tablet, Refills: 3      atorvastatin (LIPITOR) 40 MG tablet TAKE 1 TABLET EVERY NIGHT  Qty: 90 tablet, Refills: 3      olmesartan (BENICAR) 40 MG tablet Take 1 tablet by mouth daily  Qty: 90 tablet, Refills: 3      ACCU-CHEK MULTICLIX LANCETS MISC Test blood sugar qd  Qty: 100 each, Refills: 3    Comments: DX: E11.9      glucose blood VI test strips (ACCU-CHEK ION) strip Test blood sugar qd. Qty: 100 strip, Refills: 3    Comments: DX: E11.9           Current Discharge Medication List        STOP taking these medications       aspirin 81 MG chewable tablet Comments:   Reason for Stopping:         metoprolol succinate (TOPROL XL) 50 MG extended release tablet Comments:   Reason for Stopping:                   Procedures: none    Assessment on Discharge: Stable, improved     Discharge ROS:  A complete review of systems was asked and negative except for denies pain in feet or joints    Discharge Exam:  /67   Pulse 65   Temp 97.2 °F (36.2 °C)   Resp 18   Ht 5' 9\" (1.753 m)   Wt 185 lb 12.8 oz (84.3 kg)   SpO2 91%   BMI 27.44 kg/m²     Gen: NAD  HEENT: NC/AT, moist mucous membranes, no oropharyngeal erythema or exudate  Neck: supple, trachea midline, no anterior cervical or SC LAD  Heart:  Normal s1/s2, RRR, no murmurs, gallops, or rubs.  no leg edema  Lungs:  diminished bilaterally, no wheeze,no rales or rhonchi, no use of accessory muscles  Abd: bowel sounds present, soft, nontender, nondistended, no masses  Extrem:  No clubbing, cyanosis,  no edema  Skin: no lesion or masses  Psych:  A & O x3  Neuro: grossly intact, moves all four extremities    Pertinent Studies During Hospital Stay:  Radiology:  XR FOOT RIGHT (MIN 3 VIEWS)    Result Date: 2/27/2023  EXAMINATION: XRAY VIEWS OF THE RIGHT FOOT 2/27/2023 5:11 pm COMPARISON: None. HISTORY: ORDERING SYSTEM PROVIDED HISTORY: foot pain TECHNOLOGIST PROVIDED HISTORY: Reason for exam:->foot pain Reason for Exam: right foot pain; no known injury; swelling and pain while weight bearing FINDINGS: No fracture or dislocation. Enthesopathic changes at the Achilles tendon insertion. Plantar calcaneal enthesophyte. Vascular calcifications. The interphalangeal and metatarsophalangeal joint spaces are preserved. No acute findings. Mild degenerative changes. XR CHEST PORTABLE    Result Date: 2/27/2023  EXAMINATION: ONE XRAY VIEW OF THE CHEST 2/27/2023 2:55 pm COMPARISON: 01/27/2023 HISTORY: ORDERING SYSTEM PROVIDED HISTORY: leg swelling, sob TECHNOLOGIST PROVIDED HISTORY: Reason for exam:->leg swelling, sob Reason for Exam: bilat leg edema Initial evaluation. FINDINGS: Monitor wires overlie the chest. The patient has had a median sternotomy. The trachea is midline. There is atherosclerotic calcification of the aortic knob. Cardiac silhouette is stable. There is persistent airspace changes in the right lung base consistent with a known mass. Superimposed infiltrates cannot be excluded. The left lung is relatively clear. Airspace changes in the right lung base consistent with known mass. There appears to be more changes in the right lung field which could represent a superimposed infiltrate. Follow up to resolution is suggested.      VL DUP CAROTID BILATERAL    Result Date: 2/17/2023  Carotid Duplex Study  Demographics   Patient Name       Margarette Zuluaga   Date of Study      02/16/2023 Gender              Male   Patient Number     [id number]         Date of Birth       1940   Visit Number       [id number]          Age                 80 year(s)   Accession Number   [id number]         Room Number         OP   Corporate ID       [id number]            [address] [place]   Ordering Physician Mary    Interpreting        Shon WARNER CNP            Physician           Readers                                                            Sherif Barroso MD  Procedure Type of Study:   Cerebral:Carotid, VL CAROTID DUPLEX BILATERAL. Vascular Sonographer Report  Indications for Study:Carotid bruit. Additional Indications:Asymptomatic patient presents for carotid duplex following a left carotid bruit that was noted on physical exam at physician's office. Carotid Artery Duplex Scan: Transverse and longitudinal grayscale and color imaging of the extracranial carotid system including Doppler spectral waveform analysis. Impressions Right Impression Technically difficult and limited study due to calcific shadowing. The right internal carotid artery reveals a <50% diameter reducing stenosis, however, this may be underestimated due to calcific shadowing. The right external carotid artery reveals a >50% diameter reducing stenosis. The right vertebral artery demonstrates normal antegrade flow. The right subclavian artery is visualized and demonstrates multiphasic flow. Left Impression Technically difficult and limited study due to calcific shadowing. The left internal carotid artery reveals a <50% diameter reducing stenosis, however, this may be underestimated due to calcific shadowing. The left external carotid artery reveals a >50% diameter reducing stenosis. The left vertebral artery demonstrates normal antegrade flow.  The left subclavian artery is visualized and demonstrates multiphasic flow. Previous exam performed on 2/4/13, however, did not have velocity criteria available for comparison. Conclusions   Summary   The bilateral internal carotid arteries reveal a <50% diameter reducing  stenosis, however, this may be underestimated due to calcific shadowing. The bilateral vertebral arteries demonstrate normal antegrade flow. Signature   ------------------------------------------------------------------  Electronically signed by Opal Guerra MD (Interpreting  physician) on 02/17/2023 at 10:24 AM  ------------------------------------------------------------------  Blood Pressure:Right arm 129/59 mmHg. Left arm 132/54 mmHg. Patient Status:Routine. Study [address] - Vascular Lab. Technical Quality:Limited visualization due to calcific shadowing. Risk Factors   - The patient's risk factor(s) include: diabetes mellitus, dyslipidemia and     arterial hypertension.   - The patient has a former tobacco history. Plaque   - A plaque was found in the Right Prox ICA. The plaque characteristics are: high echogenicity, moderate severity and heterogeneous texture. There is evidence of calcified plaque. - A plaque was found in the Left Prox ICA. The plaque characteristics are: high echogenicity, moderate severity and heterogeneous texture. There is evidence of calcified plaque. Velocities are measured in cm/s ; Diameters are measured in mm Carotid Right Measurements +---------------+----+----+-----+----+ ! Location       ! PSV ! EDV ! Angle! RI  ! +---------------+----+----+-----+----+ ! Prox CCA       !127 !17. 3! 60   !0.86! +---------------+----+----+-----+----+ ! Mid CCA        !110 !15. 6!60   !0.86! +---------------+----+----+-----+----+ ! Dist CCA       !118 !18. 2! 60   !0.85! +---------------+----+----+-----+----+ ! Prox ICA       !101 !28. 1! 52   !0.72! +---------------+----+----+-----+----+ ! Mid ICA        !114 !22. 3!60   !0.8 ! +---------------+----+----+-----+----+ ! Dist ICA       !124 !25. 2! 60   !0.8 ! +---------------+----+----+-----+----+ ! Prox ECA       !766 !95. 3!52   !0.94! +---------------+----+----+-----+----+ ! Vertebral      !46. 4!8.74!34   !0.79! +---------------+----+----+-----+----+ ! Prox Subclavian! 181 !0   !60   !1   ! +---------------+----+----+-----+----+   - There is antegrade vertebral flow noted on the right side. - Additional Measurements:ICAPSV/CCAPSV 1.13. ICAEDV/CCAEDV 1.62. Carotid Left Measurements +---------------+----+----+-----+----+ ! Location       ! PSV ! EDV ! Angle! RI  ! +---------------+----+----+-----+----+ ! Prox CCA       !92. 7!20. 4!60   !0.78! +---------------+----+----+-----+----+ ! Mid CCA        !90.9!19. 6!60   !0.78! +---------------+----+----+-----+----+ ! Dist CCA       !99. 6!18  !60   !0.82! +---------------+----+----+-----+----+ ! Prox ICA       !111 !24. 3!60   !0.78! +---------------+----+----+-----+----+ ! Mid ICA        !135 !29. 8!60   !0.78! +---------------+----+----+-----+----+ ! Dist ICA       !123 !20. 4!52   !0.83! +---------------+----+----+-----+----+ ! Prox ECA       !166 !91. 3! 60   !0.92! +---------------+----+----+-----+----+ ! Vertebral      !65. 2!2.45!22   !0.85! +---------------+----+----+-----+----+ ! Prox Subclavian! 165 !0   !60   !1   ! +---------------+----+----+-----+----+   - There is antegrade vertebral flow noted on the left side. - Additional Measurements:ICAPSV/CCAPSV 1.49. ICAEDV/CCAEDV 1.46.            Last Labs on Discharge:     Recent Results (from the past 24 hour(s))   POCT Glucose    Collection Time: 02/28/23  4:15 PM   Result Value Ref Range    POC Glucose 211 (H) 70 - 99 mg/dl    Performed on ACCU-CHEK    POCT Glucose    Collection Time: 02/28/23  7:52 PM   Result Value Ref Range    POC Glucose 230 (H) 70 - 99 mg/dl    Performed on ACCU-CHEK    EKG 12 Lead    Collection Time: 03/01/23  1:22 AM   Result Value Ref Range    Ventricular Rate 41 BPM    Atrial Rate 47 BPM QRS Duration 86 ms    Q-T Interval 564 ms    QTc Calculation (Bazett) 465 ms    R Axis -10 degrees    T Axis 158 degrees    Diagnosis       Atrial fibrillation with slow ventricular responseCannot rule out Anterior infarct (cited on or before 01-MAR-2023)ST & T wave abnormality, consider lateral ischemiaAbnormal ECGWhen compared with ECG of 28-FEB-2023 02:56,No significant change was found   Basic Metabolic Panel    Collection Time: 03/01/23  4:30 AM   Result Value Ref Range    Sodium 133 (L) 136 - 145 mmol/L    Potassium 6.2 (HH) 3.5 - 5.1 mmol/L    Chloride 97 (L) 99 - 110 mmol/L    CO2 21 21 - 32 mmol/L    Anion Gap 15 3 - 16    Glucose 227 (H) 70 - 99 mg/dL    BUN 52 (H) 7 - 20 mg/dL    Creatinine 1.7 (H) 0.8 - 1.3 mg/dL    Est, Glom Filt Rate 40 (A) >60    Calcium 9.0 8.3 - 10.6 mg/dL   Magnesium    Collection Time: 03/01/23  4:30 AM   Result Value Ref Range    Magnesium 2.20 1.80 - 2.40 mg/dL   Potassium    Collection Time: 03/01/23  6:13 AM   Result Value Ref Range    Potassium 4.1 3.5 - 5.1 mmol/L   POCT Glucose    Collection Time: 03/01/23  7:26 AM   Result Value Ref Range    POC Glucose 220 (H) 70 - 99 mg/dl    Performed on ACCU-CHEK    POCT Glucose    Collection Time: 03/01/23 11:09 AM   Result Value Ref Range    POC Glucose 255 (H) 70 - 99 mg/dl    Performed on ACCU-CHEK          Follow up: with Britany Tracey DO    Note that 35 minutes was spent in preparing discharge papers, discussing discharge with patient, medication review, etc.    Thank you Britany Tracey DO for the opportunity to be involved in this patient's care. If you have any questions or concerns please feel free to contact me at [phone].       Electronically signed by Ken March MD on 3/1/2023 at 12:46 PM

## 2023-03-01 NOTE — CONSULTS
CARDIOLOGY CONSULTATION        Patient Name: Alise Ramsey  Date of admission: 2/27/2023  2:28 PM  Admission Dx: Cellulitis of right lower extremity [L03.115]  Difficulty in walking [R26.2]  CHF (congestive heart failure), NYHA class I, acute on chronic, combined [facility]) [I50.43]  Requesting Physician: Lowell Hagan MD  Primary Care physician: Britany Tracey DO    Reason for Consultation/Chief Complaint: Lower extremity swelling/pain    History of Present Illness:     Alise Ramsey is a 80 y.o. patient with previous medical history notable for congestive heart failure, hypertension, coronary artery disease status post CABG 2008, status post PCI of distal SVG to OM with sequential to OM 21/26/23 for abnormal stress test, CVA, hyperlipidemia, moderate mitral/tricuspid regurgitation, atrial fibrillation and diabetes mellitus who presented to the hospital with complaints of increased lower extremity edema. Pt follows with Dr. Janeth Rogers. From recent documentation February: 1/26/23 he underwent cardiac cath with PCI of distal SVG to OM1 with sequential to OM2. Post procedure he developed pulmonary edema and GI bleed which required EGD. His Hgb has been monitored and stable. A his hospital follow up on 2/7/2023, he was instructed to take one extra dose of Lasix that day and start Klor Con 20 mEq daily. Repeat echocardiogram 1/27/2023 showed an EF of 45-50%. ED course/Vital signs on presentation: /64, heart rate 75, afebrile, 97% on room air. Chest x-ray showed right lung base airspace changes consistent with known mass. Possible right lung infiltrate. EKG showed atrial fibrillation with nonspecific ST and T wave abnormality and prolonged QT. Previous EKG January noted atrial fibrillation with slow ventricular response. Most recent EKG today shows atrial fibrillation with heart rate 41 bpm, ST and T wave abnormality lateral leads consistent with possible ischemia.   Labs today show sodium 133, potassium 6.2, creatinine 1.7. Potassium was repeated and Is 4.1. Today, Patient noted Saturday morning onset of bilateral foot swelling and pain that he had woken with pain progressive when he was using crutches to ambulate about his home. He had called the office and was sent to the ED. Treated for gout. States this is much better. He can now walk. States he is concerned he's been told he will need a PPM. He reports he was walking 1.5 miles with dogs daily prior to his gout flare up. He has no dizziness, pre-syncope or LOC. No limiting fatigue. No limiting dyspnea. Denies angina since prior PCI. Denies paroxysmal nocturnal dyspnea, orthopnea, increasing lower extremity edema or weight gain. The patient is compliant with medications. Cost of medications is affordable. No endorsed side effects. Clarified today, stopped aspirin 2/26 (no longer triple therapy). No beta blocker metoprolol at this time. He denies any evidence of hematemesis, hemoptysis, melena, hematochezia or hematuria with blood thinner. Past Medical History:   has a past medical history of Aortic aneurysm, abdominal, Appendicitis, Arthritis, Bruises easily, CAD (coronary artery disease), CHF (congestive heart failure) (HCC), Chronic back pain, Chronic pain of both shoulders, Complication of anesthesia, Gout, Heart disease, Hernia, HIGH CHOLESTEROL, Hyperlipidemia, Hypertension, Measles, Pancreatitis, Persistent cough, Ringing in ears, Sinus disorder, and Sleep deprivation. Surgical History:   has a past surgical history that includes Coronary angioplasty with stent (2001 and 2002); Cardiac surgery (01/01/2008); Cholecystectomy, laparoscopic (01/01/2006); Appendectomy; Tonsillectomy; hernia repair; AAA repair, endovascular (01/28/2011); Pancreas surgery; Gallbladder surgery; Coronary artery bypass graft; Abdominal aortic aneurysm repair; pr xcapsl ctrc rmvl insj io lens prosth w/o ecp (Left, 11/13/2018);  Intracapsular cataract extraction (Right, 12/06/2018); Upper gastrointestinal endoscopy (N/A, 01/27/2023); and Diagnostic Cardiac Cath Lab Procedure (01/26/2023). Social History:   reports that he quit smoking about 45 years ago. His smoking use included cigarettes. He started smoking about 68 years ago. He has a 37.50 pack-year smoking history. He has never used smokeless tobacco. He reports that he does not drink alcohol and does not use drugs. Family History:  family history includes Cancer in his mother; Diabetes in his mother; Heart Attack in his father; Heart Disease in his father, mother, paternal uncle, and paternal uncle; Heart Failure in his father and mother; Heart Surgery in his father and paternal uncle; High Blood Pressure in his mother; Stroke in his brother, sister, and sister. Home Medications:  Were reviewed and are listed in nursing record and/or below  Prior to Admission medications    Medication Sig Start Date End Date Taking?  Authorizing Provider   apixaban (ELIQUIS) 5 MG TABS tablet Take by mouth 2 times daily   Yes Historical Provider, MD   amLODIPine (NORVASC) 5 MG tablet Take 1 tablet by mouth in the morning and at bedtime 2/17/23   Kera Morfin MD   potassium chloride (KLOR-CON M) 20 MEQ extended release tablet Take 1 tablet by mouth daily 2/7/23   RUPERTO Silva CNP   clopidogrel (PLAVIX) 75 MG tablet Take 1 tablet by mouth daily 2/2/23   RUPERTO Silva CNP   pantoprazole (PROTONIX) 40 MG tablet Take 1 tablet by mouth 2 times daily (before meals) 2/1/23   RUPERTO Silva CNP   furosemide (LASIX) 40 MG tablet TAKE 1 TABLET EVERY DAY 12/24/22   Mena Landers DO   atorvastatin (LIPITOR) 40 MG tablet TAKE 1 TABLET EVERY NIGHT 12/22/22   Kera Morfin MD   olmesartan (BENICAR) 40 MG tablet Take 1 tablet by mouth daily 9/30/22   Kera Morfin MD   aspirin 81 MG chewable tablet Take 1 tablet by mouth daily  Patient not taking: Reported on 2/27/2023 9/12/22   Abiodun Espitia, APRN - CNP   metoprolol succinate (TOPROL XL) 50 MG extended release tablet TAKE 1 TABLET EVERY DAY 2/8/22 9/11/22  Sara Mendoza DO   ACCU-CHEK MULTICLIX LANCETS MISC Test blood sugar qd  Patient taking differently: Test blood sugar qd    AS NEEDED 3/1/18   Sara Mendoza DO   glucose blood VI test strips (ACCU-CHEK ION) strip Test blood sugar qd. 3/1/18   Sara Mendoza,         CURRENT Medications:  furosemide (LASIX) tablet 40 mg, Daily  colchicine (COLCRYS) tablet 0.6 mg, Daily  insulin lispro (HUMALOG) injection vial 0-8 Units, TID WC  insulin lispro (HUMALOG) injection vial 0-4 Units, Nightly  amLODIPine (NORVASC) tablet 5 mg, BID  apixaban (ELIQUIS) tablet 5 mg, BID  [Held by provider] atorvastatin (LIPITOR) tablet 40 mg, Nightly  clopidogrel (PLAVIX) tablet 75 mg, Daily  pantoprazole (PROTONIX) tablet 40 mg, BID AC  potassium chloride (KLOR-CON M) extended release tablet 20 mEq, Daily  sodium chloride flush 0.9 % injection 5-40 mL, 2 times per day  sodium chloride flush 0.9 % injection 5-40 mL, PRN  0.9 % sodium chloride infusion, PRN  polyethylene glycol (GLYCOLAX) packet 17 g, Daily PRN  acetaminophen (TYLENOL) tablet 650 mg, Q6H PRN   Or  acetaminophen (TYLENOL) suppository 650 mg, Q6H PRN  potassium chloride (KLOR-CON M) extended release tablet 40 mEq, PRN   Or  potassium bicarb-citric acid (EFFER-K) effervescent tablet 40 mEq, PRN   Or  potassium chloride 10 mEq/100 mL IVPB (Peripheral Line), PRN  magnesium sulfate 2000 mg in 50 mL IVPB premix, PRN  glucose chewable tablet 16 g, PRN  dextrose bolus 10% 125 mL, PRN   Or  dextrose bolus 10% 250 mL, PRN  glucagon (rDNA) injection 1 mg, PRN  dextrose 10 % infusion, Continuous PRN  oxyCODONE-acetaminophen (PERCOCET) 5-325 MG per tablet 1 tablet, Q4H PRN        Allergies:  Coumadin [warfarin], Omeprazole, and Vioxx     Review of Systems:   A 14 point review of symptoms completed.  Pertinent positives identified in the HPI, all other review of symptoms negative as below. Objective:     Vitals:    03/01/23 0324 03/01/23 0411 03/01/23 0845 03/01/23 0854   BP: 113/64   137/67   Pulse: 51  68 65   Resp: 18  18 18   Temp: 97.7 °F (36.5 °C)   97.2 °F (36.2 °C)   TempSrc: Oral      SpO2: 92%   91%   Weight:  185 lb 12.8 oz (84.3 kg)     Height:          Weight: 185 lb 12.8 oz (84.3 kg)       PHYSICAL EXAM:    General:  No acute distress    Head:  Normocephalic, atraumatic   Eyes:  Conjunctiva/corneas clear, anicteric sclerae    Nose: Nares normal, no drainage or sinus tenderness   Throat: No abnormalities of the lips, oral mucosa or tongue. Neck: Trachea midline. Neck supple with no lymphadenopathy, flat neck veins   Lungs:   Clear to auscultation bilaterally, no wheezes, no rales, no respiratory distress   Chest Wall:  No deformity or tenderness to palpation   Heart:  Bradycardic, irregular, variable S1, normal S2, II/VI KENDRA at the [facility]/[place], no rub, no S3/S4, PMI non-displaced. Abdomen:   Soft, non-tender, with normoactive bowel sounds. No masses, no hepatosplenomegaly   Extremities: No cyanosis, clubbing or pitting edema. Vascular: 2+ radial, 2+dorsalis pedis and posterior tibial pulses bilaterally. Brisk carotid upstrokes without carotid bruit. Skin: Skin color, texture, turgor are normal with no rashes or ulceration. Pysch: Euthymic mood, appropriate affect   Neurologic: Oriented to person, place and time. No slurred speech or facial asymmetry. No motor or sensory deficits on gross examination.          Labs:   CBC:   Lab Results   Component Value Date/Time    WBC 10.0 02/27/2023 03:06 PM    RBC 3.64 02/27/2023 03:06 PM    HGB 11.2 02/27/2023 03:06 PM    HCT 33.2 02/27/2023 03:06 PM    MCV 91.3 02/27/2023 03:06 PM    RDW 14.7 02/27/2023 03:06 PM     02/27/2023 03:06 PM     CMP:  Lab Results   Component Value Date/Time     03/01/2023 04:30 AM    K 4.1 03/01/2023 06:13 AM    K 3.6 02/27/2023 03:06 PM    CL 97 03/01/2023 04:30 AM    CO2 21 03/01/2023 04:30 AM    BUN 52 03/01/2023 04:30 AM    CREATININE 1.7 03/01/2023 04:30 AM    GFRAA >60 09/10/2022 06:35 AM    GFRAA >60 01/15/2013 09:09 AM    AGRATIO 1.3 02/27/2023 03:06 PM    LABGLOM 40 03/01/2023 04:30 AM    GLUCOSE 227 03/01/2023 04:30 AM    PROT 6.6 02/27/2023 03:06 PM    PROT 7.2 01/15/2013 09:09 AM    CALCIUM 9.0 03/01/2023 04:30 AM    BILITOT 2.7 02/27/2023 03:06 PM    ALKPHOS 53 02/27/2023 03:06 PM    AST 17 02/27/2023 03:06 PM    ALT 12 02/27/2023 03:06 PM     PT/INR:  No results found for: PTINR  HgBA1c:  Lab Results   Component Value Date    LABA1C 6.7 02/15/2023     Lab Results   Component Value Date    CKTOTAL 196 02/27/2023    TROPONINI 0.02 (H) 02/28/2023       Lab Results   Component Value Date    CHOL 87 02/28/2023    CHOL 89 01/27/2023    CHOL 94 01/26/2023     Lab Results   Component Value Date    TRIG 41 02/28/2023    TRIG 79 01/27/2023    TRIG 60 01/26/2023     Lab Results   Component Value Date    HDL 38 (L) 02/28/2023    HDL 36 (L) 01/27/2023    HDL 40 01/26/2023     Lab Results   Component Value Date    LDLCALC 41 02/28/2023    LDLCALC 37 01/27/2023    LDLCALC 42 01/26/2023     Lab Results   Component Value Date    LABVLDL 8 02/28/2023    LABVLDL 16 01/27/2023    LABVLDL 12 01/26/2023     No results found for: CHOLHDLRATIO     Cardiac Data:     EKG: Personally reviewed with my interpretation as above    Telemetry personally reviewed: atrial fibrillation with rates 40-70's. No sig pauses. Echocardiogram 1/27/2023  Summary   Left ventricular systolic function is mildly reduced with an estimated   ejection fraction of 45-50%. 3D calculated EF of 48%. The left ventricle is normal in size with normal wall thickness. There is hypokinesis of the inferior, inferolateral, and apical septal   walls. Indeterminate diastolic function. Patient was in atrial fibrillation during   study.    The right ventricle is normal in size with reduced systolic function. Moderate right atrial dilatation. Severe left atrial dilatation. The aortic valve leaflets appear calcified with no hemodynamically   significant stenosis. Mild mitral regurgitation. Mild-moderate tricuspid regurgitation. Systolic pulmonary artery pressure (SPAP) is elevated and estimated at 74   mmHg (right atrial pressure 15 mmHg), consistent with severe pulmonary   hypertension . IVC size is dilated (>2.1 cm) and collapses < 50% with respiration   consistent with elevated RA pressure (15 mmHg). Left heart cath Dr. Moreno Be 1/26/2023  Impression:  1. Severe native multivessel coronary artery disease with  of native mid-LAD,  of ostial LCx, and  of mid-RCA. 2. Patent LIMA to LAD. There is a severe stenosis in the distal LAD after the anastomosis. 3. Severely disease SVG to OM1 with sequential to OM2. The vein graft had a 90% distal stenosis near what was previously likely the anastomosis of the OM1. The OM1 appears to have been a smaller vessel than the OM2 and is now occluded. 4. Patent SVG to distal RCA. 5. PCI was performed to the distal SVG to OM1 with sequential to OM2 with an [place] 3.5 x 22 mm AMBER. There was initially poor re-flow following PTCA and stenting of the distal SVG. Copious IC vasodilators were given and the patient was started on IV Integrilin. Final angiography showed <10% residual stenosis with JAYLA 3 flow throughout the vein graft and all but the very distal small caliber segment of the native OM2 which still had no flow, but overall flow through the distal segment appeared to be gradually improving. The OM1 appeared to be occluded, was felt to be much smaller than the OM2, and was overall not an adequate target for percutaneous coronary intervention. Therefore, the decision was made to proceed with stenting across the anastomosis with the OM1.  6. LVEDP 20 mmHg.          Echocardiogram 9/9/2022  Summary   Left ventricular systolic function is normal with ejection fraction   estimated at 50-55%. Basal inferior hypokinesis   There is mild concentric left ventricular hypertrophy. Grade II diastolic dysfunction with elevated left ventricular filling   pressure. The left atrium is severely dilated. The right atrium is mildly dilated. Moderate mitral regurgitation. Mild aortic regurgitation. Moderate tricuspid regurgitation. Systolic pulmonary artery pressure (SPAP) is estimated at 58 mmHg consistent   with moderate pulmonary hypertension (Right atrial pressure of 3 mmHg). Small PFO     Stress test 9/9/2022  Conclusions    Summary  Normal LVEF >60%  Paradoxic septal motion  Enlarged RV  Small to moderate area of anterior and basal inferior ischemia    Overall, this would be considered, intermediate risk study        Impression and Plan:      Coronary artery disease without angina   -CABG surgery 2008   -Recent left heart catheterization with PCI SVG-OM1-OM2 1/2023   -continue eliquis/plavix (no longer on aspirin)   -continue statin   -no beta blocker due to bradycardia     Ischemic cardiomyopathy with mild LV dysfunction  History of HFrEF - appears compensated  Chronic dyspnea with exertion   New LE edema/pain  Moderate mitral regurgitation. Mild aortic regurgitation. Moderate tricuspid regurgitation. Moderate pulmonary hypertension  -no evidence for volume overload at this time  -hold lasix x 48 hrs and resume Saturday.   -oral rehydration therapy for acute kidney injury; dispo pending IM eval   -BMP x 1 week  -optimal treatment of CM with beta blocker in addition to ARB - PPM consideration warranted to allow effective Rx. Plan as below. Acute kidney injury   Hyperkalemia - likely hemolyzed, resolved   -as above. Atrial fibrillation - new onset 9/2022   Bradycardia, asymptomatic - BBlk was contributing.  Remains HR 45-55 at home despite no BBlk   -slow ventricular response    -Vitals connect monitor currently in place. Will supply additional patch to complete 2 week monitoring period. NSVT on monitor 9/2022 - polymorphic.    -status post recent PCI. -cardiac monitor as above     CVA 9/8/22.  -Carotid artery diease   -Small PFO - now on [facility]  - Suspect CVA ultimately due to AF identified at Dx    Hypertension - improved  Hyperlipidemia     Obstructive Sleep apnea    -compliant with CPAP    Esophageal ulcer - noted post PCI 1/2023  Anemia due to GIB   -continues PPI, now off triple therapy     We will have the patient follow up with Dr. Estefania Herring. Ella CARDOZA 3/22 as planned for further evaluation for PPM. He has indication in atrial fibrillation with slow ventricular response and CM requiring BB therapy despite absence of symptoms. There is no acute need for PPM this admission. Follow up with Dr. Mantilla is also scheduled. BMP x 1 week. Patient Active Problem List   Diagnosis    Hypertension    CAD (coronary artery disease)    Hyperlipidemia    CHF (congestive heart failure) ([facility])    Aortic aneurysm, abdominal    Sleep deprivation    Chronic pain of both shoulders    Arthritis    Hernia    Ringing in ears    GERD (gastroesophageal reflux disease)    Leg length inequality    DM (diabetes mellitus) ([facility])    Hypokalemia    TIA (transient ischemic attack)    CVA (cerebral vascular accident) ([address]    Bradycardia    Stroke-like symptoms    Atrial fibrillation ([address]    Nuclear senile cataract    SUNDEEP (obstructive sleep apnea)    Overweight (BMI 25.0-29. 9)    CAD in native artery    Chest pain    Shortness of breath    Abnormal stress test    UGI bleed    Acute systolic HF (heart failure) ([address]    CAP (community acquired pneumonia)    Reactive arthritis ([address]         I will address the patient's cardiac risk factors and adjusted pharmacologic treatment as needed. In addition, I have reinforced the need for patient directed risk factor modification.   All questions and concerns were addressed to the patient/family. Alternatives to my treatment were discussed. Thank you for allowing us to participate in the care of Marce Joyce. Please call me with any questions [phone].     Mile Gaines MD, [facility]  Cardiovascular Disease  [address]  [phone] [facility]  [phone] [address] [place] [address]  3/1/2023 8:46 AM

## 2023-03-01 NOTE — CARE COORDINATION
CASE MANAGEMENT DISCHARGE SUMMARY      Discharge to: Home with no needs      IMM given: (date) 2/27/2023    New Durable Medical Equipment ordered/agency: None    Transportation:    Family/car:on arrival      Confirmed discharge plan with:MD/RN     Patient: yes     Family:  yes   Name:Elma Sevilla[address] [place] [address] number:[phone]         RN, name: Kenzie Wadsworth

## 2023-03-01 NOTE — PROGRESS NOTES
Patient to have BMP in one week. Per Dr. Gwendolyn Still will provide one more monitor patch to patient. ,

## 2023-03-01 NOTE — PROGRESS NOTES
[facility] called stating pt HR dipped down to 36- assessed pt and found him to be asymptomatic. Obtained EKG to have a baseline in our records.

## 2023-03-01 NOTE — PLAN OF CARE
Problem: Discharge Planning  Goal: Discharge to home or other facility with appropriate resources  3/1/2023 1432 by Travis Morfin RN  Outcome: Completed  3/1/2023 0454 by Neeraj Francois RN  Outcome: Progressing  Flowsheets (Taken 3/1/2023 0454)  Discharge to home or other facility with appropriate resources:   Identify barriers to discharge with patient and caregiver   Arrange for needed discharge resources and transportation as appropriate  3/1/2023 0450 by Neeraj Francois RN  Flowsheets (Taken 3/1/2023 0723)  Discharge to home or other facility with appropriate resources:   Identify barriers to discharge with patient and caregiver   Arrange for needed discharge resources and transportation as appropriate     Problem: Pain  Goal: Verbalizes/displays adequate comfort level or baseline comfort level  3/1/2023 1432 by Travis Morfin RN  Outcome: Completed  Flowsheets (Taken 3/1/2023 0900 by Burgess Alonso RN)  Verbalizes/displays adequate comfort level or baseline comfort level: Encourage patient to monitor pain and request assistance  3/1/2023 0454 by Neeraj Francois RN  Outcome: Progressing  Flowsheets (Taken 3/1/2023 0454)  Verbalizes/displays adequate comfort level or baseline comfort level:   Encourage patient to monitor pain and request assistance   Assess pain using appropriate pain scale  3/1/2023 0450 by Neeraj Francois RN  Flowsheets (Taken 3/1/2023 9027)  Verbalizes/displays adequate comfort level or baseline comfort level:   Encourage patient to monitor pain and request assistance   Assess pain using appropriate pain scale     Problem: Safety - Adult  Goal: Free from fall injury  3/1/2023 1432 by Travis Morfin RN  Outcome: Completed  3/1/2023 0454 by Neeraj Francois RN  Outcome: Progressing  Flowsheets (Taken 3/1/2023 0454)  Free From Fall Injury: Instruct family/caregiver on patient safety  3/1/2023 0450 by Neeraj Francois RN  Flowsheets (Taken 3/1/2023 0450)  Free From Fall Injury: Instruct family/caregiver on patient safety     Problem: Chronic Conditions and Co-morbidities  Goal: Patient's chronic conditions and co-morbidity symptoms are monitored and maintained or improved  3/1/2023 1432 by Travis Morfin RN  Outcome: Completed  3/1/2023 0454 by Neeraj Francois RN  Outcome: Progressing  Flowsheets (Taken 3/1/2023 0454)  Care Plan - Patient's Chronic Conditions and Co-Morbidity Symptoms are Monitored and Maintained or Improved: Monitor and assess patient's chronic conditions and comorbid symptoms for stability, deterioration, or improvement  Note:   Patient's EF (Ejection Fraction) is greater than 40%    Heart Failure Medications:  Diuretics[de-identified] Furosemide    (One of the following REQUIRED for EF </= 40%/SYSTOLIC FAILURE but MAY be used in EF% >40%/DIASTOLIC FAILURE)        ACE[de-identified] None        ARB[de-identified] None         ARNI[de-identified] None    (Beta Blockers)  NON- Evidenced Based Beta Blocker (for EF% >40%/DIASTOLIC FAILURE): None    Evidenced Based Beta Blocker::(REQUIRED for EF% <40%/SYSTOLIC FAILURE) None  . .................................................................................................................................................. Patient's weights and intake/output reviewed: Yes    Patient's Last Weight: 84.3 kg obtained by standing scale. Difference of 1.1 lbs more than last documented weight.       Intake/Output Summary (Last 24 hours) at 3/1/2023 0451  Last data filed at 3/1/2023 0411  Gross per 24 hour   Intake 1140 ml   Output 600 ml   Net 540 ml       Education Booklet Provided: yes    Comorbidities Reviewed Yes    Patient has a past medical history of Aortic aneurysm, abdominal, Appendicitis, Arthritis, Bruises easily, CAD (coronary artery disease), CHF (congestive heart failure) ([facility] [address] Chronic back pain, Chronic pain of both shoulders, Complication of anesthesia, Gout, Heart disease, Hernia, HIGH CHOLESTEROL, Hyperlipidemia, Hypertension, Measles, Pancreatitis, Persistent cough, Ringing in ears, Sinus disorder, and Sleep deprivation. >>For CHF and Comorbidity documentation on Education Time and Topics, please see Education Tab    Progressive Mobility Assessment:  What is this patient's Current Level of Mobility?: Ambulatory- with Assistance  How was this patient Mobilized today?: Edge of Bed and  Up to Toilet/Shower, ambulated 15 ft                 With Whom? Nurse and PCA                 Level of Difficulty/Assistance: 1x Assist     Pt resting in bed at this time on room air. Pt denies shortness of breath. Pt without lower extremity edema. Patient and/or Family's stated Goal of Care this Admission: increase activity tolerance and be more comfortable prior to discharge      3/1/2023 0450 by Nely Ramos RN  Flowsheets (Taken 3/1/2023 2366)  Care Plan - Patient's Chronic Conditions and Co-Morbidity Symptoms are Monitored and Maintained or Improved: Monitor and assess patient's chronic conditions and comorbid symptoms for stability, deterioration, or improvement     Patient's EF (Ejection Fraction) is greater than 40%    Heart Failure Medications:  Diuretics[de-identified] Furosemide, Metalozone, Other, and None    (One of the following REQUIRED for EF </= 40%/SYSTOLIC FAILURE but MAY be used in EF% >40%/DIASTOLIC FAILURE)        ACE[de-identified] None        ARB[de-identified] None         ARNI[de-identified] None    (Beta Blockers)  NON- Evidenced Based Beta Blocker (for EF% >40%/DIASTOLIC FAILURE): None    Evidenced Based Beta Blocker::(REQUIRED for EF% <40%/SYSTOLIC FAILURE) None  . .................................................................................................................................................. Patient's weights and intake/output reviewed: No    Patient's Last Weight: 186 lbs obtained by standing scale. Difference of 1 lbs less than last documented weight.       Intake/Output Summary (Last 24 hours) at 3/1/2023 1433  Last data filed at 3/1/2023 0903  Gross per 24 hour   Intake 1080 ml   Output 500 ml Net 580 ml       Education Booklet Provided: yes    Comorbidities Reviewed Yes    Patient has a past medical history of Aortic aneurysm, abdominal, Appendicitis, Arthritis, Bruises easily, CAD (coronary artery disease), CHF (congestive heart failure) (HCC), Chronic back pain, Chronic pain of both shoulders, Complication of anesthesia, Gout, Heart disease, Hernia, HIGH CHOLESTEROL, Hyperlipidemia, Hypertension, Measles, Pancreatitis, Persistent cough, Ringing in ears, Sinus disorder, and Sleep deprivation. >>For CHF and Comorbidity documentation on Education Time and Topics, please see Education Tab    Progressive Mobility Assessment:  What is this patient's Current Level of Mobility?: Ambulatory-Up Ad Marisol  How was this patient Mobilized today?: Edge of Bed, Up to Chair, Bedside Commode,  Up to Toilet/Shower, Up in Room, and Up in Hallway, ambulated 20 ft                 With Whom? Nurse, PCA, PT, OT, and Self                 Level of Difficulty/Assistance: Independent     Pt sitting in bed at this time on room air. Pt denies shortness of breath. Pt with nonpitting lower extremity edema. Patient and/or Family's stated Goal of Care this Admission: reduce shortness of breath, increase activity tolerance, better understand heart failure and disease management, be more comfortable, and reduce lower extremity edema prior to discharge    Reviewed poc 20 mins.   Problem: Discharge Planning  Goal: Discharge to home or other facility with appropriate resources  3/1/2023 1432 by Husam Sanchez RN  Outcome: Completed  3/1/2023 0454 by Eller Bamberger, RN  Outcome: Progressing  Flowsheets (Taken 3/1/2023 0454)  Discharge to home or other facility with appropriate resources:   Identify barriers to discharge with patient and caregiver   Arrange for needed discharge resources and transportation as appropriate  3/1/2023 0450 by Eller Bamberger, RN  Flowsheets (Taken 3/1/2023 1868)  Discharge to home or other facility with appropriate resources:   Identify barriers to discharge with patient and caregiver   Arrange for needed discharge resources and transportation as appropriate     Problem: Pain  Goal: Verbalizes/displays adequate comfort level or baseline comfort level  3/1/2023 1432 by Yasmin Goodman RN  Outcome: Completed  Flowsheets (Taken 3/1/2023 0900 by Yuilsa Bowling RN)  Verbalizes/displays adequate comfort level or baseline comfort level: Encourage patient to monitor pain and request assistance  3/1/2023 0454 by Gia Soliman RN  Outcome: Progressing  Flowsheets (Taken 3/1/2023 0454)  Verbalizes/displays adequate comfort level or baseline comfort level:   Encourage patient to monitor pain and request assistance   Assess pain using appropriate pain scale  3/1/2023 0450 by Gia Soliman RN  Flowsheets (Taken 3/1/2023 4722)  Verbalizes/displays adequate comfort level or baseline comfort level:   Encourage patient to monitor pain and request assistance   Assess pain using appropriate pain scale     Problem: Safety - Adult  Goal: Free from fall injury  3/1/2023 1432 by Yasmin Goodman RN  Outcome: Completed  3/1/2023 0454 by Gia Soliman RN  Outcome: Progressing  Flowsheets (Taken 3/1/2023 0454)  Free From Fall Injury: Instruct family/caregiver on patient safety  3/1/2023 0450 by Gia Soliman RN  Flowsheets (Taken 3/1/2023 0450)  Free From Fall Injury: Instruct family/caregiver on patient safety     Problem: Chronic Conditions and Co-morbidities  Goal: Patient's chronic conditions and co-morbidity symptoms are monitored and maintained or improved  3/1/2023 1432 by Yasmin Goodman RN  Outcome: Completed  3/1/2023 0454 by Gia Soliman RN  Outcome: Progressing  Flowsheets (Taken 3/1/2023 0454)  Care Plan - Patient's Chronic Conditions and Co-Morbidity Symptoms are Monitored and Maintained or Improved: Monitor and assess patient's chronic conditions and comorbid symptoms for stability, deterioration, or improvement  Note: Patient's EF (Ejection Fraction) is greater than 40%    Heart Failure Medications:  Diuretics[de-identified] Furosemide    (One of the following REQUIRED for EF </= 40%/SYSTOLIC FAILURE but MAY be used in EF% >40%/DIASTOLIC FAILURE)        ACE[de-identified] None        ARB[de-identified] None         ARNI[de-identified] None    (Beta Blockers)  NON- Evidenced Based Beta Blocker (for EF% >40%/DIASTOLIC FAILURE): None    Evidenced Based Beta Blocker::(REQUIRED for EF% <40%/SYSTOLIC FAILURE) None  . .................................................................................................................................................. Patient's weights and intake/output reviewed: Yes    Patient's Last Weight: 84.3 kg obtained by standing scale. Difference of 1.1 lbs more than last documented weight. Intake/Output Summary (Last 24 hours) at 3/1/2023 0451  Last data filed at 3/1/2023 0411  Gross per 24 hour   Intake 1140 ml   Output 600 ml   Net 540 ml       Education Booklet Provided: yes    Comorbidities Reviewed Yes    Patient has a past medical history of Aortic aneurysm, abdominal, Appendicitis, Arthritis, Bruises easily, CAD (coronary artery disease), CHF (congestive heart failure) (HCC), Chronic back pain, Chronic pain of both shoulders, Complication of anesthesia, Gout, Heart disease, Hernia, HIGH CHOLESTEROL, Hyperlipidemia, Hypertension, Measles, Pancreatitis, Persistent cough, Ringing in ears, Sinus disorder, and Sleep deprivation. >>For CHF and Comorbidity documentation on Education Time and Topics, please see Education Tab    Progressive Mobility Assessment:  What is this patient's Current Level of Mobility?: Ambulatory- with Assistance  How was this patient Mobilized today?: Edge of Bed and  Up to Toilet/Shower, ambulated 15 ft                 With Whom? Nurse and PCA                 Level of Difficulty/Assistance: 1x Assist     Pt resting in bed at this time on room air. Pt denies shortness of breath. Pt without lower extremity edema. Patient and/or Family's stated Goal of Care this Admission: increase activity tolerance and be more comfortable prior to discharge      3/1/2023 0450 by Kana Garcia RN  Flowsheets (Taken 3/1/2023 5222)  Care Plan - Patient's Chronic Conditions and Co-Morbidity Symptoms are Monitored and Maintained or Improved: Monitor and assess patient's chronic conditions and comorbid symptoms for stability, deterioration, or improvement       :

## 2023-03-01 NOTE — PROGRESS NOTES
Physician Progress Note      PATIENT:               Orly Vargas  CSN #:                  [id number]  :                       1940  ADMIT DATE:       2023 2:28 PM  [address] [place] DATE:        3/1/2023 3:50 PM  RESPONDING  PROVIDER #:        Jamari March MD          QUERY TEXT:    Patient admitted with Ggout, noted to have atrial fibrillation and is   maintained on Eliquis. If possible, please document in progress notes and   discharge summary if you are evaluating and/or treating any of the following: The medical record reflects the following:  Risk Factors: advanced age, CHF, DM 2, HTN  Clinical Indicators: increased risk of clots d/t afib  Treatment: medical management, Eliquis, supportive care    Thank you,  Colton Israel RN, CDS  [email or web address]  Options provided:  -- Secondary hypercoagulable state in a patient with atrial fibrillation  -- Other - I will add my own diagnosis  -- Disagree - Not applicable / Not valid  -- Disagree - Clinically unable to determine / Unknown  -- Refer to Clinical Documentation Reviewer    PROVIDER RESPONSE TEXT:    This patient has secondary hypercoagulable state in a patient with atrial   fibrillation.     Query created by: Jessica Vora on 3/1/2023 2:30 PM      Electronically signed by:  Jamari March MD 3/1/2023 4:35 PM

## 2023-03-02 ENCOUNTER — TELEPHONE (OUTPATIENT)
Dept: FAMILY MEDICINE CLINIC | Age: 83
End: 2023-03-02

## 2023-03-02 NOTE — TELEPHONE ENCOUNTER
Patient was just released from hospital states he was sent home with out pain medication and Is now having a flare up and is asking if there is something that provider rosas call in before his hospital follow up appointment . Offered sooner appointment with another provider which patient declined .  Patient request be called in to  [facility] [phone] - [id number] [phone]

## 2023-03-03 ENCOUNTER — SCHEDULED TELEPHONE ENCOUNTER (OUTPATIENT)
Dept: FAMILY MEDICINE CLINIC | Age: 83
End: 2023-03-03
Payer: MEDICARE

## 2023-03-03 ENCOUNTER — FOLLOWUP TELEPHONE ENCOUNTER (OUTPATIENT)
Dept: TELEMETRY | Age: 83
End: 2023-03-03

## 2023-03-03 ENCOUNTER — TELEPHONE (OUTPATIENT)
Dept: FAMILY MEDICINE CLINIC | Age: 83
End: 2023-03-03

## 2023-03-03 DIAGNOSIS — M02.39 REACTIVE ARTHRITIS OF MULTIPLE SITES (HCC): ICD-10-CM

## 2023-03-03 DIAGNOSIS — R60.0 EDEMA OF BOTH FEET: Primary | ICD-10-CM

## 2023-03-03 LAB
BLOOD CULTURE, ROUTINE: NORMAL
CULTURE, BLOOD 2: NORMAL

## 2023-03-03 PROCEDURE — 99442 PR PHYS/QHP TELEPHONE EVALUATION 11-20 MIN: CPT | Performed by: FAMILY MEDICINE

## 2023-03-03 RX ORDER — GLIPIZIDE 5 MG/1
5 TABLET ORAL DAILY
Qty: 5 TABLET | Refills: 0 | Status: SHIPPED | OUTPATIENT
Start: 2023-03-03 | End: 2023-03-08

## 2023-03-03 RX ORDER — PREDNISONE 20 MG/1
20 TABLET ORAL DAILY
Qty: 5 TABLET | Refills: 0 | Status: SHIPPED | OUTPATIENT
Start: 2023-03-03 | End: 2023-03-08

## 2023-03-03 NOTE — TELEPHONE ENCOUNTER
Second attempt at hospital follow up. VM box not set up and unable to leave VM. Will attempt again at a later time.  Zev Corcoran RN

## 2023-03-03 NOTE — TELEPHONE ENCOUNTER
First attempt at hospital follow up. VM box not set up and unable to leave VM. Will attempt again at later time.   Violetta Jackson, RN

## 2023-03-03 NOTE — TELEPHONE ENCOUNTER
Care Transitions Initial Follow Up Call    Call within 2 business days of discharge: Yes     Patient: Hoda Ogden Patient : 1940 MRN: [id number]    [unfilled]    RARS: Readmission Risk Score: 23       Spoke with: patient     Discharge department/facility: home    Non-face-to-face services provided:  Scheduled appointment with PCP-3/7/23    Spoke to patient for hospital follow up. Pt stated that was feeling great at discharge. No swelling or pain or shortness of breath. Todays weight is 185. Pt states that he now has swelling and pain. Denies any shortness of breath. Concerned about pain more than anything. Does not feel it is concerning as his pain is tolerable. Denies any current needs.        Follow Up  Future Appointments   Date Time Provider Leticia Michelle   3/7/2023  2:15 PM DO Will Mayes 45   3/22/2023 10:00 AM RUPERTO Vivar - PEGGY Silvestre [facility]   3/31/2023 10:00 AM Julio Fisher MD [facility]   2023  1:00 PM DO Will Mayes 45       Ace Grissom RN

## 2023-03-03 NOTE — PROGRESS NOTES
Bill Crow is a 82 y.o. male evaluated via telephone on 3/3/2023 for No chief complaint on file.  .        Documentation:  I communicated with the patient and/or health care decision maker about cellulitis of feet.   Details of this discussion including any medical advice provided: feet cellulitis    Was admitted to [facility] from 02/27 - 03/01/23 for bilateral foot pain, CRP and ESR elevated.    States that he was given morphine and percocet, feet in hospital looked like footballs with twigs sticking out, felt worse than gout. When he got home from hospital after an hour feet started swelling again, was discharged without pain medications.  Rx Prednisone 20 mg daily and Glipizide 5 mg.    Currently both feet are erythremic and swollen from base of toes upwards, has been walking again, worked in yard. Hasn't checked glucose readings.    A/P  1) Bilateral foot edema  - refilled Prednisone 20 mg daily and Glipizide 5 mg daily for 5 days until pt's f/u in 5 days  - counseled pt to call on call provider with any worsening sx     Total Time: minutes: 11-20 minutes    Bill Crow was evaluated through a synchronous (real-time) audio encounter. Patient identification was verified at the start of the visit. He (or guardian if applicable) is aware that this is a billable service, which includes applicable co-pays. This visit was conducted with the patient's (and/or legal guardian's) verbal consent. He has not had a related appointment within my department in the past 7 days or scheduled within the next 24 hours.   The patient was located at Home: [address] [place] [address].  The provider was located at Home (Appt Dept State): [place].    Note: not billable if this call serves to triage the patient into an appointment for the relevant concern    Foreign Gonzalez DO

## 2023-03-03 NOTE — TELEPHONE ENCOUNTER
Care Transitions Initial Follow Up Call    Outreach made within 2 business days of discharge: Yes    Patient: Camille Waddell Patient : 1940   MRN: [id number]  Reason for Admission: There are no discharge diagnoses documented for the most recent discharge. Discharge Date: 3/1/23       Spoke with: Syl Cabral    Discharge department/facility: [facility]    TCM Interactive Patient Contact:  Was patient able to fill all prescriptions: Yes  Was patient instructed to bring all medications to the follow-up visit: Yes  Is patient taking all medications as directed in the discharge summary?  Yes  Does patient understand their discharge instructions: Yes  Does patient have questions or concerns that need addressed prior to 7-14 day follow up office visit: yes - feet are swelling again    Scheduled appointment with PCP within 7-14 days    Follow Up  Future Appointments   Date Time Provider Leticia Michelle   3/3/2023  4:20 PM DO FRANKI Lora [id number]   3/7/2023  2:15 PM DO FRANKI Lora [id number]   3/22/2023 10:00 AM RUPERTO Lewis - PEGG[facility]   3/31/2023 10:00 AM Kera Morfin MD [facility]   2023  1:00 PM DO César Lora [address]

## 2023-03-07 ENCOUNTER — OFFICE VISIT (OUTPATIENT)
Dept: FAMILY MEDICINE CLINIC | Age: 83
End: 2023-03-07

## 2023-03-07 VITALS
HEART RATE: 70 BPM | SYSTOLIC BLOOD PRESSURE: 138 MMHG | BODY MASS INDEX: 27.32 KG/M2 | TEMPERATURE: 96.6 F | DIASTOLIC BLOOD PRESSURE: 73 MMHG | WEIGHT: 185 LBS | RESPIRATION RATE: 16 BRPM | OXYGEN SATURATION: 98 %

## 2023-03-07 DIAGNOSIS — E79.0 ELEVATED URIC ACID IN BLOOD: ICD-10-CM

## 2023-03-07 DIAGNOSIS — Z09 HOSPITAL DISCHARGE FOLLOW-UP: Primary | ICD-10-CM

## 2023-03-07 DIAGNOSIS — M02.30 REACTIVE ARTHRITIS, UNSPECIFIED SITE (HCC): ICD-10-CM

## 2023-03-07 DIAGNOSIS — R60.0 EDEMA OF BOTH FEET: ICD-10-CM

## 2023-03-07 LAB
ANION GAP SERPL CALCULATED.3IONS-SCNC: 16 MMOL/L (ref 3–16)
BUN BLDV-MCNC: 33 MG/DL (ref 7–20)
CALCIUM SERPL-MCNC: 9.4 MG/DL (ref 8.3–10.6)
CHLORIDE BLD-SCNC: 102 MMOL/L (ref 99–110)
CO2: 23 MMOL/L (ref 21–32)
CREAT SERPL-MCNC: 1.5 MG/DL (ref 0.8–1.3)
GFR SERPL CREATININE-BSD FRML MDRD: 46 ML/MIN/{1.73_M2}
GLUCOSE BLD-MCNC: 209 MG/DL (ref 70–99)
POTASSIUM SERPL-SCNC: 4.4 MMOL/L (ref 3.5–5.1)
SODIUM BLD-SCNC: 141 MMOL/L (ref 136–145)

## 2023-03-07 NOTE — PROGRESS NOTES
Post-Discharge Transitional Care  Follow Up      Sangeetha Rubi   YOB: 1940    Date of Office Visit:  3/7/2023  Date of Hospital Admission: 2/27/23  Date of Hospital Discharge: 3/1/23  Risk of hospital readmission (high >=14%. Medium >=10%) :Readmission Risk Score: 19      Care management risk score Rising risk (score 2-5) and Complex Care (Scores >=6): No Risk Score On File     Non face to face  following discharge, date last encounter closed (first attempt may have been earlier): 03/03/2023    Call initiated 2 business days of discharge: Yes    ASSESSMENT/PLAN:   Hospital discharge follow-up  -     CT DISCHARGE MEDS RECONCILED W/ CURRENT OUTPATIENT MED LIST  Edema of both feet  -     External Referral To Podiatry  Elevated uric acid in blood  -     Uric Acid; Future    Medical Decision Making: low complexity  Return in about 3 weeks (around 3/28/2023) for Foot Edema/Podiatrist Follow-up. Subjective:   HPI:  Follow up of Hospital problems/diagnosis(es): Cellulitis feet, elevated CRP and ESR    Patient stated in his last visit on March 3, 2023 (Telephone Visit) that when he got home from the hospital his feet began to swell again. He was prescribed prednisone 20 mg daily and glipizide 5 mg daily his prednisone 20 mg daily was refilled along with the glipizide 5 mg for 5 days until today's appointment. Inpatient course: Discharge summary reviewed- see chart. Interval history/Current status: Stable but feet are still hurting, has had improvement, has been taking the Prednisone and Glipizide, hasn't checked his glucose but doesn't feel it has bee elevated. Denies SOB or CP.     Patient Active Problem List   Diagnosis    Hypertension    CAD (coronary artery disease)    Hyperlipidemia    CHF (congestive heart failure) (HCC)    Aortic aneurysm, abdominal    Sleep deprivation    Chronic pain of both shoulders    Arthritis    Hernia    Ringing in ears    GERD (gastroesophageal reflux disease) Leg length inequality    DM (diabetes mellitus) (HCC)    Hypokalemia    TIA (transient ischemic attack)    CVA (cerebral vascular accident) ([address]    Bradycardia    Stroke-like symptoms    Atrial fibrillation ([address]    Nuclear senile cataract    SUNDEEP (obstructive sleep apnea)    Overweight (BMI 25.0-29. 9)    CAD in native artery    Chest pain    Shortness of breath    Abnormal stress test    UGI bleed    Acute systolic HF (heart failure) ([address]    CAP (community acquired pneumonia)    Reactive arthritis ([address]    Ischemic cardiomyopathy       Medications listed as ordered at the time of discharge from hospital     Medication List            Accurate as of March 7, 2023  3:03 PM. If you have any questions, ask your nurse or doctor.                 CHANGE how you take these medications      Accu-Chek Multiclix Lancets Misc  Test blood sugar qd  What changed: additional instructions            CONTINUE taking these medications      amLODIPine 5 MG tablet  Commonly known as: Norvasc  Take 1 tablet by mouth in the morning and at bedtime     apixaban 5 MG Tabs tablet  Commonly known as: ELIQUIS     atorvastatin 40 MG tablet  Commonly known as: LIPITOR  TAKE 1 TABLET EVERY NIGHT     blood glucose test strips strip  Commonly known as: Accu-Chek Dulce  Test blood sugar qd.     clopidogrel 75 MG tablet  Commonly known as: PLAVIX  Take 1 tablet by mouth daily     furosemide 40 MG tablet  Commonly known as: LASIX  Take 1 tablet by mouth daily     glipiZIDE 5 MG tablet  Commonly known as: GLUCOTROL  Take 1 tablet by mouth daily for 5 days     olmesartan 40 MG tablet  Commonly known as: BENICAR  Take 1 tablet by mouth daily     pantoprazole 40 MG tablet  Commonly known as: PROTONIX  Take 1 tablet by mouth 2 times daily (before meals)     potassium chloride 20 MEQ extended release tablet  Commonly known as: KLOR-CON M  Take 1 tablet by mouth daily     predniSONE 20 MG tablet  Commonly known as: DELTASONE  Take 1 tablet by mouth daily for 5 days                Medications marked \"taking\" at this time  Outpatient Medications Marked as Taking for the 3/7/23 encounter (Office Visit) with Karan Stinson, DO   Medication Sig Dispense Refill    predniSONE (DELTASONE) 20 MG tablet Take 1 tablet by mouth daily for 5 days 5 tablet 0    glipiZIDE (GLUCOTROL) 5 MG tablet Take 1 tablet by mouth daily for 5 days 5 tablet 0    furosemide (LASIX) 40 MG tablet Take 1 tablet by mouth daily 90 tablet 1    potassium chloride (KLOR-CON M) 20 MEQ extended release tablet Take 1 tablet by mouth daily 90 tablet 1    apixaban (ELIQUIS) 5 MG TABS tablet Take by mouth 2 times daily      amLODIPine (NORVASC) 5 MG tablet Take 1 tablet by mouth in the morning and at bedtime 180 tablet 3    clopidogrel (PLAVIX) 75 MG tablet Take 1 tablet by mouth daily 30 tablet 3    pantoprazole (PROTONIX) 40 MG tablet Take 1 tablet by mouth 2 times daily (before meals) 30 tablet 3    atorvastatin (LIPITOR) 40 MG tablet TAKE 1 TABLET EVERY NIGHT 90 tablet 3    olmesartan (BENICAR) 40 MG tablet Take 1 tablet by mouth daily 90 tablet 3    ACCU-CHEK MULTICLIX LANCETS MISC Test blood sugar qd (Patient taking differently: Test blood sugar qd    AS NEEDED) 100 each 3    glucose blood VI test strips (ACCU-CHEK ION) strip Test blood sugar qd. 100 strip 3        Medications patient taking as of now reconciled against medications ordered at time of hospital discharge: Yes    A comprehensive review of systems was negative except for what was noted in the HPI.     Objective:    /73 Comment: home reading  Pulse 70   Temp (!) 96.6 °F (35.9 °C) (Temporal)   Resp 16   Wt 185 lb (83.9 kg)   SpO2 98%   BMI 27.32 kg/m²     General Appearance: alert and oriented to person, place and time, well-developed and well-nourished, in no acute distress  Pulmonary/Chest: clear to auscultation bilaterally- no wheezes, rales or rhonchi, normal air movement, no respiratory distress  Cardiovascular: normal rate, normal S1 and S2, and systolic murmur present  Abdomen: soft, non-tender, non-distended, normal bowel sounds, no masses or organomegaly  Extremities, Edematous bilateral ankles and feet, warm, no erythema, sensation well preserved, good peripheral pulses . External referral Podiatry - Dr. Finley Shallow  Continue Prednisone 20 mg daily and Glipizide 5 mg daily  Recheck uric Acid in 1 week. An electronic signature was used to authenticate this note.   --Addison Swanson, DO

## 2023-03-13 NOTE — TELEPHONE ENCOUNTER
The scanned image does not include the pause  Please check report and scan in the image w/ the pause (or hand to me)

## 2023-03-13 NOTE — TELEPHONE ENCOUNTER
Dr. Jam Frazier - when I got into the  site to find the \"pause\" report, I could only see 2 event reports, both AFIB that have been scanned in previously, and the EOS which was generated today. I will give you the report tomorrow to look at. The only pause is from 2/24/23 ? ? The patient completed the monitor on 3/8/23 about 1:45 PM. I am confused about a phone call on 3/13 regarding an event on 2/24. I will call the rep tomorrow and let you know.

## 2023-03-14 DIAGNOSIS — E79.0 ELEVATED URIC ACID IN BLOOD: ICD-10-CM

## 2023-03-14 LAB — URATE SERPL-MCNC: 6.5 MG/DL (ref 3.5–7.2)

## 2023-03-21 NOTE — PROGRESS NOTES
risk study          Echo 9/09/2022:   Conclusions   Summary   Left ventricular systolic function is normal with ejection fraction   estimated at 50-55%. Basal inferior hypokinesis   There is mild concentric left ventricular hypertrophy. Grade II diastolic dysfunction with elevated left ventricular filling   pressure. The left atrium is severely dilated. The right atrium is mildly dilated. Moderate mitral regurgitation. Mild aortic regurgitation. Moderate tricuspid regurgitation. Systolic pulmonary artery pressure (SPAP) is estimated at 58 mmHg consistent   with moderate pulmonary hypertension (Right atrial pressure of 3 mmHg). Small PFO     All labs and testing reviewed. CARDIOLOGY LABS:   CBC: No results for input(s): WBC, HGB, HCT, PLT in the last 72 hours. BMP: No results for input(s): NA, K, CO2, BUN, CREATININE, LABGLOM, GLUCOSE in the last 72 hours. PT/INR: No results for input(s): PROTIME, INR in the last 72 hours. APTT:No results for input(s): APTT in the last 72 hours. FASTING LIPID PANEL:  Lab Results   Component Value Date/Time    HDL 38 02/28/2023 04:10 AM    HDL 37 03/21/2011 09:43 AM    LDLCALC 41 02/28/2023 04:10 AM    TRIG 41 02/28/2023 04:10 AM     LIVER PROFILE:No results for input(s): AST, ALT, ALB in the last 72 hours. IMPRESSION:    Patient Active Problem List   Diagnosis    Hypertension    CAD (coronary artery disease)    Hyperlipidemia    CHF (congestive heart failure) (HCC)    Aortic aneurysm, abdominal    Sleep deprivation    Chronic pain of both shoulders    Arthritis    Hernia    Ringing in ears    GERD (gastroesophageal reflux disease)    Leg length inequality    DM (diabetes mellitus) (HCC)    Hypokalemia    TIA (transient ischemic attack)    CVA (cerebral vascular accident) ([address]    Bradycardia    Stroke-like symptoms    Atrial fibrillation ([address]    Nuclear senile cataract    SUNDEEP (obstructive sleep apnea)    Overweight (BMI 25.0-29. 9)    CAD in native artery

## 2023-03-22 ENCOUNTER — OFFICE VISIT (OUTPATIENT)
Dept: CARDIOLOGY CLINIC | Age: 83
End: 2023-03-22

## 2023-03-22 VITALS
DIASTOLIC BLOOD PRESSURE: 60 MMHG | BODY MASS INDEX: 27.99 KG/M2 | HEIGHT: 69 IN | WEIGHT: 189 LBS | HEART RATE: 68 BPM | OXYGEN SATURATION: 95 % | SYSTOLIC BLOOD PRESSURE: 160 MMHG

## 2023-03-22 DIAGNOSIS — I44.30 AV BLOCK: ICD-10-CM

## 2023-03-22 DIAGNOSIS — R00.1 BRADYCARDIA: ICD-10-CM

## 2023-03-22 DIAGNOSIS — I25.5 ISCHEMIC CARDIOMYOPATHY: ICD-10-CM

## 2023-03-22 DIAGNOSIS — I48.0 PAF (PAROXYSMAL ATRIAL FIBRILLATION) (HCC): Primary | ICD-10-CM

## 2023-03-22 NOTE — PATIENT INSTRUCTIONS
No changes today, continue your current medications    Consider the Watchman device due to bleeding risk and Eliquis     Will arrange appointment with Dr. Chad Caro to discuss pacemaker implant further.  We will call you to get this appointment scheduled     Follow up with Dr. Eunice Enriquez as scheduled on 3/31/2023

## 2023-03-24 ENCOUNTER — TELEPHONE (OUTPATIENT)
Dept: CARDIOLOGY CLINIC | Age: 83
End: 2023-03-24

## 2023-03-24 DIAGNOSIS — I47.29 NSVT (NONSUSTAINED VENTRICULAR TACHYCARDIA) (HCC): ICD-10-CM

## 2023-03-24 DIAGNOSIS — I25.10 CORONARY ARTERY DISEASE INVOLVING NATIVE CORONARY ARTERY OF NATIVE HEART WITHOUT ANGINA PECTORIS: ICD-10-CM

## 2023-03-24 DIAGNOSIS — I48.91 ATRIAL FIBRILLATION, UNSPECIFIED TYPE (HCC): ICD-10-CM

## 2023-03-24 NOTE — TELEPHONE ENCOUNTER
Can we get him scheduled for an appointment with Dr. Hema Mckeon in the next few weeks that Bang is in office? Would prefer within 1 month. Need to discuss possible pacemaker implant given recent cardiac monitor findings. I discussed this with the patient in the office. Thanks!

## 2023-03-24 NOTE — TELEPHONE ENCOUNTER
Please call patient to schedule with CLAUDIA on 4/11/2023 @ 10:15AM per [facility] request. Thanks.

## 2023-03-25 DIAGNOSIS — R00.1 BRADYCARDIA: Primary | ICD-10-CM

## 2023-03-28 ENCOUNTER — OFFICE VISIT (OUTPATIENT)
Dept: FAMILY MEDICINE CLINIC | Age: 83
End: 2023-03-28
Payer: MEDICARE

## 2023-03-28 VITALS
SYSTOLIC BLOOD PRESSURE: 135 MMHG | OXYGEN SATURATION: 97 % | BODY MASS INDEX: 27.85 KG/M2 | WEIGHT: 188.6 LBS | DIASTOLIC BLOOD PRESSURE: 65 MMHG | TEMPERATURE: 96.9 F | RESPIRATION RATE: 16 BRPM | HEART RATE: 80 BPM

## 2023-03-28 DIAGNOSIS — E79.0 ELEVATED URIC ACID IN BLOOD: ICD-10-CM

## 2023-03-28 DIAGNOSIS — R60.0 EDEMA OF BOTH FEET: Primary | ICD-10-CM

## 2023-03-28 DIAGNOSIS — I10 ESSENTIAL HYPERTENSION: ICD-10-CM

## 2023-03-28 PROCEDURE — 1123F ACP DISCUSS/DSCN MKR DOCD: CPT | Performed by: FAMILY MEDICINE

## 2023-03-28 PROCEDURE — G8417 CALC BMI ABV UP PARAM F/U: HCPCS | Performed by: FAMILY MEDICINE

## 2023-03-28 PROCEDURE — G8484 FLU IMMUNIZE NO ADMIN: HCPCS | Performed by: FAMILY MEDICINE

## 2023-03-28 PROCEDURE — 1111F DSCHRG MED/CURRENT MED MERGE: CPT | Performed by: FAMILY MEDICINE

## 2023-03-28 PROCEDURE — 1036F TOBACCO NON-USER: CPT | Performed by: FAMILY MEDICINE

## 2023-03-28 PROCEDURE — G8427 DOCREV CUR MEDS BY ELIG CLIN: HCPCS | Performed by: FAMILY MEDICINE

## 2023-03-28 PROCEDURE — 3075F SYST BP GE 130 - 139MM HG: CPT | Performed by: FAMILY MEDICINE

## 2023-03-28 PROCEDURE — 3078F DIAST BP <80 MM HG: CPT | Performed by: FAMILY MEDICINE

## 2023-03-28 PROCEDURE — 99214 OFFICE O/P EST MOD 30 MIN: CPT | Performed by: FAMILY MEDICINE

## 2023-03-28 SDOH — ECONOMIC STABILITY: INCOME INSECURITY: HOW HARD IS IT FOR YOU TO PAY FOR THE VERY BASICS LIKE FOOD, HOUSING, MEDICAL CARE, AND HEATING?: NOT HARD AT ALL

## 2023-03-28 SDOH — ECONOMIC STABILITY: FOOD INSECURITY: WITHIN THE PAST 12 MONTHS, THE FOOD YOU BOUGHT JUST DIDN'T LAST AND YOU DIDN'T HAVE MONEY TO GET MORE.: NEVER TRUE

## 2023-03-28 SDOH — ECONOMIC STABILITY: FOOD INSECURITY: WITHIN THE PAST 12 MONTHS, YOU WORRIED THAT YOUR FOOD WOULD RUN OUT BEFORE YOU GOT MONEY TO BUY MORE.: NEVER TRUE

## 2023-03-28 ASSESSMENT — PATIENT HEALTH QUESTIONNAIRE - PHQ9
SUM OF ALL RESPONSES TO PHQ9 QUESTIONS 1 & 2: 0
SUM OF ALL RESPONSES TO PHQ QUESTIONS 1-9: 0
1. LITTLE INTEREST OR PLEASURE IN DOING THINGS: 0
2. FEELING DOWN, DEPRESSED OR HOPELESS: 0
SUM OF ALL RESPONSES TO PHQ QUESTIONS 1-9: 0

## 2023-03-28 NOTE — PROGRESS NOTES
[id number] [facility], (age 12y+), IM, 48 mcg/0.5 mL 09/27/2022    COVID-19, [place] Vaccine, Vaccine Unspecified 01/21/2021, 10/26/2021    Influenza 09/25/2013    Influenza Vaccine, unspecified formulation 11/02/2016    Influenza Whole 09/23/2010    Influenza, FLUAD, (age 72 y+), Adjuvanted, 0.5mL 09/14/2020, 09/13/2021    Influenza, FLUZONE (age 72 y+), High Dose, 0.7mL 09/27/2022    Influenza, High Dose (Fluzone 65 yrs and older) 10/17/2014, 10/08/2017, 09/20/2018    Influenza, Triv, inactivated, subunit, adjuvanted, IM (Fluad 65 yrs and older) 09/20/2018, 09/19/2019    Pneumococcal Conjugate 7-valent (Murali Pouch) 01/26/2007    Pneumococcal, PCV-13, PREVNAR 13, (age 6w+), IM, 0.5mL 09/20/2018    Pneumococcal, PPSV23, PNEUMOVAX 23, (age 2y+), SC/IM, 0.5mL 09/13/2021    TDaP, ADACEL (age 10y-63y), Claressa Maggy (age 10y+), IM, 0.5mL 01/30/2018       Allergies   Allergen Reactions    Coumadin [Warfarin] Other (See Comments)     Makes feel funny    Omeprazole Other (See Comments)     headaches    Vioxx        Orders Only on 03/14/2023   Component Date Value Ref Range Status    Uric Acid, Serum 03/14/2023 6.5  3.5 - 7.2 mg/dL Final       Review of Systems   Cardiovascular:  Positive for leg swelling. /65 Comment: average home reading  Pulse 80   Temp 96.9 °F (36.1 °C) (Temporal)   Resp 16   Wt 188 lb 9.6 oz (85.5 kg)   SpO2 97%   BMI 27.85 kg/m²     Physical Exam  Vitals reviewed. Musculoskeletal:         General: Swelling (bilateral foot) present. Psychiatric:         Mood and Affect: Mood normal.       Plan   Diagnosis Orders   1. Edema of both feet  Some improvement but still present,       2. Essential hypertension  Stable ,continue Lasix 40 mg daily, Amlodipine 5 mg daily, and Benicar 40 mg daily        Pt will ask Podiatrist tomorrow if any of the other diuretics can be used for edema/BP that won't interfere with uric acid excretion. Prior to Visit Medications    Medication Sig Taking?

## 2023-03-29 LAB — URATE SERPL-MCNC: 5.8 MG/DL (ref 3.5–7.2)

## 2023-03-31 ENCOUNTER — OFFICE VISIT (OUTPATIENT)
Dept: CARDIOLOGY CLINIC | Age: 83
End: 2023-03-31
Payer: MEDICARE

## 2023-03-31 VITALS
BODY MASS INDEX: 27.99 KG/M2 | DIASTOLIC BLOOD PRESSURE: 64 MMHG | OXYGEN SATURATION: 97 % | WEIGHT: 189 LBS | HEART RATE: 73 BPM | HEIGHT: 69 IN | SYSTOLIC BLOOD PRESSURE: 166 MMHG

## 2023-03-31 DIAGNOSIS — I10 PRIMARY HYPERTENSION: Primary | ICD-10-CM

## 2023-03-31 DIAGNOSIS — I48.91 ATRIAL FIBRILLATION, UNSPECIFIED TYPE (HCC): ICD-10-CM

## 2023-03-31 PROCEDURE — 99214 OFFICE O/P EST MOD 30 MIN: CPT | Performed by: INTERNAL MEDICINE

## 2023-03-31 PROCEDURE — 1111F DSCHRG MED/CURRENT MED MERGE: CPT | Performed by: INTERNAL MEDICINE

## 2023-03-31 PROCEDURE — 1123F ACP DISCUSS/DSCN MKR DOCD: CPT | Performed by: INTERNAL MEDICINE

## 2023-03-31 PROCEDURE — 1036F TOBACCO NON-USER: CPT | Performed by: INTERNAL MEDICINE

## 2023-03-31 PROCEDURE — G8484 FLU IMMUNIZE NO ADMIN: HCPCS | Performed by: INTERNAL MEDICINE

## 2023-03-31 PROCEDURE — 3077F SYST BP >= 140 MM HG: CPT | Performed by: INTERNAL MEDICINE

## 2023-03-31 PROCEDURE — G8427 DOCREV CUR MEDS BY ELIG CLIN: HCPCS | Performed by: INTERNAL MEDICINE

## 2023-03-31 PROCEDURE — 3078F DIAST BP <80 MM HG: CPT | Performed by: INTERNAL MEDICINE

## 2023-03-31 PROCEDURE — G8417 CALC BMI ABV UP PARAM F/U: HCPCS | Performed by: INTERNAL MEDICINE

## 2023-03-31 RX ORDER — CLONIDINE HYDROCHLORIDE 0.1 MG/1
0.1 TABLET ORAL 2 TIMES DAILY
Qty: 60 TABLET | Refills: 3 | Status: SHIPPED | OUTPATIENT
Start: 2023-03-31

## 2023-03-31 NOTE — PATIENT INSTRUCTIONS
Plan:  ~Start clonidine 0.1 mg twice a day   ~You can hold lasix while you are having a gout flare up. Monitor weight daily, swelling, and shortness of breath . If weight is up over 5 lbs then restart Lasix   Cardiac medications reviewed including indications and pertinent side effects. Medication list updated at this visit. Check blood pressure and heart rate at home a few times per week- keep a log with dates and times and bring to office visit- blood pressure should be less than 140/90 after being on new medications for one month.    Regular exercise and following a healthy diet encouraged   Follow up with me in 6 months

## 2023-03-31 NOTE — PROGRESS NOTES
equal   Abdomen:  No masses or tenderness  Liver/Spleen: No Abnormalities Noted  Neurological/Psychiatric:  Alert and oriented in all spheres  Moves all extremities well  Exhibits normal gait balance and coordination  No abnormalities of mood, affect, memory, mentation, or behavior are noted    CTA head and neck 9/8/2022  1. Atherosclerosis contributes to less than 50% stenosis involving the proximal right cervical ICA by NASCET criteria. 2. Atherosclerosis at the origin of both vertebral arteries, which contributes to mild stenosis on the right and moderate stenosis on the left. 3. No significant stenosis seen of the [id number]. 4. Nonspecific borderline prominent precarinal mediastinal lymph node measuring 13 mm. Echocardiogram 9/9/2022  Summary   Left ventricular systolic function is normal with ejection fraction   estimated at 50-55%. Basal inferior hypokinesis   There is mild concentric left ventricular hypertrophy. Grade II diastolic dysfunction with elevated left ventricular filling   pressure. The left atrium is severely dilated. The right atrium is mildly dilated. Moderate mitral regurgitation. Mild aortic regurgitation. Moderate tricuspid regurgitation. Systolic pulmonary artery pressure (SPAP) is estimated at 58 mmHg consistent   with moderate pulmonary hypertension (Right atrial pressure of 3 mmHg). Small PFO    Stress test 9/9/2022  Conclusions    Summary  Normal LVEF >60%  Paradoxic septal motion  Enlarged RV  Small to moderate area of anterior and basal inferior ischemia    Overall, this would be considered, intermediate risk study     MRI brain 9/8/2022  1. Small acute infarct of the left parietal lobe, series 4, image 20. 2. Cerebral parenchymal volume loss with moderate-severe chronic microvascular white matter ischemic disease. 3. Chronic infarcts are noted in the right occipital lobe and left cerebellar hemisphere.          Left heart cath Dr. Cecilia Castillo

## 2023-04-03 ENCOUNTER — TELEPHONE (OUTPATIENT)
Dept: CARDIOLOGY CLINIC | Age: 83
End: 2023-04-03

## 2023-04-03 RX ORDER — HYDRALAZINE HYDROCHLORIDE 25 MG/1
25 TABLET, FILM COATED ORAL 3 TIMES DAILY
Qty: 90 TABLET | Refills: 3 | Status: SHIPPED | OUTPATIENT
Start: 2023-04-03

## 2023-04-03 NOTE — TELEPHONE ENCOUNTER
Pt stated that since starting Clodine 0.1mg it has dropped his resting hr from the 50s to low 40s and one reading was 39. Pt stated that it also changed the levels of his cpap machine from 110 to 34. Please advise.

## 2023-04-06 ENCOUNTER — HOSPITAL ENCOUNTER (OUTPATIENT)
Dept: SLEEP CENTER | Age: 83
Discharge: HOME | End: 2023-04-08
Payer: MEDICARE

## 2023-04-06 DIAGNOSIS — G47.33 OSA (OBSTRUCTIVE SLEEP APNEA): ICD-10-CM

## 2023-04-06 PROCEDURE — 95811 POLYSOM 6/>YRS CPAP 4/> PARM: CPT

## 2023-04-26 ENCOUNTER — TELEPHONE (OUTPATIENT)
Dept: PULMONOLOGY | Age: 83
End: 2023-04-26

## 2023-04-26 DIAGNOSIS — G47.33 OSA (OBSTRUCTIVE SLEEP APNEA): Primary | ICD-10-CM

## 2023-04-28 ENCOUNTER — TELEPHONE (OUTPATIENT)
Dept: CARDIOLOGY CLINIC | Age: 83
End: 2023-04-28

## 2023-05-01 NOTE — TELEPHONE ENCOUNTER
Agree hydralazine not likely to cause these symptoms, but would try stopping hydralazine to see if is the contributing  If still very SOB today (message was from last week) would go to ER, o/w call me next week w/ update on how did w/o the hydralazine

## 2023-05-01 NOTE — TELEPHONE ENCOUNTER
Spoke with patient. He is concerned about his SOB so he will likely go to the ER for evaluation. (I encouraged him to go). He is also going to try stopping hydralazine for one week and see how he feels.

## 2023-05-03 ENCOUNTER — TELEPHONE (OUTPATIENT)
Dept: CARDIOLOGY CLINIC | Age: 83
End: 2023-05-03

## 2023-05-03 DIAGNOSIS — M25.571 ARTHRALGIA OF BOTH FEET: Primary | ICD-10-CM

## 2023-05-03 DIAGNOSIS — M25.572 ARTHRALGIA OF BOTH FEET: Primary | ICD-10-CM

## 2023-05-03 DIAGNOSIS — M19.90 ARTHRITIS: ICD-10-CM

## 2023-05-03 NOTE — TELEPHONE ENCOUNTER
Per LOV with CLAUDIA 4/11/2023  Plan:  Discussed risks and benefits of pacemaker placement.  -will hold off at this time. Follow up with Podiatry as scheduled or try to move appointment up.  -pending that appointment may refer to doctor to manage arthritis.  -give us a call and let us know the plan after appointment. 3.   Continue current cardiac medications as prescribed. 4.   Follow up with CLAUDIA in 3 months. Referral placed for rheumatology. Called and informed patient that referral was placed and was provided with office phone number. Recommended calling them to get scheduled ASAP. Also recommended still following up with podiatry or asking to see another provider within their team as discussed in [facility] office visit. Patient was also informed to reach out to PCP regarding further issues, and he V/U of all.

## 2023-05-03 NOTE — TELEPHONE ENCOUNTER
Pt calling to find out who [facility] would referrer him to for Arthritis. Pt has not seen his podiatrist because he is not happy with his services. Please advise.

## 2023-05-16 ENCOUNTER — OFFICE VISIT (OUTPATIENT)
Dept: FAMILY MEDICINE CLINIC | Age: 83
End: 2023-05-16
Payer: MEDICARE

## 2023-05-16 VITALS
HEART RATE: 52 BPM | DIASTOLIC BLOOD PRESSURE: 70 MMHG | WEIGHT: 192.4 LBS | SYSTOLIC BLOOD PRESSURE: 135 MMHG | BODY MASS INDEX: 28.41 KG/M2 | TEMPERATURE: 97.1 F | OXYGEN SATURATION: 95 % | RESPIRATION RATE: 16 BRPM

## 2023-05-16 DIAGNOSIS — E11.9 TYPE 2 DIABETES MELLITUS WITHOUT COMPLICATION, WITHOUT LONG-TERM CURRENT USE OF INSULIN (HCC): Primary | ICD-10-CM

## 2023-05-16 DIAGNOSIS — I10 ESSENTIAL HYPERTENSION: ICD-10-CM

## 2023-05-16 DIAGNOSIS — R94.4 DECREASED GFR: ICD-10-CM

## 2023-05-16 DIAGNOSIS — R60.0 EDEMA OF BOTH FEET: ICD-10-CM

## 2023-05-16 PROCEDURE — 1036F TOBACCO NON-USER: CPT | Performed by: FAMILY MEDICINE

## 2023-05-16 PROCEDURE — 3044F HG A1C LEVEL LT 7.0%: CPT | Performed by: FAMILY MEDICINE

## 2023-05-16 PROCEDURE — G8427 DOCREV CUR MEDS BY ELIG CLIN: HCPCS | Performed by: FAMILY MEDICINE

## 2023-05-16 PROCEDURE — 99214 OFFICE O/P EST MOD 30 MIN: CPT | Performed by: FAMILY MEDICINE

## 2023-05-16 PROCEDURE — G8417 CALC BMI ABV UP PARAM F/U: HCPCS | Performed by: FAMILY MEDICINE

## 2023-05-16 PROCEDURE — 3078F DIAST BP <80 MM HG: CPT | Performed by: FAMILY MEDICINE

## 2023-05-16 PROCEDURE — 1123F ACP DISCUSS/DSCN MKR DOCD: CPT | Performed by: FAMILY MEDICINE

## 2023-05-16 PROCEDURE — 3075F SYST BP GE 130 - 139MM HG: CPT | Performed by: FAMILY MEDICINE

## 2023-05-16 RX ORDER — FUROSEMIDE 20 MG/1
20 TABLET ORAL DAILY
Qty: 3 TABLET | Refills: 0 | Status: SHIPPED | OUTPATIENT
Start: 2023-05-16 | End: 2023-05-19

## 2023-05-16 NOTE — PROGRESS NOTES
5/16/2023    This is a 80 y.o. male   Chief Complaint   Patient presents with    Medication Problem     SOB thinks it was caused by hydralazine but is seeing cardiology that medication     Foot Swelling     B/l feet swelling, and pain, thinks may be related to medication    . HPI  Pt presents today for the following:    T2DM: not taking prescription diabetes medication    Morning BS: Doesn't check    Today's A1C = Office is out of POCT A1C kits  02/15/23 A1C = 6.7    Denies fatigue or vision disturbance    Foot pain: Bilateral, 1 weeks, has had 4 foot injections and a medrol Dose Pack, sses Podiatry and lst saw 4 days ago and received latest injection which has helped some, has f/u on May 29th. Has not been going on walks. Pain avg 4/10 but some days 8-10, stopped taking Klor Con 20 mEq 1 week ago.     HTN: Taking Lasix 40 mg daily, Amlodipine 5 mg, and Olmesartan 40 mg daily, today's BP elevated but home readings 120-130's 60-80's      Past Medical History:   Diagnosis Date    Aortic aneurysm, abdominal ([address] 1/2011    Appendicitis 12-06-11    Arthritis 12-06-11    Bruises easily     CAD (coronary artery disease) 9/2001    MI     CHF (congestive heart failure) (HCC)     Following CABG    Chronic back pain     Chronic pain of both shoulders 12-06-11    joint - the shoulders hurt    Complication of anesthesia     woke up during appendectomy    Gout     Heart disease 12-06-11    Hernia 12-    HIGH CHOLESTEROL 12-6-11    Hyperlipidemia     Hypertension     Measles 12-    Pancreatitis 2006    History of     Persistent cough     Ringing in ears     Sinus disorder     Sleep deprivation 12-    loss of sleep       Past Surgical History:   Procedure Laterality Date    ABDOMINAL AORTIC ANEURYSM REPAIR      ABDOMINAL AORTIC ANEURYSM REPAIR, ENDOVASCULAR  01/28/2011    Endomvascular abdominal aortic aneurysm repair with insertion of cardiomems pressure sensor    APPENDECTOMY      40-50 years ago

## 2023-05-18 DIAGNOSIS — R94.4 DECREASED GFR: ICD-10-CM

## 2023-05-18 LAB
ANION GAP SERPL CALCULATED.3IONS-SCNC: 12 MMOL/L (ref 3–16)
BUN SERPL-MCNC: 17 MG/DL (ref 7–20)
CALCIUM SERPL-MCNC: 8.6 MG/DL (ref 8.3–10.6)
CHLORIDE SERPL-SCNC: 103 MMOL/L (ref 99–110)
CO2 SERPL-SCNC: 29 MMOL/L (ref 21–32)
CREAT SERPL-MCNC: 1 MG/DL (ref 0.8–1.3)
GFR SERPLBLD CREATININE-BSD FMLA CKD-EPI: >60 ML/MIN/{1.73_M2}
GLUCOSE SERPL-MCNC: 151 MG/DL (ref 70–99)
POTASSIUM SERPL-SCNC: 3.1 MMOL/L (ref 3.5–5.1)
SODIUM SERPL-SCNC: 144 MMOL/L (ref 136–145)

## 2023-05-19 ENCOUNTER — TELEPHONE (OUTPATIENT)
Dept: CARDIOLOGY CLINIC | Age: 83
End: 2023-05-19

## 2023-05-19 DIAGNOSIS — Z79.899 MEDICATION MANAGEMENT: Primary | ICD-10-CM

## 2023-05-19 DIAGNOSIS — I50.21 ACUTE SYSTOLIC HF (HEART FAILURE) (HCC): ICD-10-CM

## 2023-05-19 DIAGNOSIS — I48.91 ATRIAL FIBRILLATION, UNSPECIFIED TYPE (HCC): ICD-10-CM

## 2023-05-19 NOTE — TELEPHONE ENCOUNTER
Called and spoke with patient he confirmed he is taking Lasix 40 mg daily. He stated he had been off his Klor Con 20 mEq for a few weeks but restarted taking it again this morning. Advised patient to take Klor Con 20 mEq BID for 3 days per NP Luis Miguel Kim then to continue his regular regime of Klor Con 20 mEq daily. Patient will repeat his BMP in 2 weeks. Patient verbally understood.

## 2023-05-19 NOTE — TELEPHONE ENCOUNTER
----- Message from RUPERTO Feng CNP sent at 5/19/2023  8:39 AM EDT -----  K+ is low. Please call and verify with pt that he is taking Lasix 20 mg daily and Klor Con 20 mEq daily. If he is not on potassium supplement please add the 20 mEq Klor Con and recheck BMP in 2-3 weeks. His Cr has improved. Continue other medications. Please call.

## 2023-05-22 NOTE — TELEPHONE ENCOUNTER
PT called for Medication Refill.  Eliquis 5mg  Pharmacy:   [facility] [phone] - F [phone]   [facility] [address]   Phone:  [phone]  Fax:  [phone]  Last OV 04/11/2023  Upcoming OV 07/12/23

## 2023-05-22 NOTE — TELEPHONE ENCOUNTER
PT called for Medication Refill.  Eliquis 5mg  Pharmacy:   [facility] [phone] F [phone]   [place] [id number]   Phone:  [phone]  Fax:  [phone]  Last OV 04/11/2023  Upcoming OV 05/24/23

## 2023-05-24 ENCOUNTER — OFFICE VISIT (OUTPATIENT)
Dept: PULMONOLOGY | Age: 83
End: 2023-05-24
Payer: MEDICARE

## 2023-05-24 VITALS
BODY MASS INDEX: 28.73 KG/M2 | WEIGHT: 194 LBS | OXYGEN SATURATION: 96 % | DIASTOLIC BLOOD PRESSURE: 60 MMHG | RESPIRATION RATE: 16 BRPM | SYSTOLIC BLOOD PRESSURE: 148 MMHG | HEIGHT: 69 IN | HEART RATE: 76 BPM

## 2023-05-24 DIAGNOSIS — G47.33 OSA (OBSTRUCTIVE SLEEP APNEA): Primary | ICD-10-CM

## 2023-05-24 DIAGNOSIS — I10 PRIMARY HYPERTENSION: ICD-10-CM

## 2023-05-24 DIAGNOSIS — E66.3 OVERWEIGHT (BMI 25.0-29.9): ICD-10-CM

## 2023-05-24 DIAGNOSIS — I48.91 ATRIAL FIBRILLATION, UNSPECIFIED TYPE (HCC): ICD-10-CM

## 2023-05-24 PROBLEM — G57.53 TARSAL TUNNEL SYNDROME OF BOTH LOWER EXTREMITIES: Status: ACTIVE | Noted: 2023-05-24

## 2023-05-24 PROBLEM — I83.893 VARICOSE VEINS OF BOTH LEGS WITH EDEMA: Status: ACTIVE | Noted: 2023-05-24

## 2023-05-24 PROCEDURE — 1036F TOBACCO NON-USER: CPT | Performed by: NURSE PRACTITIONER

## 2023-05-24 PROCEDURE — 3078F DIAST BP <80 MM HG: CPT | Performed by: NURSE PRACTITIONER

## 2023-05-24 PROCEDURE — G8427 DOCREV CUR MEDS BY ELIG CLIN: HCPCS | Performed by: NURSE PRACTITIONER

## 2023-05-24 PROCEDURE — 3077F SYST BP >= 140 MM HG: CPT | Performed by: NURSE PRACTITIONER

## 2023-05-24 PROCEDURE — G8417 CALC BMI ABV UP PARAM F/U: HCPCS | Performed by: NURSE PRACTITIONER

## 2023-05-24 PROCEDURE — 1123F ACP DISCUSS/DSCN MKR DOCD: CPT | Performed by: NURSE PRACTITIONER

## 2023-05-24 PROCEDURE — 99213 OFFICE O/P EST LOW 20 MIN: CPT | Performed by: NURSE PRACTITIONER

## 2023-05-24 ASSESSMENT — SLEEP AND FATIGUE QUESTIONNAIRES
HOW LIKELY ARE YOU TO NOD OFF OR FALL ASLEEP WHILE WATCHING TV: 1
NECK CIRCUMFERENCE (INCHES): 14
HOW LIKELY ARE YOU TO NOD OFF OR FALL ASLEEP WHILE SITTING AND READING: 0
HOW LIKELY ARE YOU TO NOD OFF OR FALL ASLEEP WHILE SITTING AND TALKING TO SOMEONE: 0
HOW LIKELY ARE YOU TO NOD OFF OR FALL ASLEEP WHILE SITTING INACTIVE IN A PUBLIC PLACE: 0
HOW LIKELY ARE YOU TO NOD OFF OR FALL ASLEEP IN A CAR, WHILE STOPPED FOR A FEW MINUTES IN TRAFFIC: 0
ESS TOTAL SCORE: 4
HOW LIKELY ARE YOU TO NOD OFF OR FALL ASLEEP WHILE SITTING QUIETLY AFTER LUNCH WITHOUT ALCOHOL: 0
HOW LIKELY ARE YOU TO NOD OFF OR FALL ASLEEP WHILE LYING DOWN TO REST IN THE AFTERNOON WHEN CIRCUMSTANCES PERMIT: 3
HOW LIKELY ARE YOU TO NOD OFF OR FALL ASLEEP WHEN YOU ARE A PASSENGER IN A CAR FOR AN HOUR WITHOUT A BREAK: 0

## 2023-05-24 ASSESSMENT — ENCOUNTER SYMPTOMS
EYE PAIN: 0
COUGH: 0
CHEST TIGHTNESS: 0
SORE THROAT: 0
RHINORRHEA: 0
ABDOMINAL PAIN: 0
SHORTNESS OF BREATH: 1
WHEEZING: 0

## 2023-05-24 NOTE — PATIENT INSTRUCTIONS
Yina Bryan has good benefit and adherence on PAP therapy. Compliance report information was analyzed to assess complexity and medical decision making in regards to further testing and management. Will prescribe home medical equipment company to check pressures, download usage and will replace mask, tubing, disposables and filters as needed. Instructed to wash or wipe face of excess oil before using CPAP to prevent the mask / nasal pillow from sliding and ensure proper fit. Empty the water daily and allow the chamber and tubings to air dry. Instructed how to properly clean the device to prevent bacteria and mold growth in the water chamber. Advised to avoid driving if too sleepy to function safely and given a discussion of the risks of untreated apneas such as accidents, cognitive impairment, mood impairment, worsening high blood pressure, various cardiac disease and stroke. Regarding overweight, recommend to try a formal program and/or increase physical activity by adding a 30 minute walk to daily routine. Weight loss was encouraged as a long term approach to treatment of SUNDEEP. Explained the correlation between obesity and apnea and the causative role it can play. Blood pressure a little elevated, continue to monitor. Heart rate controlled. Follow up November or sooner for any issues. Remember to bring a list of pulmonary medications and any CPAP or BiPAP machines to your next appointment with the office. Please keep all of your future appointments scheduled by [facility] office. Out of respect for other patients and providers, you may be asked to reschedule your appointment if you arrive later than your scheduled appointment time. Appointments cancelled less than 24hrs in advance will be considered a no show. Patients with three missed appointments within 1 year or four missed appointments within 2 years can be dismissed from the practice.

## 2023-05-24 NOTE — PROGRESS NOTES
MA Communication:   The following orders are received by verbal communication from Sun Franco CNP    Follow up with Dr. Emily Sutherland   11/17/2023  10:45AM

## 2023-05-24 NOTE — PROGRESS NOTES
Chief Complaint   Patient presents with    Follow-up     SUNDEEP Pressure Change Check      Olga Liang comes in today for follow-up of PAP therapy. Diagnosed with moderate obstructive sleep apnea on the study done 11/22/22. (AHI 24, desat to 83%, weight 194 lbs)   PAP titration study done 4/6/23 showed pressure of  9 cm best controlled apnea. He was having a lot of CSA and SUNDEEP still but since 4/26/23, adjusted to 10 cm H20 and apnea better controlled. Patient had symptoms of EDS, snoring and poor sleep quality at time of diagnosis, resolved with pap therapy. Denies HA, ear popping or belching with PAP use. He had a stent placed the end of January 2023. Reports sleepiness is better. Is not having extended sleeping. Is using equipment for 9 hours a night. Up at night to use the bathroom about:1-2   Is not snoring with machine. Does not have dry mouth   Is not complaining of mask issues   Is tolerating the pressure however states he sometimes feels like there is not enough  at times. Sleep Medicine 5/24/2023 10/6/2022   Sitting and reading 0 0   Watching TV 1 2   Sitting, inactive in a public place (e.g. a theatre or a meeting) 0 0   As a passenger in a car for an hour without a break 0 0   Lying down to rest in the afternoon when circumstances permit 3 3   Sitting and talking to someone 0 0   Sitting quietly after a lunch without alcohol 0 0   In a car, while stopped for a few minutes in traffic 0 0   [place] Sleepiness Score 4 5   Neck circumference (Inches) 14 16     0 = no chance of dozing  1 = slight chance of dozing  2 = moderate chance of dozing  3 = high chance of dozing    Interpretation:   0-7: It is unlikely that you are abnormally sleepy. 8-9:     You have an average amount of daytime sleepiness. 10-15: You may be excessively sleepy depending on the situation. You may want to consider seeking medical attention. 16-24:   You are excessively sleepy and should consider

## 2023-05-30 ENCOUNTER — OFFICE VISIT (OUTPATIENT)
Dept: FAMILY MEDICINE CLINIC | Age: 83
End: 2023-05-30
Payer: MEDICARE

## 2023-05-30 VITALS
SYSTOLIC BLOOD PRESSURE: 130 MMHG | BODY MASS INDEX: 27.91 KG/M2 | DIASTOLIC BLOOD PRESSURE: 65 MMHG | TEMPERATURE: 97.1 F | HEART RATE: 72 BPM | RESPIRATION RATE: 16 BRPM | WEIGHT: 189 LBS | OXYGEN SATURATION: 96 %

## 2023-05-30 DIAGNOSIS — Z87.39 HISTORY OF GOUT: ICD-10-CM

## 2023-05-30 DIAGNOSIS — I10 ESSENTIAL HYPERTENSION: ICD-10-CM

## 2023-05-30 DIAGNOSIS — R60.0 EDEMA OF BOTH FEET: Primary | ICD-10-CM

## 2023-05-30 DIAGNOSIS — I48.91 ATRIAL FIBRILLATION, UNSPECIFIED TYPE (HCC): ICD-10-CM

## 2023-05-30 DIAGNOSIS — R26.89 BALANCE PROBLEM: ICD-10-CM

## 2023-05-30 DIAGNOSIS — R60.0 EDEMA OF BOTH FEET: ICD-10-CM

## 2023-05-30 DIAGNOSIS — I50.21 ACUTE SYSTOLIC HF (HEART FAILURE) (HCC): ICD-10-CM

## 2023-05-30 DIAGNOSIS — E87.6 LOW SERUM POTASSIUM: Primary | ICD-10-CM

## 2023-05-30 DIAGNOSIS — Z79.899 MEDICATION MANAGEMENT: ICD-10-CM

## 2023-05-30 LAB
ANION GAP SERPL CALCULATED.3IONS-SCNC: 18 MMOL/L (ref 3–16)
BUN SERPL-MCNC: 16 MG/DL (ref 7–20)
CALCIUM SERPL-MCNC: 9.3 MG/DL (ref 8.3–10.6)
CHLORIDE SERPL-SCNC: 101 MMOL/L (ref 99–110)
CO2 SERPL-SCNC: 23 MMOL/L (ref 21–32)
CREAT SERPL-MCNC: 1.2 MG/DL (ref 0.8–1.3)
GFR SERPLBLD CREATININE-BSD FMLA CKD-EPI: >60 ML/MIN/{1.73_M2}
GLUCOSE SERPL-MCNC: 153 MG/DL (ref 70–99)
POTASSIUM SERPL-SCNC: 3.7 MMOL/L (ref 3.5–5.1)
SODIUM SERPL-SCNC: 142 MMOL/L (ref 136–145)
URATE SERPL-MCNC: 4.4 MG/DL (ref 3.5–7.2)

## 2023-05-30 PROCEDURE — 99214 OFFICE O/P EST MOD 30 MIN: CPT | Performed by: FAMILY MEDICINE

## 2023-05-30 PROCEDURE — 1123F ACP DISCUSS/DSCN MKR DOCD: CPT | Performed by: FAMILY MEDICINE

## 2023-05-30 PROCEDURE — 1036F TOBACCO NON-USER: CPT | Performed by: FAMILY MEDICINE

## 2023-05-30 PROCEDURE — 3078F DIAST BP <80 MM HG: CPT | Performed by: FAMILY MEDICINE

## 2023-05-30 PROCEDURE — G8417 CALC BMI ABV UP PARAM F/U: HCPCS | Performed by: FAMILY MEDICINE

## 2023-05-30 PROCEDURE — G8427 DOCREV CUR MEDS BY ELIG CLIN: HCPCS | Performed by: FAMILY MEDICINE

## 2023-05-30 PROCEDURE — 3075F SYST BP GE 130 - 139MM HG: CPT | Performed by: FAMILY MEDICINE

## 2023-05-30 RX ORDER — OLMESARTAN MEDOXOMIL 40 MG/1
40 TABLET ORAL DAILY
Qty: 90 TABLET | Refills: 3 | Status: SHIPPED | OUTPATIENT
Start: 2023-05-30

## 2023-05-30 RX ORDER — CLOPIDOGREL BISULFATE 75 MG/1
75 TABLET ORAL DAILY
Qty: 30 TABLET | Refills: 3 | Status: SHIPPED | OUTPATIENT
Start: 2023-05-30 | End: 2023-06-02

## 2023-05-30 RX ORDER — PANTOPRAZOLE SODIUM 40 MG/1
40 TABLET, DELAYED RELEASE ORAL
Qty: 180 TABLET | Refills: 3 | Status: SHIPPED | OUTPATIENT
Start: 2023-05-30

## 2023-05-30 NOTE — PROGRESS NOTES
calculator link  Adam.[email or web address]. [email or web address]/kdoqi/gfr_calculatorped  Effective Oct 3, 2022  These results are not intended for use in patients  <25years of age. eGFR results are calculated without  a race factor using the 2021 CKD-EPI equation. Careful  clinical correlation is recommended, particularly when  comparing to results calculated using previous equations. The CKD-EPI equation is less accurate in patients with  extremes of muscle mass, extra-renal metabolism of  creatinine, excessive creatinine ingestion, or following  therapy that affects renal tubular secretion. Calcium 05/18/2023 8.6  8.3 - 10.6 mg/dL Final       Review of Systems   Musculoskeletal:  Positive for arthralgias. Neurological:         Feels off balance     /65 Comment: average home reading  Pulse 72   Temp 97.1 °F (36.2 °C) (Temporal)   Resp 16   Wt 189 lb (85.7 kg)   SpO2 96%   BMI 27.91 kg/m²     Physical Exam  Vitals reviewed. Musculoskeletal:         General: Swelling (mild right foot) present. No tenderness (posterior left heel/mild). Plan   Diagnosis Orders   1. Edema of both feet  Still present, has Podiatry f/u on 06/14/23, consider vascular referral      2. Essential hypertension  Stable, continue Olmesartan 40 mg daily, Amlodipine 5 mg BID and Lasix 40 mg daily      3. Balance problem  [facility]      4. History of gout  Uric Acid          No follow-ups on file. Prior to Visit Medications    Medication Sig Taking?  Authorizing Provider   apixaban (ELIQUIS) 5 MG TABS tablet Take 1 tablet by mouth 2 times daily Yes RUPERTO Weller - CNP   pantoprazole (PROTONIX) 40 MG tablet Take 1 tablet by mouth 2 times daily (before meals) Yes Diana Peterson MD   predniSONE (DELTASONE) 20 MG tablet  Yes Historical Provider, MD   furosemide (LASIX) 40 MG tablet Take 1 tablet by mouth daily Yes Kaia Machuca MD   potassium chloride (KLOR-CON M) 20 MEQ

## 2023-05-30 NOTE — TELEPHONE ENCOUNTER
Pt has been scheduled for [facility] at [facility] 8:45 am on 06/30/2023

## 2023-05-30 NOTE — TELEPHONE ENCOUNTER
Medication Refill    Medication needing refilled: Pantoprazole    Dosage of the medication: 40 MG    How are you taking this medication (QD, BID, TID, QID, PRN): Take 1 tablet by mouth 2 times daily (before meals)    30 or 90 day supply called in: 80 DAY    When will you run out of your medication: SOON        Medication Refill    Medication needing refilled: Olmesartan    Dosage of the medication: 40 MG    How are you taking this medication (QD, BID, TID, QID, PRN): Take 1 tablet by mouth daily    30 or 90 day supply called in: 80 DAYS    When will you run out of your medication: SOON        Medication Refill    Medication needing refilled: Clopidogrel    Dosage of the medication: 75 MG    How are you taking this medication (QD, BID, TID, QID, PRN): Take 1 tablet by mouth daily    30 or 90 day supply called in: 80 DAY    When will you run out of your medication: [address] [facility] are we sending the medication to?:    [address] [facility] [phone] - F [phone]   [address] [place] [address] [address]   Phone:  [phone]  Fax:  [phone]

## 2023-05-31 ENCOUNTER — TELEPHONE (OUTPATIENT)
Dept: CARDIOLOGY CLINIC | Age: 83
End: 2023-05-31

## 2023-05-31 NOTE — TELEPHONE ENCOUNTER
Called and spoke with pt. Relayed result message from provider. He verbalized understanding of results given.

## 2023-05-31 NOTE — TELEPHONE ENCOUNTER
----- Message from RUPERTO Juárez CNP sent at 5/31/2023  2:45 PM EDT -----  BMP stable. Continue same meds.

## 2023-06-01 DIAGNOSIS — I25.10 CAD IN NATIVE ARTERY: Primary | ICD-10-CM

## 2023-06-02 RX ORDER — CLOPIDOGREL BISULFATE 75 MG/1
TABLET ORAL
Qty: 90 TABLET | Refills: 3 | Status: SHIPPED | OUTPATIENT
Start: 2023-06-02

## 2023-06-02 NOTE — TELEPHONE ENCOUNTER
[facility] last OV 03/31/23. 06/30/23 next OV. Last filled 05/30/23, however mail order pharmacy need 90 day supply. Pending as requested. [facility] last OV plan:  Plan:  Start clonidine 0.1 mg twice a day   You can hold lasix while you are having a gout flare up. Monitor weight daily, swelling, and shortness of breath . If weight is up over 5 lbs then restart Lasix   Cardiac medications reviewed including indications and pertinent side effects. Medication list updated at this visit. Check blood pressure and heart rate at home a few times per week- keep a log with dates and times and bring to office visit- blood pressure should be less than 140/90 after being on new medications for one month.    Regular exercise and following a healthy diet encouraged   Follow up with me in 6 months

## 2023-06-05 ENCOUNTER — CARE COORDINATION (OUTPATIENT)
Dept: CARE COORDINATION | Age: 83
End: 2023-06-05

## 2023-06-05 NOTE — CARE COORDINATION
Contacted patient  Introduced role of ACM/CC  Patient denies any CC needs  Provided ACM contact information  No further outreach scheduled with this ACM; episode of care resolved

## 2023-06-30 ENCOUNTER — TELEPHONE (OUTPATIENT)
Dept: CARDIOLOGY CLINIC | Age: 83
End: 2023-06-30

## 2023-06-30 ENCOUNTER — OFFICE VISIT (OUTPATIENT)
Dept: CARDIOLOGY CLINIC | Age: 83
End: 2023-06-30
Payer: MEDICARE

## 2023-06-30 VITALS
HEIGHT: 69 IN | DIASTOLIC BLOOD PRESSURE: 64 MMHG | HEART RATE: 81 BPM | WEIGHT: 188 LBS | OXYGEN SATURATION: 97 % | BODY MASS INDEX: 27.85 KG/M2 | SYSTOLIC BLOOD PRESSURE: 158 MMHG

## 2023-06-30 DIAGNOSIS — I10 PRIMARY HYPERTENSION: Primary | ICD-10-CM

## 2023-06-30 DIAGNOSIS — I25.10 CORONARY ARTERY DISEASE INVOLVING NATIVE CORONARY ARTERY OF NATIVE HEART WITHOUT ANGINA PECTORIS: ICD-10-CM

## 2023-06-30 DIAGNOSIS — I50.9 CONGESTIVE HEART FAILURE, UNSPECIFIED HF CHRONICITY, UNSPECIFIED HEART FAILURE TYPE (HCC): ICD-10-CM

## 2023-06-30 DIAGNOSIS — E78.5 HYPERLIPIDEMIA, UNSPECIFIED HYPERLIPIDEMIA TYPE: ICD-10-CM

## 2023-06-30 PROCEDURE — G8417 CALC BMI ABV UP PARAM F/U: HCPCS | Performed by: INTERNAL MEDICINE

## 2023-06-30 PROCEDURE — 1123F ACP DISCUSS/DSCN MKR DOCD: CPT | Performed by: INTERNAL MEDICINE

## 2023-06-30 PROCEDURE — 1036F TOBACCO NON-USER: CPT | Performed by: INTERNAL MEDICINE

## 2023-06-30 PROCEDURE — 3077F SYST BP >= 140 MM HG: CPT | Performed by: INTERNAL MEDICINE

## 2023-06-30 PROCEDURE — 99214 OFFICE O/P EST MOD 30 MIN: CPT | Performed by: INTERNAL MEDICINE

## 2023-06-30 PROCEDURE — 3078F DIAST BP <80 MM HG: CPT | Performed by: INTERNAL MEDICINE

## 2023-06-30 PROCEDURE — G8427 DOCREV CUR MEDS BY ELIG CLIN: HCPCS | Performed by: INTERNAL MEDICINE

## 2023-06-30 RX ORDER — ALLOPURINOL 100 MG/1
100 TABLET ORAL DAILY
COMMUNITY

## 2023-06-30 NOTE — TELEPHONE ENCOUNTER
ELIZABETH next available is 10/10/23. Could pt see [facility] in the interim for 8wks?  Not sure if that needs to be approved by [employer] first?

## 2023-06-30 NOTE — TELEPHONE ENCOUNTER
Please call patient to schedule as a new patient with Dr. Carmen Addison. Patient needs to be seen in 8 weeks.      Also schedule with [facility] for follow up in 2-3 months

## 2023-07-03 NOTE — TELEPHONE ENCOUNTER
Spoke with omar and scheduled him with nprleobardo at [facility] for 8/21/23 at 930a, and also scheduled his follow up with mgh at [facility] for 9/12/23 at [phone]

## 2023-07-11 NOTE — PROGRESS NOTES
patient to consider and let us know how he would like to proceed. Will follow up in 6 months or sooner, patient aware to call if needed. CM-Echo 1/2023 LVEF 45-50%. Patient recommended to see heart failure team and is scheduled to see them 8/21/2023. CRI-per PCP  SB- On 4/11/2023 discussed PPM-will hold off at this point for patient to follow up with podiatry but will plan to CSP pursue in the future. At this time, the arthralgias in his feet represent the primary limitation to his functional status. 7/12/2023 again discussed PPM placement due to intermittent CHB. Patient to consider and let us know how he would like to proceed  DM-per PCP  GI Bleed-while inpatient, on pantoprozole 40mg BID. Avoid anti-inflammatory medications  Gout-follows with podiatry. CAD- s/p CABS 2008. On 1/26/2023 he had a LHC with PCI of distal SVG to OM1 with sequential to OM2. Plan:  Re-discussed risks and benefits of pacemaker placement.  -please let us know how you would like to proceed. 2.   Continue current cardiac medications as prescribed. 3.   Monitor your heart rate with activity. 4.   Follow up with the heart failure team as planned. 5.   Follow up with EP NP in 6 months. QUALITY MEASURES  1. Tobacco Cessation Counseling: NA  2. Retake of BP if >140/90:   Yes  3. Documentation to PCP/referring for new patient:  Sent to PCP at close of office visit  4. CAD patient on anti-platelet: NA  5. CAD patient on STATIN therapy:  Yes  6. Patient with CHF and aFib on anticoagulation:  Yes    Scribe's attestation: This note was scribed in the presence of Dr. Jared Lopez MD by Everton Hendricksonite, Dr. Jared Lopez, personally performed the services described in this documentation as scribed by Viri Zhang RN in my presence, and it is both accurate and complete.          Jared Lopez M.D.

## 2023-07-12 ENCOUNTER — OFFICE VISIT (OUTPATIENT)
Dept: CARDIOLOGY CLINIC | Age: 83
End: 2023-07-12
Payer: MEDICARE

## 2023-07-12 VITALS
OXYGEN SATURATION: 96 % | HEART RATE: 67 BPM | WEIGHT: 184.8 LBS | BODY MASS INDEX: 27.37 KG/M2 | HEIGHT: 69 IN | SYSTOLIC BLOOD PRESSURE: 134 MMHG | DIASTOLIC BLOOD PRESSURE: 70 MMHG

## 2023-07-12 DIAGNOSIS — I48.91 ATRIAL FIBRILLATION, UNSPECIFIED TYPE (HCC): ICD-10-CM

## 2023-07-12 DIAGNOSIS — R00.1 BRADYCARDIA: Primary | ICD-10-CM

## 2023-07-12 DIAGNOSIS — I25.5 ISCHEMIC CARDIOMYOPATHY: ICD-10-CM

## 2023-07-12 PROCEDURE — 3074F SYST BP LT 130 MM HG: CPT | Performed by: INTERNAL MEDICINE

## 2023-07-12 PROCEDURE — G8427 DOCREV CUR MEDS BY ELIG CLIN: HCPCS | Performed by: INTERNAL MEDICINE

## 2023-07-12 PROCEDURE — 93000 ELECTROCARDIOGRAM COMPLETE: CPT | Performed by: INTERNAL MEDICINE

## 2023-07-12 PROCEDURE — 1036F TOBACCO NON-USER: CPT | Performed by: INTERNAL MEDICINE

## 2023-07-12 PROCEDURE — 3078F DIAST BP <80 MM HG: CPT | Performed by: INTERNAL MEDICINE

## 2023-07-12 PROCEDURE — G8417 CALC BMI ABV UP PARAM F/U: HCPCS | Performed by: INTERNAL MEDICINE

## 2023-07-12 PROCEDURE — 99214 OFFICE O/P EST MOD 30 MIN: CPT | Performed by: INTERNAL MEDICINE

## 2023-07-12 PROCEDURE — 1123F ACP DISCUSS/DSCN MKR DOCD: CPT | Performed by: INTERNAL MEDICINE

## 2023-07-12 NOTE — PATIENT INSTRUCTIONS
Plan:  Re-discussed risks and benefits of pacemaker placement.  -please let us know how you would like to proceed. 2.   Continue current cardiac medications as prescribed. 3.   Monitor your heart rate with activity. 4.   Follow up with the heart failure team as planned. 5.   Follow up with EP NP in 6 months.

## 2023-07-28 ENCOUNTER — APPOINTMENT (OUTPATIENT)
Dept: GENERAL RADIOLOGY | Age: 83
DRG: 222 | End: 2023-07-28
Payer: MEDICARE

## 2023-07-28 ENCOUNTER — HOSPITAL ENCOUNTER (INPATIENT)
Age: 83
LOS: 6 days | Discharge: HOME | DRG: 222 | End: 2023-08-03
Attending: EMERGENCY MEDICINE | Admitting: INTERNAL MEDICINE
Payer: MEDICARE

## 2023-07-28 DIAGNOSIS — I50.9 ACUTE ON CHRONIC CONGESTIVE HEART FAILURE, UNSPECIFIED HEART FAILURE TYPE (HCC): Primary | ICD-10-CM

## 2023-07-28 DIAGNOSIS — E87.6 HYPOKALEMIA: ICD-10-CM

## 2023-07-28 DIAGNOSIS — R06.01 ORTHOPNEA: ICD-10-CM

## 2023-07-28 DIAGNOSIS — R77.8 ELEVATED TROPONIN: ICD-10-CM

## 2023-07-28 PROBLEM — I50.33 ACUTE ON CHRONIC DIASTOLIC CHF (CONGESTIVE HEART FAILURE) (HCC): Status: RESOLVED | Noted: 2023-07-28 | Resolved: 2023-07-28

## 2023-07-28 PROBLEM — I50.33 ACUTE ON CHRONIC DIASTOLIC CHF (CONGESTIVE HEART FAILURE) (HCC): Status: ACTIVE | Noted: 2023-07-28

## 2023-07-28 LAB
ALBUMIN SERPL-MCNC: 3.9 G/DL (ref 3.4–5)
ALBUMIN/GLOB SERPL: 1.6 {RATIO} (ref 1.1–2.2)
ALP SERPL-CCNC: 88 U/L (ref 40–129)
ALT SERPL-CCNC: 10 U/L (ref 10–40)
ANION GAP SERPL CALCULATED.3IONS-SCNC: 15 MMOL/L (ref 3–16)
AST SERPL-CCNC: 13 U/L (ref 15–37)
BASE EXCESS BLDV CALC-SCNC: -3.2 MMOL/L (ref -3–3)
BASOPHILS # BLD: 0.1 K/UL (ref 0–0.2)
BASOPHILS NFR BLD: 0.9 %
BILIRUB SERPL-MCNC: 2.1 MG/DL (ref 0–1)
BUN SERPL-MCNC: 18 MG/DL (ref 7–20)
CALCIUM SERPL-MCNC: 9.2 MG/DL (ref 8.3–10.6)
CHLORIDE SERPL-SCNC: 102 MMOL/L (ref 99–110)
CO2 BLDV-SCNC: 21 MMOL/L
CO2 SERPL-SCNC: 25 MMOL/L (ref 21–32)
COHGB MFR BLDV: 2.2 % (ref 0–1.5)
CREAT SERPL-MCNC: 1.2 MG/DL (ref 0.8–1.3)
DEPRECATED RDW RBC AUTO: 18.5 % (ref 12.4–15.4)
EKG ATRIAL RATE: 254 BPM
EKG DIAGNOSIS: NORMAL
EKG Q-T INTERVAL: 448 MS
EKG QRS DURATION: 68 MS
EKG QTC CALCULATION (BAZETT): 454 MS
EKG R AXIS: -24 DEGREES
EKG T AXIS: 84 DEGREES
EKG VENTRICULAR RATE: 62 BPM
EOSINOPHIL # BLD: 0.2 K/UL (ref 0–0.6)
EOSINOPHIL NFR BLD: 2.9 %
GFR SERPLBLD CREATININE-BSD FMLA CKD-EPI: >60 ML/MIN/{1.73_M2}
GLUCOSE BLD-MCNC: 125 MG/DL (ref 70–99)
GLUCOSE BLD-MCNC: 146 MG/DL (ref 70–99)
GLUCOSE SERPL-MCNC: 176 MG/DL (ref 70–99)
HCO3 BLDV-SCNC: 20.4 MMOL/L (ref 23–29)
HCT VFR BLD AUTO: 34.8 % (ref 40.5–52.5)
HGB BLD-MCNC: 11.3 G/DL (ref 13.5–17.5)
LV EF: 35 %
LVEF MODALITY: NORMAL
LYMPHOCYTES # BLD: 1.5 K/UL (ref 1–5.1)
LYMPHOCYTES NFR BLD: 17.7 %
MAGNESIUM SERPL-MCNC: 1.9 MG/DL (ref 1.8–2.4)
MCH RBC QN AUTO: 27.4 PG (ref 26–34)
MCHC RBC AUTO-ENTMCNC: 32.6 G/DL (ref 31–36)
MCV RBC AUTO: 84 FL (ref 80–100)
METHGB MFR BLDV: 0.5 %
MONOCYTES # BLD: 0.9 K/UL (ref 0–1.3)
MONOCYTES NFR BLD: 11.3 %
NEUTROPHILS # BLD: 5.6 K/UL (ref 1.7–7.7)
NEUTROPHILS NFR BLD: 67.2 %
NT-PROBNP SERPL-MCNC: 3647 PG/ML (ref 0–449)
O2 THERAPY: ABNORMAL
PCO2 BLDV: 32.2 MMHG (ref 40–50)
PERFORMED ON: ABNORMAL
PERFORMED ON: ABNORMAL
PH BLDV: 7.42 [PH] (ref 7.35–7.45)
PLATELET # BLD AUTO: 190 K/UL (ref 135–450)
PMV BLD AUTO: 8.2 FL (ref 5–10.5)
PO2 BLDV: 75.7 MMHG (ref 25–40)
POTASSIUM SERPL-SCNC: 3.2 MMOL/L (ref 3.5–5.1)
PROT SERPL-MCNC: 6.4 G/DL (ref 6.4–8.2)
RBC # BLD AUTO: 4.14 M/UL (ref 4.2–5.9)
SAO2 % BLDV: 94 %
SODIUM SERPL-SCNC: 142 MMOL/L (ref 136–145)
TROPONIN, HIGH SENSITIVITY: 44 NG/L (ref 0–22)
TROPONIN, HIGH SENSITIVITY: 49 NG/L (ref 0–22)
WBC # BLD AUTO: 8.3 K/UL (ref 4–11)

## 2023-07-28 PROCEDURE — 83735 ASSAY OF MAGNESIUM: CPT

## 2023-07-28 PROCEDURE — 80053 COMPREHEN METABOLIC PANEL: CPT

## 2023-07-28 PROCEDURE — 6360000002 HC RX W HCPCS: Performed by: PHYSICIAN ASSISTANT

## 2023-07-28 PROCEDURE — 6370000000 HC RX 637 (ALT 250 FOR IP): Performed by: INTERNAL MEDICINE

## 2023-07-28 PROCEDURE — 82803 BLOOD GASES ANY COMBINATION: CPT

## 2023-07-28 PROCEDURE — 2700000000 HC OXYGEN THERAPY PER DAY

## 2023-07-28 PROCEDURE — 94761 N-INVAS EAR/PLS OXIMETRY MLT: CPT

## 2023-07-28 PROCEDURE — 6370000000 HC RX 637 (ALT 250 FOR IP): Performed by: PHYSICIAN ASSISTANT

## 2023-07-28 PROCEDURE — 96374 THER/PROPH/DIAG INJ IV PUSH: CPT

## 2023-07-28 PROCEDURE — 83036 HEMOGLOBIN GLYCOSYLATED A1C: CPT

## 2023-07-28 PROCEDURE — 99222 1ST HOSP IP/OBS MODERATE 55: CPT | Performed by: INTERNAL MEDICINE

## 2023-07-28 PROCEDURE — 1200000000 HC SEMI PRIVATE

## 2023-07-28 PROCEDURE — 93306 TTE W/DOPPLER COMPLETE: CPT

## 2023-07-28 PROCEDURE — 2060000000 HC ICU INTERMEDIATE R&B

## 2023-07-28 PROCEDURE — 2580000003 HC RX 258: Performed by: INTERNAL MEDICINE

## 2023-07-28 PROCEDURE — 93010 ELECTROCARDIOGRAM REPORT: CPT | Performed by: INTERNAL MEDICINE

## 2023-07-28 PROCEDURE — 93005 ELECTROCARDIOGRAM TRACING: CPT | Performed by: EMERGENCY MEDICINE

## 2023-07-28 PROCEDURE — 6360000002 HC RX W HCPCS: Performed by: INTERNAL MEDICINE

## 2023-07-28 PROCEDURE — 99285 EMERGENCY DEPT VISIT HI MDM: CPT

## 2023-07-28 PROCEDURE — 84484 ASSAY OF TROPONIN QUANT: CPT

## 2023-07-28 PROCEDURE — 85025 COMPLETE CBC W/AUTO DIFF WBC: CPT

## 2023-07-28 PROCEDURE — 83880 ASSAY OF NATRIURETIC PEPTIDE: CPT

## 2023-07-28 PROCEDURE — 71045 X-RAY EXAM CHEST 1 VIEW: CPT

## 2023-07-28 RX ORDER — FUROSEMIDE 10 MG/ML
60 INJECTION INTRAMUSCULAR; INTRAVENOUS ONCE
Status: COMPLETED | OUTPATIENT
Start: 2023-07-28 | End: 2023-07-28

## 2023-07-28 RX ORDER — ACETAMINOPHEN 650 MG/1
650 SUPPOSITORY RECTAL EVERY 6 HOURS PRN
Status: DISCONTINUED | OUTPATIENT
Start: 2023-07-28 | End: 2023-08-03 | Stop reason: HOSPADM

## 2023-07-28 RX ORDER — MAGNESIUM SULFATE 1 G/100ML
1000 INJECTION INTRAVENOUS ONCE
Status: COMPLETED | OUTPATIENT
Start: 2023-07-28 | End: 2023-07-28

## 2023-07-28 RX ORDER — INSULIN LISPRO 100 [IU]/ML
0-4 INJECTION, SOLUTION INTRAVENOUS; SUBCUTANEOUS
Status: DISCONTINUED | OUTPATIENT
Start: 2023-07-28 | End: 2023-08-03 | Stop reason: HOSPADM

## 2023-07-28 RX ORDER — SPIRONOLACTONE 25 MG/1
25 TABLET ORAL DAILY
Status: DISCONTINUED | OUTPATIENT
Start: 2023-07-28 | End: 2023-08-03 | Stop reason: HOSPADM

## 2023-07-28 RX ORDER — SODIUM CHLORIDE 9 MG/ML
INJECTION, SOLUTION INTRAVENOUS PRN
Status: DISCONTINUED | OUTPATIENT
Start: 2023-07-28 | End: 2023-08-03 | Stop reason: HOSPADM

## 2023-07-28 RX ORDER — ALLOPURINOL 100 MG/1
100 TABLET ORAL DAILY
Status: DISCONTINUED | OUTPATIENT
Start: 2023-07-28 | End: 2023-08-03 | Stop reason: HOSPADM

## 2023-07-28 RX ORDER — ACETAMINOPHEN 325 MG/1
650 TABLET ORAL EVERY 6 HOURS PRN
Status: DISCONTINUED | OUTPATIENT
Start: 2023-07-28 | End: 2023-08-03 | Stop reason: HOSPADM

## 2023-07-28 RX ORDER — INSULIN LISPRO 100 [IU]/ML
0-4 INJECTION, SOLUTION INTRAVENOUS; SUBCUTANEOUS NIGHTLY
Status: DISCONTINUED | OUTPATIENT
Start: 2023-07-28 | End: 2023-08-03 | Stop reason: HOSPADM

## 2023-07-28 RX ORDER — POTASSIUM CHLORIDE 20 MEQ/1
40 TABLET, EXTENDED RELEASE ORAL ONCE
Status: COMPLETED | OUTPATIENT
Start: 2023-07-28 | End: 2023-07-28

## 2023-07-28 RX ORDER — CLOPIDOGREL BISULFATE 75 MG/1
75 TABLET ORAL DAILY
Status: DISCONTINUED | OUTPATIENT
Start: 2023-07-28 | End: 2023-08-03 | Stop reason: HOSPADM

## 2023-07-28 RX ORDER — PANTOPRAZOLE SODIUM 40 MG/1
40 TABLET, DELAYED RELEASE ORAL
Status: DISCONTINUED | OUTPATIENT
Start: 2023-07-28 | End: 2023-08-03 | Stop reason: HOSPADM

## 2023-07-28 RX ORDER — SODIUM CHLORIDE 0.9 % (FLUSH) 0.9 %
5-40 SYRINGE (ML) INJECTION PRN
Status: DISCONTINUED | OUTPATIENT
Start: 2023-07-28 | End: 2023-08-03 | Stop reason: HOSPADM

## 2023-07-28 RX ORDER — DEXTROSE MONOHYDRATE 100 MG/ML
INJECTION, SOLUTION INTRAVENOUS CONTINUOUS PRN
Status: DISCONTINUED | OUTPATIENT
Start: 2023-07-28 | End: 2023-08-03 | Stop reason: HOSPADM

## 2023-07-28 RX ORDER — ATORVASTATIN CALCIUM 40 MG/1
40 TABLET, FILM COATED ORAL NIGHTLY
Status: DISCONTINUED | OUTPATIENT
Start: 2023-07-28 | End: 2023-08-03 | Stop reason: HOSPADM

## 2023-07-28 RX ORDER — ASPIRIN 81 MG/1
162 TABLET, CHEWABLE ORAL ONCE
Status: COMPLETED | OUTPATIENT
Start: 2023-07-28 | End: 2023-07-28

## 2023-07-28 RX ORDER — PROCHLORPERAZINE EDISYLATE 5 MG/ML
10 INJECTION INTRAMUSCULAR; INTRAVENOUS EVERY 6 HOURS PRN
Status: DISCONTINUED | OUTPATIENT
Start: 2023-07-28 | End: 2023-08-03 | Stop reason: HOSPADM

## 2023-07-28 RX ORDER — SODIUM CHLORIDE 0.9 % (FLUSH) 0.9 %
5-40 SYRINGE (ML) INJECTION EVERY 12 HOURS SCHEDULED
Status: DISCONTINUED | OUTPATIENT
Start: 2023-07-28 | End: 2023-08-03 | Stop reason: HOSPADM

## 2023-07-28 RX ORDER — POLYETHYLENE GLYCOL 3350 17 G/17G
17 POWDER, FOR SOLUTION ORAL DAILY PRN
Status: DISCONTINUED | OUTPATIENT
Start: 2023-07-28 | End: 2023-08-03 | Stop reason: HOSPADM

## 2023-07-28 RX ADMIN — ALLOPURINOL 100 MG: 100 TABLET ORAL at 12:29

## 2023-07-28 RX ADMIN — APIXABAN 5 MG: 5 TABLET, FILM COATED ORAL at 12:30

## 2023-07-28 RX ADMIN — SPIRONOLACTONE 25 MG: 25 TABLET ORAL at 12:29

## 2023-07-28 RX ADMIN — FUROSEMIDE 5 MG/HR: 10 INJECTION, SOLUTION INTRAMUSCULAR; INTRAVENOUS at 15:35

## 2023-07-28 RX ADMIN — EMPAGLIFLOZIN 10 MG: 10 TABLET, FILM COATED ORAL at 13:58

## 2023-07-28 RX ADMIN — APIXABAN 5 MG: 5 TABLET, FILM COATED ORAL at 21:48

## 2023-07-28 RX ADMIN — NITROGLYCERIN 0.5 INCH: 20 OINTMENT TOPICAL at 08:16

## 2023-07-28 RX ADMIN — POTASSIUM CHLORIDE 40 MEQ: 1500 TABLET, EXTENDED RELEASE ORAL at 08:19

## 2023-07-28 RX ADMIN — CLOPIDOGREL BISULFATE 75 MG: 75 TABLET ORAL at 12:28

## 2023-07-28 RX ADMIN — ATORVASTATIN CALCIUM 40 MG: 40 TABLET, FILM COATED ORAL at 21:48

## 2023-07-28 RX ADMIN — ASPIRIN 81 MG 162 MG: 81 TABLET ORAL at 08:19

## 2023-07-28 RX ADMIN — PANTOPRAZOLE SODIUM 40 MG: 40 TABLET, DELAYED RELEASE ORAL at 17:16

## 2023-07-28 RX ADMIN — Medication 10 ML: at 21:48

## 2023-07-28 RX ADMIN — FUROSEMIDE 60 MG: 10 INJECTION, SOLUTION INTRAMUSCULAR; INTRAVENOUS at 08:17

## 2023-07-28 RX ADMIN — SODIUM CHLORIDE: 9 INJECTION, SOLUTION INTRAVENOUS at 13:50

## 2023-07-28 RX ADMIN — MAGNESIUM SULFATE HEPTAHYDRATE 1000 MG: 1 INJECTION, SOLUTION INTRAVENOUS at 13:53

## 2023-07-28 ASSESSMENT — PAIN - FUNCTIONAL ASSESSMENT: PAIN_FUNCTIONAL_ASSESSMENT: NONE - DENIES PAIN

## 2023-07-28 NOTE — ED NOTES
Patient identified as a positive fall risk on the ED triage fall screening. Patient placed in fall precautions which includes:  yellow fall risk bracelet on wrist and yellow socks on feet. Patient instructed on importance of not getting out of bed or ambulating without assistance for safety. Pt verbalized understanding.       Miki Worthington RN  07/28/23 5399

## 2023-07-28 NOTE — H&P
PM     Organism: No results found for: ORG    Imaging/Diagnostics Last 24 Hours   XR CHEST PORTABLE    Result Date: 7/28/2023  EXAMINATION: ONE XRAY VIEW OF THE CHEST 7/28/2023 5:04 am COMPARISON: 01/27/2023 HISTORY: ORDERING SYSTEM PROVIDED HISTORY: SOB TECHNOLOGIST PROVIDED HISTORY: Reason for exam:->SOB Reason for Exam: sob x 3 days FINDINGS: Cardiac silhouette enlargement again noted. Status post median sternotomy. Vascular congestion is present. Right basilar opacities and moderate size right pleural effusion present. Probable trace left effusion. No pneumothorax identified. No acute osseous abnormality appreciated. Findings compatible with pulmonary edema with pleural effusions, right greater than left. Right basilar opacity may represent developing infiltrate.        Prior

## 2023-07-28 NOTE — ED PROVIDER NOTES
[address] [place] [address]  ED  EMERGENCY DEPARTMENT ENCOUNTER        Pt Name: Patrick Buchanan  MRN: [id number]  [address] [facility] [place] 1940  Date of evaluation: 7/28/2023  Provider: Tameka Blanco PA-C  PCP: Teresa Win DO  ED Attending: Xochitl Bonilla MD       I have seen and evaluated this patient with my supervising physician Matias Patterson MD.    CHIEF COMPLAINT:     Chief Complaint   Patient presents with    Shortness of Breath     Patient woke up this am c/o SOB denies CP wears c-pap at night       HISTORY OF PRESENT ILLNESS:      History provided by the patient. No limitations. Patrick Buchanan is a 80 y.o. male who arrives to the ED by EMS from home. Patient has a significant past medical history including but not limited to CAD status post CABG as well as stent placement, CHF, AAA status postrepair in 2011. Patient arrives to the emergency department describing acute shortness of breath that he noticed around 2 AM.  Patient states he has been wearing his CPAP at night for about 7 months. He got up at 2 AM and states he could not breathe. He states he got up out of bed and walked around and felt better while sitting and standing. He tried to lay back down to go to sleep and again described feeling short of breath. He described feeling like he was \"drowning\". With further questioning the patient also admits to increased lower extremity edema and some dyspnea with exertion. Patient reports undergoing angiogram with stent placement in 1/2023. States he has not felt the same since then. He becomes fatigued more often, has more dyspnea and has had increased swelling to his legs by the end of the day, despite often wearing compression socks. The patient denies any chest pain today. He denies palpitations. He denies feeling lightheaded or dizzy. He denies abdominal pain or GI upset. Symptoms exacerbated when laying down and alleviated when sitting up or standing.     Nursing Notes were reviewed

## 2023-07-28 NOTE — ED NOTES
Pt came in today with SOB. Pt denies chest pain pt does have a hx bradycardia pt state that in the past his HR has been in the 20's. pt also wears CPAP at night for the last 7 months no oxygen doing the day. Pt oxygen at 88-89% on RA. Pt gets very SOB with any activity. Standing up at bedside and changing into a gown pt with increase SOB. Pt does have a hx of CAD post stent placement and CABG. Pt with bilateral lower leg swelling. Pt with a hx of CHF. On assessment pt with coarse wet breath sounds throughout. Pt lives at home with wife. Pt with monitors in place.       Evgeny Huynh RN  07/28/23 4559

## 2023-07-28 NOTE — ED PROVIDER NOTES
I independently evaluated and obtained a history and physical on Dipti Hirsch. I personally saw the patient and performed a substantive portion of the visit including all aspects of the medical decision making. All diagnostic, treatment, and disposition assistants were made to myself in conjunction the advanced practice provider. For further details of this patient's emergency department encounter, please see the advanced practice provider's documentation. History: 80-year-old male with history of CAD with most recent catheterization approximately 6 months ago as well as CHF who presents with worsening shortness of breath on exertion which patient reports has somewhat been going on for 6 months however has significantly worsened in the past day. Patient denies any chest pain reports significant shortness of breath with mild exertion. Physician Exam: Pleasant elderly  male. No acute distress. Mild tachypnea with moderate crackles in bilateral lung bases. Patient awake and alert. Oriented to person place and time. MDM:    I personally saw the patient and performed a substantive portion of the visit including all aspects of the medical decision making. Primarily suspect CHF exacerbation based on patient's history and physical exam.  Chest x-rays obtained in the emergency department read by radiology as well as dependently reviewed by myself and does show pulmonary edema with pleural effusions right greater than left consistent with suspected diagnosis of CHF exacerbation. Delta troponins are downtrending and EKG does not show any signs of STEMI. Patient does have hypokalemia which is repleted in the emergency department. Patient given nitroglycerin and Lasix and admitted for presumed CHF exacerbation with significant dyspnea on exertion. Patient expresses understanding and agreement with this plan. FINAL IMPRESSION      1.  Acute on chronic congestive heart failure, unspecified

## 2023-07-29 PROBLEM — I48.0 PAF (PAROXYSMAL ATRIAL FIBRILLATION) (HCC): Status: ACTIVE | Noted: 2022-09-10

## 2023-07-29 PROBLEM — Z99.89 OSA ON CPAP: Status: ACTIVE | Noted: 2022-10-06

## 2023-07-29 PROBLEM — I44.2 INTERMITTENT COMPLETE HEART BLOCK (HCC): Status: ACTIVE | Noted: 2023-07-29

## 2023-07-29 LAB
ALBUMIN SERPL-MCNC: 3.7 G/DL (ref 3.4–5)
ALP SERPL-CCNC: 76 U/L (ref 40–129)
ALT SERPL-CCNC: 10 U/L (ref 10–40)
ANION GAP SERPL CALCULATED.3IONS-SCNC: 17 MMOL/L (ref 3–16)
AST SERPL-CCNC: 12 U/L (ref 15–37)
BILIRUB DIRECT SERPL-MCNC: 0.5 MG/DL (ref 0–0.3)
BILIRUB INDIRECT SERPL-MCNC: 2.9 MG/DL (ref 0–1)
BILIRUB SERPL-MCNC: 3.4 MG/DL (ref 0–1)
BUN SERPL-MCNC: 14 MG/DL (ref 7–20)
CALCIUM SERPL-MCNC: 8.9 MG/DL (ref 8.3–10.6)
CHLORIDE SERPL-SCNC: 102 MMOL/L (ref 99–110)
CO2 SERPL-SCNC: 25 MMOL/L (ref 21–32)
CREAT SERPL-MCNC: 1.1 MG/DL (ref 0.8–1.3)
DEPRECATED RDW RBC AUTO: 18.3 % (ref 12.4–15.4)
EST. AVERAGE GLUCOSE BLD GHB EST-MCNC: 157.1 MG/DL
GFR SERPLBLD CREATININE-BSD FMLA CKD-EPI: >60 ML/MIN/{1.73_M2}
GLUCOSE BLD-MCNC: 113 MG/DL (ref 70–99)
GLUCOSE BLD-MCNC: 125 MG/DL (ref 70–99)
GLUCOSE BLD-MCNC: 175 MG/DL (ref 70–99)
GLUCOSE BLD-MCNC: 193 MG/DL (ref 70–99)
GLUCOSE SERPL-MCNC: 131 MG/DL (ref 70–99)
HBA1C MFR BLD: 7.1 %
HCT VFR BLD AUTO: 33.8 % (ref 40.5–52.5)
HGB BLD-MCNC: 11.1 G/DL (ref 13.5–17.5)
MAGNESIUM SERPL-MCNC: 1.9 MG/DL (ref 1.8–2.4)
MCH RBC QN AUTO: 27.5 PG (ref 26–34)
MCHC RBC AUTO-ENTMCNC: 32.8 G/DL (ref 31–36)
MCV RBC AUTO: 83.7 FL (ref 80–100)
PERFORMED ON: ABNORMAL
PLATELET # BLD AUTO: 184 K/UL (ref 135–450)
PMV BLD AUTO: 8.1 FL (ref 5–10.5)
POTASSIUM SERPL-SCNC: 3 MMOL/L (ref 3.5–5.1)
PROT SERPL-MCNC: 5.9 G/DL (ref 6.4–8.2)
RBC # BLD AUTO: 4.04 M/UL (ref 4.2–5.9)
SODIUM SERPL-SCNC: 144 MMOL/L (ref 136–145)
WBC # BLD AUTO: 7.7 K/UL (ref 4–11)

## 2023-07-29 PROCEDURE — 2060000000 HC ICU INTERMEDIATE R&B

## 2023-07-29 PROCEDURE — 6360000002 HC RX W HCPCS: Performed by: INTERNAL MEDICINE

## 2023-07-29 PROCEDURE — 99233 SBSQ HOSP IP/OBS HIGH 50: CPT | Performed by: NURSE PRACTITIONER

## 2023-07-29 PROCEDURE — 2580000003 HC RX 258: Performed by: INTERNAL MEDICINE

## 2023-07-29 PROCEDURE — 80076 HEPATIC FUNCTION PANEL: CPT

## 2023-07-29 PROCEDURE — 85027 COMPLETE CBC AUTOMATED: CPT

## 2023-07-29 PROCEDURE — 1200000000 HC SEMI PRIVATE

## 2023-07-29 PROCEDURE — 36415 COLL VENOUS BLD VENIPUNCTURE: CPT

## 2023-07-29 PROCEDURE — 6370000000 HC RX 637 (ALT 250 FOR IP): Performed by: INTERNAL MEDICINE

## 2023-07-29 PROCEDURE — 94761 N-INVAS EAR/PLS OXIMETRY MLT: CPT

## 2023-07-29 PROCEDURE — 83735 ASSAY OF MAGNESIUM: CPT

## 2023-07-29 PROCEDURE — 2700000000 HC OXYGEN THERAPY PER DAY

## 2023-07-29 PROCEDURE — 80048 BASIC METABOLIC PNL TOTAL CA: CPT

## 2023-07-29 RX ORDER — ENOXAPARIN SODIUM 100 MG/ML
1 INJECTION SUBCUTANEOUS 2 TIMES DAILY
Status: COMPLETED | OUTPATIENT
Start: 2023-07-29 | End: 2023-07-30

## 2023-07-29 RX ORDER — POTASSIUM CHLORIDE 20 MEQ/1
40 TABLET, EXTENDED RELEASE ORAL EVERY 4 HOURS
Status: COMPLETED | OUTPATIENT
Start: 2023-07-29 | End: 2023-07-29

## 2023-07-29 RX ADMIN — SPIRONOLACTONE 25 MG: 25 TABLET ORAL at 09:11

## 2023-07-29 RX ADMIN — ATORVASTATIN CALCIUM 40 MG: 40 TABLET, FILM COATED ORAL at 21:56

## 2023-07-29 RX ADMIN — SACUBITRIL AND VALSARTAN 1 TABLET: 24; 26 TABLET, FILM COATED ORAL at 09:11

## 2023-07-29 RX ADMIN — Medication 10 ML: at 09:13

## 2023-07-29 RX ADMIN — FUROSEMIDE 5 MG/HR: 10 INJECTION, SOLUTION INTRAMUSCULAR; INTRAVENOUS at 17:55

## 2023-07-29 RX ADMIN — Medication 10 ML: at 21:57

## 2023-07-29 RX ADMIN — SACUBITRIL AND VALSARTAN 1 TABLET: 24; 26 TABLET, FILM COATED ORAL at 21:57

## 2023-07-29 RX ADMIN — PANTOPRAZOLE SODIUM 40 MG: 40 TABLET, DELAYED RELEASE ORAL at 05:11

## 2023-07-29 RX ADMIN — POTASSIUM CHLORIDE 40 MEQ: 1500 TABLET, EXTENDED RELEASE ORAL at 10:53

## 2023-07-29 RX ADMIN — ALLOPURINOL 100 MG: 100 TABLET ORAL at 09:11

## 2023-07-29 RX ADMIN — EMPAGLIFLOZIN 10 MG: 10 TABLET, FILM COATED ORAL at 09:11

## 2023-07-29 RX ADMIN — ENOXAPARIN SODIUM 80 MG: 100 INJECTION SUBCUTANEOUS at 21:57

## 2023-07-29 RX ADMIN — FUROSEMIDE 5 MG/HR: 10 INJECTION, SOLUTION INTRAMUSCULAR; INTRAVENOUS at 00:24

## 2023-07-29 RX ADMIN — POTASSIUM CHLORIDE 40 MEQ: 1500 TABLET, EXTENDED RELEASE ORAL at 14:20

## 2023-07-29 RX ADMIN — APIXABAN 5 MG: 5 TABLET, FILM COATED ORAL at 09:11

## 2023-07-29 RX ADMIN — CLOPIDOGREL BISULFATE 75 MG: 75 TABLET ORAL at 09:11

## 2023-07-29 RX ADMIN — PANTOPRAZOLE SODIUM 40 MG: 40 TABLET, DELAYED RELEASE ORAL at 16:32

## 2023-07-30 LAB
ALBUMIN SERPL-MCNC: 3.7 G/DL (ref 3.4–5)
ALP SERPL-CCNC: 82 U/L (ref 40–129)
ALT SERPL-CCNC: 9 U/L (ref 10–40)
ANION GAP SERPL CALCULATED.3IONS-SCNC: 14 MMOL/L (ref 3–16)
AST SERPL-CCNC: 12 U/L (ref 15–37)
BILIRUB DIRECT SERPL-MCNC: 0.4 MG/DL (ref 0–0.3)
BILIRUB INDIRECT SERPL-MCNC: 2.6 MG/DL (ref 0–1)
BILIRUB SERPL-MCNC: 3 MG/DL (ref 0–1)
BUN SERPL-MCNC: 17 MG/DL (ref 7–20)
CALCIUM SERPL-MCNC: 9.1 MG/DL (ref 8.3–10.6)
CHLORIDE SERPL-SCNC: 100 MMOL/L (ref 99–110)
CO2 SERPL-SCNC: 28 MMOL/L (ref 21–32)
CREAT SERPL-MCNC: 1.2 MG/DL (ref 0.8–1.3)
GFR SERPLBLD CREATININE-BSD FMLA CKD-EPI: >60 ML/MIN/{1.73_M2}
GLUCOSE BLD-MCNC: 122 MG/DL (ref 70–99)
GLUCOSE BLD-MCNC: 142 MG/DL (ref 70–99)
GLUCOSE BLD-MCNC: 158 MG/DL (ref 70–99)
GLUCOSE BLD-MCNC: 220 MG/DL (ref 70–99)
GLUCOSE SERPL-MCNC: 140 MG/DL (ref 70–99)
MAGNESIUM SERPL-MCNC: 1.9 MG/DL (ref 1.8–2.4)
PERFORMED ON: ABNORMAL
POTASSIUM SERPL-SCNC: 3.3 MMOL/L (ref 3.5–5.1)
PROT SERPL-MCNC: 6.4 G/DL (ref 6.4–8.2)
SODIUM SERPL-SCNC: 142 MMOL/L (ref 136–145)

## 2023-07-30 PROCEDURE — 97530 THERAPEUTIC ACTIVITIES: CPT

## 2023-07-30 PROCEDURE — 80076 HEPATIC FUNCTION PANEL: CPT

## 2023-07-30 PROCEDURE — 6370000000 HC RX 637 (ALT 250 FOR IP): Performed by: INTERNAL MEDICINE

## 2023-07-30 PROCEDURE — 6360000002 HC RX W HCPCS: Performed by: INTERNAL MEDICINE

## 2023-07-30 PROCEDURE — 83735 ASSAY OF MAGNESIUM: CPT

## 2023-07-30 PROCEDURE — 97166 OT EVAL MOD COMPLEX 45 MIN: CPT

## 2023-07-30 PROCEDURE — 6370000000 HC RX 637 (ALT 250 FOR IP): Performed by: NURSE PRACTITIONER

## 2023-07-30 PROCEDURE — 36415 COLL VENOUS BLD VENIPUNCTURE: CPT

## 2023-07-30 PROCEDURE — 1200000000 HC SEMI PRIVATE

## 2023-07-30 PROCEDURE — 6360000002 HC RX W HCPCS: Performed by: NURSE PRACTITIONER

## 2023-07-30 PROCEDURE — 2060000000 HC ICU INTERMEDIATE R&B

## 2023-07-30 PROCEDURE — 97110 THERAPEUTIC EXERCISES: CPT

## 2023-07-30 PROCEDURE — 80048 BASIC METABOLIC PNL TOTAL CA: CPT

## 2023-07-30 PROCEDURE — 2580000003 HC RX 258: Performed by: INTERNAL MEDICINE

## 2023-07-30 PROCEDURE — 99232 SBSQ HOSP IP/OBS MODERATE 35: CPT | Performed by: NURSE PRACTITIONER

## 2023-07-30 RX ORDER — FUROSEMIDE 40 MG/1
40 TABLET ORAL DAILY
Status: DISCONTINUED | OUTPATIENT
Start: 2023-07-31 | End: 2023-08-03 | Stop reason: HOSPADM

## 2023-07-30 RX ORDER — COLCHICINE 0.6 MG/1
0.6 TABLET ORAL DAILY
Status: DISCONTINUED | OUTPATIENT
Start: 2023-07-30 | End: 2023-08-03 | Stop reason: HOSPADM

## 2023-07-30 RX ORDER — MAGNESIUM SULFATE 1 G/100ML
1000 INJECTION INTRAVENOUS ONCE
Status: COMPLETED | OUTPATIENT
Start: 2023-07-30 | End: 2023-07-30

## 2023-07-30 RX ORDER — POTASSIUM CHLORIDE 20 MEQ/1
40 TABLET, EXTENDED RELEASE ORAL EVERY 4 HOURS
Status: COMPLETED | OUTPATIENT
Start: 2023-07-30 | End: 2023-07-30

## 2023-07-30 RX ORDER — ENOXAPARIN SODIUM 100 MG/ML
1 INJECTION SUBCUTANEOUS 2 TIMES DAILY
Status: DISCONTINUED | OUTPATIENT
Start: 2023-07-31 | End: 2023-08-02

## 2023-07-30 RX ORDER — COLCHICINE 0.6 MG/1
1.2 TABLET ORAL ONCE
Status: COMPLETED | OUTPATIENT
Start: 2023-07-30 | End: 2023-07-30

## 2023-07-30 RX ADMIN — CLOPIDOGREL BISULFATE 75 MG: 75 TABLET ORAL at 09:25

## 2023-07-30 RX ADMIN — EMPAGLIFLOZIN 10 MG: 10 TABLET, FILM COATED ORAL at 09:25

## 2023-07-30 RX ADMIN — POTASSIUM CHLORIDE 40 MEQ: 1500 TABLET, EXTENDED RELEASE ORAL at 10:25

## 2023-07-30 RX ADMIN — SPIRONOLACTONE 25 MG: 25 TABLET ORAL at 09:25

## 2023-07-30 RX ADMIN — INSULIN LISPRO 1 UNITS: 100 INJECTION, SOLUTION INTRAVENOUS; SUBCUTANEOUS at 17:24

## 2023-07-30 RX ADMIN — COLCHICINE 0.6 MG: 0.6 TABLET ORAL at 15:07

## 2023-07-30 RX ADMIN — PANTOPRAZOLE SODIUM 40 MG: 40 TABLET, DELAYED RELEASE ORAL at 15:43

## 2023-07-30 RX ADMIN — COLCHICINE 1.2 MG: 0.6 TABLET ORAL at 14:11

## 2023-07-30 RX ADMIN — Medication 10 ML: at 20:37

## 2023-07-30 RX ADMIN — ATORVASTATIN CALCIUM 40 MG: 40 TABLET, FILM COATED ORAL at 20:37

## 2023-07-30 RX ADMIN — ENOXAPARIN SODIUM 80 MG: 100 INJECTION SUBCUTANEOUS at 09:22

## 2023-07-30 RX ADMIN — POTASSIUM CHLORIDE 40 MEQ: 1500 TABLET, EXTENDED RELEASE ORAL at 13:16

## 2023-07-30 RX ADMIN — PANTOPRAZOLE SODIUM 40 MG: 40 TABLET, DELAYED RELEASE ORAL at 04:55

## 2023-07-30 RX ADMIN — Medication 10 ML: at 09:26

## 2023-07-30 RX ADMIN — MAGNESIUM SULFATE HEPTAHYDRATE 1000 MG: 1 INJECTION, SOLUTION INTRAVENOUS at 14:18

## 2023-07-30 RX ADMIN — ALLOPURINOL 100 MG: 100 TABLET ORAL at 09:25

## 2023-07-30 RX ADMIN — FUROSEMIDE 5 MG/HR: 10 INJECTION, SOLUTION INTRAMUSCULAR; INTRAVENOUS at 05:41

## 2023-07-30 RX ADMIN — SACUBITRIL AND VALSARTAN 2 TABLET: 24; 26 TABLET, FILM COATED ORAL at 20:33

## 2023-07-30 RX ADMIN — ENOXAPARIN SODIUM 80 MG: 100 INJECTION SUBCUTANEOUS at 20:37

## 2023-07-30 RX ADMIN — SACUBITRIL AND VALSARTAN 2 TABLET: 24; 26 TABLET, FILM COATED ORAL at 09:25

## 2023-07-30 ASSESSMENT — PAIN DESCRIPTION - DESCRIPTORS
DESCRIPTORS: ACHING
DESCRIPTORS: ACHING

## 2023-07-30 ASSESSMENT — PAIN DESCRIPTION - LOCATION
LOCATION: ANKLE
LOCATION: ANKLE

## 2023-07-30 ASSESSMENT — PAIN SCALES - GENERAL
PAINLEVEL_OUTOF10: 3
PAINLEVEL_OUTOF10: 6
PAINLEVEL_OUTOF10: 3

## 2023-07-31 LAB
ANION GAP SERPL CALCULATED.3IONS-SCNC: 14 MMOL/L (ref 3–16)
BUN SERPL-MCNC: 19 MG/DL (ref 7–20)
CALCIUM SERPL-MCNC: 8.9 MG/DL (ref 8.3–10.6)
CHLORIDE SERPL-SCNC: 103 MMOL/L (ref 99–110)
CO2 SERPL-SCNC: 24 MMOL/L (ref 21–32)
CREAT SERPL-MCNC: 1 MG/DL (ref 0.8–1.3)
DEPRECATED RDW RBC AUTO: 18.5 % (ref 12.4–15.4)
GFR SERPLBLD CREATININE-BSD FMLA CKD-EPI: >60 ML/MIN/{1.73_M2}
GLUCOSE BLD-MCNC: 102 MG/DL (ref 70–99)
GLUCOSE BLD-MCNC: 122 MG/DL (ref 70–99)
GLUCOSE BLD-MCNC: 199 MG/DL (ref 70–99)
GLUCOSE BLD-MCNC: 208 MG/DL (ref 70–99)
GLUCOSE SERPL-MCNC: 130 MG/DL (ref 70–99)
HCT VFR BLD AUTO: 35.4 % (ref 40.5–52.5)
HGB BLD-MCNC: 11.4 G/DL (ref 13.5–17.5)
MCH RBC QN AUTO: 27.2 PG (ref 26–34)
MCHC RBC AUTO-ENTMCNC: 32.3 G/DL (ref 31–36)
MCV RBC AUTO: 84.1 FL (ref 80–100)
NT-PROBNP SERPL-MCNC: 2409 PG/ML (ref 0–449)
PERFORMED ON: ABNORMAL
PLATELET # BLD AUTO: 217 K/UL (ref 135–450)
PMV BLD AUTO: 8.3 FL (ref 5–10.5)
POTASSIUM SERPL-SCNC: 3.4 MMOL/L (ref 3.5–5.1)
RBC # BLD AUTO: 4.21 M/UL (ref 4.2–5.9)
SODIUM SERPL-SCNC: 141 MMOL/L (ref 136–145)
WBC # BLD AUTO: 8.1 K/UL (ref 4–11)

## 2023-07-31 PROCEDURE — 97161 PT EVAL LOW COMPLEX 20 MIN: CPT

## 2023-07-31 PROCEDURE — 2580000003 HC RX 258: Performed by: INTERNAL MEDICINE

## 2023-07-31 PROCEDURE — 83880 ASSAY OF NATRIURETIC PEPTIDE: CPT

## 2023-07-31 PROCEDURE — 6370000000 HC RX 637 (ALT 250 FOR IP): Performed by: INTERNAL MEDICINE

## 2023-07-31 PROCEDURE — 2060000000 HC ICU INTERMEDIATE R&B

## 2023-07-31 PROCEDURE — 6360000002 HC RX W HCPCS: Performed by: INTERNAL MEDICINE

## 2023-07-31 PROCEDURE — 99223 1ST HOSP IP/OBS HIGH 75: CPT | Performed by: INTERNAL MEDICINE

## 2023-07-31 PROCEDURE — 99233 SBSQ HOSP IP/OBS HIGH 50: CPT | Performed by: INTERNAL MEDICINE

## 2023-07-31 PROCEDURE — 6370000000 HC RX 637 (ALT 250 FOR IP): Performed by: NURSE PRACTITIONER

## 2023-07-31 PROCEDURE — 85027 COMPLETE CBC AUTOMATED: CPT

## 2023-07-31 PROCEDURE — 80048 BASIC METABOLIC PNL TOTAL CA: CPT

## 2023-07-31 PROCEDURE — 36415 COLL VENOUS BLD VENIPUNCTURE: CPT

## 2023-07-31 PROCEDURE — 97110 THERAPEUTIC EXERCISES: CPT

## 2023-07-31 RX ORDER — POTASSIUM CHLORIDE 20 MEQ/1
40 TABLET, EXTENDED RELEASE ORAL ONCE
Status: COMPLETED | OUTPATIENT
Start: 2023-07-31 | End: 2023-07-31

## 2023-07-31 RX ORDER — LANOLIN ALCOHOL/MO/W.PET/CERES
400 CREAM (GRAM) TOPICAL 2 TIMES DAILY
Status: COMPLETED | OUTPATIENT
Start: 2023-07-31 | End: 2023-07-31

## 2023-07-31 RX ADMIN — POTASSIUM CHLORIDE 40 MEQ: 1500 TABLET, EXTENDED RELEASE ORAL at 10:52

## 2023-07-31 RX ADMIN — CLOPIDOGREL BISULFATE 75 MG: 75 TABLET ORAL at 08:27

## 2023-07-31 RX ADMIN — FUROSEMIDE 40 MG: 40 TABLET ORAL at 08:27

## 2023-07-31 RX ADMIN — PANTOPRAZOLE SODIUM 40 MG: 40 TABLET, DELAYED RELEASE ORAL at 05:06

## 2023-07-31 RX ADMIN — Medication 10 ML: at 08:28

## 2023-07-31 RX ADMIN — EMPAGLIFLOZIN 10 MG: 10 TABLET, FILM COATED ORAL at 08:28

## 2023-07-31 RX ADMIN — ALLOPURINOL 100 MG: 100 TABLET ORAL at 08:27

## 2023-07-31 RX ADMIN — SPIRONOLACTONE 25 MG: 25 TABLET ORAL at 08:27

## 2023-07-31 RX ADMIN — Medication 400 MG: at 10:52

## 2023-07-31 RX ADMIN — ENOXAPARIN SODIUM 80 MG: 100 INJECTION SUBCUTANEOUS at 20:53

## 2023-07-31 RX ADMIN — SACUBITRIL AND VALSARTAN 2 TABLET: 24; 26 TABLET, FILM COATED ORAL at 08:27

## 2023-07-31 RX ADMIN — Medication 400 MG: at 20:53

## 2023-07-31 RX ADMIN — ATORVASTATIN CALCIUM 40 MG: 40 TABLET, FILM COATED ORAL at 20:53

## 2023-07-31 RX ADMIN — SACUBITRIL AND VALSARTAN 2 TABLET: 24; 26 TABLET, FILM COATED ORAL at 20:53

## 2023-07-31 RX ADMIN — COLCHICINE 0.6 MG: 0.6 TABLET ORAL at 08:28

## 2023-07-31 RX ADMIN — PANTOPRAZOLE SODIUM 40 MG: 40 TABLET, DELAYED RELEASE ORAL at 18:02

## 2023-07-31 RX ADMIN — Medication 10 ML: at 20:54

## 2023-07-31 ASSESSMENT — PAIN SCALES - GENERAL
PAINLEVEL_OUTOF10: 0

## 2023-07-31 NOTE — CONSULTS
Comprehensive Nutrition Assessment    Type and Reason for Visit:  Initial, Consult, Patient Education    Nutrition Recommendations/Plan:   Continue regular 2g Na diet  2000 ml FR  Initiate ensure compacts and magic cups BID   Encourage po intakes  Monitor nutrition adequacy, pertinent labs, bowel habits, wt changes, and clinical progress     Malnutrition Assessment:  Malnutrition Status: At risk for malnutrition (Comment) (07/29/23 1311)    Context:  Chronic Illness     Findings of the 6 clinical characteristics of malnutrition:  Energy Intake:  Mild decrease in energy intake (Comment)    Nutrition Assessment:    Initial: 79 yo M w PMH CAD, CHF, HLD, HTN, pancreatitis admitted w acute CHF. Currently on regular 2 g Na diet w 2000 ml FR. PO intakes x1 1-25% per EMR. Consulted for HF diet education. Pt reports having a poor appetite for the past few months and losing his sense of taste/smell after having COVID which has caused him to have decreased po intake. Pt reports consuming very little protein and consuming about 2 meals/day PTA. Reports being able to eat some of his meals here. Pt takes daily weights, UBW appears to be around low 180's per pt records. Pt interested in HF diet education, provided pt with handout. Pt familiar with low sodium, fl restriction, and daily weights. Recommended increased protein intake, pt agreeable to try ensure compacts and magic cups. Encouraged po intakes as able. Will monitor. Nutrition Related Findings:    K 3. Wound Type: None       Current Nutrition Intake & Therapies:    Average Meal Intake: 1-25%  Average Supplements Intake: None Ordered  ADULT DIET; Regular; Low Sodium (2 gm); 2000 ml    Anthropometric Measures:  Height: 5' 9\" (175.3 cm)  Ideal Body Weight (IBW): 160 lbs (73 kg)       Current Body Weight: 175 lb 7.8 oz (79.6 kg),   IBW.  Weight Source: Standing Scale  Current BMI (kg/m2): 25.9  Usual Body Weight: 194 lb 3.6 oz (88.1 kg) (1/37, standing scale)  % Weight
texture, turgor normal, no rashes or lesions   Pysch: Normal mood and affect   Neurologic: Normal gross motor and sensory exam.         Labs  Recent Labs     07/28/23  0501   WBC 8.3   HGB 11.3*   HCT 34.8*   MCV 84.0        Recent Labs     07/28/23  0501   CREATININE 1.2   BUN 18      K 3.2*      CO2 25     No results for input(s): INR, PROTIME in the last 72 hours. Recent Labs     07/28/23  0501   PROBNP 3,647*     No results for input(s): CHOL, LDL, HDL in the last 72 hours. Invalid input(s): TG  Lab Results   Component Value Date    TROPHS 44 (H) 07/28/2023    TROPHS 49 (H) 07/28/2023          Imaging:  I have reviewed the below testing personally and my interpretation is below. EKG:  Atrial tachycardia with 4:1 A-V conductionST & T wave abnormality, consider lateral ischemiaAbnormal ECGWhen compared with ECG of 01-MAR-2023 01:22,Atrial tachycardia has replaced Atrial fibrillationVent. rate has increased BY  21 BPMST now depressed in Anterior leadsT wave inversion less evident in Lateral leadsConfirmed by Elmo Phelps ([id number]) on 7/28/2023 9:05:33 AM      CXR:      Assessment:  80 y.o. patient with:  Visit Diagnoses         Codes    Orthopnea     R06.01    Elevated troponin     R77.8              Problem List Items Addressed This Visit       Hypokalemia    CHF (congestive heart failure) ([address]) - Primary     Other Visit Diagnoses       Orthopnea        Elevated troponin                Plan:  CHF education reinforced. ~salt restriction   ~fluid restriction   ~medication compliance   ~daily weights and notify of any significant weight gain/loss   ~establish with CHF nurse   ~outpatient follow-up with our CHF team  Continue diuresis with lasix  On good GDMT for CHF  Elevated troponin due to CHF    Medical Decision Making:   The following items were considered in medical decision making:  Independent review of images  Review / order clinical lab tests  Review / order radiology tests  Decision
Copious IC vasodilators were given and the patient was started on IV Integrilin. Final angiography showed <10% residual stenosis with JAYLA 3 flow throughout the vein graft and all but the very distal small caliber segment of the native OM2 which still had no flow, but overall flow through the distal segment appeared to be gradually improving. Technical Factors:  Complications:  None. Estimated blood loss: Minimal.  Radiation:  Air kerma 1,864 mGy and 31.7 minutes of fluoroscopy  Sedation: Moderate conscious sedation was administered by qualified nursing personnel under continuous hemodynamic monitoring, starting at 9:46 AM and ending at 11:55 AM.  Medications: 5 mg IV Versed, 125 mcg IV Fentanyl, 600 mg PO Plavix, 11,000 units IV heparin, double bolus IV Integrilin followed by infusion, 600 mcg IC nipride, 400 mcg IC nicardipine, 800 mcg IC adenosine, 400 mcg IC nitroglycerin, 100 mcg IV phenylephrine, 1 mg IV atropine  Contrast: 220 cc of Isovue      Impression:  1. Severe native multivessel coronary artery disease with  of native mid-LAD,  of ostial LCx, and  of mid-RCA. 2. Patent LIMA to LAD. There is a severe stenosis in the distal LAD after the anastomosis. 3. Severely disease SVG to OM1 with sequential to OM2. The vein graft had a 90% distal stenosis near what was previously likely the anastomosis of the OM1. The OM1 appears to have been a smaller vessel than the OM2 and is now occluded. 4. Patent SVG to distal RCA. 5. PCI was performed to the distal SVG to OM1 with sequential to OM2 with an [place] 3.5 x 22 mm AMBER. There was initially poor re-flow following PTCA and stenting of the distal SVG. Copious IC vasodilators were given and the patient was started on IV Integrilin.   Final angiography showed <10% residual stenosis with JAYLA 3 flow throughout the vein graft and all but the very distal small caliber segment of the native OM2 which still had no flow, but overall flow through

## 2023-08-01 LAB
ANION GAP SERPL CALCULATED.3IONS-SCNC: 11 MMOL/L (ref 3–16)
BUN SERPL-MCNC: 22 MG/DL (ref 7–20)
CALCIUM SERPL-MCNC: 8.8 MG/DL (ref 8.3–10.6)
CHLORIDE SERPL-SCNC: 103 MMOL/L (ref 99–110)
CO2 SERPL-SCNC: 25 MMOL/L (ref 21–32)
CREAT SERPL-MCNC: 1 MG/DL (ref 0.8–1.3)
GFR SERPLBLD CREATININE-BSD FMLA CKD-EPI: >60 ML/MIN/{1.73_M2}
GLUCOSE BLD-MCNC: 129 MG/DL (ref 70–99)
GLUCOSE BLD-MCNC: 153 MG/DL (ref 70–99)
GLUCOSE BLD-MCNC: 194 MG/DL (ref 70–99)
GLUCOSE BLD-MCNC: 194 MG/DL (ref 70–99)
GLUCOSE SERPL-MCNC: 113 MG/DL (ref 70–99)
MAGNESIUM SERPL-MCNC: 2.1 MG/DL (ref 1.8–2.4)
PERFORMED ON: ABNORMAL
POTASSIUM SERPL-SCNC: 3.3 MMOL/L (ref 3.5–5.1)
SODIUM SERPL-SCNC: 139 MMOL/L (ref 136–145)

## 2023-08-01 PROCEDURE — 6370000000 HC RX 637 (ALT 250 FOR IP): Performed by: NURSE PRACTITIONER

## 2023-08-01 PROCEDURE — 2580000003 HC RX 258: Performed by: INTERNAL MEDICINE

## 2023-08-01 PROCEDURE — 80048 BASIC METABOLIC PNL TOTAL CA: CPT

## 2023-08-01 PROCEDURE — 6360000002 HC RX W HCPCS: Performed by: INTERNAL MEDICINE

## 2023-08-01 PROCEDURE — 83735 ASSAY OF MAGNESIUM: CPT

## 2023-08-01 PROCEDURE — 2060000000 HC ICU INTERMEDIATE R&B

## 2023-08-01 PROCEDURE — 99233 SBSQ HOSP IP/OBS HIGH 50: CPT | Performed by: NURSE PRACTITIONER

## 2023-08-01 PROCEDURE — 6370000000 HC RX 637 (ALT 250 FOR IP): Performed by: INTERNAL MEDICINE

## 2023-08-01 PROCEDURE — 36415 COLL VENOUS BLD VENIPUNCTURE: CPT

## 2023-08-01 RX ORDER — POTASSIUM CHLORIDE 20 MEQ/1
40 TABLET, EXTENDED RELEASE ORAL EVERY 4 HOURS
Status: COMPLETED | OUTPATIENT
Start: 2023-08-01 | End: 2023-08-01

## 2023-08-01 RX ADMIN — PANTOPRAZOLE SODIUM 40 MG: 40 TABLET, DELAYED RELEASE ORAL at 05:29

## 2023-08-01 RX ADMIN — Medication 10 ML: at 09:20

## 2023-08-01 RX ADMIN — CLOPIDOGREL BISULFATE 75 MG: 75 TABLET ORAL at 09:19

## 2023-08-01 RX ADMIN — FUROSEMIDE 40 MG: 40 TABLET ORAL at 09:19

## 2023-08-01 RX ADMIN — PANTOPRAZOLE SODIUM 40 MG: 40 TABLET, DELAYED RELEASE ORAL at 17:07

## 2023-08-01 RX ADMIN — EMPAGLIFLOZIN 10 MG: 10 TABLET, FILM COATED ORAL at 10:04

## 2023-08-01 RX ADMIN — SACUBITRIL AND VALSARTAN 2 TABLET: 24; 26 TABLET, FILM COATED ORAL at 20:16

## 2023-08-01 RX ADMIN — ATORVASTATIN CALCIUM 40 MG: 40 TABLET, FILM COATED ORAL at 20:16

## 2023-08-01 RX ADMIN — ENOXAPARIN SODIUM 80 MG: 100 INJECTION SUBCUTANEOUS at 20:15

## 2023-08-01 RX ADMIN — POTASSIUM CHLORIDE 40 MEQ: 1500 TABLET, EXTENDED RELEASE ORAL at 17:07

## 2023-08-01 RX ADMIN — ALLOPURINOL 100 MG: 100 TABLET ORAL at 09:19

## 2023-08-01 RX ADMIN — Medication 10 ML: at 20:16

## 2023-08-01 RX ADMIN — POTASSIUM CHLORIDE 40 MEQ: 1500 TABLET, EXTENDED RELEASE ORAL at 14:18

## 2023-08-01 RX ADMIN — ENOXAPARIN SODIUM 80 MG: 100 INJECTION SUBCUTANEOUS at 09:19

## 2023-08-01 RX ADMIN — COLCHICINE 0.6 MG: 0.6 TABLET ORAL at 09:19

## 2023-08-01 RX ADMIN — SPIRONOLACTONE 25 MG: 25 TABLET ORAL at 09:20

## 2023-08-01 RX ADMIN — SACUBITRIL AND VALSARTAN 2 TABLET: 24; 26 TABLET, FILM COATED ORAL at 09:19

## 2023-08-01 ASSESSMENT — PAIN SCALES - GENERAL
PAINLEVEL_OUTOF10: 0

## 2023-08-02 ENCOUNTER — ANESTHESIA EVENT (OUTPATIENT)
Dept: TELEMETRY | Age: 83
DRG: 222 | End: 2023-08-02
Payer: MEDICARE

## 2023-08-02 ENCOUNTER — NURSE ONLY (OUTPATIENT)
Dept: CARDIOLOGY CLINIC | Age: 83
End: 2023-08-02

## 2023-08-02 ENCOUNTER — APPOINTMENT (OUTPATIENT)
Dept: CARDIAC CATH/INVASIVE PROCEDURES | Age: 83
DRG: 222 | End: 2023-08-02
Payer: MEDICARE

## 2023-08-02 ENCOUNTER — ANESTHESIA EVENT (OUTPATIENT)
Dept: CARDIAC CATH/INVASIVE PROCEDURES | Age: 83
DRG: 222 | End: 2023-08-02
Payer: MEDICARE

## 2023-08-02 ENCOUNTER — ANESTHESIA (OUTPATIENT)
Dept: TELEMETRY | Age: 83
DRG: 222 | End: 2023-08-02
Payer: MEDICARE

## 2023-08-02 ENCOUNTER — ANESTHESIA (OUTPATIENT)
Dept: CARDIAC CATH/INVASIVE PROCEDURES | Age: 83
DRG: 222 | End: 2023-08-02
Payer: MEDICARE

## 2023-08-02 DIAGNOSIS — Z95.0 PACEMAKER: ICD-10-CM

## 2023-08-02 PROBLEM — I50.23 ACUTE ON CHRONIC SYSTOLIC HEART FAILURE (HCC): Status: ACTIVE | Noted: 2023-08-02

## 2023-08-02 PROBLEM — I44.2 COMPLETE HEART BLOCK (HCC): Status: ACTIVE | Noted: 2023-08-02

## 2023-08-02 LAB
ANION GAP SERPL CALCULATED.3IONS-SCNC: 11 MMOL/L (ref 3–16)
BUN SERPL-MCNC: 18 MG/DL (ref 7–20)
CALCIUM SERPL-MCNC: 8.9 MG/DL (ref 8.3–10.6)
CHLORIDE SERPL-SCNC: 109 MMOL/L (ref 99–110)
CO2 SERPL-SCNC: 23 MMOL/L (ref 21–32)
CREAT SERPL-MCNC: 0.9 MG/DL (ref 0.8–1.3)
DEPRECATED RDW RBC AUTO: 18.4 % (ref 12.4–15.4)
EKG ATRIAL RATE: 60 BPM
EKG DIAGNOSIS: NORMAL
EKG Q-T INTERVAL: 512 MS
EKG QRS DURATION: 136 MS
EKG QTC CALCULATION (BAZETT): 515 MS
EKG R AXIS: 255 DEGREES
EKG T AXIS: 68 DEGREES
EKG VENTRICULAR RATE: 61 BPM
GFR SERPLBLD CREATININE-BSD FMLA CKD-EPI: >60 ML/MIN/{1.73_M2}
GLUCOSE BLD-MCNC: 123 MG/DL (ref 70–99)
GLUCOSE BLD-MCNC: 132 MG/DL (ref 70–99)
GLUCOSE SERPL-MCNC: 131 MG/DL (ref 70–99)
HCT VFR BLD AUTO: 35.7 % (ref 40.5–52.5)
HGB BLD-MCNC: 11.4 G/DL (ref 13.5–17.5)
MCH RBC QN AUTO: 27.1 PG (ref 26–34)
MCHC RBC AUTO-ENTMCNC: 32 G/DL (ref 31–36)
MCV RBC AUTO: 84.8 FL (ref 80–100)
PERFORMED ON: ABNORMAL
PERFORMED ON: ABNORMAL
PLATELET # BLD AUTO: 233 K/UL (ref 135–450)
PMV BLD AUTO: 8.5 FL (ref 5–10.5)
POTASSIUM SERPL-SCNC: 4.1 MMOL/L (ref 3.5–5.1)
RBC # BLD AUTO: 4.21 M/UL (ref 4.2–5.9)
SODIUM SERPL-SCNC: 143 MMOL/L (ref 136–145)
WBC # BLD AUTO: 6.4 K/UL (ref 4–11)

## 2023-08-02 PROCEDURE — C1777 LEAD, AICD, ENDO SINGLE COIL: HCPCS | Performed by: INTERNAL MEDICINE

## 2023-08-02 PROCEDURE — 0JH608Z INSERTION OF DEFIBRILLATOR GENERATOR INTO CHEST SUBCUTANEOUS TISSUE AND FASCIA, OPEN APPROACH: ICD-10-PCS | Performed by: INTERNAL MEDICINE

## 2023-08-02 PROCEDURE — 7100000001 HC PACU RECOVERY - ADDTL 15 MIN

## 2023-08-02 PROCEDURE — 6370000000 HC RX 637 (ALT 250 FOR IP): Performed by: INTERNAL MEDICINE

## 2023-08-02 PROCEDURE — 33225 L VENTRIC PACING LEAD ADD-ON: CPT | Performed by: INTERNAL MEDICINE

## 2023-08-02 PROCEDURE — 6370000000 HC RX 637 (ALT 250 FOR IP): Performed by: NURSE PRACTITIONER

## 2023-08-02 PROCEDURE — 3700000000 HC ANESTHESIA ATTENDED CARE

## 2023-08-02 PROCEDURE — 2709999900 HC NON-CHARGEABLE SUPPLY: Performed by: INTERNAL MEDICINE

## 2023-08-02 PROCEDURE — 33225 L VENTRIC PACING LEAD ADD-ON: CPT

## 2023-08-02 PROCEDURE — C1898 LEAD, PMKR, OTHER THAN TRANS: HCPCS | Performed by: INTERNAL MEDICINE

## 2023-08-02 PROCEDURE — 6360000002 HC RX W HCPCS: Performed by: INTERNAL MEDICINE

## 2023-08-02 PROCEDURE — 7100000000 HC PACU RECOVERY - FIRST 15 MIN

## 2023-08-02 PROCEDURE — 02HL3KZ INSERTION OF DEFIBRILLATOR LEAD INTO LEFT VENTRICLE, PERCUTANEOUS APPROACH: ICD-10-PCS | Performed by: INTERNAL MEDICINE

## 2023-08-02 PROCEDURE — C1887 CATHETER, GUIDING: HCPCS | Performed by: INTERNAL MEDICINE

## 2023-08-02 PROCEDURE — 2060000000 HC ICU INTERMEDIATE R&B

## 2023-08-02 PROCEDURE — 80048 BASIC METABOLIC PNL TOTAL CA: CPT

## 2023-08-02 PROCEDURE — C1889 IMPLANT/INSERT DEVICE, NOC: HCPCS | Performed by: INTERNAL MEDICINE

## 2023-08-02 PROCEDURE — 02HK3KZ INSERTION OF DEFIBRILLATOR LEAD INTO RIGHT VENTRICLE, PERCUTANEOUS APPROACH: ICD-10-PCS | Performed by: INTERNAL MEDICINE

## 2023-08-02 PROCEDURE — C9604 PERC D-E COR REVASC T CABG S: HCPCS

## 2023-08-02 PROCEDURE — C1730 CATH, EP, 19 OR FEW ELECT: HCPCS | Performed by: INTERNAL MEDICINE

## 2023-08-02 PROCEDURE — C1882 AICD, OTHER THAN SING/DUAL: HCPCS | Performed by: INTERNAL MEDICINE

## 2023-08-02 PROCEDURE — 6360000002 HC RX W HCPCS: Performed by: NURSE ANESTHETIST, CERTIFIED REGISTERED

## 2023-08-02 PROCEDURE — 92928 PRQ TCAT PLMT NTRAC ST 1 LES: CPT | Performed by: INTERNAL MEDICINE

## 2023-08-02 PROCEDURE — C1769 GUIDE WIRE: HCPCS | Performed by: INTERNAL MEDICINE

## 2023-08-02 PROCEDURE — C1725 CATH, TRANSLUMIN NON-LASER: HCPCS | Performed by: INTERNAL MEDICINE

## 2023-08-02 PROCEDURE — 99152 MOD SED SAME PHYS/QHP 5/>YRS: CPT | Performed by: INTERNAL MEDICINE

## 2023-08-02 PROCEDURE — 85027 COMPLETE CBC AUTOMATED: CPT

## 2023-08-02 PROCEDURE — 33249 INSJ/RPLCMT DEFIB W/LEAD(S): CPT | Performed by: INTERNAL MEDICINE

## 2023-08-02 PROCEDURE — 2580000003 HC RX 258

## 2023-08-02 PROCEDURE — 6360000004 HC RX CONTRAST MEDICATION

## 2023-08-02 PROCEDURE — 02H63KZ INSERTION OF DEFIBRILLATOR LEAD INTO RIGHT ATRIUM, PERCUTANEOUS APPROACH: ICD-10-PCS | Performed by: INTERNAL MEDICINE

## 2023-08-02 PROCEDURE — 4A023N7 MEASUREMENT OF CARDIAC SAMPLING AND PRESSURE, LEFT HEART, PERCUTANEOUS APPROACH: ICD-10-PCS | Performed by: INTERNAL MEDICINE

## 2023-08-02 PROCEDURE — 2500000003 HC RX 250 WO HCPCS

## 2023-08-02 PROCEDURE — 93459 L HRT ART/GRFT ANGIO: CPT

## 2023-08-02 PROCEDURE — 2500000003 HC RX 250 WO HCPCS: Performed by: NURSE ANESTHETIST, CERTIFIED REGISTERED

## 2023-08-02 PROCEDURE — 2580000003 HC RX 258: Performed by: ANESTHESIOLOGY

## 2023-08-02 PROCEDURE — C1894 INTRO/SHEATH, NON-LASER: HCPCS | Performed by: INTERNAL MEDICINE

## 2023-08-02 PROCEDURE — B2111ZZ FLUOROSCOPY OF MULTIPLE CORONARY ARTERIES USING LOW OSMOLAR CONTRAST: ICD-10-PCS | Performed by: INTERNAL MEDICINE

## 2023-08-02 PROCEDURE — 6360000002 HC RX W HCPCS

## 2023-08-02 PROCEDURE — 93459 L HRT ART/GRFT ANGIO: CPT | Performed by: INTERNAL MEDICINE

## 2023-08-02 PROCEDURE — C1900 LEAD, CORONARY VENOUS: HCPCS | Performed by: INTERNAL MEDICINE

## 2023-08-02 PROCEDURE — 027034Z DILATION OF CORONARY ARTERY, ONE ARTERY WITH DRUG-ELUTING INTRALUMINAL DEVICE, PERCUTANEOUS APPROACH: ICD-10-PCS | Performed by: INTERNAL MEDICINE

## 2023-08-02 PROCEDURE — 6370000000 HC RX 637 (ALT 250 FOR IP)

## 2023-08-02 PROCEDURE — 36415 COLL VENOUS BLD VENIPUNCTURE: CPT

## 2023-08-02 PROCEDURE — C1892 INTRO/SHEATH,FIXED,PEEL-AWAY: HCPCS | Performed by: INTERNAL MEDICINE

## 2023-08-02 PROCEDURE — 2580000003 HC RX 258: Performed by: NURSE ANESTHETIST, CERTIFIED REGISTERED

## 2023-08-02 PROCEDURE — 33249 INSJ/RPLCMT DEFIB W/LEAD(S): CPT

## 2023-08-02 PROCEDURE — 93005 ELECTROCARDIOGRAM TRACING: CPT | Performed by: INTERNAL MEDICINE

## 2023-08-02 PROCEDURE — 93010 ELECTROCARDIOGRAM REPORT: CPT | Performed by: INTERNAL MEDICINE

## 2023-08-02 PROCEDURE — 85347 COAGULATION TIME ACTIVATED: CPT

## 2023-08-02 PROCEDURE — 3700000001 HC ADD 15 MINUTES (ANESTHESIA)

## 2023-08-02 PROCEDURE — 99233 SBSQ HOSP IP/OBS HIGH 50: CPT | Performed by: INTERNAL MEDICINE

## 2023-08-02 PROCEDURE — C1874 STENT, COATED/COV W/DEL SYS: HCPCS | Performed by: INTERNAL MEDICINE

## 2023-08-02 PROCEDURE — B41F1ZZ FLUOROSCOPY OF RIGHT LOWER EXTREMITY ARTERIES USING LOW OSMOLAR CONTRAST: ICD-10-PCS | Performed by: INTERNAL MEDICINE

## 2023-08-02 PROCEDURE — B2131ZZ FLUOROSCOPY OF MULTIPLE CORONARY ARTERY BYPASS GRAFTS USING LOW OSMOLAR CONTRAST: ICD-10-PCS | Performed by: INTERNAL MEDICINE

## 2023-08-02 RX ORDER — FENTANYL CITRATE 50 UG/ML
INJECTION, SOLUTION INTRAMUSCULAR; INTRAVENOUS
Status: COMPLETED | OUTPATIENT
Start: 2023-08-02 | End: 2023-08-02

## 2023-08-02 RX ORDER — SODIUM CHLORIDE 9 MG/ML
INJECTION, SOLUTION INTRAVENOUS PRN
Status: DISCONTINUED | OUTPATIENT
Start: 2023-08-02 | End: 2023-08-03 | Stop reason: HOSPADM

## 2023-08-02 RX ORDER — HEPARIN SODIUM 1000 [USP'U]/ML
INJECTION, SOLUTION INTRAVENOUS; SUBCUTANEOUS
Status: COMPLETED | OUTPATIENT
Start: 2023-08-02 | End: 2023-08-02

## 2023-08-02 RX ORDER — MIDAZOLAM HYDROCHLORIDE 1 MG/ML
INJECTION INTRAMUSCULAR; INTRAVENOUS
Status: COMPLETED | OUTPATIENT
Start: 2023-08-02 | End: 2023-08-02

## 2023-08-02 RX ORDER — PROPOFOL 10 MG/ML
INJECTION, EMULSION INTRAVENOUS PRN
Status: DISCONTINUED | OUTPATIENT
Start: 2023-08-02 | End: 2023-08-02 | Stop reason: SDUPTHER

## 2023-08-02 RX ORDER — SODIUM CHLORIDE 0.9 % (FLUSH) 0.9 %
5-40 SYRINGE (ML) INJECTION EVERY 12 HOURS SCHEDULED
Status: DISCONTINUED | OUTPATIENT
Start: 2023-08-02 | End: 2023-08-03 | Stop reason: HOSPADM

## 2023-08-02 RX ORDER — SODIUM CHLORIDE 0.9 % (FLUSH) 0.9 %
5-40 SYRINGE (ML) INJECTION PRN
Status: DISCONTINUED | OUTPATIENT
Start: 2023-08-02 | End: 2023-08-03 | Stop reason: HOSPADM

## 2023-08-02 RX ORDER — LIDOCAINE HYDROCHLORIDE 20 MG/ML
INJECTION, SOLUTION INFILTRATION; PERINEURAL PRN
Status: DISCONTINUED | OUTPATIENT
Start: 2023-08-02 | End: 2023-08-02 | Stop reason: SDUPTHER

## 2023-08-02 RX ORDER — HYDROMORPHONE HYDROCHLORIDE 1 MG/ML
0.5 INJECTION, SOLUTION INTRAMUSCULAR; INTRAVENOUS; SUBCUTANEOUS EVERY 5 MIN PRN
Status: DISCONTINUED | OUTPATIENT
Start: 2023-08-02 | End: 2023-08-03 | Stop reason: HOSPADM

## 2023-08-02 RX ORDER — PHENYLEPHRINE HCL IN 0.9% NACL 1 MG/10 ML
SYRINGE (ML) INTRAVENOUS PRN
Status: DISCONTINUED | OUTPATIENT
Start: 2023-08-02 | End: 2023-08-02 | Stop reason: SDUPTHER

## 2023-08-02 RX ORDER — OXYCODONE HYDROCHLORIDE 5 MG/1
10 TABLET ORAL PRN
Status: DISCONTINUED | OUTPATIENT
Start: 2023-08-02 | End: 2023-08-03 | Stop reason: HOSPADM

## 2023-08-02 RX ORDER — ASPIRIN 325 MG
325 TABLET ORAL ONCE
Status: DISCONTINUED | OUTPATIENT
Start: 2023-08-02 | End: 2023-08-03 | Stop reason: HOSPADM

## 2023-08-02 RX ORDER — MEPERIDINE HYDROCHLORIDE 50 MG/ML
12.5 INJECTION INTRAMUSCULAR; INTRAVENOUS; SUBCUTANEOUS EVERY 5 MIN PRN
Status: DISCONTINUED | OUTPATIENT
Start: 2023-08-02 | End: 2023-08-03 | Stop reason: HOSPADM

## 2023-08-02 RX ORDER — CLOPIDOGREL 300 MG/1
TABLET, FILM COATED ORAL
Status: COMPLETED | OUTPATIENT
Start: 2023-08-02 | End: 2023-08-02

## 2023-08-02 RX ORDER — ASPIRIN 81 MG/1
81 TABLET, CHEWABLE ORAL DAILY
Status: DISCONTINUED | OUTPATIENT
Start: 2023-08-03 | End: 2023-08-03 | Stop reason: HOSPADM

## 2023-08-02 RX ORDER — DIPHENHYDRAMINE HYDROCHLORIDE 50 MG/ML
6.25 INJECTION INTRAMUSCULAR; INTRAVENOUS
Status: DISCONTINUED | OUTPATIENT
Start: 2023-08-02 | End: 2023-08-03 | Stop reason: HOSPADM

## 2023-08-02 RX ORDER — MIDAZOLAM HYDROCHLORIDE 1 MG/ML
2 INJECTION INTRAMUSCULAR; INTRAVENOUS
Status: DISCONTINUED | OUTPATIENT
Start: 2023-08-02 | End: 2023-08-03 | Stop reason: HOSPADM

## 2023-08-02 RX ORDER — ONDANSETRON 2 MG/ML
4 INJECTION INTRAMUSCULAR; INTRAVENOUS EVERY 30 MIN PRN
Status: DISCONTINUED | OUTPATIENT
Start: 2023-08-02 | End: 2023-08-03 | Stop reason: HOSPADM

## 2023-08-02 RX ORDER — OXYCODONE HYDROCHLORIDE 5 MG/1
5 TABLET ORAL PRN
Status: DISCONTINUED | OUTPATIENT
Start: 2023-08-02 | End: 2023-08-03 | Stop reason: HOSPADM

## 2023-08-02 RX ORDER — SODIUM CHLORIDE 9 MG/ML
INJECTION, SOLUTION INTRAVENOUS CONTINUOUS PRN
Status: DISCONTINUED | OUTPATIENT
Start: 2023-08-02 | End: 2023-08-02 | Stop reason: SDUPTHER

## 2023-08-02 RX ORDER — ACETAMINOPHEN 325 MG/1
650 TABLET ORAL EVERY 4 HOURS PRN
Status: DISCONTINUED | OUTPATIENT
Start: 2023-08-02 | End: 2023-08-03 | Stop reason: HOSPADM

## 2023-08-02 RX ORDER — HYDROMORPHONE HYDROCHLORIDE 1 MG/ML
0.5 INJECTION, SOLUTION INTRAMUSCULAR; INTRAVENOUS; SUBCUTANEOUS EVERY 10 MIN PRN
Status: DISCONTINUED | OUTPATIENT
Start: 2023-08-02 | End: 2023-08-03 | Stop reason: HOSPADM

## 2023-08-02 RX ORDER — DOXYCYCLINE HYCLATE 100 MG
100 TABLET ORAL EVERY 12 HOURS SCHEDULED
Status: DISCONTINUED | OUTPATIENT
Start: 2023-08-02 | End: 2023-08-03 | Stop reason: HOSPADM

## 2023-08-02 RX ADMIN — CLOPIDOGREL 300 MG: 300 TABLET, FILM COATED ORAL at 12:39

## 2023-08-02 RX ADMIN — FENTANYL CITRATE 25 MCG: 50 INJECTION, SOLUTION INTRAMUSCULAR; INTRAVENOUS at 11:22

## 2023-08-02 RX ADMIN — PROPOFOL 20 MG: 10 INJECTION, EMULSION INTRAVENOUS at 15:28

## 2023-08-02 RX ADMIN — ATORVASTATIN CALCIUM 40 MG: 40 TABLET, FILM COATED ORAL at 21:00

## 2023-08-02 RX ADMIN — MIDAZOLAM HYDROCHLORIDE 1 MG: 1 INJECTION INTRAMUSCULAR; INTRAVENOUS at 12:03

## 2023-08-02 RX ADMIN — HEPARIN SODIUM 3000 UNITS: 1000 INJECTION, SOLUTION INTRAVENOUS; SUBCUTANEOUS at 12:12

## 2023-08-02 RX ADMIN — Medication 100 MCG: at 16:27

## 2023-08-02 RX ADMIN — Medication 10 ML: at 21:00

## 2023-08-02 RX ADMIN — MIDAZOLAM HYDROCHLORIDE 1 MG: 1 INJECTION INTRAMUSCULAR; INTRAVENOUS at 11:50

## 2023-08-02 RX ADMIN — SODIUM CHLORIDE: 9 INJECTION, SOLUTION INTRAVENOUS at 14:40

## 2023-08-02 RX ADMIN — FUROSEMIDE 40 MG: 40 TABLET ORAL at 08:35

## 2023-08-02 RX ADMIN — SODIUM CHLORIDE: 9 INJECTION, SOLUTION INTRAVENOUS at 16:32

## 2023-08-02 RX ADMIN — PROPOFOL 20 MG: 10 INJECTION, EMULSION INTRAVENOUS at 15:41

## 2023-08-02 RX ADMIN — PROPOFOL 10 MG: 10 INJECTION, EMULSION INTRAVENOUS at 16:05

## 2023-08-02 RX ADMIN — FENTANYL CITRATE 50 MCG: 50 INJECTION, SOLUTION INTRAMUSCULAR; INTRAVENOUS at 12:49

## 2023-08-02 RX ADMIN — ALLOPURINOL 100 MG: 100 TABLET ORAL at 08:35

## 2023-08-02 RX ADMIN — HEPARIN SODIUM 3000 UNITS: 1000 INJECTION, SOLUTION INTRAVENOUS; SUBCUTANEOUS at 12:35

## 2023-08-02 RX ADMIN — Medication 1000 MG: at 14:39

## 2023-08-02 RX ADMIN — PROPOFOL 10 MG: 10 INJECTION, EMULSION INTRAVENOUS at 15:24

## 2023-08-02 RX ADMIN — SACUBITRIL AND VALSARTAN 2 TABLET: 24; 26 TABLET, FILM COATED ORAL at 08:36

## 2023-08-02 RX ADMIN — FENTANYL CITRATE 25 MCG: 50 INJECTION, SOLUTION INTRAMUSCULAR; INTRAVENOUS at 11:07

## 2023-08-02 RX ADMIN — FENTANYL CITRATE 25 MCG: 50 INJECTION, SOLUTION INTRAMUSCULAR; INTRAVENOUS at 10:53

## 2023-08-02 RX ADMIN — FENTANYL CITRATE 25 MCG: 50 INJECTION, SOLUTION INTRAMUSCULAR; INTRAVENOUS at 12:03

## 2023-08-02 RX ADMIN — COLCHICINE 0.6 MG: 0.6 TABLET ORAL at 08:36

## 2023-08-02 RX ADMIN — HEPARIN SODIUM 5000 UNITS: 1000 INJECTION, SOLUTION INTRAVENOUS; SUBCUTANEOUS at 11:51

## 2023-08-02 RX ADMIN — HEPARIN SODIUM 3000 UNITS: 1000 INJECTION, SOLUTION INTRAVENOUS; SUBCUTANEOUS at 12:02

## 2023-08-02 RX ADMIN — SACUBITRIL AND VALSARTAN 2 TABLET: 24; 26 TABLET, FILM COATED ORAL at 20:58

## 2023-08-02 RX ADMIN — Medication 100 MCG: at 16:35

## 2023-08-02 RX ADMIN — PROPOFOL 20 MG: 10 INJECTION, EMULSION INTRAVENOUS at 17:14

## 2023-08-02 RX ADMIN — MIDAZOLAM HYDROCHLORIDE 1 MG: 1 INJECTION INTRAMUSCULAR; INTRAVENOUS at 10:53

## 2023-08-02 RX ADMIN — CLOPIDOGREL BISULFATE 75 MG: 75 TABLET ORAL at 08:35

## 2023-08-02 RX ADMIN — PROPOFOL 30 MG: 10 INJECTION, EMULSION INTRAVENOUS at 14:48

## 2023-08-02 RX ADMIN — PROPOFOL 20 MG: 10 INJECTION, EMULSION INTRAVENOUS at 15:57

## 2023-08-02 RX ADMIN — PROPOFOL 20 MG: 10 INJECTION, EMULSION INTRAVENOUS at 15:12

## 2023-08-02 RX ADMIN — PROPOFOL 20 MG: 10 INJECTION, EMULSION INTRAVENOUS at 15:02

## 2023-08-02 RX ADMIN — PROPOFOL 20 MG: 10 INJECTION, EMULSION INTRAVENOUS at 17:16

## 2023-08-02 RX ADMIN — PROPOFOL 30 MG: 10 INJECTION, EMULSION INTRAVENOUS at 15:18

## 2023-08-02 RX ADMIN — MIDAZOLAM HYDROCHLORIDE 1 MG: 1 INJECTION INTRAMUSCULAR; INTRAVENOUS at 11:07

## 2023-08-02 RX ADMIN — PROPOFOL 10 MG: 10 INJECTION, EMULSION INTRAVENOUS at 16:09

## 2023-08-02 RX ADMIN — DOXYCYCLINE HYCLATE 100 MG: 100 TABLET, COATED ORAL at 20:58

## 2023-08-02 RX ADMIN — PROPOFOL 20 MG: 10 INJECTION, EMULSION INTRAVENOUS at 15:48

## 2023-08-02 RX ADMIN — PROPOFOL 20 MG: 10 INJECTION, EMULSION INTRAVENOUS at 15:52

## 2023-08-02 RX ADMIN — EMPAGLIFLOZIN 10 MG: 10 TABLET, FILM COATED ORAL at 08:45

## 2023-08-02 RX ADMIN — PROPOFOL 10 MG: 10 INJECTION, EMULSION INTRAVENOUS at 15:55

## 2023-08-02 RX ADMIN — FENTANYL CITRATE 25 MCG: 50 INJECTION, SOLUTION INTRAMUSCULAR; INTRAVENOUS at 11:23

## 2023-08-02 RX ADMIN — LIDOCAINE HYDROCHLORIDE 60 MG: 20 INJECTION, SOLUTION INFILTRATION; PERINEURAL at 14:48

## 2023-08-02 RX ADMIN — SPIRONOLACTONE 25 MG: 25 TABLET ORAL at 08:36

## 2023-08-02 RX ADMIN — PROPOFOL 100 MG: 10 INJECTION, EMULSION INTRAVENOUS at 16:18

## 2023-08-02 ASSESSMENT — ENCOUNTER SYMPTOMS
SHORTNESS OF BREATH: 1
SHORTNESS OF BREATH: 1

## 2023-08-02 ASSESSMENT — PAIN SCALES - GENERAL
PAINLEVEL_OUTOF10: 0

## 2023-08-02 NOTE — PROCEDURES
CARDIAC CATHETERIZATION REPORT    Date of Procedure: 8/2/2023  : Francisco Gomez DO  Primary Indication: Ischemic cardiomyopathy with new decline in LVEF, Known CAD with previous CABG and PCI to SVG to OM1 with sequential to OM2    Procedures Performed:  1. Coronary angiography  2. Left heart catheterization  3. Left subclavian angiography  4. LIMA angiography  5. Saphenous vein graft angiography  6. PCI of native distal LAD through LIMA with AMBER  7. Ultrasound-guided right femoral artery access  8. Right femoral angiography  9. Moderate conscious sedation    Procedural Details:  Access: Due to previous difficulties engaging LIMA from femoral approach, local anesthetic was given and access was initially attempted in the left radial artery, but was ultimately unsuccessful. Therefore, access was transitioned to the right femoral artery. Additional local anesthetic was given and access was obtained in the right femoral artery using a micropuncture technique and ultrasound guidance and a 6F sheath was placed without difficulty. The short 6F sheath was then exchanged over a J-wire for a 6F x 45 cm [place] sheath. Diagnostic: 5F JR4 and 5F JL4 catheters were used to perform selective right and left coronary angiography, respectively. The 5F JR4 catheter was used to engage the SVG to RCA and to perform left subclavian angiography. A 5F LCB catheter was used to engage the SVG to OM1 with sequential to OM2. A 5F AJIT catheter was used to perform LIMA angiography. The 5F JR4 catheter was used to perform the left heart catheterization. No significant gradient was observed on pull-back of the catheter across the aortic valve. Intervention: Therapeutic ACT was achieved with the administration of IV heparin.   Using a 6F AJIT guide catheter, a 0.014 [place] wire was advanced into the LIMA, but due to significant vessel tortuosity, there was difficulty advancing the wire distally into the native distal

## 2023-08-02 NOTE — ANESTHESIA PRE PROCEDURE
results found for: PREGTESTUR, PREGSERUM, HCG, HCGQUANT     ABGs: No results found for: PHART, PO2ART, CUC4HLF, QDE7ERM, BEART, J5IYYUOV     Type & Screen (If Applicable):  Lab Results   Component Value Date    LABABO O 01/26/2011    [address] Positive 01/26/2011       Drug/Infectious Status (If Applicable):  No results found for: HIV, HEPCAB    COVID-19 Screening (If Applicable): No results found for: COVID19        Anesthesia Evaluation  Patient summary reviewed and Nursing notes reviewed no history of anesthetic complications:   Airway: Mallampati: II     Neck ROM: full     Dental:          Pulmonary:   (+) pneumonia:  shortness of breath:  sleep apnea:                             Cardiovascular:    (+) hypertension:, pacemaker:, CAD:, dysrhythmias:, CHF:,                   Neuro/Psych:   (+) CVA:, neuromuscular disease:, TIA,             GI/Hepatic/Renal:   (+) GERD:,           Endo/Other:    (+) Diabetes, . Abdominal:             Vascular: Other Findings:           Anesthesia Plan      MAC     ASA 3     (Medications & allergies reviewed  All available lab & EKG data reviewed)  Induction: intravenous. Anesthetic plan and risks discussed with patient. Plan discussed with CRNA.                     Jarrett Short MD   8/2/2023

## 2023-08-02 NOTE — PROCEDURES
Electrophysiology Procedure Report    Attending MD Inessa Rolle MD    Procedures Biventricular ICD implant  Left upper extremity venogram  Coronary sinus venogram    Indications Ischemic cardiomyopathy and atrial fibrillation with slow ventricular response and complete heart block    Complication None    Conclusion   Successful biventricular/internal cardioverter defibrillator implant    Description of Procedure    The patient was brought to the electrophysiology laboratory in the fasting state after informed consent was obtained. The patient was placed in the supine position and the left pectoral area was prepared and draped in a sterile fashion. The electrocardiogram, blood pressure, and oxygenation were monitored intra- and post-procedure. Sedation was administered by the anesthesia department. Intravenous antibiotic was given prior to the procedure for general antibiotic prophylaxis. After using buffered lidocaine 1% with epinephrine (1/100,000) for local anesthesia to the left pectoral subcutaneous area, venous access was obtained in the left axillary vein using the modified Seldinger technique under ultrasound guidance x 3 using micropuncture kits. A 4-5 cm incision was made inferior to the left clavicle. The subcutaneous tissues were dissected to the level of the pre-pectoral fascia, and a pocket was fashioned via blunt dissection. A 9 F sheath was inserted into the left axillary vein over a guidewire. The right ventricular lead was inserted and the lead tip positioned in the RV apex under fluoroscopic guidance. Once appropriate placement was obtained and threshold measurements made to 's specifications, the lead was anchored to the pectoralis fascia using 2-0 Eth[employer] Excel. A 7 F sheath was then inserted into the left axillary vein over a guidewire. The right atrial lead was inserted and the lead tip positioned in the RA appendage under fluoroscopic guidance.   Threshold

## 2023-08-02 NOTE — ANESTHESIA PRE PROCEDURE
WBC 6.4 08/02/2023 05:55 AM    RBC 4.21 08/02/2023 05:55 AM    HGB 11.4 08/02/2023 05:55 AM    HCT 35.7 08/02/2023 05:55 AM    MCV 84.8 08/02/2023 05:55 AM    RDW 18.4 08/02/2023 05:55 AM     08/02/2023 05:55 AM       CMP:   Lab Results   Component Value Date/Time     08/02/2023 05:56 AM    K 4.1 08/02/2023 05:56 AM     08/02/2023 05:56 AM    CO2 23 08/02/2023 05:56 AM    BUN 18 08/02/2023 05:56 AM    CREATININE 0.9 08/02/2023 05:56 AM    GFRAA >60 09/10/2022 06:35 AM    GFRAA >60 01/15/2013 09:09 AM    AGRATIO 1.6 07/28/2023 05:01 AM    LABGLOM >60 08/02/2023 05:56 AM    GLUCOSE 131 08/02/2023 05:56 AM    PROT 6.4 07/30/2023 05:23 AM    PROT 7.2 01/15/2013 09:09 AM    CALCIUM 8.9 08/02/2023 05:56 AM    BILITOT 3.0 07/30/2023 05:23 AM    ALKPHOS 82 07/30/2023 05:23 AM    AST 12 07/30/2023 05:23 AM    ALT 9 07/30/2023 05:23 AM       POC Tests:   Recent Labs     08/02/23  0753   POCGLU 123*       Coags:   Lab Results   Component Value Date/Time    PROTIME 16.1 01/28/2023 04:20 AM    INR 1.30 01/28/2023 04:20 AM    APTT 25.7 05/02/2011 10:10 PM       HCG (If Applicable): No results found for: PREGTESTUR, PREGSERUM, HCG, HCGQUANT     ABGs: No results found for: PHART, PO2ART, AMC9QVD, JRO6SXA, BEART, T7DXHPSM     Type & Screen (If Applicable):  Lab Results   Component Value Date    LABABO O 01/26/2011    [address] [facility] 01/26/2011       Drug/Infectious Status (If Applicable):  No results found for: HIV, HEPCAB    COVID-19 Screening (If Applicable): No results found for: COVID19        Anesthesia Evaluation  Patient summary reviewed and Nursing notes reviewed no history of anesthetic complications:   Airway: Mallampati: II     Neck ROM: full     Dental:          Pulmonary:   (+) pneumonia:  shortness of breath:  sleep apnea:                             Cardiovascular:    (+) hypertension:, pacemaker:, CAD:, dysrhythmias:, CHF:,                   Neuro/Psych:   (+) CVA:, neuromuscular disease:, TIA,

## 2023-08-03 ENCOUNTER — APPOINTMENT (OUTPATIENT)
Dept: GENERAL RADIOLOGY | Age: 83
DRG: 222 | End: 2023-08-03
Payer: MEDICARE

## 2023-08-03 ENCOUNTER — PROCEDURE VISIT (OUTPATIENT)
Dept: CARDIOLOGY CLINIC | Age: 83
End: 2023-08-03

## 2023-08-03 VITALS
HEART RATE: 69 BPM | WEIGHT: 165.1 LBS | TEMPERATURE: 98.2 F | SYSTOLIC BLOOD PRESSURE: 134 MMHG | OXYGEN SATURATION: 98 % | RESPIRATION RATE: 20 BRPM | DIASTOLIC BLOOD PRESSURE: 73 MMHG | HEIGHT: 69 IN | BODY MASS INDEX: 24.45 KG/M2

## 2023-08-03 DIAGNOSIS — Z95.810 CARDIAC RESYNCHRONIZATION THERAPY DEFIBRILLATOR (CRT-D) IN PLACE: Primary | ICD-10-CM

## 2023-08-03 LAB
ANION GAP SERPL CALCULATED.3IONS-SCNC: 13 MMOL/L (ref 3–16)
BUN SERPL-MCNC: 15 MG/DL (ref 7–20)
CALCIUM SERPL-MCNC: 8.5 MG/DL (ref 8.3–10.6)
CHLORIDE SERPL-SCNC: 106 MMOL/L (ref 99–110)
CHOLEST SERPL-MCNC: 76 MG/DL (ref 0–199)
CO2 SERPL-SCNC: 22 MMOL/L (ref 21–32)
CREAT SERPL-MCNC: 1 MG/DL (ref 0.8–1.3)
DEPRECATED RDW RBC AUTO: 18.6 % (ref 12.4–15.4)
GFR SERPLBLD CREATININE-BSD FMLA CKD-EPI: >60 ML/MIN/{1.73_M2}
GLUCOSE BLD-MCNC: 112 MG/DL (ref 70–99)
GLUCOSE BLD-MCNC: 144 MG/DL (ref 70–99)
GLUCOSE SERPL-MCNC: 112 MG/DL (ref 70–99)
HCT VFR BLD AUTO: 34.1 % (ref 40.5–52.5)
HDLC SERPL-MCNC: 24 MG/DL (ref 40–60)
HGB BLD-MCNC: 11.2 G/DL (ref 13.5–17.5)
LDLC SERPL CALC-MCNC: 39 MG/DL
MAGNESIUM SERPL-MCNC: 1.7 MG/DL (ref 1.8–2.4)
MCH RBC QN AUTO: 27.5 PG (ref 26–34)
MCHC RBC AUTO-ENTMCNC: 32.9 G/DL (ref 31–36)
MCV RBC AUTO: 83.4 FL (ref 80–100)
PERFORMED ON: ABNORMAL
PERFORMED ON: ABNORMAL
PLATELET # BLD AUTO: 218 K/UL (ref 135–450)
PMV BLD AUTO: 7.9 FL (ref 5–10.5)
POC ACT LR: 241 SEC
POC ACT LR: 261 SEC
POC ACT LR: 277 SEC
POC ACT LR: 308 SEC
POTASSIUM SERPL-SCNC: 3.5 MMOL/L (ref 3.5–5.1)
RBC # BLD AUTO: 4.09 M/UL (ref 4.2–5.9)
SODIUM SERPL-SCNC: 141 MMOL/L (ref 136–145)
TRIGL SERPL-MCNC: 66 MG/DL (ref 0–150)
VLDLC SERPL CALC-MCNC: 13 MG/DL
WBC # BLD AUTO: 6.9 K/UL (ref 4–11)

## 2023-08-03 PROCEDURE — 80048 BASIC METABOLIC PNL TOTAL CA: CPT

## 2023-08-03 PROCEDURE — 6370000000 HC RX 637 (ALT 250 FOR IP): Performed by: INTERNAL MEDICINE

## 2023-08-03 PROCEDURE — 6360000002 HC RX W HCPCS: Performed by: NURSE PRACTITIONER

## 2023-08-03 PROCEDURE — 83735 ASSAY OF MAGNESIUM: CPT

## 2023-08-03 PROCEDURE — 71046 X-RAY EXAM CHEST 2 VIEWS: CPT

## 2023-08-03 PROCEDURE — 85027 COMPLETE CBC AUTOMATED: CPT

## 2023-08-03 PROCEDURE — 80061 LIPID PANEL: CPT

## 2023-08-03 PROCEDURE — 6370000000 HC RX 637 (ALT 250 FOR IP): Performed by: NURSE PRACTITIONER

## 2023-08-03 PROCEDURE — 99232 SBSQ HOSP IP/OBS MODERATE 35: CPT | Performed by: NURSE PRACTITIONER

## 2023-08-03 RX ORDER — MAGNESIUM SULFATE 1 G/100ML
1000 INJECTION INTRAVENOUS ONCE
Status: COMPLETED | OUTPATIENT
Start: 2023-08-03 | End: 2023-08-03

## 2023-08-03 RX ORDER — SPIRONOLACTONE 25 MG/1
25 TABLET ORAL DAILY
Qty: 30 TABLET | Refills: 3 | Status: SHIPPED | OUTPATIENT
Start: 2023-08-04

## 2023-08-03 RX ORDER — ASPIRIN 81 MG/1
81 TABLET, CHEWABLE ORAL DAILY
Qty: 30 TABLET | Refills: 0 | Status: SHIPPED | OUTPATIENT
Start: 2023-08-04

## 2023-08-03 RX ORDER — DOXYCYCLINE HYCLATE 100 MG
100 TABLET ORAL EVERY 12 HOURS SCHEDULED
Qty: 8 TABLET | Refills: 0 | Status: SHIPPED | OUTPATIENT
Start: 2023-08-03 | End: 2023-08-07

## 2023-08-03 RX ORDER — METOPROLOL SUCCINATE 50 MG/1
50 TABLET, EXTENDED RELEASE ORAL DAILY
Qty: 30 TABLET | Refills: 3 | Status: SHIPPED | OUTPATIENT
Start: 2023-08-03

## 2023-08-03 RX ORDER — COLCHICINE 0.6 MG/1
0.6 TABLET ORAL DAILY
Qty: 2 TABLET | Refills: 0 | Status: SHIPPED | OUTPATIENT
Start: 2023-08-04 | End: 2023-08-06

## 2023-08-03 RX ORDER — METOPROLOL SUCCINATE 25 MG/1
25 TABLET, EXTENDED RELEASE ORAL DAILY
Status: DISCONTINUED | OUTPATIENT
Start: 2023-08-03 | End: 2023-08-03 | Stop reason: HOSPADM

## 2023-08-03 RX ADMIN — SPIRONOLACTONE 25 MG: 25 TABLET ORAL at 09:52

## 2023-08-03 RX ADMIN — ALLOPURINOL 100 MG: 100 TABLET ORAL at 09:52

## 2023-08-03 RX ADMIN — METOPROLOL SUCCINATE 25 MG: 25 TABLET, EXTENDED RELEASE ORAL at 12:31

## 2023-08-03 RX ADMIN — DOXYCYCLINE HYCLATE 100 MG: 100 TABLET, COATED ORAL at 09:52

## 2023-08-03 RX ADMIN — PANTOPRAZOLE SODIUM 40 MG: 40 TABLET, DELAYED RELEASE ORAL at 05:11

## 2023-08-03 RX ADMIN — CLOPIDOGREL BISULFATE 75 MG: 75 TABLET ORAL at 09:52

## 2023-08-03 RX ADMIN — MAGNESIUM SULFATE HEPTAHYDRATE 1000 MG: 1 INJECTION, SOLUTION INTRAVENOUS at 12:35

## 2023-08-03 RX ADMIN — SACUBITRIL AND VALSARTAN 2 TABLET: 24; 26 TABLET, FILM COATED ORAL at 09:52

## 2023-08-03 RX ADMIN — EMPAGLIFLOZIN 10 MG: 10 TABLET, FILM COATED ORAL at 09:52

## 2023-08-03 RX ADMIN — FUROSEMIDE 40 MG: 40 TABLET ORAL at 09:52

## 2023-08-03 RX ADMIN — COLCHICINE 0.6 MG: 0.6 TABLET ORAL at 09:52

## 2023-08-03 RX ADMIN — ACETAMINOPHEN 650 MG: 325 TABLET ORAL at 03:31

## 2023-08-03 RX ADMIN — ASPIRIN 81 MG 81 MG: 81 TABLET ORAL at 09:52

## 2023-08-03 ASSESSMENT — PAIN DESCRIPTION - LOCATION: LOCATION: SHOULDER

## 2023-08-03 ASSESSMENT — PAIN DESCRIPTION - ORIENTATION: ORIENTATION: LEFT

## 2023-08-03 ASSESSMENT — PAIN SCALES - GENERAL: PAINLEVEL_OUTOF10: 3

## 2023-08-03 ASSESSMENT — PAIN DESCRIPTION - DESCRIPTORS: DESCRIPTORS: ACHING;DULL

## 2023-08-03 ASSESSMENT — PAIN - FUNCTIONAL ASSESSMENT: PAIN_FUNCTIONAL_ASSESSMENT: ACTIVITIES ARE NOT PREVENTED

## 2023-08-03 NOTE — PLAN OF CARE
Patient's EF (Ejection Fraction) is less than 40%    Heart Failure Medications:  Diuretics[de-identified] Furosemide and Spironolactone    (One of the following REQUIRED for EF </= 40%/SYSTOLIC FAILURE but MAY be used in EF% >40%/DIASTOLIC FAILURE)        ACE[de-identified] None        ARB[de-identified] None         ARNI[de-identified] Sacubitril/Valsartan-Entresto    (Beta Blockers)  NON- Evidenced Based Beta Blocker (for EF% >40%/DIASTOLIC FAILURE): None    Evidenced Based Beta Blocker::(REQUIRED for EF% <40%/SYSTOLIC FAILURE) None  . .................................................................................................................................................. Healthy Weight Tracking - BMI + Meds 6/30/2023 7/12/2023 7/28/2023 7/28/2023 7/29/2023 7/30/2023 7/31/2023   Weight 188 lb 184 lb 12.8 oz 185 lb 183 lb 1.6 oz 175 lb 6.4 oz 167 lb 14.4 oz 166 lb 4.8 oz   Height 5' 9\" 5' 9\" 5' 9\" - - - -   Body Mass Index 27.76 kg/m2 27.29 kg/m2 27.32 kg/m2 27.04 kg/m2 25.9 kg/m2 24.79 kg/m2 24.56 kg/m2   Some recent data might be hidden         Patient's weights and intake/output reviewed: No    Daily Weight log at bedside and being used: \"yes    Patient's Last Weight: 166 lbs obtained by standing scale. Difference of 1 lbs less than last documented weight. Intake/Output Summary (Last 24 hours) at 7/31/2023 1446  Last data filed at 7/31/2023 1312  Gross per 24 hour   Intake 150 ml   Output 1350 ml   Net -1200 ml       Education Booklet Provided: yes    Comorbidities Reviewed Yes    Patient has a past medical history of Aortic aneurysm, abdominal ([address]), Appendicitis, Arthritis, Bruises easily, CAD (coronary artery disease), CHF (congestive heart failure) ([address]), Chronic back pain, Chronic pain of both shoulders, Complication of anesthesia, Gout, Heart disease, Hernia, HIGH CHOLESTEROL, Hyperlipidemia, Hypertension, Measles, Pancreatitis, Persistent cough, Ringing in ears, Sinus disorder, and Sleep deprivation.      >>For CHF and Comorbidity
Patient's EF (Ejection Fraction) is less than 40%    Heart Failure Medications:  Diuretics[de-identified] Furosemide and Spironolactone    (One of the following REQUIRED for EF </= 40%/SYSTOLIC FAILURE but MAY be used in EF% >40%/DIASTOLIC FAILURE)        ACE[de-identified] None        ARB[de-identified] None         ARNI[de-identified] Sacubitril/Valsartan-Entresto    (Beta Blockers)  NON- Evidenced Based Beta Blocker (for EF% >40%/DIASTOLIC FAILURE): None    Evidenced Based Beta Blocker::(REQUIRED for EF% <40%/SYSTOLIC FAILURE) None  . .................................................................................................................................................. Healthy Weight Tracking - BMI + Meds 7/12/2023 7/28/2023 7/28/2023 7/29/2023 7/30/2023 7/31/2023 8/1/2023   Weight 184 lb 12.8 oz 185 lb 183 lb 1.6 oz 175 lb 6.4 oz 167 lb 14.4 oz 166 lb 4.8 oz 164 lb 8 oz   Height 5' 9\" 5' 9\" - - - - -   Body Mass Index 27.29 kg/m2 27.32 kg/m2 27.04 kg/m2 25.9 kg/m2 24.79 kg/m2 24.56 kg/m2 24.29 kg/m2   Some recent data might be hidden         Patient's weights and intake/output reviewed: Yes    Daily Weight log at bedside and being used: \"yes    Patient's Last Weight: 164 lbs obtained by standing scale. Difference of 2 lbs less than last documented weight. Intake/Output Summary (Last 24 hours) at 8/1/2023 1953  Last data filed at 8/1/2023 1837  Gross per 24 hour   Intake 1320 ml   Output 650 ml   Net 670 ml       Education Booklet Provided: yes    Comorbidities Reviewed Yes    Patient has a past medical history of Aortic aneurysm, abdominal ([address]), Appendicitis, Arthritis, Bruises easily, CAD (coronary artery disease), CHF (congestive heart failure) ([address]), Chronic back pain, Chronic pain of both shoulders, Complication of anesthesia, Gout, Heart disease, Hernia, HIGH CHOLESTEROL, Hyperlipidemia, Hypertension, Measles, Pancreatitis, Persistent cough, Ringing in ears, Sinus disorder, and Sleep deprivation.      >>For CHF and Comorbidity
Patient's EF (Ejection Fraction) is less than 40%    Heart Failure Medications:  Diuretics[de-identified] Furosemide and Spironolactone    (One of the following REQUIRED for EF </= 40%/SYSTOLIC FAILURE but MAY be used in EF% >40%/DIASTOLIC FAILURE)        ACE[de-identified] None        ARB[de-identified] None         ARNI[de-identified] Sacubitril/Valsartan-Entresto    (Beta Blockers)  NON- Evidenced Based Beta Blocker (for EF% >40%/DIASTOLIC FAILURE): None    Evidenced Based Beta Blocker::(REQUIRED for EF% <40%/SYSTOLIC FAILURE) None  . .................................................................................................................................................. Healthy Weight Tracking - BMI + Meds 7/28/2023 7/29/2023 7/30/2023 7/31/2023 8/1/2023 8/2/2023 8/3/2023   Weight 183 lb 1.6 oz 175 lb 6.4 oz 167 lb 14.4 oz 166 lb 4.8 oz 164 lb 8 oz 164 lb 1.6 oz 165 lb 1.6 oz   Height - - - - - - -   Body Mass Index 27.04 kg/m2 25.9 kg/m2 24.79 kg/m2 24.56 kg/m2 24.29 kg/m2 24.23 kg/m2 24.38 kg/m2   Some recent data might be hidden         Patient's weights and intake/output reviewed: Yes    Daily Weight log at bedside and being used: \"yes    Patient's Last Weight: 164 lbs obtained by standing scale. Difference of 0 lbs than last documented weight. Intake/Output Summary (Last 24 hours) at 8/3/2023 0659  Last data filed at 8/3/2023 0042  Gross per 24 hour   Intake 1660 ml   Output 1775 ml   Net -115 ml       Education Booklet Provided: yes    Comorbidities Reviewed Yes    Patient has a past medical history of Aortic aneurysm, abdominal ([address]), Appendicitis, Arthritis, Bruises easily, CAD (coronary artery disease), CHF (congestive heart failure) ([address]), Chronic back pain, Chronic pain of both shoulders, Complication of anesthesia, Gout, Heart disease, Hernia, HIGH CHOLESTEROL, Hyperlipidemia, Hypertension, Measles, Pancreatitis, Persistent cough, Ringing in ears, Sinus disorder, and Sleep deprivation.      >>For CHF and Comorbidity documentation on
Problem: Chronic Conditions and Co-morbidities  Goal: Patient's chronic conditions and co-morbidity symptoms are monitored and maintained or improved  7/30/2023 1711 by Kassie Loza RN  Outcome: Progressing  Note:   Patient's EF (Ejection Fraction) is less than 40%    Heart Failure Medications:  Diuretics[de-identified] Furosemide and Spironolactone    (One of the following REQUIRED for EF </= 40%/SYSTOLIC FAILURE but MAY be used in EF% >40%/DIASTOLIC FAILURE)        ACE[de-identified] None        ARB[de-identified] None         ARNI[de-identified] Sacubitril/Valsartan-Entresto    (Beta Blockers)  NON- Evidenced Based Beta Blocker (for EF% >40%/DIASTOLIC FAILURE): None    Evidenced Based Beta Blocker::(REQUIRED for EF% <40%/SYSTOLIC FAILURE) None  . .................................................................................................................................................. Healthy Weight Tracking - BMI + Meds 5/30/2023 6/30/2023 7/12/2023 7/28/2023 7/28/2023 7/29/2023 7/30/2023   Weight 189 lb 188 lb 184 lb 12.8 oz 185 lb 183 lb 1.6 oz 175 lb 6.4 oz 167 lb 14.4 oz   Height - 5' 9\" 5' 9\" 5' 9\" - - -   Body Mass Index - 27.76 kg/m2 27.29 kg/m2 27.32 kg/m2 27.04 kg/m2 25.9 kg/m2 24.79 kg/m2   Some recent data might be hidden         Patient's weights and intake/output reviewed: Yes    Daily Weight log at bedside and being used: \"yes    Patient's Last Weight: 167 lbs obtained by standing scale. Difference of 8 lbs less than last documented weight.       Intake/Output Summary (Last 24 hours) at 7/30/2023 1711  Last data filed at 7/30/2023 1418  Gross per 24 hour   Intake 1540 ml   Output 4425 ml   Net -2885 ml       Education Booklet Provided: yes    Comorbidities Reviewed Yes    Patient has a past medical history of Aortic aneurysm, abdominal ([address]), Appendicitis, Arthritis, Bruises easily, CAD (coronary artery disease), CHF (congestive heart failure) ([address]), Chronic back pain, Chronic pain of both shoulders, Complication of anesthesia, Gout, Heart
Problem: Chronic Conditions and Co-morbidities  Goal: Patient's chronic conditions and co-morbidity symptoms are monitored and maintained or improved  7/30/2023 2211 by Sharron Candelaria RN  Outcome: Progressing  Note:   Patient's EF (Ejection Fraction) is less than 40%    Heart Failure Medications:  Diuretics[de-identified] Spironolactone    (One of the following REQUIRED for EF </= 40%/SYSTOLIC FAILURE but MAY be used in EF% >40%/DIASTOLIC FAILURE)        ACE[de-identified] None        ARB[de-identified] None         ARNI[de-identified] Sacubitril/Valsartan-Entresto    (Beta Blockers)  NON- Evidenced Based Beta Blocker (for EF% >40%/DIASTOLIC FAILURE): None    Evidenced Based Beta Blocker::(REQUIRED for EF% <40%/SYSTOLIC FAILURE) None  . .................................................................................................................................................. Healthy Weight Tracking - BMI + Meds 5/30/2023 6/30/2023 7/12/2023 7/28/2023 7/28/2023 7/29/2023 7/30/2023   Weight 189 lb 188 lb 184 lb 12.8 oz 185 lb 183 lb 1.6 oz 175 lb 6.4 oz 167 lb 14.4 oz   Height - 5' 9\" 5' 9\" 5' 9\" - - -   Body Mass Index - 27.76 kg/m2 27.29 kg/m2 27.32 kg/m2 27.04 kg/m2 25.9 kg/m2 24.79 kg/m2   Some recent data might be hidden         Patient's weights and intake/output reviewed: Yes    Daily Weight log at bedside and being used: \"yes    Patient's Last Weight: 167 lbs obtained by standing scale. Difference of 8 lbs less than last documented weight.       Intake/Output Summary (Last 24 hours) at 7/30/2023 2211  Last data filed at 7/30/2023 1836  Gross per 24 hour   Intake 950 ml   Output 3225 ml   Net -2275 ml       Education Booklet Provided: yes    Comorbidities Reviewed Yes    Patient has a past medical history of Aortic aneurysm, abdominal ([address]), Appendicitis, Arthritis, Bruises easily, CAD (coronary artery disease), CHF (congestive heart failure) ([address]), Chronic back pain, Chronic pain of both shoulders, Complication of anesthesia, Gout, Heart disease,
Problem: Discharge Planning  Goal: Discharge to home or other facility with appropriate resources  7/30/2023 0415 by Betina Anderson RN  Outcome: Progressing     Problem: Chronic Conditions and Co-morbidities  Goal: Patient's chronic conditions and co-morbidity symptoms are monitored and maintained or improved  7/30/2023 0415 by Betina Anderson RN  Outcome: Progressing  Note:   Patient's EF (Ejection Fraction) is less than 40%    Heart Failure Medications:  Diuretics[de-identified] Furosemide and Spironolactone    (One of the following REQUIRED for EF </= 40%/SYSTOLIC FAILURE but MAY be used in EF% >40%/DIASTOLIC FAILURE)        ACE[de-identified] None        ARB[de-identified] None         ARNI[de-identified] Sacubitril/Valsartan-Entresto    (Beta Blockers)  NON- Evidenced Based Beta Blocker (for EF% >40%/DIASTOLIC FAILURE): None    Evidenced Based Beta Blocker::(REQUIRED for EF% <40%/SYSTOLIC FAILURE) None  . .................................................................................................................................................. Healthy Weight Tracking - BMI + Meds 5/24/2023 5/30/2023 6/30/2023 7/12/2023 7/28/2023 7/28/2023 7/29/2023   Weight 194 lb 189 lb 188 lb 184 lb 12.8 oz 185 lb 183 lb 1.6 oz 175 lb 6.4 oz   Height 5' 9\" - 5' 9\" 5' 9\" 5' 9\" - -   Body Mass Index 28.65 kg/m2 - 27.76 kg/m2 27.29 kg/m2 27.32 kg/m2 27.04 kg/m2 25.9 kg/m2   Some recent data might be hidden         Patient's weights and intake/output reviewed: Yes    Daily Weight log at bedside and being used: \"yes    Patient's Last Weight: 175 lbs obtained by standing scale. Difference of 8 lbs less than last documented weight.       Intake/Output Summary (Last 24 hours) at 7/30/2023 0414  Last data filed at 7/30/2023 0115  Gross per 24 hour   Intake 1380 ml   Output 4775 ml   Net -3395 ml       Education Booklet Provided: yes    Comorbidities Reviewed Yes    Patient has a past medical history of Aortic aneurysm, abdominal ([address]), Appendicitis, Arthritis, Bruises easily,
Problem: Discharge Planning  Goal: Discharge to home or other facility with appropriate resources  Outcome: Progressing     Problem: Chronic Conditions and Co-morbidities  Goal: Patient's chronic conditions and co-morbidity symptoms are monitored and maintained or improved  8/1/2023 0437 by Andrea Londono RN  Outcome: Progressing     Problem: Safety - Adult  Goal: Free from fall injury  Outcome: Progressing     Problem: Pain  Goal: Verbalizes/displays adequate comfort level or baseline comfort level  Outcome: Progressing     Problem: Nutrition Deficit:  Goal: Optimize nutritional status  Outcome: Progressing     Problem: Metabolic/Fluid and Electrolytes - Adult  Goal: Glucose maintained within prescribed range  Outcome: Progressing     Problem: ABCDS Injury Assessment  Goal: Absence of physical injury  Outcome: Progressing      Patient's EF (Ejection Fraction) is less than 40%    Heart Failure Medications:  Diuretics[de-identified] Furosemide and Spironolactone    (One of the following REQUIRED for EF </= 40%/SYSTOLIC FAILURE but MAY be used in EF% >40%/DIASTOLIC FAILURE)        ACE[de-identified] None        ARB[de-identified] None         ARNI[de-identified] Sacubitril/Valsartan-Entresto    (Beta Blockers)  NON- Evidenced Based Beta Blocker (for EF% >40%/DIASTOLIC FAILURE): None    Evidenced Based Beta Blocker::(REQUIRED for EF% <40%/SYSTOLIC FAILURE) None  . ..................................................................................................................................................     Healthy Weight Tracking - BMI + Meds 6/30/2023 7/12/2023 7/28/2023 7/28/2023 7/29/2023 7/30/2023 7/31/2023   Weight 188 lb 184 lb 12.8 oz 185 lb 183 lb 1.6 oz 175 lb 6.4 oz 167 lb 14.4 oz 166 lb 4.8 oz   Height 5' 9\" 5' 9\" 5' 9\" - - - -   Body Mass Index 27.76 kg/m2 27.29 kg/m2 27.32 kg/m2 27.04 kg/m2 25.9 kg/m2 24.79 kg/m2 24.56 kg/m2   Some recent data might be hidden         Patient's weights and intake/output reviewed: Yes    Daily Weight log at bedside and
Problem: Discharge Planning  Goal: Discharge to home or other facility with appropriate resources  Outcome: Progressing     Problem: Chronic Conditions and Co-morbidities  Goal: Patient's chronic conditions and co-morbidity symptoms are monitored and maintained or improved  Outcome: Progressing     Patient's EF (Ejection Fraction) is less than 40%    Heart Failure Medications:  Diuretics[de-identified] Furosemide and Spironolactone    (One of the following REQUIRED for EF </= 40%/SYSTOLIC FAILURE but MAY be used in EF% >40%/DIASTOLIC FAILURE)        ACE[de-identified] None        ARB[de-identified] None         ARNI[de-identified] Sacubitril/Valsartan-Entresto    (Beta Blockers)  NON- Evidenced Based Beta Blocker (for EF% >40%/DIASTOLIC FAILURE): None    Evidenced Based Beta Blocker::(REQUIRED for EF% <40%/SYSTOLIC FAILURE) None  . .................................................................................................................................................. Healthy Weight Tracking - BMI + Meds 7/28/2023 7/29/2023 7/30/2023 7/31/2023 8/1/2023 8/2/2023 8/3/2023   Weight 183 lb 1.6 oz 175 lb 6.4 oz 167 lb 14.4 oz 166 lb 4.8 oz 164 lb 8 oz 164 lb 1.6 oz 165 lb 1.6 oz   Height - - - - - - -   Body Mass Index 27.04 kg/m2 25.9 kg/m2 24.79 kg/m2 24.56 kg/m2 24.29 kg/m2 24.23 kg/m2 24.38 kg/m2   Some recent data might be hidden         Patient's weights and intake/output reviewed: yes    Daily Weight log at bedside and being used: \"yes    Patient's Last Weight: 165 lbs obtained by standing scale. Difference of 1 lbs less than last documented weight.       Intake/Output Summary (Last 24 hours) at 8/3/2023 0454  Last data filed at 8/3/2023 0042  Gross per 24 hour   Intake 1660 ml   Output 1775 ml   Net -115 ml       Education Booklet Provided: yes    Comorbidities Reviewed Yes    Patient has a past medical history of Aortic aneurysm, abdominal ([address]), Appendicitis, Arthritis, Bruises easily, CAD (coronary artery disease), CHF (congestive heart failure)
Problem: Discharge Planning  Goal: Discharge to home or other facility with appropriate resources  Outcome: Progressing     Problem: Chronic Conditions and Co-morbidities  Goal: Patient's chronic conditions and co-morbidity symptoms are monitored and maintained or improved  Outcome: Progressing  Note: Patient with diagnosis of Diabetes. Active orders for ACHS glucose monitoring, SSI and Lantus. Instructed importance of following carb control diet to maintain stable gluocose levels. Problem: Safety - Adult  Goal: Free from fall injury  Outcome: Progressing     Problem: Pain  Goal: Verbalizes/displays adequate comfort level or baseline comfort level  Outcome: Progressing     Problem: Nutrition Deficit:  Goal: Optimize nutritional status  Outcome: Progressing       Patient's EF (Ejection Fraction) is less than 40%    Heart Failure Medications:  Diuretics[de-identified] Furosemide and Spironolactone    (One of the following REQUIRED for EF </= 40%/SYSTOLIC FAILURE but MAY be used in EF% >40%/DIASTOLIC FAILURE)        ACE[de-identified] None        ARB[de-identified] None         ARNI[de-identified] Sacubitril/Valsartan-Entresto    (Beta Blockers)  NON- Evidenced Based Beta Blocker (for EF% >40%/DIASTOLIC FAILURE): None    Evidenced Based Beta Blocker::(REQUIRED for EF% <40%/SYSTOLIC FAILURE) None  . ..................................................................................................................................................     Healthy Weight Tracking - BMI + Meds 7/12/2023 7/28/2023 7/28/2023 7/29/2023 7/30/2023 7/31/2023 8/1/2023   Weight 184 lb 12.8 oz 185 lb 183 lb 1.6 oz 175 lb 6.4 oz 167 lb 14.4 oz 166 lb 4.8 oz 164 lb 8 oz   Height 5' 9\" 5' 9\" - - - - -   Body Mass Index 27.29 kg/m2 27.32 kg/m2 27.04 kg/m2 25.9 kg/m2 24.79 kg/m2 24.56 kg/m2 24.29 kg/m2   Some recent data might be hidden         Patient's weights and intake/output reviewed: Yes    Daily Weight log at bedside and being used: \"yes    Patient's Last Weight: 164 lbs obtained
Problem: Discharge Planning  Goal: Discharge to home or other facility with appropriate resources  Outcome: Progressing     Problem: Safety - Adult  Goal: Free from fall injury  Outcome: Progressing     Problem: Pain  Goal: Verbalizes/displays adequate comfort level or baseline comfort level  Outcome: Progressing     Problem: Chronic Conditions and Co-morbidities  Goal: Patient's chronic conditions and co-morbidity symptoms are monitored and maintained or improved  Outcome: Progressing     Patient's EF (Ejection Fraction) is less than 40%    Heart Failure Medications:  Diuretics[de-identified] Furosemide and Spironolactone    (One of the following REQUIRED for EF </= 40%/SYSTOLIC FAILURE but MAY be used in EF% >40%/DIASTOLIC FAILURE)        ACE[de-identified] None        ARB[de-identified] None         ARNI[de-identified] Sacubitril/Valsartan-Entresto    (Beta Blockers)  NON- Evidenced Based Beta Blocker (for EF% >40%/DIASTOLIC FAILURE): None    Evidenced Based Beta Blocker::(REQUIRED for EF% <40%/SYSTOLIC FAILURE) None  . .................................................................................................................................................. Healthy Weight Tracking - BMI + Meds 5/16/2023 5/24/2023 5/30/2023 6/30/2023 7/12/2023 7/28/2023 7/28/2023   Weight 192 lb 6.4 oz 194 lb 189 lb 188 lb 184 lb 12.8 oz 185 lb 183 lb 1.6 oz   Height - 5' 9\" - 5' 9\" 5' 9\" 5' 9\" -   Body Mass Index - 28.65 kg/m2 - 27.76 kg/m2 27.29 kg/m2 27.32 kg/m2 27.04 kg/m2   Some recent data might be hidden         Patient's weights and intake/output reviewed: Yes    Daily Weight log at bedside and being used: \"yes    Patient's Last Weight: 183 lbs obtained by standing scale. Difference of 4 lbs less than last documented weight.       Intake/Output Summary (Last 24 hours) at 7/28/2023 1757  Last data filed at 7/28/2023 1734  Gross per 24 hour   Intake 240 ml   Output --   Net 240 ml       Education Booklet Provided: yes    Comorbidities Reviewed Yes    Patient has a
Problem: Discharge Planning  Goal: Discharge to home or other facility with appropriate resources  Outcome: Progressing  Note:   Patient's EF (Ejection Fraction) is less than 40%    Heart Failure Medications:  Diuretics[de-identified] Furosemide and Spironolactone    (One of the following REQUIRED for EF </= 40%/SYSTOLIC FAILURE but MAY be used in EF% >40%/DIASTOLIC FAILURE)        ACE[de-identified] None        ARB[de-identified] None         ARNI[de-identified] Sacubitril/Valsartan-Entresto    (Beta Blockers)  NON- Evidenced Based Beta Blocker (for EF% >40%/DIASTOLIC FAILURE): None    Evidenced Based Beta Blocker::(REQUIRED for EF% <40%/SYSTOLIC FAILURE) None  . .................................................................................................................................................. Healthy Weight Tracking - BMI + Meds 5/24/2023 5/30/2023 6/30/2023 7/12/2023 7/28/2023 7/28/2023 7/29/2023   Weight 194 lb 189 lb 188 lb 184 lb 12.8 oz 185 lb 183 lb 1.6 oz 175 lb 6.4 oz   Height 5' 9\" - 5' 9\" 5' 9\" 5' 9\" - -   Body Mass Index 28.65 kg/m2 - 27.76 kg/m2 27.29 kg/m2 27.32 kg/m2 27.04 kg/m2 25.9 kg/m2   Some recent data might be hidden         Patient's weights and intake/output reviewed: Yes    Daily Weight log at bedside and being used: \"yes    Patient's Last Weight: 175 lbs obtained by standing scale. Difference of 8 lbs less than last documented weight.       Intake/Output Summary (Last 24 hours) at 7/29/2023 1654  Last data filed at 7/29/2023 1626  Gross per 24 hour   Intake 880 ml   Output 4475 ml   Net -3595 ml       Education Booklet Provided: yes    Comorbidities Reviewed Yes    Patient has a past medical history of Aortic aneurysm, abdominal ([address]), Appendicitis, Arthritis, Bruises easily, CAD (coronary artery disease), CHF (congestive heart failure) ([address]), Chronic back pain, Chronic pain of both shoulders, Complication of anesthesia, Gout, Heart disease, Hernia, HIGH CHOLESTEROL, Hyperlipidemia, Hypertension, Measles, Pancreatitis,
Problem: Nutrition Deficit:  Goal: Optimize nutritional status  8/3/2023 0901 by Tiffani Flowers RN  Outcome: Progressing     Problem: Pain  Goal: Verbalizes/displays adequate comfort level or baseline comfort level  8/3/2023 0901 by Tiffani Flowers RN  Outcome: Progressing     Problem: Safety - Adult  Goal: Free from fall injury  8/3/2023 0901 by Tiffani Flowers RN  Outcome: Progressing     Problem: Chronic Conditions and Co-morbidities  Goal: Patient's chronic conditions and co-morbidity symptoms are monitored and maintained or improved  8/3/2023 0901 by Tiffani Flowers RN  Outcome: Progressing     Patient's EF (Ejection Fraction) is greater than 40%    Heart Failure Medications:  Diuretics[de-identified] Furosemide and Spironolactone    (One of the following REQUIRED for EF </= 40%/SYSTOLIC FAILURE but MAY be used in EF% >40%/DIASTOLIC FAILURE)        ACE[de-identified] None        ARB[de-identified] None         ARNI[de-identified] Sacubitril/Valsartan-Entresto    (Beta Blockers)  NON- Evidenced Based Beta Blocker (for EF% >40%/DIASTOLIC FAILURE): None    Evidenced Based Beta Blocker::(REQUIRED for EF% <40%/SYSTOLIC FAILURE) None  . .................................................................................................................................................. Healthy Weight Tracking - BMI + Meds 7/28/2023 7/29/2023 7/30/2023 7/31/2023 8/1/2023 8/2/2023 8/3/2023   Weight 183 lb 1.6 oz 175 lb 6.4 oz 167 lb 14.4 oz 166 lb 4.8 oz 164 lb 8 oz 164 lb 1.6 oz 165 lb 1.6 oz   Height - - - - - - -   Body Mass Index 27.04 kg/m2 25.9 kg/m2 24.79 kg/m2 24.56 kg/m2 24.29 kg/m2 24.23 kg/m2 24.38 kg/m2   Some recent data might be hidden         Patient's weights and intake/output reviewed: Yes    Daily Weight log at bedside and being used: \"yes    Patient's Last Weight: 165 lbs obtained by standing scale. Difference of 1 lbs more than last documented weight.       Intake/Output Summary (Last 24 hours) at 8/3/2023 0901  Last data filed at 8/3/2023
Problem: Safety - Adult  Goal: Free from fall injury  7/30/2023 1310 by Hannah Fenton RN  Outcome: Progressing  Note: Pt will remain free from falls throughout hospital stay. Fall precautions in place, bed alarm on, bed in lowest position with wheels locked and side rails 2/4 up. Room door open and hourly rounding completed. Will continue to monitor throughout shift. Problem: Pain  Goal: Verbalizes/displays adequate comfort level or baseline comfort level  7/30/2023 1310 by Hannah Fenton RN  Outcome: Progressing  Note: Pt will be satisfied with pain control. Pt uses numeric pain rating scale with reassessments after pain med administration. Will continue to monitor progression throughout shift.
Pt see. Full not to come. To discuss device timing with Dr. Faustino Marcano.     David Rodriguez MD  Cardiac Electrophysiology  [id number] [facility]  [phone] [facility]
yes    Comorbidities Reviewed Yes    Patient has a past medical history of Aortic aneurysm, abdominal ([address]), Appendicitis, Arthritis, Bruises easily, CAD (coronary artery disease), CHF (congestive heart failure) ([address]), Chronic back pain, Chronic pain of both shoulders, Complication of anesthesia, Gout, Heart disease, Hernia, HIGH CHOLESTEROL, Hyperlipidemia, Hypertension, Measles, Pancreatitis, Persistent cough, Ringing in ears, Sinus disorder, and Sleep deprivation. >>For CHF and Comorbidity documentation on Education Time and Topics, please see Education Tab    Progressive Mobility Assessment:  What is this patient's Current Level of Mobility?: Ambulatory- with Assistance  How was this patient Mobilized today?:  Up to Toilet/Shower, ambulated 5 ft                 With Whom? Nurse                 Level of Difficulty/Assistance: 1x Assist     Pt resting in bed at this time on  1.5 L O2. Pt denies shortness of breath. Pt with nonpitting lower extremity edema.      Patient and/or Family's stated Goal of Care this Admission: reduce shortness of breath, increase activity tolerance, better understand heart failure and disease management, be more comfortable, and reduce lower extremity edema prior to discharge        :   7/28/2023 1755 by Francisco Deshpande RN  Outcome: Progressing

## 2023-08-03 NOTE — DISCHARGE SUMMARY
tablet  Take 1 tablet by mouth daily  Start taking on: August 4, 2023     colchicine 0.6 MG tablet  Commonly known as: COLCRYS  Take 1 tablet by mouth daily for 2 days  Start taking on: August 4, 2023     doxycycline hyclate 100 MG tablet  Commonly known as: VIBRA-TABS  Take 1 tablet by mouth every 12 hours for 8 doses     empagliflozin 10 MG tablet  Commonly known as: JARDIANCE  Take 1 tablet by mouth daily  Start taking on: August 4, 2023     sacubitril-valsartan 24-26 MG per tablet  Commonly known as: ENTRESTO  Take 2 tablets by mouth 2 times daily     spironolactone 25 MG tablet  Commonly known as: ALDACTONE  Take 1 tablet by mouth daily  Start taking on: August 4, 2023            CHANGE how you take these medications      Accu-Chek Multiclix Lancets Misc  Test blood sugar qd  What changed: additional instructions            CONTINUE taking these medications      allopurinol 100 MG tablet  Commonly known as: ZYLOPRIM     apixaban 5 MG Tabs tablet  Commonly known as: Eliquis  Take 1 tablet by mouth 2 times daily     atorvastatin 40 MG tablet  Commonly known as: LIPITOR  TAKE 1 TABLET EVERY NIGHT     blood glucose test strips strip  Commonly known as: Accu-Chek Dulce  Test blood sugar qd.     clopidogrel 75 MG tablet  Commonly known as: PLAVIX  TAKE 1 TABLET EVERY DAY     furosemide 40 MG tablet  Commonly known as: LASIX  Take 1 tablet by mouth daily     pantoprazole 40 MG tablet  Commonly known as: PROTONIX  Take 1 tablet by mouth 2 times daily (before meals)            STOP taking these medications      amLODIPine 5 MG tablet  Commonly known as: Norvasc     metoprolol succinate 50 MG extended release tablet  Commonly known as: TOPROL XL     olmesartan 40 MG tablet  Commonly known as: BENICAR     potassium chloride 20 MEQ extended release tablet  Commonly known as: KLOR-CON M     predniSONE 20 MG tablet  Commonly known as: Ana Lau               Where to Get Your Medications        Information about where to get

## 2023-08-04 ENCOUNTER — CARE COORDINATION (OUTPATIENT)
Dept: CASE MANAGEMENT | Age: 83
End: 2023-08-04

## 2023-08-04 ENCOUNTER — FOLLOWUP TELEPHONE ENCOUNTER (OUTPATIENT)
Dept: TELEMETRY | Age: 83
End: 2023-08-04

## 2023-08-04 NOTE — TELEPHONE ENCOUNTER
1st Attempt; No Answer- Left HIPAA compliant voicemail with Non-Urgent Heart Failure Resource Line number for call back.      VictorM anuel Perez RN

## 2023-08-04 NOTE — CARE COORDINATION
[facility] Care Transitions Initial Follow Up Call    Call within 2 business days of discharge: Yes       Patient: Jamila Epstein Patient : 1940   MRN: [id number]  Reason for Admission: chronic CHF  Discharge Date: 8/3/23 RARS: Readmission Risk Score: 14.7      Last Discharge [address] [facility] [facility]       Date Complaint Diagnosis Description Type Department Provider    23 Shortness of Breath Acute on chronic congestive heart failure, unspecified heart failure type ([address]) . .. ED to Hosp-Admission (Discharged) (ADMITTED) Rodrigo Rivers MD; Sudeep Pearl Go. .. Attempted to reach patient via phone for transition call. VM left stating purpose of call along with my contact information requesting a return call.       Care Transitions 24 Hour Call    Care Transitions Interventions       Follow Up  Future Appointments   Date Time Provider [address]   2023  9:00 AM Ralph Peña MD [facility] [place] - DYD   2023 11:15 AM SCHEDULE, [facility] Rukhsana Cope [facility]   2023  9:30 AM Porsche Liu APRN - CNP Rukhsana Cope [facility]   2023  9:45 AM Jc Felix MD [facility]   10/31/2023  8:25 AM [facility]   2023 12:45 PM SCHEDULE, [facility]   2023 12:45 PM Niecy Mcgee, APRN - CNP Rukhsana Cope [facility]   2023 10:45 AM cJ Felix MD AND [facility]       Lynn Moseley RN

## 2023-08-04 NOTE — TELEPHONE ENCOUNTER
2nd Attempt; No Answer- Left HIPAA compliant voicemail with Non-Urgent Heart Failure Resource Line number for call back.      Lou Correia RN

## 2023-08-04 NOTE — TELEPHONE ENCOUNTER
3rd and final Attempt; No Answer- Left HIPAA compliant voicemail with Non-Urgent Heart Failure Resource Line number for call back.       Birgit Hdz RN

## 2023-08-07 ENCOUNTER — CARE COORDINATION (OUTPATIENT)
Dept: CASE MANAGEMENT | Age: 83
End: 2023-08-07

## 2023-08-07 ENCOUNTER — TELEPHONE (OUTPATIENT)
Dept: CARDIOLOGY CLINIC | Age: 83
End: 2023-08-07

## 2023-08-07 NOTE — CARE COORDINATION
[facility] Care Transitions Initial Follow Up Call    Call within 2 business days of discharge: Yes    Patient Current Location:  Home: [address] [place] [address]    Care Transition Nurse contacted the patient by telephone to perform post hospital discharge assessment. Verified name and  with patient as identifiers. Provided introduction to self, and explanation of the Care Transition Nurse role. Patient: Shailesh Shin Patient : 1940   MRN: [id number]  Reason for Admission: acute CHF  PMH h/o former smoking, HTN, HLD, DM2, CAD s/p CABG in  and stenting (most recently to his SVG to OM in 2023), ischemic cardiomyopathy, CHF, SUNDEEP on CPAP, severe pulmonary HTN, Afib/flutter, endovascular AAA repair in , and CVA in 2022   Discharge Date: 8/3/23 RARS: Readmission Risk Score: 14.7      Last Discharge [address] [facility] [facility]       Date Complaint Diagnosis Description Type Department Provider    23 Shortness of Breath Acute on chronic congestive heart failure, unspecified heart failure type ([facility]) . .. ED to Hosp-Admission (Discharged) (ADMITTED) Jaymie Mendoza MD; Bernardo Fragoso. .. Was this an external facility discharge? No Discharge Facility:     Challenges to be reviewed by the provider   Additional needs identified to be addressed with provider: No  none               Method of communication with provider: none. Patient answered call and verified . Patient pleasant and agreeable to transition call. Doing well since discharge. Concerned that he still has soreness and bruising at incision pacemaker site. Stated that he has not needed to take any medication for pain. Confirmed that he has AVS and medications reviewed. Stated that he is taking all as directed and had question about Entresto, but provider was able to answer question. CTN discussed daily vital signs and confirmed that he checks daily.  Results were Weight 160.2lbs /63 HR 64 O2 97%  Follow up with cardio

## 2023-08-07 NOTE — TELEPHONE ENCOUNTER
Pt states he has Entresto 49-51 and would like to know what dose he should be taking. Please advise.

## 2023-08-09 ENCOUNTER — TELEPHONE (OUTPATIENT)
Dept: CARDIOLOGY CLINIC | Age: 83
End: 2023-08-09

## 2023-08-09 ENCOUNTER — NURSE ONLY (OUTPATIENT)
Dept: CARDIOLOGY CLINIC | Age: 83
End: 2023-08-09
Payer: MEDICARE

## 2023-08-09 DIAGNOSIS — Z95.810 CARDIAC RESYNCHRONIZATION THERAPY DEFIBRILLATOR (CRT-D) IN PLACE: Primary | ICD-10-CM

## 2023-08-09 PROCEDURE — 93284 PRGRMG EVAL IMPLANTABLE DFB: CPT | Performed by: INTERNAL MEDICINE

## 2023-08-09 NOTE — TELEPHONE ENCOUNTER
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 Called [employer] and got their recommendations. Sent patient the PDF that was forwarded to me for [facility] guidelines.

## 2023-08-09 NOTE — TELEPHONE ENCOUNTER
Spoke w/ patient - he states he received [employer] message and is reading it now. No further questions.

## 2023-08-09 NOTE — PROGRESS NOTES
Incision is closed, clean, and dry with all dressings/steri strips removed. Site left open to the air. Incision well approximated. No s/s of infection. Orange sized hematoma and substantial bruising noted (patient on [address]). Patient education was provided about site care, device functionality, in home monitoring, and any other patient questions and/or concerns were addressed. Aftercare and remote monitoring literature was provided. Patient voices understanding.

## 2023-08-09 NOTE — PATIENT INSTRUCTIONS
New Cardiac Device Implant (Pacemaker and/or Defibrillator) Post Op Instructions  Bathe with water indirectly hitting the incision site. Water and soap may run over the incision site. Do not scrub. Pat dry with a clean towel after bathing. Leave incision open to the air; do not apply any dressings, ointments, or bandages to the area. Do not apply lotion, perfume/cologne, or powders to the area until it is completely healed. Any scabbing or skin glue that is noted will fall off within 1-2 weeks after the post op appointment. If any oozing, bleeding, or pus drainage occurs, please call the office immediately at [phone]. Patient has movement restrictions in place until 4 weeks post op (to the day of implant) unless otherwise instructed by physician. Patient may not lift the device side arm above shoulder height. Do not far reach or stretch across body or behind body with effected side. Do not use this arm for pushing, pulling, or lifting body. Do not use cane on the effected side. Patient may not lift anything heavier than a gallon of milk with the associated arm. Appointments to expect going forward:  Post operatively the patient will have had a 1-week post op check and a 3 month follow up with NP/MD and the device clinic. Remote Monitoring Instructions    Within 2-3 weeks of your device being implanted, you will receive a call from the  of your device. Please answer this call as it is to set up remote monitoring for your device. Once you receive your in-home monitor, please follow the instructions provided to sync the home monitor to your implanted device. Once you have paired your home monitor to your implanted device, keep your monitor plugged in within 6 feet of where you sleep. Your monitor will routinely check in with your device during sleep hours and transmit any urgent events to the [address] [place] [address] for review.      Please do not send additional routine

## 2023-08-11 LAB
POC ACT LR: 191 SEC
POC ACT LR: 218 SEC

## 2023-08-14 ENCOUNTER — CARE COORDINATION (OUTPATIENT)
Dept: CASE MANAGEMENT | Age: 83
End: 2023-08-14

## 2023-08-14 ENCOUNTER — TELEPHONE (OUTPATIENT)
Dept: FAMILY MEDICINE CLINIC | Age: 83
End: 2023-08-14

## 2023-08-14 NOTE — TELEPHONE ENCOUNTER
Patient scheduled a hospital follow up following CHF, stent placement, and pacemaker. D/C 08/03 Polo Moscoso. Sending to clinical staff for TCM questions before his upcoming appt.      Future Appointments   Date Time Provider [address]   8/16/2023  9:40 AM DO FRANKI Wright [place] - DYD   8/21/2023  9:30 AM Lakisha Terry APRN - CNP Kanwal Etienne [facility]   9/12/2023  9:45 AM Makenna Perez MD [facility]   10/31/2023  8:25 AM SCHEDULE, Davis Dutta [facility] Jia Bowden [facility]   11/13/2023 12:45 PM SCHEDULE, [facility]   11/13/2023 12:45 PM RUPERTO Yi - CNP Kanwal Etienne [facility]   11/17/2023 10:45 AM Makenna Perez MD AND [facility]

## 2023-08-14 NOTE — TELEPHONE ENCOUNTER
Care Transitions Initial Follow Up Call    Outreach made within 2 business days of discharge: No    Patient: Jose Luis Carrillo Patient : 1940   MRN: [id number]  Reason for Admission:SOB, Discharge Date: 8/3/23       Spoke with: Patient     Discharge department/facility: [facility]     TCM Interactive Patient Contact:  Was patient able to fill all prescriptions: Yes  Was patient instructed to bring all medications to the follow-up visit: Yes  Is patient taking all medications as directed in the discharge summary?  Yes  Does patient understand their discharge instructions: Yes  Does patient have questions or concerns that need addressed prior to 7-14 day follow up office visit: no    Scheduled appointment with PCP within 7-14 days    Follow Up  Future Appointments   Date Time Provider [address] [place] [address]   2023  9:40 AM [facility]   2023  9:30 AM RUPERTO Mireles - PEGGY Corona [facility]   2023  9:45 AM Jessica Abreu MD [facility]   10/31/2023  8:25 AM SCHEDULE, [facility] Sand[facility]ita Salomon [facility]   2023 12:45 PM [facility]EDULE, [facility]   2023 12:45 PM Niecy Judithe Grim, APRN - CNP Emely Ponds [facility]   2023 10:45 AM Jessica Abreu MD AND [facility]       Parisa Morgan

## 2023-08-14 NOTE — CARE COORDINATION
[facility] Care Transitions Follow Up Call    Patient: Terry Hightower  Patient : 1940   MRN: [id number]  Reason for Admission: SOB  Discharge Date: 8/3/23 RARS: Readmission Risk Score: 14.7  Notes reviewed and CM with provider's office already contacted patient today. CTN rescheduled call for later this week.      Care Transitions Subsequent and Final Call    Subsequent and Final Calls  Care Transitions Interventions  Other Interventions:             Bria Molina RN

## 2023-08-15 ENCOUNTER — TELEPHONE (OUTPATIENT)
Dept: CARDIOLOGY CLINIC | Age: 83
End: 2023-08-15

## 2023-08-15 PROBLEM — Z95.0 PACEMAKER: Status: RESOLVED | Noted: 2023-08-02 | Resolved: 2023-08-15

## 2023-08-15 NOTE — TELEPHONE ENCOUNTER
Pt called re eliquis and stated that he can no longer afford the medication and has 50 tabs left. Pt has not applied for pt assistance.   Please advise

## 2023-08-15 NOTE — TELEPHONE ENCOUNTER
LVM for patient. Please give patient Eliquis Assistance number-- [phone]. He should call and speak with them regarding options. If Eliquis still not affordable after this, patient needs to speak with his insurance to determine which alternative medication would be more cost effective for patient. Thanks.

## 2023-08-16 ENCOUNTER — OFFICE VISIT (OUTPATIENT)
Dept: FAMILY MEDICINE CLINIC | Age: 83
End: 2023-08-16

## 2023-08-16 VITALS
OXYGEN SATURATION: 98 % | DIASTOLIC BLOOD PRESSURE: 68 MMHG | RESPIRATION RATE: 16 BRPM | HEART RATE: 81 BPM | TEMPERATURE: 96.4 F | WEIGHT: 160.4 LBS | SYSTOLIC BLOOD PRESSURE: 138 MMHG | BODY MASS INDEX: 23.69 KG/M2

## 2023-08-16 DIAGNOSIS — Z09 HOSPITAL DISCHARGE FOLLOW-UP: Primary | ICD-10-CM

## 2023-08-17 ENCOUNTER — CARE COORDINATION (OUTPATIENT)
Dept: CASE MANAGEMENT | Age: 83
End: 2023-08-17

## 2023-08-17 NOTE — CARE COORDINATION
[facility] Care Transitions Follow Up Call  Patient: Jamila Epstein  Patient : 1940   MRN: [id number]  Reason for Admission: SOB  Discharge Date: 8/3/23 RARS: Readmission Risk Score: 14.7    Attempted to reach patient via phone for transition call. VM left stating purpose of call along with my contact information requesting a return call.       Follow Up  Future Appointments   Date Time Provider [address]   2023  9:30 AM RUPERTO Mejía CNP [facility]   2023  9:45 AM Jc Felix MD [facility]   2023 11:00 AM DO FRANKI Baugh [place] - DYCHACHA   2023 12:45 PM SCHEDULE, [facility]   2023 12:45 PM RUPERTO Umana CNP [facility]   2023 10:45 AM Jc Felix MD AND [facility]        Care Transitions Subsequent and Final Call    Subsequent and Final Calls  Care Transitions Interventions  Other Interventions:             Lynn Moseley, RN

## 2023-08-21 ENCOUNTER — OFFICE VISIT (OUTPATIENT)
Dept: CARDIOLOGY CLINIC | Age: 83
End: 2023-08-21
Payer: MEDICARE

## 2023-08-21 VITALS
WEIGHT: 161 LBS | BODY MASS INDEX: 23.85 KG/M2 | HEIGHT: 69 IN | SYSTOLIC BLOOD PRESSURE: 130 MMHG | OXYGEN SATURATION: 97 % | HEART RATE: 78 BPM | DIASTOLIC BLOOD PRESSURE: 60 MMHG

## 2023-08-21 DIAGNOSIS — I50.22 CHRONIC SYSTOLIC HEART FAILURE (HCC): ICD-10-CM

## 2023-08-21 DIAGNOSIS — Z79.899 MEDICATION MANAGEMENT: ICD-10-CM

## 2023-08-21 DIAGNOSIS — Z95.810 CARDIAC RESYNCHRONIZATION THERAPY DEFIBRILLATOR (CRT-D) IN PLACE: ICD-10-CM

## 2023-08-21 DIAGNOSIS — I25.10 CAD IN NATIVE ARTERY: ICD-10-CM

## 2023-08-21 DIAGNOSIS — I25.5 ISCHEMIC CARDIOMYOPATHY: ICD-10-CM

## 2023-08-21 DIAGNOSIS — I25.5 ISCHEMIC CARDIOMYOPATHY: Primary | ICD-10-CM

## 2023-08-21 PROCEDURE — G8427 DOCREV CUR MEDS BY ELIG CLIN: HCPCS | Performed by: NURSE PRACTITIONER

## 2023-08-21 PROCEDURE — 3078F DIAST BP <80 MM HG: CPT | Performed by: NURSE PRACTITIONER

## 2023-08-21 PROCEDURE — 1036F TOBACCO NON-USER: CPT | Performed by: NURSE PRACTITIONER

## 2023-08-21 PROCEDURE — 3075F SYST BP GE 130 - 139MM HG: CPT | Performed by: NURSE PRACTITIONER

## 2023-08-21 PROCEDURE — 99214 OFFICE O/P EST MOD 30 MIN: CPT | Performed by: NURSE PRACTITIONER

## 2023-08-21 PROCEDURE — 1111F DSCHRG MED/CURRENT MED MERGE: CPT | Performed by: NURSE PRACTITIONER

## 2023-08-21 PROCEDURE — G8420 CALC BMI NORM PARAMETERS: HCPCS | Performed by: NURSE PRACTITIONER

## 2023-08-21 PROCEDURE — 1123F ACP DISCUSS/DSCN MKR DOCD: CPT | Performed by: NURSE PRACTITIONER

## 2023-08-21 RX ORDER — SACUBITRIL AND VALSARTAN 49; 51 MG/1; MG/1
1 TABLET, FILM COATED ORAL 2 TIMES DAILY
COMMUNITY
End: 2023-08-22 | Stop reason: DRUGHIGH

## 2023-08-21 NOTE — PATIENT INSTRUCTIONS
Plan:   Stay as active as possible   plavix lifelong due to graft stenting   Plan to stop aspirin 9/2/2023  Eliquis for afib- patient assistance papers today  Maureen Davila and jardiance patient assistance papers today  Check BMP and BNP this week- not fasting  Continue current heart failure meds today  Follow up Dr. Claribel Parker   Follow with CHF team in 3 months

## 2023-08-21 NOTE — PROGRESS NOTES
[facility]  Cardiac Follow-up    Primary Care Doctor:  Niles Rodgers DO    Chief Complaint   Patient presents with    Other     Pulmonary hypertension        History of Present Illness:  I had the pleasure of seeing Tyshawn Montero in follow up for Cardiomyopathy. New patient to me. Hx of PAF, Bradycardia with intermittent complete heart block (follows with EP), Dm, CAD s/p CABD 2008, S/P LCI of distal SVT to OM2 with sequential to OM2 1/2023, HTN, DM, HLD,  SUNDEEP on CPAP, AAA repair 2011, CVA 9/2022, GI bleed. Admitted 7/28/23 with shortness of breath, orthopnea, treated for heart failure , LVEF down to 35%, s/p LHC with PCI to Distal LAD on 8/2/23. S/p BiV ICD 8/2/23. Discharged 8/3/2023 weight 160lbs  Admission weight was 183lbs  Now weight 157lbs. He is walking some. He is feeling better. He misses walking,  wants to walk his dog. Small amount of shortness of breath for 10 seconds in the am.   He has a pool and taking care of the pool. checking b/p at home 115-120/50-60  Hr 60-70. Left chest site healing. His edema is improved compared to admission. Tyshawn Montero describes symptoms including dyspnea but denies chest pain, palpitations, edema, syncope. Appetite:  Current diet: oats/eggs in the am, protein drink in the am.   Reduced salt. Issues with eating supper   Eating fruit each meal.   Fluid intake: 64 ounces a day- hard. Koolaid/tea/water    Past Medical History:   has a past medical history of Aortic aneurysm, abdominal ([address]), Appendicitis, Arthritis, Bruises easily, CAD (coronary artery disease), CHF (congestive heart failure) ([facility]), Chronic back pain, Chronic pain of both shoulders, Complication of anesthesia, Gout, Heart disease, Hernia, HIGH CHOLESTEROL, Hyperlipidemia, Hypertension, Measles, Pancreatitis, Persistent cough, Ringing in ears, Sinus disorder, and Sleep deprivation.   Surgical History:   has a past surgical history that includes Coronary angioplasty with

## 2023-08-22 ENCOUNTER — TELEPHONE (OUTPATIENT)
Dept: CARDIOLOGY CLINIC | Age: 83
End: 2023-08-22

## 2023-08-22 DIAGNOSIS — Z95.810 CARDIAC RESYNCHRONIZATION THERAPY DEFIBRILLATOR (CRT-D) IN PLACE: ICD-10-CM

## 2023-08-22 DIAGNOSIS — I50.22 CHRONIC SYSTOLIC HEART FAILURE (HCC): Primary | ICD-10-CM

## 2023-08-22 LAB
ANION GAP SERPL CALCULATED.3IONS-SCNC: 17 MMOL/L (ref 3–16)
BUN SERPL-MCNC: 28 MG/DL (ref 7–20)
CALCIUM SERPL-MCNC: 9.3 MG/DL (ref 8.3–10.6)
CHLORIDE SERPL-SCNC: 103 MMOL/L (ref 99–110)
CO2 SERPL-SCNC: 27 MMOL/L (ref 21–32)
CREAT SERPL-MCNC: 1.1 MG/DL (ref 0.8–1.3)
GFR SERPLBLD CREATININE-BSD FMLA CKD-EPI: >60 ML/MIN/{1.73_M2}
GLUCOSE SERPL-MCNC: 177 MG/DL (ref 70–99)
NT-PROBNP SERPL-MCNC: 975 PG/ML (ref 0–449)
POTASSIUM SERPL-SCNC: 3 MMOL/L (ref 3.5–5.1)
SODIUM SERPL-SCNC: 147 MMOL/L (ref 136–145)

## 2023-08-22 RX ORDER — FUROSEMIDE 40 MG/1
20 TABLET ORAL DAILY
Qty: 90 TABLET | Refills: 1
Start: 2023-08-25 | End: 2023-08-30

## 2023-08-22 NOTE — TELEPHONE ENCOUNTER
----- Message from RUPERTO De La Rosa CNP sent at 8/22/2023 12:23 PM EDT -----  Please notify patient results. Kidney function is stable but shows he is dehydrated. Electrolytes show his potassium level is low. Recommend he hold the lasix dose for 2 days and then restart at 1/2 tab daily  or 20mg daily. Please see if he has any potassium supplements at home, if not will then send in and have him take 40meq daily for 2 days. Then in 3 days increase the Entresto to max dose 97-103mg Twice a day (okay to take 2 tabs of the 49/51mg tablet until he gets the higher dose)  Repeat BMP and BNP next Monday.

## 2023-08-23 ENCOUNTER — CARE COORDINATION (OUTPATIENT)
Dept: CASE MANAGEMENT | Age: 83
End: 2023-08-23

## 2023-08-23 NOTE — TELEPHONE ENCOUNTER
Pt returned call. Message given. V/u. Pt will hold lasix for 2 days, and he does have 20meq potassium supplements at home and will begin to take that for 2 days and he will begin Entresto on 08/26/2023. Pt will get labs repeated on Monday.

## 2023-08-25 ENCOUNTER — CARE COORDINATION (OUTPATIENT)
Dept: CASE MANAGEMENT | Age: 83
End: 2023-08-25

## 2023-08-25 NOTE — CARE COORDINATION
follow up appointment(s):     Care Transition Nurse reviewed medical action plan with patient and discussed any barriers to care and/or understanding of plan of care after discharge. Discussed appropriate site of care based on symptoms and resources available to patient including: PCP. The patient agrees to contact the PCP office for questions related to their healthcare. Advance Care Planning:   not on file. Patients top risk factors for readmission: functional physical ability  Interventions to address risk factors:       Offered patient enrollment in the Remote Patient Monitoring (RPM) program for in-home monitoring:  did not discuss . Care Transitions Subsequent and Final Call    Subsequent and Final Calls  Do you have any ongoing symptoms?: No  Have your medications changed?: No  Do you have any questions related to your medications?: No  Do you currently have any active services?: No  Do you have any needs or concerns that I can assist you with?: No  Identified Barriers: None  Care Transitions Interventions  Other Interventions:             Care Transition Nurse provided contact information for future needs. Plan for follow-up call in 5-7 days based on severity of symptoms and risk factors.   Plan for next call: self management-     Tanya Cadena RN

## 2023-08-28 DIAGNOSIS — Z95.810 CARDIAC RESYNCHRONIZATION THERAPY DEFIBRILLATOR (CRT-D) IN PLACE: ICD-10-CM

## 2023-08-28 DIAGNOSIS — I50.22 CHRONIC SYSTOLIC HEART FAILURE (HCC): ICD-10-CM

## 2023-08-28 LAB
ANION GAP SERPL CALCULATED.3IONS-SCNC: 14 MMOL/L (ref 3–16)
BUN SERPL-MCNC: 16 MG/DL (ref 7–20)
CALCIUM SERPL-MCNC: 9.4 MG/DL (ref 8.3–10.6)
CHLORIDE SERPL-SCNC: 104 MMOL/L (ref 99–110)
CO2 SERPL-SCNC: 27 MMOL/L (ref 21–32)
CREAT SERPL-MCNC: 1.2 MG/DL (ref 0.8–1.3)
GFR SERPLBLD CREATININE-BSD FMLA CKD-EPI: >60 ML/MIN/{1.73_M2}
GLUCOSE SERPL-MCNC: 180 MG/DL (ref 70–99)
NT-PROBNP SERPL-MCNC: 1221 PG/ML (ref 0–449)
POTASSIUM SERPL-SCNC: 3.5 MMOL/L (ref 3.5–5.1)
SODIUM SERPL-SCNC: 145 MMOL/L (ref 136–145)

## 2023-08-29 ENCOUNTER — TELEPHONE (OUTPATIENT)
Dept: CARDIOLOGY CLINIC | Age: 83
End: 2023-08-29

## 2023-08-29 DIAGNOSIS — I25.10 CAD IN NATIVE ARTERY: Primary | ICD-10-CM

## 2023-08-29 DIAGNOSIS — I50.23 ACUTE ON CHRONIC SYSTOLIC HEART FAILURE (HCC): ICD-10-CM

## 2023-08-29 DIAGNOSIS — I25.5 ISCHEMIC CARDIOMYOPATHY: ICD-10-CM

## 2023-08-29 DIAGNOSIS — I25.10 CORONARY ARTERY DISEASE INVOLVING NATIVE CORONARY ARTERY OF NATIVE HEART WITHOUT ANGINA PECTORIS: ICD-10-CM

## 2023-08-29 DIAGNOSIS — I48.0 PAF (PAROXYSMAL ATRIAL FIBRILLATION) (HCC): ICD-10-CM

## 2023-08-29 DIAGNOSIS — I50.21 ACUTE SYSTOLIC HF (HEART FAILURE) (HCC): ICD-10-CM

## 2023-08-29 NOTE — TELEPHONE ENCOUNTER
----- Message from RUPERTO Russo CNP sent at 8/29/2023  2:24 PM EDT -----  Please notify patient results. Kidney function is stable. Electrolytes are stable. Pro-BNP (heart failure number) is overall improved. Continue current regimen with taking the Entresto 97-103mg twice a day. Repeat BMP and BNP in about 4 weeks.

## 2023-08-29 NOTE — TELEPHONE ENCOUNTER
Pt returned call. Pt states white of eye is red. Pt states no trauma to eye and not actively bleeding.

## 2023-08-29 NOTE — TELEPHONE ENCOUNTER
I attempted to call the patient to obtain more information regarding message. What is bruised? Is he actively bleeding? Was there any trauma to his eye? LMOV.

## 2023-08-29 NOTE — TELEPHONE ENCOUNTER
Pt stated that since he went for his lab work his right has been bruised and now the whites in his right eye are blood shot. Pt called his eye doctor and they asked if he was on blood thinners, pt stated yes. Pt is on eliquis, plavix and asprin. Please advise.

## 2023-08-30 RX ORDER — FUROSEMIDE 40 MG/1
TABLET ORAL
Qty: 90 TABLET | Refills: 1 | Status: SHIPPED | OUTPATIENT
Start: 2023-08-30

## 2023-08-30 NOTE — TELEPHONE ENCOUNTER
Future Appointments   Date Time Provider [address]   9/12/2023  9:45 AM Jamaal Cassidy MD [facility]   9/14/2023 11:00 AM DO FRANKI Cuevaci - DYCHACHA   11/13/2023 12:45 PM SCHEDULE, [facility]   11/13/2023 12:45 PM RUPERTO Jordan CNP [facility]   11/15/2023  9:00 AM RUPERTO Garcias CNP [facility]   11/17/2023 10:45 AM Jamaal Cassidy MD AND PURVI MOORE     LOV 8/16/2023

## 2023-08-30 NOTE — TELEPHONE ENCOUNTER
Patient called office back. He reports he spoke with optometrist  and was told right eye blood vessel busted. He was instructed may take 4-6 weeks to clear up and to use cold compress. He VU to AM NP instructions with aspirin, plavix, and Eliquis. No further questions.

## 2023-08-30 NOTE — TELEPHONE ENCOUNTER
Has been evaluated by eye doctor? That would be my first recommendation. He is on triple therapy until 9/2/2023 and then can stop ASA. He is to remain on Eliquis and lifelong Plavix.

## 2023-08-31 ENCOUNTER — CARE COORDINATION (OUTPATIENT)
Dept: CASE MANAGEMENT | Age: 83
End: 2023-08-31

## 2023-09-05 ENCOUNTER — TELEPHONE (OUTPATIENT)
Dept: CARDIOLOGY CLINIC | Age: 83
End: 2023-09-05

## 2023-09-05 NOTE — TELEPHONE ENCOUNTER
Patient stopped in the office to drop off patient assistance paperwork. Also he states that when he saw NPRB last, she increased his Entresto. He states that since then, he has been bruising more easily than before. 08/29- the white of his right eye turned blood red    09/01- the skin between his right eye and now is bruised    09/04- woke up at 4 am with a 12 inch pool of blood on bed sheets from his left arm bleeding. He stated that he stopped the aspirin on 09/02. (After being on it for 30 days) along with plavix and eliquis. Patient wants to know what to do. Best # is [phone].

## 2023-09-05 NOTE — TELEPHONE ENCOUNTER
Covering for Osiris Rodriguez, EPGGY   The Harry Postin will not cause increase in bruising or bleeding. Since he has stopped the aspirin, let's see if this improves before making any other changes.    Thank you, Mila Hernandez

## 2023-09-07 ENCOUNTER — CARE COORDINATION (OUTPATIENT)
Dept: CASE MANAGEMENT | Age: 83
End: 2023-09-07

## 2023-09-07 NOTE — CARE COORDINATION
[address] [address] [facility],[address] Care Transitions Follow Up Call    Patient: Koko Ramirez  Patient : 1940   MRN: [id number]  Reason for Admission: systolic heart failure RARS 15 home w/ no needs [facility] PCP  Discharge Date: 8/3/23 RARS: Readmission Risk Score: 14.7  Attempted to reach patient via phone for transition call. VM left stating purpose of call along with my contact information requesting a return call.         Follow Up  Future Appointments   Date Time Provider [address]   2023  9:45 AM Oswald Ferraro MD [facility]   2023 11:00 AM DO FRANKI Amaya [place] - DYD   2023 12:45 PM SCHEDULE, [facility]   2023 12:45 PM RUPERTO Stone - PEGGY Harley Gone [facility]   11/15/2023  9:00 AM RUPERTO Xie - PEGGY Harley [facility]   2023 10:45 AM Oswald Ferraro MD AND [facility]     Care Transitions Subsequent and Final Call    Subsequent and Final Calls  Care Transitions Interventions  Other Interventions:             Sachi Frost RN

## 2023-09-08 ENCOUNTER — CARE COORDINATION (OUTPATIENT)
Dept: CASE MANAGEMENT | Age: 83
End: 2023-09-08

## 2023-09-08 NOTE — CARE COORDINATION
[facility] Care Transitions Follow Up Call    Patient: Shayy Gonzales  Patient : 1940   MRN: [id number]  Reason for Admission: systolic heart failure RARS 15 home w/ no needs [facility] PCP  Discharge Date: 8/3/23 RARS: Readmission Risk Score: 14.7    Patient called CTN back and left voicemail from missed call. Stated he is doing well and was returning message. CTN to schedule call for later time.     Follow Up  Future Appointments   Date Time Provider [address]   2023  9:45 AM Jose Barney MD [facility]   2023 11:00 AM DO FRANKI Kendall [place] - DYCHACHA   2023 12:45 PM SCHEDULE, [facility]   2023 12:45 PM RUPERTO Bhat CNP Leticia Renchacha [facility]   11/15/2023  9:00 AM RUPERTO Russo CNP Leticia Renchacha [facility]   2023 10:45 AM Jose Barney MD AND PULM [facility]        Care Transitions Subsequent and Final Call    Subsequent and Final Calls  Care Transitions Interventions  Other Interventions:             Juanpablo Swanson, RN

## 2023-09-11 ENCOUNTER — CARE COORDINATION (OUTPATIENT)
Dept: CASE MANAGEMENT | Age: 83
End: 2023-09-11

## 2023-09-12 ENCOUNTER — OFFICE VISIT (OUTPATIENT)
Dept: CARDIOLOGY CLINIC | Age: 83
End: 2023-09-12
Payer: MEDICARE

## 2023-09-12 VITALS
HEIGHT: 69 IN | SYSTOLIC BLOOD PRESSURE: 152 MMHG | OXYGEN SATURATION: 99 % | DIASTOLIC BLOOD PRESSURE: 75 MMHG | HEART RATE: 87 BPM | BODY MASS INDEX: 23.7 KG/M2 | WEIGHT: 160 LBS

## 2023-09-12 DIAGNOSIS — I25.5 ISCHEMIC CARDIOMYOPATHY: ICD-10-CM

## 2023-09-12 DIAGNOSIS — I25.10 CORONARY ARTERY DISEASE INVOLVING NATIVE CORONARY ARTERY OF NATIVE HEART WITHOUT ANGINA PECTORIS: ICD-10-CM

## 2023-09-12 DIAGNOSIS — I10 PRIMARY HYPERTENSION: ICD-10-CM

## 2023-09-12 DIAGNOSIS — E78.5 HYPERLIPIDEMIA, UNSPECIFIED HYPERLIPIDEMIA TYPE: ICD-10-CM

## 2023-09-12 DIAGNOSIS — Z95.810 CARDIAC RESYNCHRONIZATION THERAPY DEFIBRILLATOR (CRT-D) IN PLACE: Primary | ICD-10-CM

## 2023-09-12 PROCEDURE — G8427 DOCREV CUR MEDS BY ELIG CLIN: HCPCS | Performed by: INTERNAL MEDICINE

## 2023-09-12 PROCEDURE — 1036F TOBACCO NON-USER: CPT | Performed by: INTERNAL MEDICINE

## 2023-09-12 PROCEDURE — 3077F SYST BP >= 140 MM HG: CPT | Performed by: INTERNAL MEDICINE

## 2023-09-12 PROCEDURE — 1123F ACP DISCUSS/DSCN MKR DOCD: CPT | Performed by: INTERNAL MEDICINE

## 2023-09-12 PROCEDURE — 99214 OFFICE O/P EST MOD 30 MIN: CPT | Performed by: INTERNAL MEDICINE

## 2023-09-12 PROCEDURE — 3078F DIAST BP <80 MM HG: CPT | Performed by: INTERNAL MEDICINE

## 2023-09-12 PROCEDURE — G8420 CALC BMI NORM PARAMETERS: HCPCS | Performed by: INTERNAL MEDICINE

## 2023-09-12 NOTE — PATIENT INSTRUCTIONS
Plan:  ~Referral to cardiac rehab   ~Recommend repeating an echocardiogram in November prior to your appointments with [facility] and [facility]   Cardiac medications reviewed including indications and pertinent side effects. Medication list updated at this visit.    Check blood pressure and heart rate at home a few times per week- keep a log with dates and times and bring to office visit   Regular exercise and following a healthy diet encouraged   Follow up with me in January [FreeTextEntry1] : 2019: Last here 1 y/a\par 82 y/o F with ASHD- CCS [address] in 2014\par \par CRFs include HTN, DM, HLD and CKD.  Pt comes in very infrequently.\par \par EKG: Normal Sinus Rhythm with a 1st degree AVB,  lPVCs, ow voltage, Right Bundle Branch Block, nonspecific ST-T wave abnormalities 11/1/18\par \par 5/20/21 We finally connected for a Telemed visit.  She reports intermittent ankle edema, most like from reduced activity and Norvasc.  She did manage it with elevation and support hose.  Her daughter bought her a peddle exerciser.  Needs Covid vaccine, agrees to get it.  Also with poor sleep.

## 2023-09-12 NOTE — PROGRESS NOTES
[facility]   Cardiac Consult     Referring Provider:  Sandy Weston DO     Chief Complaint   Patient presents with    Follow-up    Hypertension    Coronary Artery Disease    Atrial Fibrillation    Cardiomyopathy    Congestive Heart Failure    Hyperlipidemia    Shortness of Breath     When he wakes up, very slight and it is improving. Daisy Powers   1940    History of Present Illness:    Daisy Powers is a 80 y.o. male who is here is here today for follow up for a past medical history of an abnormal stress test, AF,carotid artery diease,  coronary artery disease- CABG surgery 2008, CVA, hypertension, hyperlipidemia, valvular heart diease, small PFO. Admitted for evaluation of a brief episode of aphasia on 09/08/2022. Symptoms had resolved by the time he arrived to the ED. EKG at admission showed AF. On 9/09/2022 echo showed EF of 50-55% with basal inferior hypokinesis, grade II DD, moderate MR, mild AR, moderate TR, moderate HTN, and small PFO. Stress test showed paradoxic septal motion, enlarged RV and small to moderate area of anterior and basal inferior ischemia. Brain MRI showed a small acute infarct of the left parietal lobe and chronic infarcts in right occipital lobe and left cerebellar hemisphere. On 1/26/23 he underwent cardiac cath with PCI of distal SVG to OM1 with sequential to OM2. Post procedure he developed pulmonary edema and GI bleed which required EGD. His Hgb has been monitored and stable. A his hospital follow up on 2/7/2023, he was instructed to take one extra dose of Lasix that day and start Klor Con 20 mEq daily. Repeat echocardiogram 1/27/2023 showed an EF of 45-50%. He wore a cardiac monitor from 2/17/2023-3/04/2023 that showed 80% AF burden with slow ventricular rates, sinus bradycardia with average HR of 51 bpm ( bpm), 1.26% PVC burden, intermittent complete heart block, and NSVT.  Not on BB due to bradycardia and intermittent CHB seen on cardiac

## 2023-09-14 ENCOUNTER — OFFICE VISIT (OUTPATIENT)
Dept: FAMILY MEDICINE CLINIC | Age: 83
End: 2023-09-14

## 2023-09-14 ENCOUNTER — CARE COORDINATION (OUTPATIENT)
Dept: CARE COORDINATION | Age: 83
End: 2023-09-14

## 2023-09-14 VITALS
TEMPERATURE: 96.6 F | SYSTOLIC BLOOD PRESSURE: 117 MMHG | RESPIRATION RATE: 16 BRPM | DIASTOLIC BLOOD PRESSURE: 60 MMHG | HEART RATE: 88 BPM | OXYGEN SATURATION: 99 % | BODY MASS INDEX: 23.57 KG/M2 | WEIGHT: 159.6 LBS

## 2023-09-14 DIAGNOSIS — E11.9 TYPE 2 DIABETES MELLITUS WITHOUT COMPLICATION, WITHOUT LONG-TERM CURRENT USE OF INSULIN (HCC): Primary | ICD-10-CM

## 2023-09-14 DIAGNOSIS — I10 ESSENTIAL HYPERTENSION: ICD-10-CM

## 2023-09-14 DIAGNOSIS — Z12.5 PROSTATE CANCER SCREENING: ICD-10-CM

## 2023-09-14 ASSESSMENT — PATIENT HEALTH QUESTIONNAIRE - PHQ9
SUM OF ALL RESPONSES TO PHQ QUESTIONS 1-9: 0
SUM OF ALL RESPONSES TO PHQ9 QUESTIONS 1 & 2: 0
1. LITTLE INTEREST OR PLEASURE IN DOING THINGS: 0
2. FEELING DOWN, DEPRESSED OR HOPELESS: 0

## 2023-09-14 NOTE — CARE COORDINATION
Contacted Augie Huynh and left voicemail regarding Dietitian referral. Left call back number and will follow up as appropriate.        Gerald Santos, [facility],   [phone]

## 2023-09-18 ENCOUNTER — CARE COORDINATION (OUTPATIENT)
Dept: CASE MANAGEMENT | Age: 83
End: 2023-09-18

## 2023-09-18 NOTE — CARE COORDINATION
DO FRANKI Barrett [place] - DYD     Non-[facility] follow up appointment(s):     Care Transition Nurse reviewed medical action plan with patient and discussed any barriers to care and/or understanding of plan of care after discharge. Discussed appropriate site of care based on symptoms and resources available to patient including: PCP  Specialist  When to call 911. The patient agrees to contact the PCP office for questions related to their healthcare. Advance Care Planning:   not on file. Patients top risk factors for readmission: functional physical ability  Interventions to address risk factors: Education of patient/family/caregiver/guardian to support self-management-     Offered patient enrollment in the Remote Patient Monitoring (RPM) program for in-home monitoring: Patient declined. Care Transitions Subsequent and Final Call    Subsequent and Final Calls  Do you have any ongoing symptoms?: No  Have your medications changed?: No  Do you have any questions related to your medications?: No  Do you currently have any active services?: No  Do you have any needs or concerns that I can assist you with?: No  Identified Barriers: None  Care Transitions Interventions                          Other Interventions:             Care Transition Nurse provided contact information for future needs. No further follow-up call indicated based on severity of symptoms and risk factors.   Plan for next call: referral to ambulatory care [address] , RN

## 2023-09-20 ENCOUNTER — CARE COORDINATION (OUTPATIENT)
Dept: CARE COORDINATION | Age: 83
End: 2023-09-20

## 2023-09-20 NOTE — CARE COORDINATION
Contacted Kali Donaldson and left voicemail regarding Dietitian referral.  RD has attempted to call patient two times. Provided call back number. RD sign off at this time and notify ACM. RD will follow/assist patient should phone call be returned.       Jurgen Sawyer, [address] [place] [address],    [phone]

## 2023-09-22 DIAGNOSIS — I25.10 CORONARY ARTERY DISEASE INVOLVING NATIVE CORONARY ARTERY OF NATIVE HEART WITHOUT ANGINA PECTORIS: ICD-10-CM

## 2023-09-22 DIAGNOSIS — I10 ESSENTIAL HYPERTENSION: ICD-10-CM

## 2023-09-22 DIAGNOSIS — I50.23 ACUTE ON CHRONIC SYSTOLIC HEART FAILURE (HCC): ICD-10-CM

## 2023-09-22 DIAGNOSIS — Z12.5 PROSTATE CANCER SCREENING: ICD-10-CM

## 2023-09-22 DIAGNOSIS — I48.0 PAF (PAROXYSMAL ATRIAL FIBRILLATION) (HCC): ICD-10-CM

## 2023-09-22 DIAGNOSIS — I50.21 ACUTE SYSTOLIC HF (HEART FAILURE) (HCC): ICD-10-CM

## 2023-09-22 DIAGNOSIS — E11.9 TYPE 2 DIABETES MELLITUS WITHOUT COMPLICATION, WITHOUT LONG-TERM CURRENT USE OF INSULIN (HCC): ICD-10-CM

## 2023-09-22 DIAGNOSIS — I25.10 CAD IN NATIVE ARTERY: ICD-10-CM

## 2023-09-22 DIAGNOSIS — I25.5 ISCHEMIC CARDIOMYOPATHY: ICD-10-CM

## 2023-09-22 LAB
ALBUMIN SERPL-MCNC: 4.2 G/DL (ref 3.4–5)
ALBUMIN/GLOB SERPL: 2.2 {RATIO} (ref 1.1–2.2)
ALP SERPL-CCNC: 80 U/L (ref 40–129)
ALT SERPL-CCNC: 10 U/L (ref 10–40)
ANION GAP SERPL CALCULATED.3IONS-SCNC: 12 MMOL/L (ref 3–16)
AST SERPL-CCNC: 15 U/L (ref 15–37)
BASOPHILS # BLD: 0 K/UL (ref 0–0.2)
BASOPHILS NFR BLD: 0.7 %
BILIRUB SERPL-MCNC: 1 MG/DL (ref 0–1)
BUN SERPL-MCNC: 17 MG/DL (ref 7–20)
CALCIUM SERPL-MCNC: 8.9 MG/DL (ref 8.3–10.6)
CHLORIDE SERPL-SCNC: 103 MMOL/L (ref 99–110)
CO2 SERPL-SCNC: 29 MMOL/L (ref 21–32)
CREAT SERPL-MCNC: 1 MG/DL (ref 0.8–1.3)
DEPRECATED RDW RBC AUTO: 18.4 % (ref 12.4–15.4)
EOSINOPHIL # BLD: 0.2 K/UL (ref 0–0.6)
EOSINOPHIL NFR BLD: 3.5 %
GFR SERPLBLD CREATININE-BSD FMLA CKD-EPI: >60 ML/MIN/{1.73_M2}
GLUCOSE SERPL-MCNC: 162 MG/DL (ref 70–99)
HCT VFR BLD AUTO: 34.6 % (ref 40.5–52.5)
HGB BLD-MCNC: 11.6 G/DL (ref 13.5–17.5)
LYMPHOCYTES # BLD: 1.1 K/UL (ref 1–5.1)
LYMPHOCYTES NFR BLD: 22.2 %
MCH RBC QN AUTO: 28.3 PG (ref 26–34)
MCHC RBC AUTO-ENTMCNC: 33.5 G/DL (ref 31–36)
MCV RBC AUTO: 84.5 FL (ref 80–100)
MONOCYTES # BLD: 0.5 K/UL (ref 0–1.3)
MONOCYTES NFR BLD: 9.5 %
NEUTROPHILS # BLD: 3.3 K/UL (ref 1.7–7.7)
NEUTROPHILS NFR BLD: 64.1 %
NT-PROBNP SERPL-MCNC: 1391 PG/ML (ref 0–449)
PLATELET # BLD AUTO: 199 K/UL (ref 135–450)
PMV BLD AUTO: 8.1 FL (ref 5–10.5)
POTASSIUM SERPL-SCNC: 3.5 MMOL/L (ref 3.5–5.1)
PROT SERPL-MCNC: 6.1 G/DL (ref 6.4–8.2)
PSA SERPL DL<=0.01 NG/ML-MCNC: 0.39 NG/ML (ref 0–4)
RBC # BLD AUTO: 4.09 M/UL (ref 4.2–5.9)
SODIUM SERPL-SCNC: 144 MMOL/L (ref 136–145)
TSH SERPL DL<=0.005 MIU/L-ACNC: 0.71 UIU/ML (ref 0.27–4.2)
WBC # BLD AUTO: 5.1 K/UL (ref 4–11)

## 2023-09-24 DIAGNOSIS — D64.9 NORMOCYTIC ANEMIA: Primary | ICD-10-CM

## 2023-09-25 ENCOUNTER — TELEPHONE (OUTPATIENT)
Dept: CARDIOLOGY CLINIC | Age: 83
End: 2023-09-25

## 2023-09-25 NOTE — TELEPHONE ENCOUNTER
Created telephone encounter. Spoke with Ebleobardo Cedeno relayed message per NPDE regarding labs. Pt verbalized understanding.

## 2023-09-25 NOTE — TELEPHONE ENCOUNTER
----- Message from RUPERTO De La Cruz CNP sent at 9/25/2023  8:23 AM EDT -----  H/H stable. This is ok. Continue same meds. Please call.

## 2023-09-29 ENCOUNTER — CARE COORDINATION (OUTPATIENT)
Dept: CARE COORDINATION | Age: 83
End: 2023-09-29

## 2023-09-29 NOTE — CARE COORDINATION
Ambulatory Care Coordination Note  2023    ACM contacted the patient by telephone. Verified name and  with patient as identifiers. Provided introduction to self, and explanation of the ACM role. ACM: Oscar Cullen RN    Challenges to be reviewed by the provider   Additional needs identified to be addressed with provider: No  none    PLAN:  Will send the following for review:  Learning About Heart Failure  Heart Failure Care Instructions  Low Sodium Flavoring Tips  Low Sodium Tips  How to Read a Food Label: Low Sodium         Method of communication with provider: chart routing. Introduced role of ACM/CC; patient agrees to [address] [place] [address] enrollment  Patient currently driving therefore call kept short for safety  Informed patient this ACM noted he was interested in Low sodium diet; will send information via mail  Patient agrees to f/u call next week    Offered patient enrollment in the Remote Patient Monitoring (RPM) program for in-home monitoring:  Not reviewed; patient driving and call brief . Ambulatory Care Coordination Assessment    Care Coordination Protocol  Referral from Primary Care Provider: No  Week 1 - Initial Assessment     Are you able to afford your medications?: Yes  How often do you have trouble taking your medications the way you have been told to take them?: I always take them as prescribed. Suggested Interventions and Community Resources  Fall Risk Prevention: Not Started Disease Specific Clinic: In Process (Comment: Cardiac Rehab)   Registered Dietician:  In Process   Zone Management Tools: Not Started (Comment: CHF)                  Future Appointments   Date Time Provider [address] [place] [address]   10/6/2023 10:30 AM SCHEDULE, [facility]   2023 10:00 AM SCHEDULE, [facility] ECHO [facility]   2023 12:45 PM SCHEDULE, [facility] Carla Lam [facility]   2023 12:45 PM Niecy Bazzi, APRN - PEGGY Lam

## 2023-10-04 ENCOUNTER — CARE COORDINATION (OUTPATIENT)
Dept: CARE COORDINATION | Age: 83
End: 2023-10-04

## 2023-10-06 ENCOUNTER — HOSPITAL ENCOUNTER (OUTPATIENT)
Dept: CARDIAC REHAB | Age: 83
Setting detail: THERAPIES SERIES
Discharge: HOME | End: 2023-10-06

## 2023-10-11 ENCOUNTER — HOSPITAL ENCOUNTER (OUTPATIENT)
Dept: CARDIAC REHAB | Age: 83
Setting detail: THERAPIES SERIES
Discharge: HOME | End: 2023-10-11
Payer: MEDICARE

## 2023-10-11 ENCOUNTER — CARE COORDINATION (OUTPATIENT)
Dept: CARE COORDINATION | Age: 83
End: 2023-10-11

## 2023-10-11 PROCEDURE — 93798 PHYS/QHP OP CAR RHAB W/ECG: CPT

## 2023-10-11 NOTE — CARE COORDINATION
Ambulatory Care Coordination Note  10/11/2023    Patient Current Location:  Home: [address] [place] [id number]     ACM contacted the patient by telephone. Verified name and  with patient as identifiers. Provided introduction to self, and explanation of the ACM role. Challenges to be reviewed by the provider   Additional needs identified to be addressed with provider: No  none               Method of communication with provider: none.     ACM: Genoveva Padilla RN      Patient reports he has reviewed low sodium hand outs; denies any questions  Patient declines f/u with RD  Patient will continue to monitor daily weight  Notify provider if unable to afford medications  Follow CHF Zones      Congestive Heart Failure Assessment    Do you understand a low sodium diet?: Yes  Do you understand how to read food labels?: Yes         Symptoms:  CHF associated dyspnea on exertion: Pos, CHF associated leg swelling: Neg      Symptom course: stable  Weight trend: fluctuating minimally (Comment: 157 lb)             Goals Addressed    None         Future Appointments   Date Time Provider [address]   10/11/2023  2:30 PM SCHEDULE,  [address] RM MHAZ [facility]ARD[facility]   10/13/2023  2:30 PM SCHEDULE, [facility] CARDIAC RM MHAZ CARDPU [facility]   10/16/2023  2:30 PM SCHEDULE, MHAZ CARDIAC RM M[facility]Z [facility]ARD[facility]   10/18/2023  2:30 PM SCHEDULE, MHAZ CARDIAC RM M[facility]Z [facility]ARD[facility]   10/20/2023  2:30 PM SCHEDULE, MHAZ CARDIAC RM MHAZ [facility]ARD[facility]   10/23/2023  2:30 PM SCHEDULE, MHAZ CARDIAC RM MHAZ [facility]ARD[facility]   10/25/2023  2:30 PM SCHEDULE, MHAZ CARDIAC RM MHAZ [facility]ARD[facility]   10/27/2023  2:30 PM SCHEDULE, MHAZ CARDIAC RM MHAZ [facility]   10/30/2023  2:30 PM SCHEDULE, MHAZ CARDIAC RM MHAZ [facility]ARD[facility]   2023 10:00 AM SCHEDULE, MHCX MHI FRANKI ECHO MHI FRANKI [facility]   2023  2:30 PM SCHEDULE, MHAZ CARDIAC RM MHAZ CARD[facility]   11/3/2023  2:30 PM

## 2023-10-13 ENCOUNTER — HOSPITAL ENCOUNTER (OUTPATIENT)
Dept: CARDIAC REHAB | Age: 83
Setting detail: THERAPIES SERIES
Discharge: HOME | End: 2023-10-13
Payer: MEDICARE

## 2023-10-13 PROCEDURE — 93798 PHYS/QHP OP CAR RHAB W/ECG: CPT

## 2023-10-16 ENCOUNTER — HOSPITAL ENCOUNTER (OUTPATIENT)
Dept: CARDIAC REHAB | Age: 83
Setting detail: THERAPIES SERIES
Discharge: HOME | End: 2023-10-16
Payer: MEDICARE

## 2023-10-16 PROCEDURE — 93798 PHYS/QHP OP CAR RHAB W/ECG: CPT

## 2023-10-18 ENCOUNTER — HOSPITAL ENCOUNTER (OUTPATIENT)
Dept: CARDIAC REHAB | Age: 83
Setting detail: THERAPIES SERIES
Discharge: HOME | End: 2023-10-18
Payer: MEDICARE

## 2023-10-18 PROCEDURE — 93798 PHYS/QHP OP CAR RHAB W/ECG: CPT

## 2023-10-20 ENCOUNTER — HOSPITAL ENCOUNTER (OUTPATIENT)
Dept: CARDIAC REHAB | Age: 83
Setting detail: THERAPIES SERIES
Discharge: HOME | End: 2023-10-20
Payer: MEDICARE

## 2023-10-20 PROCEDURE — 93798 PHYS/QHP OP CAR RHAB W/ECG: CPT

## 2023-10-23 ENCOUNTER — HOSPITAL ENCOUNTER (OUTPATIENT)
Dept: CARDIAC REHAB | Age: 83
Setting detail: THERAPIES SERIES
Discharge: HOME | End: 2023-10-23
Payer: MEDICARE

## 2023-10-23 PROCEDURE — 93798 PHYS/QHP OP CAR RHAB W/ECG: CPT

## 2023-10-25 ENCOUNTER — HOSPITAL ENCOUNTER (OUTPATIENT)
Dept: CARDIAC REHAB | Age: 83
Setting detail: THERAPIES SERIES
Discharge: HOME | End: 2023-10-25
Payer: MEDICARE

## 2023-10-25 PROCEDURE — 93798 PHYS/QHP OP CAR RHAB W/ECG: CPT

## 2023-10-26 ENCOUNTER — CARE COORDINATION (OUTPATIENT)
Dept: CARE COORDINATION | Age: 83
End: 2023-10-26

## 2023-10-27 ENCOUNTER — HOSPITAL ENCOUNTER (OUTPATIENT)
Dept: CARDIAC REHAB | Age: 83
Setting detail: THERAPIES SERIES
Discharge: HOME | End: 2023-10-27
Payer: MEDICARE

## 2023-10-27 PROCEDURE — 93798 PHYS/QHP OP CAR RHAB W/ECG: CPT

## 2023-10-30 ENCOUNTER — HOSPITAL ENCOUNTER (OUTPATIENT)
Dept: CARDIAC REHAB | Age: 83
Setting detail: THERAPIES SERIES
Discharge: HOME | End: 2023-10-30
Payer: MEDICARE

## 2023-10-30 PROCEDURE — 93798 PHYS/QHP OP CAR RHAB W/ECG: CPT

## 2023-11-01 ENCOUNTER — CARE COORDINATION (OUTPATIENT)
Dept: CARE COORDINATION | Age: 83
End: 2023-11-01

## 2023-11-01 ENCOUNTER — HOSPITAL ENCOUNTER (OUTPATIENT)
Dept: CARDIAC REHAB | Age: 83
Setting detail: THERAPIES SERIES
Discharge: HOME | End: 2023-11-01
Payer: MEDICARE

## 2023-11-01 PROCEDURE — 93798 PHYS/QHP OP CAR RHAB W/ECG: CPT

## 2023-11-01 NOTE — CARE COORDINATION
Ambulatory Care Coordination Note  2023    Patient Current Location:  Home: [address] [place] [id number]     ACM contacted the patient by telephone. Verified name and  with patient as identifiers. Provided introduction to self, and explanation of the ACM role. Challenges to be reviewed by the provider   Additional needs identified to be addressed with provider: No  none    PLAN:  Continue Cardiac Rehab/Exercise routine  Monitor/record Daily weight  Follow CHF Zone Tools  Will resend Low Flavoring Tips  Patient will contact CC RD; contact number provided  Patient prefers the Healthy Tip hand outs be sent via mail  Will also send the web site information on the [employer] and how to access their recipe section               Method of communication with provider: chart routing. ACM: Jorge Begum RN  Did not review [address] Patel-will review next f/u  Patient had multiple questions related to low sodium diet    Heart Failure Education outreach Date/Time: 2023 9:51 AM    ACM reviewed that a Health Healthy tips for the [employer] packet has been mailed to the them. ACM reviewed CHF zones, daily weights, fluid restriction, the importance of low sodium diet, and healthy tips packet with the patient. Instructed patient to call their Cardiologist if they have a weight gain of 3 lbs in 2 days or 5 lbs in a week. Patient reminded that there is a physician on call 24 hours a day / 7 days a week should the patient have questions or concerns. The patient verbalized understanding. Offered patient enrollment in the Remote Patient Monitoring (RPM) program for in-home monitoring:  patient declines/patient self monitors .     Congestive Heart Failure Assessment    Are you currently restricting fluids?: Other  Do you understand a low sodium diet?: Yes  Do you understand how to read food labels?: Yes  Do you salt your food before tasting it?: No     No patient-reported symptoms

## 2023-11-03 ENCOUNTER — TELEPHONE (OUTPATIENT)
Dept: CARDIOLOGY CLINIC | Age: 83
End: 2023-11-03

## 2023-11-03 ENCOUNTER — HOSPITAL ENCOUNTER (OUTPATIENT)
Dept: CARDIAC REHAB | Age: 83
Setting detail: THERAPIES SERIES
Discharge: HOME | End: 2023-11-03
Payer: MEDICARE

## 2023-11-03 ENCOUNTER — PROCEDURE VISIT (OUTPATIENT)
Dept: CARDIOLOGY CLINIC | Age: 83
End: 2023-11-03

## 2023-11-03 DIAGNOSIS — I25.5 ISCHEMIC CARDIOMYOPATHY: ICD-10-CM

## 2023-11-03 DIAGNOSIS — Z95.810 CARDIAC RESYNCHRONIZATION THERAPY DEFIBRILLATOR (CRT-D) IN PLACE: ICD-10-CM

## 2023-11-03 DIAGNOSIS — I25.10 CORONARY ARTERY DISEASE INVOLVING NATIVE CORONARY ARTERY OF NATIVE HEART WITHOUT ANGINA PECTORIS: ICD-10-CM

## 2023-11-03 PROCEDURE — 93798 PHYS/QHP OP CAR RHAB W/ECG: CPT

## 2023-11-03 NOTE — TELEPHONE ENCOUNTER
----- Message from Saúl Cruz MD sent at 11/3/2023  1:32 PM EDT -----  Please notify patient that their echo looks ok  Heart fxn and valves stable

## 2023-11-06 ENCOUNTER — HOSPITAL ENCOUNTER (OUTPATIENT)
Dept: CARDIAC REHAB | Age: 83
Setting detail: THERAPIES SERIES
Discharge: HOME | End: 2023-11-06
Payer: MEDICARE

## 2023-11-06 PROCEDURE — 93798 PHYS/QHP OP CAR RHAB W/ECG: CPT

## 2023-11-08 ENCOUNTER — HOSPITAL ENCOUNTER (OUTPATIENT)
Dept: CARDIAC REHAB | Age: 83
Setting detail: THERAPIES SERIES
Discharge: HOME | End: 2023-11-08
Payer: MEDICARE

## 2023-11-08 PROCEDURE — 93798 PHYS/QHP OP CAR RHAB W/ECG: CPT

## 2023-11-10 ENCOUNTER — HOSPITAL ENCOUNTER (OUTPATIENT)
Dept: CARDIAC REHAB | Age: 83
Setting detail: THERAPIES SERIES
Discharge: HOME | End: 2023-11-10
Payer: MEDICARE

## 2023-11-10 PROCEDURE — 93798 PHYS/QHP OP CAR RHAB W/ECG: CPT

## 2023-11-13 ENCOUNTER — HOSPITAL ENCOUNTER (OUTPATIENT)
Dept: CARDIAC REHAB | Age: 83
Setting detail: THERAPIES SERIES
Discharge: HOME | End: 2023-11-13
Payer: MEDICARE

## 2023-11-13 ENCOUNTER — NURSE ONLY (OUTPATIENT)
Dept: CARDIOLOGY CLINIC | Age: 83
End: 2023-11-13
Payer: MEDICARE

## 2023-11-13 ENCOUNTER — OFFICE VISIT (OUTPATIENT)
Dept: CARDIOLOGY CLINIC | Age: 83
End: 2023-11-13
Payer: MEDICARE

## 2023-11-13 VITALS
HEIGHT: 69 IN | WEIGHT: 160.4 LBS | SYSTOLIC BLOOD PRESSURE: 142 MMHG | OXYGEN SATURATION: 96 % | HEART RATE: 75 BPM | DIASTOLIC BLOOD PRESSURE: 78 MMHG | BODY MASS INDEX: 23.76 KG/M2

## 2023-11-13 DIAGNOSIS — I48.19 PERSISTENT ATRIAL FIBRILLATION (HCC): Primary | ICD-10-CM

## 2023-11-13 DIAGNOSIS — I25.5 ISCHEMIC CARDIOMYOPATHY: ICD-10-CM

## 2023-11-13 DIAGNOSIS — I44.30 AV BLOCK: ICD-10-CM

## 2023-11-13 DIAGNOSIS — R00.1 BRADYCARDIA: ICD-10-CM

## 2023-11-13 DIAGNOSIS — I49.9 IRREGULAR HEART RATE: ICD-10-CM

## 2023-11-13 DIAGNOSIS — Z95.810 CARDIAC RESYNCHRONIZATION THERAPY DEFIBRILLATOR (CRT-D) IN PLACE: ICD-10-CM

## 2023-11-13 DIAGNOSIS — Z95.810 CARDIAC RESYNCHRONIZATION THERAPY DEFIBRILLATOR (CRT-D) IN PLACE: Primary | ICD-10-CM

## 2023-11-13 DIAGNOSIS — I47.29 NSVT (NONSUSTAINED VENTRICULAR TACHYCARDIA) (HCC): ICD-10-CM

## 2023-11-13 PROCEDURE — 3077F SYST BP >= 140 MM HG: CPT | Performed by: NURSE PRACTITIONER

## 2023-11-13 PROCEDURE — G8427 DOCREV CUR MEDS BY ELIG CLIN: HCPCS | Performed by: NURSE PRACTITIONER

## 2023-11-13 PROCEDURE — G8420 CALC BMI NORM PARAMETERS: HCPCS | Performed by: NURSE PRACTITIONER

## 2023-11-13 PROCEDURE — 1123F ACP DISCUSS/DSCN MKR DOCD: CPT | Performed by: NURSE PRACTITIONER

## 2023-11-13 PROCEDURE — G8484 FLU IMMUNIZE NO ADMIN: HCPCS | Performed by: NURSE PRACTITIONER

## 2023-11-13 PROCEDURE — 3078F DIAST BP <80 MM HG: CPT | Performed by: NURSE PRACTITIONER

## 2023-11-13 PROCEDURE — 1036F TOBACCO NON-USER: CPT | Performed by: NURSE PRACTITIONER

## 2023-11-13 PROCEDURE — 93284 PRGRMG EVAL IMPLANTABLE DFB: CPT | Performed by: INTERNAL MEDICINE

## 2023-11-13 PROCEDURE — 93798 PHYS/QHP OP CAR RHAB W/ECG: CPT

## 2023-11-13 PROCEDURE — 99214 OFFICE O/P EST MOD 30 MIN: CPT | Performed by: NURSE PRACTITIONER

## 2023-11-13 NOTE — PATIENT INSTRUCTIONS
No changes     Continue cardiac rehab and physical exercise     Remote device transmissions every three months     Follow up in 6 months

## 2023-11-15 ENCOUNTER — OFFICE VISIT (OUTPATIENT)
Dept: CARDIOLOGY CLINIC | Age: 83
End: 2023-11-15
Payer: MEDICARE

## 2023-11-15 ENCOUNTER — HOSPITAL ENCOUNTER (OUTPATIENT)
Dept: CARDIAC REHAB | Age: 83
Setting detail: THERAPIES SERIES
Discharge: HOME | End: 2023-11-15
Payer: MEDICARE

## 2023-11-15 VITALS
DIASTOLIC BLOOD PRESSURE: 62 MMHG | WEIGHT: 160 LBS | HEIGHT: 69 IN | OXYGEN SATURATION: 97 % | SYSTOLIC BLOOD PRESSURE: 127 MMHG | BODY MASS INDEX: 23.7 KG/M2 | HEART RATE: 65 BPM

## 2023-11-15 DIAGNOSIS — Z95.810 CARDIAC RESYNCHRONIZATION THERAPY DEFIBRILLATOR (CRT-D) IN PLACE: ICD-10-CM

## 2023-11-15 DIAGNOSIS — I25.10 ASHD (ARTERIOSCLEROTIC HEART DISEASE): ICD-10-CM

## 2023-11-15 DIAGNOSIS — I25.5 ISCHEMIC CARDIOMYOPATHY: Primary | ICD-10-CM

## 2023-11-15 DIAGNOSIS — I50.22 CHRONIC SYSTOLIC HEART FAILURE (HCC): ICD-10-CM

## 2023-11-15 DIAGNOSIS — G47.33 OSA (OBSTRUCTIVE SLEEP APNEA): ICD-10-CM

## 2023-11-15 PROCEDURE — 3078F DIAST BP <80 MM HG: CPT | Performed by: NURSE PRACTITIONER

## 2023-11-15 PROCEDURE — 3074F SYST BP LT 130 MM HG: CPT | Performed by: NURSE PRACTITIONER

## 2023-11-15 PROCEDURE — 1123F ACP DISCUSS/DSCN MKR DOCD: CPT | Performed by: NURSE PRACTITIONER

## 2023-11-15 PROCEDURE — 1036F TOBACCO NON-USER: CPT | Performed by: NURSE PRACTITIONER

## 2023-11-15 PROCEDURE — 93798 PHYS/QHP OP CAR RHAB W/ECG: CPT

## 2023-11-15 PROCEDURE — G8484 FLU IMMUNIZE NO ADMIN: HCPCS | Performed by: NURSE PRACTITIONER

## 2023-11-15 PROCEDURE — 99214 OFFICE O/P EST MOD 30 MIN: CPT | Performed by: NURSE PRACTITIONER

## 2023-11-15 PROCEDURE — G8420 CALC BMI NORM PARAMETERS: HCPCS | Performed by: NURSE PRACTITIONER

## 2023-11-15 PROCEDURE — G8427 DOCREV CUR MEDS BY ELIG CLIN: HCPCS | Performed by: NURSE PRACTITIONER

## 2023-11-15 RX ORDER — SPIRONOLACTONE 25 MG/1
25 TABLET ORAL DAILY
Qty: 90 TABLET | Refills: 1 | Status: SHIPPED | OUTPATIENT
Start: 2023-11-15

## 2023-11-15 NOTE — PATIENT INSTRUCTIONS
Plan:   Stay as active as possible - continue cardiac rehab   plavix lifelong due to graft stenting   Continue Entresto and jardiance  Continue toprol  Follow up Dr. Kriss Diaz in 4 months  Follow with CHF team next summer

## 2023-11-15 NOTE — PROGRESS NOTES
Chloride   Date Value Ref Range Status   09/22/2023 103 99 - 110 mmol/L Final   08/28/2023 104 99 - 110 mmol/L Final   08/21/2023 103 99 - 110 mmol/L Final     CO2   Date Value Ref Range Status   09/22/2023 29 21 - 32 mmol/L Final   08/28/2023 27 21 - 32 mmol/L Final   08/21/2023 27 21 - 32 mmol/L Final     Phosphorus   Date Value Ref Range Status   09/10/2022 2.7 2.5 - 4.9 mg/dL Final   09/09/2022 2.9 2.5 - 4.9 mg/dL Final     BUN   Date Value Ref Range Status   09/22/2023 17 7 - 20 mg/dL Final   08/28/2023 16 7 - 20 mg/dL Final   08/21/2023 28 (H) 7 - 20 mg/dL Final     Creatinine   Date Value Ref Range Status   09/22/2023 1.0 0.8 - 1.3 mg/dL Final   08/28/2023 1.2 0.8 - 1.3 mg/dL Final   08/21/2023 1.1 0.8 - 1.3 mg/dL Final     BNP:   Lab Results   Component Value Date    PROBNP 1,391 (H) 09/22/2023    PROBNP 1,221 (H) 08/28/2023    PROBNP 975 (H) 08/21/2023     Lipids: No components found for: \"T\"                Triglycerides   Date Value Ref Range Status   08/03/2023 66 0 - 150 mg/dL Final                   HDL   Date Value Ref Range Status   08/03/2023 24 (L) 40 - 60 mg/dL Final     Comment:     An HDL cholesterol less than 40 mg/dL is low and  constitutes a coronary heart disease risk factor. An HDL cholesterol greater than 60 mg/dL is a  negative risk factor for coronary heart disease. LDL Calculated   Date Value Ref Range Status   08/03/2023 39 <100 mg/dL Final                   VLDL Cholesterol Calculated   Date Value Ref Range Status   08/03/2023 13 Not Established mg/dL Final                 No results found for: \"CHOLHDLRATIO\"    EF:   Lab Results   Component Value Date/Time    LVEF 35 07/28/2023 02:13 PM    LVEF 48 01/27/2023 01:51 PM     Testing reviewed:   Echo 7/28/2023   The left ventricular systolic function is reduced with an ejection fraction of 35%.   There is hypokinesis of the anteroseptum, anterior, apical septum walls  Grade II diastolic dysfunction with elevated

## 2023-11-16 ENCOUNTER — CARE COORDINATION (OUTPATIENT)
Dept: CARE COORDINATION | Age: 83
End: 2023-11-16

## 2023-11-17 ENCOUNTER — HOSPITAL ENCOUNTER (OUTPATIENT)
Dept: CARDIAC REHAB | Age: 83
Setting detail: THERAPIES SERIES
Discharge: HOME | End: 2023-11-17
Payer: MEDICARE

## 2023-11-17 PROCEDURE — 93798 PHYS/QHP OP CAR RHAB W/ECG: CPT

## 2023-11-20 ENCOUNTER — HOSPITAL ENCOUNTER (OUTPATIENT)
Dept: CARDIAC REHAB | Age: 83
Setting detail: THERAPIES SERIES
Discharge: HOME | End: 2023-11-20
Payer: MEDICARE

## 2023-11-20 PROCEDURE — 93798 PHYS/QHP OP CAR RHAB W/ECG: CPT

## 2023-11-21 ENCOUNTER — OFFICE VISIT (OUTPATIENT)
Dept: PULMONOLOGY | Age: 83
End: 2023-11-21
Payer: MEDICARE

## 2023-11-21 VITALS
HEART RATE: 83 BPM | DIASTOLIC BLOOD PRESSURE: 73 MMHG | RESPIRATION RATE: 16 BRPM | BODY MASS INDEX: 23.55 KG/M2 | WEIGHT: 159 LBS | TEMPERATURE: 97.4 F | OXYGEN SATURATION: 98 % | HEIGHT: 69 IN | SYSTOLIC BLOOD PRESSURE: 151 MMHG

## 2023-11-21 DIAGNOSIS — G47.33 OSA (OBSTRUCTIVE SLEEP APNEA): Primary | ICD-10-CM

## 2023-11-21 DIAGNOSIS — I48.91 ATRIAL FIBRILLATION, UNSPECIFIED TYPE (HCC): ICD-10-CM

## 2023-11-21 DIAGNOSIS — I50.9 CONGESTIVE HEART FAILURE, UNSPECIFIED HF CHRONICITY, UNSPECIFIED HEART FAILURE TYPE (HCC): ICD-10-CM

## 2023-11-21 DIAGNOSIS — E66.3 OVERWEIGHT (BMI 25.0-29.9): ICD-10-CM

## 2023-11-21 PROCEDURE — 99214 OFFICE O/P EST MOD 30 MIN: CPT | Performed by: INTERNAL MEDICINE

## 2023-11-21 PROCEDURE — 3078F DIAST BP <80 MM HG: CPT | Performed by: INTERNAL MEDICINE

## 2023-11-21 PROCEDURE — 3077F SYST BP >= 140 MM HG: CPT | Performed by: INTERNAL MEDICINE

## 2023-11-21 PROCEDURE — G8484 FLU IMMUNIZE NO ADMIN: HCPCS | Performed by: INTERNAL MEDICINE

## 2023-11-21 PROCEDURE — G8427 DOCREV CUR MEDS BY ELIG CLIN: HCPCS | Performed by: INTERNAL MEDICINE

## 2023-11-21 PROCEDURE — 1123F ACP DISCUSS/DSCN MKR DOCD: CPT | Performed by: INTERNAL MEDICINE

## 2023-11-21 PROCEDURE — G8420 CALC BMI NORM PARAMETERS: HCPCS | Performed by: INTERNAL MEDICINE

## 2023-11-21 PROCEDURE — 1036F TOBACCO NON-USER: CPT | Performed by: INTERNAL MEDICINE

## 2023-11-21 ASSESSMENT — ENCOUNTER SYMPTOMS
GASTROINTESTINAL NEGATIVE: 1
EYES NEGATIVE: 1
ALLERGIC/IMMUNOLOGIC NEGATIVE: 1
RESPIRATORY NEGATIVE: 1

## 2023-11-21 NOTE — PATIENT INSTRUCTIONS
ASSESSMENT/PLAN:   Diagnosis Orders   1. SUNDEEP (obstructive sleep apnea)        2. Overweight (BMI 25.0-29.9)        3. Congestive heart failure, unspecified HF chronicity, unspecified heart failure type ([address])        4. Atrial fibrillation, unspecified type ([address])                    Having nasal breakdown at the nose and bridge, even bleeding b/c of mask  Using full face mask    Will gift the [employer] N30i  Fitted in the office with medium      Dme is [employer]  Set up date was 12/21/22    Cpap is set at 10 cwp  Ramp only erp 3    Using 100% of time  Using 9 h/night    Leak at 4.6 lpm  Ahi is 4.6      I have access via [employer]      Will change the cpap to   Autopap with min of 7 and max of 10  Erp full at 3    Call me in 1-2 weeks about pressure change    Remember to change under options to nasal mask              Afib  Had pacemaker placed Aug 1 of 2023        CHF  Echo done 11/3/23   Left Ventricle   Normal left ventricle size. There is mild concentric left ventricular   hypertrophy. Overall left ventricular systolic function appears mildly   reduced with an ejection fraction of 40-45%. The mid to distal anterior and   septal wall are hypokinetic. The basal inferior wall is hypokinetic. Indeterminate diastolic function due to irregular heart rate. Echo done 7/28/23   Summary   The left ventricular systolic function is reduced with an ejection fraction   of 35%. There is hypokinesis of the anteroseptum, anterior, apical septum walls   Grade II diastolic dysfunction with elevated filling pressure. Mild to moderate mitral regurgitation. The left atrium is mildly dilated. Bi-atrial enlargement. Mild aortic stenosis. Mild aortic regurgitation. The right ventricle is mildly enlarged. Right ventricular systolic function is mild to moderately reduced . Moderate tricuspid regurgitation.    Systolic pulmonic artery pressure (SPAP) is estimated at 55 mmHg consistent        RTC in 3-4 months  Remember

## 2023-11-22 ENCOUNTER — HOSPITAL ENCOUNTER (OUTPATIENT)
Dept: CARDIAC REHAB | Age: 83
Setting detail: THERAPIES SERIES
Discharge: HOME | End: 2023-11-22
Payer: MEDICARE

## 2023-11-22 ENCOUNTER — CARE COORDINATION (OUTPATIENT)
Dept: CARE COORDINATION | Age: 83
End: 2023-11-22

## 2023-11-22 PROCEDURE — 93798 PHYS/QHP OP CAR RHAB W/ECG: CPT

## 2023-11-22 RX ORDER — METOPROLOL SUCCINATE 50 MG/1
50 TABLET, EXTENDED RELEASE ORAL DAILY
Qty: 90 TABLET | Refills: 1 | Status: SHIPPED | OUTPATIENT
Start: 2023-11-22

## 2023-11-22 SDOH — ECONOMIC STABILITY: INCOME INSECURITY: IN THE LAST 12 MONTHS, WAS THERE A TIME WHEN YOU WERE NOT ABLE TO PAY THE MORTGAGE OR RENT ON TIME?: NO

## 2023-11-22 SDOH — ECONOMIC STABILITY: HOUSING INSECURITY: IN THE LAST 12 MONTHS, HOW MANY PLACES HAVE YOU LIVED?: 1

## 2023-11-22 ASSESSMENT — SOCIAL DETERMINANTS OF HEALTH (SDOH)
DO YOU BELONG TO ANY CLUBS OR ORGANIZATIONS SUCH AS CHURCH GROUPS UNIONS, FRATERNAL OR ATHLETIC GROUPS, OR SCHOOL GROUPS?: NO
HOW OFTEN DO YOU ATTENT MEETINGS OF THE CLUB OR ORGANIZATION YOU BELONG TO?: NEVER
HOW OFTEN DO YOU ATTEND CHURCH OR RELIGIOUS SERVICES?: NEVER

## 2023-11-22 NOTE — PROGRESS NOTES
Hospitalist Progress Note      PCP: Cipriano Coley DO    Date of Admission: 9/8/2022    Chief Complaint:   Aphasia    Hospital Course: Reviewed H&P    Subjective: Patient seen and examined this morning. His speech appears to be clear. Reviewed MRI brain findings with patient-small acute infarct of the left parietal lobe. Stress test showed small to moderate area of anterior and basal inferior ischemia. RN reports patient continues to have intermittent bradycardia episodes. Cardiology to evaluate and decide further testing.  -Evaluated by PT OT with recommendations to home with assist as needed. Patient currently off oxygen. Discussed with RN regarding care plan      Medications:  Reviewed    Infusion Medications    sodium chloride       Scheduled Medications    apixaban  5 mg Oral BID    [START ON 9/11/2022] aspirin  81 mg Oral Daily    potassium bicarb-citric acid  40 mEq Oral Daily    amLODIPine  5 mg Oral Daily    furosemide  20 mg Oral Daily    lisinopril  40 mg Oral Daily    spironolactone  25 mg Oral Daily    sodium chloride flush  5-40 mL IntraVENous 2 times per day     PRN Meds: sodium chloride flush, sodium chloride, ondansetron **OR** ondansetron, polyethylene glycol, acetaminophen **OR** acetaminophen, perflutren lipid microspheres      Intake/Output Summary (Last 24 hours) at 9/10/2022 1359  Last data filed at 9/10/2022 0957  Gross per 24 hour   Intake 480 ml   Output --   Net 480 ml       Physical Exam Performed:  BP (!) 144/65   Pulse 66   Temp 98 °F (36.7 °C) (Oral)   Resp 18   Ht 5' 8\" (1.727 m)   Wt 186 lb 11.2 oz (84.7 kg)   SpO2 92%   BMI 28.39 kg/m²     General appearance: No apparent distress, appears stated age and cooperative. HEENT: Pupils equal, round, and reactive to light. Conjunctivae/corneas clear. Neck: Supple, with full range of motion. No jugular venous distention. Trachea midline. Respiratory:  Normal respiratory effort.  Clear to auscultation, bilaterally Spoke to patient who is concern that he has been getting elevated BP readings. Today, he had a BP of 140/90 and currently 150/91. Denies headaches, dizziness, blurred vision, chest pain or SOB.    I will forward to Dr. Chandler for review.    without Rales/Wheezes/Rhonchi. Cardiovascular: Regular rate and rhythm with normal S1/S2 without murmurs, rubs or gallops. Abdomen: Soft, non-tender, non-distended with normal bowel sounds. Musculoskeletal: No clubbing, cyanosis or edema bilaterally. Full range of motion without deformity. Skin: Skin color, texture, turgor normal.  No rashes or lesions. Neurologic:  Neurovascularly intact without any focal sensory/motor deficits. Cranial nerves: II-XII intact, grossly non-focal.  Psychiatric: Alert and oriented, thought content appropriate, normal insight  Capillary Refill: Brisk,< 3 seconds   Peripheral Pulses: +2 palpable, equal bilaterally       Labs:   Recent Labs     09/08/22  0900 09/09/22  0717 09/10/22  0635   WBC 10.3 9.2 8.8   HGB 13.9 13.4* 12.4*   HCT 40.7 40.2* 36.5*    159 161     Recent Labs     09/08/22  0900 09/09/22  0717 09/10/22  0635    138 137   K 3.4* 4.1 3.7    100 99   CO2 24 24 24   BUN 23* 17 20   CREATININE 1.1 1.0 1.0   CALCIUM 9.5 9.9 9.5   PHOS  --  2.9 2.7     Recent Labs     09/08/22  0900   AST 26   ALT 32   BILITOT 1.2*   ALKPHOS 80     Recent Labs     09/08/22  0900   INR 1.17*     Recent Labs     09/08/22  0900   TROPONINI <0.01     Radiology:  NM Cardiac Stress Test Nuclear Imaging   Final Result      MRI BRAIN WO CONTRAST   Final Result   1. Small acute infarct of the left parietal lobe, series 4, image 20.   2. Cerebral parenchymal volume loss with moderate-severe chronic   microvascular white matter ischemic disease. 3. Chronic infarcts are noted in the right occipital lobe and left cerebellar   hemisphere. CTA HEAD NECK W CONTRAST   Final Result   1. Atherosclerosis contributes to less than 50% stenosis involving the   proximal right cervical ICA by NASCET criteria. 2. Atherosclerosis at the origin of both vertebral arteries, which   contributes to mild stenosis on the right and moderate stenosis on the left.    3. No significant stenosis seen of the lgeoin-rd-Umijgu. 4. Nonspecific borderline prominent precarinal mediastinal lymph node   measuring 13 mm. CT HEAD WO CONTRAST   Final Result   No hemorrhage or mass identified      Atrophy and moderate to severe small-vessel ischemic change, increased   compared to 2013      Results discussed with [facility] [place], [place] by Lamonte Rojo. Jose Velez MD at 9:19 am on   9/8/2022         XR CHEST PORTABLE   Final Result   1. No acute abnormality. Consults:  IP CONSULT TO PHARMACY  PHARMACY TO CHANGE BASE FLUIDS  IP CONSULT TO NEUROLOGY  IP CONSULT TO CARDIOLOGY      Active Hospital Problems    Diagnosis Date Noted    GERD (gastroesophageal reflux disease) [K21.9] 12/20/2012     Priority: High    Hypertension [I10]      Priority: High    CVA (cerebral vascular accident) ([facility] [I63.9] 09/09/2022     Priority: Medium    Bradycardia [R00.1] 09/09/2022     Priority: Medium    Stroke-like symptoms [R29.90] 09/09/2022     Priority: Medium    Hypokalemia [E87.6] 09/08/2022     Priority: Medium    CHF (congestive heart failure) ([facility] [I50.9]     Hyperlipidemia [E78.5]     CAD (coronary artery disease) [I25.10] 09/01/2001       Assessment/Plan:  CVA - acute thromboembolic likely. MRI confirmed small acute L Parietal lobe infarct. Echo ordered 8 Sept -showed LVEF 50 to 55%; basal inferior hypokinesis; grade 2 diastolic dysfunction. CTA H/N in ED demonstrated no flow-limiting stenosis. Neurology following and assisting with management. Continue ASA/Statin for 2nd prevention and risk reduction. Afib - new dx w/ bradycardia, possibly symptomatic on admission w/ etiology clinically unable to determine etiology. Normally rate controlled on Dig/BBlocker - continued/held respectively. NOT Anticoagulated at baseline. Monitored on tele.  Cardiology consulted and started on Eliquis 5 mg twice daily on 9/10/2022.  -Obtain NM stress test on 9/9/2022 which showed small to moderate area of anterior and basal inferior ischemia. Intermediate risk study. Cardiology to determine neck steps in the management. HTN/CAD - w/ known CAD but no evidence of active signs/sxs of ischemia/failure. Currently controlled on home meds w/ vitals reviewed and documented as above. HyperLipidemia - controlled on home Statin. Continue, w/ f/u and med adjustment w/ PCP     CHF - chronic diastolic failure w/ unknown EF - ordered and pending. Likely due to hypertensive heart disease. Patient is euvolemic w/ no evidence of acute decompensation. Continue current medical mgt on home Lasix/Aldactone w/ ACEi/BBlocker. GERD - w/out active signs/sxs of dysphagia/odynophagia. No evidence of active PUD or hx of GI bleed. Controlled on home PPI - continue. DVT Prophylaxis: Lovenox  Diet: ADULT DIET; Regular  Code Status: Full Code    PT/OT Eval Status: Evaluated with recommendations to home with assist as needed    Dispo -pending cardiology recommendations. The note was completed using Dragon -speech recognition software & EMR  . Every effort was made to ensure accuracy; however, inadvertent computerized transcription errors may be present.     Soledad Khan MD

## 2023-11-22 NOTE — CARE COORDINATION
SCHEDULE, [address] [place] HOD   12/4/2023  2:30 PM SCHEDULE, MHAZ CARDIAC RM MHAZ CARDPU [place] HOD   12/6/2023  2:30 PM SCHEDULE, MHAZ CARDIAC RM [facility]Z CARDPU [place] HOD   12/8/2023  2:30 PM SCHEDULE, MHAZ CARDIAC RM MHAZ CARDPU [place] HOD   12/11/2023  2:30 PM SCHEDULE, MHAZ CARDIAC RM MHAZ CARDPU [place] HOD   12/13/2023  2:30 PM SCHEDULE, MHAZ CARDIAC RM MHAZ CARDPU [place] HOD   12/15/2023  2:30 PM SCHEDULE, MHAZ CARDIAC RM MHAZ CARDPU [place] [facility]   12/18/2023  2:30 PM SCHEDULE, MHAZ CARDIAC RM MHAZ CARDPU [place] HOD   12/20/2023  2:30 PM SCHEDULE, MHAZ CARDIAC RM MHAZ CARDPU [place] [facility]   12/22/2023  2:30 PM SCHEDULE, MHAZ CARDIAC RM MHAZ CARDPU [place] HOD   12/27/2023  2:30 PM SCHEDULE, MHAZ CARDIAC RM MHAZ CARDPU [place] HOD   12/29/2023  2:30 PM SCHEDULE, MHAZ CARDIAC RM MHAZ CARDPU [facility]   1/1/2024  2:30 PM SCHEDULE, MHAZ CARDIAC RM MHAZ CARDPU [facility]   1/3/2024  2:30 PM SCHEDULE, MHAZ CARDIAC RM MHAZ CARDPU [facility]   1/4/2024 10:00 AM DO FRANKI [place] - DYD   1/5/2024  2:30 PM SCHEDULE, MHAZ CARDIAC RM MHAZ CARDPU [facility]   1/8/2024  2:30 PM SCHEDULE, MHAZ CARDIAC RM MHAZ CARDPU [facility]   1/10/2024  2:30 PM SCHEDULE, MHAZ CARDIAC RM MHAZ CARDPU [facility]   1/12/2024  2:30 PM SCHEDULE, MHAZ CARDIAC RM MHAZ CARDPU [facility]   1/15/2024  2:30 PM SCHEDULE, MHAZ CARDIAC RM MHAZ CARDPU [facility]   1/17/2024  2:30 PM SCHEDULE, MHAZ CARDIAC RM MHAZ CARDPU [facility]   1/19/2024  2:30 PM SCHEDULE, MHAZ CARDIAC RM MHAZ CARDPU [facility]   1/22/2024  2:30 PM SCHEDULE, MHAZ CARDIAC RM DAPHNEROMINA [place] [facility]   1/24/2024  2:30 PM SCHEDULE, MHAZ CARDIAC RM DAPHNEROMINA [place] [facility]   1/26/2024  2:30 PM SCHEDULE, MHAZ CARDIAC RM PENNY [place] [facility]   1/29/2024  2:30 PM SCHEDULE, MHAZ CARDIAC RM DAPHNEROMINA [place] [facility]   1/31/2024  2:30 PM SCHEDULE, MHAZ CARDIAC RM DAPHNEROMINA [place] HOD   2/2/2024  2:30 PM

## 2023-11-24 ENCOUNTER — APPOINTMENT (OUTPATIENT)
Dept: CARDIAC REHAB | Age: 83
End: 2023-11-24
Payer: MEDICARE

## 2023-11-27 ENCOUNTER — HOSPITAL ENCOUNTER (OUTPATIENT)
Dept: CARDIAC REHAB | Age: 83
Setting detail: THERAPIES SERIES
Discharge: HOME | End: 2023-11-27
Payer: MEDICARE

## 2023-11-27 PROCEDURE — 93798 PHYS/QHP OP CAR RHAB W/ECG: CPT

## 2023-11-28 ENCOUNTER — TELEPHONE (OUTPATIENT)
Dept: PULMONOLOGY | Age: 83
End: 2023-11-28

## 2023-11-28 NOTE — TELEPHONE ENCOUNTER
Patient call state that he can see to have a good seal on this new mask he's using (cushion), he said also try with the mask straps,and still doesn't work. He want to know what else now to do. Please advise.

## 2023-11-29 ENCOUNTER — HOSPITAL ENCOUNTER (OUTPATIENT)
Dept: CARDIAC REHAB | Age: 83
Setting detail: THERAPIES SERIES
Discharge: HOME | End: 2023-11-29
Payer: MEDICARE

## 2023-11-29 PROCEDURE — 93798 PHYS/QHP OP CAR RHAB W/ECG: CPT

## 2023-11-29 NOTE — TELEPHONE ENCOUNTER
Spoke with the representative from [facility]. Patient declined to schedule a mask fitting and asked that they do not call him again. He advised that he would be contacting our office to let us know that he would not be doing the mask fitting.

## 2023-11-29 NOTE — TELEPHONE ENCOUNTER
Please have [facility] contacted and have them go out and change the patient's mask and refit him, I am not quite sure why they gave him a full facemask    I did give him a mask in the office  I have also stated that the patient's DME needs to refit for the patient

## 2023-12-01 ENCOUNTER — HOSPITAL ENCOUNTER (OUTPATIENT)
Dept: CARDIAC REHAB | Age: 83
Setting detail: THERAPIES SERIES
Discharge: HOME | End: 2023-12-01
Payer: MEDICARE

## 2023-12-01 PROCEDURE — 93798 PHYS/QHP OP CAR RHAB W/ECG: CPT

## 2023-12-04 ENCOUNTER — TELEPHONE (OUTPATIENT)
Dept: CARDIOLOGY CLINIC | Age: 83
End: 2023-12-04

## 2023-12-04 ENCOUNTER — HOSPITAL ENCOUNTER (OUTPATIENT)
Dept: CARDIAC REHAB | Age: 83
Setting detail: THERAPIES SERIES
Discharge: HOME | End: 2023-12-04
Payer: MEDICARE

## 2023-12-04 PROCEDURE — 93798 PHYS/QHP OP CAR RHAB W/ECG: CPT

## 2023-12-06 ENCOUNTER — HOSPITAL ENCOUNTER (OUTPATIENT)
Dept: CARDIAC REHAB | Age: 83
Setting detail: THERAPIES SERIES
Discharge: HOME | End: 2023-12-06
Payer: MEDICARE

## 2023-12-06 PROCEDURE — 93798 PHYS/QHP OP CAR RHAB W/ECG: CPT

## 2023-12-08 ENCOUNTER — HOSPITAL ENCOUNTER (OUTPATIENT)
Dept: CARDIAC REHAB | Age: 83
Setting detail: THERAPIES SERIES
Discharge: HOME | End: 2023-12-08
Payer: MEDICARE

## 2023-12-08 PROCEDURE — 93798 PHYS/QHP OP CAR RHAB W/ECG: CPT

## 2023-12-11 ENCOUNTER — HOSPITAL ENCOUNTER (OUTPATIENT)
Dept: CARDIAC REHAB | Age: 83
Setting detail: THERAPIES SERIES
Discharge: HOME | End: 2023-12-11
Payer: MEDICARE

## 2023-12-11 PROCEDURE — 93798 PHYS/QHP OP CAR RHAB W/ECG: CPT

## 2023-12-13 ENCOUNTER — CARE COORDINATION (OUTPATIENT)
Dept: CARE COORDINATION | Age: 83
End: 2023-12-13

## 2023-12-13 ENCOUNTER — HOSPITAL ENCOUNTER (OUTPATIENT)
Dept: CARDIAC REHAB | Age: 83
Setting detail: THERAPIES SERIES
Discharge: HOME | End: 2023-12-13
Payer: MEDICARE

## 2023-12-13 PROCEDURE — 93798 PHYS/QHP OP CAR RHAB W/ECG: CPT

## 2023-12-15 ENCOUNTER — HOSPITAL ENCOUNTER (OUTPATIENT)
Dept: CARDIAC REHAB | Age: 83
Setting detail: THERAPIES SERIES
Discharge: HOME | End: 2023-12-15
Payer: MEDICARE

## 2023-12-15 PROCEDURE — 93798 PHYS/QHP OP CAR RHAB W/ECG: CPT

## 2023-12-27 ENCOUNTER — HOSPITAL ENCOUNTER (OUTPATIENT)
Dept: CARDIAC REHAB | Age: 83
Setting detail: THERAPIES SERIES
Discharge: HOME | End: 2023-12-27
Payer: MEDICARE

## 2023-12-27 PROCEDURE — 93798 PHYS/QHP OP CAR RHAB W/ECG: CPT

## 2023-12-29 ENCOUNTER — HOSPITAL ENCOUNTER (OUTPATIENT)
Dept: CARDIAC REHAB | Age: 83
Setting detail: THERAPIES SERIES
Discharge: HOME | End: 2023-12-29
Payer: MEDICARE

## 2023-12-29 PROCEDURE — 93798 PHYS/QHP OP CAR RHAB W/ECG: CPT

## 2024-01-02 ENCOUNTER — CARE COORDINATION (OUTPATIENT)
Dept: CASE MANAGEMENT | Age: 84
End: 2024-01-02

## 2024-01-02 NOTE — CARE COORDINATION
Ambulatory Care Coordination Note  1/2/2024      ACM: Violet Guy LPN    Spoke with na    [facility] for [facility] follow up call. Left HIPPA compliant message and contact information for call back.       Lab Results       None            Care Coordination Interventions    Referral from Primary Care Provider: No  Suggested Interventions and Community Resources  Fall Risk Prevention: Not Started  Disease Specific Clinic: In Process (Comment: Cardiac Rehab)  Registered Dietician: In Process  Zone Management Tools: Not Started (Comment: CHF)          Goals Addressed    None         Future Appointments   Date Time Provider Department Center   1/3/2024  2:30 PM SCHEDULE, MHAZ CARDIAC RM [facility] CARD[facility] [place] HOD   1/4/2024 10:00 AM Foreign Gonzalez DO [place] [facility] [place] - [facility]   1/5/2024  2:30 PM SCHEDULE, MHAZ CARDIAC RM [facility] CARD[facility] [place] HOD   1/8/2024  2:30 PM SCHEDULE, MHAZ CARDIAC RM [facility] [place] HOD   1/10/2024  2:30 PM SCHEDULE, MHAZ CARDIAC RM [facility] CARD[facility] [place] HOD   1/12/2024  2:30 PM SCHEDULE, MHAZ CARDIAC RM [facility] CARD[facility] [place] HOD   1/15/2024  2:30 PM SCHEDULE, MHAZ CARDIAC RM [facility] CARDPU [place] HOD   1/17/2024  2:30 PM SCHEDULE, MHAZ CARDIAC RM [facility] CARD[facility] [place] HOD   1/19/2024  2:30 PM SCHEDULE, MHAZ CARDIAC RM [facility] CARD[facility] [place] HOD   1/22/2024  2:30 PM SCHEDULE, MHAZ CARDIAC RM [facility] CARD[facility] [place] HOD   1/24/2024  2:30 PM SCHEDULE, MHAZ CARDIAC RM [facility] CARDPU [place] HOD   1/26/2024  2:30 PM SCHEDULE, MHAZ CARDIAC RM [facility] CARDPU [place] HOD   1/29/2024  2:30 PM SCHEDULE, MHAZ CARDIAC RM [facility] CARD[facility] [place] HOD   1/31/2024  2:30 PM SCHEDULE, MHAZ CARDIAC RM [facility] CARD[facility] [place] HOD   2/2/2024  2:30 PM SCHEDULE, MHAZ CARDIAC RM [facility] CARD[facility] [place] HOD   3/1/2024  9:30 AM Aaron Ayala MD AND [facility]   3/22/2024  8:15 AM Geronimo Wall MD RUST FRANKI [facility]   5/14/2024 11:00 AM SCHEDULE, BROOKE DEVICE CHECK Brooke Car [facility]   5/14/2024 11:00 AM Niecy Frank,

## 2024-01-04 ENCOUNTER — CARE COORDINATION (OUTPATIENT)
Dept: CARE COORDINATION | Age: 84
End: 2024-01-04

## 2024-01-04 ENCOUNTER — OFFICE VISIT (OUTPATIENT)
Dept: FAMILY MEDICINE CLINIC | Age: 84
End: 2024-01-04
Payer: MEDICARE

## 2024-01-04 VITALS
RESPIRATION RATE: 16 BRPM | TEMPERATURE: 96.8 F | HEART RATE: 77 BPM | SYSTOLIC BLOOD PRESSURE: 117 MMHG | DIASTOLIC BLOOD PRESSURE: 61 MMHG | WEIGHT: 156.8 LBS | HEIGHT: 69 IN | BODY MASS INDEX: 23.22 KG/M2

## 2024-01-04 DIAGNOSIS — E11.9 TYPE 2 DIABETES MELLITUS WITHOUT COMPLICATION, WITHOUT LONG-TERM CURRENT USE OF INSULIN (HCC): ICD-10-CM

## 2024-01-04 DIAGNOSIS — Z00.00 MEDICARE ANNUAL WELLNESS VISIT, SUBSEQUENT: Primary | ICD-10-CM

## 2024-01-04 LAB — HBA1C MFR BLD: 6.6 %

## 2024-01-04 PROCEDURE — 3078F DIAST BP <80 MM HG: CPT | Performed by: FAMILY MEDICINE

## 2024-01-04 PROCEDURE — 3044F HG A1C LEVEL LT 7.0%: CPT | Performed by: FAMILY MEDICINE

## 2024-01-04 PROCEDURE — 1123F ACP DISCUSS/DSCN MKR DOCD: CPT | Performed by: FAMILY MEDICINE

## 2024-01-04 PROCEDURE — G0439 PPPS, SUBSEQ VISIT: HCPCS | Performed by: FAMILY MEDICINE

## 2024-01-04 PROCEDURE — G8427 DOCREV CUR MEDS BY ELIG CLIN: HCPCS | Performed by: FAMILY MEDICINE

## 2024-01-04 PROCEDURE — G8484 FLU IMMUNIZE NO ADMIN: HCPCS | Performed by: FAMILY MEDICINE

## 2024-01-04 PROCEDURE — 1036F TOBACCO NON-USER: CPT | Performed by: FAMILY MEDICINE

## 2024-01-04 PROCEDURE — 3074F SYST BP LT 130 MM HG: CPT | Performed by: FAMILY MEDICINE

## 2024-01-04 PROCEDURE — 99213 OFFICE O/P EST LOW 20 MIN: CPT | Performed by: FAMILY MEDICINE

## 2024-01-04 PROCEDURE — 83036 HEMOGLOBIN GLYCOSYLATED A1C: CPT | Performed by: FAMILY MEDICINE

## 2024-01-04 PROCEDURE — G8420 CALC BMI NORM PARAMETERS: HCPCS | Performed by: FAMILY MEDICINE

## 2024-01-04 RX ORDER — LACTOSE-REDUCED FOOD
1 LIQUID (ML) ORAL DAILY
Qty: 30 EACH | Refills: 5 | Status: SHIPPED | OUTPATIENT
Start: 2024-01-04

## 2024-01-04 ASSESSMENT — PATIENT HEALTH QUESTIONNAIRE - PHQ9
SUM OF ALL RESPONSES TO PHQ9 QUESTIONS 1 & 2: 0
SUM OF ALL RESPONSES TO PHQ QUESTIONS 1-9: 0
SUM OF ALL RESPONSES TO PHQ QUESTIONS 1-9: 0
1. LITTLE INTEREST OR PLEASURE IN DOING THINGS: 0
2. FEELING DOWN, DEPRESSED OR HOPELESS: 0
SUM OF ALL RESPONSES TO PHQ QUESTIONS 1-9: 0
SUM OF ALL RESPONSES TO PHQ QUESTIONS 1-9: 0

## 2024-01-04 ASSESSMENT — LIFESTYLE VARIABLES
HOW MANY STANDARD DRINKS CONTAINING ALCOHOL DO YOU HAVE ON A TYPICAL DAY: PATIENT DOES NOT DRINK
HOW OFTEN DO YOU HAVE A DRINK CONTAINING ALCOHOL: NEVER

## 2024-01-04 NOTE — PROGRESS NOTES
Yes Geronimo Wall MD   pantoprazole (PROTONIX) 40 MG tablet Take 1 tablet by mouth 2 times daily (before meals) Yes Geronimo Wall MD   apixaban (ELIQUIS) 5 MG TABS tablet Take 1 tablet by mouth 2 times daily Yes Niecy Frank APRN - CNP   atorvastatin (LIPITOR) 40 MG tablet TAKE 1 TABLET EVERY NIGHT Yes Geronimo Wall MD   ACCU-CHEK MULTICLIX LANCETS MISC Test blood sugar qd  Patient taking differently: Test blood sugar qd    AS NEEDED Yes Foreign Gonzalez DO   glucose blood VI test strips (ACCU-CHEK ION) strip Test blood sugar qd. Yes Foreign Gonzalez DO       CareTeam (Including outside providers/suppliers regularly involved in providing care):   Patient Care Team:  Foreign Gonzalez DO as PCP - General (Family Medicine)  Foreign Gonzalez DO as PCP - Empaneled Provider  Nito Davis MD as Consulting Physician (Cardiology)  Marily Harper, RN as Ambulatory Care Manager     Reviewed and updated this visit:  Tobacco  Allergies  Meds  Problems  Med Hx  Surg Hx  Soc Hx  Fam Hx

## 2024-01-04 NOTE — CARE COORDINATION
Attempted to contact patient for CC f/u call; left HIPPA compliant message and ACM contact information.

## 2024-01-09 ENCOUNTER — TELEPHONE (OUTPATIENT)
Dept: PULMONOLOGY | Age: 84
End: 2024-01-09

## 2024-01-09 NOTE — TELEPHONE ENCOUNTER
Pt called and said he thinks his pressures are still a little too low.  He said it is currently set at 7-13.  He would like it raised to 9-13.    Please review and advise.

## 2024-01-21 PROCEDURE — 93297 REM INTERROG DEV EVAL ICPMS: CPT | Performed by: NURSE PRACTITIONER

## 2024-01-23 ENCOUNTER — CARE COORDINATION (OUTPATIENT)
Dept: CARE COORDINATION | Age: 84
End: 2024-01-23

## 2024-01-23 NOTE — CARE COORDINATION
LPN CC unable to reach patient for care coordination outreach. Unable to leave .  Tammy Rivas, ORI CC  Care Transitions  [phone]

## 2024-01-29 RX ORDER — ATORVASTATIN CALCIUM 40 MG/1
TABLET, FILM COATED ORAL
Qty: 90 TABLET | Refills: 3 | Status: SHIPPED | OUTPATIENT
Start: 2024-01-29

## 2024-02-08 ENCOUNTER — CARE COORDINATION (OUTPATIENT)
Dept: CARE COORDINATION | Age: 84
End: 2024-02-08

## 2024-02-08 NOTE — CARE COORDINATION
Multiple attempts to contact patient unsuccessful; left ACM contact information to return call  Will send CC unable to reach letter via My Chart  No further outreach scheduled with this ACM; episode of care resolved

## 2024-02-21 PROCEDURE — 93297 REM INTERROG DEV EVAL ICPMS: CPT | Performed by: NURSE PRACTITIONER

## 2024-03-14 DIAGNOSIS — E11.9 TYPE 2 DIABETES MELLITUS WITHOUT COMPLICATION, WITHOUT LONG-TERM CURRENT USE OF INSULIN (HCC): ICD-10-CM

## 2024-03-14 LAB
ALBUMIN SERPL-MCNC: 4.2 G/DL (ref 3.4–5)
ALBUMIN/GLOB SERPL: 2.3 {RATIO} (ref 1.1–2.2)
ALP SERPL-CCNC: 82 U/L (ref 40–129)
ALT SERPL-CCNC: 11 U/L (ref 10–40)
ANION GAP SERPL CALCULATED.3IONS-SCNC: 13 MMOL/L (ref 3–16)
AST SERPL-CCNC: 16 U/L (ref 15–37)
BILIRUB SERPL-MCNC: 0.9 MG/DL (ref 0–1)
BUN SERPL-MCNC: 28 MG/DL (ref 7–20)
CALCIUM SERPL-MCNC: 9.1 MG/DL (ref 8.3–10.6)
CHLORIDE SERPL-SCNC: 105 MMOL/L (ref 99–110)
CHOLEST SERPL-MCNC: 89 MG/DL (ref 0–199)
CO2 SERPL-SCNC: 26 MMOL/L (ref 21–32)
CREAT SERPL-MCNC: 1.1 MG/DL (ref 0.8–1.3)
GFR SERPLBLD CREATININE-BSD FMLA CKD-EPI: >60 ML/MIN/{1.73_M2}
GLUCOSE SERPL-MCNC: 127 MG/DL (ref 70–99)
HDLC SERPL-MCNC: 36 MG/DL (ref 40–60)
LDLC SERPL CALC-MCNC: 43 MG/DL
POTASSIUM SERPL-SCNC: 4 MMOL/L (ref 3.5–5.1)
PROT SERPL-MCNC: 6 G/DL (ref 6.4–8.2)
SODIUM SERPL-SCNC: 144 MMOL/L (ref 136–145)
TRIGL SERPL-MCNC: 48 MG/DL (ref 0–150)
TSH SERPL DL<=0.005 MIU/L-ACNC: 1.22 UIU/ML (ref 0.27–4.2)
VLDLC SERPL CALC-MCNC: 10 MG/DL

## 2024-03-21 NOTE — PROGRESS NOTES
mild plaque. Last visit started clonidine 0.1 mg BID. Patient was seen by the EP team 4/11/2023- discussed PPM-will hold off at this point for patient to follow up with podiatry but will plan to CSP pursue in the future.    Admitted 7/28/23 with shortness of breath, orthopnea, treated for heart failure , LVEF down to 35%, s/p LHC with PCI to Distal LAD on 8/2/23. S/p BiV ICD 8/2/23. Today he states he has SOB occasionally after standing up, last 3-4 seconds and resolves. SOB is overall getting better. States he has started walking for exercise and is now up to 3/4 of a mile. He has filled out patient assistance eliquis due to cost. He monitors his blood pressure at 105-120/60-70, heart rate in the 70-80's. He is tolerating his medications and is taking them as prescribed. He had some bleeding due to a scratch on his arm prior to stopping ASA therapy. Patient currently denies any weight gain, edema, palpitations, chest pain, dizziness, and syncope.   Today he states he feels well since his last visit. He states he is walking a mile daily as well as riding his stationary bike. He has some mild SOB with lifting something heavy due to holding his breath. He is tolerating his medications and is taking them as prescribed. Patient currently denies any weight gain, edema, palpitations, chest pain, dizziness, and syncope.         Past Medical History:   has a past medical history of Aortic aneurysm, abdominal (HCC), Appendicitis, Arthritis, Bruises easily, CAD (coronary artery disease), CHF (congestive heart failure) ([facility]), Chronic back pain, Chronic pain of both shoulders, Complication of anesthesia, Gout, Heart disease, Hernia, HIGH CHOLESTEROL, Hyperlipidemia, Hypertension, Measles, SUNDEEP (obstructive sleep apnea), Pancreatitis, Persistent cough, Ringing in ears, Sinus disorder, and Sleep deprivation.    Surgical History:   has a past surgical history that includes Coronary angioplasty with stent (2001 and 2002); Cardiac

## 2024-03-22 ENCOUNTER — OFFICE VISIT (OUTPATIENT)
Dept: CARDIOLOGY CLINIC | Age: 84
End: 2024-03-22
Payer: MEDICARE

## 2024-03-22 VITALS
WEIGHT: 158 LBS | BODY MASS INDEX: 23.4 KG/M2 | HEIGHT: 69 IN | SYSTOLIC BLOOD PRESSURE: 162 MMHG | OXYGEN SATURATION: 97 % | HEART RATE: 84 BPM | DIASTOLIC BLOOD PRESSURE: 70 MMHG

## 2024-03-22 DIAGNOSIS — E78.5 HYPERLIPIDEMIA, UNSPECIFIED HYPERLIPIDEMIA TYPE: ICD-10-CM

## 2024-03-22 DIAGNOSIS — I25.10 CORONARY ARTERY DISEASE INVOLVING NATIVE CORONARY ARTERY OF NATIVE HEART WITHOUT ANGINA PECTORIS: Primary | ICD-10-CM

## 2024-03-22 DIAGNOSIS — I50.9 CONGESTIVE HEART FAILURE, UNSPECIFIED HF CHRONICITY, UNSPECIFIED HEART FAILURE TYPE (HCC): ICD-10-CM

## 2024-03-22 PROCEDURE — 3077F SYST BP >= 140 MM HG: CPT | Performed by: INTERNAL MEDICINE

## 2024-03-22 PROCEDURE — G8484 FLU IMMUNIZE NO ADMIN: HCPCS | Performed by: INTERNAL MEDICINE

## 2024-03-22 PROCEDURE — G8420 CALC BMI NORM PARAMETERS: HCPCS | Performed by: INTERNAL MEDICINE

## 2024-03-22 PROCEDURE — 99214 OFFICE O/P EST MOD 30 MIN: CPT | Performed by: INTERNAL MEDICINE

## 2024-03-22 PROCEDURE — 3078F DIAST BP <80 MM HG: CPT | Performed by: INTERNAL MEDICINE

## 2024-03-22 PROCEDURE — 1036F TOBACCO NON-USER: CPT | Performed by: INTERNAL MEDICINE

## 2024-03-22 PROCEDURE — 1123F ACP DISCUSS/DSCN MKR DOCD: CPT | Performed by: INTERNAL MEDICINE

## 2024-03-22 PROCEDURE — G8427 DOCREV CUR MEDS BY ELIG CLIN: HCPCS | Performed by: INTERNAL MEDICINE

## 2024-03-22 RX ORDER — FUROSEMIDE 20 MG/1
20 TABLET ORAL 2 TIMES DAILY
COMMUNITY

## 2024-03-22 NOTE — PATIENT INSTRUCTIONS
Plan:  Cardiac medications reviewed including indications and pertinent side effects. Medication list updated at this visit.   Check blood pressure and heart rate at home a few times per week- keep a log with dates and times and bring to office visit   Regular exercise and following a healthy diet encouraged   Follow up with Anupama in 6 months and me 1 year

## 2024-03-23 PROCEDURE — 93297 REM INTERROG DEV EVAL ICPMS: CPT | Performed by: NURSE PRACTITIONER

## 2024-04-03 ENCOUNTER — OFFICE VISIT (OUTPATIENT)
Dept: PULMONOLOGY | Age: 84
End: 2024-04-03
Payer: MEDICARE

## 2024-04-03 VITALS
HEIGHT: 69 IN | BODY MASS INDEX: 23.85 KG/M2 | TEMPERATURE: 97 F | DIASTOLIC BLOOD PRESSURE: 88 MMHG | RESPIRATION RATE: 16 BRPM | OXYGEN SATURATION: 97 % | HEART RATE: 83 BPM | SYSTOLIC BLOOD PRESSURE: 168 MMHG | WEIGHT: 161 LBS

## 2024-04-03 DIAGNOSIS — G47.33 OSA (OBSTRUCTIVE SLEEP APNEA): Primary | ICD-10-CM

## 2024-04-03 DIAGNOSIS — I48.91 ATRIAL FIBRILLATION, UNSPECIFIED TYPE (HCC): ICD-10-CM

## 2024-04-03 DIAGNOSIS — E66.3 OVERWEIGHT (BMI 25.0-29.9): ICD-10-CM

## 2024-04-03 DIAGNOSIS — I50.9 CONGESTIVE HEART FAILURE, UNSPECIFIED HF CHRONICITY, UNSPECIFIED HEART FAILURE TYPE (HCC): ICD-10-CM

## 2024-04-03 PROCEDURE — 3079F DIAST BP 80-89 MM HG: CPT | Performed by: INTERNAL MEDICINE

## 2024-04-03 PROCEDURE — G8420 CALC BMI NORM PARAMETERS: HCPCS | Performed by: INTERNAL MEDICINE

## 2024-04-03 PROCEDURE — 3077F SYST BP >= 140 MM HG: CPT | Performed by: INTERNAL MEDICINE

## 2024-04-03 PROCEDURE — 1123F ACP DISCUSS/DSCN MKR DOCD: CPT | Performed by: INTERNAL MEDICINE

## 2024-04-03 PROCEDURE — G8427 DOCREV CUR MEDS BY ELIG CLIN: HCPCS | Performed by: INTERNAL MEDICINE

## 2024-04-03 PROCEDURE — 1036F TOBACCO NON-USER: CPT | Performed by: INTERNAL MEDICINE

## 2024-04-03 PROCEDURE — 99214 OFFICE O/P EST MOD 30 MIN: CPT | Performed by: INTERNAL MEDICINE

## 2024-04-03 ASSESSMENT — ENCOUNTER SYMPTOMS
RESPIRATORY NEGATIVE: 1
ALLERGIC/IMMUNOLOGIC NEGATIVE: 1
EYES NEGATIVE: 1
GASTROINTESTINAL NEGATIVE: 1

## 2024-04-03 NOTE — PROGRESS NOTES
MA Communication:  The following orders are received by verbal communication from Aaron Ayala MD    Orders include:  Follow up in 9 months

## 2024-04-03 NOTE — PATIENT INSTRUCTIONS
Please keep all of your future appointments scheduled by [facility] [place] [facility] office. Out of respect for other patients and providers, you may be asked to reschedule your appointment if you arrive later than your scheduled appointment time. Appointments cancelled less than 24hrs in advance will be considered a no show. Patients with three missed appointments within 1 year or four missed appointments within 2 years can be dismissed from the practice.     Please be aware that our physicians are required to work in the Intensive Care Unit at [facility].  Your appointment may need to be rescheduled if they are designated to work during your appointment time.      You may receive a survey regarding the care you received during your visit.  Your input is valuable to us.  We encourage you to complete and return your survey.  We hope you will choose us in the future for your healthcare needs.     Pt instructed of all future appointment dates & times, including radiology, labs, procedures & referrals. If procedures were scheduled preparation instructions provided. Instructions on future appointments with [facility] were given.      In the next few weeks, you will be receiving a survey from [facility] regarding your visit today.  We would greatly appreciate it if you would take just a few minutes to fill that out.  It is very important to us that our patients receive top notch care and our surveys help keep us accountable. However, if your experience was not a good one, we want to hear about that as well. This is a key way we can keep track of problems and strive to correct any for future visits.    Again, we appreciate your time and thank you for choosing [facility]!    ALLY Perry

## 2024-04-03 NOTE — PROGRESS NOTES
Bill Crow (: 1940 ) is a 83 y.o. male here for an evaluation of   Chief Complaint   Patient presents with    Sleep Apnea    Follow-up     3 month follow up         ASSESSMENT/PLAN:   Diagnosis Orders   1. SUNDEEP (obstructive sleep apnea)        2. Overweight (BMI 25.0-29.9)        3. Congestive heart failure, unspecified HF chronicity, unspecified heart failure type (HCC)        4. Atrial fibrillation, unspecified type (HCC)                    Having nasal breakdown at the nose and bridge, even bleeding b/c of mask  Using full face mask          Dme is [facility]  Set up date was 22    SN: [id number]    Cpap is set at 9 cwp- was changed  Ramp only erp 3    Using 100% of time  Using 6.5 h/night    Leak at 20.9 lpm  Ahi is 2.0      I have access via [employer]    Has learned to change the machine if needed  Overall doing well  And feeling the benefit          Afib  Had pacemaker placed Aug 1 of 2023        CHF  Echo done 11/3/23   Left Ventricle   Normal left ventricle size. There is mild concentric left ventricular   hypertrophy. Overall left ventricular systolic function appears mildly   reduced with an ejection fraction of 40-45%. The mid to distal anterior and   septal wall are hypokinetic. The basal inferior wall is hypokinetic.   Indeterminate diastolic function due to irregular heart rate.      Echo done 23   Summary   The left ventricular systolic function is reduced with an ejection fraction   of 35%.   There is hypokinesis of the anteroseptum, anterior, apical septum walls   Grade II diastolic dysfunction with elevated filling pressure.   Mild to moderate mitral regurgitation.   The left atrium is mildly dilated.   Bi-atrial enlargement.   Mild aortic stenosis.   Mild aortic regurgitation.   The right ventricle is mildly enlarged.   Right ventricular systolic function is mild to moderately reduced .   Moderate tricuspid regurgitation.   Systolic pulmonic artery pressure (SPAP) is estimated

## 2024-04-04 ENCOUNTER — OFFICE VISIT (OUTPATIENT)
Dept: FAMILY MEDICINE CLINIC | Age: 84
End: 2024-04-04
Payer: MEDICARE

## 2024-04-04 VITALS
SYSTOLIC BLOOD PRESSURE: 128 MMHG | RESPIRATION RATE: 16 BRPM | WEIGHT: 159.2 LBS | DIASTOLIC BLOOD PRESSURE: 75 MMHG | HEART RATE: 73 BPM | OXYGEN SATURATION: 99 % | TEMPERATURE: 96.4 F | BODY MASS INDEX: 23.51 KG/M2

## 2024-04-04 DIAGNOSIS — I10 ESSENTIAL HYPERTENSION: ICD-10-CM

## 2024-04-04 DIAGNOSIS — E11.9 TYPE 2 DIABETES MELLITUS WITHOUT COMPLICATION, WITHOUT LONG-TERM CURRENT USE OF INSULIN (HCC): Primary | ICD-10-CM

## 2024-04-04 LAB — HBA1C MFR BLD: 6.6 %

## 2024-04-04 PROCEDURE — 3044F HG A1C LEVEL LT 7.0%: CPT | Performed by: FAMILY MEDICINE

## 2024-04-04 PROCEDURE — 1123F ACP DISCUSS/DSCN MKR DOCD: CPT | Performed by: FAMILY MEDICINE

## 2024-04-04 PROCEDURE — G8427 DOCREV CUR MEDS BY ELIG CLIN: HCPCS | Performed by: FAMILY MEDICINE

## 2024-04-04 PROCEDURE — G8420 CALC BMI NORM PARAMETERS: HCPCS | Performed by: FAMILY MEDICINE

## 2024-04-04 PROCEDURE — 3074F SYST BP LT 130 MM HG: CPT | Performed by: FAMILY MEDICINE

## 2024-04-04 PROCEDURE — 83036 HEMOGLOBIN GLYCOSYLATED A1C: CPT | Performed by: FAMILY MEDICINE

## 2024-04-04 PROCEDURE — 99214 OFFICE O/P EST MOD 30 MIN: CPT | Performed by: FAMILY MEDICINE

## 2024-04-04 PROCEDURE — 3078F DIAST BP <80 MM HG: CPT | Performed by: FAMILY MEDICINE

## 2024-04-04 PROCEDURE — 1036F TOBACCO NON-USER: CPT | Performed by: FAMILY MEDICINE

## 2024-04-04 SDOH — ECONOMIC STABILITY: INCOME INSECURITY: HOW HARD IS IT FOR YOU TO PAY FOR THE VERY BASICS LIKE FOOD, HOUSING, MEDICAL CARE, AND HEATING?: NOT VERY HARD

## 2024-04-04 SDOH — ECONOMIC STABILITY: FOOD INSECURITY: WITHIN THE PAST 12 MONTHS, THE FOOD YOU BOUGHT JUST DIDN'T LAST AND YOU DIDN'T HAVE MONEY TO GET MORE.: NEVER TRUE

## 2024-04-04 SDOH — ECONOMIC STABILITY: FOOD INSECURITY: WITHIN THE PAST 12 MONTHS, YOU WORRIED THAT YOUR FOOD WOULD RUN OUT BEFORE YOU GOT MONEY TO BUY MORE.: NEVER TRUE

## 2024-04-04 SDOH — ECONOMIC STABILITY: INCOME INSECURITY: HOW HARD IS IT FOR YOU TO PAY FOR THE VERY BASICS LIKE FOOD, HOUSING, MEDICAL CARE, AND HEATING?: NOT HARD AT ALL

## 2024-04-04 ASSESSMENT — PATIENT HEALTH QUESTIONNAIRE - PHQ9
SUM OF ALL RESPONSES TO PHQ QUESTIONS 1-9: 0
SUM OF ALL RESPONSES TO PHQ QUESTIONS 1-9: 0
1. LITTLE INTEREST OR PLEASURE IN DOING THINGS: NOT AT ALL
SUM OF ALL RESPONSES TO PHQ9 QUESTIONS 1 & 2: 0
2. FEELING DOWN, DEPRESSED OR HOPELESS: NOT AT ALL
SUM OF ALL RESPONSES TO PHQ QUESTIONS 1-9: 0
SUM OF ALL RESPONSES TO PHQ QUESTIONS 1-9: 0

## 2024-04-04 ASSESSMENT — ENCOUNTER SYMPTOMS: APNEA: 1

## 2024-04-04 NOTE — PROGRESS NOTES
4/4/2024    This is a 83 y.o. male   Chief Complaint   Patient presents with    3 Month Follow-Up     Diabetes    .    HPI  Pt presents today for a diabetic follow-up. Currently taking Jardiance 10 mg daily     Morning glucose readings: doesn't check often    Admits to sleep apnea  Denies fatigue or vision changes     Labs from 03/14/24 reviewed at today's visit, wnl except for BUN 28, glucose 1 6020, and HDL 36.    Last Eye Exam: 1 year ago, negative for DR  Last Foot Exam: 09/14/23  Last Microalbumin: Will do at next visit    Today's A1C = 6.6  01/04/24 A1C = 6.6    HTN: Taking Spironolactone 25 mg daily. Entresto  mg BID, Topril XL  50 mg daily, and lasix 20 mg BID. Today's /75  Past Medical History:   Diagnosis Date    Aortic aneurysm, abdominal (HCC) 01/2011    Appendicitis 12/06/2011    Arthritis 12/06/2011    Bruises easily     CAD (coronary artery disease) 09/2001    MI     CHF (congestive heart failure) (HCC)     Following CABG    Chronic back pain     Chronic pain of both shoulders 12/06/2011    joint - the shoulders hurt    Complication of anesthesia     woke up during appendectomy    Gout     Heart disease 12/06/2011    Hernia 12/06/2011    HIGH CHOLESTEROL 12/06/2011    Hyperlipidemia     Hypertension     Measles 12/06/2011    SUNDEEP (obstructive sleep apnea)     Pancreatitis 2006    History of     Persistent cough     Ringing in ears     Sinus disorder     Sleep deprivation 12/06/2001    loss of sleep       Orders Only on 03/14/2024   Component Date Value Ref Range Status    Sodium 03/14/2024 144  136 - 145 mmol/L Final    Potassium 03/14/2024 4.0  3.5 - 5.1 mmol/L Final    Chloride 03/14/2024 105  99 - 110 mmol/L Final    CO2 03/14/2024 26  21 - 32 mmol/L Final    Anion Gap 03/14/2024 13  3 - 16 Final    Glucose 03/14/2024 127 (H)  70 - 99 mg/dL Final    BUN 03/14/2024 28 (H)  7 - 20 mg/dL Final    Creatinine 03/14/2024 1.1  0.8 - 1.3 mg/dL Final    Est, Glom Filt Rate 03/14/2024 >60  >60

## 2024-04-12 ENCOUNTER — TELEPHONE (OUTPATIENT)
Dept: FAMILY MEDICINE CLINIC | Age: 84
End: 2024-04-12

## 2024-04-12 NOTE — TELEPHONE ENCOUNTER
Patient called, has had diarrhea, throwing up for 2-3 days, lost 10 pounds since Sunday night when his symptoms started. He can only have 64 oz of liquid per day so he believes he may be dehydrated and would like to know what to do. Diarrhea has calmed down and he is no longer throwing up.Said he cannot do a virtual visit today, as they don't know how. No fever and negative COVID test. Please advise.

## 2024-04-12 NOTE — TELEPHONE ENCOUNTER
Please advise patient to continue oral hydration and he can use a sugar-free Gatorade or Powerade for electrolyte replenishment.  Hydration should be in sips as taking in too much fluid at once could cause his vomiting to return as his stomach is sensitive.  Can start foods as tolerated but start light with crackers, toast, etc. Thank you.

## 2024-04-19 RX ORDER — SPIRONOLACTONE 25 MG/1
25 TABLET ORAL DAILY
Qty: 90 TABLET | Refills: 3 | Status: SHIPPED | OUTPATIENT
Start: 2024-04-19

## 2024-04-19 RX ORDER — PANTOPRAZOLE SODIUM 40 MG/1
40 TABLET, DELAYED RELEASE ORAL
Qty: 180 TABLET | Refills: 1 | Status: SHIPPED | OUTPATIENT
Start: 2024-04-19

## 2024-04-19 NOTE — TELEPHONE ENCOUNTER
Future Appointments   Date Time Provider Department Center   5/15/2024 11:00 AM SCHEDULE, [place] DEVICE CHECK [facility]   5/15/2024 11:00 AM Niecy Frank APRN - CNP [facility]   7/9/2024 10:00 AM Foreign Gonzalez DO [facility] [place] [facility]   9/23/2024  9:00 AM Luzma Garcia APRN - CNP [facility]   1/28/2025 10:00 AM Magad Tamez MD [facility]     LOV 4/4/2024

## 2024-05-09 NOTE — PROGRESS NOTES
atrial fibrillation) (HCC)    Nuclear senile cataract    SUNDEEP on CPAP    Overweight (BMI 25.0-29.9)    CAD in native artery    Chest pain    Shortness of breath    Abnormal stress test    UGI bleed    Acute systolic HF (heart failure) (HCC)    CAP (community acquired pneumonia)    Reactive arthritis (HCC)    Ischemic cardiomyopathy    Varicose veins of both legs with edema    Tarsal tunnel syndrome of both lower extremities    Intermittent complete heart block (HCC)    Complete heart block (HCC)    Acute on chronic systolic heart failure (HCC)    Cardiac resynchronization therapy defibrillator (CRT-D) in place       Assessment:   Persistent atrial fibrillation with slow ventricular response: ongooing               -[id number] score 7 (age, HTN, CAD, CHF, and CVD)   Intermittent complete heart block: noted on event monitor               -s/p BiV ICD implant 8/2/2023              -device check per HPI  NSVT: noted on event monitor   CAD s/p CABG and recent PCI               -s/p LHC with PCI to LAD 8/2/2023  Ischemic cardiomyopathy: improved   -EF 40-45% on echo 11/2023              -EF 35% on echo 7/2023, previously 45-50% 1/2023              -BiV ICD as above   Chronic HFrEF: compensated   HTN: suboptimal   HLD  CVD   GERD   History of GIB   Aortic aneurysm   SUNDEEP   Generalized pain and weakness: unclear etiology, started approximately four days ago       Plan:   1. Continue Eliquis and Toprol   2. Update labs given complaints of pain and weakness   3. Remote device transmissions every three months   4. Monitor BP at home and call if consistently out of goal ranges  5. Patient to follow up with PCP given pain/weakness   6. Follow up in 6 months     Time Based Itemization  A total of 50 minutes was spent on today's patient encounter.  If applicable, non-patient-facing activities:  (*)Preparing to see the patient and reviewing records  (*) Individual interpretation of results  (*) Discussion or coordination of care

## 2024-05-15 ENCOUNTER — OFFICE VISIT (OUTPATIENT)
Dept: CARDIOLOGY CLINIC | Age: 84
End: 2024-05-15
Payer: MEDICARE

## 2024-05-15 ENCOUNTER — TELEPHONE (OUTPATIENT)
Dept: FAMILY MEDICINE CLINIC | Age: 84
End: 2024-05-15

## 2024-05-15 ENCOUNTER — NURSE ONLY (OUTPATIENT)
Dept: CARDIOLOGY CLINIC | Age: 84
End: 2024-05-15

## 2024-05-15 VITALS
WEIGHT: 152.8 LBS | HEART RATE: 65 BPM | DIASTOLIC BLOOD PRESSURE: 68 MMHG | BODY MASS INDEX: 22.63 KG/M2 | SYSTOLIC BLOOD PRESSURE: 154 MMHG | OXYGEN SATURATION: 98 % | HEIGHT: 69 IN

## 2024-05-15 DIAGNOSIS — I25.5 ISCHEMIC CARDIOMYOPATHY: ICD-10-CM

## 2024-05-15 DIAGNOSIS — Z95.810 CARDIAC RESYNCHRONIZATION THERAPY DEFIBRILLATOR (CRT-D) IN PLACE: Primary | ICD-10-CM

## 2024-05-15 DIAGNOSIS — R00.1 BRADYCARDIA: ICD-10-CM

## 2024-05-15 DIAGNOSIS — I48.19 PERSISTENT ATRIAL FIBRILLATION (HCC): Primary | ICD-10-CM

## 2024-05-15 DIAGNOSIS — R52 GENERALIZED PAIN: ICD-10-CM

## 2024-05-15 DIAGNOSIS — R53.1 WEAKNESS: ICD-10-CM

## 2024-05-15 PROCEDURE — G8427 DOCREV CUR MEDS BY ELIG CLIN: HCPCS | Performed by: NURSE PRACTITIONER

## 2024-05-15 PROCEDURE — 1123F ACP DISCUSS/DSCN MKR DOCD: CPT | Performed by: NURSE PRACTITIONER

## 2024-05-15 PROCEDURE — 3078F DIAST BP <80 MM HG: CPT | Performed by: NURSE PRACTITIONER

## 2024-05-15 PROCEDURE — 1036F TOBACCO NON-USER: CPT | Performed by: NURSE PRACTITIONER

## 2024-05-15 PROCEDURE — 99215 OFFICE O/P EST HI 40 MIN: CPT | Performed by: NURSE PRACTITIONER

## 2024-05-15 PROCEDURE — G8420 CALC BMI NORM PARAMETERS: HCPCS | Performed by: NURSE PRACTITIONER

## 2024-05-15 PROCEDURE — 93000 ELECTROCARDIOGRAM COMPLETE: CPT | Performed by: NURSE PRACTITIONER

## 2024-05-15 PROCEDURE — 3077F SYST BP >= 140 MM HG: CPT | Performed by: NURSE PRACTITIONER

## 2024-05-15 NOTE — TELEPHONE ENCOUNTER
LMTCB. Dr. Gonzalez would like to see patient sooner than his 6/4/24 appointment re note from cardiology for weakness and muscle pain.  Please schedule

## 2024-05-15 NOTE — PATIENT INSTRUCTIONS
Update labs given generalized pain    Remote device transmissions every three months     Restart nasal saline spray to help decrease nosebleeds     Please call Dr. Gonzalez regarding pain     Follow up with me in 6 months

## 2024-05-16 LAB
ALBUMIN SERPL-MCNC: 3.8 G/DL (ref 3.4–5)
ALBUMIN/GLOB SERPL: 1.7 {RATIO} (ref 1.1–2.2)
ALP SERPL-CCNC: 82 U/L (ref 40–129)
ALT SERPL-CCNC: 12 U/L (ref 10–40)
ANION GAP SERPL CALCULATED.3IONS-SCNC: 14 MMOL/L (ref 3–16)
AST SERPL-CCNC: 14 U/L (ref 15–37)
BASOPHILS # BLD: 0.1 K/UL (ref 0–0.2)
BASOPHILS NFR BLD: 0.5 %
BILIRUB SERPL-MCNC: 1.8 MG/DL (ref 0–1)
BUN SERPL-MCNC: 26 MG/DL (ref 7–20)
CALCIUM SERPL-MCNC: 8.5 MG/DL (ref 8.3–10.6)
CHLORIDE SERPL-SCNC: 101 MMOL/L (ref 99–110)
CO2 SERPL-SCNC: 24 MMOL/L (ref 21–32)
CREAT SERPL-MCNC: 1.4 MG/DL (ref 0.8–1.3)
CRP SERPL-MCNC: 242.7 MG/L (ref 0–5.1)
DEPRECATED RDW RBC AUTO: 19.6 % (ref 12.4–15.4)
EOSINOPHIL # BLD: 0 K/UL (ref 0–0.6)
EOSINOPHIL NFR BLD: 0 %
ERYTHROCYTE [SEDIMENTATION RATE] IN BLOOD BY WESTERGREN METHOD: 48 MM/HR (ref 0–20)
GFR SERPLBLD CREATININE-BSD FMLA CKD-EPI: 50 ML/MIN/{1.73_M2}
GLUCOSE SERPL-MCNC: 184 MG/DL (ref 70–99)
HCT VFR BLD AUTO: 31.2 % (ref 40.5–52.5)
HGB BLD-MCNC: 9.8 G/DL (ref 13.5–17.5)
LYMPHOCYTES # BLD: 0.6 K/UL (ref 1–5.1)
LYMPHOCYTES NFR BLD: 4.8 %
MCH RBC QN AUTO: 25.1 PG (ref 26–34)
MCHC RBC AUTO-ENTMCNC: 31.6 G/DL (ref 31–36)
MCV RBC AUTO: 79.4 FL (ref 80–100)
MONOCYTES # BLD: 1.1 K/UL (ref 0–1.3)
MONOCYTES NFR BLD: 9.5 %
NEUTROPHILS # BLD: 10.1 K/UL (ref 1.7–7.7)
NEUTROPHILS NFR BLD: 85.2 %
PLATELET # BLD AUTO: 185 K/UL (ref 135–450)
PMV BLD AUTO: 8.4 FL (ref 5–10.5)
POTASSIUM SERPL-SCNC: 4.3 MMOL/L (ref 3.5–5.1)
PROT SERPL-MCNC: 6.1 G/DL (ref 6.4–8.2)
RBC # BLD AUTO: 3.92 M/UL (ref 4.2–5.9)
SODIUM SERPL-SCNC: 139 MMOL/L (ref 136–145)
WBC # BLD AUTO: 11.9 K/UL (ref 4–11)

## 2024-05-16 NOTE — RESULT ENCOUNTER NOTE
Here are th results of Mr Crow' lab results. It appears he has some renal failure, anemia, leukocytosis and CRP and ESR are rather elevated. Please let me know if there is anything you would like me to do. Thanks!    Staff, please patient know his labs are abnormal. Dr. Gonzalez is aware and should be in touch.

## 2024-05-21 ENCOUNTER — OFFICE VISIT (OUTPATIENT)
Dept: FAMILY MEDICINE CLINIC | Age: 84
End: 2024-05-21
Payer: MEDICARE

## 2024-05-21 VITALS
DIASTOLIC BLOOD PRESSURE: 70 MMHG | SYSTOLIC BLOOD PRESSURE: 130 MMHG | HEART RATE: 69 BPM | TEMPERATURE: 96.9 F | WEIGHT: 154 LBS | OXYGEN SATURATION: 98 % | BODY MASS INDEX: 22.73 KG/M2 | RESPIRATION RATE: 16 BRPM

## 2024-05-21 DIAGNOSIS — Z91.81 AT HIGH RISK FOR FALLS: ICD-10-CM

## 2024-05-21 DIAGNOSIS — M79.10 MUSCLE PAIN: Primary | ICD-10-CM

## 2024-05-21 DIAGNOSIS — R79.82 ELEVATED C-REACTIVE PROTEIN (CRP): ICD-10-CM

## 2024-05-21 DIAGNOSIS — R70.0 ELEVATED SED RATE: ICD-10-CM

## 2024-05-21 DIAGNOSIS — R04.0 EPISTAXIS: ICD-10-CM

## 2024-05-21 DIAGNOSIS — D64.9 NORMOCYTIC ANEMIA: ICD-10-CM

## 2024-05-21 DIAGNOSIS — R53.1 WEAKNESS: ICD-10-CM

## 2024-05-21 DIAGNOSIS — R94.4 DECREASED GFR: ICD-10-CM

## 2024-05-21 PROCEDURE — 1036F TOBACCO NON-USER: CPT | Performed by: FAMILY MEDICINE

## 2024-05-21 PROCEDURE — 1123F ACP DISCUSS/DSCN MKR DOCD: CPT | Performed by: FAMILY MEDICINE

## 2024-05-21 PROCEDURE — 99214 OFFICE O/P EST MOD 30 MIN: CPT | Performed by: FAMILY MEDICINE

## 2024-05-21 PROCEDURE — 3075F SYST BP GE 130 - 139MM HG: CPT | Performed by: FAMILY MEDICINE

## 2024-05-21 PROCEDURE — G8427 DOCREV CUR MEDS BY ELIG CLIN: HCPCS | Performed by: FAMILY MEDICINE

## 2024-05-21 PROCEDURE — G8420 CALC BMI NORM PARAMETERS: HCPCS | Performed by: FAMILY MEDICINE

## 2024-05-21 PROCEDURE — 3078F DIAST BP <80 MM HG: CPT | Performed by: FAMILY MEDICINE

## 2024-05-21 ASSESSMENT — PATIENT HEALTH QUESTIONNAIRE - PHQ9
1. LITTLE INTEREST OR PLEASURE IN DOING THINGS: SEVERAL DAYS
SUM OF ALL RESPONSES TO PHQ QUESTIONS 1-9: 2
2. FEELING DOWN, DEPRESSED OR HOPELESS: SEVERAL DAYS
SUM OF ALL RESPONSES TO PHQ QUESTIONS 1-9: 2
SUM OF ALL RESPONSES TO PHQ9 QUESTIONS 1 & 2: 2

## 2024-05-21 ASSESSMENT — ENCOUNTER SYMPTOMS: COLOR CHANGE: 1

## 2024-05-21 NOTE — PROGRESS NOTES
5/21/2024    This is a 83 y.o. male   Chief Complaint   Patient presents with    Joint Pain    Muscle Pain    Fall     Fell yesterday 5/21/24,     .    HPI  Pt presents today for the following:    Joint/Muscle Pain: Patient was seen on May 15, 2024 by cardiology for his A-fib and cardiomyopathy follow-up.  Cardiology felt that he was doing well from a heart rhythm standpoint, but patient had admitted to a 4-day history of progressive and diffuse weakness and pain and had to start using a walker again.    States that he [place] yesterday [place] [employer] [place], tripped over small curb. Hit chin and knees, admits to pain that started on Mother's day that was in left shoulder that radiated to neck and right scapula then into right shoulder and into right elbow and bilateral groin area, took 15 minutes to get off the couch and bed, got walker out and started using, weakness corresponded to areas that were painful, pain has  resolved, walked 1.5 miles yesterday for the first time with his dogs without the walker, denies sick contacts.    Epistaxis: States that he awoke at 5 AM yesterday, wears CPAP with humidifier, and had epistaxis. Fell back asleep and nose bled again. Denies previous hx of epistaxis, takes both Eliquis and Plavix, has had 12 episodes in the last 2 months.     Also states that he noticed a rash on his left LE under the compression socks thaty moved to right LE, turned red, was mowing and was laying on the ground working on his mower, both LE's under compression socks turned \"beet red\" purchased \"dome something\" which is a powder that mixes with water and wrap around LEs periodically  for 2 days, this resolved the erythema.     States he feels aggravated because he has done everything his doctors want him to do and that he takes too many blood thinners. States he spoke with his Cardiologist about this.   Past Medical History:   Diagnosis Date    Aortic aneurysm, abdominal (HCC) 01/2011    Appendicitis

## 2024-06-03 DIAGNOSIS — I25.10 CAD IN NATIVE ARTERY: ICD-10-CM

## 2024-06-03 NOTE — TELEPHONE ENCOUNTER
[facility] states pt would like refills of metoprolol sent to [facility].       Last Ov- 03.22.24 [facility]  Next Ov- 09.23.24 [facility]  1 yr [facility]

## 2024-06-03 NOTE — TELEPHONE ENCOUNTER
3/22/2024 [facility] [place]  9/23/2024 [facility] upcoming appt  5/15/2024 cbc, cmp

## 2024-06-04 DIAGNOSIS — R70.0 ELEVATED SED RATE: ICD-10-CM

## 2024-06-04 DIAGNOSIS — R94.4 DECREASED GFR: ICD-10-CM

## 2024-06-04 DIAGNOSIS — R79.82 ELEVATED C-REACTIVE PROTEIN (CRP): ICD-10-CM

## 2024-06-04 DIAGNOSIS — E11.9 TYPE 2 DIABETES MELLITUS WITHOUT COMPLICATION, WITHOUT LONG-TERM CURRENT USE OF INSULIN (HCC): ICD-10-CM

## 2024-06-04 DIAGNOSIS — D64.9 NORMOCYTIC ANEMIA: ICD-10-CM

## 2024-06-04 DIAGNOSIS — R70.0 ELEVATED SED RATE: Primary | ICD-10-CM

## 2024-06-04 DIAGNOSIS — R53.1 WEAKNESS: ICD-10-CM

## 2024-06-04 RX ORDER — METOPROLOL SUCCINATE 50 MG/1
50 TABLET, EXTENDED RELEASE ORAL DAILY
Qty: 90 TABLET | Refills: 1 | Status: SHIPPED | OUTPATIENT
Start: 2024-06-04

## 2024-06-04 RX ORDER — CLOPIDOGREL BISULFATE 75 MG/1
TABLET ORAL
Qty: 90 TABLET | Refills: 3 | Status: SHIPPED | OUTPATIENT
Start: 2024-06-04

## 2024-06-05 LAB
ALBUMIN SERPL-MCNC: 3.9 G/DL (ref 3.4–5)
ALBUMIN/GLOB SERPL: 1.6 {RATIO} (ref 1.1–2.2)
ALP SERPL-CCNC: 90 U/L (ref 40–129)
ALT SERPL-CCNC: 11 U/L (ref 10–40)
ANION GAP SERPL CALCULATED.3IONS-SCNC: 13 MMOL/L (ref 3–16)
AST SERPL-CCNC: 17 U/L (ref 15–37)
BASOPHILS # BLD: 0 K/UL (ref 0–0.2)
BASOPHILS NFR BLD: 0.4 %
BILIRUB SERPL-MCNC: 0.8 MG/DL (ref 0–1)
BUN SERPL-MCNC: 17 MG/DL (ref 7–20)
CALCIUM SERPL-MCNC: 8.9 MG/DL (ref 8.3–10.6)
CHLORIDE SERPL-SCNC: 102 MMOL/L (ref 99–110)
CHOLEST SERPL-MCNC: 93 MG/DL (ref 0–199)
CO2 SERPL-SCNC: 26 MMOL/L (ref 21–32)
CRP SERPL-MCNC: <3 MG/L (ref 0–5.1)
DEPRECATED RDW RBC AUTO: 20 % (ref 12.4–15.4)
EOSINOPHIL # BLD: 0.2 K/UL (ref 0–0.6)
EOSINOPHIL NFR BLD: 3.3 %
ERYTHROCYTE [SEDIMENTATION RATE] IN BLOOD BY WESTERGREN METHOD: 11 MM/HR (ref 0–20)
GFR SERPLBLD CREATININE-BSD FMLA CKD-EPI: 66 ML/MIN/{1.73_M2}
GLUCOSE SERPL-MCNC: 124 MG/DL (ref 70–99)
HCT VFR BLD AUTO: 33.1 % (ref 40.5–52.5)
HDLC SERPL-MCNC: 44 MG/DL (ref 40–60)
HGB BLD-MCNC: 10.2 G/DL (ref 13.5–17.5)
LDLC SERPL CALC-MCNC: 38 MG/DL
LYMPHOCYTES # BLD: 1.4 K/UL (ref 1–5.1)
LYMPHOCYTES NFR BLD: 28.4 %
MCH RBC QN AUTO: 24.4 PG (ref 26–34)
MCHC RBC AUTO-ENTMCNC: 30.8 G/DL (ref 31–36)
MCV RBC AUTO: 79.2 FL (ref 80–100)
MONOCYTES # BLD: 0.4 K/UL (ref 0–1.3)
MONOCYTES NFR BLD: 8.9 %
NEUTROPHILS # BLD: 2.9 K/UL (ref 1.7–7.7)
NEUTROPHILS NFR BLD: 59 %
PLATELET # BLD AUTO: 266 K/UL (ref 135–450)
PMV BLD AUTO: 8.1 FL (ref 5–10.5)
POTASSIUM SERPL-SCNC: 4.3 MMOL/L (ref 3.5–5.1)
PROT SERPL-MCNC: 6.3 G/DL (ref 6.4–8.2)
RBC # BLD AUTO: 4.17 M/UL (ref 4.2–5.9)
SODIUM SERPL-SCNC: 141 MMOL/L (ref 136–145)
TRIGL SERPL-MCNC: 53 MG/DL (ref 0–150)
TSH SERPL DL<=0.005 MIU/L-ACNC: 1.23 UIU/ML (ref 0.27–4.2)
VLDLC SERPL CALC-MCNC: 11 MG/DL
WBC # BLD AUTO: 4.9 K/UL (ref 4–11)

## 2024-06-07 DIAGNOSIS — D50.9 MICROCYTIC ANEMIA: Primary | ICD-10-CM

## 2024-06-26 ENCOUNTER — OFFICE VISIT (OUTPATIENT)
Dept: FAMILY MEDICINE CLINIC | Age: 84
End: 2024-06-26
Payer: MEDICARE

## 2024-06-26 VITALS
DIASTOLIC BLOOD PRESSURE: 66 MMHG | HEART RATE: 80 BPM | WEIGHT: 154.2 LBS | RESPIRATION RATE: 16 BRPM | SYSTOLIC BLOOD PRESSURE: 138 MMHG | BODY MASS INDEX: 22.76 KG/M2 | OXYGEN SATURATION: 99 % | TEMPERATURE: 96.6 F

## 2024-06-26 DIAGNOSIS — M02.30 REACTIVE ARTHRITIS, UNSPECIFIED SITE (HCC): ICD-10-CM

## 2024-06-26 DIAGNOSIS — I25.708 ATHEROSCLEROSIS OF CORONARY ARTERY BYPASS GRAFT(S), UNSPECIFIED, WITH OTHER FORMS OF ANGINA PECTORIS (HCC): ICD-10-CM

## 2024-06-26 DIAGNOSIS — D50.9 MICROCYTIC ANEMIA: ICD-10-CM

## 2024-06-26 DIAGNOSIS — I50.23 ACUTE ON CHRONIC SYSTOLIC (CONGESTIVE) HEART FAILURE (HCC): ICD-10-CM

## 2024-06-26 DIAGNOSIS — E11.9 TYPE 2 DIABETES MELLITUS WITHOUT COMPLICATION, WITHOUT LONG-TERM CURRENT USE OF INSULIN (HCC): ICD-10-CM

## 2024-06-26 DIAGNOSIS — J96.01 ACUTE RESPIRATORY FAILURE WITH HYPOXIA (HCC): ICD-10-CM

## 2024-06-26 DIAGNOSIS — I10 ESSENTIAL HYPERTENSION: Primary | ICD-10-CM

## 2024-06-26 PROCEDURE — 1036F TOBACCO NON-USER: CPT | Performed by: FAMILY MEDICINE

## 2024-06-26 PROCEDURE — 3075F SYST BP GE 130 - 139MM HG: CPT | Performed by: FAMILY MEDICINE

## 2024-06-26 PROCEDURE — G8420 CALC BMI NORM PARAMETERS: HCPCS | Performed by: FAMILY MEDICINE

## 2024-06-26 PROCEDURE — 99214 OFFICE O/P EST MOD 30 MIN: CPT | Performed by: FAMILY MEDICINE

## 2024-06-26 PROCEDURE — 1123F ACP DISCUSS/DSCN MKR DOCD: CPT | Performed by: FAMILY MEDICINE

## 2024-06-26 PROCEDURE — G8427 DOCREV CUR MEDS BY ELIG CLIN: HCPCS | Performed by: FAMILY MEDICINE

## 2024-06-26 PROCEDURE — 3078F DIAST BP <80 MM HG: CPT | Performed by: FAMILY MEDICINE

## 2024-06-26 PROCEDURE — 3044F HG A1C LEVEL LT 7.0%: CPT | Performed by: FAMILY MEDICINE

## 2024-06-26 ASSESSMENT — PATIENT HEALTH QUESTIONNAIRE - PHQ9
SUM OF ALL RESPONSES TO PHQ QUESTIONS 1-9: 0
SUM OF ALL RESPONSES TO PHQ QUESTIONS 1-9: 0
SUM OF ALL RESPONSES TO PHQ9 QUESTIONS 1 & 2: 0
1. LITTLE INTEREST OR PLEASURE IN DOING THINGS: NOT AT ALL
2. FEELING DOWN, DEPRESSED OR HOPELESS: NOT AT ALL
SUM OF ALL RESPONSES TO PHQ QUESTIONS 1-9: 0
SUM OF ALL RESPONSES TO PHQ QUESTIONS 1-9: 0

## 2024-06-26 NOTE — PROGRESS NOTES
6/26/2024    This is a 83 y.o. male   Chief Complaint   Patient presents with    1 Month Follow-Up   .    HPI  Pt presents today for the following:    Hypertension: Taking spironolactone 25 mg daily, Entresto  mg twice daily, Toprol-XL 50 mg daily, and Lasix 20 mg daily.  Today's blood pressure 138/67, home readings 120's-low 130's/60's-70's, denies dizziness or HA's, admits to periodic LH    Admits to easy arm bruising since starting Eliquis and Plavix.    Lab Results: Labs from June 4, 2024 reviewed at today's visit, the normal limits except for total protein 6.3, glucose 124, red blood cell count 4.17, hemoglobin/hematocrit 10.2/33.1, and MCV 79.2. Takes one protein drink daily and eats nuts and protein. Denies unusual fatigue.     Past Medical History:   Diagnosis Date    Aortic aneurysm, abdominal (HCC) 01/2011    Appendicitis 12/06/2011    Arthritis 12/06/2011    Bruises easily     CAD (coronary artery disease) 09/2001    MI     CHF (congestive heart failure) (HCC)     Following CABG    Chronic back pain     Chronic pain of both shoulders 12/06/2011    joint - the shoulders hurt    Complication of anesthesia     woke up during appendectomy    Gout     Heart disease 12/06/2011    Hernia 12/06/2011    HIGH CHOLESTEROL 12/06/2011    Hyperlipidemia     Hypertension     Measles 12/06/2011    SUNDEEP (obstructive sleep apnea)     Pancreatitis 2006    History of     Persistent cough     Ringing in ears     Sinus disorder     Sleep deprivation 12/06/2001    loss of sleep       Orders Only on 06/04/2024   Component Date Value Ref Range Status    Sodium 06/04/2024 141  136 - 145 mmol/L Final    Potassium 06/04/2024 4.3  3.5 - 5.1 mmol/L Final    Chloride 06/04/2024 102  99 - 110 mmol/L Final    CO2 06/04/2024 26  21 - 32 mmol/L Final    Anion Gap 06/04/2024 13  3 - 16 Final    Glucose 06/04/2024 124 (H)  70 - 99 mg/dL Final    BUN 06/04/2024 17  7 - 20 mg/dL Final    Creatinine 06/04/2024 1.1  0.8 - 1.3 mg/dL Final

## 2024-07-30 DIAGNOSIS — D50.9 MICROCYTIC ANEMIA: ICD-10-CM

## 2024-07-30 DIAGNOSIS — E11.9 TYPE 2 DIABETES MELLITUS WITHOUT COMPLICATION, WITHOUT LONG-TERM CURRENT USE OF INSULIN (HCC): ICD-10-CM

## 2024-07-30 LAB
BASOPHILS # BLD: 0 K/UL (ref 0–0.2)
BASOPHILS NFR BLD: 0.7 %
DEPRECATED RDW RBC AUTO: 19.9 % (ref 12.4–15.4)
EOSINOPHIL # BLD: 0.1 K/UL (ref 0–0.6)
EOSINOPHIL NFR BLD: 2.5 %
EST. AVERAGE GLUCOSE BLD GHB EST-MCNC: 148.5 MG/DL
HBA1C MFR BLD: 6.8 %
HCT VFR BLD AUTO: 32.4 % (ref 40.5–52.5)
HGB BLD-MCNC: 10 G/DL (ref 13.5–17.5)
IRON SATN MFR SERPL: 10 % (ref 20–50)
IRON SERPL-MCNC: 35 UG/DL (ref 59–158)
LYMPHOCYTES # BLD: 0.9 K/UL (ref 1–5.1)
LYMPHOCYTES NFR BLD: 19.3 %
MCHC RBC AUTO-ENTMCNC: 30.9 G/DL (ref 31–36)
MCV RBC AUTO: 78.4 FL (ref 80–100)
MONOCYTES # BLD: 0.4 K/UL (ref 0–1.3)
MONOCYTES NFR BLD: 8.5 %
NEUTROPHILS # BLD: 3.1 K/UL (ref 1.7–7.7)
NEUTROPHILS NFR BLD: 69 %
PLATELET # BLD AUTO: 192 K/UL (ref 135–450)
PMV BLD AUTO: 7.9 FL (ref 5–10.5)
RBC # BLD AUTO: 4.13 M/UL (ref 4.2–5.9)
TIBC SERPL-MCNC: 368 UG/DL (ref 260–445)
WBC # BLD AUTO: 4.4 K/UL (ref 4–11)

## 2024-08-11 DIAGNOSIS — D50.9 IRON DEFICIENCY ANEMIA, UNSPECIFIED IRON DEFICIENCY ANEMIA TYPE: Primary | ICD-10-CM

## 2024-08-11 RX ORDER — FERROUS SULFATE 325(65) MG
325 TABLET ORAL
Qty: 90 TABLET | Refills: 0 | Status: SHIPPED | OUTPATIENT
Start: 2024-08-11

## 2024-08-12 RX ORDER — APIXABAN 5 MG/1
5 TABLET, FILM COATED ORAL 2 TIMES DAILY
Qty: 180 TABLET | Refills: 2 | Status: SHIPPED | OUTPATIENT
Start: 2024-08-12

## 2024-08-14 ENCOUNTER — PATIENT MESSAGE (OUTPATIENT)
Dept: FAMILY MEDICINE CLINIC | Age: 84
End: 2024-08-14

## 2024-08-27 ENCOUNTER — OFFICE VISIT (OUTPATIENT)
Dept: FAMILY MEDICINE CLINIC | Age: 84
End: 2024-08-27
Payer: MEDICARE

## 2024-08-27 VITALS
TEMPERATURE: 97.1 F | RESPIRATION RATE: 16 BRPM | SYSTOLIC BLOOD PRESSURE: 125 MMHG | DIASTOLIC BLOOD PRESSURE: 70 MMHG | BODY MASS INDEX: 22.88 KG/M2 | WEIGHT: 155 LBS | HEART RATE: 83 BPM

## 2024-08-27 DIAGNOSIS — D64.9 NORMOCYTIC ANEMIA: ICD-10-CM

## 2024-08-27 DIAGNOSIS — Z12.5 PROSTATE CANCER SCREENING: ICD-10-CM

## 2024-08-27 DIAGNOSIS — E11.9 TYPE 2 DIABETES MELLITUS WITHOUT COMPLICATION, WITHOUT LONG-TERM CURRENT USE OF INSULIN (HCC): ICD-10-CM

## 2024-08-27 DIAGNOSIS — I10 ESSENTIAL HYPERTENSION: Primary | ICD-10-CM

## 2024-08-27 PROCEDURE — G8420 CALC BMI NORM PARAMETERS: HCPCS | Performed by: FAMILY MEDICINE

## 2024-08-27 PROCEDURE — 3074F SYST BP LT 130 MM HG: CPT | Performed by: FAMILY MEDICINE

## 2024-08-27 PROCEDURE — 1123F ACP DISCUSS/DSCN MKR DOCD: CPT | Performed by: FAMILY MEDICINE

## 2024-08-27 PROCEDURE — G2211 COMPLEX E/M VISIT ADD ON: HCPCS | Performed by: FAMILY MEDICINE

## 2024-08-27 PROCEDURE — 3078F DIAST BP <80 MM HG: CPT | Performed by: FAMILY MEDICINE

## 2024-08-27 PROCEDURE — 1036F TOBACCO NON-USER: CPT | Performed by: FAMILY MEDICINE

## 2024-08-27 PROCEDURE — 3044F HG A1C LEVEL LT 7.0%: CPT | Performed by: FAMILY MEDICINE

## 2024-08-27 PROCEDURE — 99214 OFFICE O/P EST MOD 30 MIN: CPT | Performed by: FAMILY MEDICINE

## 2024-08-27 PROCEDURE — G8427 DOCREV CUR MEDS BY ELIG CLIN: HCPCS | Performed by: FAMILY MEDICINE

## 2024-08-27 ASSESSMENT — ENCOUNTER SYMPTOMS: APNEA: 1

## 2024-08-27 NOTE — PROGRESS NOTES
8/27/2024    This is a 83 y.o. male   Chief Complaint   Patient presents with    3 Month Follow-Up     Diabetes, Spironolactone is on patient's allergy list, but he states he is taking it    .    HPI     Pt presents today for a diabetic follow-up. Currently taking Jardiance 10 mg daily    Morning glucose readings: Only checked a few times as wife has been ill.    Denies fatigue or vision changes.    Admits to easy bruising. Wants t talk to Cardiologist about stopping one of his blood thinners (takes Eliquis and Plavix).     Has apnea.    Last Eye Exam: 1 month ago, negative for diabetic retinopathy  Last Foot Exam: 09/14/2023  Last Microalbumin: Today    07/30/2024 A1C = 6.8    Hypertension: Taking Lasix 20 mg twice daily,  Toprol-XL 50 mg daily, Entresto  mg twice daily, and spironolactone 25 mg daily, today's blood pressure 125/70, denies HA's, admits to mild LH when standing for periods of time    Past Medical History:   Diagnosis Date    Aortic aneurysm, abdominal (HCC) 01/2011    Appendicitis 12/06/2011    Arthritis 12/06/2011    Bruises easily     CAD (coronary artery disease) 09/2001    MI     CHF (congestive heart failure) (HCC)     Following CABG    Chronic back pain     Chronic pain of both shoulders 12/06/2011    joint - the shoulders hurt    Complication of anesthesia     woke up during appendectomy    Gout     Heart disease 12/06/2011    Hernia 12/06/2011    HIGH CHOLESTEROL 12/06/2011    Hyperlipidemia     Hypertension     Measles 12/06/2011    SUNDEEP (obstructive sleep apnea)     Pancreatitis 2006    History of     Persistent cough     Ringing in ears     Sinus disorder     Sleep deprivation 12/06/2001    loss of sleep       Orders Only on 07/30/2024   Component Date Value Ref Range Status    Iron 07/30/2024 35 (L)  59 - 158 ug/dL Final    TIBC 07/30/2024 368  260 - 445 ug/dL Final    Iron % Saturation 07/30/2024 10 (L)  20 - 50 % Final    WBC 07/30/2024 4.4  4.0 - 11.0 K/uL Final    RBC 07/30/2024

## 2024-09-18 RX ORDER — PANTOPRAZOLE SODIUM 40 MG/1
TABLET, DELAYED RELEASE ORAL
Qty: 180 TABLET | Refills: 3 | Status: SHIPPED | OUTPATIENT
Start: 2024-09-18

## 2024-09-23 ENCOUNTER — OFFICE VISIT (OUTPATIENT)
Dept: CARDIOLOGY CLINIC | Age: 84
End: 2024-09-23
Payer: MEDICARE

## 2024-09-23 ENCOUNTER — TELEPHONE (OUTPATIENT)
Dept: CARDIOLOGY CLINIC | Age: 84
End: 2024-09-23

## 2024-09-23 VITALS
OXYGEN SATURATION: 96 % | HEART RATE: 84 BPM | HEIGHT: 69 IN | WEIGHT: 160 LBS | SYSTOLIC BLOOD PRESSURE: 156 MMHG | BODY MASS INDEX: 23.7 KG/M2 | DIASTOLIC BLOOD PRESSURE: 64 MMHG

## 2024-09-23 DIAGNOSIS — Z95.0 PACEMAKER: ICD-10-CM

## 2024-09-23 DIAGNOSIS — I48.0 PAF (PAROXYSMAL ATRIAL FIBRILLATION) (HCC): ICD-10-CM

## 2024-09-23 DIAGNOSIS — I25.10 ASHD (ARTERIOSCLEROTIC HEART DISEASE): ICD-10-CM

## 2024-09-23 DIAGNOSIS — I25.5 ISCHEMIC CARDIOMYOPATHY: Primary | ICD-10-CM

## 2024-09-23 DIAGNOSIS — I50.22 CHRONIC SYSTOLIC HEART FAILURE (HCC): ICD-10-CM

## 2024-09-23 DIAGNOSIS — D50.0 IRON DEFICIENCY ANEMIA DUE TO CHRONIC BLOOD LOSS: ICD-10-CM

## 2024-09-23 DIAGNOSIS — Z95.810 CARDIAC RESYNCHRONIZATION THERAPY DEFIBRILLATOR (CRT-D) IN PLACE: ICD-10-CM

## 2024-09-23 PROCEDURE — 3078F DIAST BP <80 MM HG: CPT | Performed by: NURSE PRACTITIONER

## 2024-09-23 PROCEDURE — 99214 OFFICE O/P EST MOD 30 MIN: CPT | Performed by: NURSE PRACTITIONER

## 2024-09-23 PROCEDURE — G8427 DOCREV CUR MEDS BY ELIG CLIN: HCPCS | Performed by: NURSE PRACTITIONER

## 2024-09-23 PROCEDURE — 1036F TOBACCO NON-USER: CPT | Performed by: NURSE PRACTITIONER

## 2024-09-23 PROCEDURE — 1123F ACP DISCUSS/DSCN MKR DOCD: CPT | Performed by: NURSE PRACTITIONER

## 2024-09-23 PROCEDURE — G8420 CALC BMI NORM PARAMETERS: HCPCS | Performed by: NURSE PRACTITIONER

## 2024-09-23 PROCEDURE — 3077F SYST BP >= 140 MM HG: CPT | Performed by: NURSE PRACTITIONER

## 2024-09-23 RX ORDER — FUROSEMIDE 40 MG
20 TABLET ORAL SEE ADMIN INSTRUCTIONS
Qty: 45 TABLET | Refills: 3
Start: 2024-09-23

## 2024-09-27 DIAGNOSIS — D64.9 NORMOCYTIC ANEMIA: ICD-10-CM

## 2024-09-27 LAB
BASOPHILS # BLD: 0 K/UL (ref 0–0.2)
BASOPHILS NFR BLD: 0.3 %
DEPRECATED RDW RBC AUTO: 25.7 % (ref 12.4–15.4)
EOSINOPHIL # BLD: 0.2 K/UL (ref 0–0.6)
EOSINOPHIL NFR BLD: 4.3 %
HCT VFR BLD AUTO: 36.4 % (ref 40.5–52.5)
HGB BLD-MCNC: 11.7 G/DL (ref 13.5–17.5)
IRON SATN MFR SERPL: 9 % (ref 20–50)
IRON SERPL-MCNC: 30 UG/DL (ref 59–158)
LYMPHOCYTES # BLD: 1.2 K/UL (ref 1–5.1)
LYMPHOCYTES NFR BLD: 24.5 %
MCH RBC QN AUTO: 26.8 PG (ref 26–34)
MCHC RBC AUTO-ENTMCNC: 32.2 G/DL (ref 31–36)
MCV RBC AUTO: 83.3 FL (ref 80–100)
MONOCYTES # BLD: 0.5 K/UL (ref 0–1.3)
MONOCYTES NFR BLD: 10.8 %
NEUTROPHILS # BLD: 3 K/UL (ref 1.7–7.7)
NEUTROPHILS NFR BLD: 60.1 %
PLATELET # BLD AUTO: 144 K/UL (ref 135–450)
PMV BLD AUTO: 8.2 FL (ref 5–10.5)
RBC # BLD AUTO: 4.36 M/UL (ref 4.2–5.9)
TIBC SERPL-MCNC: 330 UG/DL (ref 260–445)
WBC # BLD AUTO: 5.1 K/UL (ref 4–11)

## 2024-10-02 RX ORDER — EMPAGLIFLOZIN 10 MG/1
10 TABLET, FILM COATED ORAL DAILY
Qty: 90 TABLET | Refills: 1 | Status: SHIPPED | OUTPATIENT
Start: 2024-10-02

## 2024-10-02 NOTE — TELEPHONE ENCOUNTER
Lov 8/27/2024    Future Appointments   Date Time Provider Department Center   10/17/2024 11:15 AM Niecy Frank APRN - CNP [facility]   11/17/2024 11:15 AM BROOKE [place] DEVICE CHECK [facility]   11/27/2024  9:40 AM Foreign Gonzalez DO [facility]   1/28/2025 10:00 AM Magda Tamez MD AND [facility]   3/7/2025  8:30 AM Geronimo Wall MD [facility]   9/23/2025  1:00 PM Luzma Garcia, APRN - CNP [facility]

## 2024-10-06 DIAGNOSIS — E61.1 LOW IRON: ICD-10-CM

## 2024-10-06 DIAGNOSIS — D64.9 NORMOCYTIC ANEMIA: Primary | ICD-10-CM

## 2024-10-15 NOTE — PROGRESS NOTES
aspirin 81 mg daily should be resumed indefinitely.  7. Resume atorvastatin 40 mg qHS, amlodipine 10 mg daily, losartan 100 mg daily, spironolactone 25 mg daily, and lasix 40 mg daily.  8. Hold off on beta-blocker in setting of bradycardia.  9. Will arrange for staged PCI of native distal LAD through LIMA in 2 weeks.  There was difficulty engaging the LIMA from the femoral artery approach and may consider performing PCI from left radial artery approach.  Can plan to re-evaluate SVG stent and flow and OM2 at that time.      All labs and testing reviewed.  CARDIOLOGY LABS:   CBC: No results for input(s): \"WBC\", \"HGB\", \"HCT\", \"PLT\" in the last 72 hours.  BMP: No results for input(s): \"NA\", \"K\", \"CO2\", \"BUN\", \"CREATININE\", \"LABGLOM\", \"GLUCOSE\" in the last 72 hours.  PT/INR: No results for input(s): \"PROTIME\", \"INR\" in the last 72 hours.  APTT:No results for input(s): \"APTT\" in the last 72 hours.  FASTING LIPID PANEL:  Lab Results   Component Value Date/Time    HDL 44 06/04/2024 09:54 AM    HDL 37 03/21/2011 09:43 AM    TRIG 53 06/04/2024 09:54 AM     LIVER PROFILE:No results for input(s): \"AST\", \"ALT\" in the last 72 hours.    Invalid input(s): \"ALB\"    IMPRESSION:    Patient Active Problem List   Diagnosis    Hypertension    CAD (coronary artery disease)    Hyperlipidemia    CHF (congestive heart failure) (HCC)    Aortic aneurysm, abdominal (HCC)    Sleep deprivation    Chronic pain of both shoulders    Arthritis    Hernia    Ringing in ears    GERD (gastroesophageal reflux disease)    Leg length inequality    DM (diabetes mellitus) ([facility])    Hypokalemia    TIA (transient ischemic attack)    CVA (cerebral vascular accident) (HCC)    Bradycardia    Stroke-like symptoms    PAF (paroxysmal atrial fibrillation) ([facility])    Nuclear senile cataract    SUNDEEP on CPAP    Overweight (BMI 25.0-29.9)    CAD in native artery    Chest pain    Shortness of breath    Abnormal stress test    UGI bleed    Acute systolic HF (heart failure)

## 2024-10-17 ENCOUNTER — NURSE ONLY (OUTPATIENT)
Dept: CARDIOLOGY CLINIC | Age: 84
End: 2024-10-17

## 2024-10-17 ENCOUNTER — OFFICE VISIT (OUTPATIENT)
Dept: CARDIOLOGY CLINIC | Age: 84
End: 2024-10-17

## 2024-10-17 VITALS
WEIGHT: 162 LBS | HEART RATE: 63 BPM | OXYGEN SATURATION: 99 % | BODY MASS INDEX: 23.99 KG/M2 | SYSTOLIC BLOOD PRESSURE: 142 MMHG | DIASTOLIC BLOOD PRESSURE: 84 MMHG | HEIGHT: 69 IN

## 2024-10-17 DIAGNOSIS — R04.0 EPISTAXIS: ICD-10-CM

## 2024-10-17 DIAGNOSIS — I48.19 PERSISTENT ATRIAL FIBRILLATION (HCC): Primary | ICD-10-CM

## 2024-10-17 DIAGNOSIS — Z95.810 CARDIAC RESYNCHRONIZATION THERAPY DEFIBRILLATOR (CRT-D) IN PLACE: Primary | ICD-10-CM

## 2024-10-17 DIAGNOSIS — R00.1 BRADYCARDIA: ICD-10-CM

## 2024-10-17 DIAGNOSIS — I25.5 ISCHEMIC CARDIOMYOPATHY: ICD-10-CM

## 2024-10-17 DIAGNOSIS — Z95.810 CARDIAC RESYNCHRONIZATION THERAPY DEFIBRILLATOR (CRT-D) IN PLACE: ICD-10-CM

## 2024-10-17 DIAGNOSIS — I47.29 NSVT (NONSUSTAINED VENTRICULAR TACHYCARDIA) (HCC): ICD-10-CM

## 2024-10-17 NOTE — PATIENT INSTRUCTIONS
No changes today     Remote device transmissions every three months     Recommend ENT follow up for recurrent nose bleeds

## 2024-10-26 PROCEDURE — 93297 REM INTERROG DEV EVAL ICPMS: CPT | Performed by: NURSE PRACTITIONER

## 2024-11-04 DIAGNOSIS — Z12.5 PROSTATE CANCER SCREENING: ICD-10-CM

## 2024-11-04 DIAGNOSIS — D64.9 NORMOCYTIC ANEMIA: ICD-10-CM

## 2024-11-04 DIAGNOSIS — E11.9 TYPE 2 DIABETES MELLITUS WITHOUT COMPLICATION, WITHOUT LONG-TERM CURRENT USE OF INSULIN (HCC): ICD-10-CM

## 2024-11-04 DIAGNOSIS — E61.1 LOW IRON: ICD-10-CM

## 2024-11-04 LAB
ALBUMIN SERPL-MCNC: 4.3 G/DL (ref 3.4–5)
ALBUMIN/GLOB SERPL: 2.4 {RATIO} (ref 1.1–2.2)
ALP SERPL-CCNC: 89 U/L (ref 40–129)
ALT SERPL-CCNC: 35 U/L (ref 10–40)
ANION GAP SERPL CALCULATED.3IONS-SCNC: 10 MMOL/L (ref 3–16)
AST SERPL-CCNC: 31 U/L (ref 15–37)
BASOPHILS # BLD: 0 K/UL (ref 0–0.2)
BASOPHILS NFR BLD: 0.3 %
BILIRUB SERPL-MCNC: 1.6 MG/DL (ref 0–1)
BUN SERPL-MCNC: 24 MG/DL (ref 7–20)
CALCIUM SERPL-MCNC: 9.5 MG/DL (ref 8.3–10.6)
CHLORIDE SERPL-SCNC: 105 MMOL/L (ref 99–110)
CHOLEST SERPL-MCNC: 99 MG/DL (ref 0–199)
CO2 SERPL-SCNC: 27 MMOL/L (ref 21–32)
CREAT SERPL-MCNC: 1.2 MG/DL (ref 0.8–1.3)
DEPRECATED RDW RBC AUTO: 22.8 % (ref 12.4–15.4)
EOSINOPHIL # BLD: 0.2 K/UL (ref 0–0.6)
EOSINOPHIL NFR BLD: 3.5 %
GFR SERPLBLD CREATININE-BSD FMLA CKD-EPI: 60 ML/MIN/{1.73_M2}
GLUCOSE SERPL-MCNC: 124 MG/DL (ref 70–99)
HCT VFR BLD AUTO: 44.6 % (ref 40.5–52.5)
HDLC SERPL-MCNC: 43 MG/DL (ref 40–60)
HGB BLD-MCNC: 14.2 G/DL (ref 13.5–17.5)
IRON SATN MFR SERPL: 22 % (ref 20–50)
IRON SERPL-MCNC: 72 UG/DL (ref 59–158)
LDLC SERPL CALC-MCNC: 46 MG/DL
LYMPHOCYTES # BLD: 1.4 K/UL (ref 1–5.1)
LYMPHOCYTES NFR BLD: 23.4 %
MCH RBC QN AUTO: 28.5 PG (ref 26–34)
MCHC RBC AUTO-ENTMCNC: 31.9 G/DL (ref 31–36)
MCV RBC AUTO: 89.1 FL (ref 80–100)
MONOCYTES # BLD: 0.6 K/UL (ref 0–1.3)
MONOCYTES NFR BLD: 9.5 %
NEUTROPHILS # BLD: 3.9 K/UL (ref 1.7–7.7)
NEUTROPHILS NFR BLD: 63.3 %
PLATELET # BLD AUTO: 160 K/UL (ref 135–450)
PMV BLD AUTO: 8.5 FL (ref 5–10.5)
POTASSIUM SERPL-SCNC: 4.4 MMOL/L (ref 3.5–5.1)
PROT SERPL-MCNC: 6.1 G/DL (ref 6.4–8.2)
PSA SERPL DL<=0.01 NG/ML-MCNC: 0.26 NG/ML (ref 0–4)
RBC # BLD AUTO: 5 M/UL (ref 4.2–5.9)
SODIUM SERPL-SCNC: 142 MMOL/L (ref 136–145)
TIBC SERPL-MCNC: 330 UG/DL (ref 260–445)
TRIGL SERPL-MCNC: 52 MG/DL (ref 0–150)
TSH SERPL DL<=0.005 MIU/L-ACNC: 1.41 UIU/ML (ref 0.27–4.2)
VLDLC SERPL CALC-MCNC: 10 MG/DL
WBC # BLD AUTO: 6.2 K/UL (ref 4–11)

## 2024-11-12 RX ORDER — FUROSEMIDE 40 MG/1
TABLET ORAL
Qty: 135 TABLET | Refills: 1 | Status: SHIPPED | OUTPATIENT
Start: 2024-11-12

## 2024-11-12 RX ORDER — METOPROLOL SUCCINATE 50 MG/1
50 TABLET, EXTENDED RELEASE ORAL DAILY
Qty: 90 TABLET | Refills: 1 | Status: SHIPPED | OUTPATIENT
Start: 2024-11-12

## 2024-11-12 RX ORDER — ATORVASTATIN CALCIUM 40 MG/1
TABLET, FILM COATED ORAL
Qty: 90 TABLET | Refills: 1 | Status: SHIPPED | OUTPATIENT
Start: 2024-11-12

## 2024-11-12 NOTE — TELEPHONE ENCOUNTER
Future Appointments   Date Time Provider Department Center   11/27/2024  9:40 AM Foreign Gonzalez DO [facility]   1/28/2025 10:00 AM [facility]   3/7/2025  8:30 AM Geronimo Wall [facility]   4/17/2025 10:30 AM SCHEDULE, [place] DEVICE CHECK [facility]   4/17/2025 10:30 AM Niecy Frank APRN - CNP [facility]   9/23/2025  1:00 PM Luzma Garcia APRN - CNP [facility] 8/27/2024     [place] [facility] Discharge Note    Date of Discharge:  5/25/2021     Date of Admit: 5/24/2021    Discharge Diagnosis:  ACS (acute coronary syndrome) (CMS/[facility])    Chest pain      Hospital Course:     Mr Lamb is a 28 year old gentleman with a history of hyperlipidemia, was admitted with acute coronary syndrome.      He presented to the emergency room on 5/24/20 21 with complaints of chest discomfort.  The patient had received his second Moderna Covid vaccine on 5/21.  Following that he developed fevers, chills, body aches over the weekend.  His symptoms lasted about 48 hours.  On the morning of his admission he woke up around 6 AM with severe substernal chest discomfort which radiated to his left shoulder.  It was not associated with any other symptoms.  By the time he arrived to the emergency room his symptoms had improved and were only mild.      Following his arrival to the emergency room his work-up included an EKG which showed evidence of ST elevations in 1 and aVL and ST depressions in lead III.  Troponin was also noted to be abnormal at 0.7.  Based on these findings he was brought urgently to the cardiac catheterization laboratory where coronary angiogram showed normal coronary arteries.  Left ventricular angiogram showed evidence of apical wall motion abnormalities.  He subsequently underwent an echocardiogram which was performed on 5/24/2021 which showed normal left ventricular systolic function with an EF of 55%, normal diastolic function, a small PFO, and apical hypokinesis.  He underwent a cardiac MRI on 5/25/2021 which interestingly showed mild global hypokinesis with a mildly depressed left ventricular systolic function, with an EF of 40%, normal perfusion of the myocardium with no evidence of delayed enhancement, and mild right ventricular hypokinesis.    Based on the patient's presentations and the findings I felt that this is most consistent with myocarditis possibly leading to a cardiomyopathy.   Since there had been some reports of development of myocarditis following the COVID-19 vaccine I felt that this was the most likely case based on the timing of his recent vaccine shot.  He was placed on aspirin 81 mg and low-dose metoprolol succinate.  He was stable on these medications with complete resolution of his chest discomfort.    The patient was eager to be discharged.  Since he was now asymptomatic and otherwise stable he was discharged with plans for close follow-up in the office at which point he would need further titration of his medications including addition of ACE inhibitor or an ARB.    We will follow up in our office in 1 week with APRN with myself in about 4 weeks.    Procedures Performed  Procedure(s):  Left Heart Cath  Coronary angiography  Left ventriculography    Coronary Findings    Diagnostic  Dominance: Right  Left Main   Large-caliber vessel with 0% stenosis. The vessel bifurcates into left anterior descending and left circumflex arteries.   Left Anterior Descending   Large-caliber vessel with 0% proximal to mid stenosis. The proximal vessel gives rise to a small caliber first diagonal branch with no significant disease. The mid vessel gives rise to a large caliber second diagonal branch with 0% stenosis. The origin of the second diagonal branch the LAD tapers to a moderate caliber vessel with no significant disease. Distal LAD tapers to a small caliber with 0% stenosis and appears to reach the apex.   Left Circumflex   Large-caliber vessel with 0% proximal stenosis. Proximal vessel gives rise to a large caliber, branching first obtuse marginal branch with 0% stenosis. The distal continuation of the circumflex vessel tapers to a small caliber with 0% stenosis.   Right Coronary Artery   Large-caliber vessel with 0% proximal to distal stenosis. The distal vessel bifurcates into large caliber posterior descending and posterior lateral branch both with no significant disease. This is a right  dominant system.   Intervention    No interventions have been documented    Indications    ST elevation myocardial infarction (STEMI), unspecified artery (CMS/HCC) [I21.3 (ICD-10-CM)]   Pre Procedure Diagnosis    STEMI    Post Procedure Diagnosis    Normal coronary arteries   Suspected myocarditis      Conclusion    1.  Suspected myocarditis  2.  Normal coronary arteries   Recommendations    •     Based on his normal coronary arteries, and suspected distal anterior wall hypokinesis I suspect his presentation is due to myocarditis.  We will continue with aspirin.  Start low-dose beta-blocker as tolerates.  Check an echocardiogram and cardiac MRI.     ECHO   · Saline test results are positive with valsalva manuever consistent with a small PFO.  · Estimated left ventricular EF = 55% Left ventricular systolic function is normal.  · Left ventricular diastolic function was normal.  · The following left ventricular wall segments are hypokinetic: apical lateral and apex hypokinetic             Consults     Date and Time Order Name Status Description    5/24/2021  8:12 AM Interventional Cardiology (on-call MD unless specified) Completed             Discharge Physical Exam:    General Appearance No acute distress   Neck No adenopathy, supple, trachea midline, no thyromegaly, no carotid bruit, no JVD   Lungs Clear to auscultation,respirations regular, even and unlabored   Heart Regular rhythm and normal rate, normal S1 and S2, no murmur, no gallop, no rub, no click   Chest wall No abnormalities observed   Abdomen Normal bowel sounds, no masses, no hepatomegaly, soft   Extremities Moves all extremities well, no edema, no cyanosis, no redness   Neurological Alert and oriented x 3     Discharge Medications     Discharge Medications      New Medications      Instructions Start Date   aspirin 81 MG chewable tablet   81 mg, Oral, Daily   Start Date: May 26, 2021     metoprolol succinate XL 25 MG 24 hr tablet  Commonly known as:  TOPROL-XL   12.5 mg, Oral, Every 24 Hours Scheduled   Start Date: May 26, 2021        Continue These Medications      Instructions Start Date   FISH OIL PO   2 tablet/day, Oral      VITAMIN D PO   1 tablet/day, Oral             Discharge Diet:   Diet Instructions     Healthy heart diet        Healthy Heart    Activity at Discharge:   Activity Instructions     Activity as tolerated        No lifting > 5 pounds for 5 days    Discharge disposition: Home    Condition on Discharge: Stable    Follow-up Appointments  Future Appointments   Date Time Provider Department Center   6/8/2021 11:30 AM Radha Manley APRN [facility]     Additional Instructions for the Follow-ups that You Need to Schedule     Ambulatory Referral to Cardiac Rehab   As directed      Discharge Follow-up with PCP   As directed       Currently Documented PCP:    Provider, No Known    PCP Phone Number:    [phone]     Follow Up Details: Follow up with PCP in 1 month         Discharge Follow-up with Specialty: Follow up with Dr Weber or JUNI in 1 week.  Our ofice will call with appointment; 1 Week   As directed      Specialty: Follow up with Dr Weber or JUNI in 1 week.  Our ofice will call with appointment    Follow Up: 1 Week               Discharge time: 45 minutes       Josefa Weber MD  05/25/21  14:30 EDT

## 2024-11-12 NOTE — TELEPHONE ENCOUNTER
Last Office Visit: 3/22/2024 Provider:   Is provider OOT? No    Next Office Visit: 3/7/2025 Provider: [facility] [place]      LAST LABS:   CMP:   Lab Results   Component Value Date     11/04/2024    K 4.4 11/04/2024     11/04/2024    CO2 27 11/04/2024    BUN 24 (H) 11/04/2024    CREATININE 1.2 11/04/2024    GLUCOSE 124 (H) 11/04/2024    CALCIUM 9.5 11/04/2024    BILITOT 1.6 (H) 11/04/2024    ALKPHOS 89 11/04/2024    AST 31 11/04/2024    ALT 35 11/04/2024    LABGLOM 60 (A) 11/04/2024    GFRAA >60 09/10/2022    AGRATIO 2.4 (H) 11/04/2024    GLOB 3.0 09/07/2021          Lipid:   Lab Results   Component Value Date    CHOL 99 11/04/2024    TRIG 52 11/04/2024    HDL 43 11/04/2024    LDL 46 11/04/2024    VLDL 10 11/04/2024         Is encounter provider correct?   Yes  Does refill dosage match last filled?   Yes  Changes to script from Tele Encounters or LOV Plan?   no      Requested Prescriptions     Pending Prescriptions Disp Refills    atorvastatin (LIPITOR) 40 MG tablet [Pharmacy Med Name: Atorvastatin Calcium Oral Tablet 40 MG] 90 tablet 3     Sig: TAKE 1 TABLET EVERY NIGHT    metoprolol succinate (TOPROL XL) 50 MG extended release tablet [Pharmacy Med Name: Metoprolol Succinate ER Oral Tablet Extended Release 24 Hour 50 MG] 90 tablet 3     Sig: TAKE 1 TABLET EVERY DAY

## 2024-11-27 ENCOUNTER — OFFICE VISIT (OUTPATIENT)
Dept: FAMILY MEDICINE CLINIC | Age: 84
End: 2024-11-27

## 2024-11-27 VITALS
RESPIRATION RATE: 16 BRPM | BODY MASS INDEX: 25.09 KG/M2 | OXYGEN SATURATION: 98 % | DIASTOLIC BLOOD PRESSURE: 75 MMHG | WEIGHT: 170 LBS | TEMPERATURE: 97.1 F | SYSTOLIC BLOOD PRESSURE: 125 MMHG | HEART RATE: 76 BPM

## 2024-11-27 DIAGNOSIS — Z86.73 HISTORY OF STROKE: ICD-10-CM

## 2024-11-27 DIAGNOSIS — E11.9 TYPE 2 DIABETES MELLITUS WITHOUT COMPLICATION, WITHOUT LONG-TERM CURRENT USE OF INSULIN (HCC): Primary | ICD-10-CM

## 2024-11-27 DIAGNOSIS — Z12.5 PROSTATE CANCER SCREENING: ICD-10-CM

## 2024-11-27 DIAGNOSIS — I10 ESSENTIAL HYPERTENSION: ICD-10-CM

## 2024-11-27 SDOH — ECONOMIC STABILITY: FOOD INSECURITY: WITHIN THE PAST 12 MONTHS, THE FOOD YOU BOUGHT JUST DIDN'T LAST AND YOU DIDN'T HAVE MONEY TO GET MORE.: NEVER TRUE

## 2024-11-27 SDOH — ECONOMIC STABILITY: FOOD INSECURITY: WITHIN THE PAST 12 MONTHS, YOU WORRIED THAT YOUR FOOD WOULD RUN OUT BEFORE YOU GOT MONEY TO BUY MORE.: NEVER TRUE

## 2024-11-27 SDOH — ECONOMIC STABILITY: INCOME INSECURITY: HOW HARD IS IT FOR YOU TO PAY FOR THE VERY BASICS LIKE FOOD, HOUSING, MEDICAL CARE, AND HEATING?: NOT HARD AT ALL

## 2024-11-27 ASSESSMENT — ANXIETY QUESTIONNAIRES
1. FEELING NERVOUS, ANXIOUS, OR ON EDGE: NOT AT ALL
7. FEELING AFRAID AS IF SOMETHING AWFUL MIGHT HAPPEN: NOT AT ALL
4. TROUBLE RELAXING: NOT AT ALL
2. NOT BEING ABLE TO STOP OR CONTROL WORRYING: NOT AT ALL
GAD7 TOTAL SCORE: 0
IF YOU CHECKED OFF ANY PROBLEMS ON THIS QUESTIONNAIRE, HOW DIFFICULT HAVE THESE PROBLEMS MADE IT FOR YOU TO DO YOUR WORK, TAKE CARE OF THINGS AT HOME, OR GET ALONG WITH OTHER PEOPLE: NOT DIFFICULT AT ALL
5. BEING SO RESTLESS THAT IT IS HARD TO SIT STILL: NOT AT ALL
3. WORRYING TOO MUCH ABOUT DIFFERENT THINGS: NOT AT ALL
6. BECOMING EASILY ANNOYED OR IRRITABLE: NOT AT ALL

## 2024-11-27 ASSESSMENT — PATIENT HEALTH QUESTIONNAIRE - PHQ9
2. FEELING DOWN, DEPRESSED OR HOPELESS: NOT AT ALL
1. LITTLE INTEREST OR PLEASURE IN DOING THINGS: NOT AT ALL
SUM OF ALL RESPONSES TO PHQ QUESTIONS 1-9: 0
DEPRESSION UNABLE TO ASSESS: FUNCTIONAL CAPACITY MOTIVATION LIMITS ACCURACY
SUM OF ALL RESPONSES TO PHQ QUESTIONS 1-9: 0
SUM OF ALL RESPONSES TO PHQ9 QUESTIONS 1 & 2: 0
SUM OF ALL RESPONSES TO PHQ QUESTIONS 1-9: 0
SUM OF ALL RESPONSES TO PHQ QUESTIONS 1-9: 0

## 2024-11-27 NOTE — PROGRESS NOTES
11/27/2024    This is a 84 y.o. male   Chief Complaint   Patient presents with    Diabetes   .    HPI     Pt presents today for a diabetic follow-up. Currently taking Jardiance 10 mg daily    Morning glucose readings: Doesn't check     Denies fatigue or vision changes.    Labs from 11/04/2024 reviewed at today's visit, within normal limits except for BUN 24, GFR 60, glucose 124, total protein 6.1, and total bilirubin 1.6.    Last Eye Exam: July 2024-negative for diabetic retinopathy  Last Foot Exam: Today  Last Microalbumin:    Today's A1C = 6.6  08/27/2024 A1c = 6.8    Hypertension: Taking Lasix 20 mg every other day, Toprol-XL 50 mg daily, Entresto  twice daily, and spironolactone 25 mg daily, today's blood pressure 144/85, home readings 120's/70's, denies HA's    States that he stopped using his CPAP d/t  keeping him awake, tried 3 masks, has f/u in January 2025, nosebleeds have stopped   Past Medical History:   Diagnosis Date    Aortic aneurysm, abdominal (HCC) 01/2011    Appendicitis 12/06/2011    Arthritis 12/06/2011    Bruises easily     CAD (coronary artery disease) 09/2001    MI     CHF (congestive heart failure) (HCC)     Following CABG    Chronic back pain     Chronic pain of both shoulders 12/06/2011    joint - the shoulders hurt    Complication of anesthesia     woke up during appendectomy    Gout     Heart disease 12/06/2011    Hernia 12/06/2011    HIGH CHOLESTEROL 12/06/2011    Hyperlipidemia     Hypertension     Measles 12/06/2011    SUNDEEP (obstructive sleep apnea)     Pancreatitis 2006    History of     Persistent cough     Ringing in ears     Sinus disorder     Sleep deprivation 12/06/2001    loss of sleep       Orders Only on 11/04/2024   Component Date Value Ref Range Status    Iron 11/04/2024 72  59 - 158 ug/dL Final    TIBC 11/04/2024 330  260 - 445 ug/dL Final    Iron % Saturation 11/04/2024 22  20 - 50 % Final    WBC 11/04/2024 6.2  4.0 - 11.0 K/uL Final    RBC 11/04/2024 5.00  4.20 -

## 2024-12-03 ENCOUNTER — TELEPHONE (OUTPATIENT)
Dept: CARDIOLOGY CLINIC | Age: 84
End: 2024-12-03

## 2024-12-03 NOTE — TELEPHONE ENCOUNTER
Pt presented self in main suite.  Dropped off [employer] PA PPW for Entresto.  Placed ppw in [facility] folder to be completed and faxed

## 2024-12-16 ENCOUNTER — TELEPHONE (OUTPATIENT)
Dept: CARDIOLOGY CLINIC | Age: 84
End: 2024-12-16

## 2024-12-16 NOTE — TELEPHONE ENCOUNTER
Bill,  We received a fax from [employer] patient stickapps regarding the approval of your Entresto.    They are requiring that you have to contact the Extra Help Program-    They need the denial letter from them to complete processing your application.    To find out if you're eligible for the Extra Help Program-     You can - call  1-201.420.2299- Medicare,  Email-- www.medicare.gov/basics/costs/help/drug-costs    To apply for the Extra Help Program-  Call your local social cecurity office, or     Www.[facility].gov/medicare/part-d-extra-help    As soon as you get the denial letter from them, we need that to send to Jeri. (Entresto)  If you have any questions, please let us know.

## 2025-01-06 RX ORDER — SPIRONOLACTONE 25 MG/1
25 TABLET ORAL DAILY
Qty: 90 TABLET | Refills: 1 | Status: SHIPPED | OUTPATIENT
Start: 2025-01-06

## 2025-01-08 ENCOUNTER — TELEPHONE (OUTPATIENT)
Dept: FAMILY MEDICINE CLINIC | Age: 85
End: 2025-01-08

## 2025-01-08 RX ORDER — ALLOPURINOL 100 MG/1
100 TABLET ORAL DAILY
Qty: 90 TABLET | Refills: 1 | Status: SHIPPED | OUTPATIENT
Start: 2025-01-08

## 2025-01-08 NOTE — TELEPHONE ENCOUNTER
Pt is also asking that Dr Gonzalez send in a refill for his Allopurinol. He reached out to Dr Self's office and has not heard back and he is out of medication.    Please advise.

## 2025-01-08 NOTE — TELEPHONE ENCOUNTER
Patient needs a refill on Jardiance. They need a 90 day supply.     Mail order or local pharmacy: mail order    [facility]    Last OV: 11/27/24    Future Appointments   Date Time Provider Department [place]   1/28/2025 10:00 AM [facility]   2/27/2025  9:40 AM Foreign Gonzalez DO [facility]   3/7/2025  8:30 AM Geronimo Wall MD [facility]   4/17/2025 10:30 AM SAVANAH, BROOKE DEVICE CHECK [facility]   4/17/2025 10:30 AM Niecy Frank APRN - CNP [place] [facility]   9/23/2025  1:00 PM Luzma Garcia APRN - CNP [facility]

## 2025-02-24 NOTE — TELEPHONE ENCOUNTER
Gretel from [facility] called asking for a new order for   sacubitril-valsartan (ENTRESTO)  MG per tablet   Please send to   [place], [place] - [address] - P [phone] - F [phone]

## 2025-02-27 ENCOUNTER — OFFICE VISIT (OUTPATIENT)
Dept: FAMILY MEDICINE CLINIC | Age: 85
End: 2025-02-27

## 2025-02-27 VITALS
WEIGHT: 167 LBS | RESPIRATION RATE: 16 BRPM | HEIGHT: 69 IN | BODY MASS INDEX: 24.73 KG/M2 | HEART RATE: 80 BPM | SYSTOLIC BLOOD PRESSURE: 137 MMHG | TEMPERATURE: 97 F | OXYGEN SATURATION: 97 % | DIASTOLIC BLOOD PRESSURE: 74 MMHG

## 2025-02-27 DIAGNOSIS — Z00.00 MEDICARE ANNUAL WELLNESS VISIT, SUBSEQUENT: ICD-10-CM

## 2025-02-27 DIAGNOSIS — J96.01 ACUTE RESPIRATORY FAILURE WITH HYPOXIA (HCC): ICD-10-CM

## 2025-02-27 DIAGNOSIS — E11.9 TYPE 2 DIABETES MELLITUS WITHOUT COMPLICATION, WITHOUT LONG-TERM CURRENT USE OF INSULIN (HCC): Primary | ICD-10-CM

## 2025-02-27 DIAGNOSIS — I50.23 ACUTE ON CHRONIC SYSTOLIC (CONGESTIVE) HEART FAILURE (HCC): ICD-10-CM

## 2025-02-27 DIAGNOSIS — E11.9 TYPE 2 DIABETES MELLITUS WITHOUT COMPLICATION, WITHOUT LONG-TERM CURRENT USE OF INSULIN (HCC): ICD-10-CM

## 2025-02-27 DIAGNOSIS — M02.30 REACTIVE ARTHRITIS, UNSPECIFIED SITE (HCC): ICD-10-CM

## 2025-02-27 DIAGNOSIS — I48.19 PERSISTENT ATRIAL FIBRILLATION (HCC): ICD-10-CM

## 2025-02-27 DIAGNOSIS — I10 ESSENTIAL HYPERTENSION: ICD-10-CM

## 2025-02-27 LAB
CREAT UR-MCNC: 19.1 MG/DL (ref 39–259)
HBA1C MFR BLD: 6.3 %
MICROALBUMIN UR DL<=1MG/L-MCNC: <1.2 MG/DL
MICROALBUMIN/CREAT UR: ABNORMAL MG/G (ref 0–30)

## 2025-02-27 SDOH — ECONOMIC STABILITY: FOOD INSECURITY: WITHIN THE PAST 12 MONTHS, THE FOOD YOU BOUGHT JUST DIDN'T LAST AND YOU DIDN'T HAVE MONEY TO GET MORE.: NEVER TRUE

## 2025-02-27 SDOH — ECONOMIC STABILITY: FOOD INSECURITY: WITHIN THE PAST 12 MONTHS, YOU WORRIED THAT YOUR FOOD WOULD RUN OUT BEFORE YOU GOT MONEY TO BUY MORE.: NEVER TRUE

## 2025-02-27 ASSESSMENT — PATIENT HEALTH QUESTIONNAIRE - PHQ9
2. FEELING DOWN, DEPRESSED OR HOPELESS: NOT AT ALL
SUM OF ALL RESPONSES TO PHQ QUESTIONS 1-9: 0
SUM OF ALL RESPONSES TO PHQ9 QUESTIONS 1 & 2: 0
SUM OF ALL RESPONSES TO PHQ QUESTIONS 1-9: 0
1. LITTLE INTEREST OR PLEASURE IN DOING THINGS: NOT AT ALL

## 2025-02-27 NOTE — PROGRESS NOTES
Nutritional Supplements (ENSURE ACTIVE HIGH PROTEIN) LIQD Take 1 Bottle by mouth daily Yes Foreign Gonzalez DO   sacubitril-valsartan (ENTRESTO)  MG per tablet Take 1 tablet by mouth 2 times daily Yes Luzma Garcia, APRN - CNP   ACCU-CHEK MULTICLIX LANCETS MISC Test blood sugar qd  Patient taking differently: Test blood sugar qd    AS NEEDED Yes Foreign Gonzalez DO   glucose blood VI test strips (ACCU-CHEK ION) strip Test blood sugar qd. Yes Foreign Gonzalez DO       CareTeam (Including outside providers/suppliers regularly involved in providing care):   Patient Care Team:  Foreign Gonzalez DO as PCP - General (Family Medicine)  Foreign Gonzalez DO as PCP - Empaneled Provider  Nito Davis MD as Consulting Physician (Cardiology)     Recommendations for Preventive Services Due: see orders and patient instructions/AVS.  Recommended screening schedule for the next 5-10 years is provided to the patient in written form: see Patient Instructions/AVS.     Reviewed and updated this visit:  Tobacco  Allergies  Meds  Problems  Med Hx  Surg Hx  Fam Hx

## 2025-03-05 NOTE — TELEPHONE ENCOUNTER
LOV 09/23/2024 w/ NPRB  Next- 09/23/2025      LAST LABS:   CBC:   Lab Results   Component Value Date    WBC 6.2 11/04/2024    HGB 14.2 11/04/2024    HCT 44.6 11/04/2024    MCV 89.1 11/04/2024     11/04/2024     BMP:   Lab Results   Component Value Date/Time     11/04/2024 08:44 AM    K 4.4 11/04/2024 08:44 AM    K 3.5 08/03/2023 06:22 AM     11/04/2024 08:44 AM    CO2 27 11/04/2024 08:44 AM    BUN 24 11/04/2024 08:44 AM    CREATININE 1.2 11/04/2024 08:44 AM    GLUCOSE 124 11/04/2024 08:44 AM    CALCIUM 9.5 11/04/2024 08:44 AM    LABGLOM 60 11/04/2024 08:44 AM    LABGLOM >60 03/14/2024 10:26 AM       CMP:   Lab Results   Component Value Date     11/04/2024    K 4.4 11/04/2024     11/04/2024    CO2 27 11/04/2024    BUN 24 (H) 11/04/2024    CREATININE 1.2 11/04/2024    GLUCOSE 124 (H) 11/04/2024    CALCIUM 9.5 11/04/2024    BILITOT 1.6 (H) 11/04/2024    ALKPHOS 89 11/04/2024    AST 31 11/04/2024    ALT 35 11/04/2024    LABGLOM 60 (A) 11/04/2024    GFRAA >60 09/10/2022    AGRATIO 2.4 (H) 11/04/2024    GLOB 3.0 09/07/2021          BNP:   Lab Results   Component Value Date    BNP 92.9 02/03/2010             normal... No

## 2025-03-06 RX ORDER — SACUBITRIL AND VALSARTAN 97; 103 MG/1; MG/1
1 TABLET, FILM COATED ORAL 2 TIMES DAILY
Qty: 180 TABLET | Refills: 3 | Status: SHIPPED | OUTPATIENT
Start: 2025-03-06

## 2025-03-06 NOTE — PROGRESS NOTES
Patient was seen by the EP team 4/11/2023- discussed PPM-will hold off at this point for patient to follow up with podiatry but will plan to CSP pursue in the future.    Admitted 7/28/23 with shortness of breath, orthopnea, treated for heart failure , LVEF down to 35%, s/p LHC with PCI to Distal LAD on 8/2/23. S/p BiV ICD 8/2/23. Today he states he has SOB occasionally after standing up, last 3-4 seconds and resolves. SOB is overall getting better. States he has started walking for exercise and is now up to 3/4 of a mile. He has filled out patient assistance eliquis due to cost. He monitors his blood pressure at 105-120/60-70, heart rate in the 70-80's. He is tolerating his medications and is taking them as prescribed. He had some bleeding due to a scratch on his arm prior to stopping ASA therapy. Patient currently denies any weight gain, edema, palpitations, chest pain, dizziness, and syncope.   Today he states he has been feeling well since his last visit. He generally walks 1.5 miles daily. He rides his stationary bike in colder weather. His blood pressure at home is well controlled at home- 120/70. He is tolerating his medications and is taking them as prescribed. Patient currently denies any weight gain, edema, palpitations, chest pain, shortness of breath, dizziness, and syncope.           Past Medical History:   has a past medical history of Aortic aneurysm, abdominal, Appendicitis, Arthritis, Bruises easily, CAD (coronary artery disease), CHF (congestive heart failure) ([facility]), Chronic back pain, Chronic pain of both shoulders, Complication of anesthesia, Gout, Heart disease, Hernia, HIGH CHOLESTEROL, Hyperlipidemia, Hypertension, Measles, SUNDEEP (obstructive sleep apnea), Pancreatitis, Persistent cough, Ringing in ears, Sinus disorder, and Sleep deprivation.    Surgical History:   has a past surgical history that includes Coronary angioplasty with stent (2001 and 2002); Cardiac surgery (01/01/2008);

## 2025-03-07 ENCOUNTER — OFFICE VISIT (OUTPATIENT)
Dept: CARDIOLOGY CLINIC | Age: 85
End: 2025-03-07
Payer: MEDICARE

## 2025-03-07 VITALS
SYSTOLIC BLOOD PRESSURE: 144 MMHG | BODY MASS INDEX: 24.73 KG/M2 | DIASTOLIC BLOOD PRESSURE: 80 MMHG | HEIGHT: 69 IN | OXYGEN SATURATION: 100 % | WEIGHT: 167 LBS | HEART RATE: 82 BPM

## 2025-03-07 DIAGNOSIS — Z95.810 CARDIAC RESYNCHRONIZATION THERAPY DEFIBRILLATOR (CRT-D) IN PLACE: ICD-10-CM

## 2025-03-07 DIAGNOSIS — E78.5 HYPERLIPIDEMIA, UNSPECIFIED HYPERLIPIDEMIA TYPE: ICD-10-CM

## 2025-03-07 DIAGNOSIS — I65.23 BILATERAL CAROTID ARTERY STENOSIS: ICD-10-CM

## 2025-03-07 DIAGNOSIS — I10 PRIMARY HYPERTENSION: ICD-10-CM

## 2025-03-07 DIAGNOSIS — I25.10 CAD IN NATIVE ARTERY: Primary | ICD-10-CM

## 2025-03-07 DIAGNOSIS — E11.9 TYPE 2 DIABETES MELLITUS WITHOUT COMPLICATION, WITHOUT LONG-TERM CURRENT USE OF INSULIN (HCC): ICD-10-CM

## 2025-03-07 LAB
CHOLEST SERPL-MCNC: 99 MG/DL (ref 0–199)
HDLC SERPL-MCNC: 40 MG/DL (ref 40–60)
LDLC SERPL CALC-MCNC: 45 MG/DL
TRIGL SERPL-MCNC: 68 MG/DL (ref 0–150)
TSH SERPL DL<=0.005 MIU/L-ACNC: 1.45 UIU/ML (ref 0.27–4.2)
VLDLC SERPL CALC-MCNC: 14 MG/DL

## 2025-03-07 PROCEDURE — 3077F SYST BP >= 140 MM HG: CPT | Performed by: INTERNAL MEDICINE

## 2025-03-07 PROCEDURE — 99214 OFFICE O/P EST MOD 30 MIN: CPT | Performed by: INTERNAL MEDICINE

## 2025-03-07 PROCEDURE — 1036F TOBACCO NON-USER: CPT | Performed by: INTERNAL MEDICINE

## 2025-03-07 PROCEDURE — G8427 DOCREV CUR MEDS BY ELIG CLIN: HCPCS | Performed by: INTERNAL MEDICINE

## 2025-03-07 PROCEDURE — 1159F MED LIST DOCD IN RCRD: CPT | Performed by: INTERNAL MEDICINE

## 2025-03-07 PROCEDURE — 1123F ACP DISCUSS/DSCN MKR DOCD: CPT | Performed by: INTERNAL MEDICINE

## 2025-03-07 PROCEDURE — 3079F DIAST BP 80-89 MM HG: CPT | Performed by: INTERNAL MEDICINE

## 2025-03-07 PROCEDURE — G8420 CALC BMI NORM PARAMETERS: HCPCS | Performed by: INTERNAL MEDICINE

## 2025-03-07 RX ORDER — ATORVASTATIN CALCIUM 40 MG/1
40 TABLET, FILM COATED ORAL NIGHTLY
Qty: 90 TABLET | Refills: 3 | Status: SHIPPED | OUTPATIENT
Start: 2025-03-07

## 2025-03-07 RX ORDER — SPIRONOLACTONE 25 MG/1
25 TABLET ORAL DAILY
Qty: 90 TABLET | Refills: 3 | Status: SHIPPED | OUTPATIENT
Start: 2025-03-07

## 2025-03-07 RX ORDER — CLOPIDOGREL BISULFATE 75 MG/1
75 TABLET ORAL DAILY
Qty: 90 TABLET | Refills: 3 | Status: SHIPPED | OUTPATIENT
Start: 2025-03-07

## 2025-03-07 RX ORDER — METOPROLOL SUCCINATE 50 MG/1
50 TABLET, EXTENDED RELEASE ORAL DAILY
Qty: 90 TABLET | Refills: 3 | Status: SHIPPED | OUTPATIENT
Start: 2025-03-07

## 2025-03-07 NOTE — PATIENT INSTRUCTIONS
~Repeat carotid dopplers     Cardiac medications reviewed including indications and pertinent side effects. Medication list updated at this visit.   Patient verbalizes understanding of the need for treatment and education has been provided at today's visit. Additional education material will be provided in after visit summary.    Check blood pressure and heart rate at home a few times per week- keep a log with dates and times and bring to office visit   Regular exercise and following a healthy diet encouraged   Follow up with me in 1 year

## 2025-03-08 ENCOUNTER — RESULTS FOLLOW-UP (OUTPATIENT)
Dept: FAMILY MEDICINE CLINIC | Age: 85
End: 2025-03-08

## 2025-03-20 ENCOUNTER — RESULTS FOLLOW-UP (OUTPATIENT)
Dept: CARDIOLOGY CLINIC | Age: 85
End: 2025-03-20

## 2025-03-20 NOTE — TELEPHONE ENCOUNTER
Pt sts he is home now and can discuss results. Medical staff not available at time of call. Please call again.

## 2025-03-20 NOTE — TELEPHONE ENCOUNTER
Called and left  for patient to call back. When he calls back let him know-     Carotid dopplers showed mild plaque. He has a hypoechoic structure located superficially in the right lateral neck. He should follow up with his PCP regarding this,        Mild (<50%) stenosis in the right internal carotid artery.  Heterogeneous and calcific plaque in the right internal carotid artery.    Mild (<50%) stenosis in the left internal carotid artery.  Heterogeneous and calcific plaque in the left internal carotid artery.    Normal antegrade flow involving the right vertebral artery.    Normal antegrade flow involving the left vertebral artery.    >50% bilateral ECA stenosis.    Right Carotid: Incidental finding of a non-vascular, round, hypoechoic structure located superficially in the right lateral neck, as described below.

## 2025-04-15 NOTE — PROGRESS NOTES
[facility]   Electrophysiology Outpatient Note              Date:  April 17, 2025  Patient name: Bill Crow  YOB: 1940    Primary Care physician: Foreign Gonzalez DO    HISTORY OF PRESENT ILLNESS: The patient is a 84 y.o.  male with a history of AF, intermittent CHB, NSVT, CAD, ischemic cardiomyopathy, CHF, HTN, HLD, CVD, GERD, GI bleed, aortic aneurysm and SUNDEEP.   In 9/2022, patient was admitted with aphasia.  EKG showed rate controlled atrial fibrillation.  Echo showed an EF of 50 to 55% and small PFO.  Stress test was abnormal.  Brain MRI showed small acute infarct.  She showed atrial fibrillation with rates in the 40 to 60 bpm range.  He wore an event monitor that showed persistent atrial fibrillation with an average heart rate of 48, occasional PVCs and brief NSVT.  In 10/2022, a cardiac cath was recommended.  In 1/2023, he had an LHC with PCI of the distal SVG to OM1 with sequential to OM 2.  Postprocedure he developed pulmonary edema and GI bleeding which required EGD.  In 3/2023, he was admitted for lower extremity edema.  He was treated for gout.  He wore an event monitor that showed 80% A-fib burden with slow ventricular rates, sinus bradycardia, PVCs, intermittent complete heart block and NSVT.  Pacemaker placement and Watchman referral were recommended.  In 7/2023, he was admitted with CHF.  Echo showed an EF of 35%.  He had an LHC with PCI of the native distal LAD through LIMA with AMBER.  After his LHC, he had a BiV ICD implanted. In 10/2023, echo showed an EF of 40-45%.   Today he is being seen for AF and cardiomyopathy. EKG shows AS  with a HR of 67, underlying AF.  He is feeling well.  Denies chest pain, shortness of breath, palpitations or dizziness.  He stopped wearing his CPAP a few months ago due to nightly nosebleeds.  Since he has not been wearing his CPAP, he has not had any recurrent epistaxis.  He reports that he is sleeping well and does not have

## 2025-04-17 ENCOUNTER — CLINICAL SUPPORT (OUTPATIENT)
Dept: CARDIOLOGY CLINIC | Age: 85
End: 2025-04-17

## 2025-04-17 ENCOUNTER — OFFICE VISIT (OUTPATIENT)
Dept: CARDIOLOGY CLINIC | Age: 85
End: 2025-04-17
Payer: MEDICARE

## 2025-04-17 VITALS
WEIGHT: 168.43 LBS | OXYGEN SATURATION: 99 % | DIASTOLIC BLOOD PRESSURE: 62 MMHG | BODY MASS INDEX: 24.95 KG/M2 | SYSTOLIC BLOOD PRESSURE: 120 MMHG | HEART RATE: 67 BPM | HEIGHT: 69 IN

## 2025-04-17 DIAGNOSIS — Z95.810 CARDIAC RESYNCHRONIZATION THERAPY DEFIBRILLATOR (CRT-D) IN PLACE: Primary | ICD-10-CM

## 2025-04-17 DIAGNOSIS — Z95.810 CARDIAC RESYNCHRONIZATION THERAPY DEFIBRILLATOR (CRT-D) IN PLACE: ICD-10-CM

## 2025-04-17 DIAGNOSIS — I44.30 AV BLOCK: ICD-10-CM

## 2025-04-17 DIAGNOSIS — I25.5 ISCHEMIC CARDIOMYOPATHY: ICD-10-CM

## 2025-04-17 DIAGNOSIS — R00.1 BRADYCARDIA: ICD-10-CM

## 2025-04-17 DIAGNOSIS — I44.2 INTERMITTENT COMPLETE HEART BLOCK (HCC): ICD-10-CM

## 2025-04-17 DIAGNOSIS — I48.19 PERSISTENT ATRIAL FIBRILLATION (HCC): Primary | ICD-10-CM

## 2025-04-17 DIAGNOSIS — I10 ESSENTIAL HYPERTENSION: ICD-10-CM

## 2025-04-17 DIAGNOSIS — I44.2 COMPLETE HEART BLOCK (HCC): ICD-10-CM

## 2025-04-17 PROCEDURE — 3074F SYST BP LT 130 MM HG: CPT | Performed by: NURSE PRACTITIONER

## 2025-04-17 PROCEDURE — G2211 COMPLEX E/M VISIT ADD ON: HCPCS | Performed by: NURSE PRACTITIONER

## 2025-04-17 PROCEDURE — 1123F ACP DISCUSS/DSCN MKR DOCD: CPT | Performed by: NURSE PRACTITIONER

## 2025-04-17 PROCEDURE — G8427 DOCREV CUR MEDS BY ELIG CLIN: HCPCS | Performed by: NURSE PRACTITIONER

## 2025-04-17 PROCEDURE — G8420 CALC BMI NORM PARAMETERS: HCPCS | Performed by: NURSE PRACTITIONER

## 2025-04-17 PROCEDURE — 99214 OFFICE O/P EST MOD 30 MIN: CPT | Performed by: NURSE PRACTITIONER

## 2025-04-17 PROCEDURE — 1159F MED LIST DOCD IN RCRD: CPT | Performed by: NURSE PRACTITIONER

## 2025-04-17 PROCEDURE — 93000 ELECTROCARDIOGRAM COMPLETE: CPT | Performed by: NURSE PRACTITIONER

## 2025-04-17 PROCEDURE — 3078F DIAST BP <80 MM HG: CPT | Performed by: NURSE PRACTITIONER

## 2025-04-17 PROCEDURE — 1160F RVW MEDS BY RX/DR IN RCRD: CPT | Performed by: NURSE PRACTITIONER

## 2025-04-17 PROCEDURE — 1036F TOBACCO NON-USER: CPT | Performed by: NURSE PRACTITIONER

## 2025-04-17 NOTE — PATIENT INSTRUCTIONS
No changes today     Monitor BP at home and call if consistently out of goal ranges    Remote device transmissions every three months     Continue to monitor weight     Maintain adequate hydration     Compression stockings with exercise     Follow up in one year or sooner if needed

## 2025-06-03 ENCOUNTER — OFFICE VISIT (OUTPATIENT)
Dept: FAMILY MEDICINE CLINIC | Age: 85
End: 2025-06-03

## 2025-06-03 VITALS
TEMPERATURE: 97.1 F | RESPIRATION RATE: 16 BRPM | WEIGHT: 169.4 LBS | BODY MASS INDEX: 25 KG/M2 | DIASTOLIC BLOOD PRESSURE: 70 MMHG | SYSTOLIC BLOOD PRESSURE: 125 MMHG

## 2025-06-03 DIAGNOSIS — I10 ESSENTIAL HYPERTENSION: ICD-10-CM

## 2025-06-03 DIAGNOSIS — E11.9 TYPE 2 DIABETES MELLITUS WITHOUT COMPLICATION, WITHOUT LONG-TERM CURRENT USE OF INSULIN (HCC): Primary | ICD-10-CM

## 2025-06-03 DIAGNOSIS — R22.1 SUBCUTANEOUS MASS OF NECK: ICD-10-CM

## 2025-06-03 LAB — HBA1C MFR BLD: 6.6 %

## 2025-06-03 RX ORDER — APIXABAN 5 MG/1
5 TABLET, FILM COATED ORAL 2 TIMES DAILY
Qty: 180 TABLET | Refills: 3 | Status: SHIPPED | OUTPATIENT
Start: 2025-06-03

## 2025-06-03 ASSESSMENT — PATIENT HEALTH QUESTIONNAIRE - PHQ9
1. LITTLE INTEREST OR PLEASURE IN DOING THINGS: NOT AT ALL
SUM OF ALL RESPONSES TO PHQ QUESTIONS 1-9: 0
2. FEELING DOWN, DEPRESSED OR HOPELESS: NOT AT ALL

## 2025-06-03 NOTE — PROGRESS NOTES
6/3/2025    This is a 84 y.o. male   Chief Complaint   Patient presents with    3 Month Follow-Up     Diabetes    .    HPI     Pt presents today for a diabetic follow-up. Currently taking Jardiance 10 mg daily    Morning glucose readings: Doesn't check    Denies fatigue or vision changes.    Last Eye Exam: June 2024 - negative for DR, has appt this month  Last Foot Exam: 02/27/25  Last Microalbumin: 02/27/25    Today's A1C = 6.6  02/27/25 A1C = 6.3    HTN: taking Lasix 20 mg daily, Toprol XL 50 mg daily, Entresto  BID, and Spironolactone 25 mg daily, today's /70, home BP good, admits to mild LH when sitting or standing a lot    Past Medical History:   Diagnosis Date    Aortic aneurysm, abdominal 01/2011    Appendicitis 12/06/2011    Arthritis 12/06/2011    Bruises easily     CAD (coronary artery disease) 09/2001    MI     CHF (congestive heart failure) (HCC)     Following CABG    Chronic back pain     Chronic pain of both shoulders 12/06/2011    joint - the shoulders hurt    Complication of anesthesia     woke up during appendectomy    Gout     Heart disease 12/06/2011    Hernia 12/06/2011    HIGH CHOLESTEROL 12/06/2011    Hyperlipidemia     Hypertension     Measles 12/06/2011    SUNDEEP (obstructive sleep apnea)     Pancreatitis 2006    History of     Persistent cough     Ringing in ears     Sinus disorder     Sleep deprivation 12/06/2001    loss of sleep       Ancillary Procedure on 03/20/2025   Component Date Value Ref Range Status    Left CCA dist PSV 03/20/2025 84.9  cm/s Final    Left CCA dist EDV 03/20/2025 23.2  cm/s Final    Left CCA mid PSV 03/20/2025 76.00  cm/s Final    Left CCA mid EDV 03/20/2025 20.80  cm/s Final    Left CCA prox PSV 03/20/2025 77.8  cm/s Final    Left CCA prox EDV 03/20/2025 22.6  cm/s Final    Left ICA dist PSV 03/20/2025 127.0  cm/s Final    Left ICA dist EDV 03/20/2025 34.0  cm/s Final    Left ICA mid PSV 03/20/2025 112.0  cm/s Final    Left ICA mid EDV 03/20/2025 37.3

## 2025-06-03 NOTE — TELEPHONE ENCOUNTER
Last Office Visit: 4/17/2025 Provider: NPAM  **Is provider OOT? No    Next Office Visit: NONE Provider: NPAM  **If no OV, when does pt need to be seen? in 1 year(s)  **Has patient already had 30 day supply? No    Lab orders needed? no   Encounter provider correct? Yes If not, change provider  Script changes since last refill? no    LAST LABS:   CBC:  Lab Results   Component Value Date    WBC 6.2 11/04/2024    HGB 14.2 11/04/2024    HCT 44.6 11/04/2024    MCV 89.1 11/04/2024     11/04/2024    LYMPHOPCT 23.4 11/04/2024    RBC 5.00 11/04/2024    MCH 28.5 11/04/2024    MCHC 31.9 11/04/2024    RDW 22.8 (H) 11/04/2024       BMP:  Lab Results   Component Value Date/Time     11/04/2024 08:44 AM    K 4.4 11/04/2024 08:44 AM    K 3.5 08/03/2023 06:22 AM     11/04/2024 08:44 AM    CO2 27 11/04/2024 08:44 AM    BUN 24 11/04/2024 08:44 AM    CREATININE 1.2 11/04/2024 08:44 AM    GLUCOSE 124 11/04/2024 08:44 AM    CALCIUM 9.5 11/04/2024 08:44 AM    LABGLOM 60 11/04/2024 08:44 AM    LABGLOM >60 03/14/2024 10:26 AM

## 2025-06-11 RX ORDER — ALLOPURINOL 100 MG/1
100 TABLET ORAL DAILY
Qty: 90 TABLET | Refills: 1 | Status: SHIPPED | OUTPATIENT
Start: 2025-06-11

## 2025-06-11 RX ORDER — EMPAGLIFLOZIN 10 MG/1
10 TABLET, FILM COATED ORAL DAILY
Qty: 90 TABLET | Refills: 1 | Status: SHIPPED | OUTPATIENT
Start: 2025-06-11

## 2025-06-11 NOTE — TELEPHONE ENCOUNTER
LOV 6/3/2025    Future Appointments   Date Time Provider Department Center   9/3/2025  9:20 AM Foreign Gonzalez DO [place] [facility] ECC DEP   9/23/2025  1:00 PM Luzma Garcia, APRN - CNP [place] [facility]

## 2025-07-01 PROCEDURE — 93297 REM INTERROG DEV EVAL ICPMS: CPT | Performed by: NURSE PRACTITIONER

## 2025-07-08 ENCOUNTER — APPOINTMENT (OUTPATIENT)
Dept: CT IMAGING | Age: 85
End: 2025-07-08
Payer: MEDICARE

## 2025-07-08 ENCOUNTER — ANCILLARY PROCEDURE (OUTPATIENT)
Dept: EMERGENCY DEPT | Age: 85
End: 2025-07-08
Attending: STUDENT IN AN ORGANIZED HEALTH CARE EDUCATION/TRAINING PROGRAM
Payer: MEDICARE

## 2025-07-08 ENCOUNTER — APPOINTMENT (OUTPATIENT)
Dept: GENERAL RADIOLOGY | Age: 85
End: 2025-07-08
Payer: MEDICARE

## 2025-07-08 ENCOUNTER — HOSPITAL ENCOUNTER (INPATIENT)
Age: 85
LOS: 10 days | Discharge: HOME | End: 2025-07-18
Attending: STUDENT IN AN ORGANIZED HEALTH CARE EDUCATION/TRAINING PROGRAM | Admitting: INTERNAL MEDICINE
Payer: MEDICARE

## 2025-07-08 DIAGNOSIS — R09.02 HYPOXEMIA: ICD-10-CM

## 2025-07-08 DIAGNOSIS — I50.9 ACUTE ON CHRONIC CONGESTIVE HEART FAILURE, UNSPECIFIED HEART FAILURE TYPE (HCC): Primary | ICD-10-CM

## 2025-07-08 DIAGNOSIS — R60.9 EDEMA, UNSPECIFIED TYPE: ICD-10-CM

## 2025-07-08 DIAGNOSIS — R80.9 PROTEINURIA, UNSPECIFIED TYPE: ICD-10-CM

## 2025-07-08 DIAGNOSIS — J18.9 PNEUMONIA OF RIGHT LOWER LOBE DUE TO INFECTIOUS ORGANISM: ICD-10-CM

## 2025-07-08 DIAGNOSIS — R11.2 NAUSEA AND VOMITING, UNSPECIFIED VOMITING TYPE: ICD-10-CM

## 2025-07-08 DIAGNOSIS — A41.9 SEPSIS, DUE TO UNSPECIFIED ORGANISM, UNSPECIFIED WHETHER ACUTE ORGAN DYSFUNCTION PRESENT (HCC): ICD-10-CM

## 2025-07-08 DIAGNOSIS — E87.6 HYPOKALEMIA: ICD-10-CM

## 2025-07-08 LAB
ALBUMIN SERPL-MCNC: 4.1 G/DL (ref 3.4–5)
ALBUMIN/GLOB SERPL: 1.7 {RATIO} (ref 1.1–2.2)
ALP SERPL-CCNC: 58 U/L (ref 40–129)
ALT SERPL-CCNC: 16 U/L (ref 10–40)
AMORPH SED URNS QL MICRO: ABNORMAL /HPF
ANION GAP SERPL CALCULATED.3IONS-SCNC: 19 MMOL/L (ref 3–16)
AST SERPL-CCNC: 22 U/L (ref 15–37)
BACTERIA URNS QL MICRO: ABNORMAL /HPF
BASE EXCESS BLDV CALC-SCNC: 3.4 MMOL/L (ref -3–3)
BASOPHILS # BLD: 0 K/UL (ref 0–0.2)
BASOPHILS NFR BLD: 0.2 %
BILIRUB SERPL-MCNC: 2.1 MG/DL (ref 0–1)
BILIRUB UR QL STRIP.AUTO: ABNORMAL
BUN SERPL-MCNC: 29 MG/DL (ref 7–20)
CALCIUM SERPL-MCNC: 9 MG/DL (ref 8.3–10.6)
CHLORIDE SERPL-SCNC: 95 MMOL/L (ref 99–110)
CK SERPL-CCNC: 134 U/L (ref 39–308)
CLARITY UR: CLEAR
CO2 BLDV-SCNC: 26 MMOL/L
CO2 SERPL-SCNC: 23 MMOL/L (ref 21–32)
COHGB MFR BLDV: 3.5 % (ref 0–1.5)
COLOR UR: YELLOW
CREAT SERPL-MCNC: 1.4 MG/DL (ref 0.8–1.3)
DEPRECATED RDW RBC AUTO: 15.6 % (ref 12.4–15.4)
EOSINOPHIL # BLD: 0 K/UL (ref 0–0.6)
EOSINOPHIL NFR BLD: 0 %
EPI CELLS #/AREA URNS HPF: ABNORMAL /HPF (ref 0–5)
FLUAV RNA RESP QL NAA+PROBE: NOT DETECTED
FLUBV RNA RESP QL NAA+PROBE: NOT DETECTED
GFR SERPLBLD CREATININE-BSD FMLA CKD-EPI: 49 ML/MIN/{1.73_M2}
GLUCOSE SERPL-MCNC: 152 MG/DL (ref 70–99)
GLUCOSE UR STRIP.AUTO-MCNC: >=1000 MG/DL
HCO3 BLDV-SCNC: 25.5 MMOL/L (ref 23–29)
HCT VFR BLD AUTO: 38.7 % (ref 40.5–52.5)
HGB BLD-MCNC: 12.9 G/DL (ref 13.5–17.5)
HGB UR QL STRIP.AUTO: ABNORMAL
KETONES UR STRIP.AUTO-MCNC: 40 MG/DL
LACTATE BLDV-SCNC: 1.9 MMOL/L (ref 0.4–2)
LEUKOCYTE ESTERASE UR QL STRIP.AUTO: NEGATIVE
LIPASE SERPL-CCNC: 20 U/L (ref 13–60)
LYMPHOCYTES # BLD: 0.5 K/UL (ref 1–5.1)
LYMPHOCYTES NFR BLD: 4 %
MAGNESIUM SERPL-MCNC: 2.01 MG/DL (ref 1.8–2.4)
MCH RBC QN AUTO: 31.6 PG (ref 26–34)
MCHC RBC AUTO-ENTMCNC: 33.2 G/DL (ref 31–36)
MCV RBC AUTO: 95 FL (ref 80–100)
METHGB MFR BLDV: 0.4 %
MONOCYTES # BLD: 1 K/UL (ref 0–1.3)
MONOCYTES NFR BLD: 7.9 %
NEUTROPHILS # BLD: 11.3 K/UL (ref 1.7–7.7)
NEUTROPHILS NFR BLD: 87.9 %
NITRITE UR QL STRIP.AUTO: NEGATIVE
NT-PROBNP SERPL-MCNC: 7942 PG/ML (ref 0–449)
O2 THERAPY: ABNORMAL
PCO2 BLDV: 31 MMHG (ref 40–50)
PH BLDV: 7.53 [PH] (ref 7.35–7.45)
PH UR STRIP.AUTO: 5.5 [PH] (ref 5–8)
PLATELET # BLD AUTO: 131 K/UL (ref 135–450)
PMV BLD AUTO: 8.4 FL (ref 5–10.5)
PO2 BLDV: 41.1 MMHG (ref 25–40)
POTASSIUM SERPL-SCNC: 3 MMOL/L (ref 3.5–5.1)
PROT SERPL-MCNC: 6.5 G/DL (ref 6.4–8.2)
PROT UR STRIP.AUTO-MCNC: 100 MG/DL
RBC # BLD AUTO: 4.08 M/UL (ref 4.2–5.9)
RBC #/AREA URNS HPF: ABNORMAL /HPF (ref 0–4)
SAO2 % BLDV: 82 %
SARS-COV-2 RNA RESP QL NAA+PROBE: NOT DETECTED
SODIUM SERPL-SCNC: 137 MMOL/L (ref 136–145)
SP GR UR STRIP.AUTO: 1.02 (ref 1–1.03)
TROPONIN, HIGH SENSITIVITY: 52 NG/L (ref 0–22)
TROPONIN, HIGH SENSITIVITY: 52 NG/L (ref 0–22)
UA COMPLETE W REFLEX CULTURE PNL UR: ABNORMAL
UA DIPSTICK W REFLEX MICRO PNL UR: YES
URN SPEC COLLECT METH UR: ABNORMAL
UROBILINOGEN UR STRIP-ACNC: 0.2 E.U./DL
WBC # BLD AUTO: 12.9 K/UL (ref 4–11)
WBC #/AREA URNS HPF: ABNORMAL /HPF (ref 0–5)

## 2025-07-08 PROCEDURE — 96365 THER/PROPH/DIAG IV INF INIT: CPT

## 2025-07-08 PROCEDURE — 71260 CT THORAX DX C+: CPT

## 2025-07-08 PROCEDURE — 6370000000 HC RX 637 (ALT 250 FOR IP): Performed by: STUDENT IN AN ORGANIZED HEALTH CARE EDUCATION/TRAINING PROGRAM

## 2025-07-08 PROCEDURE — 6370000000 HC RX 637 (ALT 250 FOR IP): Performed by: INTERNAL MEDICINE

## 2025-07-08 PROCEDURE — 82550 ASSAY OF CK (CPK): CPT

## 2025-07-08 PROCEDURE — 6360000004 HC RX CONTRAST MEDICATION: Performed by: STUDENT IN AN ORGANIZED HEALTH CARE EDUCATION/TRAINING PROGRAM

## 2025-07-08 PROCEDURE — 82803 BLOOD GASES ANY COMBINATION: CPT

## 2025-07-08 PROCEDURE — 96375 TX/PRO/DX INJ NEW DRUG ADDON: CPT

## 2025-07-08 PROCEDURE — 83605 ASSAY OF LACTIC ACID: CPT

## 2025-07-08 PROCEDURE — 80053 COMPREHEN METABOLIC PANEL: CPT

## 2025-07-08 PROCEDURE — 94640 AIRWAY INHALATION TREATMENT: CPT

## 2025-07-08 PROCEDURE — 6360000002 HC RX W HCPCS: Performed by: INTERNAL MEDICINE

## 2025-07-08 PROCEDURE — 84484 ASSAY OF TROPONIN QUANT: CPT

## 2025-07-08 PROCEDURE — 93308 TTE F-UP OR LMTD: CPT

## 2025-07-08 PROCEDURE — 99285 EMERGENCY DEPT VISIT HI MDM: CPT

## 2025-07-08 PROCEDURE — 93005 ELECTROCARDIOGRAM TRACING: CPT | Performed by: STUDENT IN AN ORGANIZED HEALTH CARE EDUCATION/TRAINING PROGRAM

## 2025-07-08 PROCEDURE — 83690 ASSAY OF LIPASE: CPT

## 2025-07-08 PROCEDURE — 85025 COMPLETE CBC W/AUTO DIFF WBC: CPT

## 2025-07-08 PROCEDURE — 2500000003 HC RX 250 WO HCPCS: Performed by: STUDENT IN AN ORGANIZED HEALTH CARE EDUCATION/TRAINING PROGRAM

## 2025-07-08 PROCEDURE — 2580000003 HC RX 258: Performed by: INTERNAL MEDICINE

## 2025-07-08 PROCEDURE — 96366 THER/PROPH/DIAG IV INF ADDON: CPT

## 2025-07-08 PROCEDURE — 1200000000 HC SEMI PRIVATE

## 2025-07-08 PROCEDURE — 2700000000 HC OXYGEN THERAPY PER DAY

## 2025-07-08 PROCEDURE — 2580000003 HC RX 258: Performed by: STUDENT IN AN ORGANIZED HEALTH CARE EDUCATION/TRAINING PROGRAM

## 2025-07-08 PROCEDURE — 83735 ASSAY OF MAGNESIUM: CPT

## 2025-07-08 PROCEDURE — 6360000002 HC RX W HCPCS: Performed by: STUDENT IN AN ORGANIZED HEALTH CARE EDUCATION/TRAINING PROGRAM

## 2025-07-08 PROCEDURE — 74176 CT ABD & PELVIS W/O CONTRAST: CPT

## 2025-07-08 PROCEDURE — 94761 N-INVAS EAR/PLS OXIMETRY MLT: CPT

## 2025-07-08 PROCEDURE — 83880 ASSAY OF NATRIURETIC PEPTIDE: CPT

## 2025-07-08 PROCEDURE — 81001 URINALYSIS AUTO W/SCOPE: CPT

## 2025-07-08 PROCEDURE — 36415 COLL VENOUS BLD VENIPUNCTURE: CPT

## 2025-07-08 PROCEDURE — 2500000003 HC RX 250 WO HCPCS: Performed by: INTERNAL MEDICINE

## 2025-07-08 PROCEDURE — 87636 SARSCOV2 & INF A&B AMP PRB: CPT

## 2025-07-08 PROCEDURE — 71046 X-RAY EXAM CHEST 2 VIEWS: CPT

## 2025-07-08 RX ORDER — POLYETHYLENE GLYCOL 3350 17 G/17G
17 POWDER, FOR SOLUTION ORAL DAILY PRN
Status: DISCONTINUED | OUTPATIENT
Start: 2025-07-08 | End: 2025-07-18 | Stop reason: HOSPADM

## 2025-07-08 RX ORDER — CLOPIDOGREL BISULFATE 75 MG/1
75 TABLET ORAL DAILY
Status: DISCONTINUED | OUTPATIENT
Start: 2025-07-08 | End: 2025-07-18 | Stop reason: HOSPADM

## 2025-07-08 RX ORDER — PANTOPRAZOLE SODIUM 40 MG/1
40 TABLET, DELAYED RELEASE ORAL
Status: DISCONTINUED | OUTPATIENT
Start: 2025-07-09 | End: 2025-07-18 | Stop reason: HOSPADM

## 2025-07-08 RX ORDER — SODIUM CHLORIDE 9 MG/ML
INJECTION, SOLUTION INTRAVENOUS CONTINUOUS
Status: DISCONTINUED | OUTPATIENT
Start: 2025-07-08 | End: 2025-07-13

## 2025-07-08 RX ORDER — ONDANSETRON 2 MG/ML
4 INJECTION INTRAMUSCULAR; INTRAVENOUS EVERY 6 HOURS PRN
Status: DISCONTINUED | OUTPATIENT
Start: 2025-07-08 | End: 2025-07-18 | Stop reason: HOSPADM

## 2025-07-08 RX ORDER — ATORVASTATIN CALCIUM 40 MG/1
40 TABLET, FILM COATED ORAL NIGHTLY
Status: DISCONTINUED | OUTPATIENT
Start: 2025-07-08 | End: 2025-07-18 | Stop reason: HOSPADM

## 2025-07-08 RX ORDER — METOPROLOL SUCCINATE 50 MG/1
50 TABLET, EXTENDED RELEASE ORAL DAILY
Status: DISCONTINUED | OUTPATIENT
Start: 2025-07-08 | End: 2025-07-18 | Stop reason: HOSPADM

## 2025-07-08 RX ORDER — AZITHROMYCIN 250 MG/1
500 TABLET, FILM COATED ORAL EVERY 24 HOURS
Status: CANCELLED | OUTPATIENT
Start: 2025-07-08 | End: 2025-07-11

## 2025-07-08 RX ORDER — ONDANSETRON 4 MG/1
4 TABLET, ORALLY DISINTEGRATING ORAL EVERY 8 HOURS PRN
Status: DISCONTINUED | OUTPATIENT
Start: 2025-07-08 | End: 2025-07-18 | Stop reason: HOSPADM

## 2025-07-08 RX ORDER — ONDANSETRON 2 MG/ML
4 INJECTION INTRAMUSCULAR; INTRAVENOUS ONCE
Status: COMPLETED | OUTPATIENT
Start: 2025-07-08 | End: 2025-07-08

## 2025-07-08 RX ORDER — IPRATROPIUM BROMIDE AND ALBUTEROL SULFATE 2.5; .5 MG/3ML; MG/3ML
1 SOLUTION RESPIRATORY (INHALATION)
Status: DISCONTINUED | OUTPATIENT
Start: 2025-07-08 | End: 2025-07-18 | Stop reason: HOSPADM

## 2025-07-08 RX ORDER — FUROSEMIDE 10 MG/ML
20 INJECTION INTRAMUSCULAR; INTRAVENOUS ONCE
Status: COMPLETED | OUTPATIENT
Start: 2025-07-08 | End: 2025-07-08

## 2025-07-08 RX ORDER — SODIUM CHLORIDE 0.9 % (FLUSH) 0.9 %
5-40 SYRINGE (ML) INJECTION PRN
Status: DISCONTINUED | OUTPATIENT
Start: 2025-07-08 | End: 2025-07-18 | Stop reason: HOSPADM

## 2025-07-08 RX ORDER — ACETAMINOPHEN 325 MG/1
650 TABLET ORAL EVERY 6 HOURS PRN
Status: DISCONTINUED | OUTPATIENT
Start: 2025-07-08 | End: 2025-07-18 | Stop reason: HOSPADM

## 2025-07-08 RX ORDER — SODIUM CHLORIDE 9 MG/ML
INJECTION, SOLUTION INTRAVENOUS PRN
Status: DISCONTINUED | OUTPATIENT
Start: 2025-07-08 | End: 2025-07-18 | Stop reason: HOSPADM

## 2025-07-08 RX ORDER — POTASSIUM CHLORIDE 1500 MG/1
40 TABLET, EXTENDED RELEASE ORAL ONCE
Status: COMPLETED | OUTPATIENT
Start: 2025-07-08 | End: 2025-07-08

## 2025-07-08 RX ORDER — IOPAMIDOL 755 MG/ML
75 INJECTION, SOLUTION INTRAVASCULAR
Status: COMPLETED | OUTPATIENT
Start: 2025-07-08 | End: 2025-07-08

## 2025-07-08 RX ORDER — ACETAMINOPHEN 650 MG/1
650 SUPPOSITORY RECTAL EVERY 6 HOURS PRN
Status: DISCONTINUED | OUTPATIENT
Start: 2025-07-08 | End: 2025-07-18 | Stop reason: HOSPADM

## 2025-07-08 RX ORDER — ALLOPURINOL 100 MG/1
100 TABLET ORAL DAILY
Status: DISCONTINUED | OUTPATIENT
Start: 2025-07-08 | End: 2025-07-13

## 2025-07-08 RX ORDER — SODIUM CHLORIDE 0.9 % (FLUSH) 0.9 %
5-40 SYRINGE (ML) INJECTION EVERY 12 HOURS SCHEDULED
Status: DISCONTINUED | OUTPATIENT
Start: 2025-07-08 | End: 2025-07-18 | Stop reason: HOSPADM

## 2025-07-08 RX ADMIN — POTASSIUM BICARBONATE 40 MEQ: 782 TABLET, EFFERVESCENT ORAL at 16:36

## 2025-07-08 RX ADMIN — WATER 1000 MG: 1 INJECTION INTRAMUSCULAR; INTRAVENOUS; SUBCUTANEOUS at 17:55

## 2025-07-08 RX ADMIN — APIXABAN 5 MG: 5 TABLET, FILM COATED ORAL at 22:06

## 2025-07-08 RX ADMIN — METOPROLOL SUCCINATE 50 MG: 50 TABLET, EXTENDED RELEASE ORAL at 22:06

## 2025-07-08 RX ADMIN — FUROSEMIDE 20 MG: 10 INJECTION, SOLUTION INTRAMUSCULAR; INTRAVENOUS at 17:43

## 2025-07-08 RX ADMIN — ONDANSETRON 4 MG: 2 INJECTION, SOLUTION INTRAMUSCULAR; INTRAVENOUS at 13:36

## 2025-07-08 RX ADMIN — PIPERACILLIN AND TAZOBACTAM 3375 MG: 3; .375 INJECTION, POWDER, FOR SOLUTION INTRAVENOUS at 22:05

## 2025-07-08 RX ADMIN — AZITHROMYCIN MONOHYDRATE 500 MG: 500 INJECTION, POWDER, LYOPHILIZED, FOR SOLUTION INTRAVENOUS at 18:01

## 2025-07-08 RX ADMIN — ATORVASTATIN CALCIUM 40 MG: 40 TABLET, FILM COATED ORAL at 22:06

## 2025-07-08 RX ADMIN — CLOPIDOGREL BISULFATE 75 MG: 75 TABLET, FILM COATED ORAL at 22:06

## 2025-07-08 RX ADMIN — SODIUM CHLORIDE: 0.9 INJECTION, SOLUTION INTRAVENOUS at 22:02

## 2025-07-08 RX ADMIN — POTASSIUM CHLORIDE 40 MEQ: 1500 TABLET, EXTENDED RELEASE ORAL at 22:06

## 2025-07-08 RX ADMIN — Medication 10 ML: at 22:01

## 2025-07-08 RX ADMIN — IOPAMIDOL 75 ML: 755 INJECTION, SOLUTION INTRAVENOUS at 17:02

## 2025-07-08 RX ADMIN — IPRATROPIUM BROMIDE AND ALBUTEROL SULFATE 1 DOSE: .5; 2.5 SOLUTION RESPIRATORY (INHALATION) at 20:44

## 2025-07-08 RX ADMIN — ALLOPURINOL 100 MG: 100 TABLET ORAL at 22:06

## 2025-07-08 ASSESSMENT — PAIN DESCRIPTION - LOCATION: LOCATION: ABDOMEN

## 2025-07-08 ASSESSMENT — PAIN - FUNCTIONAL ASSESSMENT
PAIN_FUNCTIONAL_ASSESSMENT: NONE - DENIES PAIN
PAIN_FUNCTIONAL_ASSESSMENT: 0-10

## 2025-07-08 ASSESSMENT — PAIN SCALES - GENERAL: PAINLEVEL_OUTOF10: 1

## 2025-07-08 NOTE — ED PROVIDER NOTES
[facility] EMERGENCY DEPARTMENT  EMERGENCY DEPARTMENT ENCOUNTER        Pt Name: Bill Crow  MRN: [id number]  Birthdate 1940  Date of evaluation: 7/8/2025  Provider: Keeley Montero PA-C  PCP: Foreign Gonzalez DO  Note Started: 1:00 PM EDT 7/8/25       I have seen and evaluated this patient with my supervising physician Salvatore Mathur MD.      CHIEF COMPLAINT       Chief Complaint   Patient presents with    Nausea     Patient has been not feeling bad since Sunday. Patient took a covid  test and it was negative.    Vomiting    Diarrhea       HISTORY OF PRESENT ILLNESS: 1 or more Elements     History From: Patient    Limitations to history : None    Social Determinants Significantly Affecting Health : None    Chief Complaint: Nausea, vomiting, and diarrhea    Bill Crow is a 84 y.o. male who presents to the emergency department for evaluation of nausea, vomiting, and diarrhea.  He has a past medical history of CHF, hypertension, hyperlipidemia, GERD, ICD pacemaker, he states that his symptoms started on Saturday.  He reports prior to symptom onset he was working in the yard in the hot weather.  He states he went to the store on Sunday when he started to feel worse.  He reports several bouts of NBNB emesis and diarrhea.  He denies hematochezia or melena.  Denies chest pain, shortness of breath, or lightheadedness.  He does report feeling feverish and having chills.  He has not taken anything for his symptoms.    Nursing Notes were all reviewed and agreed with or any disagreements were addressed in the HPI.    REVIEW OF SYSTEMS :      Review of Systems    Positives and Pertinent negatives as per HPI.     SURGICAL HISTORY     Past Surgical History:   Procedure Laterality Date    ABDOMINAL AORTIC ANEURYSM REPAIR      ABDOMINAL AORTIC ANEURYSM REPAIR, ENDOVASCULAR  01/28/2011    Endomvascular abdominal aortic aneurysm repair with insertion of cardiomems pressure sensor    APPENDECTOMY      40-50 years

## 2025-07-08 NOTE — ED NOTES
Bill Crow is a 84 y.o. male admitted for  Active Problems:    * No active hospital problems. *  Resolved Problems:    * No resolved hospital problems. *  .   Patient Home via EMS transportation with   Chief Complaint   Patient presents with    Nausea     Patient has been not feeling bad since Sunday. Patient took a covid  test and it was negative.    Vomiting    Diarrhea   .  Patient is alert and Person, Place, Time, and Situation  Patient's baseline mobility: Baseline Mobility: Cane   Code Status: Prior   Cardiac Rhythm:       Is patient on baseline Oxygen: yes, 2 how many Liters:   Abnormal Assessment Findings: nausea, vomiting and diarrhea    Isolation: None      NIH Score:    C-SSRS: Risk of Suicide: No Risk  Bedside swallow:        Active LDA's:   Peripheral IV 07/08/25 Distal;Right Cephalic (Active)     Patient admitted with a delgado:  If the delgado is chronic was it exchanged  Reason for delgado:   Patient admitted with Central Line:  . PICC line placement confirmed: YES OR NO:[id number]}   Reason for Central line:   Was central line Inserted from an outside facility:        Family/Caregiver Present no Any Concerns: no   Restraints no  Sitter no         Vitals: MEWS Score: 1    Vitals:    07/08/25 1523 07/08/25 1637 07/08/25 1638 07/08/25 1745   BP: (!) 118/39 (!) 149/64  (!) 158/84   Pulse: 60 66  73   Resp: 25 24  30   Temp:       TempSrc:       SpO2: 97% 96%  96%   Weight:   74.8 kg (165 lb)    Height:   1.753 m (5' 9\")        Last documented pain score (0-10 scale) Pain Level: 1  Pain medication administered No.    Pertinent or High Risk Medications/Drips: No.    Pending Blood Product Administration: no    Abnormal labs:   Abnormal Labs Reviewed   CBC WITH AUTO DIFFERENTIAL - Abnormal; Notable for the following components:       Result Value    WBC 12.9 (*)     RBC 4.08 (*)     Hemoglobin 12.9 (*)     Hematocrit 38.7 (*)     RDW 15.6 (*)     Platelets 131 (*)     Neutrophils Absolute 11.3 (*)     Lymphocytes

## 2025-07-08 NOTE — ED PROVIDER NOTES
I independently performed a history and physical on Bill Crow.     I have discussed the case with the ADAM/resident at 1535 and approve / take responsibility for the initial management plan and anticipated disposition as documented below.     In summary the patient presents with generalized illness since the weekend.  He states that on Sunday he developed fever and chills with nausea vomiting and diarrhea.  His GI symptoms are slowly improving though he has had difficulty maintaining adequate oral intake and he has felt some generalized weakness and therefore presents to the emerged department where he is found with hypoxia now requiring supplemental oxygen no history of similar.    On my assessment he is alert and little acute distress though notably tachypneic and requiring supplemental oxygen.  He is otherwise afebrile and hemodynamically stable.  On exam his breath sounds are clear, pacemaker left chest, his abdomen is soft nontender nondistended extremities are warm and well-perfused no peripheral edema.    The patient's workup here is notable for ketonuria I favor a degree of starvation ketosis due to his GI illness.  There are no markers of urinary infection.  There is some hematuria.  He has a benign lactate.  In the context of consideration of infection I have no concern for severe sepsis or septic shock.  He has a primary respiratory alkalosis I believe a reflection of his tachypnea due to hypoxia.  He has a mildly elevated troponin stable on repeat EKG nonischemic I have no concern for ACS.  CT scan for PE is benign no thromboembolic disease though there are features to suggest a pneumonia and he is started on empiric therapies for community-acquired pneumonia which certainly may be contributing.  Consideration could also be given to aspiration given his vomiting illness.  Regarding his hypoxic respiratory failure at this time I believe is most likely due to a heart failure exacerbation.  His BNP is

## 2025-07-08 NOTE — ED NOTES
Patients oxygen saturation stayed at 90% on room air, placed patient on 2 liters nasal canula patients oxygen saturation is now 93%.

## 2025-07-09 LAB
ANION GAP SERPL CALCULATED.3IONS-SCNC: 16 MMOL/L (ref 3–16)
BASOPHILS # BLD: 0 K/UL (ref 0–0.2)
BASOPHILS NFR BLD: 0.1 %
BUN SERPL-MCNC: 35 MG/DL (ref 7–20)
C DIFF TOX A+B STL QL IA: NORMAL
CALCIUM SERPL-MCNC: 8.5 MG/DL (ref 8.3–10.6)
CHLORIDE SERPL-SCNC: 100 MMOL/L (ref 99–110)
CO2 SERPL-SCNC: 24 MMOL/L (ref 21–32)
CREAT SERPL-MCNC: 1.5 MG/DL (ref 0.8–1.3)
DEPRECATED RDW RBC AUTO: 15.4 % (ref 12.4–15.4)
EKG DIAGNOSIS: NORMAL
EKG Q-T INTERVAL: 496 MS
EKG QRS DURATION: 132 MS
EKG QTC CALCULATION (BAZETT): 511 MS
EKG R AXIS: -62 DEGREES
EKG T AXIS: 108 DEGREES
EKG VENTRICULAR RATE: 64 BPM
EOSINOPHIL # BLD: 0 K/UL (ref 0–0.6)
EOSINOPHIL NFR BLD: 0 %
GFR SERPLBLD CREATININE-BSD FMLA CKD-EPI: 45 ML/MIN/{1.73_M2}
GLUCOSE SERPL-MCNC: 141 MG/DL (ref 70–99)
HCT VFR BLD AUTO: 36.2 % (ref 40.5–52.5)
HGB BLD-MCNC: 12.2 G/DL (ref 13.5–17.5)
LYMPHOCYTES # BLD: 0.5 K/UL (ref 1–5.1)
LYMPHOCYTES NFR BLD: 4.7 %
MCH RBC QN AUTO: 31.8 PG (ref 26–34)
MCHC RBC AUTO-ENTMCNC: 33.7 G/DL (ref 31–36)
MCV RBC AUTO: 94.3 FL (ref 80–100)
MONOCYTES # BLD: 0.9 K/UL (ref 0–1.3)
MONOCYTES NFR BLD: 8.4 %
NEUTROPHILS # BLD: 9.2 K/UL (ref 1.7–7.7)
NEUTROPHILS NFR BLD: 86.8 %
PLATELET # BLD AUTO: 116 K/UL (ref 135–450)
PMV BLD AUTO: 8.7 FL (ref 5–10.5)
POTASSIUM SERPL-SCNC: 3.7 MMOL/L (ref 3.5–5.1)
RBC # BLD AUTO: 3.84 M/UL (ref 4.2–5.9)
SODIUM SERPL-SCNC: 140 MMOL/L (ref 136–145)
WBC # BLD AUTO: 10.6 K/UL (ref 4–11)

## 2025-07-09 PROCEDURE — 6370000000 HC RX 637 (ALT 250 FOR IP): Performed by: INTERNAL MEDICINE

## 2025-07-09 PROCEDURE — 2700000000 HC OXYGEN THERAPY PER DAY

## 2025-07-09 PROCEDURE — 2580000003 HC RX 258: Performed by: INTERNAL MEDICINE

## 2025-07-09 PROCEDURE — 80048 BASIC METABOLIC PNL TOTAL CA: CPT

## 2025-07-09 PROCEDURE — 94761 N-INVAS EAR/PLS OXIMETRY MLT: CPT

## 2025-07-09 PROCEDURE — 6360000002 HC RX W HCPCS: Performed by: INTERNAL MEDICINE

## 2025-07-09 PROCEDURE — 93010 ELECTROCARDIOGRAM REPORT: CPT | Performed by: INTERNAL MEDICINE

## 2025-07-09 PROCEDURE — 87449 NOS EACH ORGANISM AG IA: CPT

## 2025-07-09 PROCEDURE — 36415 COLL VENOUS BLD VENIPUNCTURE: CPT

## 2025-07-09 PROCEDURE — 87506 IADNA-DNA/RNA PROBE TQ 6-11: CPT

## 2025-07-09 PROCEDURE — 2500000003 HC RX 250 WO HCPCS: Performed by: INTERNAL MEDICINE

## 2025-07-09 PROCEDURE — 85025 COMPLETE CBC W/AUTO DIFF WBC: CPT

## 2025-07-09 PROCEDURE — 1200000000 HC SEMI PRIVATE

## 2025-07-09 PROCEDURE — 94640 AIRWAY INHALATION TREATMENT: CPT

## 2025-07-09 PROCEDURE — 87324 CLOSTRIDIUM AG IA: CPT

## 2025-07-09 RX ORDER — 0.9 % SODIUM CHLORIDE 0.9 %
1000 INTRAVENOUS SOLUTION INTRAVENOUS ONCE
Status: COMPLETED | OUTPATIENT
Start: 2025-07-09 | End: 2025-07-09

## 2025-07-09 RX ORDER — LOPERAMIDE HYDROCHLORIDE 2 MG/1
2 CAPSULE ORAL 4 TIMES DAILY PRN
Status: DISCONTINUED | OUTPATIENT
Start: 2025-07-09 | End: 2025-07-18 | Stop reason: HOSPADM

## 2025-07-09 RX ADMIN — IPRATROPIUM BROMIDE AND ALBUTEROL SULFATE 1 DOSE: .5; 2.5 SOLUTION RESPIRATORY (INHALATION) at 19:42

## 2025-07-09 RX ADMIN — APIXABAN 5 MG: 5 TABLET, FILM COATED ORAL at 09:26

## 2025-07-09 RX ADMIN — IPRATROPIUM BROMIDE AND ALBUTEROL SULFATE 1 DOSE: .5; 2.5 SOLUTION RESPIRATORY (INHALATION) at 07:49

## 2025-07-09 RX ADMIN — Medication 10 ML: at 09:26

## 2025-07-09 RX ADMIN — SODIUM CHLORIDE: 0.9 INJECTION, SOLUTION INTRAVENOUS at 21:05

## 2025-07-09 RX ADMIN — Medication 10 ML: at 21:05

## 2025-07-09 RX ADMIN — LOPERAMIDE HYDROCHLORIDE 2 MG: 2 CAPSULE ORAL at 12:29

## 2025-07-09 RX ADMIN — CLOPIDOGREL BISULFATE 75 MG: 75 TABLET, FILM COATED ORAL at 09:25

## 2025-07-09 RX ADMIN — SODIUM CHLORIDE 1000 ML: 0.9 INJECTION, SOLUTION INTRAVENOUS at 11:06

## 2025-07-09 RX ADMIN — IPRATROPIUM BROMIDE AND ALBUTEROL SULFATE 1 DOSE: .5; 2.5 SOLUTION RESPIRATORY (INHALATION) at 15:37

## 2025-07-09 RX ADMIN — PIPERACILLIN AND TAZOBACTAM 3375 MG: 3; .375 INJECTION, POWDER, FOR SOLUTION INTRAVENOUS at 04:34

## 2025-07-09 RX ADMIN — ALLOPURINOL 100 MG: 100 TABLET ORAL at 09:25

## 2025-07-09 RX ADMIN — APIXABAN 5 MG: 5 TABLET, FILM COATED ORAL at 21:05

## 2025-07-09 RX ADMIN — PIPERACILLIN AND TAZOBACTAM 3375 MG: 3; .375 INJECTION, POWDER, FOR SOLUTION INTRAVENOUS at 21:03

## 2025-07-09 RX ADMIN — LOPERAMIDE HYDROCHLORIDE 2 MG: 2 CAPSULE ORAL at 21:05

## 2025-07-09 RX ADMIN — METOPROLOL SUCCINATE 50 MG: 50 TABLET, EXTENDED RELEASE ORAL at 09:25

## 2025-07-09 RX ADMIN — PIPERACILLIN AND TAZOBACTAM 3375 MG: 3; .375 INJECTION, POWDER, FOR SOLUTION INTRAVENOUS at 12:33

## 2025-07-09 RX ADMIN — ATORVASTATIN CALCIUM 40 MG: 40 TABLET, FILM COATED ORAL at 21:05

## 2025-07-09 RX ADMIN — PANTOPRAZOLE SODIUM 40 MG: 40 TABLET, DELAYED RELEASE ORAL at 06:20

## 2025-07-09 ASSESSMENT — PAIN SCALES - GENERAL
PAINLEVEL_OUTOF10: 0
PAINLEVEL_OUTOF10: 0

## 2025-07-09 NOTE — ED NOTES
Bill Crow is a 84 y.o. male admitted for  Principal Problem:    Pneumonia due to infectious agent  Resolved Problems:    * No resolved hospital problems. *  .   Patient Home via EMS transportation with   Chief Complaint   Patient presents with    Nausea     Patient has been not feeling bad since Sunday. Patient took a covid  test and it was negative.    Vomiting    Diarrhea   .  Patient is alert and Person, Place, Time, and Situation  Patient's baseline mobility: Baseline Mobility: Cane   Code Status: Prior   Cardiac Rhythm:       Is patient on baseline Oxygen: no how many Liters:   Abnormal Assessment Findings:     Isolation: None      NIH Score:    C-SSRS: Risk of Suicide: No Risk  Bedside swallow:        Active LDA's:   Peripheral IV 07/08/25 Distal;Right Cephalic (Active)     Patient admitted with a delgado: no If the delgado is chronic was it exchanged:  Reason for delgado:   Patient admitted with Central Line:  . PICC line placement confirmed: YES OR NO:[id number]}   Reason for Central line:   Was central line Inserted from an outside facility:        Family/Caregiver Present  Any Concerns:    Restraints   Sitter          Vitals: MEWS Score: 1    Vitals:    07/08/25 1818 07/08/25 1828 07/08/25 1914 07/08/25 1944   BP:  134/72 (!) 132/58 (!) 144/68   Pulse:  65 60 73   Resp:  21 19 16   Temp:       TempSrc:       SpO2: 96%  94% 97%   Weight:       Height:           Last documented pain score (0-10 scale) Pain Level: 1  Pain medication administered No.    Pertinent or High Risk Medications/Drips: No.    Pending Blood Product Administration: no    Abnormal labs:   Abnormal Labs Reviewed   CBC WITH AUTO DIFFERENTIAL - Abnormal; Notable for the following components:       Result Value    WBC 12.9 (*)     RBC 4.08 (*)     Hemoglobin 12.9 (*)     Hematocrit 38.7 (*)     RDW 15.6 (*)     Platelets 131 (*)     Neutrophils Absolute 11.3 (*)     Lymphocytes Absolute 0.5 (*)     All other components within normal limits

## 2025-07-09 NOTE — CARE COORDINATION
Case Management Assessment  Initial Evaluation    Date/Time of Evaluation: 7/9/2025 9:19 AM  Assessment Completed by: Zahra Matthew RN    If patient is discharged prior to next notation, then this note serves as note for discharge by case management.    Patient Name: Bill Crow                   YOB: 1940  Diagnosis: Hypoxemia [R09.02]  Hypokalemia [E87.6]  Pneumonia due to infectious agent [J18.9]  Pneumonia of right lower lobe due to infectious organism [J18.9]  Proteinuria, unspecified type [R80.9]  Acute on chronic congestive heart failure, unspecified heart failure type (HCC) [I50.9]  Sepsis, due to unspecified organism, unspecified whether acute organ dysfunction present (HCC) [A41.9]  Nausea and vomiting, unspecified vomiting type [R11.2]                   Date / Time: 7/8/2025 12:33 PM    Patient Admission Status: Inpatient   Readmission Risk (Low < 19, Mod (19-27), High > 27): Readmission Risk Score: 16.7    Current PCP: Foreign Gonzalez DO  PCP verified by CM? Yes (Foreign Gonzalez DO)    Chart Reviewed: Yes      History Provided by: Patient, Medical Record  Patient Orientation: Alert and Oriented    Patient Cognition: Alert    Hospitalization in the last 30 days (Readmission):  No    If yes, Readmission Assessment in CM Navigator will be completed.    Advance Directives:      Code Status: Full Code   Patient's Primary Decision Maker is: Patient Declined (Legal Next of Kin Remains as Decision Maker)    Primary Decision Maker (Active): Elma Crow - Spouse - [phone]    Discharge Planning:    Patient lives with: Spouse/Significant Other Type of Home: House  Primary Care Giver: Self  Patient Support Systems include: Spouse/Significant Other, Family Members   Current Financial resources: Medicare  Current community resources: None  Current services prior to admission: Durable Medical Equipment            Current DME: Cane            Type of Home Care services:

## 2025-07-09 NOTE — H&P
[facility] Medicine History & Physical    V 5.1    Date of Admission: 7/8/2025    Date of Service:  Pt seen/examined on 7/5/2025    [x]Admitted to Inpatient with expected LOS greater than two midnights due to medical therapy.  []Placed in Observation status.    Chief Admission Complaint: Feeling tired nausea vomiting diarrhea and cough for few days    Presenting Admission History:      84 y.o. male who presented to [facility] with above complaint.  PMHx significant for CHF hypertension hyperlipidemia pacemaker he takes oxygen during nights  He told he has been having nausea vomiting diarrhea for last few days, watery no blood he takes iron  and stool is black whenever he takes iron   He has been experiencing right lower quadrant pain no abdominal distention no fever  No change in mental status  He has cough no sputum  Mild shortness of breath and currently on 2 L oxygen       assessment/Plan:      Lower lobe pneumonia-admit, septic workup from emergency room Zosyn breathing treatment    Acute hypoxic respiratory failure secondary to above titrate the oxygen    Nausea vomiting diarrhea right lower quadrant pain-rule out diverticulitis CT abdomen ordered  Patient is already on Zosyn  Clear liquids  Antiemetics  Protonix  Consider GI if persists    KEON with anion gap acidosis-IV fluids avoid nephrotoxic medications    Hypokalemia replete and monitor    History of hypertension hyperlipidemia    Troponins elevated possibly secondary to KEON trend -flat telemetry        Discussed management and the need for Hospitalization of the patient w/ the Emergency Department Provider: ARNOL Glass    CXR: I have reviewed the CXR with the following interpretation:   EKG:  I have reviewed the EKG with the following interpretation: Paced ventricular rhythm    Physical Exam Performed:      General appearance:  No apparent distress, appears stated age and cooperative.  Looks ill  HEENT: . Conjunctivae/corneas

## 2025-07-09 NOTE — PROGRESS NOTES
4 Eyes Skin Assessment     NAME:  Bill Crow  YOB: 1940  MEDICAL RECORD NUMBER:  [id number]    The patient is being assessed for  Admission    I agree that at least one RN has performed a thorough Head to Toe Skin Assessment on the patient. ALL assessment sites listed below have been assessed.      Areas assessed by both nurses:    Head, Face, Ears, Shoulders, Back, Chest, Arms, Elbows, Hands, Sacrum. Buttock, Coccyx, Ischium, Legs. Feet and Heels, and Under Medical Devices         Does the Patient have a Wound? No noted wound(s) Patient has scattered bruising from taking blood thinners       Anastacio Prevention initiated by RN: Yes  Wound Care Orders initiated by RN: No    Pressure Injury (Stage 1,2,3,4, Unstageable, DTI, NWPT, and Complex wounds) if present, place Wound referral order by RN under : No    New Ostomies, if present place, Ostomy referral order under : No     Nurse 1 eSignature: Electronically signed by Lilliana Mar RN on 7/9/25 at 1:02 AM EDT    **SHARE this note so that the co-signing nurse can place an eSignature**    Nurse 2 eSignature: Electronically signed by Efren Hughes RN on 7/9/25 at 3:59 AM EDT

## 2025-07-09 NOTE — PLAN OF CARE
Problem: Chronic Conditions and Co-morbidities  Goal: Patient's chronic conditions and co-morbidity symptoms are monitored and maintained or improved  7/9/2025 0928 by Chetan Shaikh RN  Outcome: Progressing  7/9/2025 0052 by Lilliana Mar RN  Outcome: Progressing     Problem: Discharge Planning  Goal: Discharge to home or other facility with appropriate resources  7/9/2025 0928 by Chetan Shaikh RN  Outcome: Progressing  7/9/2025 0052 by Lilliana Mar RN  Outcome: Progressing     Problem: Safety - Adult  Goal: Free from fall injury  Outcome: Progressing

## 2025-07-09 NOTE — PROGRESS NOTES
Hospital Medicine Progress Note      Subjective:  He is still having diarrhea.  Says vomiting has resolved.  No abdominal pain.  Thirsty.        General appearance: No apparent distress, appears stated age and cooperative.  HEENT: Pupils equal, round.  Conjunctivae/corneas clear.  Neck: No increased jugular venous distention.   Respiratory:  Normal respiratory effort.  Bilaterally without Rales/Wheezes/Rhonchi.  Cardiovascular: Normal rate and regular rhythm with normal S1/S2 without murmurs, rubs or gallops.  Abdomen: Soft, non-tender, non-distended with hyperactive bowel sounds.  Musculoskeletal: No edema.  Without deformity.  Skin: No jaundice.  No rashes or lesions.  Neurologic:  Neurovascularly intact without any focal sensory/motor deficits. Cranial nerves: II-XII intact.  Psychiatric: Alert and oriented, normal insight.  Not especially anxious.       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  He lives at home with his wife.  He hasn't been hospitalized since he had a CHF decompensation 2 years ago.     Starting on 7/5 he developed new n/v/d.  His wife had the same symptoms.  She got better, he kept worsening.  They have some young great-grandkids who visit.  Nonbloody diarrhea and emesis.  Fever to 101 at home.  Not much abdominal pain.  No new medications or supplements, no recent abx.  Poor appetite.  Dry mouth.  Weak and tired.  He came to the ED to get checked out.  He had KEON and electrolyte deficiencies.  Something on POCUS made them give furosemide IV.  He was admitted to hospital medicine and started on IVFs.  He also had

## 2025-07-10 LAB
ALBUMIN SERPL-MCNC: 3.2 G/DL (ref 3.4–5)
ALP SERPL-CCNC: 50 U/L (ref 40–129)
ALT SERPL-CCNC: 47 U/L (ref 10–40)
ANION GAP SERPL CALCULATED.3IONS-SCNC: 12 MMOL/L (ref 3–16)
AST SERPL-CCNC: 74 U/L (ref 15–37)
BILIRUB DIRECT SERPL-MCNC: 0.6 MG/DL (ref 0–0.3)
BILIRUB INDIRECT SERPL-MCNC: 0.6 MG/DL (ref 0–1)
BILIRUB SERPL-MCNC: 1.2 MG/DL (ref 0–1)
BUN SERPL-MCNC: 34 MG/DL (ref 7–20)
CALCIUM SERPL-MCNC: 8 MG/DL (ref 8.3–10.6)
CHLORIDE SERPL-SCNC: 102 MMOL/L (ref 99–110)
CO2 SERPL-SCNC: 23 MMOL/L (ref 21–32)
CREAT SERPL-MCNC: 1.8 MG/DL (ref 0.8–1.3)
GFR SERPLBLD CREATININE-BSD FMLA CKD-EPI: 36 ML/MIN/{1.73_M2}
GI PATHOGENS PNL STL NAA+PROBE: NORMAL
GLUCOSE SERPL-MCNC: 144 MG/DL (ref 70–99)
MAGNESIUM SERPL-MCNC: 2.14 MG/DL (ref 1.8–2.4)
POTASSIUM SERPL-SCNC: 3.2 MMOL/L (ref 3.5–5.1)
PROT SERPL-MCNC: 5.6 G/DL (ref 6.4–8.2)
SODIUM SERPL-SCNC: 137 MMOL/L (ref 136–145)

## 2025-07-10 PROCEDURE — 6370000000 HC RX 637 (ALT 250 FOR IP): Performed by: INTERNAL MEDICINE

## 2025-07-10 PROCEDURE — 97162 PT EVAL MOD COMPLEX 30 MIN: CPT

## 2025-07-10 PROCEDURE — 97530 THERAPEUTIC ACTIVITIES: CPT

## 2025-07-10 PROCEDURE — 6370000000 HC RX 637 (ALT 250 FOR IP): Performed by: NURSE PRACTITIONER

## 2025-07-10 PROCEDURE — 2580000003 HC RX 258: Performed by: INTERNAL MEDICINE

## 2025-07-10 PROCEDURE — 1200000000 HC SEMI PRIVATE

## 2025-07-10 PROCEDURE — 2500000003 HC RX 250 WO HCPCS: Performed by: INTERNAL MEDICINE

## 2025-07-10 PROCEDURE — 36415 COLL VENOUS BLD VENIPUNCTURE: CPT

## 2025-07-10 PROCEDURE — 83735 ASSAY OF MAGNESIUM: CPT

## 2025-07-10 PROCEDURE — 97166 OT EVAL MOD COMPLEX 45 MIN: CPT

## 2025-07-10 PROCEDURE — 80076 HEPATIC FUNCTION PANEL: CPT

## 2025-07-10 PROCEDURE — 51798 US URINE CAPACITY MEASURE: CPT

## 2025-07-10 PROCEDURE — 94640 AIRWAY INHALATION TREATMENT: CPT

## 2025-07-10 PROCEDURE — 6360000002 HC RX W HCPCS: Performed by: INTERNAL MEDICINE

## 2025-07-10 PROCEDURE — 97116 GAIT TRAINING THERAPY: CPT

## 2025-07-10 PROCEDURE — 80048 BASIC METABOLIC PNL TOTAL CA: CPT

## 2025-07-10 PROCEDURE — 2700000000 HC OXYGEN THERAPY PER DAY

## 2025-07-10 PROCEDURE — 94761 N-INVAS EAR/PLS OXIMETRY MLT: CPT

## 2025-07-10 RX ORDER — GUAIFENESIN/DEXTROMETHORPHAN 100-10MG/5
5 SYRUP ORAL EVERY 4 HOURS PRN
Status: DISCONTINUED | OUTPATIENT
Start: 2025-07-10 | End: 2025-07-18 | Stop reason: HOSPADM

## 2025-07-10 RX ORDER — TAMSULOSIN HYDROCHLORIDE 0.4 MG/1
0.4 CAPSULE ORAL DAILY
Status: DISCONTINUED | OUTPATIENT
Start: 2025-07-10 | End: 2025-07-18 | Stop reason: HOSPADM

## 2025-07-10 RX ORDER — POTASSIUM CHLORIDE 1500 MG/1
20 TABLET, EXTENDED RELEASE ORAL ONCE
Status: COMPLETED | OUTPATIENT
Start: 2025-07-10 | End: 2025-07-10

## 2025-07-10 RX ORDER — POTASSIUM CHLORIDE 1500 MG/1
40 TABLET, EXTENDED RELEASE ORAL ONCE
Status: COMPLETED | OUTPATIENT
Start: 2025-07-10 | End: 2025-07-10

## 2025-07-10 RX ADMIN — POTASSIUM CHLORIDE 40 MEQ: 1500 TABLET, EXTENDED RELEASE ORAL at 09:07

## 2025-07-10 RX ADMIN — PIPERACILLIN AND TAZOBACTAM 3375 MG: 3; .375 INJECTION, POWDER, FOR SOLUTION INTRAVENOUS at 05:05

## 2025-07-10 RX ADMIN — IPRATROPIUM BROMIDE AND ALBUTEROL SULFATE 1 DOSE: .5; 2.5 SOLUTION RESPIRATORY (INHALATION) at 11:30

## 2025-07-10 RX ADMIN — IPRATROPIUM BROMIDE AND ALBUTEROL SULFATE 1 DOSE: .5; 2.5 SOLUTION RESPIRATORY (INHALATION) at 08:15

## 2025-07-10 RX ADMIN — GUAIFENESIN AND DEXTROMETHORPHAN 5 ML: 100; 10 SYRUP ORAL at 14:58

## 2025-07-10 RX ADMIN — SODIUM CHLORIDE: 0.9 INJECTION, SOLUTION INTRAVENOUS at 12:12

## 2025-07-10 RX ADMIN — SODIUM CHLORIDE: 0.9 INJECTION, SOLUTION INTRAVENOUS at 22:26

## 2025-07-10 RX ADMIN — ACETAMINOPHEN 650 MG: 325 TABLET ORAL at 09:07

## 2025-07-10 RX ADMIN — POTASSIUM CHLORIDE 20 MEQ: 1500 TABLET, EXTENDED RELEASE ORAL at 12:29

## 2025-07-10 RX ADMIN — IPRATROPIUM BROMIDE AND ALBUTEROL SULFATE 1 DOSE: .5; 2.5 SOLUTION RESPIRATORY (INHALATION) at 19:45

## 2025-07-10 RX ADMIN — ALLOPURINOL 100 MG: 100 TABLET ORAL at 09:07

## 2025-07-10 RX ADMIN — LOPERAMIDE HYDROCHLORIDE 2 MG: 2 CAPSULE ORAL at 05:05

## 2025-07-10 RX ADMIN — GUAIFENESIN AND DEXTROMETHORPHAN 5 ML: 100; 10 SYRUP ORAL at 22:26

## 2025-07-10 RX ADMIN — Medication 10 ML: at 22:26

## 2025-07-10 RX ADMIN — CLOPIDOGREL BISULFATE 75 MG: 75 TABLET, FILM COATED ORAL at 09:07

## 2025-07-10 RX ADMIN — TAMSULOSIN HYDROCHLORIDE 0.4 MG: 0.4 CAPSULE ORAL at 14:58

## 2025-07-10 RX ADMIN — GUAIFENESIN AND DEXTROMETHORPHAN 5 ML: 100; 10 SYRUP ORAL at 05:05

## 2025-07-10 RX ADMIN — APIXABAN 5 MG: 5 TABLET, FILM COATED ORAL at 22:26

## 2025-07-10 RX ADMIN — PANTOPRAZOLE SODIUM 40 MG: 40 TABLET, DELAYED RELEASE ORAL at 05:05

## 2025-07-10 RX ADMIN — APIXABAN 5 MG: 5 TABLET, FILM COATED ORAL at 09:07

## 2025-07-10 RX ADMIN — PIPERACILLIN AND TAZOBACTAM 3375 MG: 3; .375 INJECTION, POWDER, FOR SOLUTION INTRAVENOUS at 12:28

## 2025-07-10 RX ADMIN — METOPROLOL SUCCINATE 50 MG: 50 TABLET, EXTENDED RELEASE ORAL at 09:07

## 2025-07-10 RX ADMIN — ATORVASTATIN CALCIUM 40 MG: 40 TABLET, FILM COATED ORAL at 22:26

## 2025-07-10 RX ADMIN — Medication 10 ML: at 09:07

## 2025-07-10 RX ADMIN — PIPERACILLIN AND TAZOBACTAM 3375 MG: 3; .375 INJECTION, POWDER, FOR SOLUTION INTRAVENOUS at 22:25

## 2025-07-10 RX ADMIN — IPRATROPIUM BROMIDE AND ALBUTEROL SULFATE 1 DOSE: .5; 2.5 SOLUTION RESPIRATORY (INHALATION) at 16:22

## 2025-07-10 ASSESSMENT — PAIN SCALES - GENERAL: PAINLEVEL_OUTOF10: 0

## 2025-07-10 NOTE — PROGRESS NOTES
Hospital Medicine Progress Note      Subjective:  diarrhea and vomiting have resolved.  Cr trending in wrong direction, perhaps still evolving ATN from the IV furosemide in the ED when he was already dehydrated?  Continuing IVFs, he's not edematous, ruled out bladder outlet obstruction.      General appearance: No apparent distress, appears stated age and cooperative.  HEENT: Pupils equal, round.  Conjunctivae/corneas clear.  Neck: No increased jugular venous distention.   Respiratory:  Normal respiratory effort.  Bilaterally without Rales/Wheezes/Rhonchi.  Cardiovascular: Normal rate and regular rhythm with normal S1/S2 without murmurs, rubs or gallops.  Abdomen: Soft, non-tender, non-distended with hyperactive bowel sounds.  Musculoskeletal: No edema.  Without deformity.  Skin: No jaundice.  No rashes or lesions.  Neurologic:  Neurovascularly intact without any focal sensory/motor deficits. Cranial nerves: II-XII intact.  Psychiatric: Alert and oriented, normal insight.  Not especially anxious.       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  He lives at home with his wife.  He hasn't been hospitalized since he had a CHF decompensation 2 years ago.     Starting on 7/5 he developed new n/v/d.  His wife had the same symptoms.  She got better, he kept worsening.  They have some young great-grandkids who visit.  Nonbloody diarrhea and emesis.  Fever to 101 at home.  Not much abdominal pain.  No new medications or supplements, no recent abx.  Poor appetite.  Dry mouth.  Weak and tired.  He came to the ED to get checked out.  He had KEON and

## 2025-07-10 NOTE — PLAN OF CARE
Problem: Chronic Conditions and Co-morbidities  Goal: Patient's chronic conditions and co-morbidity symptoms are monitored and maintained or improved  Outcome: Progressing  Flowsheets (Taken 7/9/2025 2107)  Care Plan - Patient's Chronic Conditions and Co-Morbidity Symptoms are Monitored and Maintained or Improved:   Monitor and assess patient's chronic conditions and comorbid symptoms for stability, deterioration, or improvement   Collaborate with multidisciplinary team to address chronic and comorbid conditions and prevent exacerbation or deterioration   Update acute care plan with appropriate goals if chronic or comorbid symptoms are exacerbated and prevent overall improvement and discharge     Problem: Discharge Planning  Goal: Discharge to home or other facility with appropriate resources  Outcome: Progressing  Flowsheets (Taken 7/9/2025 2107)  Discharge to home or other facility with appropriate resources:   Arrange for needed discharge resources and transportation as appropriate   Identify barriers to discharge with patient and caregiver   Identify discharge learning needs (meds, wound care, etc)   Refer to discharge planning if patient needs post-hospital services based on physician order or complex needs related to functional status, cognitive ability or social support system   Arrange for interpreters to assist at discharge as needed     Problem: Safety - Adult  Goal: Free from fall injury  Outcome: Progressing     Problem: Skin/Tissue Integrity  Goal: Skin integrity remains intact  Description: 1.  Monitor for areas of redness and/or skin breakdown  2.  Assess vascular access sites hourly  3.  Every 4-6 hours minimum:  Change oxygen saturation probe site  4.  Every 4-6 hours:  If on nasal continuous positive airway pressure, respiratory therapy assess nares and determine need for appliance change or resting period  Outcome: Progressing     Problem: Pain  Goal: Verbalizes/displays adequate comfort level  or baseline comfort level  Outcome: Progressing     Problem: Respiratory - Adult  Goal: Achieves optimal ventilation and oxygenation  Outcome: Progressing     Problem: Infection - Adult  Goal: Absence of infection at discharge  Outcome: Progressing  Goal: Absence of infection during hospitalization  Outcome: Progressing  Goal: Absence of fever/infection during anticipated neutropenic period  Outcome: Progressing

## 2025-07-10 NOTE — PROGRESS NOTES
Physical Therapy  Facility/Department: [place] [id number] - MED SURG/ORTHO  Physical Therapy Initial Assessment and Treatment    Name: Bill Crow  : 1940  MRN: [id number]  Date of Service: 7/10/2025    Discharge Recommendations:  Home with assist PRN, Outpatient PT, Patient would benefit from continued therapy after discharge   PT Equipment Recommendations  Equipment Needed: No      Patient Diagnosis(es): The primary encounter diagnosis was Acute on chronic congestive heart failure, unspecified heart failure type (HCC). Diagnoses of Hypoxemia, Hypokalemia, Nausea and vomiting, unspecified vomiting type, Pneumonia of right lower lobe due to infectious organism, Proteinuria, unspecified type, and Sepsis, due to unspecified organism, unspecified whether acute organ dysfunction present (HCC) were also pertinent to this visit.  Past Medical History:  has a past medical history of Aortic aneurysm, abdominal, Appendicitis, Arthritis, Bruises easily, CAD (coronary artery disease), CHF (congestive heart failure) (HCC), Chronic back pain, Chronic pain of both shoulders, Complication of anesthesia, Gout, Heart disease, Hernia, HIGH CHOLESTEROL, Hyperlipidemia, Hypertension, Measles, SUNDEEP (obstructive sleep apnea), Pancreatitis, Persistent cough, Ringing in ears, Sinus disorder, and Sleep deprivation.  Past Surgical History:  has a past surgical history that includes Coronary angioplasty with stent ( and ); Cardiac surgery (2008); Cholecystectomy, laparoscopic (2006); Appendectomy; Tonsillectomy; hernia repair; AAA repair, endovascular (2011); Pancreas surgery; Gallbladder surgery; Coronary artery bypass graft; Abdominal aortic aneurysm repair; pr xcapsl ctrc rmvl insj io lens prosth w/o ecp (Left, 2018); Intracapsular cataract extraction (Right, 2018); Upper gastrointestinal endoscopy (N/A, 2023); and Diagnostic Cardiac Cath Lab Procedure (2023).    Assessment  Body Structures,  naty  Orthostatic B/P and Pulse?: Yes  Blood Pressure Lyin/56  Blood Pressure Sittin/54  Blood Pressure Standin/50  Comment: O2 94% on 2.5L after short walk in hallway (~60 feet)     Observation/Palpation  Posture: Fair  Gross Assessment  AROM: Generally decreased, functional  Strength: Generally decreased, functional  Pain  Pre-Pain: 0                Bed Mobility Training  Bed Mobility Training: Yes  Overall Level of Assistance: Stand by assistance  Supine to Sit: Stand by assistance (bed flat, use of bed rail)  Sit to Supine:  (pt sitting in chair at end of session)  Scooting: Stand by assistance  Balance  Sitting: Intact  Standing: High guard;With support;Impaired (cane)  Standing - Static: Good  Standing - Dynamic: Constant support;Fair  Transfer Training  Transfer Training: Yes  Sit to Stand: Contact guard assistance  Stand to Sit: Contact guard assistance  Gait  Gait Training: Yes  Overall Level of Assistance: Contact guard assistance;Minimal assistance (min A for intermittent unsteadiness)  Distance (ft): 60 Feet  Assistive Device: Cane, straight;Gait belt  Interventions: Safety awareness training;Tactile cues;Verbal cues  Base of Support: Widened  Speed/Olive: Shuffled;Slow  Step Length: Left shortened;Right shortened  Stance: Right decreased;Left decreased  Gait Abnormalities: Antalgic;Altered arm swing;Decreased step clearance                         OutComes Score  AM-PAC - Mobility    AM-PAC Basic Mobility - Inpatient   How much help is needed turning from your back to your side while in a flat bed without using bedrails?: None  How much help is needed moving from lying on your back to sitting on the side of a flat bed without using bedrails?: None  How much help is needed moving to and from a bed to a chair?: None  How much help is needed standing up from a chair using your arms?: None  How much help is needed walking in hospital room?: A Little  How much help is needed climbing

## 2025-07-10 NOTE — PROGRESS NOTES
CHF Care Plan      Patient's EF (Ejection Fraction) is greater than 40%    Heart Failure Medications:  Diuretics:: Furosemide, Spironolactone Being Held Currently    (One of the following REQUIRED for EF </= 40%/SYSTOLIC FAILURE but MAY be used in EF% >40%/DIASTOLIC FAILURE)        ACE:: None        ARB:: None         ARNI:: Sacubitril/Valsartan-Entresto Being Held Currently    (Beta Blockers)  NON- Evidenced Based Beta Blocker (for EF% >40%/DIASTOLIC FAILURE): None    Evidenced Based Beta Blocker::(REQUIRED for EF% <40%/SYSTOLIC FAILURE) Metoprolol SUCCinate- Toprol XL  ...................................................................................................................................................    Failed to redirect to the Timeline version of the [facility] Smart[employer].      Patient's weights and intake/output reviewed    Daily Weight log at bedside, patient/family participation in use of log: no    Patient's current weight today shows a difference of 8 lbs more than last documented weight.      Intake/Output Summary (Last 24 hours) at 7/10/2025 0639  Last data filed at 7/10/2025 0458  Gross per 24 hour   Intake 3496.39 ml   Output 2550 ml   Net 946.39 ml       Education Booklet Provided: no    Comorbidities Reviewed Yes    Patient has a past medical history of Aortic aneurysm, abdominal, Appendicitis, Arthritis, Bruises easily, CAD (coronary artery disease), CHF (congestive heart failure) (HCC), Chronic back pain, Chronic pain of both shoulders, Complication of anesthesia, Gout, Heart disease, Hernia, HIGH CHOLESTEROL, Hyperlipidemia, Hypertension, Measles, SUNDEEP (obstructive sleep apnea), Pancreatitis, Persistent cough, Ringing in ears, Sinus disorder, and Sleep deprivation.     >>For CHF and Comorbidity documentation on Education Time and Topics, please see Education Tab      CHF Education    Learners:  Patient  Readineess:   Acceptance  Method:   Explanation  Response:   Verbalizes  Understanding  Comments: NA    Time Spent: 3 Minutes      Pt resting in bed at this time on 2 L O2. Pt denies shortness of breath. Pt without lower extremity edema.     Patient and/or Family's stated Goal of Care this Admission: reduce shortness of breath, increase activity tolerance, better understand heart failure and disease management, be more comfortable, and reduce lower extremity edema prior to discharge        :

## 2025-07-10 NOTE — CARE COORDINATION
CM update; LOS # 2 Followed by IM, and Therapies. Patient has a few more days of IV antibiotic. Therapies are recommending Outpatient PT. IPTA Will follow for any discharge needs.Zahra Matthew RN

## 2025-07-10 NOTE — PROGRESS NOTES
This rn explained to the patient that dr rock has said  'I assumed that he and his doctor are happy with his PSA number. That’s a prostate cancer screening test. It’s a whole different issue than the plumbing issue we’re dealing with here. His 290 ML bladder scan makes me a little nervous that he might have some kidney failure partially from bladder obstruction. It’s not quite enough to catheterize him, but it could be slowing down his renal recovery. If he doesn’t wanna take it it’s no big deal, I can talk to him about it tomorrow.' He said 'if that's the case I can take it '  Patient educated on the flomax medication side effects and uses ,patient stated that he understands and he's yonny to proceed with it

## 2025-07-10 NOTE — PLAN OF CARE
Problem: Chronic Conditions and Co-morbidities  Goal: Patient's chronic conditions and co-morbidity symptoms are monitored and maintained or improved  7/10/2025 0859 by Chetan Shaikh RN  Outcome: Progressing  7/10/2025 0246 by Sarah Gagnon RN  Outcome: Progressing  Flowsheets (Taken 7/9/2025 2107)  Care Plan - Patient's Chronic Conditions and Co-Morbidity Symptoms are Monitored and Maintained or Improved:   Monitor and assess patient's chronic conditions and comorbid symptoms for stability, deterioration, or improvement   Collaborate with multidisciplinary team to address chronic and comorbid conditions and prevent exacerbation or deterioration   Update acute care plan with appropriate goals if chronic or comorbid symptoms are exacerbated and prevent overall improvement and discharge     Problem: Discharge Planning  Goal: Discharge to home or other facility with appropriate resources  7/10/2025 0859 by Chetan Shiakh RN  Outcome: Progressing  7/10/2025 0246 by Sarah Gagnon RN  Outcome: Progressing  Flowsheets (Taken 7/9/2025 2107)  Discharge to home or other facility with appropriate resources:   Arrange for needed discharge resources and transportation as appropriate   Identify barriers to discharge with patient and caregiver   Identify discharge learning needs (meds, wound care, etc)   Refer to discharge planning if patient needs post-hospital services based on physician order or complex needs related to functional status, cognitive ability or social support system   Arrange for interpreters to assist at discharge as needed     Problem: Safety - Adult  Goal: Free from fall injury  7/10/2025 0859 by Chetan Shaikh RN  Outcome: Progressing  7/10/2025 0246 by Sarah Gagnon RN  Outcome: Progressing     Problem: Skin/Tissue Integrity  Goal: Skin integrity remains intact  Description: 1.  Monitor for areas of redness and/or skin breakdown  2.  Assess vascular access sites hourly  3.  Every 4-6 hours

## 2025-07-10 NOTE — PROGRESS NOTES
Occupational Therapy  Facility/Department: [place] [id number] - MED SURG/ORTHO  Occupational Therapy Initial Assessment & Treatment    Name: Bill Crow  : 1940  MRN: [id number]  Date of Service: 7/10/2025    Discharge Recommendations:  Home with assist PRN, Continue to assess pending progress  OT Equipment Recommendations  Equipment Needed:  (CTA)    AM-PAC score  AM-PAC Inpatient Daily Activity Raw Score: 19 (07/10/25 1520)  AM-PAC Inpatient ADL T-Scale Score : 40.22 (07/10/25 1520)  ADL Inpatient CMS 0-100% Score: 42.8 (07/10/25 1520)  ADL Inpatient CMS G-Code Modifier : CK (07/10/25 1520)    If pt is unable to be seen after this session, please let this note serve as discharge summary.  Please see case management note for discharge disposition.  Thank you.     Patient Diagnosis(es): The primary encounter diagnosis was Acute on chronic congestive heart failure, unspecified heart failure type (HCC). Diagnoses of Hypoxemia, Hypokalemia, Nausea and vomiting, unspecified vomiting type, Pneumonia of right lower lobe due to infectious organism, Proteinuria, unspecified type, and Sepsis, due to unspecified organism, unspecified whether acute organ dysfunction present (HCC) were also pertinent to this visit.  Past Medical History:  has a past medical history of Aortic aneurysm, abdominal, Appendicitis, Arthritis, Bruises easily, CAD (coronary artery disease), CHF (congestive heart failure) (HCC), Chronic back pain, Chronic pain of both shoulders, Complication of anesthesia, Gout, Heart disease, Hernia, HIGH CHOLESTEROL, Hyperlipidemia, Hypertension, Measles, SUNDEEP (obstructive sleep apnea), Pancreatitis, Persistent cough, Ringing in ears, Sinus disorder, and Sleep deprivation.  Past Surgical History:  has a past surgical history that includes Coronary angioplasty with stent ( and ); Cardiac surgery (2008); Cholecystectomy, laparoscopic (2006); Appendectomy; Tonsillectomy; hernia repair; AAA repair,

## 2025-07-11 LAB
ALBUMIN SERPL-MCNC: 3.2 G/DL (ref 3.4–5)
ALP SERPL-CCNC: 50 U/L (ref 40–129)
ALT SERPL-CCNC: 65 U/L (ref 10–40)
ANION GAP SERPL CALCULATED.3IONS-SCNC: 12 MMOL/L (ref 3–16)
AST SERPL-CCNC: 95 U/L (ref 15–37)
BILIRUB DIRECT SERPL-MCNC: 0.7 MG/DL (ref 0–0.3)
BILIRUB INDIRECT SERPL-MCNC: 0.5 MG/DL (ref 0–1)
BILIRUB SERPL-MCNC: 1.2 MG/DL (ref 0–1)
BUN SERPL-MCNC: 34 MG/DL (ref 7–20)
CALCIUM SERPL-MCNC: 7.9 MG/DL (ref 8.3–10.6)
CHLORIDE SERPL-SCNC: 101 MMOL/L (ref 99–110)
CO2 SERPL-SCNC: 22 MMOL/L (ref 21–32)
CREAT SERPL-MCNC: 2.1 MG/DL (ref 0.8–1.3)
DEPRECATED RDW RBC AUTO: 15.7 % (ref 12.4–15.4)
GFR SERPLBLD CREATININE-BSD FMLA CKD-EPI: 30 ML/MIN/{1.73_M2}
GLUCOSE SERPL-MCNC: 166 MG/DL (ref 70–99)
HCT VFR BLD AUTO: 34.5 % (ref 40.5–52.5)
HGB BLD-MCNC: 11.6 G/DL (ref 13.5–17.5)
MAGNESIUM SERPL-MCNC: 2.17 MG/DL (ref 1.8–2.4)
MCH RBC QN AUTO: 31.7 PG (ref 26–34)
MCHC RBC AUTO-ENTMCNC: 33.7 G/DL (ref 31–36)
MCV RBC AUTO: 94.1 FL (ref 80–100)
PLATELET # BLD AUTO: 89 K/UL (ref 135–450)
PLATELET BLD QL SMEAR: ABNORMAL
PMV BLD AUTO: 8.9 FL (ref 5–10.5)
POTASSIUM SERPL-SCNC: 3.4 MMOL/L (ref 3.5–5.1)
PROT SERPL-MCNC: 5.4 G/DL (ref 6.4–8.2)
RBC # BLD AUTO: 3.66 M/UL (ref 4.2–5.9)
SLIDE REVIEW: ABNORMAL
SODIUM SERPL-SCNC: 135 MMOL/L (ref 136–145)
WBC # BLD AUTO: 6.4 K/UL (ref 4–11)

## 2025-07-11 PROCEDURE — 85027 COMPLETE CBC AUTOMATED: CPT

## 2025-07-11 PROCEDURE — 2500000003 HC RX 250 WO HCPCS: Performed by: INTERNAL MEDICINE

## 2025-07-11 PROCEDURE — 1200000000 HC SEMI PRIVATE

## 2025-07-11 PROCEDURE — 80076 HEPATIC FUNCTION PANEL: CPT

## 2025-07-11 PROCEDURE — 94640 AIRWAY INHALATION TREATMENT: CPT

## 2025-07-11 PROCEDURE — 6370000000 HC RX 637 (ALT 250 FOR IP): Performed by: INTERNAL MEDICINE

## 2025-07-11 PROCEDURE — 36415 COLL VENOUS BLD VENIPUNCTURE: CPT

## 2025-07-11 PROCEDURE — 83735 ASSAY OF MAGNESIUM: CPT

## 2025-07-11 PROCEDURE — 2580000003 HC RX 258: Performed by: INTERNAL MEDICINE

## 2025-07-11 PROCEDURE — 6370000000 HC RX 637 (ALT 250 FOR IP): Performed by: NURSE PRACTITIONER

## 2025-07-11 PROCEDURE — 6360000002 HC RX W HCPCS: Performed by: INTERNAL MEDICINE

## 2025-07-11 PROCEDURE — 2700000000 HC OXYGEN THERAPY PER DAY

## 2025-07-11 PROCEDURE — 80048 BASIC METABOLIC PNL TOTAL CA: CPT

## 2025-07-11 PROCEDURE — 94761 N-INVAS EAR/PLS OXIMETRY MLT: CPT

## 2025-07-11 RX ORDER — POTASSIUM CHLORIDE 1500 MG/1
40 TABLET, EXTENDED RELEASE ORAL ONCE
Status: COMPLETED | OUTPATIENT
Start: 2025-07-11 | End: 2025-07-11

## 2025-07-11 RX ADMIN — ATORVASTATIN CALCIUM 40 MG: 40 TABLET, FILM COATED ORAL at 20:22

## 2025-07-11 RX ADMIN — TAMSULOSIN HYDROCHLORIDE 0.4 MG: 0.4 CAPSULE ORAL at 08:34

## 2025-07-11 RX ADMIN — PIPERACILLIN AND TAZOBACTAM 3375 MG: 3; .375 INJECTION, POWDER, FOR SOLUTION INTRAVENOUS at 06:16

## 2025-07-11 RX ADMIN — SODIUM CHLORIDE: 0.9 INJECTION, SOLUTION INTRAVENOUS at 06:21

## 2025-07-11 RX ADMIN — PIPERACILLIN AND TAZOBACTAM 3375 MG: 3; .375 INJECTION, POWDER, FOR SOLUTION INTRAVENOUS at 14:45

## 2025-07-11 RX ADMIN — PIPERACILLIN AND TAZOBACTAM 3375 MG: 3; .375 INJECTION, POWDER, FOR SOLUTION INTRAVENOUS at 23:09

## 2025-07-11 RX ADMIN — Medication 10 ML: at 08:34

## 2025-07-11 RX ADMIN — APIXABAN 5 MG: 5 TABLET, FILM COATED ORAL at 20:22

## 2025-07-11 RX ADMIN — APIXABAN 5 MG: 5 TABLET, FILM COATED ORAL at 08:34

## 2025-07-11 RX ADMIN — PANTOPRAZOLE SODIUM 40 MG: 40 TABLET, DELAYED RELEASE ORAL at 06:17

## 2025-07-11 RX ADMIN — IPRATROPIUM BROMIDE AND ALBUTEROL SULFATE 1 DOSE: .5; 2.5 SOLUTION RESPIRATORY (INHALATION) at 07:57

## 2025-07-11 RX ADMIN — ONDANSETRON 4 MG: 4 TABLET, ORALLY DISINTEGRATING ORAL at 20:22

## 2025-07-11 RX ADMIN — POTASSIUM CHLORIDE 40 MEQ: 1500 TABLET, EXTENDED RELEASE ORAL at 08:34

## 2025-07-11 RX ADMIN — GUAIFENESIN AND DEXTROMETHORPHAN 5 ML: 100; 10 SYRUP ORAL at 06:17

## 2025-07-11 RX ADMIN — IPRATROPIUM BROMIDE AND ALBUTEROL SULFATE 1 DOSE: .5; 2.5 SOLUTION RESPIRATORY (INHALATION) at 11:47

## 2025-07-11 RX ADMIN — IPRATROPIUM BROMIDE AND ALBUTEROL SULFATE 1 DOSE: .5; 2.5 SOLUTION RESPIRATORY (INHALATION) at 19:51

## 2025-07-11 RX ADMIN — SODIUM CHLORIDE: 0.9 INJECTION, SOLUTION INTRAVENOUS at 20:21

## 2025-07-11 RX ADMIN — LOPERAMIDE HYDROCHLORIDE 2 MG: 2 CAPSULE ORAL at 20:22

## 2025-07-11 RX ADMIN — Medication 10 ML: at 20:22

## 2025-07-11 RX ADMIN — ALLOPURINOL 100 MG: 100 TABLET ORAL at 08:34

## 2025-07-11 RX ADMIN — IPRATROPIUM BROMIDE AND ALBUTEROL SULFATE 1 DOSE: .5; 2.5 SOLUTION RESPIRATORY (INHALATION) at 15:30

## 2025-07-11 RX ADMIN — METOPROLOL SUCCINATE 50 MG: 50 TABLET, EXTENDED RELEASE ORAL at 08:34

## 2025-07-11 RX ADMIN — CLOPIDOGREL BISULFATE 75 MG: 75 TABLET, FILM COATED ORAL at 08:34

## 2025-07-11 RX ADMIN — GUAIFENESIN AND DEXTROMETHORPHAN 5 ML: 100; 10 SYRUP ORAL at 20:21

## 2025-07-11 NOTE — PLAN OF CARE
Problem: Chronic Conditions and Co-morbidities  Goal: Patient's chronic conditions and co-morbidity symptoms are monitored and maintained or improved  Outcome: Progressing  Flowsheets (Taken 7/10/2025 2228)  Care Plan - Patient's Chronic Conditions and Co-Morbidity Symptoms are Monitored and Maintained or Improved:   Monitor and assess patient's chronic conditions and comorbid symptoms for stability, deterioration, or improvement   Collaborate with multidisciplinary team to address chronic and comorbid conditions and prevent exacerbation or deterioration   Update acute care plan with appropriate goals if chronic or comorbid symptoms are exacerbated and prevent overall improvement and discharge     Problem: Discharge Planning  Goal: Discharge to home or other facility with appropriate resources  Outcome: Progressing  Flowsheets (Taken 7/10/2025 2228)  Discharge to home or other facility with appropriate resources:   Identify barriers to discharge with patient and caregiver   Arrange for needed discharge resources and transportation as appropriate   Identify discharge learning needs (meds, wound care, etc)   Refer to discharge planning if patient needs post-hospital services based on physician order or complex needs related to functional status, cognitive ability or social support system     Problem: Safety - Adult  Goal: Free from fall injury  Outcome: Progressing     Problem: Skin/Tissue Integrity  Goal: Skin integrity remains intact  Description: 1.  Monitor for areas of redness and/or skin breakdown  2.  Assess vascular access sites hourly  3.  Every 4-6 hours minimum:  Change oxygen saturation probe site  4.  Every 4-6 hours:  If on nasal continuous positive airway pressure, respiratory therapy assess nares and determine need for appliance change or resting period  Outcome: Progressing  Flowsheets (Taken 7/10/2025 2228)  Skin Integrity Remains Intact:   Monitor for areas of redness and/or skin breakdown    Assess vascular access sites hourly   Every 4-6 hours:  If on nasal continuous positive airway pressure, assess nares and determine need for appliance change or resting period   Turn and reposition as indicated   Pressure redistribution bed/mattress (bed type)   Check visual cues for pain   Monitor skin under medical devices     Problem: Pain  Goal: Verbalizes/displays adequate comfort level or baseline comfort level  Outcome: Progressing  Flowsheets  Taken 7/10/2025 2359  Verbalizes/displays adequate comfort level or baseline comfort level:   Encourage patient to monitor pain and request assistance   Administer analgesics based on type and severity of pain and evaluate response   Assess pain using appropriate pain scale   Implement non-pharmacological measures as appropriate and evaluate response   Consider cultural and social influences on pain and pain management   Notify Licensed Independent Practitioner if interventions unsuccessful or patient reports new pain  Taken 7/10/2025 1930  Verbalizes/displays adequate comfort level or baseline comfort level:   Encourage patient to monitor pain and request assistance   Assess pain using appropriate pain scale   Administer analgesics based on type and severity of pain and evaluate response   Implement non-pharmacological measures as appropriate and evaluate response   Consider cultural and social influences on pain and pain management   Notify Licensed Independent Practitioner if interventions unsuccessful or patient reports new pain     Problem: Respiratory - Adult  Goal: Achieves optimal ventilation and oxygenation  Outcome: Progressing  Flowsheets (Taken 7/10/2025 2228)  Achieves optimal ventilation and oxygenation:   Assess for changes in respiratory status   Assess for changes in mentation and behavior   Position to facilitate oxygenation and minimize respiratory effort   Oxygen supplementation based on oxygen saturation or arterial blood gases   Assess and  instruct to report shortness of breath or any respiratory difficulty   Respiratory therapy support as indicated     Problem: Infection - Adult  Goal: Absence of infection at discharge  Outcome: Progressing  Flowsheets (Taken 7/10/2025 2228)  Absence of infection at discharge:   Assess and monitor for signs and symptoms of infection   Monitor lab/diagnostic results   Monitor all insertion sites i.e., indwelling lines, tubes and drains   Instruct and encourage patient and family to use good hand hygiene technique   Identify and instruct in appropriate isolation precautions for identified infection/condition  Goal: Absence of infection during hospitalization  Outcome: Progressing  Flowsheets (Taken 7/10/2025 2228)  Absence of infection during hospitalization:   Assess and monitor for signs and symptoms of infection   Monitor lab/diagnostic results   Monitor all insertion sites i.e., indwelling lines, tubes and drains   Instruct and encourage patient and family to use good hand hygiene technique   Identify and instruct in appropriate isolation precautions for identified infection/condition  Goal: Absence of fever/infection during anticipated neutropenic period  Outcome: Progressing  Flowsheets (Taken 7/10/2025 2228)  Absence of fever/infection during anticipated neutropenic period: Monitor white blood cell count

## 2025-07-11 NOTE — CONSULTS
Plan:     Started on IVF for ALENA and holding lasix, Entresto, and Aldactone for now.    Ok for Zosyn but prefer not used in combination with Vanc.   BP remains stable.   Good UOP 1050 cc without lasix.     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 Baseline SCr of 1.0 to 1.2 in 2023 and 2024.    BP has been good since admission with no hypotensive episodies.   CT angio for PE on 7/8/25 and SCr started rising after.   Home meds include Lasix, Jardiance, Entresto, and Aldactone.    Deneis NSAIDs, Stones, BPH, Fam Kid Dz  Hx of Gout and taking allopurinol.  Last attack was 2 years ago.      Please call our office at [phone] or [facility] with any questions or contact me directly.    Subjective:     CC / Reason for Consult:  KEON    HPI / PMHx:  This is a consult for Bill EWELINA Crow  requested by Jazlyn Jaime MD for the reason of  KEON w/ CKD 3a .  Bill Crow is a 84 y.o. male admitted for N/V/D with PMHx of CKD 2, CHF, HTN, HLD, Pacemaker.  Noted N/V /D for a few days with RLQ abd pain.  R/O diverticulitis with patient on Zossyn.      I thank Dr. Jazlyn Jaime MD for consulting [facility] in the care of your patient.  Please call with any questions or concerns.  [phone].  Raj Dickson    Objective:     Current Medications:  Scheduled Medications:  potassium chloride, 40 mEq, Once  tamsulosin, 0.4 mg, Daily  allopurinol, 100 mg, Daily  atorvastatin, 40 mg, Nightly  clopidogrel, 75 mg, Daily  apixaban, 5 mg, BID  metoprolol succinate, 50 mg, Daily  pantoprazole, 40 mg, QAM AC  sodium chloride flush, 5-40 mL, 2 times per day  ipratropium 0.5 mg-albuterol 2.5 mg, 1 Dose, 4x Daily RT  piperacillin-tazobactam, 3,375 mg, Q8H       IV Meds:  sodium chloride, Last Rate: 100 mL/hr at 07/11/25 0621  sodium chloride, Last Rate: 100 mL/hr at 07/10/25 0325       PRN Medications:  guaiFENesin-dextromethorphan, 5 mL, Q4H

## 2025-07-11 NOTE — PROGRESS NOTES
Hospital Medicine Progress Note      Subjective:  One episode of loose stools today.  Maybe two episodes yesterday.  No n/v.  No abdominal pain.        General appearance: No apparent distress, appears stated age and cooperative.  HEENT: Pupils equal, round.  Conjunctivae/corneas clear.  Neck: No increased jugular venous distention.   Respiratory:  Normal respiratory effort.  Bilaterally without Rales/Wheezes/Rhonchi.  Cardiovascular: Normal rate and regular rhythm with normal S1/S2 without murmurs, rubs or gallops.  Abdomen: Soft, non-tender, non-distended with hyperactive bowel sounds.  Musculoskeletal: No edema.  Without deformity.  Skin: No jaundice.  No rashes or lesions.  Neurologic:  Neurovascularly intact without any focal sensory/motor deficits. Cranial nerves: II-XII intact.  Psychiatric: Alert and oriented, normal insight.  Not especially anxious.       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  He lives at home with his wife.  He hasn't been hospitalized since he had a CHF decompensation 2 years ago.     Starting on 7/5 he developed new n/v/d.  His wife had the same symptoms.  She got better, he kept worsening.  They have some young great-grandkids who visit.  Nonbloody diarrhea and emesis.  Fever to 101 at home.  Not much abdominal pain.  No new medications or supplements, no recent abx.  Poor appetite.  Dry mouth.  Weak and tired.  He came to the ED to get checked out.  He had KEON and electrolyte deficiencies.  Something on POCUS made them give furosemide IV.  He was admitted to hospital medicine and started on IVFs.  He  chronic illness and without ongoing treatment there would be threat to life or bodily function.  Therefore, today's billing level will in part depend upon whether any of the following criteria are met today:  [] drugs with significant risk requiring lab monitoring (furosemide/Cr for KEON, vanc/Cr for KEON, insulin/glucose for hypoglycemia, contrast/Cr for KEON, heparin gtt/anti-Xa for risk of bleeding)  [] imaging or rhythm strip interpretation  [x] 3 of these: Cr worse labs reviewed, Cr trend labs ordered, collateral history from RN that bladder scan wasn't too high  [] IV controlled substance (drug) ordered PRN because of severe pain  [] major procedure or surgery with significant risk  [] change in code status or decision to escalate care  [] spent 51 minutes working on this patient today           Diet: ADULT DIET; Regular    Medications:        Infusion Medications    sodium chloride 100 mL/hr at 07/11/25 0621    sodium chloride 100 mL/hr at 07/10/25 0325     Scheduled Medications    tamsulosin  0.4 mg Oral Daily    allopurinol  100 mg Oral Daily    atorvastatin  40 mg Oral Nightly    clopidogrel  75 mg Oral Daily    apixaban  5 mg Oral BID    metoprolol succinate  50 mg Oral Daily    pantoprazole  40 mg Oral QAM AC    sodium chloride flush  5-40 mL IntraVENous 2 times per day    ipratropium 0.5 mg-albuterol 2.5 mg  1 Dose Inhalation 4x Daily RT    piperacillin-tazobactam  3,375 mg IntraVENous Q8H     PRN Meds: guaiFENesin-dextromethorphan, loperamide, sodium chloride flush, sodium chloride, ondansetron **OR** ondansetron, polyethylene glycol, acetaminophen **OR** acetaminophen    /73   Pulse 70   Temp 98.3 °F (36.8 °C) (Oral)   Resp 16   Ht 1.753 m (5' 9\")   Wt 78.8 kg (173 lb 11.6 oz)   SpO2 94%   BMI 25.65 kg/m²     Telemetry:      Personally reviewed and interpreted telemetry (Rhythm Strip) on 7/11/2025 with the following findings:     Diet: ADULT DIET; Regular    DVT Prophylaxis: []PPx LMWH  []SQ

## 2025-07-11 NOTE — CONSULTS
Thank you for consulting [facility] in the care of your patient.  A full consult will follow but if you have any questions I can be reached through our office at [phone] or through [facility].  Thank you.  Dr. Raj Dickson

## 2025-07-11 NOTE — PROGRESS NOTES
Physical Therapy    PT attempted to see pt for follow up PT session this morning however pt politely declines at this tanner, stating \"I haven't had breakfast yet and then I want to get cleaned up first\". Offered to assist pt OOB to chair for breakfast and educated pt on role of PT, however pt continued to politely decline.  Will follow up with pt as schedule allows and per POC.  Thank you,  Naheed Cuenca, PT, DPT

## 2025-07-12 LAB
ALBUMIN SERPL-MCNC: 3.2 G/DL (ref 3.4–5)
ANION GAP SERPL CALCULATED.3IONS-SCNC: 14 MMOL/L (ref 3–16)
BUN SERPL-MCNC: 42 MG/DL (ref 7–20)
CALCIUM SERPL-MCNC: 7.6 MG/DL (ref 8.3–10.6)
CHLORIDE SERPL-SCNC: 100 MMOL/L (ref 99–110)
CO2 SERPL-SCNC: 18 MMOL/L (ref 21–32)
CREAT SERPL-MCNC: 2.5 MG/DL (ref 0.8–1.3)
GFR SERPLBLD CREATININE-BSD FMLA CKD-EPI: 25 ML/MIN/{1.73_M2}
GLUCOSE SERPL-MCNC: 193 MG/DL (ref 70–99)
PHOSPHATE SERPL-MCNC: 2.6 MG/DL (ref 2.5–4.9)
POTASSIUM SERPL-SCNC: 3.8 MMOL/L (ref 3.5–5.1)
SODIUM SERPL-SCNC: 132 MMOL/L (ref 136–145)

## 2025-07-12 PROCEDURE — 6360000002 HC RX W HCPCS: Performed by: INTERNAL MEDICINE

## 2025-07-12 PROCEDURE — 6370000000 HC RX 637 (ALT 250 FOR IP): Performed by: INTERNAL MEDICINE

## 2025-07-12 PROCEDURE — 2500000003 HC RX 250 WO HCPCS: Performed by: INTERNAL MEDICINE

## 2025-07-12 PROCEDURE — 6370000000 HC RX 637 (ALT 250 FOR IP): Performed by: NURSE PRACTITIONER

## 2025-07-12 PROCEDURE — 94761 N-INVAS EAR/PLS OXIMETRY MLT: CPT

## 2025-07-12 PROCEDURE — 2580000003 HC RX 258: Performed by: INTERNAL MEDICINE

## 2025-07-12 PROCEDURE — 80069 RENAL FUNCTION PANEL: CPT

## 2025-07-12 PROCEDURE — 94640 AIRWAY INHALATION TREATMENT: CPT

## 2025-07-12 PROCEDURE — 1200000000 HC SEMI PRIVATE

## 2025-07-12 PROCEDURE — 36415 COLL VENOUS BLD VENIPUNCTURE: CPT

## 2025-07-12 PROCEDURE — 2700000000 HC OXYGEN THERAPY PER DAY

## 2025-07-12 RX ADMIN — IPRATROPIUM BROMIDE AND ALBUTEROL SULFATE 1 DOSE: .5; 2.5 SOLUTION RESPIRATORY (INHALATION) at 11:58

## 2025-07-12 RX ADMIN — IPRATROPIUM BROMIDE AND ALBUTEROL SULFATE 1 DOSE: .5; 2.5 SOLUTION RESPIRATORY (INHALATION) at 08:50

## 2025-07-12 RX ADMIN — PANTOPRAZOLE SODIUM 40 MG: 40 TABLET, DELAYED RELEASE ORAL at 06:31

## 2025-07-12 RX ADMIN — Medication 10 ML: at 09:40

## 2025-07-12 RX ADMIN — IPRATROPIUM BROMIDE AND ALBUTEROL SULFATE 1 DOSE: .5; 2.5 SOLUTION RESPIRATORY (INHALATION) at 19:22

## 2025-07-12 RX ADMIN — GUAIFENESIN AND DEXTROMETHORPHAN 5 ML: 100; 10 SYRUP ORAL at 06:31

## 2025-07-12 RX ADMIN — PIPERACILLIN AND TAZOBACTAM 3375 MG: 3; .375 INJECTION, POWDER, FOR SOLUTION INTRAVENOUS at 06:31

## 2025-07-12 RX ADMIN — APIXABAN 5 MG: 5 TABLET, FILM COATED ORAL at 20:09

## 2025-07-12 RX ADMIN — APIXABAN 5 MG: 5 TABLET, FILM COATED ORAL at 09:39

## 2025-07-12 RX ADMIN — ATORVASTATIN CALCIUM 40 MG: 40 TABLET, FILM COATED ORAL at 20:08

## 2025-07-12 RX ADMIN — LOPERAMIDE HYDROCHLORIDE 2 MG: 2 CAPSULE ORAL at 09:40

## 2025-07-12 RX ADMIN — CLOPIDOGREL BISULFATE 75 MG: 75 TABLET, FILM COATED ORAL at 09:39

## 2025-07-12 RX ADMIN — METOPROLOL SUCCINATE 50 MG: 50 TABLET, EXTENDED RELEASE ORAL at 09:39

## 2025-07-12 RX ADMIN — TAMSULOSIN HYDROCHLORIDE 0.4 MG: 0.4 CAPSULE ORAL at 09:39

## 2025-07-12 RX ADMIN — IPRATROPIUM BROMIDE AND ALBUTEROL SULFATE 1 DOSE: .5; 2.5 SOLUTION RESPIRATORY (INHALATION) at 15:59

## 2025-07-12 RX ADMIN — PIPERACILLIN AND TAZOBACTAM 3375 MG: 3; .375 INJECTION, POWDER, FOR SOLUTION INTRAVENOUS at 22:41

## 2025-07-12 RX ADMIN — ALLOPURINOL 100 MG: 100 TABLET ORAL at 09:39

## 2025-07-12 RX ADMIN — SODIUM CHLORIDE: 0.9 INJECTION, SOLUTION INTRAVENOUS at 06:30

## 2025-07-12 RX ADMIN — PIPERACILLIN AND TAZOBACTAM 3375 MG: 3; .375 INJECTION, POWDER, FOR SOLUTION INTRAVENOUS at 17:03

## 2025-07-12 RX ADMIN — LOPERAMIDE HYDROCHLORIDE 2 MG: 2 CAPSULE ORAL at 06:31

## 2025-07-12 ASSESSMENT — PAIN SCALES - GENERAL: PAINLEVEL_OUTOF10: 0

## 2025-07-12 NOTE — PROGRESS NOTES
Hospital Medicine Progress Note      Subjective:  One episode of loose stools yesterday, one so far today.  No abdominal pain.  Decreased appetite.  Tolerating IVFs.         General appearance: No apparent distress, appears stated age and cooperative.  HEENT: Pupils equal, round.  Conjunctivae/corneas clear.  Neck: No increased jugular venous distention.   Respiratory:  Normal respiratory effort.  Bilaterally without Rales/Wheezes/Rhonchi.  Cardiovascular: Normal rate and regular rhythm with normal S1/S2 without murmurs, rubs or gallops.  Abdomen: Soft, non-tender, non-distended with hyperactive bowel sounds.  Musculoskeletal: No edema.  Without deformity.  Skin: No jaundice.  No rashes or lesions.  Neurologic:  Neurovascularly intact without any focal sensory/motor deficits. Cranial nerves: II-XII intact.  Psychiatric: Alert and oriented, normal insight.  Not especially anxious.       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  He lives at home with his wife.  He hasn't been hospitalized since he had a CHF decompensation 2 years ago.     Starting on 7/5 he developed new n/v/d.  His wife had the same symptoms.  She got better, he kept worsening.  They have some young great-grandkids who visit.  Nonbloody diarrhea and emesis.  Fever to 101 at home.  Not much abdominal pain.  No new medications or supplements, no recent abx.  Poor appetite.  Dry mouth.  Weak and tired.  He came to the ED to get checked out.  He had KEON and electrolyte deficiencies.  Something on POCUS made them give furosemide IV.  He was admitted to hospital medicine and  in part depend upon whether any of the following criteria are met today:  [] drugs with significant risk requiring lab monitoring (furosemide/Cr for KEON, vanc/Cr for KEON, insulin/glucose for hypoglycemia, contrast/Cr for KEON, heparin gtt/anti-Xa for risk of bleeding)  [] imaging or rhythm strip interpretation  [x] 3 of these: Cr labs review today, Cr trend labs ordered, nephrology suspects ALENA consult reviewed  [] IV controlled substance (drug) ordered PRN because of severe pain  [] major procedure or surgery with significant risk  [] change in code status or decision to escalate care  [] spent 51 minutes working on this patient today           Diet: ADULT DIET; Regular    Medications:        Infusion Medications    sodium chloride 100 mL/hr at 07/12/25 0630    sodium chloride 100 mL/hr at 07/10/25 0325     Scheduled Medications    tamsulosin  0.4 mg Oral Daily    allopurinol  100 mg Oral Daily    atorvastatin  40 mg Oral Nightly    clopidogrel  75 mg Oral Daily    apixaban  5 mg Oral BID    metoprolol succinate  50 mg Oral Daily    pantoprazole  40 mg Oral QAM AC    sodium chloride flush  5-40 mL IntraVENous 2 times per day    ipratropium 0.5 mg-albuterol 2.5 mg  1 Dose Inhalation 4x Daily RT    piperacillin-tazobactam  3,375 mg IntraVENous Q8H     PRN Meds: guaiFENesin-dextromethorphan, loperamide, sodium chloride flush, sodium chloride, ondansetron **OR** ondansetron, polyethylene glycol, acetaminophen **OR** acetaminophen    /70   Pulse 77   Temp 98.6 °F (37 °C) (Oral)   Resp 16   Ht 1.753 m (5' 9\")   Wt 78.8 kg (173 lb 11.6 oz)   SpO2 94%   BMI 25.65 kg/m²     Telemetry:      Personally reviewed and interpreted telemetry (Rhythm Strip) on 7/12/2025 with the following findings:     Diet: ADULT DIET; Regular    DVT Prophylaxis: []PPx LMWH  []SQ Heparin  []IPC/SCDs  []Eliquis  []Xarelto  []Coumadin  [] Heparin Drip  []Other -      Code status: Full Code    PT/OT Eval Status:   []NOT yet ordered

## 2025-07-12 NOTE — PLAN OF CARE
Problem: Chronic Conditions and Co-morbidities  Goal: Patient's chronic conditions and co-morbidity symptoms are monitored and maintained or improved  Outcome: Progressing  Flowsheets (Taken 7/11/2025 2313)  Care Plan - Patient's Chronic Conditions and Co-Morbidity Symptoms are Monitored and Maintained or Improved:   Monitor and assess patient's chronic conditions and comorbid symptoms for stability, deterioration, or improvement   Collaborate with multidisciplinary team to address chronic and comorbid conditions and prevent exacerbation or deterioration   Update acute care plan with appropriate goals if chronic or comorbid symptoms are exacerbated and prevent overall improvement and discharge     Problem: Discharge Planning  Goal: Discharge to home or other facility with appropriate resources  Outcome: Progressing  Flowsheets (Taken 7/11/2025 2313)  Discharge to home or other facility with appropriate resources:   Identify barriers to discharge with patient and caregiver   Arrange for needed discharge resources and transportation as appropriate   Identify discharge learning needs (meds, wound care, etc)   Refer to discharge planning if patient needs post-hospital services based on physician order or complex needs related to functional status, cognitive ability or social support system     Problem: Safety - Adult  Goal: Free from fall injury  Outcome: Progressing     Problem: Skin/Tissue Integrity  Goal: Skin integrity remains intact  Description: 1.  Monitor for areas of redness and/or skin breakdown  2.  Assess vascular access sites hourly  3.  Every 4-6 hours minimum:  Change oxygen saturation probe site  4.  Every 4-6 hours:  If on nasal continuous positive airway pressure, respiratory therapy assess nares and determine need for appliance change or resting period  Outcome: Progressing  Flowsheets  Taken 7/11/2025 2313 by Sarah Gagnon RN  Skin Integrity Remains Intact:   Monitor for areas of redness and/or

## 2025-07-12 NOTE — PROGRESS NOTES
Plan:     SCr worsening 2.1 from 1.4 baseline.    Continue IVF  ml/hr for ALENA and holding lasix, Entresto, and Aldactone for now.    Ok for Zosyn but prefer not used in combination with Vanc. May hold Allopurinol as well if worsening tomorrow.   BP remains stable.   Good  cc without lasix.     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 Baseline SCr of 1.0 to 1.2 in 2023 and 2024.    BP has been good since admission with no hypotensive episodies.   CT angio for PE on 7/8/25 and SCr started rising after.   Home meds include Lasix, Jardiance, Entresto, and Aldactone.    Deneis NSAIDs, Stones, BPH, Fam Kid Dz  Hx of Gout and taking allopurinol.  Last attack was 2 years ago.      Please call our office at [phone] or [facility] with any questions or contact me directly.    Subjective:     CC / Reason for Consult:  KEON    HPI / PMHx:  This is a consult for Bill Crow  requested by Jazlyn Jaime MD for the reason of  KEON w/ CKD 3a .  Bill Crow is a 84 y.o. male admitted for N/V/D with PMHx of CKD 2, CHF, HTN, HLD, Pacemaker.  Noted N/V /D for a few days with RLQ abd pain.  R/O diverticulitis with patient on Zossyn.      I thank Dr. Jazlyn Jaime MD for consulting [facility] in the care of your patient.  Please call with any questions or concerns.  [phone].  Raj Dickson    Objective:     Current Medications:  Scheduled Medications:  tamsulosin, 0.4 mg, Daily  allopurinol, 100 mg, Daily  atorvastatin, 40 mg, Nightly  clopidogrel, 75 mg, Daily  apixaban, 5 mg, BID  metoprolol succinate, 50 mg, Daily  pantoprazole, 40 mg, QAM AC  sodium chloride flush, 5-40 mL, 2 times per day  ipratropium 0.5 mg-albuterol 2.5 mg, 1 Dose, 4x Daily RT  piperacillin-tazobactam, 3,375 mg, Q8H       IV Meds:  sodium chloride, Last Rate: 100 mL/hr at 07/12/25 0630  sodium chloride, Last Rate: 100 mL/hr at 07/10/25 0325       PRN  Medications:  guaiFENesin-dextromethorphan, 5 mL, Q4H PRN  loperamide, 2 mg, 4x Daily PRN  sodium chloride flush, 5-40 mL, PRN  sodium chloride, , PRN  ondansetron, 4 mg, Q8H PRN   Or  ondansetron, 4 mg, Q6H PRN  polyethylene glycol, 17 g, Daily PRN  acetaminophen, 650 mg, Q6H PRN   Or  acetaminophen, 650 mg, Q6H PRN        Allergies: Aldactone [spironolactone], Cardura [doxazosin mesylate], Clonidine derivatives, Coumadin [warfarin], Omeprazole, and Vioxx    ROS: Positives in BOLD     GEN:   fevers, chills, sweats, fatigue and weight loss      Resp:    cough, hemoptysis, pneumonia or dyspnea on exertion     Card:  chest pain, chest pressure/discomfort, dyspnea, palpitations,  lower extremity edema     GI:   nausea, vomiting, diarrhea, constipation and abdominal pain     :  dysuria, nocturia, urinary incontinence, hesitancy and hematuria         PE:     Gen: alert, well appearing, and in no distress and oriented to person, place, and time     Cardio: normal rate, regular rhythm, normal S1, S2, no murmurs, rubs, clicks or gallops      Resp: clear to auscultation, no wheezes, rales or rhonchi, symmetric air entry.       Ext:  peripheral pulses normal, no pedal edema, no clubbing or cyanosis         Vitals: Patient Vitals for the past 8 hrs:   BP Temp Temp src Pulse Resp SpO2   07/12/25 0930 108/70 98.6 °F (37 °C) Oral 77 16 94 %   07/12/25 0852 -- -- -- -- -- 96 %          I/Os:   Intake/Output Summary (Last 24 hours) at 7/12/2025 1542  Last data filed at 7/12/2025 0633  Gross per 24 hour   Intake 2044 ml   Output 700 ml   Net 1344 ml       LABS:    Lab Results   Component Value Date/Time    CREATININE 2.1 07/11/2025 04:41 AM    BUN 34 07/11/2025 04:41 AM     07/11/2025 04:41 AM    K 3.4 07/11/2025 04:41 AM     07/11/2025 04:41 AM    CO2 22 07/11/2025 04:41 AM     No results found for: \"[id number]\"   Lab Results   Component Value Date/Time    WBC 6.4 07/11/2025 04:41 AM    HGB 11.6 07/11/2025 04:41 AM

## 2025-07-13 LAB
ALBUMIN SERPL-MCNC: 3.1 G/DL (ref 3.4–5)
ANION GAP SERPL CALCULATED.3IONS-SCNC: 13 MMOL/L (ref 3–16)
BUN SERPL-MCNC: 43 MG/DL (ref 7–20)
CALCIUM SERPL-MCNC: 7.9 MG/DL (ref 8.3–10.6)
CHLORIDE SERPL-SCNC: 104 MMOL/L (ref 99–110)
CO2 SERPL-SCNC: 17 MMOL/L (ref 21–32)
CREAT SERPL-MCNC: 2.5 MG/DL (ref 0.8–1.3)
DEPRECATED RDW RBC AUTO: 16.3 % (ref 12.4–15.4)
GFR SERPLBLD CREATININE-BSD FMLA CKD-EPI: 25 ML/MIN/{1.73_M2}
GLUCOSE SERPL-MCNC: 129 MG/DL (ref 70–99)
HCT VFR BLD AUTO: 32.8 % (ref 40.5–52.5)
HGB BLD-MCNC: 11.1 G/DL (ref 13.5–17.5)
MCH RBC QN AUTO: 31.9 PG (ref 26–34)
MCHC RBC AUTO-ENTMCNC: 33.9 G/DL (ref 31–36)
MCV RBC AUTO: 94.1 FL (ref 80–100)
PHOSPHATE SERPL-MCNC: 3.2 MG/DL (ref 2.5–4.9)
PLATELET # BLD AUTO: 100 K/UL (ref 135–450)
PMV BLD AUTO: 9.5 FL (ref 5–10.5)
POTASSIUM SERPL-SCNC: 3.5 MMOL/L (ref 3.5–5.1)
RBC # BLD AUTO: 3.48 M/UL (ref 4.2–5.9)
SODIUM SERPL-SCNC: 134 MMOL/L (ref 136–145)
WBC # BLD AUTO: 6.5 K/UL (ref 4–11)

## 2025-07-13 PROCEDURE — 2580000003 HC RX 258: Performed by: INTERNAL MEDICINE

## 2025-07-13 PROCEDURE — 36415 COLL VENOUS BLD VENIPUNCTURE: CPT

## 2025-07-13 PROCEDURE — 6370000000 HC RX 637 (ALT 250 FOR IP): Performed by: INTERNAL MEDICINE

## 2025-07-13 PROCEDURE — 1200000000 HC SEMI PRIVATE

## 2025-07-13 PROCEDURE — 85027 COMPLETE CBC AUTOMATED: CPT

## 2025-07-13 PROCEDURE — 94640 AIRWAY INHALATION TREATMENT: CPT

## 2025-07-13 PROCEDURE — 2700000000 HC OXYGEN THERAPY PER DAY

## 2025-07-13 PROCEDURE — 94761 N-INVAS EAR/PLS OXIMETRY MLT: CPT

## 2025-07-13 PROCEDURE — 6360000002 HC RX W HCPCS: Performed by: INTERNAL MEDICINE

## 2025-07-13 PROCEDURE — 80069 RENAL FUNCTION PANEL: CPT

## 2025-07-13 PROCEDURE — 2500000003 HC RX 250 WO HCPCS: Performed by: INTERNAL MEDICINE

## 2025-07-13 RX ADMIN — IPRATROPIUM BROMIDE AND ALBUTEROL SULFATE 1 DOSE: .5; 2.5 SOLUTION RESPIRATORY (INHALATION) at 07:55

## 2025-07-13 RX ADMIN — APIXABAN 5 MG: 5 TABLET, FILM COATED ORAL at 09:20

## 2025-07-13 RX ADMIN — PANTOPRAZOLE SODIUM 40 MG: 40 TABLET, DELAYED RELEASE ORAL at 05:24

## 2025-07-13 RX ADMIN — PIPERACILLIN AND TAZOBACTAM 3375 MG: 3; .375 INJECTION, POWDER, FOR SOLUTION INTRAVENOUS at 06:04

## 2025-07-13 RX ADMIN — LOPERAMIDE HYDROCHLORIDE 2 MG: 2 CAPSULE ORAL at 09:30

## 2025-07-13 RX ADMIN — CLOPIDOGREL BISULFATE 75 MG: 75 TABLET, FILM COATED ORAL at 09:20

## 2025-07-13 RX ADMIN — SODIUM BICARBONATE: 84 INJECTION, SOLUTION INTRAVENOUS at 15:07

## 2025-07-13 RX ADMIN — APIXABAN 5 MG: 5 TABLET, FILM COATED ORAL at 20:39

## 2025-07-13 RX ADMIN — IPRATROPIUM BROMIDE AND ALBUTEROL SULFATE 1 DOSE: .5; 2.5 SOLUTION RESPIRATORY (INHALATION) at 11:31

## 2025-07-13 RX ADMIN — ATORVASTATIN CALCIUM 40 MG: 40 TABLET, FILM COATED ORAL at 20:39

## 2025-07-13 RX ADMIN — IPRATROPIUM BROMIDE AND ALBUTEROL SULFATE 1 DOSE: .5; 2.5 SOLUTION RESPIRATORY (INHALATION) at 21:06

## 2025-07-13 RX ADMIN — METOPROLOL SUCCINATE 50 MG: 50 TABLET, EXTENDED RELEASE ORAL at 09:20

## 2025-07-13 RX ADMIN — PIPERACILLIN AND TAZOBACTAM 3375 MG: 3; .375 INJECTION, POWDER, FOR SOLUTION INTRAVENOUS at 15:12

## 2025-07-13 RX ADMIN — TAMSULOSIN HYDROCHLORIDE 0.4 MG: 0.4 CAPSULE ORAL at 09:20

## 2025-07-13 RX ADMIN — ALLOPURINOL 100 MG: 100 TABLET ORAL at 09:20

## 2025-07-13 RX ADMIN — PIPERACILLIN AND TAZOBACTAM 3375 MG: 3; .375 INJECTION, POWDER, FOR SOLUTION INTRAVENOUS at 20:44

## 2025-07-13 RX ADMIN — IPRATROPIUM BROMIDE AND ALBUTEROL SULFATE 1 DOSE: .5; 2.5 SOLUTION RESPIRATORY (INHALATION) at 15:34

## 2025-07-13 RX ADMIN — SODIUM CHLORIDE: 0.9 INJECTION, SOLUTION INTRAVENOUS at 09:33

## 2025-07-13 RX ADMIN — SODIUM CHLORIDE 12.5 ML/HR: 0.9 INJECTION, SOLUTION INTRAVENOUS at 15:11

## 2025-07-13 ASSESSMENT — PAIN SCALES - GENERAL: PAINLEVEL_OUTOF10: 0

## 2025-07-13 ASSESSMENT — PAIN DESCRIPTION - LOCATION: LOCATION: ABDOMEN

## 2025-07-13 NOTE — PROGRESS NOTES
Hospital Medicine Progress Note      Subjective:  Cr has at least stopped worsening.  He is only having 1 BM per day for the last few days.  Weak, tired, little appetite.        General appearance: No apparent distress, appears stated age and cooperative.  HEENT: Pupils equal, round.  Conjunctivae/corneas clear.  Neck: No increased jugular venous distention.   Respiratory:  Normal respiratory effort.  Bilaterally without Rales/Wheezes/Rhonchi.  Cardiovascular: Normal rate and regular rhythm with normal S1/S2 without murmurs, rubs or gallops.  Abdomen: Soft, non-tender, non-distended with hyperactive bowel sounds.  Musculoskeletal: No edema.  Without deformity.  Skin: No jaundice.  No rashes or lesions.  Neurologic:  Neurovascularly intact without any focal sensory/motor deficits. Cranial nerves: II-XII intact.  Psychiatric: Alert and oriented, normal insight.  Not especially anxious.       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  He lives at home with his wife.  He hasn't been hospitalized since he had a CHF decompensation 2 years ago.     Starting on 7/5 he developed new n/v/d.  His wife had the same symptoms.  She got better, he kept worsening.  They have some young great-grandkids who visit.  Nonbloody diarrhea and emesis.  Fever to 101 at home.  Not much abdominal pain.  No new medications or supplements, no recent abx.  Poor appetite.  Dry mouth.  Weak and tired.  He came to the ED to get checked out.  He had KEON and electrolyte deficiencies.  Something on POCUS made them give furosemide IV.  He was admitted to hospital medicine

## 2025-07-13 NOTE — PLAN OF CARE
Problem: Chronic Conditions and Co-morbidities  Goal: Patient's chronic conditions and co-morbidity symptoms are monitored and maintained or improved  Outcome: Progressing     Problem: Discharge Planning  Goal: Discharge to home or other facility with appropriate resources  Outcome: Progressing     Problem: Safety - Adult  Goal: Free from fall injury  Outcome: Progressing     Problem: Skin/Tissue Integrity  Goal: Skin integrity remains intact  Description: 1.  Monitor for areas of redness and/or skin breakdown  2.  Assess vascular access sites hourly  3.  Every 4-6 hours minimum:  Change oxygen saturation probe site  4.  Every 4-6 hours:  If on nasal continuous positive airway pressure, respiratory therapy assess nares and determine need for appliance change or resting period  Outcome: Progressing     Problem: Pain  Goal: Verbalizes/displays adequate comfort level or baseline comfort level  Outcome: Progressing     Problem: Respiratory - Adult  Goal: Achieves optimal ventilation and oxygenation  Outcome: Progressing     Problem: Infection - Adult  Goal: Absence of infection at discharge  Outcome: Progressing  Goal: Absence of infection during hospitalization  Outcome: Progressing  Goal: Absence of fever/infection during anticipated neutropenic period  Outcome: Progressing

## 2025-07-13 NOTE — PROGRESS NOTES
4 Eyes Skin Assessment     NAME:  Bill Crow  YOB: 1940  MEDICAL RECORD NUMBER:  [id number]    The patient is being assessed for  Shift Handoff    I agree that at least one RN has performed a thorough Head to Toe Skin Assessment on the patient. ALL assessment sites listed below have been assessed.      Areas assessed by both nurses:    Head, Face, Ears, Shoulders, Back, Chest, Arms, Elbows, Hands, Sacrum. Buttock, Coccyx, Ischium, Legs. Feet and Heels, and Under Medical Devices         Does the Patient have a Wound? Yes wound(s) were present on assessment. LDA wound assessment was Initiated and completed by RN       Anastacio Prevention initiated by RN: No  Wound Care Orders initiated by RN: No    Pressure Injury (Stage 1,2,3,4, Unstageable, DTI, NWPT, and Complex wounds) if present, place Wound referral order by RN under : No    New Ostomies, if present place, Ostomy referral order under : No     Nurse 1 eSignature: Electronically signed by Chadd Abernathy RN on 7/12/25 at 11:22 PM EDT    **SHARE this note so that the co-signing nurse can place an eSignature**    Nurse 2 eSignature: Electronically signed by Efren Hughes RN on 7/13/25 at 3:28 AM EDT

## 2025-07-13 NOTE — PROGRESS NOTES
4 Eyes Skin Assessment     NAME:  Bill Crow  YOB: 1940  MEDICAL RECORD NUMBER:  [id number]    The patient is being assessed for  Shift Handoff    I agree that at least one RN has performed a thorough Head to Toe Skin Assessment on the patient. ALL assessment sites listed below have been assessed.      Areas assessed by both nurses:    Head, Face, Ears, Shoulders, Back, Chest, Arms, Elbows, Hands, Sacrum. Buttock, Coccyx, Ischium, Legs. Feet and Heels, and Under Medical Devices         Does the Patient have a Wound? Yes wound(s) were present on assessment. LDA wound assessment was Initiated and completed by RN       Anastacio Prevention initiated by RN: No  Wound Care Orders initiated by RN: Yes    Pressure Injury (Stage 1,2,3,4, Unstageable, DTI, NWPT, and Complex wounds) if present, place Wound referral order by RN under : No    New Ostomies, if present place, Ostomy referral order under : No     Nurse 1 eSignature: Electronically signed by Chadd Abernathy RN on 7/12/25 at 11:21 PM EDT    **SHARE this note so that the co-signing nurse can place an eSignature**    Nurse 2 eSignature: Electronically signed by Efren Hughes RN on 7/14/25 at 1:01 AM EDT

## 2025-07-13 NOTE — PROGRESS NOTES
Plan:     SCr worsening 2.5 from 1.4 baseline.    Continue IVF NS 75 ml/hr for ALENA and holding lasix, Entresto, and Aldactone.    Ok for Zosyn but prefer not used in combination with Vanc.   Hold Allopurinol for now as well.   BP remains stable 143/74.   UOP only 400 cc without lasix.     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 Baseline SCr of 1.0 to 1.2 in 2023 and 2024.    BP has been good since admission with no hypotensive episodies.   CT angio for PE on 7/8/25 and SCr started rising after.   Home meds include Lasix, Jardiance, Entresto, and Aldactone.    Deneis NSAIDs, Stones, BPH, Fam Kid Dz  Hx of Gout and taking allopurinol.  Last attack was 2 years ago.      Please call our office at [phone] or [employer] with any questions or contact me directly.    Subjective:     CC / Reason for Consult:  KEON    HPI / PMHx:  This is a consult for Bill Crow  requested by Edilberto Lew MD for the reason of  KEON w/ CKD 3a .  Bill Crow is a 84 y.o. male admitted for N/V/D with PMHx of CKD 2, CHF, HTN, HLD, Pacemaker.  Noted N/V /D for a few days with RLQ abd pain.  R/O diverticulitis with patient on Zossyn.      I thank Dr. Edilberto Lew MD for consulting [facility] in the care of your patient.  Please call with any questions or concerns.  [phone].  Raj Dickson    Objective:     Current Medications:  Scheduled Medications:  tamsulosin, 0.4 mg, Daily  allopurinol, 100 mg, Daily  atorvastatin, 40 mg, Nightly  clopidogrel, 75 mg, Daily  apixaban, 5 mg, BID  metoprolol succinate, 50 mg, Daily  pantoprazole, 40 mg, QAM AC  sodium chloride flush, 5-40 mL, 2 times per day  ipratropium 0.5 mg-albuterol 2.5 mg, 1 Dose, 4x Daily RT  piperacillin-tazobactam, 3,375 mg, Q8H       IV Meds:  sodium bicarbonate 150 mEq in sterile water 1,000 mL infusion  sodium chloride, Last Rate: 100 mL/hr at 07/10/25 0325       PRN

## 2025-07-13 NOTE — PLAN OF CARE
Problem: Chronic Conditions and Co-morbidities  Goal: Patient's chronic conditions and co-morbidity symptoms are monitored and maintained or improved  7/12/2025 2320 by Chadd Abernathy RN  Outcome: Progressing  7/12/2025 2319 by Chadd Abernathy RN  Outcome: Progressing     Problem: Discharge Planning  Goal: Discharge to home or other facility with appropriate resources  7/12/2025 2320 by Chadd Abernathy RN  Outcome: Progressing  7/12/2025 2319 by Chadd Abernathy RN  Outcome: Progressing     Problem: Safety - Adult  Goal: Free from fall injury  7/12/2025 2320 by Chadd Abernathy RN  Outcome: Progressing  7/12/2025 2319 by Chadd Abernathy RN  Outcome: Progressing     Problem: Skin/Tissue Integrity  Goal: Skin integrity remains intact  Description: 1.  Monitor for areas of redness and/or skin breakdown  2.  Assess vascular access sites hourly  3.  Every 4-6 hours minimum:  Change oxygen saturation probe site  4.  Every 4-6 hours:  If on nasal continuous positive airway pressure, respiratory therapy assess nares and determine need for appliance change or resting period  7/12/2025 2320 by Chadd Abernathy RN  Outcome: Progressing  7/12/2025 2319 by Chadd Abernathy RN  Outcome: Progressing     Problem: Pain  Goal: Verbalizes/displays adequate comfort level or baseline comfort level  7/12/2025 2320 by Chadd Abernathy RN  Outcome: Progressing  7/12/2025 2319 by Chadd Abernathy RN  Outcome: Progressing     Problem: Respiratory - Adult  Goal: Achieves optimal ventilation and oxygenation  7/12/2025 2320 by Chadd Abernathy RN  Outcome: Progressing  7/12/2025 2319 by Chadd Abernathy RN  Outcome: Progressing     Problem: Infection - Adult  Goal: Absence of infection at discharge  7/12/2025 2320 by Chadd Abernathy RN  Outcome: Progressing  7/12/2025 2319 by Chadd Abernathy  FRAN Tellez  Outcome: Progressing  Goal: Absence of infection during hospitalization  7/12/2025 2320 by Chadd Abernathy RN  Outcome: Progressing  7/12/2025 2319 by Chadd Abernathy RN  Outcome: Progressing  Goal: Absence of fever/infection during anticipated neutropenic period  7/12/2025 2320 by Chadd Abernathy RN  Outcome: Progressing  7/12/2025 2319 by Chadd Abernathy RN  Outcome: Progressing

## 2025-07-14 LAB
ALBUMIN SERPL-MCNC: 3.3 G/DL (ref 3.4–5)
ANION GAP SERPL CALCULATED.3IONS-SCNC: 12 MMOL/L (ref 3–16)
BUN SERPL-MCNC: 43 MG/DL (ref 7–20)
CALCIUM SERPL-MCNC: 8.2 MG/DL (ref 8.3–10.6)
CHLORIDE SERPL-SCNC: 101 MMOL/L (ref 99–110)
CO2 SERPL-SCNC: 21 MMOL/L (ref 21–32)
CREAT SERPL-MCNC: 2.5 MG/DL (ref 0.8–1.3)
GFR SERPLBLD CREATININE-BSD FMLA CKD-EPI: 25 ML/MIN/{1.73_M2}
GLUCOSE SERPL-MCNC: 159 MG/DL (ref 70–99)
PHOSPHATE SERPL-MCNC: 2.8 MG/DL (ref 2.5–4.9)
POTASSIUM SERPL-SCNC: 3.1 MMOL/L (ref 3.5–5.1)
SODIUM SERPL-SCNC: 134 MMOL/L (ref 136–145)

## 2025-07-14 PROCEDURE — 6370000000 HC RX 637 (ALT 250 FOR IP): Performed by: INTERNAL MEDICINE

## 2025-07-14 PROCEDURE — 80069 RENAL FUNCTION PANEL: CPT

## 2025-07-14 PROCEDURE — 94761 N-INVAS EAR/PLS OXIMETRY MLT: CPT

## 2025-07-14 PROCEDURE — 6360000002 HC RX W HCPCS: Performed by: INTERNAL MEDICINE

## 2025-07-14 PROCEDURE — 94640 AIRWAY INHALATION TREATMENT: CPT

## 2025-07-14 PROCEDURE — 1200000000 HC SEMI PRIVATE

## 2025-07-14 PROCEDURE — 36415 COLL VENOUS BLD VENIPUNCTURE: CPT

## 2025-07-14 PROCEDURE — 6370000000 HC RX 637 (ALT 250 FOR IP): Performed by: NURSE PRACTITIONER

## 2025-07-14 PROCEDURE — 51798 US URINE CAPACITY MEASURE: CPT

## 2025-07-14 PROCEDURE — 97116 GAIT TRAINING THERAPY: CPT

## 2025-07-14 PROCEDURE — 2700000000 HC OXYGEN THERAPY PER DAY

## 2025-07-14 PROCEDURE — 2580000003 HC RX 258: Performed by: INTERNAL MEDICINE

## 2025-07-14 RX ORDER — POTASSIUM CHLORIDE 1500 MG/1
40 TABLET, EXTENDED RELEASE ORAL ONCE
Status: COMPLETED | OUTPATIENT
Start: 2025-07-14 | End: 2025-07-14

## 2025-07-14 RX ORDER — POTASSIUM CHLORIDE 1500 MG/1
20 TABLET, EXTENDED RELEASE ORAL 2 TIMES DAILY
Status: DISCONTINUED | OUTPATIENT
Start: 2025-07-14 | End: 2025-07-14

## 2025-07-14 RX ADMIN — PANTOPRAZOLE SODIUM 40 MG: 40 TABLET, DELAYED RELEASE ORAL at 05:34

## 2025-07-14 RX ADMIN — IPRATROPIUM BROMIDE AND ALBUTEROL SULFATE 1 DOSE: .5; 2.5 SOLUTION RESPIRATORY (INHALATION) at 08:30

## 2025-07-14 RX ADMIN — GUAIFENESIN AND DEXTROMETHORPHAN 5 ML: 100; 10 SYRUP ORAL at 03:44

## 2025-07-14 RX ADMIN — CLOPIDOGREL BISULFATE 75 MG: 75 TABLET, FILM COATED ORAL at 09:09

## 2025-07-14 RX ADMIN — IPRATROPIUM BROMIDE AND ALBUTEROL SULFATE 1 DOSE: .5; 2.5 SOLUTION RESPIRATORY (INHALATION) at 20:16

## 2025-07-14 RX ADMIN — IPRATROPIUM BROMIDE AND ALBUTEROL SULFATE 1 DOSE: .5; 2.5 SOLUTION RESPIRATORY (INHALATION) at 11:08

## 2025-07-14 RX ADMIN — APIXABAN 5 MG: 5 TABLET, FILM COATED ORAL at 09:09

## 2025-07-14 RX ADMIN — PIPERACILLIN AND TAZOBACTAM 3375 MG: 3; .375 INJECTION, POWDER, FOR SOLUTION INTRAVENOUS at 05:37

## 2025-07-14 RX ADMIN — GUAIFENESIN AND DEXTROMETHORPHAN 5 ML: 100; 10 SYRUP ORAL at 14:17

## 2025-07-14 RX ADMIN — POTASSIUM BICARBONATE 20 MEQ: 782 TABLET, EFFERVESCENT ORAL at 14:23

## 2025-07-14 RX ADMIN — PIPERACILLIN AND TAZOBACTAM 3375 MG: 3; .375 INJECTION, POWDER, FOR SOLUTION INTRAVENOUS at 14:23

## 2025-07-14 RX ADMIN — POTASSIUM CHLORIDE 40 MEQ: 1500 TABLET, EXTENDED RELEASE ORAL at 09:09

## 2025-07-14 RX ADMIN — ATORVASTATIN CALCIUM 40 MG: 40 TABLET, FILM COATED ORAL at 19:51

## 2025-07-14 RX ADMIN — TAMSULOSIN HYDROCHLORIDE 0.4 MG: 0.4 CAPSULE ORAL at 09:09

## 2025-07-14 RX ADMIN — METOPROLOL SUCCINATE 50 MG: 50 TABLET, EXTENDED RELEASE ORAL at 09:09

## 2025-07-14 RX ADMIN — APIXABAN 5 MG: 5 TABLET, FILM COATED ORAL at 19:51

## 2025-07-14 RX ADMIN — PIPERACILLIN AND TAZOBACTAM 3375 MG: 3; .375 INJECTION, POWDER, FOR SOLUTION INTRAVENOUS at 22:43

## 2025-07-14 NOTE — PROGRESS NOTES
Physical Therapy  Facility/Department: [facility] C5 - MED SURG/ORTHO  Daily Treatment Note  NAME: Bill Crow  : 1940  MRN: [id number]    Date of Service: 2025    Discharge Recommendations:  Home with assist PRN, Outpatient PT, Patient would benefit from continued therapy after discharge   PT Equipment Recommendations  Equipment Needed: No  If pt is unable to be seen after this session, please let this note serve as discharge summary.  Please see case management note for discharge disposition.  Thank you.    Patient Diagnosis(es): The primary encounter diagnosis was Acute on chronic congestive heart failure, unspecified heart failure type (HCC). Diagnoses of Hypoxemia, Hypokalemia, Nausea and vomiting, unspecified vomiting type, Pneumonia of right lower lobe due to infectious organism, Proteinuria, unspecified type, and Sepsis, due to unspecified organism, unspecified whether acute organ dysfunction present (HCC) were also pertinent to this visit.    Assessment  Assessment: Pt presents to [facility] for pneumonia. Pt presents below baseline for PT treatment with decreased strength, decreased endurance, decreased balance, and decreased activity tolerance. Pt tolerated session fair. Pt requires seated rest breaks for fatigue and dizziness (with BP WFL). Pt ambulated up to 50 ft at once with SPC and CGA. Pt reports he feels more off balance and weaker than normal due to being in bed for awhile and requests to use the SPC. Pt completed 8 steps with 1 HR and SPC and CGA/SBA. Educated pt on follow up with OPPT if fatigue and balance deficits persist. Pt SPO2 96 post gait and stairs. Pt would continue to benefit from skilled PT to aid in the above deficits and a safe DC home with PRN A and OPPT when medically appropriate.  Activity Tolerance: Patient tolerated treatment well  Equipment Needed: No        Plan  Physical Therapy Plan  General Plan:  (3-5 times per week)  Current Treatment Recommendations:

## 2025-07-14 NOTE — PLAN OF CARE
Problem: Chronic Conditions and Co-morbidities  Goal: Patient's chronic conditions and co-morbidity symptoms are monitored and maintained or improved  7/14/2025 1245 by Camryn Nunez RN  Outcome: Progressing  Flowsheets (Taken 7/14/2025 1245)  Care Plan - Patient's Chronic Conditions and Co-Morbidity Symptoms are Monitored and Maintained or Improved:   Update acute care plan with appropriate goals if chronic or comorbid symptoms are exacerbated and prevent overall improvement and discharge   Monitor and assess patient's chronic conditions and comorbid symptoms for stability, deterioration, or improvement    Problem: Discharge Planning  Goal: Discharge to home or other facility with appropriate resources  7/14/2025 1245 by Camryn Nunez, RN  Outcome: Progressing  Flowsheets (Taken 7/14/2025 1245)  Discharge to home or other facility with appropriate resources:   Identify barriers to discharge with patient and caregiver   Arrange for needed discharge resources and transportation as appropriate

## 2025-07-14 NOTE — CARE COORDINATION
CM update; LOS # 6; Followed by IM, Nephrology and Therapies; Nephrology IV Fluids have been stopped. Monitor renal function for stabilization. Home with outpatient PT. Will follow.Zahra Matthew RN

## 2025-07-14 NOTE — PROGRESS NOTES
Interval history and Plan:     Seen at bedside  /65  650 mL urine yesterday, some improvement   Cr stable at 2.5  On bicarb infusion 75 mL/h  Bicarb up to 21 today  K 3.1    Replace K  Renal function plateaued  Stop IVF  Holding diuretics and entresto for now.     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 Baseline SCr of 1.0 to 1.2 in 2023 and 2024.    BP has been good since admission with no hypotensive episodies.   CT angio for PE on 7/8/25 and SCr started rising after.   Home meds include Lasix, Jardiance, Entresto, and Aldactone.    Deneis NSAIDs, Stones, BPH, Fam Kid Dz  Hx of Gout and taking allopurinol.  Last attack was 2 years ago.      Please call our office at [phone] or [employer] with any questions or contact me directly.    Subjective:     CC / Reason for Consult:  KEON    HPI / PMHx:  This is a consult for Bill Crow  requested by Edilberto Lew MD for the reason of  KEON w/ CKD 3a .  Bill Crow is a 84 y.o. male admitted for N/V/D with PMHx of CKD 2, CHF, HTN, HLD, Pacemaker.  Noted N/V /D for a few days with RLQ abd pain.  R/O diverticulitis with patient on Zossyn.      I thank Dr. Edilberto Lew MD for consulting [facility] in the care of your patient.  Please call with any questions or concerns.  [phone].      Objective:     Current Medications:  Scheduled Medications:  potassium chloride, 20 mEq, BID  tamsulosin, 0.4 mg, Daily  atorvastatin, 40 mg, Nightly  clopidogrel, 75 mg, Daily  apixaban, 5 mg, BID  metoprolol succinate, 50 mg, Daily  pantoprazole, 40 mg, QAM AC  sodium chloride flush, 5-40 mL, 2 times per day  ipratropium 0.5 mg-albuterol 2.5 mg, 1 Dose, 4x Daily RT  piperacillin-tazobactam, 3,375 mg, Q8H       IV Meds:  sodium bicarbonate 150 mEq in sterile water 1,000 mL infusion, Last Rate: 75 mL/hr at 07/13/25 1507  sodium chloride, Last Rate: 12.5 mL/hr (07/13/25 1519)

## 2025-07-14 NOTE — PROGRESS NOTES
Hospital Medicine Progress Note      Subjective:  Cr has plateaued.  Nephrology stopped IVFs.  Not eating much, not having much diarrhea.       General appearance: No apparent distress, appears stated age and cooperative.  HEENT: Pupils equal, round.  Conjunctivae/corneas clear.  Neck: No increased jugular venous distention.   Respiratory:  Normal respiratory effort.  Bilaterally without Rales/Wheezes/Rhonchi.  Cardiovascular: Normal rate and regular rhythm with normal S1/S2 without murmurs, rubs or gallops.  Abdomen: Soft, non-tender, non-distended with hyperactive bowel sounds.  Musculoskeletal: No edema.  Without deformity.  Skin: No jaundice.  No rashes or lesions.  Neurologic:  Neurovascularly intact without any focal sensory/motor deficits. Cranial nerves: II-XII intact.  Psychiatric: Alert and oriented, normal insight.  Not especially anxious.       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  He lives at home with his wife.  He hasn't been hospitalized since he had a CHF decompensation 2 years ago.     Starting on 7/5 he developed new n/v/d.  His wife had the same symptoms.  She got better, he kept worsening.  They have some young great-grandkids who visit.  Nonbloody diarrhea and emesis.  Fever to 101 at home.  Not much abdominal pain.  No new medications or supplements, no recent abx.  Poor appetite.  Dry mouth.  Weak and tired.  He came to the ED to get checked out.  He had KEON and electrolyte deficiencies.  Something on POCUS made them give furosemide IV.  He was admitted to hospital medicine and started on IVFs.  He also had to  Multi-Disciplinary Rounds with Case Management  [] Discussed management of the case with           Labs:  Personally reviewed on 7/14/2025 and interpreted for clinical significance as documented above.     Recent Labs     07/13/25  0528   WBC 6.5   HGB 11.1*   HCT 32.8*   *     Recent Labs     07/12/25  1600 07/13/25  0528 07/14/25  0506   * 134* 134*   K 3.8 3.5 3.1*    104 101   CO2 18* 17* 21   BUN 42* 43* 43*   CREATININE 2.5* 2.5* 2.5*   CALCIUM 7.6* 7.9* 8.2*   PHOS 2.6 3.2 2.8     No results for input(s): \"PROBNP\", \"TROPHS\" in the last 72 hours.    No results for input(s): \"LABA1C\" in the last 72 hours.  No results for input(s): \"AST\", \"ALT\", \"BILIDIR\", \"BILITOT\", \"ALKPHOS\" in the last 72 hours.    No results for input(s): \"INR\", \"LACTA\", \"TSH\" in the last 72 hours.      Urine Cultures:   Lab Results   Component Value Date/Time    LABURIN 300 10/08/2019 08:58 AM     Blood Cultures:   Lab Results   Component Value Date/Time    BC No Growth after 4 days of incubation. 02/27/2023 07:00 PM     Lab Results   Component Value Date/Time    BLOODCULT2 No Growth after 4 days of incubation. 02/27/2023 07:20 PM     Organism: No results found for: \"ORG\"      CHANTEL SOSA MD        V 10.25    Date of Admission: 7/8/2025  Hospital Day: 7      Chief Complaint   Patient presents with    Nausea     Patient has been not feeling bad since Sunday. Patient took a covid  test and it was negative.    Vomiting    Diarrhea         Current Principal Problem:  Pneumonia due to infectious agent      Consults:      IP CONSULT TO HOSPITALIST  IP CONSULT TO NEPHROLOGY

## 2025-07-15 LAB
AMORPH SED URNS QL MICRO: ABNORMAL /HPF
ANION GAP SERPL CALCULATED.3IONS-SCNC: 10 MMOL/L (ref 3–16)
BACTERIA URNS QL MICRO: ABNORMAL /HPF
BILIRUB UR QL STRIP.AUTO: NEGATIVE
BUN SERPL-MCNC: 39 MG/DL (ref 7–20)
CALCIUM SERPL-MCNC: 8 MG/DL (ref 8.3–10.6)
CHLORIDE SERPL-SCNC: 108 MMOL/L (ref 99–110)
CLARITY UR: CLEAR
CO2 SERPL-SCNC: 21 MMOL/L (ref 21–32)
COLOR UR: YELLOW
CREAT SERPL-MCNC: 2.5 MG/DL (ref 0.8–1.3)
CREAT UR-MCNC: 57.7 MG/DL (ref 39–259)
EPI CELLS #/AREA URNS HPF: ABNORMAL /HPF (ref 0–5)
GFR SERPLBLD CREATININE-BSD FMLA CKD-EPI: 25 ML/MIN/{1.73_M2}
GLUCOSE SERPL-MCNC: 145 MG/DL (ref 70–99)
GLUCOSE UR STRIP.AUTO-MCNC: NEGATIVE MG/DL
HGB UR QL STRIP.AUTO: ABNORMAL
KETONES UR STRIP.AUTO-MCNC: NEGATIVE MG/DL
LEUKOCYTE ESTERASE UR QL STRIP.AUTO: NEGATIVE
MAGNESIUM SERPL-MCNC: 2.21 MG/DL (ref 1.8–2.4)
NITRITE UR QL STRIP.AUTO: NEGATIVE
PH UR STRIP.AUTO: 6 [PH] (ref 5–8)
POTASSIUM SERPL-SCNC: 3.5 MMOL/L (ref 3.5–5.1)
PROT UR STRIP.AUTO-MCNC: 30 MG/DL
PROT UR-MCNC: 33.9 MG/DL
PROT/CREAT UR-RTO: 0.6 MG/DL
RBC #/AREA URNS HPF: ABNORMAL /HPF (ref 0–4)
SODIUM SERPL-SCNC: 139 MMOL/L (ref 136–145)
SP GR UR STRIP.AUTO: 1.01 (ref 1–1.03)
UA DIPSTICK W REFLEX MICRO PNL UR: YES
URN SPEC COLLECT METH UR: ABNORMAL
UROBILINOGEN UR STRIP-ACNC: 0.2 E.U./DL
WBC #/AREA URNS HPF: ABNORMAL /HPF (ref 0–5)
YEAST URNS QL MICRO: PRESENT /HPF

## 2025-07-15 PROCEDURE — 36415 COLL VENOUS BLD VENIPUNCTURE: CPT

## 2025-07-15 PROCEDURE — 2580000003 HC RX 258: Performed by: INTERNAL MEDICINE

## 2025-07-15 PROCEDURE — 94669 MECHANICAL CHEST WALL OSCILL: CPT

## 2025-07-15 PROCEDURE — 94761 N-INVAS EAR/PLS OXIMETRY MLT: CPT

## 2025-07-15 PROCEDURE — 97535 SELF CARE MNGMENT TRAINING: CPT

## 2025-07-15 PROCEDURE — 82570 ASSAY OF URINE CREATININE: CPT

## 2025-07-15 PROCEDURE — 6360000002 HC RX W HCPCS: Performed by: INTERNAL MEDICINE

## 2025-07-15 PROCEDURE — 97530 THERAPEUTIC ACTIVITIES: CPT

## 2025-07-15 PROCEDURE — 6370000000 HC RX 637 (ALT 250 FOR IP): Performed by: NURSE PRACTITIONER

## 2025-07-15 PROCEDURE — 1200000000 HC SEMI PRIVATE

## 2025-07-15 PROCEDURE — 6370000000 HC RX 637 (ALT 250 FOR IP): Performed by: INTERNAL MEDICINE

## 2025-07-15 PROCEDURE — 51798 US URINE CAPACITY MEASURE: CPT

## 2025-07-15 PROCEDURE — 84155 ASSAY OF PROTEIN SERUM: CPT

## 2025-07-15 PROCEDURE — 2700000000 HC OXYGEN THERAPY PER DAY

## 2025-07-15 PROCEDURE — 83735 ASSAY OF MAGNESIUM: CPT

## 2025-07-15 PROCEDURE — 94640 AIRWAY INHALATION TREATMENT: CPT

## 2025-07-15 PROCEDURE — 80048 BASIC METABOLIC PNL TOTAL CA: CPT

## 2025-07-15 PROCEDURE — 84156 ASSAY OF PROTEIN URINE: CPT

## 2025-07-15 PROCEDURE — 83521 IG LIGHT CHAINS FREE EACH: CPT

## 2025-07-15 PROCEDURE — 81001 URINALYSIS AUTO W/SCOPE: CPT

## 2025-07-15 PROCEDURE — 84165 PROTEIN E-PHORESIS SERUM: CPT

## 2025-07-15 RX ADMIN — TAMSULOSIN HYDROCHLORIDE 0.4 MG: 0.4 CAPSULE ORAL at 09:05

## 2025-07-15 RX ADMIN — PANTOPRAZOLE SODIUM 40 MG: 40 TABLET, DELAYED RELEASE ORAL at 05:48

## 2025-07-15 RX ADMIN — GUAIFENESIN AND DEXTROMETHORPHAN 5 ML: 100; 10 SYRUP ORAL at 19:55

## 2025-07-15 RX ADMIN — APIXABAN 5 MG: 5 TABLET, FILM COATED ORAL at 09:05

## 2025-07-15 RX ADMIN — ATORVASTATIN CALCIUM 40 MG: 40 TABLET, FILM COATED ORAL at 19:55

## 2025-07-15 RX ADMIN — IPRATROPIUM BROMIDE AND ALBUTEROL SULFATE 1 DOSE: .5; 2.5 SOLUTION RESPIRATORY (INHALATION) at 16:13

## 2025-07-15 RX ADMIN — IPRATROPIUM BROMIDE AND ALBUTEROL SULFATE 1 DOSE: .5; 2.5 SOLUTION RESPIRATORY (INHALATION) at 20:05

## 2025-07-15 RX ADMIN — IPRATROPIUM BROMIDE AND ALBUTEROL SULFATE 1 DOSE: .5; 2.5 SOLUTION RESPIRATORY (INHALATION) at 11:53

## 2025-07-15 RX ADMIN — CLOPIDOGREL BISULFATE 75 MG: 75 TABLET, FILM COATED ORAL at 09:05

## 2025-07-15 RX ADMIN — IPRATROPIUM BROMIDE AND ALBUTEROL SULFATE 1 DOSE: .5; 2.5 SOLUTION RESPIRATORY (INHALATION) at 08:39

## 2025-07-15 RX ADMIN — APIXABAN 5 MG: 5 TABLET, FILM COATED ORAL at 19:55

## 2025-07-15 RX ADMIN — GUAIFENESIN AND DEXTROMETHORPHAN 5 ML: 100; 10 SYRUP ORAL at 05:59

## 2025-07-15 RX ADMIN — LOPERAMIDE HYDROCHLORIDE 2 MG: 2 CAPSULE ORAL at 05:59

## 2025-07-15 RX ADMIN — PIPERACILLIN AND TAZOBACTAM 3375 MG: 3; .375 INJECTION, POWDER, FOR SOLUTION INTRAVENOUS at 05:50

## 2025-07-15 RX ADMIN — METOPROLOL SUCCINATE 50 MG: 50 TABLET, EXTENDED RELEASE ORAL at 09:05

## 2025-07-15 RX ADMIN — PIPERACILLIN AND TAZOBACTAM 3375 MG: 3; .375 INJECTION, POWDER, FOR SOLUTION INTRAVENOUS at 16:35

## 2025-07-15 ASSESSMENT — PAIN SCALES - GENERAL: PAINLEVEL_OUTOF10: 0

## 2025-07-15 ASSESSMENT — PAIN DESCRIPTION - LOCATION: LOCATION: ABDOMEN

## 2025-07-15 NOTE — PROGRESS NOTES
Occupational Therapy  Facility/Department: [facility] C5 - MED SURG/ORTHO  Daily Treatment Note  NAME: Bill Crow  : 1940  MRN: [id number]    Date of Service: 7/15/2025    Discharge Recommendations:  Home with assist PRN     Patient Diagnosis(es): The primary encounter diagnosis was Acute on chronic congestive heart failure, unspecified heart failure type (HCC). Diagnoses of Hypoxemia, Hypokalemia, Nausea and vomiting, unspecified vomiting type, Pneumonia of right lower lobe due to infectious organism, Proteinuria, unspecified type, and Sepsis, due to unspecified organism, unspecified whether acute organ dysfunction present (HCC) were also pertinent to this visit.     Assessment   Assessment: Pt seen for follow up OT session this date. Pt completed toileting with Max A. Completed functional mobility with O2 staying above 90% with 2L O2. Pt at EOS left with all needs met, call light in reach, pt sitting EOB with RN present. Pt would continue to benefit from acute OT at this time to improve functional mobility and ADL independence. D/c recs for home with PRN assist when medically ready.  Therapy discharge recommendations are subject to collaboration from the patient’s interdisciplinary healthcare team, including MD and case management recommendations.  If pt is unable to be seen after this session, please let this note serve as discharge summary.  Please see case management note for discharge disposition.  Thank you.    Activity Tolerance: Patient tolerated treatment well  Discharge Recommendations: Home with assist PRN     Plan  Occupational Therapy Plan  Times Per Week: 2-3x/week  Current Treatment Recommendations: Strengthening;ROM;Functional mobility training;Endurance training;Safety education & training;Self-Care / ADL    Restrictions  Restrictions/Precautions  Restrictions/Precautions: General Precautions  Activity Level: Up with Assist  Position Activity Restriction  Other Position/Activity Restrictions:  functional transfers w/ IND by 7/15. - goal ongoing  Short Term Goal 2: Pt will complete 5 x 15 BUE AROM exercises for increased ADL completion.  Short Term Goal 3: Pt will complete standing grooming tasks for ~4mins for increased activity tolerance.  Short Term Goal 4: Pt will complete LB dressing w/ increased time and mod. I.  Patient Goals   Patient goals : \"get better\"    AM-PAC - ADL  AM-PAC Daily Activity - Inpatient   How much help is needed for putting on and taking off regular lower body clothing?: A Little  How much help is needed for bathing (which includes washing, rinsing, drying)?: A Little  How much help is needed for toileting (which includes using toilet, bedpan, or urinal)?: A Lot  How much help is needed for putting on and taking off regular upper body clothing?: A Little  How much help for eating meals?: None    Therapy Time   Individual Concurrent Group Co-treatment   Time In       1410   Time Out       1448   Minutes       38   Timed Code Treatment Minutes: 38 Minutes       Linsey Bergeron, OT

## 2025-07-15 NOTE — PLAN OF CARE
Problem: Chronic Conditions and Co-morbidities  Goal: Patient's chronic conditions and co-morbidity symptoms are monitored and maintained or improved  7/15/2025 1331 by Camryn Nunez RN  Outcome: Progressing  Flowsheets (Taken 7/15/2025 1331)  Care Plan - Patient's Chronic Conditions and Co-Morbidity Symptoms are Monitored and Maintained or Improved:   Monitor and assess patient's chronic conditions and comorbid symptoms for stability, deterioration, or improvement   Collaborate with multidisciplinary team to address chronic and comorbid conditions and prevent exacerbation or deterioration

## 2025-07-15 NOTE — PROGRESS NOTES
Hospital Medicine Progress Note      Subjective:  Cr has plateaued, stuck at 2.5 for the 4th day in a row.  Nephrology stopped IVFs yesterday.  He says he isn't eating much.  I/O's seem inaccurate.  Bladder scan 325 ml yesterday afternoon, nothing came of this.      General appearance: No apparent distress, appears stated age and cooperative.  HEENT: Pupils equal, round.  Conjunctivae/corneas clear.  Neck: No increased jugular venous distention.   Respiratory:  Normal respiratory effort.  Bilaterally without Rales/Wheezes/Rhonchi.  Cardiovascular: Normal rate and regular rhythm with normal S1/S2 without murmurs, rubs or gallops.  Abdomen: Soft, non-tender, non-distended with hyperactive bowel sounds.  Musculoskeletal: trace BLE pitting edema.  Without deformity.  Skin: No jaundice.  No rashes or lesions.  Neurologic:  Neurovascularly intact without any focal sensory/motor deficits. Cranial nerves: II-XII intact.  Psychiatric: Alert and oriented, normal insight.  Not especially anxious.       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  He lives at home with his wife.  He hasn't been hospitalized since he had a CHF decompensation 2 years ago.     Starting on 7/5 he developed new n/v/d.  His wife had the same symptoms.  She got better, he kept worsening.  They have some young great-grandkids who visit.  Nonbloody diarrhea and emesis.  Fever to 101 at home.  Not much abdominal pain.  No new medications or supplements, no recent abx.  Poor appetite.  Dry mouth.  Weak and tired.  He came to the ED to get checked out.  He had KEON and electrolyte

## 2025-07-15 NOTE — PLAN OF CARE
Problem: Chronic Conditions and Co-morbidities  Goal: Patient's chronic conditions and co-morbidity symptoms are monitored and maintained or improved  7/15/2025 0014 by Felipa Hester RN  Outcome: Progressing     Problem: Discharge Planning  Goal: Discharge to home or other facility with appropriate resources  7/15/2025 0014 by Felipa Hester RN  Outcome: Progressing     Problem: Safety - Adult  Goal: Free from fall injury  Outcome: Progressing     Problem: Skin/Tissue Integrity  Goal: Skin integrity remains intact  Description: 1.  Monitor for areas of redness and/or skin breakdown  2.  Assess vascular access sites hourly  3.  Every 4-6 hours minimum:  Change oxygen saturation probe site  4.  Every 4-6 hours:  If on nasal continuous positive airway pressure, respiratory therapy assess nares and determine need for appliance change or resting period  Outcome: Progressing     Problem: Pain  Goal: Verbalizes/displays adequate comfort level or baseline comfort level  Outcome: Progressing     Problem: Respiratory - Adult  Goal: Achieves optimal ventilation and oxygenation  Outcome: Progressing     Problem: Infection - Adult  Goal: Absence of infection at discharge  Outcome: Progressing  Goal: Absence of infection during hospitalization  Outcome: Progressing  Goal: Absence of fever/infection during anticipated neutropenic period  Outcome: Progressing

## 2025-07-15 NOTE — PROGRESS NOTES
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 Baseline SCr of 1.0 to 1.2 in 2023 and 2024.    BP has been good since admission with no hypotensive episodies.   CT angio for PE on 7/8/25 and SCr started rising after.   Home meds include Lasix, Jardiance, Entresto, and Aldactone.    Deneis NSAIDs, Stones, BPH, Fam Kid Dz  Hx of Gout and taking allopurinol.  Last attack was 2 years ago.      Hypertension   BP: (149)/(69)  Pulse:  [59-65]   BP goal inpatient 130-140 systolic inpatient      Please call our office at [phone] or [facility] with any questions or contact me directly.    Subjective:     CC / Reason for Consult:  KEON    HPI / PMHx:  This is a consult for Bill Crow  requested by Edilberto Lew MD for the reason of  KEON w/ CKD 3a .  Bill Crow is a 84 y.o. male admitted for N/V/D with PMHx of CKD 2, CHF, HTN, HLD, Pacemaker.  Noted N/V /D for a few days with RLQ abd pain.  R/O diverticulitis with patient on Zossyn.      I thank Dr. Edilberto Lew MD for consulting [facility] in the care of your patient.  Please call with any questions or concerns.  [phone].      Objective:     Current Medications:  Scheduled Medications:  tamsulosin, 0.4 mg, Daily  atorvastatin, 40 mg, Nightly  clopidogrel, 75 mg, Daily  apixaban, 5 mg, BID  metoprolol

## 2025-07-16 PROBLEM — E44.0 MODERATE PROTEIN-CALORIE MALNUTRITION: Status: ACTIVE | Noted: 2025-07-16

## 2025-07-16 LAB
ALBUMIN SERPL ELPH-MCNC: 2.3 G/DL (ref 3.1–4.9)
ALPHA1 GLOB SERPL ELPH-MCNC: 0.4 G/DL (ref 0.2–0.4)
ALPHA2 GLOB SERPL ELPH-MCNC: 1 G/DL (ref 0.4–1.1)
ANION GAP SERPL CALCULATED.3IONS-SCNC: 12 MMOL/L (ref 3–16)
B-GLOBULIN SERPL ELPH-MCNC: 0.6 G/DL (ref 0.9–1.6)
BUN SERPL-MCNC: 34 MG/DL (ref 7–20)
CALCIUM SERPL-MCNC: 8.6 MG/DL (ref 8.3–10.6)
CHLORIDE SERPL-SCNC: 105 MMOL/L (ref 99–110)
CO2 SERPL-SCNC: 23 MMOL/L (ref 21–32)
CREAT SERPL-MCNC: 2.3 MG/DL (ref 0.8–1.3)
GAMMA GLOB SERPL ELPH-MCNC: 0.6 G/DL (ref 0.6–1.8)
GFR SERPLBLD CREATININE-BSD FMLA CKD-EPI: 27 ML/MIN/{1.73_M2}
GLUCOSE SERPL-MCNC: 151 MG/DL (ref 70–99)
KAPPA LC FREE SER-MCNC: 25.2 MG/L (ref 2.37–20.73)
KAPPA LC FREE/LAMBDA FREE SER: 0.76 {RATIO} (ref 0.22–1.74)
LAMBDA LC FREE SERPL-MCNC: 33.2 MG/L (ref 4.23–27.69)
MAGNESIUM SERPL-MCNC: 2.04 MG/DL (ref 1.8–2.4)
POTASSIUM SERPL-SCNC: 3.1 MMOL/L (ref 3.5–5.1)
PROT SERPL-MCNC: 4.8 G/DL (ref 6.4–8.2)
RPT COMMENT: ABNORMAL
SODIUM SERPL-SCNC: 140 MMOL/L (ref 136–145)
SPE/IFE INTERPRETATION: NORMAL

## 2025-07-16 PROCEDURE — 97530 THERAPEUTIC ACTIVITIES: CPT

## 2025-07-16 PROCEDURE — 94669 MECHANICAL CHEST WALL OSCILL: CPT

## 2025-07-16 PROCEDURE — 6370000000 HC RX 637 (ALT 250 FOR IP): Performed by: INTERNAL MEDICINE

## 2025-07-16 PROCEDURE — 36415 COLL VENOUS BLD VENIPUNCTURE: CPT

## 2025-07-16 PROCEDURE — 80048 BASIC METABOLIC PNL TOTAL CA: CPT

## 2025-07-16 PROCEDURE — 2700000000 HC OXYGEN THERAPY PER DAY

## 2025-07-16 PROCEDURE — 83735 ASSAY OF MAGNESIUM: CPT

## 2025-07-16 PROCEDURE — 97116 GAIT TRAINING THERAPY: CPT

## 2025-07-16 PROCEDURE — 6370000000 HC RX 637 (ALT 250 FOR IP): Performed by: NURSE PRACTITIONER

## 2025-07-16 PROCEDURE — 2500000003 HC RX 250 WO HCPCS: Performed by: INTERNAL MEDICINE

## 2025-07-16 PROCEDURE — 97110 THERAPEUTIC EXERCISES: CPT

## 2025-07-16 PROCEDURE — 94640 AIRWAY INHALATION TREATMENT: CPT

## 2025-07-16 PROCEDURE — 1200000000 HC SEMI PRIVATE

## 2025-07-16 PROCEDURE — 94761 N-INVAS EAR/PLS OXIMETRY MLT: CPT

## 2025-07-16 RX ORDER — SPIRONOLACTONE 25 MG/1
12.5 TABLET ORAL DAILY
Status: DISCONTINUED | OUTPATIENT
Start: 2025-07-16 | End: 2025-07-18 | Stop reason: HOSPADM

## 2025-07-16 RX ORDER — TORSEMIDE 20 MG/1
20 TABLET ORAL DAILY
Status: DISCONTINUED | OUTPATIENT
Start: 2025-07-16 | End: 2025-07-18 | Stop reason: HOSPADM

## 2025-07-16 RX ORDER — POTASSIUM CHLORIDE 1500 MG/1
40 TABLET, EXTENDED RELEASE ORAL ONCE
Status: DISCONTINUED | OUTPATIENT
Start: 2025-07-16 | End: 2025-07-16 | Stop reason: SDUPTHER

## 2025-07-16 RX ORDER — POTASSIUM CHLORIDE 1500 MG/1
20 TABLET, EXTENDED RELEASE ORAL DAILY
Status: DISCONTINUED | OUTPATIENT
Start: 2025-07-17 | End: 2025-07-18 | Stop reason: HOSPADM

## 2025-07-16 RX ADMIN — APIXABAN 5 MG: 5 TABLET, FILM COATED ORAL at 09:03

## 2025-07-16 RX ADMIN — GUAIFENESIN AND DEXTROMETHORPHAN 5 ML: 100; 10 SYRUP ORAL at 20:05

## 2025-07-16 RX ADMIN — POTASSIUM BICARBONATE 40 MEQ: 782 TABLET, EFFERVESCENT ORAL at 09:03

## 2025-07-16 RX ADMIN — IPRATROPIUM BROMIDE AND ALBUTEROL SULFATE 1 DOSE: .5; 2.5 SOLUTION RESPIRATORY (INHALATION) at 16:00

## 2025-07-16 RX ADMIN — CLOPIDOGREL BISULFATE 75 MG: 75 TABLET, FILM COATED ORAL at 09:03

## 2025-07-16 RX ADMIN — METOPROLOL SUCCINATE 50 MG: 50 TABLET, EXTENDED RELEASE ORAL at 09:03

## 2025-07-16 RX ADMIN — IPRATROPIUM BROMIDE AND ALBUTEROL SULFATE 1 DOSE: .5; 2.5 SOLUTION RESPIRATORY (INHALATION) at 19:39

## 2025-07-16 RX ADMIN — IPRATROPIUM BROMIDE AND ALBUTEROL SULFATE 1 DOSE: .5; 2.5 SOLUTION RESPIRATORY (INHALATION) at 07:41

## 2025-07-16 RX ADMIN — TORSEMIDE 20 MG: 20 TABLET ORAL at 16:26

## 2025-07-16 RX ADMIN — SPIRONOLACTONE 12.5 MG: 25 TABLET ORAL at 16:27

## 2025-07-16 RX ADMIN — IPRATROPIUM BROMIDE AND ALBUTEROL SULFATE 1 DOSE: .5; 2.5 SOLUTION RESPIRATORY (INHALATION) at 11:17

## 2025-07-16 RX ADMIN — GUAIFENESIN AND DEXTROMETHORPHAN 5 ML: 100; 10 SYRUP ORAL at 02:30

## 2025-07-16 RX ADMIN — APIXABAN 5 MG: 5 TABLET, FILM COATED ORAL at 20:05

## 2025-07-16 RX ADMIN — Medication 10 ML: at 20:04

## 2025-07-16 RX ADMIN — PANTOPRAZOLE SODIUM 40 MG: 40 TABLET, DELAYED RELEASE ORAL at 05:17

## 2025-07-16 RX ADMIN — ATORVASTATIN CALCIUM 40 MG: 40 TABLET, FILM COATED ORAL at 20:05

## 2025-07-16 RX ADMIN — TAMSULOSIN HYDROCHLORIDE 0.4 MG: 0.4 CAPSULE ORAL at 09:03

## 2025-07-16 RX ADMIN — Medication 10 ML: at 09:03

## 2025-07-16 ASSESSMENT — PAIN SCALES - GENERAL: PAINLEVEL_OUTOF10: 0

## 2025-07-16 ASSESSMENT — PAIN DESCRIPTION - LOCATION: LOCATION: ABDOMEN

## 2025-07-16 NOTE — PROGRESS NOTES
4 Eyes Skin Assessment     NAME:  Bill Crow  YOB: 1940  MEDICAL RECORD NUMBER:  [id number]    The patient is being assessed for  Shift Handoff    I agree that at least one RN has performed a thorough Head to Toe Skin Assessment on the patient. ALL assessment sites listed below have been assessed.      Areas assessed by both nurses:    Head, Face, Ears, Shoulders, Back, Chest, Arms, Elbows, Hands, Sacrum. Buttock, Coccyx, Ischium, Legs. Feet and Heels, and Under Medical Devices         Does the Patient have a Wound? Yes wound(s) were present on assessment. LDA wound assessment was Initiated and completed by RN       Anastacio Prevention initiated by RN: No  Wound Care Orders initiated by RN: No    Pressure Injury (Stage 1,2,3,4, Unstageable, DTI, NWPT, and Complex wounds) if present, place Wound referral order by RN under : No    New Ostomies, if present place, Ostomy referral order under : No     Nurse 1 eSignature: Electronically signed by Chadd Abernathy RN on 7/16/25 at 12:03 AM EDT    **SHARE this note so that the co-signing nurse can place an eSignature**    Nurse 2 eSignature: Electronically signed by Efren Hughes RN on 7/16/25 at 12:16 AM EDT

## 2025-07-16 NOTE — PROGRESS NOTES
Physician Progress Note      PATIENT:               MARIZA IZAGUIRRE  CSN #:                  [id number]  :                       1940  ADMIT DATE:       2025 12:33 PM  DISCH DATE:  RESPONDING  PROVIDER #:        Aubrey Lew MD          QUERY TEXT:    Based on your medical judgment, please clarify these findings and document if   any of the following are being evaluated and/or treated:    The clinical indicators include:  This patient 84 yr male admitted with Pneumonia    -CHF,HTn,CAd    -\"Afib.  Apixaban, metoprolol.\"(IM PN on  and IM PN on  by Edilberto Lew MD)    -In Epic    - Apixaban 5 mg(medication list)    Thank you,  [facility]  Options provided:  -- Secondary hypercoagulable state in a patient with atrial fibrillation  -- Other - I will add my own diagnosis  -- Disagree - Not applicable / Not valid  -- Disagree - Clinically unable to determine / Unknown  -- Refer to Clinical Documentation Reviewer    PROVIDER RESPONSE TEXT:    Provider disagreed with this query.  na    Query created by: Drea Catherine on 2025 5:06 AM      Electronically signed by:  Aubrey Lew MD 7/15/2025 8:50 PM

## 2025-07-16 NOTE — PROGRESS NOTES
Hospital Medicine Progress Note      Subjective:   c/o right lE edema , no pain , sob with exertion , on o2 - new since this admission , no dizziness or syncope .      General appearance: No apparent distress, appears stated age and cooperative.  HEENT: Pupils equal, round.  Conjunctivae/corneas clear.  Neck: No increased jugular venous distention.   Respiratory:  Normal respiratory effort.  Bilaterally without Rales/Wheezes/Rhonchi. On o2 , reduced AE right >left   Cardiovascular: Normal rate and regular rhythm with normal S1/S2 without murmurs, rubs or gallops.  Abdomen: Soft, non-tender, non-distended with hyperactive bowel sounds.  Musculoskeletal: trace BLE pitting edema more over the right .  Without deformity.  Skin: No jaundice.  No rashes or lesions.  Neurologic:  Neurovascularly intact without any focal sensory/motor deficits. Psychiatric: Alert and oriented, normal insight.  Not especially anxious.       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  He lives at home with his wife.  He hasn't been hospitalized since he had a CHF decompensation 2 years ago.     Starting on 7/5 he developed new n/v/d.  His wife had the same symptoms.  She got better, he kept worsening.  They have some young great-grandkids who visit.  Nonbloody diarrhea and emesis.  Fever to 101 at home.  Not much abdominal pain.  No new medications or supplements, no recent abx.  Poor appetite.  Dry mouth.  Weak and tired.  He came to the ED to get checked out.  He had KEON and electrolyte deficiencies.  Something on POCUS made them give furosemide IV.  He was

## 2025-07-16 NOTE — PLAN OF CARE
Problem: Chronic Conditions and Co-morbidities  Goal: Patient's chronic conditions and co-morbidity symptoms are monitored and maintained or improved  7/15/2025 2330 by Chadd Abernathy, RN  Outcome: Progressing  7/15/2025 1331 by Camryn Nunez, RN  Outcome: Progressing  Flowsheets (Taken 7/15/2025 1331)  Care Plan - Patient's Chronic Conditions and Co-Morbidity Symptoms are Monitored and Maintained or Improved:   Monitor and assess patient's chronic conditions and comorbid symptoms for stability, deterioration, or improvement   Collaborate with multidisciplinary team to address chronic and comorbid conditions and prevent exacerbation or deterioration     Problem: Discharge Planning  Goal: Discharge to home or other facility with appropriate resources  Outcome: Progressing     Problem: Safety - Adult  Goal: Free from fall injury  Outcome: Progressing     Problem: Skin/Tissue Integrity  Goal: Skin integrity remains intact  Description: 1.  Monitor for areas of redness and/or skin breakdown  2.  Assess vascular access sites hourly  3.  Every 4-6 hours minimum:  Change oxygen saturation probe site  4.  Every 4-6 hours:  If on nasal continuous positive airway pressure, respiratory therapy assess nares and determine need for appliance change or resting period  Outcome: Progressing     Problem: Pain  Goal: Verbalizes/displays adequate comfort level or baseline comfort level  Outcome: Progressing     Problem: Respiratory - Adult  Goal: Achieves optimal ventilation and oxygenation  Outcome: Progressing     Problem: Infection - Adult  Goal: Absence of infection at discharge  Outcome: Progressing  Goal: Absence of infection during hospitalization  Outcome: Progressing  Goal: Absence of fever/infection during anticipated neutropenic period  Outcome: Progressing

## 2025-07-16 NOTE — PROGRESS NOTES
Interval history and Plan:     Seen at bedside  Likely has ATN, due to meds, dehydration, and contrast associated nephropathy   Has cough and on oxygen and getting treatment for aspiration pneumonia  Getting edematous  2+ today  Creatinine coming down though  Potassium is low at 3.1 today  KL ratio is normal   Repeat urine from 7/15/2025 shows moderate blood  Protein creatinine ratio is 0.6 g from 7/15/2025    Has moderate RBC in urine  Denies catheterized  Proteinuria     Continue to hold Entresto and   Blood pressure is high today       Started on torsemide and Aldactone    I reviewed the results of CT scan of the abdomen and pelvis from 7.8/25 in light of hematuria  No mass in the urine, excreted contrast in the urine bladder  6.9 cm infrarenal aortic aneurysm- willbee seen by Dr Woodard per DR Bryan   Will need cystoscopy as outpatient       Assessment:     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 Baseline SCr of 1.0 to 1.2 in 2023 and 2024.    BP has been good since admission with no hypotensive episodies.   CT angio for PE on 7/8/25 and SCr started rising after.   Home meds include Lasix, Jardiance, Entresto, and Aldactone.    Deneis NSAIDs, Stones, BPH, Fam Kid Dz  Hx of Gout and taking allopurinol.  Last attack was 2 years ago.        Repeat urine from 7/15/2025 shows moderate blood  Protein creatinine ratio is 0.6 g from 7/15/2025    Hypertension   BP: (162)/(73)  Pulse:  [66]   BP goal inpatient 130-140 systolic inpatient      Please call our office at [phone] or [facility] with any questions or contact me directly.    Subjective:     CC / Reason for Consult:  KEON    HPI / PMHx:  This is a consult for Bill Crow  requested by Edilberto Lew MD for the reason of  KEON w/ CKD 3a .  Bill Crow is a 84 y.o. male admitted for N/V/D with PMHx of CKD 2, CHF, HTN, HLD, Pacemaker.  Noted N/V /D for a few days with RLQ abd pain.  R/O

## 2025-07-16 NOTE — CARE COORDINATION
CM update; LOS # 8 Followed by IM, Nephrology Therapies. Remains on IV Antibiotics. Monitoring renal function for stabilization. Will follow.Zahra Matthew RN

## 2025-07-16 NOTE — PROGRESS NOTES
Physical Therapy  Facility/Department: [facility] C5 - MED SURG/ORTHO  Daily Treatment Note  NAME: Bill Crow  : 1940  MRN: [id number]    Date of Service: 2025    Discharge Recommendations:  Home with assist PRN, Outpatient PT, Patient would benefit from continued therapy after discharge   PT Equipment Recommendations  Equipment Needed:  (assess benefits of rw for home.)    Patient Diagnosis(es): The primary encounter diagnosis was Acute on chronic congestive heart failure, unspecified heart failure type (HCC). Diagnoses of Hypoxemia, Hypokalemia, Nausea and vomiting, unspecified vomiting type, Pneumonia of right lower lobe due to infectious organism, Proteinuria, unspecified type, and Sepsis, due to unspecified organism, unspecified whether acute organ dysfunction present (HCC) were also pertinent to this visit.    Assessment  Assessment: Pt presents below baseline for PT treatment with decreased strength, decreased endurance, decreased balance, and decreased activity tolerance. Pt tolerated session well w/ vitals stable. Pt ambulated up to 60 ft at once with RW (RW per pt request) and SBA. Pt reports he feels more off balanced w/ RW use vs the SPC. Pt completed 8 steps with 1 HR and no AD and SBA. Pt required only a standing rest during amb to and from stairwell and trial of stairs. Educated pt on follow up with OPPT if fatigue and balance deficits persist. Pt would continue to benefit from skilled PT to aid in the above deficits and a safe DC home with PRN A and OPPT when medically appropriate.  Activity Tolerance: Patient tolerated treatment well  Equipment Needed:  (assess benefits of rw for home.)    Plan  Physical Therapy Plan  General Plan:  (3-5 times per week)  Current Treatment Recommendations: Strengthening;Balance training;Functional mobility training;Transfer training;Endurance training;Gait training;Stair training;Neuromuscular re-education;Home exercise program;Safety education &

## 2025-07-16 NOTE — PROGRESS NOTES
Comprehensive Nutrition Assessment    Type and Reason for Visit:  Initial, LOS    Nutrition Recommendations/Plan:   Continue regular diet.  Encourage PO intakes.  Ensure ONS TID - vanilla.  Monitor pertinent labs, bowel habits, weight, N/V, supplement acceptance, clinical progression.       Malnutrition Assessment:  Malnutrition Status:  Moderate malnutrition (07/16/25 1334)    Context:  Chronic Illness     Findings of the 6 clinical characteristics of malnutrition:  Energy Intake:  75% or less estimated energy requirements for 1 month or longer  Weight Loss:  No weight loss     Body Fat Loss:  Mild body fat loss Orbital, Triceps   Muscle Mass Loss:  Mild muscle mass loss Temples (temporalis), Clavicles (pectoralis & deltoids)  Fluid Accumulation:  Unable to assess     Strength:  Not Performed    Nutrition Assessment:    Patient seen for LOS. Admitted for PNA, nausea, vomiting, diarrhea. PMHx significant for CHF, hypertension, hyperlipidemia. Currently on regular diet with Ensure ONS ordered TID. Patient seen resting in bed. States he has had \"no appetite\" for the past x2 weeks. States for the past several years he is \"roshan if I eat two meals a day.\" Reports altered taste/smell ever since having covid a few years ago. States he used to eat oatmeal or an omelet every morning, now he just drinks an Ensure ONS. Encouraged patient to snack in between meals (nuts, string cheese, greek yogurt, fruit with peanut butter, etc) in addition to Ensure ONS daily. Pt denies any N/V. States diarrhea has greatly improved. No recent weight changes. All diet questions answered at this time. Will continue to monitor.    Nutrition Related Findings:    K 3.1, glucose 151. LBM 7/14. Trace edema. Wound Type: None       Current Nutrition Intake & Therapies:    Average Meal Intake: 51-75%  Average Supplements Intake: Unable to assess  ADULT DIET; Regular  ADULT ORAL NUTRITION SUPPLEMENT; Breakfast, Lunch, Dinner; Standard High

## 2025-07-16 NOTE — PLAN OF CARE
Problem: Chronic Conditions and Co-morbidities  Goal: Patient's chronic conditions and co-morbidity symptoms are monitored and maintained or improved  7/16/2025 1047 by Camryn Nunez RN  Outcome: Progressing  Flowsheets (Taken 7/15/2025 1331)  Care Plan - Patient's Chronic Conditions and Co-Morbidity Symptoms are Monitored and Maintained or Improved:   Monitor and assess patient's chronic conditions and comorbid symptoms for stability, deterioration, or improvement   Collaborate with multidisciplinary team to address chronic and comorbid conditions and prevent exacerbation or deterioration       Problem: Discharge Planning  Goal: Discharge to home or other facility with appropriate resources  7/16/2025 1047 by Camryn Nunez, RN  Outcome: Progressing  Flowsheets (Taken 7/16/2025 1047)  Discharge to home or other facility with appropriate resources:   Identify barriers to discharge with patient and caregiver   Arrange for needed discharge resources and transportation as appropriate   Identify discharge learning needs (meds, wound care, etc)

## 2025-07-17 ENCOUNTER — APPOINTMENT (OUTPATIENT)
Dept: CT IMAGING | Age: 85
End: 2025-07-17
Payer: MEDICARE

## 2025-07-17 PROBLEM — Z86.79 S/P AAA REPAIR: Status: ACTIVE | Noted: 2025-07-17

## 2025-07-17 PROBLEM — Z98.890 S/P AAA REPAIR: Status: ACTIVE | Noted: 2025-07-17

## 2025-07-17 PROBLEM — I97.89 TYPE II ENDOLEAK OF AORTIC GRAFT: Status: ACTIVE | Noted: 2025-07-17

## 2025-07-17 LAB
ACANTHOCYTES BLD QL SMEAR: ABNORMAL
ANION GAP SERPL CALCULATED.3IONS-SCNC: 11 MMOL/L (ref 3–16)
ANISOCYTOSIS BLD QL SMEAR: ABNORMAL
BASOPHILS # BLD: 0 K/UL (ref 0–0.2)
BASOPHILS NFR BLD: 0 %
BUN SERPL-MCNC: 30 MG/DL (ref 7–20)
CALCIUM SERPL-MCNC: 8.7 MG/DL (ref 8.3–10.6)
CHLORIDE SERPL-SCNC: 106 MMOL/L (ref 99–110)
CO2 SERPL-SCNC: 27 MMOL/L (ref 21–32)
CREAT SERPL-MCNC: 2.1 MG/DL (ref 0.8–1.3)
DACRYOCYTES BLD QL SMEAR: ABNORMAL
DEPRECATED RDW RBC AUTO: 16.3 % (ref 12.4–15.4)
EOSINOPHIL # BLD: 0.1 K/UL (ref 0–0.6)
EOSINOPHIL NFR BLD: 1 %
GFR SERPLBLD CREATININE-BSD FMLA CKD-EPI: 30 ML/MIN/{1.73_M2}
GLUCOSE SERPL-MCNC: 144 MG/DL (ref 70–99)
HCT VFR BLD AUTO: 32.5 % (ref 40.5–52.5)
HGB BLD-MCNC: 11.1 G/DL (ref 13.5–17.5)
LYMPHOCYTES # BLD: 0.6 K/UL (ref 1–5.1)
LYMPHOCYTES NFR BLD: 8 %
MACROCYTES BLD QL SMEAR: ABNORMAL
MAGNESIUM SERPL-MCNC: 1.85 MG/DL (ref 1.8–2.4)
MCH RBC QN AUTO: 31.8 PG (ref 26–34)
MCHC RBC AUTO-ENTMCNC: 34.2 G/DL (ref 31–36)
MCV RBC AUTO: 92.8 FL (ref 80–100)
MICROCYTES BLD QL SMEAR: ABNORMAL
MONOCYTES # BLD: 0.1 K/UL (ref 0–1.3)
MONOCYTES NFR BLD: 2 %
NEUTROPHILS # BLD: 6.6 K/UL (ref 1.7–7.7)
NEUTROPHILS NFR BLD: 86 %
NEUTS BAND NFR BLD MANUAL: 3 % (ref 0–7)
OVALOCYTES BLD QL SMEAR: ABNORMAL
PLATELET # BLD AUTO: 212 K/UL (ref 135–450)
PLATELET BLD QL SMEAR: ADEQUATE
PMV BLD AUTO: 8.2 FL (ref 5–10.5)
POIKILOCYTOSIS BLD QL SMEAR: ABNORMAL
POLYCHROMASIA BLD QL SMEAR: ABNORMAL
POTASSIUM SERPL-SCNC: 3.5 MMOL/L (ref 3.5–5.1)
RBC # BLD AUTO: 3.51 M/UL (ref 4.2–5.9)
SLIDE REVIEW: ABNORMAL
SODIUM SERPL-SCNC: 144 MMOL/L (ref 136–145)
SPHEROCYTES BLD QL SMEAR: ABNORMAL
TARGETS BLD QL SMEAR: ABNORMAL
TOXIC GRANULES BLD QL SMEAR: PRESENT
WBC # BLD AUTO: 7.4 K/UL (ref 4–11)

## 2025-07-17 PROCEDURE — 2500000003 HC RX 250 WO HCPCS: Performed by: INTERNAL MEDICINE

## 2025-07-17 PROCEDURE — 74174 CTA ABD&PLVS W/CONTRAST: CPT

## 2025-07-17 PROCEDURE — 1200000000 HC SEMI PRIVATE

## 2025-07-17 PROCEDURE — 2580000003 HC RX 258: Performed by: INTERNAL MEDICINE

## 2025-07-17 PROCEDURE — 83735 ASSAY OF MAGNESIUM: CPT

## 2025-07-17 PROCEDURE — 6370000000 HC RX 637 (ALT 250 FOR IP): Performed by: INTERNAL MEDICINE

## 2025-07-17 PROCEDURE — 6360000004 HC RX CONTRAST MEDICATION: Performed by: SURGERY

## 2025-07-17 PROCEDURE — 97116 GAIT TRAINING THERAPY: CPT

## 2025-07-17 PROCEDURE — 80048 BASIC METABOLIC PNL TOTAL CA: CPT

## 2025-07-17 PROCEDURE — 99222 1ST HOSP IP/OBS MODERATE 55: CPT | Performed by: SURGERY

## 2025-07-17 PROCEDURE — 97110 THERAPEUTIC EXERCISES: CPT

## 2025-07-17 PROCEDURE — 85025 COMPLETE CBC W/AUTO DIFF WBC: CPT

## 2025-07-17 PROCEDURE — 94669 MECHANICAL CHEST WALL OSCILL: CPT

## 2025-07-17 PROCEDURE — 94640 AIRWAY INHALATION TREATMENT: CPT

## 2025-07-17 PROCEDURE — 36415 COLL VENOUS BLD VENIPUNCTURE: CPT

## 2025-07-17 RX ORDER — NIFEDIPINE 30 MG/1
60 TABLET, EXTENDED RELEASE ORAL DAILY
Status: DISCONTINUED | OUTPATIENT
Start: 2025-07-17 | End: 2025-07-18 | Stop reason: HOSPADM

## 2025-07-17 RX ORDER — IOPAMIDOL 755 MG/ML
75 INJECTION, SOLUTION INTRAVASCULAR
Status: COMPLETED | OUTPATIENT
Start: 2025-07-17 | End: 2025-07-17

## 2025-07-17 RX ORDER — SODIUM CHLORIDE 9 MG/ML
INJECTION, SOLUTION INTRAVENOUS CONTINUOUS
Status: DISCONTINUED | OUTPATIENT
Start: 2025-07-17 | End: 2025-07-18

## 2025-07-17 RX ADMIN — NIFEDIPINE 60 MG: 30 TABLET, FILM COATED, EXTENDED RELEASE ORAL at 10:37

## 2025-07-17 RX ADMIN — CLOPIDOGREL BISULFATE 75 MG: 75 TABLET, FILM COATED ORAL at 08:16

## 2025-07-17 RX ADMIN — Medication 10 ML: at 08:15

## 2025-07-17 RX ADMIN — APIXABAN 5 MG: 5 TABLET, FILM COATED ORAL at 19:43

## 2025-07-17 RX ADMIN — Medication 3 MG: at 22:28

## 2025-07-17 RX ADMIN — APIXABAN 5 MG: 5 TABLET, FILM COATED ORAL at 08:16

## 2025-07-17 RX ADMIN — SPIRONOLACTONE 12.5 MG: 25 TABLET ORAL at 08:16

## 2025-07-17 RX ADMIN — METOPROLOL SUCCINATE 50 MG: 50 TABLET, EXTENDED RELEASE ORAL at 08:15

## 2025-07-17 RX ADMIN — TAMSULOSIN HYDROCHLORIDE 0.4 MG: 0.4 CAPSULE ORAL at 08:16

## 2025-07-17 RX ADMIN — TORSEMIDE 20 MG: 20 TABLET ORAL at 08:16

## 2025-07-17 RX ADMIN — SODIUM CHLORIDE: 0.9 INJECTION, SOLUTION INTRAVENOUS at 10:39

## 2025-07-17 RX ADMIN — PANTOPRAZOLE SODIUM 40 MG: 40 TABLET, DELAYED RELEASE ORAL at 06:39

## 2025-07-17 RX ADMIN — IPRATROPIUM BROMIDE AND ALBUTEROL SULFATE 1 DOSE: .5; 2.5 SOLUTION RESPIRATORY (INHALATION) at 15:52

## 2025-07-17 RX ADMIN — IPRATROPIUM BROMIDE AND ALBUTEROL SULFATE 1 DOSE: .5; 2.5 SOLUTION RESPIRATORY (INHALATION) at 11:52

## 2025-07-17 RX ADMIN — ATORVASTATIN CALCIUM 40 MG: 40 TABLET, FILM COATED ORAL at 19:42

## 2025-07-17 RX ADMIN — IOPAMIDOL 75 ML: 755 INJECTION, SOLUTION INTRAVENOUS at 13:28

## 2025-07-17 RX ADMIN — IPRATROPIUM BROMIDE AND ALBUTEROL SULFATE 1 DOSE: .5; 2.5 SOLUTION RESPIRATORY (INHALATION) at 08:04

## 2025-07-17 RX ADMIN — POTASSIUM CHLORIDE 20 MEQ: 1500 TABLET, EXTENDED RELEASE ORAL at 08:15

## 2025-07-17 RX ADMIN — IPRATROPIUM BROMIDE AND ALBUTEROL SULFATE 1 DOSE: .5; 2.5 SOLUTION RESPIRATORY (INHALATION) at 19:28

## 2025-07-17 NOTE — PLAN OF CARE
Problem: Chronic Conditions and Co-morbidities  Goal: Patient's chronic conditions and co-morbidity symptoms are monitored and maintained or improved  Outcome: Progressing     Problem: Discharge Planning  Goal: Discharge to home or other facility with appropriate resources  Outcome: Progressing  Flowsheets (Taken 7/17/2025 1522)  Discharge to home or other facility with appropriate resources: Identify barriers to discharge with patient and caregiver     Problem: Safety - Adult  Goal: Free from fall injury  Outcome: Progressing     Problem: Skin/Tissue Integrity  Goal: Skin integrity remains intact  Description: 1.  Monitor for areas of redness and/or skin breakdown  2.  Assess vascular access sites hourly  3.  Every 4-6 hours minimum:  Change oxygen saturation probe site  4.  Every 4-6 hours:  If on nasal continuous positive airway pressure, respiratory therapy assess nares and determine need for appliance change or resting period  Outcome: Progressing     Problem: Pain  Goal: Verbalizes/displays adequate comfort level or baseline comfort level  Outcome: Progressing     Problem: Infection - Adult  Goal: Absence of infection at discharge  Outcome: Progressing  Goal: Absence of infection during hospitalization  Outcome: Progressing  Goal: Absence of fever/infection during anticipated neutropenic period  Outcome: Progressing     Problem: Respiratory - Adult  Goal: Achieves optimal ventilation and oxygenation  Outcome: Progressing     Problem: Nutrition Deficit:  Goal: Optimize nutritional status  Outcome: Progressing

## 2025-07-17 NOTE — PLAN OF CARE
Problem: Chronic Conditions and Co-morbidities  Goal: Patient's chronic conditions and co-morbidity symptoms are monitored and maintained or improved  7/16/2025 2331 by Chadd Abernathy RN  Outcome: Progressing  7/16/2025 1047 by Camryn Nunez RN  Outcome: Progressing  Flowsheets (Taken 7/15/2025 1331)  Care Plan - Patient's Chronic Conditions and Co-Morbidity Symptoms are Monitored and Maintained or Improved:   Monitor and assess patient's chronic conditions and comorbid symptoms for stability, deterioration, or improvement   Collaborate with multidisciplinary team to address chronic and comorbid conditions and prevent exacerbation or deterioration     Problem: Discharge Planning  Goal: Discharge to home or other facility with appropriate resources  7/16/2025 2331 by Chadd Abernathy RN  Outcome: Progressing  7/16/2025 1047 by Camryn Nunez RN  Outcome: Progressing  Flowsheets (Taken 7/16/2025 1047)  Discharge to home or other facility with appropriate resources:   Identify barriers to discharge with patient and caregiver   Arrange for needed discharge resources and transportation as appropriate   Identify discharge learning needs (meds, wound care, etc)     Problem: Safety - Adult  Goal: Free from fall injury  Outcome: Progressing     Problem: Skin/Tissue Integrity  Goal: Skin integrity remains intact  Description: 1.  Monitor for areas of redness and/or skin breakdown  2.  Assess vascular access sites hourly  3.  Every 4-6 hours minimum:  Change oxygen saturation probe site  4.  Every 4-6 hours:  If on nasal continuous positive airway pressure, respiratory therapy assess nares and determine need for appliance change or resting period  Outcome: Progressing     Problem: Pain  Goal: Verbalizes/displays adequate comfort level or baseline comfort level  Outcome: Progressing     Problem: Respiratory - Adult  Goal: Achieves optimal ventilation and oxygenation  Outcome: Progressing     Problem:

## 2025-07-17 NOTE — PROGRESS NOTES
Physical Therapy  Facility/Department: [facility] C5 - MED SURG/ORTHO  Daily Treatment Note  NAME: Bill Crow  : 1940  MRN: [id number]    Date of Service: 2025    Discharge Recommendations:  Home with assist PRN, Outpatient PT, Patient would benefit from continued therapy after discharge        Patient Diagnosis(es): The primary encounter diagnosis was Acute on chronic congestive heart failure, unspecified heart failure type (HCC). Diagnoses of Hypoxemia, Hypokalemia, Nausea and vomiting, unspecified vomiting type, Pneumonia of right lower lobe due to infectious organism, Proteinuria, unspecified type, Sepsis, due to unspecified organism, unspecified whether acute organ dysfunction present (HCC), and Edema, unspecified type were also pertinent to this visit.    Assessment  Assessment: Pt presents below baseline for PT treatment with decreased strength, decreased endurance, decreased balance, and decreased activity tolerance. Pt tolerated session well w/ vitals stable. Pt ambulated up to 400 ft with RW and CGA/SBA. Pt report fatigue and weakness.  Educated pt on follow up with OPPT if fatigue and balance deficits persist. Pt would continue to benefit from skilled PT to aid in the above deficits and a safe DC home with PRN A and OPPT when medically appropriate.  Activity Tolerance: Patient tolerated treatment well    Plan  Physical Therapy Plan  General Plan:  (3-5x/week)  Current Treatment Recommendations: Strengthening;Balance training;Functional mobility training;Transfer training;Endurance training;Gait training;Stair training;Neuromuscular re-education;Home exercise program;Safety education & training;Patient/Caregiver education & training;Therapeutic activities    Restrictions  Restrictions/Precautions  Restrictions/Precautions: General Precautions  Activity Level: Up with Assist  Position Activity Restriction  Other Position/Activity Restrictions: purewick, O2     Subjective   Subjective  Subjective:

## 2025-07-17 NOTE — CONSULTS
Consult Placed     Who: vascular  Date:7/17/2025  Time:11:41 AM     Electronically signed by Elise Barrios on 7/17/2025 at 11:41 AM

## 2025-07-17 NOTE — PROGRESS NOTES
Interval history and Plan:     Has ATN  Creatinine coming down  Now 2.1  eGFR of 30 mL/min has significant aortic aneurysm 6.1 cm  Discussed with Dr. 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 Baseline SCr of 1.0 to 1.2 in 2023 and 2024.    BP has been good since admission with no hypotensive episodies.   CT angio for PE on 7/8/25 and SCr started rising after.   Home meds include Lasix, Jardiance, Entresto, and Aldactone.    Deneis NSAIDs, Stones, BPH, Fam Kid Dz  Hx of Gout and taking allopurinol.  Last attack was 2 years ago.        Repeat urine from 7/15/2025 shows moderate blood  Protein creatinine ratio is 0.6 g from 7/15/2025    Hypertension   BP: (159)/(60)  Pulse:  [62]   BP goal inpatient 130-140 systolic inpatient      Please call our office at [phone] or [facility] with any questions or contact me directly.    Subjective:     CC / Reason for Consult:  KEON    HPI / PMHx:  This is a consult for Bill THEODORE Mignon  requested by Edilberto Lew MD for the reason of  KEON w/ CKD 3a .  Bill Crow is a 84 y.o. male admitted for N/V/D with PMHx of CKD 2, CHF, HTN, HLD, Pacemaker.  Noted N/V /D for a few days with RLQ abd pain.  R/O diverticulitis with patient on Zossyn.          Objective:     Current Medications:  Scheduled Medications:  [Held by provider] torsemide, 20 mg, Daily  [Held by provider] potassium  chloride, 20 mEq, Daily  [Held by provider] spironolactone, 12.5 mg, Daily  tamsulosin, 0.4 mg, Daily  atorvastatin, 40 mg, Nightly  clopidogrel, 75 mg, Daily  apixaban, 5 mg, BID  metoprolol succinate, 50 mg, Daily  pantoprazole, 40 mg, QAM AC  sodium chloride flush, 5-40 mL, 2 times per day  ipratropium 0.5 mg-albuterol 2.5 mg, 1 Dose, 4x Daily RT       IV Meds:  sodium chloride  sodium chloride, Last Rate: 12.5 mL/hr (07/13/25 1511)       PRN Medications:  guaiFENesin-dextromethorphan, 5 mL, Q4H PRN  loperamide, 2 mg, 4x Daily PRN  sodium chloride flush, 5-40 mL, PRN  sodium chloride, , PRN  ondansetron, 4 mg, Q8H PRN   Or  ondansetron, 4 mg, Q6H PRN  polyethylene glycol, 17 g, Daily PRN  acetaminophen, 650 mg, Q6H PRN   Or  acetaminophen, 650 mg, Q6H PRN        Allergies: Aldactone [spironolactone], Cardura [doxazosin mesylate], Clonidine derivatives, Coumadin [warfarin], Omeprazole, and Vioxx    PE:     Gen: alert, well appearing, and in no distress and oriented to person, place, and time     Cardio: normal rate, regular rhythm, normal S1, S2, no murmurs, rubs, clicks or gallops      Resp: clear to auscultation, no wheezes, rales or rhonchi, symmetric air entry.       Ext:  peripheral pulses normal, no pedal edema, no clubbing or cyanosis         Vitals: Patient Vitals for the past 8 hrs:   BP Temp Temp src Pulse Resp SpO2   07/17/25 0810 (!) 159/60 97.3 °F (36.3 °C) Oral 62 18 95 %   07/17/25 0804 -- -- -- -- -- 97 %          I/Os:   Intake/Output Summary (Last 24 hours) at 7/17/2025 1020  Last data filed at 7/17/2025 0839  Gross per 24 hour   Intake --   Output 2750 ml   Net -2750 ml       LABS:    Lab Results   Component Value Date/Time    CREATININE 2.1 07/17/2025 04:51 AM    BUN 30 07/17/2025 04:51 AM     07/17/2025 04:51 AM    K 3.5 07/17/2025 04:51 AM     07/17/2025 04:51 AM    CO2 27 07/17/2025 04:51 AM     No results found for: \"[id number]\"   Lab Results   Component Value Date/Time    WBC

## 2025-07-17 NOTE — CARE COORDINATION
CM update; LOS # 9. Followed by IM, Nephrology, Vascular and Therapies. Patient has completed IV Antibiotics. Patient having CT Abdomen/Pelvis today. Will monitor for results. Will follow.Zahra Matthew RN

## 2025-07-17 NOTE — PROGRESS NOTES
Hospital Medicine Progress Note      Subjective:    off o2 , no sob  or worsening edema , c/o insomnia       General appearance: No apparent distress, appears stated age and cooperative.  HEENT: Pupils equal, round.  Conjunctivae/corneas clear.  Neck: No increased jugular venous distention.   Respiratory:  Normal respiratory effort.  Bilaterally without Rales/Wheezes/Rhonchi. Off  o2 , reduced AE right >left   Cardiovascular: Normal rate and regular rhythm with normal S1/S2 without murmurs, rubs or gallops.  Abdomen: Soft, non-tender, non-distended with hyperactive bowel sounds.  Musculoskeletal: trace BLE pitting edema more over the right , improving .  Without deformity.  Skin: No jaundice.  No rashes or lesions.  Neurologic:  Neurovascularly intact without any focal sensory/motor deficits. Psychiatric: Alert and oriented, normal insight.          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  He lives at home with his wife.  He hasn't been hospitalized since he had a CHF decompensation 2 years ago.     Starting on 7/5 he developed new n/v/d.  His wife had the same symptoms.  She got better, he kept worsening.  They have some young great-grandkids who visit.  Nonbloody diarrhea and emesis.  Fever to 101 at home.  Not much abdominal pain.  No new medications or supplements, no recent abx.  Poor appetite.  Dry mouth.  Weak and tired.  He came to the ED to get checked out.  He had KEON and electrolyte deficiencies.  Something on POCUS made them give furosemide IV.  He was admitted to hospital medicine and started on IVFs.  He also had to be

## 2025-07-17 NOTE — CONSULTS
[facility]           Vascular Surgery Consultation  Bill Crow  Medical Record #: [id number]  YOB: 1940      Date of Admission:  7/8/2025 12:33 PM  Date of Consultation:  7/17/2025      PCP:  Foreign Gonzalez DO  Nephro: KELSEY Pruett MD    Chief Complaint:  fever, chillls, nausea/vomiting    History of Present Illness:   We are asked to see this patient in consultation by KARIE Jaime MD  regarding dilatation of aneurysmal sac and ? Endoleak from a previous AAA repair done at [facility].     Bill Crow is a 84 y.o. male who presented to [facility] ED with three day history of generalized weakness, nausea and vomiting. This started after doing yard work in the hot weather. Symptoms did not improve and came to the ED for evaluation. In the ED, he was found to be tachypneic needing supplemental oxygen. A CT PE study and CT non-contrast of abdomen/pelvis was obtained.  Chest CT was negative for a PE but did show RLL pneumonia. CT of abdomen/pelvis showed increased dilatation of aneurysm sac. IV antibiotics were started and admitted for further evaluation and treatment. Vascular surgery was consulted regarding the dilatation of aneurysm sac.       Past Medical History:   Diagnosis Date    Aortic aneurysm, abdominal 01/2011    Appendicitis 12/06/2011    Arthritis 12/06/2011    Bruises easily     CAD (coronary artery disease) 09/2001    MI     CHF (congestive heart failure) (HCC)     Following CABG    Chronic back pain     Chronic pain of both shoulders 12/06/2011    joint - the shoulders hurt    Complication of anesthesia     woke up during appendectomy    Gout     Heart disease 12/06/2011    Hernia 12/06/2011    HIGH CHOLESTEROL 12/06/2011    Hyperlipidemia     Hypertension     Measles 12/06/2011    SUNDEEP (obstructive sleep apnea)     Pancreatitis 2006    History of     Persistent cough     Ringing in ears     Sinus disorder     Sleep deprivation 12/06/2001    loss  of sleep         Past Surgical History:   Procedure Laterality Date    ABDOMINAL AORTIC ANEURYSM REPAIR      ABDOMINAL AORTIC ANEURYSM REPAIR, ENDOVASCULAR  01/28/2011    Endomvascular abdominal aortic aneurysm repair with insertion of cardiomems pressure sensor    APPENDECTOMY      40-50 years ago    CARDIAC SURGERY  01/01/2008    CABG    CHOLECYSTECTOMY, LAPAROSCOPIC  01/01/2006    CORONARY ANGIOPLASTY WITH STENT PLACEMENT  2001 and 2002    CORONARY ARTERY BYPASS GRAFT      DIAGNOSTIC CARDIAC CATH LAB PROCEDURE  01/26/2023    PCI of distal SVG to OM1    GALLBLADDER SURGERY      gallbladder/pancreatitis    HERNIA REPAIR      20+ years ago    INTRACAPSULAR CATARACT EXTRACTION Right 12/06/2018    PHACOEMULSIFICATION OF CATARACT RIGHT  EYE WITH INTRAOCULAR LENS IMPLANT performed by Jose Alfredo Quinn MD at [facility] OR    PANCREAS SURGERY      panceastitis /galbladder    AZ XCAPSL CTRC RMVL INSJ IO LENS PROSTH W/O ECP Left 11/13/2018    PHACOEMULSIFICATION OF CATARACT LEFT EYE WITH INTRAOCULAR LENS IMPLANT performed by Jose Alfredo Quinn MD at [facility] OR    TONSILLECTOMY      as child    UPPER GASTROINTESTINAL ENDOSCOPY N/A 01/27/2023    EGD DIAGNOSTIC ONLY performed by Steffen Arenas MD at [facility]       Home Medications:   Prior to Admission medications    Medication Sig Start Date End Date Taking? Authorizing Provider   empagliflozin (JARDIANCE) 10 MG tablet TAKE 1 TABLET EVERY DAY 6/11/25  Yes Foreign Gonzalez,    allopurinol (ZYLOPRIM) 100 MG tablet TAKE 1 TABLET EVERY DAY 6/11/25  Yes Foreign Gonzalez, DO   ELIQUIS 5 MG TABS tablet TAKE 1 TABLET TWICE DAILY 6/3/25  Yes Niecy Frank, RUPERTO - CNP   atorvastatin (LIPITOR) 40 MG tablet Take 1 tablet by mouth nightly 3/7/25  Yes Geronimo Wall MD   clopidogrel (PLAVIX) 75 MG tablet Take 1 tablet by mouth daily 3/7/25  Yes Geronimo Wall MD   metoprolol succinate (TOPROL XL) 50 MG extended release tablet Take 1 tablet by mouth daily 3/7/25  Yes  contrast and delayed imaging today.       All pertinent information and plan of care discussed with Dr. Nahid Woodard.    All questions and concerns were addressed with the patient. I have discussed the above stated plan with the patient and the nurse. The patient verbalized understanding and agreed with the plan.  Thank you for allowing to us to participate in the care of Bill Morris RUPERTO Hernandez    I have personally seen and evaluated the patient as well as reviewed the chart and/or images and/or pertinent lab work for this visit.  I agree with the nurse practitioner/PA/resident evaluation in their discussion. I have also performed the pertinent physical exam for this visit.  Nearly the entire portion/ of the medical decision / E&M portion of this visit was performed by myself with the patient which was well over 50% of the physical encounter of the patient.     Remote Endovascular AAA repair 2011.  Aneurysm sac increased from immediate post-op imaging.  CTA obtained- no evidence fo Type I or III endoleak, most likely small Type II endoleak.   Given CKD I would be reluctant to perform angiogram to clearly identify source.  Will schedule for f/u aortic duplex scan in 6 months to monitor for interval aneurysm growth.  Discussed with patient.

## 2025-07-17 NOTE — PROGRESS NOTES
4 Eyes Skin Assessment     NAME:  Bill Crow  YOB: 1940  MEDICAL RECORD NUMBER:  [id number]    The patient is being assessed for  Shift Handoff    I agree that at least one RN has performed a thorough Head to Toe Skin Assessment on the patient. ALL assessment sites listed below have been assessed.      Areas assessed by both nurses:    Head, Face, Ears, Shoulders, Back, Chest, Arms, Elbows, Hands, Sacrum. Buttock, Coccyx, Ischium, Legs. Feet and Heels, and Under Medical Devices         Does the Patient have a Wound? Yes wound(s) were present on assessment. LDA wound assessment was Initiated and completed by RN       Anastacio Prevention initiated by RN: Yes  Wound Care Orders initiated by RN: No    For hospital-acquired stage 1 & 2 and ALL Stage 3,4, Unstageable, DTI, NWPT, and Complex wounds: place order “IP Wound Care/Ostomy Nurse Eval and Treat” by RN under : No    New Ostomies, if present place, Ostomy referral order under : No     Nurse 1 eSignature: Electronically signed by Chadd Abernathy RN on 7/16/25 at 11:22 PM EDT    **SHARE this note so that the co-signing nurse can place an eSignature**    Nurse 2 eSignature: {Esignature:[id number]}

## 2025-07-18 VITALS
DIASTOLIC BLOOD PRESSURE: 63 MMHG | WEIGHT: 171.96 LBS | SYSTOLIC BLOOD PRESSURE: 152 MMHG | TEMPERATURE: 97.3 F | BODY MASS INDEX: 25.47 KG/M2 | RESPIRATION RATE: 16 BRPM | HEIGHT: 69 IN | HEART RATE: 68 BPM | OXYGEN SATURATION: 93 %

## 2025-07-18 LAB
ALBUMIN SERPL-MCNC: 3.3 G/DL (ref 3.4–5)
ANION GAP SERPL CALCULATED.3IONS-SCNC: 11 MMOL/L (ref 3–16)
BUN SERPL-MCNC: 24 MG/DL (ref 7–20)
CALCIUM SERPL-MCNC: 8.8 MG/DL (ref 8.3–10.6)
CHLORIDE SERPL-SCNC: 103 MMOL/L (ref 99–110)
CO2 SERPL-SCNC: 30 MMOL/L (ref 21–32)
CREAT SERPL-MCNC: 1.9 MG/DL (ref 0.8–1.3)
DEPRECATED RDW RBC AUTO: 16.2 % (ref 12.4–15.4)
GFR SERPLBLD CREATININE-BSD FMLA CKD-EPI: 34 ML/MIN/{1.73_M2}
GLUCOSE SERPL-MCNC: 139 MG/DL (ref 70–99)
HCT VFR BLD AUTO: 33.5 % (ref 40.5–52.5)
HGB BLD-MCNC: 11.3 G/DL (ref 13.5–17.5)
MCH RBC QN AUTO: 31.3 PG (ref 26–34)
MCHC RBC AUTO-ENTMCNC: 33.8 G/DL (ref 31–36)
MCV RBC AUTO: 92.5 FL (ref 80–100)
PHOSPHATE SERPL-MCNC: 2.7 MG/DL (ref 2.5–4.9)
PLATELET # BLD AUTO: 242 K/UL (ref 135–450)
PMV BLD AUTO: 8.2 FL (ref 5–10.5)
POTASSIUM SERPL-SCNC: 3 MMOL/L (ref 3.5–5.1)
RBC # BLD AUTO: 3.62 M/UL (ref 4.2–5.9)
SODIUM SERPL-SCNC: 144 MMOL/L (ref 136–145)
WBC # BLD AUTO: 7.8 K/UL (ref 4–11)

## 2025-07-18 PROCEDURE — 85027 COMPLETE CBC AUTOMATED: CPT

## 2025-07-18 PROCEDURE — 94640 AIRWAY INHALATION TREATMENT: CPT

## 2025-07-18 PROCEDURE — 6370000000 HC RX 637 (ALT 250 FOR IP): Performed by: INTERNAL MEDICINE

## 2025-07-18 PROCEDURE — 94669 MECHANICAL CHEST WALL OSCILL: CPT

## 2025-07-18 PROCEDURE — 2580000003 HC RX 258: Performed by: INTERNAL MEDICINE

## 2025-07-18 PROCEDURE — 80069 RENAL FUNCTION PANEL: CPT

## 2025-07-18 PROCEDURE — 36415 COLL VENOUS BLD VENIPUNCTURE: CPT

## 2025-07-18 RX ORDER — POTASSIUM CHLORIDE 1500 MG/1
40 TABLET, EXTENDED RELEASE ORAL ONCE
Status: COMPLETED | OUTPATIENT
Start: 2025-07-18 | End: 2025-07-18

## 2025-07-18 RX ORDER — NIFEDIPINE 30 MG/1
60 TABLET, EXTENDED RELEASE ORAL DAILY
Qty: 30 TABLET | Refills: 3 | Status: ON HOLD | OUTPATIENT
Start: 2025-07-19 | End: 2025-07-30

## 2025-07-18 RX ORDER — TAMSULOSIN HYDROCHLORIDE 0.4 MG/1
0.4 CAPSULE ORAL DAILY
Qty: 30 CAPSULE | Refills: 0 | Status: SHIPPED | OUTPATIENT
Start: 2025-07-19

## 2025-07-18 RX ADMIN — CLOPIDOGREL BISULFATE 75 MG: 75 TABLET, FILM COATED ORAL at 08:55

## 2025-07-18 RX ADMIN — APIXABAN 5 MG: 5 TABLET, FILM COATED ORAL at 08:55

## 2025-07-18 RX ADMIN — NIFEDIPINE 60 MG: 30 TABLET, FILM COATED, EXTENDED RELEASE ORAL at 08:55

## 2025-07-18 RX ADMIN — METOPROLOL SUCCINATE 50 MG: 50 TABLET, EXTENDED RELEASE ORAL at 08:55

## 2025-07-18 RX ADMIN — IPRATROPIUM BROMIDE AND ALBUTEROL SULFATE 1 DOSE: .5; 2.5 SOLUTION RESPIRATORY (INHALATION) at 11:47

## 2025-07-18 RX ADMIN — PANTOPRAZOLE SODIUM 40 MG: 40 TABLET, DELAYED RELEASE ORAL at 06:38

## 2025-07-18 RX ADMIN — POTASSIUM CHLORIDE 40 MEQ: 1500 TABLET, EXTENDED RELEASE ORAL at 12:13

## 2025-07-18 RX ADMIN — TAMSULOSIN HYDROCHLORIDE 0.4 MG: 0.4 CAPSULE ORAL at 08:55

## 2025-07-18 RX ADMIN — SODIUM CHLORIDE: 0.9 INJECTION, SOLUTION INTRAVENOUS at 06:35

## 2025-07-18 RX ADMIN — IPRATROPIUM BROMIDE AND ALBUTEROL SULFATE 1 DOSE: .5; 2.5 SOLUTION RESPIRATORY (INHALATION) at 07:50

## 2025-07-18 ASSESSMENT — PAIN SCALES - GENERAL: PAINLEVEL_OUTOF10: 0

## 2025-07-18 NOTE — DISCHARGE SUMMARY
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  Respiratory:  Normal respiratory effort.   Cardiovascular:  Regular rate and rhythm.  Abdomen:  Soft, non-tender, non-distended.  Musculoskeletal:  No edema  Neurologic:  Non-focal  Psychiatric:  Alert and oriented    Patient Discharge Instructions:      Follow up:    1.  Primary Care Provider Foreign Gonzalez DO in the next 1-2 weeks.      The patient was seen and examined on day of discharge and this discharge summary is in conjunction with any daily progress note from day of discharge. Time spent on discharge: 3*** minutes in the examination, evaluation, counseling and review of medications and discharge plan.    ------------------------------------------------------------------------------------------------------------------------------------------------------    Discharge Medications:   Current Discharge Medication List        START taking these medications    Details   NIFEdipine (PROCARDIA XL) 30 MG  7.8   HGB 11.1* 11.3*   HCT 32.5* 33.5*    242     Recent Labs     07/16/25  0520 07/17/25  0451 07/18/25  0816    144 144   K 3.1* 3.5 3.0*    106 103   CO2 23 27 30   BUN 34* 30* 24*   CREATININE 2.3* 2.1* 1.9*   CALCIUM 8.6 8.7 8.8   MG 2.04 1.85  --    PHOS  --   --  2.7     No results for input(s): \"PROBNP\", \"TROPHS\" in the last 72 hours.  No results for input(s): \"LABA1C\" in the last 72 hours.  No results for input(s): \"AST\", \"ALT\", \"BILIDIR\", \"BILITOT\", \"ALKPHOS\" in the last 72 hours.  No results for input(s): \"INR\", \"LACTA\", \"TSH\" in the last 72 hours.    Urine Cultures:   Lab Results   Component Value Date/Time    LABURIN 300 10/08/2019 08:58 AM     Blood Cultures:   Lab Results   Component Value Date/Time    BC No Growth after 4 days of incubation. 02/27/2023 07:00 PM     Lab Results   Component Value Date/Time    BLOODCULT2 No Growth after 4 days of incubation. 02/27/2023 07:20 PM     Organism: No results found for: \"ORG\"    Signed:    Jazlyn Jaime MD      dose reduction techniques were employed. 75 mL of Isovue-370 IV. FINDINGS: PULMONARY ARTERIES: No evidence of pulmonary embolism. Mild dilation of the pulmonary trunk measuring 3.3 cm in diameter. LUNGS AND AIRWAYS: Central airways are patent. Consolidation in the basal segments of right lower lobe, bronchial wall thickening and focal mucous plugging. Compatible with pneumonia. Correlate for aspiration. PLEURA: Minimal right pleural effusion. MEDIASTINUM: The heart is mildly enlarged. No pericardial effusion. Stable slightly ectatic ascending aorta 3.9 cm in diameter. Stable mildly ectatic proximal descending aorta, 3.4 cm in diameter. LYMPH NODES: Mildly enlarged of right paratracheal and subcarinal lymph nodes. Mildly enlarged right hilar lymph nodes. CHEST WALL/LOWER NECK: Left subclavian biventricular cardiac conduction leads in place. BONES: Thyroid multinodular goiter, grossly stable. Bilateral gynecomastia. UPPER ABDOMEN: Intrahepatic pneumobilia. Prior cholecystectomy. 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     1.  No evidence of pulmonary embolism. 2.  Right lower lobe consolidation compatible with pneumonia. Correlate for aspiration. 3.  Minimal right pleural effusion. 4.  Mildly enlarged subcarinal and right hilar lymph nodes nonspecific but likely reactive. 5.  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 ----------PERT DATA---------- Data reported only if pulmonary embolism is present: Most central extent of clot: NA RV/LV ratio: NA ----------PERT DATA---------- Electronically signed by Raj Zavala MD    XR CHEST (2 VW)  Result Date: 7/8/2025  EXAM: XR CHEST (2 VW) INDICATION: Chest Pain COMPARISON: 8/3/2023 and other prior studies FINDINGS: Medical Devices: Postsurgical changes of the mediastinum. Implanted cardiac device projects over the left chest with lead position unchanged. Lungs: Elevation of the

## 2025-07-18 NOTE — CARE COORDINATION
CASE MANAGEMENT DISCHARGE SUMMARY      Discharge to: Home with family    Precertification completed:N/A   Hospital Exemption Notification (HENS) completed: N/A    IMM given: (date) 7/18/25    New Durable Medical Equipment ordered/agency: None ordered    Transportation:    Family/car: private car       Confirmed discharge plan with: Home with no Home Care needs     Patient: yes     Family: Patient will notify family      RN, name: Hodan    Note: Discharging nurse to complete ECOC, reconcile AVS, and place final copy with patient's discharge packet. RN to ensure that written prescriptions for  Level II medications are sent with patient to the facility as per protocol.  .Zahra Matthew RN

## 2025-07-18 NOTE — PROGRESS NOTES
Interval history and Plan:     He feels good has no complaints  Cleared by vascular surgery for discharge  Has edema of the legs but also got IV fluids to protect from contrast yesterday  Creatinine came back, and doing  better and made more than 4 L of urine yesterday  He appears to be on diuretic phase of ATN and making urine spontaneously  Without diuretics    Due to risks of KEON will hold off on diuretics  Okay to resume Jardiance upon discharge    Would hold Entresto and Aldactone and furosemide upon discharge but they will need to be resumed in about a week or so  He is agreeable to see PCP within a week    Should he have any issues like significant edema , etc to call us  Discussed with Dr. 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 Baseline SCr of 1.0 to 1.2 in 2023 and 2024.    BP has been good since admission with no hypotensive episodies.   CT angio for PE on 7/8/25 and SCr started rising after.   Home meds include Lasix, Jardiance, Entresto, and Aldactone.    Deneis NSAIDs, Stones, BPH, Fam Kid Dz  Hx of Gout and taking allopurinol.  Last attack was 2 years ago.        Repeat urine from 7/15/2025 shows moderate blood  Protein creatinine ratio is 0.6 g from 7/15/2025    Hypertension        BP goal inpatient 130-140 systolic inpatient      Please call our office at [phone] or [facility] with any questions or contact me directly.    Subjective:     CC / Reason for Consult:  KEON    HPI / PMHx:  This is a consult for Bill Crow  requested by Edilberto Lew MD for the reason of  KEON w/ CKD 3a .  Bill Crow is a 84 y.o. male admitted for N/V/D with PMHx of CKD 2, CHF, HTN, HLD, Pacemaker.  Noted N/V /D for a few days with RLQ abd pain.  R/O diverticulitis with patient on Zossyn.          Objective:     Current Medications:  Scheduled Medications:  NIFEdipine, 60 mg, Daily  [Held by provider] torsemide,

## 2025-07-18 NOTE — PLAN OF CARE
Problem: Chronic Conditions and Co-morbidities  Goal: Patient's chronic conditions and co-morbidity symptoms are monitored and maintained or improved  Outcome: Progressing  Flowsheets (Taken 7/15/2025 1331 by Camryn Nunez, RN)  Care Plan - Patient's Chronic Conditions and Co-Morbidity Symptoms are Monitored and Maintained or Improved:   Monitor and assess patient's chronic conditions and comorbid symptoms for stability, deterioration, or improvement   Collaborate with multidisciplinary team to address chronic and comorbid conditions and prevent exacerbation or deterioration     Problem: Discharge Planning  Goal: Discharge to home or other facility with appropriate resources  Outcome: Progressing  Flowsheets (Taken 7/17/2025 1522 by Alem Agustin, RN)  Discharge to home or other facility with appropriate resources: Identify barriers to discharge with patient and caregiver     Problem: Safety - Adult  Goal: Free from fall injury  Outcome: Progressing  Flowsheets (Taken 7/18/2025 1051)  Free From Fall Injury: Instruct family/caregiver on patient safety     Problem: Skin/Tissue Integrity  Goal: Skin integrity remains intact  Description: 1.  Monitor for areas of redness and/or skin breakdown  2.  Assess vascular access sites hourly  3.  Every 4-6 hours minimum:  Change oxygen saturation probe site  4.  Every 4-6 hours:  If on nasal continuous positive airway pressure, respiratory therapy assess nares and determine need for appliance change or resting period  Outcome: Progressing  Flowsheets (Taken 7/15/2025 0805 by Camryn Nunez, RN)  Skin Integrity Remains Intact: Monitor for areas of redness and/or skin breakdown     Problem: Pain  Goal: Verbalizes/displays adequate comfort level or baseline comfort level  Outcome: Progressing  Flowsheets (Taken 7/14/2025 0900 by Camryn Nunez, RN)  Verbalizes/displays adequate comfort level or baseline comfort level: Encourage patient to monitor pain and request assistance

## 2025-07-18 NOTE — CARE COORDINATION
Follow-Up copy of Important Message from Medicare (IMM2) has been explained to patient and/or designated healthcare decision maker (HCDM). Pt and/or HCDM aware that patient is permitted to stay an additional 4 hours prior to discharge to consider an appeal if they feel as though they are being discharged too soon. Patient may discharge as planned if chooses to do so.  Patient/HCDM voice no other concerns or questions regarding this process. .Zahra Matthew RN

## 2025-07-21 ENCOUNTER — TELEPHONE (OUTPATIENT)
Dept: CARDIOLOGY CLINIC | Age: 85
End: 2025-07-21

## 2025-07-21 NOTE — TELEPHONE ENCOUNTER
MURJ- RB    Please review update Optivol status in MURJ. S/p recent hospital d/c. Next scheduled f/u with NPRB is on 9/23/25. Thanks.

## 2025-07-22 NOTE — TELEPHONE ENCOUNTER
Patient informed. Verbalized understanding. He said that he does not have an order for blood work and that nephrologist paper just says follow up in month. Please advise.

## 2025-07-22 NOTE — TELEPHONE ENCOUNTER
Reviewed. Device check shows elevated fluid levels but just treated for KEON with ATN.   Remind patient to have repeat BMP as already ordered by the kidney doctor.   Hold off on adding diuretics at this time until labs reviewed.     Thanks!

## 2025-07-22 NOTE — TELEPHONE ENCOUNTER
Patient needs to call the nephrologist office and get the repeat lab work order and set up a follow-up appointment.

## 2025-07-23 RX ORDER — PANTOPRAZOLE SODIUM 40 MG/1
TABLET, DELAYED RELEASE ORAL
Qty: 180 TABLET | Refills: 2 | OUTPATIENT
Start: 2025-07-23

## 2025-07-23 NOTE — TELEPHONE ENCOUNTER
Last Office Visit: 3/7/2025 Provider: SHAGUFTA  **Is provider OOT? No    Next Office Visit: 9/23/2025 Provider: SYDNEY    LAST LABS:   BMP:  Lab Results   Component Value Date/Time     07/18/2025 08:16 AM    K 3.0 07/18/2025 08:16 AM    K 3.5 07/17/2025 04:51 AM     07/18/2025 08:16 AM    CO2 30 07/18/2025 08:16 AM    BUN 24 07/18/2025 08:16 AM    CREATININE 1.9 07/18/2025 08:16 AM    GLUCOSE 139 07/18/2025 08:16 AM    CALCIUM 8.8 07/18/2025 08:16 AM    LABGLOM 34 07/18/2025 08:16 AM    LABGLOM >60 03/14/2024 10:26 AM

## 2025-07-24 ENCOUNTER — TELEPHONE (OUTPATIENT)
Dept: FAMILY MEDICINE CLINIC | Age: 85
End: 2025-07-24

## 2025-07-24 DIAGNOSIS — E11.9 TYPE 2 DIABETES MELLITUS WITHOUT COMPLICATION, WITHOUT LONG-TERM CURRENT USE OF INSULIN (HCC): ICD-10-CM

## 2025-07-24 LAB
ALBUMIN SERPL-MCNC: 3.8 G/DL (ref 3.4–5)
ALBUMIN/GLOB SERPL: 1.7 {RATIO} (ref 1.1–2.2)
ALP SERPL-CCNC: 84 U/L (ref 40–129)
ALT SERPL-CCNC: 45 U/L (ref 10–40)
ANION GAP SERPL CALCULATED.3IONS-SCNC: 16 MMOL/L (ref 3–16)
AST SERPL-CCNC: 43 U/L (ref 15–37)
BILIRUB SERPL-MCNC: 2.2 MG/DL (ref 0–1)
BUN SERPL-MCNC: 19 MG/DL (ref 7–20)
CALCIUM SERPL-MCNC: 8.2 MG/DL (ref 8.3–10.6)
CHLORIDE SERPL-SCNC: 100 MMOL/L (ref 99–110)
CHOLEST SERPL-MCNC: 106 MG/DL (ref 0–199)
CO2 SERPL-SCNC: 27 MMOL/L (ref 21–32)
CREAT SERPL-MCNC: 1.6 MG/DL (ref 0.8–1.3)
GFR SERPLBLD CREATININE-BSD FMLA CKD-EPI: 42 ML/MIN/{1.73_M2}
GLUCOSE SERPL-MCNC: 124 MG/DL (ref 70–99)
HDLC SERPL-MCNC: 43 MG/DL (ref 40–60)
LDLC SERPL CALC-MCNC: 47 MG/DL
POTASSIUM SERPL-SCNC: 2.8 MMOL/L (ref 3.5–5.1)
PROT SERPL-MCNC: 6.1 G/DL (ref 6.4–8.2)
SODIUM SERPL-SCNC: 143 MMOL/L (ref 136–145)
TRIGL SERPL-MCNC: 82 MG/DL (ref 0–150)
TSH SERPL DL<=0.005 MIU/L-ACNC: 2.41 UIU/ML (ref 0.27–4.2)
VLDLC SERPL CALC-MCNC: 16 MG/DL

## 2025-07-25 ENCOUNTER — APPOINTMENT (OUTPATIENT)
Dept: GENERAL RADIOLOGY | Age: 85
DRG: 291 | End: 2025-07-25
Payer: MEDICARE

## 2025-07-25 ENCOUNTER — HOSPITAL ENCOUNTER (INPATIENT)
Age: 85
LOS: 5 days | Discharge: HOME | DRG: 291 | End: 2025-07-31
Attending: EMERGENCY MEDICINE | Admitting: STUDENT IN AN ORGANIZED HEALTH CARE EDUCATION/TRAINING PROGRAM
Payer: MEDICARE

## 2025-07-25 DIAGNOSIS — I50.43 ACUTE ON CHRONIC COMBINED SYSTOLIC (CONGESTIVE) AND DIASTOLIC (CONGESTIVE) HEART FAILURE (HCC): ICD-10-CM

## 2025-07-25 DIAGNOSIS — I44.2 COMPLETE HEART BLOCK (HCC): ICD-10-CM

## 2025-07-25 DIAGNOSIS — E83.42 HYPOMAGNESEMIA: ICD-10-CM

## 2025-07-25 DIAGNOSIS — E87.6 LOW SERUM POTASSIUM: Primary | ICD-10-CM

## 2025-07-25 DIAGNOSIS — N18.30 STAGE 3 CHRONIC KIDNEY DISEASE, UNSPECIFIED WHETHER STAGE 3A OR 3B CKD (HCC): ICD-10-CM

## 2025-07-25 DIAGNOSIS — Z95.810 CARDIAC RESYNCHRONIZATION THERAPY DEFIBRILLATOR (CRT-D) IN PLACE: ICD-10-CM

## 2025-07-25 DIAGNOSIS — R79.89 ELEVATED TROPONIN: ICD-10-CM

## 2025-07-25 DIAGNOSIS — N18.9 CHRONIC KIDNEY DISEASE, UNSPECIFIED CKD STAGE: ICD-10-CM

## 2025-07-25 DIAGNOSIS — I50.23 ACUTE ON CHRONIC SYSTOLIC CONGESTIVE HEART FAILURE (HCC): ICD-10-CM

## 2025-07-25 DIAGNOSIS — E87.6 LOW SERUM POTASSIUM LEVEL: Primary | ICD-10-CM

## 2025-07-25 DIAGNOSIS — I50.9 ACUTE ON CHRONIC CONGESTIVE HEART FAILURE, UNSPECIFIED HEART FAILURE TYPE (HCC): Primary | ICD-10-CM

## 2025-07-25 DIAGNOSIS — E87.6 HYPOKALEMIA: ICD-10-CM

## 2025-07-25 LAB
BASE EXCESS BLDV CALC-SCNC: 2.2 MMOL/L (ref -3–3)
BASOPHILS # BLD: 0.1 K/UL (ref 0–0.2)
BASOPHILS NFR BLD: 0.6 %
CO2 BLDV-SCNC: 26 MMOL/L
COHGB MFR BLDV: 1.5 % (ref 0–1.5)
DEPRECATED RDW RBC AUTO: 16.5 % (ref 12.4–15.4)
EOSINOPHIL # BLD: 0 K/UL (ref 0–0.6)
EOSINOPHIL NFR BLD: 0.1 %
HCO3 BLDV-SCNC: 25 MMOL/L (ref 23–29)
HCT VFR BLD AUTO: 30.9 % (ref 40.5–52.5)
HGB BLD-MCNC: 10.5 G/DL (ref 13.5–17.5)
LYMPHOCYTES # BLD: 0.7 K/UL (ref 1–5.1)
LYMPHOCYTES NFR BLD: 7.6 %
MCH RBC QN AUTO: 31.4 PG (ref 26–34)
MCHC RBC AUTO-ENTMCNC: 34 G/DL (ref 31–36)
MCV RBC AUTO: 92.4 FL (ref 80–100)
METHGB MFR BLDV: 0.3 %
MONOCYTES # BLD: 0.8 K/UL (ref 0–1.3)
MONOCYTES NFR BLD: 9 %
NEUTROPHILS # BLD: 7.6 K/UL (ref 1.7–7.7)
NEUTROPHILS NFR BLD: 82.7 %
O2 THERAPY: ABNORMAL
PCO2 BLDV: 32.9 MMHG (ref 40–50)
PH BLDV: 7.5 [PH] (ref 7.35–7.45)
PLATELET # BLD AUTO: 152 K/UL (ref 135–450)
PMV BLD AUTO: 8.9 FL (ref 5–10.5)
PO2 BLDV: 96.9 MMHG (ref 25–40)
RBC # BLD AUTO: 3.35 M/UL (ref 4.2–5.9)
SAO2 % BLDV: 97 %
WBC # BLD AUTO: 9.2 K/UL (ref 4–11)

## 2025-07-25 PROCEDURE — 80053 COMPREHEN METABOLIC PANEL: CPT

## 2025-07-25 PROCEDURE — 99291 CRITICAL CARE FIRST HOUR: CPT

## 2025-07-25 PROCEDURE — 82803 BLOOD GASES ANY COMBINATION: CPT

## 2025-07-25 PROCEDURE — 96366 THER/PROPH/DIAG IV INF ADDON: CPT

## 2025-07-25 PROCEDURE — 85025 COMPLETE CBC W/AUTO DIFF WBC: CPT

## 2025-07-25 PROCEDURE — 83880 ASSAY OF NATRIURETIC PEPTIDE: CPT

## 2025-07-25 PROCEDURE — 84484 ASSAY OF TROPONIN QUANT: CPT

## 2025-07-25 PROCEDURE — 2700000000 HC OXYGEN THERAPY PER DAY

## 2025-07-25 PROCEDURE — 87636 SARSCOV2 & INF A&B AMP PRB: CPT

## 2025-07-25 PROCEDURE — 93005 ELECTROCARDIOGRAM TRACING: CPT | Performed by: EMERGENCY MEDICINE

## 2025-07-25 PROCEDURE — 83735 ASSAY OF MAGNESIUM: CPT

## 2025-07-25 PROCEDURE — 94761 N-INVAS EAR/PLS OXIMETRY MLT: CPT

## 2025-07-25 PROCEDURE — 71045 X-RAY EXAM CHEST 1 VIEW: CPT

## 2025-07-25 PROCEDURE — 96365 THER/PROPH/DIAG IV INF INIT: CPT

## 2025-07-25 RX ORDER — POTASSIUM CHLORIDE 1500 MG/1
60 TABLET, EXTENDED RELEASE ORAL DAILY
Qty: 21 TABLET | Refills: 0 | Status: ON HOLD | OUTPATIENT
Start: 2025-07-25 | End: 2025-08-01

## 2025-07-25 RX ORDER — FUROSEMIDE 10 MG/ML
40 INJECTION INTRAMUSCULAR; INTRAVENOUS ONCE
Status: COMPLETED | OUTPATIENT
Start: 2025-07-25 | End: 2025-07-26

## 2025-07-25 NOTE — TELEPHONE ENCOUNTER
Spoke with pharmacy. They do not have the powder in stock and would not be able to get it until Monday. Tablets can not be broken or crushed. Spoke with patient. He is going to attempt to swallow the pills. Patient advised to  rx ASAP. If he is unable to get the tablets down to call us back. Please send new rx to [facility]

## 2025-07-25 NOTE — TELEPHONE ENCOUNTER
I received a call from the lab stating critical potassium of 2.8.  It is not 11:00 I called the patient and left a voicemail advising him that his potassium was low at 2.8 which can cause cardiac arrhythmias advised him to go to the emergency room if he gets this message within the next couple hours if not to call Dr. Gonzalez's office in the morning as he may need oral supplementation and repeat labs

## 2025-07-25 NOTE — TELEPHONE ENCOUNTER
Spoke with patient. Patient has old potasium pills at home meq 20 that he took last night. Patent states that b/p, pulse and O2 are stable, 139/69 77 95%. Was recently released from the hospital from pneumonia and just feels \"lousy\", general fatigue from being in the hospital.

## 2025-07-25 NOTE — PROGRESS NOTES
Physician Progress Note      PATIENT:               MARIZA IZAGUIRRE  CSN #:                  [id number]  :                       1940  ADMIT DATE:       2025 12:33 PM  DISCH DATE:        2025 1:39 PM  RESPONDING  PROVIDER #:        Jazlyn Jaime MD          QUERY TEXT:    Please clarify the patient?s nutritional status:    The clinical indicators include:  This patient 84 yr male aditted with Pneumonia  -Dehydration,KEON,CHF,Hypokalemia    -RD note on  by  Anjali Teague RD    Malnutrition Status:  Moderate malnutrition (25 1334)  Context:  Chronic Illness  Findings of the 6 clinical characteristics of malnutrition:  Energy Intake:  75% or less estimated energy requirements for 1 month or   longer  Weight Loss:  No weight loss  Body Fat Loss:  Mild body fat loss Orbital, Triceps  Muscle Mass Loss:  Mild muscle mass loss Temples (temporalis), Clavicles   (pectoralis & deltoids)  Fluid Accumulation:  Unable to assess   Strength:  Not Performed  Height: 175.3 cm (5' 9\")  Ideal Body Weight (IBW): 160 lbs (73 kg)  Current BMI (kg/m2): 25.4    Nutrition Recommendations/Plan:  Continue regular diet.  Encourage PO intakes.  Ensure ONS TID - vanilla.  Monitor pertinent labs, bowel habits, weight, N/V, supplement acceptance,   clinical progression.    Nutrition Recommendations/Plan:  Continue regular diet.  Encourage PO intakes.  Ensure ONS TID - vanilla.  Monitor pertinent labs, bowel habits, weight, N/V, supplement acceptance,   clinical progression.      Thank you,  Drea LA CDS  Options provided:  -- Protein calorie malnutrition moderate  -- Other - I will add my own diagnosis  -- Disagree - Not applicable / Not valid  -- Disagree - Clinically unable to determine / Unknown  -- Refer to Clinical Documentation Reviewer    PROVIDER RESPONSE TEXT:    This patient has moderate protein calorie malnutrition.    Query created by: Drea Catherine on 2025 4:43 AM      Electronically signed

## 2025-07-25 NOTE — TELEPHONE ENCOUNTER
I would like patient to take 3 of the 20 mEq tablets daily for the next week and have his potassium rechecked in 1 week.  Please call his pharmacy to see which powdered form they have, as I don't see it in EPIC. Thank you.

## 2025-07-26 ENCOUNTER — APPOINTMENT (OUTPATIENT)
Age: 85
DRG: 291 | End: 2025-07-26
Attending: STUDENT IN AN ORGANIZED HEALTH CARE EDUCATION/TRAINING PROGRAM
Payer: MEDICARE

## 2025-07-26 PROBLEM — R79.89 ELEVATED TROPONIN: Status: ACTIVE | Noted: 2025-07-26

## 2025-07-26 PROBLEM — N18.9 CHRONIC KIDNEY DISEASE: Status: ACTIVE | Noted: 2025-07-26

## 2025-07-26 PROBLEM — I50.43 ACUTE ON CHRONIC COMBINED SYSTOLIC (CONGESTIVE) AND DIASTOLIC (CONGESTIVE) HEART FAILURE (HCC): Status: ACTIVE | Noted: 2025-07-26

## 2025-07-26 LAB
ALBUMIN SERPL-MCNC: 3.7 G/DL (ref 3.4–5)
ALBUMIN SERPL-MCNC: 3.7 G/DL (ref 3.4–5)
ALBUMIN/GLOB SERPL: 1.4 {RATIO} (ref 1.1–2.2)
ALP SERPL-CCNC: 85 U/L (ref 40–129)
ALP SERPL-CCNC: 92 U/L (ref 40–129)
ALT SERPL-CCNC: 38 U/L (ref 10–40)
ALT SERPL-CCNC: 43 U/L (ref 10–40)
ANION GAP SERPL CALCULATED.3IONS-SCNC: 16 MMOL/L (ref 3–16)
ANION GAP SERPL CALCULATED.3IONS-SCNC: 18 MMOL/L (ref 3–16)
AST SERPL-CCNC: 37 U/L (ref 15–37)
AST SERPL-CCNC: 40 U/L (ref 15–37)
BASE EXCESS BLDA CALC-SCNC: 7.2 MMOL/L (ref -3–3)
BASOPHILS # BLD: 0.1 K/UL (ref 0–0.2)
BASOPHILS NFR BLD: 0.9 %
BILIRUB DIRECT SERPL-MCNC: 1 MG/DL (ref 0–0.3)
BILIRUB INDIRECT SERPL-MCNC: 1.2 MG/DL (ref 0–1)
BILIRUB SERPL-MCNC: 2.2 MG/DL (ref 0–1)
BILIRUB SERPL-MCNC: 2.3 MG/DL (ref 0–1)
BUN SERPL-MCNC: 28 MG/DL (ref 7–20)
BUN SERPL-MCNC: 29 MG/DL (ref 7–20)
CALCIUM SERPL-MCNC: 8 MG/DL (ref 8.3–10.6)
CALCIUM SERPL-MCNC: 8.3 MG/DL (ref 8.3–10.6)
CHLORIDE SERPL-SCNC: 100 MMOL/L (ref 99–110)
CHLORIDE SERPL-SCNC: 99 MMOL/L (ref 99–110)
CO2 BLDA-SCNC: 31.1 MMOL/L
CO2 SERPL-SCNC: 23 MMOL/L (ref 21–32)
CO2 SERPL-SCNC: 24 MMOL/L (ref 21–32)
COHGB MFR BLDA: 0.1 % (ref 0–1.5)
CREAT SERPL-MCNC: 1.6 MG/DL (ref 0.8–1.3)
CREAT SERPL-MCNC: 1.7 MG/DL (ref 0.8–1.3)
DEPRECATED RDW RBC AUTO: 16.2 % (ref 12.4–15.4)
EKG DIAGNOSIS: NORMAL
EKG DIAGNOSIS: NORMAL
EKG Q-T INTERVAL: 448 MS
EKG Q-T INTERVAL: 470 MS
EKG QRS DURATION: 120 MS
EKG QRS DURATION: 126 MS
EKG QTC CALCULATION (BAZETT): 497 MS
EKG QTC CALCULATION (BAZETT): 503 MS
EKG R AXIS: -61 DEGREES
EKG R AXIS: -68 DEGREES
EKG T AXIS: 100 DEGREES
EKG T AXIS: 113 DEGREES
EKG VENTRICULAR RATE: 69 BPM
EKG VENTRICULAR RATE: 74 BPM
EOSINOPHIL # BLD: 0 K/UL (ref 0–0.6)
EOSINOPHIL NFR BLD: 0.5 %
FLUAV RNA RESP QL NAA+PROBE: NOT DETECTED
FLUBV RNA RESP QL NAA+PROBE: NOT DETECTED
GFR SERPLBLD CREATININE-BSD FMLA CKD-EPI: 39 ML/MIN/{1.73_M2}
GFR SERPLBLD CREATININE-BSD FMLA CKD-EPI: 42 ML/MIN/{1.73_M2}
GLUCOSE SERPL-MCNC: 147 MG/DL (ref 70–99)
GLUCOSE SERPL-MCNC: 164 MG/DL (ref 70–99)
HCO3 BLDA-SCNC: 30 MMOL/L (ref 21–29)
HCT VFR BLD AUTO: 30.5 % (ref 40.5–52.5)
HGB BLD-MCNC: 10.5 G/DL (ref 13.5–17.5)
HGB BLDA-MCNC: 12 G/DL (ref 13.5–17.5)
INR PPP: 2.19 (ref 0.86–1.14)
LYMPHOCYTES # BLD: 0.8 K/UL (ref 1–5.1)
LYMPHOCYTES NFR BLD: 11.6 %
MAGNESIUM SERPL-MCNC: 1.18 MG/DL (ref 1.8–2.4)
MAGNESIUM SERPL-MCNC: 1.67 MG/DL (ref 1.8–2.4)
MAGNESIUM SERPL-MCNC: 1.7 MG/DL (ref 1.8–2.4)
MCH RBC QN AUTO: 31.9 PG (ref 26–34)
MCHC RBC AUTO-ENTMCNC: 34.4 G/DL (ref 31–36)
MCV RBC AUTO: 92.5 FL (ref 80–100)
METHGB MFR BLDA: 0.1 %
MONOCYTES # BLD: 0.7 K/UL (ref 0–1.3)
MONOCYTES NFR BLD: 9.1 %
NEUTROPHILS # BLD: 5.6 K/UL (ref 1.7–7.7)
NEUTROPHILS NFR BLD: 77.9 %
NT-PROBNP SERPL-MCNC: ABNORMAL PG/ML (ref 0–449)
O2 THERAPY: ABNORMAL
PCO2 BLDA: 36 MMHG (ref 35–45)
PH BLDA: 7.54 [PH] (ref 7.35–7.45)
PLATELET # BLD AUTO: 148 K/UL (ref 135–450)
PMV BLD AUTO: 8.3 FL (ref 5–10.5)
PO2 BLDA: 74.6 MMHG (ref 75–108)
POTASSIUM SERPL-SCNC: 3.1 MMOL/L (ref 3.5–5.1)
POTASSIUM SERPL-SCNC: 3.2 MMOL/L (ref 3.5–5.1)
PROT SERPL-MCNC: 6.3 G/DL (ref 6.4–8.2)
PROT SERPL-MCNC: 6.4 G/DL (ref 6.4–8.2)
PROTHROMBIN TIME: 24.1 SEC (ref 12.1–14.9)
RBC # BLD AUTO: 3.3 M/UL (ref 4.2–5.9)
SAO2 % BLDA: 95.1 %
SARS-COV-2 RNA RESP QL NAA+PROBE: NOT DETECTED
SODIUM SERPL-SCNC: 139 MMOL/L (ref 136–145)
SODIUM SERPL-SCNC: 141 MMOL/L (ref 136–145)
TROPONIN, HIGH SENSITIVITY: 71 NG/L (ref 0–22)
TROPONIN, HIGH SENSITIVITY: 75 NG/L (ref 0–22)
TROPONIN, HIGH SENSITIVITY: 78 NG/L (ref 0–22)
WBC # BLD AUTO: 7.1 K/UL (ref 4–11)

## 2025-07-26 PROCEDURE — 2500000003 HC RX 250 WO HCPCS: Performed by: STUDENT IN AN ORGANIZED HEALTH CARE EDUCATION/TRAINING PROGRAM

## 2025-07-26 PROCEDURE — 84484 ASSAY OF TROPONIN QUANT: CPT

## 2025-07-26 PROCEDURE — 2060000000 HC ICU INTERMEDIATE R&B

## 2025-07-26 PROCEDURE — 94660 CPAP INITIATION&MGMT: CPT

## 2025-07-26 PROCEDURE — 96375 TX/PRO/DX INJ NEW DRUG ADDON: CPT

## 2025-07-26 PROCEDURE — 80048 BASIC METABOLIC PNL TOTAL CA: CPT

## 2025-07-26 PROCEDURE — 93005 ELECTROCARDIOGRAM TRACING: CPT | Performed by: STUDENT IN AN ORGANIZED HEALTH CARE EDUCATION/TRAINING PROGRAM

## 2025-07-26 PROCEDURE — 83735 ASSAY OF MAGNESIUM: CPT

## 2025-07-26 PROCEDURE — 93010 ELECTROCARDIOGRAM REPORT: CPT | Performed by: INTERNAL MEDICINE

## 2025-07-26 PROCEDURE — 36415 COLL VENOUS BLD VENIPUNCTURE: CPT

## 2025-07-26 PROCEDURE — 2700000000 HC OXYGEN THERAPY PER DAY

## 2025-07-26 PROCEDURE — 84443 ASSAY THYROID STIM HORMONE: CPT

## 2025-07-26 PROCEDURE — 94761 N-INVAS EAR/PLS OXIMETRY MLT: CPT

## 2025-07-26 PROCEDURE — 6370000000 HC RX 637 (ALT 250 FOR IP): Performed by: EMERGENCY MEDICINE

## 2025-07-26 PROCEDURE — 6360000002 HC RX W HCPCS: Performed by: STUDENT IN AN ORGANIZED HEALTH CARE EDUCATION/TRAINING PROGRAM

## 2025-07-26 PROCEDURE — 85025 COMPLETE CBC W/AUTO DIFF WBC: CPT

## 2025-07-26 PROCEDURE — 99222 1ST HOSP IP/OBS MODERATE 55: CPT | Performed by: INTERNAL MEDICINE

## 2025-07-26 PROCEDURE — 6370000000 HC RX 637 (ALT 250 FOR IP): Performed by: STUDENT IN AN ORGANIZED HEALTH CARE EDUCATION/TRAINING PROGRAM

## 2025-07-26 PROCEDURE — 93306 TTE W/DOPPLER COMPLETE: CPT

## 2025-07-26 PROCEDURE — 6360000002 HC RX W HCPCS: Performed by: INTERNAL MEDICINE

## 2025-07-26 PROCEDURE — 6360000002 HC RX W HCPCS: Performed by: EMERGENCY MEDICINE

## 2025-07-26 PROCEDURE — 82803 BLOOD GASES ANY COMBINATION: CPT

## 2025-07-26 PROCEDURE — 85610 PROTHROMBIN TIME: CPT

## 2025-07-26 PROCEDURE — 80076 HEPATIC FUNCTION PANEL: CPT

## 2025-07-26 PROCEDURE — 36600 WITHDRAWAL OF ARTERIAL BLOOD: CPT

## 2025-07-26 PROCEDURE — 6360000002 HC RX W HCPCS

## 2025-07-26 RX ORDER — POTASSIUM CHLORIDE 1500 MG/1
40 TABLET, EXTENDED RELEASE ORAL PRN
Status: CANCELLED | OUTPATIENT
Start: 2025-07-26

## 2025-07-26 RX ORDER — ALLOPURINOL 100 MG/1
100 TABLET ORAL DAILY
Status: DISCONTINUED | OUTPATIENT
Start: 2025-07-26 | End: 2025-07-31 | Stop reason: HOSPADM

## 2025-07-26 RX ORDER — CLOPIDOGREL BISULFATE 75 MG/1
75 TABLET ORAL DAILY
Status: DISCONTINUED | OUTPATIENT
Start: 2025-07-26 | End: 2025-07-31 | Stop reason: HOSPADM

## 2025-07-26 RX ORDER — ONDANSETRON 2 MG/ML
4 INJECTION INTRAMUSCULAR; INTRAVENOUS EVERY 6 HOURS PRN
Status: DISCONTINUED | OUTPATIENT
Start: 2025-07-26 | End: 2025-07-31 | Stop reason: HOSPADM

## 2025-07-26 RX ORDER — POLYETHYLENE GLYCOL 3350 17 G/17G
17 POWDER, FOR SOLUTION ORAL DAILY PRN
Status: DISCONTINUED | OUTPATIENT
Start: 2025-07-26 | End: 2025-07-31 | Stop reason: HOSPADM

## 2025-07-26 RX ORDER — SODIUM CHLORIDE 0.9 % (FLUSH) 0.9 %
5-40 SYRINGE (ML) INJECTION PRN
Status: DISCONTINUED | OUTPATIENT
Start: 2025-07-26 | End: 2025-07-31 | Stop reason: HOSPADM

## 2025-07-26 RX ORDER — METOPROLOL SUCCINATE 50 MG/1
50 TABLET, EXTENDED RELEASE ORAL DAILY
Status: DISCONTINUED | OUTPATIENT
Start: 2025-07-26 | End: 2025-07-31 | Stop reason: HOSPADM

## 2025-07-26 RX ORDER — MAGNESIUM SULFATE IN WATER 40 MG/ML
2000 INJECTION, SOLUTION INTRAVENOUS PRN
Status: CANCELLED | OUTPATIENT
Start: 2025-07-26

## 2025-07-26 RX ORDER — POTASSIUM CHLORIDE 7.45 MG/ML
10 INJECTION INTRAVENOUS PRN
Status: CANCELLED | OUTPATIENT
Start: 2025-07-26

## 2025-07-26 RX ORDER — MAGNESIUM SULFATE IN WATER 40 MG/ML
2000 INJECTION, SOLUTION INTRAVENOUS ONCE
Status: COMPLETED | OUTPATIENT
Start: 2025-07-26 | End: 2025-07-26

## 2025-07-26 RX ORDER — TAMSULOSIN HYDROCHLORIDE 0.4 MG/1
0.4 CAPSULE ORAL DAILY
Status: DISCONTINUED | OUTPATIENT
Start: 2025-07-26 | End: 2025-07-31 | Stop reason: HOSPADM

## 2025-07-26 RX ORDER — SODIUM CHLORIDE 9 MG/ML
INJECTION, SOLUTION INTRAVENOUS PRN
Status: DISCONTINUED | OUTPATIENT
Start: 2025-07-26 | End: 2025-07-31 | Stop reason: HOSPADM

## 2025-07-26 RX ORDER — NIFEDIPINE 30 MG/1
60 TABLET, EXTENDED RELEASE ORAL DAILY
Status: DISCONTINUED | OUTPATIENT
Start: 2025-07-26 | End: 2025-07-28

## 2025-07-26 RX ORDER — POTASSIUM CHLORIDE 7.45 MG/ML
10 INJECTION INTRAVENOUS PRN
Status: DISCONTINUED | OUTPATIENT
Start: 2025-07-26 | End: 2025-07-27

## 2025-07-26 RX ORDER — FUROSEMIDE 10 MG/ML
40 INJECTION INTRAMUSCULAR; INTRAVENOUS 2 TIMES DAILY
Status: DISCONTINUED | OUTPATIENT
Start: 2025-07-26 | End: 2025-07-27

## 2025-07-26 RX ORDER — SODIUM CHLORIDE 0.9 % (FLUSH) 0.9 %
5-40 SYRINGE (ML) INJECTION EVERY 12 HOURS SCHEDULED
Status: DISCONTINUED | OUTPATIENT
Start: 2025-07-26 | End: 2025-07-31 | Stop reason: HOSPADM

## 2025-07-26 RX ORDER — FUROSEMIDE 10 MG/ML
40 INJECTION INTRAMUSCULAR; INTRAVENOUS 2 TIMES DAILY
Status: CANCELLED | OUTPATIENT
Start: 2025-07-26

## 2025-07-26 RX ORDER — ENOXAPARIN SODIUM 100 MG/ML
40 INJECTION SUBCUTANEOUS DAILY
Status: DISCONTINUED | OUTPATIENT
Start: 2025-07-26 | End: 2025-07-28 | Stop reason: SDUPTHER

## 2025-07-26 RX ORDER — PANTOPRAZOLE SODIUM 40 MG/1
40 TABLET, DELAYED RELEASE ORAL
Status: DISCONTINUED | OUTPATIENT
Start: 2025-07-26 | End: 2025-07-31 | Stop reason: HOSPADM

## 2025-07-26 RX ORDER — ACETAMINOPHEN 650 MG/1
650 SUPPOSITORY RECTAL EVERY 6 HOURS PRN
Status: DISCONTINUED | OUTPATIENT
Start: 2025-07-26 | End: 2025-07-31 | Stop reason: HOSPADM

## 2025-07-26 RX ORDER — ATORVASTATIN CALCIUM 40 MG/1
40 TABLET, FILM COATED ORAL NIGHTLY
Status: DISCONTINUED | OUTPATIENT
Start: 2025-07-26 | End: 2025-07-31 | Stop reason: HOSPADM

## 2025-07-26 RX ORDER — ONDANSETRON 4 MG/1
4 TABLET, ORALLY DISINTEGRATING ORAL EVERY 8 HOURS PRN
Status: DISCONTINUED | OUTPATIENT
Start: 2025-07-26 | End: 2025-07-31 | Stop reason: HOSPADM

## 2025-07-26 RX ORDER — ACETAMINOPHEN 325 MG/1
650 TABLET ORAL EVERY 6 HOURS PRN
Status: DISCONTINUED | OUTPATIENT
Start: 2025-07-26 | End: 2025-07-31 | Stop reason: HOSPADM

## 2025-07-26 RX ADMIN — APIXABAN 5 MG: 5 TABLET, FILM COATED ORAL at 09:35

## 2025-07-26 RX ADMIN — FUROSEMIDE 40 MG: 10 INJECTION, SOLUTION INTRAMUSCULAR; INTRAVENOUS at 11:48

## 2025-07-26 RX ADMIN — POTASSIUM CHLORIDE 10 MEQ: 7.46 INJECTION, SOLUTION INTRAVENOUS at 19:28

## 2025-07-26 RX ADMIN — Medication 10 ML: at 09:36

## 2025-07-26 RX ADMIN — MAGNESIUM SULFATE HEPTAHYDRATE 2000 MG: 40 INJECTION, SOLUTION INTRAVENOUS at 02:03

## 2025-07-26 RX ADMIN — Medication 10 ML: at 20:28

## 2025-07-26 RX ADMIN — ALLOPURINOL 100 MG: 100 TABLET ORAL at 09:35

## 2025-07-26 RX ADMIN — NIFEDIPINE 60 MG: 30 TABLET, FILM COATED, EXTENDED RELEASE ORAL at 09:35

## 2025-07-26 RX ADMIN — CLOPIDOGREL BISULFATE 75 MG: 75 TABLET, FILM COATED ORAL at 09:35

## 2025-07-26 RX ADMIN — FUROSEMIDE 40 MG: 10 INJECTION, SOLUTION INTRAMUSCULAR; INTRAVENOUS at 17:06

## 2025-07-26 RX ADMIN — POTASSIUM CHLORIDE 10 MEQ: 7.46 INJECTION, SOLUTION INTRAVENOUS at 17:11

## 2025-07-26 RX ADMIN — POTASSIUM CHLORIDE 10 MEQ: 7.46 INJECTION, SOLUTION INTRAVENOUS at 18:14

## 2025-07-26 RX ADMIN — POTASSIUM CHLORIDE 10 MEQ: 7.46 INJECTION, SOLUTION INTRAVENOUS at 20:28

## 2025-07-26 RX ADMIN — ENOXAPARIN SODIUM 40 MG: 100 INJECTION SUBCUTANEOUS at 09:35

## 2025-07-26 RX ADMIN — METOPROLOL SUCCINATE 50 MG: 50 TABLET, EXTENDED RELEASE ORAL at 09:35

## 2025-07-26 RX ADMIN — TAMSULOSIN HYDROCHLORIDE 0.4 MG: 0.4 CAPSULE ORAL at 09:34

## 2025-07-26 RX ADMIN — MAGNESIUM SULFATE HEPTAHYDRATE 2000 MG: 40 INJECTION, SOLUTION INTRAVENOUS at 17:52

## 2025-07-26 RX ADMIN — PANTOPRAZOLE SODIUM 40 MG: 40 TABLET, DELAYED RELEASE ORAL at 06:42

## 2025-07-26 RX ADMIN — FUROSEMIDE 40 MG: 10 INJECTION, SOLUTION INTRAMUSCULAR; INTRAVENOUS at 00:04

## 2025-07-26 RX ADMIN — POTASSIUM BICARBONATE 40 MEQ: 782 TABLET, EFFERVESCENT ORAL at 02:03

## 2025-07-26 ASSESSMENT — LIFESTYLE VARIABLES
HOW OFTEN DO YOU HAVE A DRINK CONTAINING ALCOHOL: NEVER
HOW MANY STANDARD DRINKS CONTAINING ALCOHOL DO YOU HAVE ON A TYPICAL DAY: PATIENT DOES NOT DRINK
HOW MANY STANDARD DRINKS CONTAINING ALCOHOL DO YOU HAVE ON A TYPICAL DAY: PATIENT DOES NOT DRINK
HOW OFTEN DO YOU HAVE A DRINK CONTAINING ALCOHOL: NEVER

## 2025-07-26 NOTE — PLAN OF CARE
Problem: Chronic Conditions and Co-morbidities  Goal: Patient's chronic conditions and co-morbidity symptoms are monitored and maintained or improved  7/26/2025 0618 by Pierre Mancilla RN  Outcome: Progressing  7/26/2025 0618 by Pierre Mancilla RN  Outcome: Progressing     Problem: ABCDS Injury Assessment  Goal: Absence of physical injury  7/26/2025 0618 by Pierre Mancilla RN  Outcome: Progressing  7/26/2025 0618 by Pierre Mancilla RN  Outcome: Progressing

## 2025-07-26 NOTE — H&P
clubbing, cyanosis or edema bilaterally.  Full range of motion without deformity.  Neurologic:  Neurovascularly intact without any focal sensory/motor deficits. Cranial nerves: II-XII intact, grossly non-focal.  Psychiatric:  Alert and oriented, thought content appropriate, normal insight  Skin:  Skin color, texture, turgor normal.  No rashes or lesions.  Capillary Refill:  Brisk,< 3 seconds   Peripheral Pulses:  +2 palpable, equal bilaterally     /68   Pulse 70   Temp 98.9 °F (37.2 °C)   Resp 25   Wt 76.2 kg (168 lb)   SpO2 91%   BMI 24.81 kg/m²     Diet: []Adult/General  [x]Cardiac  []Diabetic  []Low Fat  []NPO  []NPO after Midnight  []Other     DVT Prophylaxis: Eliquis    Code status: [x]Full  []DNR/CCA  []Limited (DNR/CCA with Do Not Intubate)  []DNRCC    Surrogate Decision Maker:   Name Home Phone Work Phone Mobile Phone Relationship Lgl ALFONSO David [phone] Spouse         PT/OT Eval Status: Ordered and pending    Anticipated Discharge Day/Date:  TBD    Anticipated Discharge Location: Home    Consults:      IP CONSULT TO HOSPITALIST    I personally have obtained, updated and/or reviewed the patient’s medication list on 7/26/2025   --------------------------------------------------------------------------------------------------------------------------------------------------------------------    Imaging:     XR CHEST PORTABLE  Result Date: 7/26/2025  History: Shortness of breath. AP chest. COMPARISON: 7/8/2025. FINDINGS: Elevation of the right side of the diaphragm. Patchy airspace opacities in the right lower lung presumably related to atelectasis and scarring. No change compared to the previous. Stable pacemaker and pacemaking leads. Stable heart size. Left lung remains clear.     1. Persistent right lower lung airspace disease with elevation of the right side of the diaphragm. No acute change in the interval. Electronically signed by Jonathan Finn MD    CTA ABDOMEN

## 2025-07-26 NOTE — ED NOTES
Bill Crow is a 84 y.o. male admitted for  Principal Problem:    Acute on chronic combined systolic (congestive) and diastolic (congestive) heart failure (HCC)  Resolved Problems:    * No resolved hospital problems. *  .   Patient Home via EMS transportation with   Chief Complaint   Patient presents with    Shortness of Breath     Pt arrives via squad from home for SOB. He has a recent admission for pneumonia. This patient also has recent abnormal labs.    .  Patient is alert and Person, Place, Time, and Situation  Patient's baseline mobility: Baseline Mobility:    Code Status: Prior   Cardiac Rhythm:    O2 Flow Rate (L/min): 3 L/min  Is patient on baseline Oxygen:  how many Liters:   Abnormal Assessment Findings:     Isolation: None      NIH Score:    C-SSRS: Risk of Suicide: No Risk  Bedside swallow:        Active LDA's:   Peripheral IV 07/25/25 Distal;Left;Anterior Forearm (Active)     Patient admitted with a delgado: no If the delgado is chronic was it exchanged:  Reason for delgado:   Patient admitted with Central Line:  . PICC line placement confirmed: YES OR NO:[id number]}   Reason for Central line:   Was central line Inserted from an outside facility:        Family/Caregiver Present  Any Concerns:    Restraints   Sitter          Vitals:      Vitals:    07/26/25 0244 07/26/25 0304 07/26/25 0345 07/26/25 0401   BP: 127/60 124/71 133/66 133/75   Pulse: 66 71 71 68   Resp: 22 23 30 25   Temp:       SpO2: 92% 92% 92% 90%   Weight:           Last documented pain score (0-10 scale)    Pain medication administered No.    Pertinent or High Risk Medications/Drips: No.    Pending Blood Product Administration: no    Abnormal labs:   Abnormal Labs Reviewed   CBC WITH AUTO DIFFERENTIAL - Abnormal; Notable for the following components:       Result Value    RBC 3.35 (*)     Hemoglobin 10.5 (*)     Hematocrit 30.9 (*)     RDW 16.5 (*)     Lymphocytes Absolute 0.7 (*)     All other components within normal limits   COMPREHENSIVE

## 2025-07-26 NOTE — ED PROVIDER NOTES
EMERGENCY DEPARTMENT ENCOUNTER     [facility] A2 CARD TELEMETRY     Pt Name: Bill Crow   MRN: [id number]   Birthdate 1940   Date of evaluation: 7/25/2025   Provider: Geronimo Mcfarland MD   PCP: Foreign Gonzalez DO   Note Started: 6:48 AM EDT 7/26/25     CHIEF COMPLAINT     Chief Complaint   Patient presents with    Shortness of Breath     Pt arrives via squad from home for SOB. He has a recent admission for pneumonia. This patient also has recent abnormal labs.         HISTORY OF PRESENT ILLNESS:  History from : Patient   Limitations to history : None     Bill Crow is a 84 y.o. male who presents for shortness of breath.  Patient reports he was recently admitted to the hospital for pneumonia and treated and discharged.  He states that at home he was feeling worsening shortness of breath particularly when he would lay down flat.  This is the reason for presentation today.  He denies any fevers or chest pain.  No nausea, vomiting or diarrhea.    Nursing Notes were all reviewed and agreed with or any disagreements were addressed in the HPI.     ROS: Positives and Pertinent negatives as per HPI.    PAST MEDICAL HISTORY     Past medical history:  has a past medical history of Aortic aneurysm, abdominal (01/2011), Appendicitis (12/06/2011), Arthritis (12/06/2011), Bruises easily, CAD (coronary artery disease) (09/2001), CHF (congestive heart failure) (HCC), Chronic back pain, Chronic pain of both shoulders (12/06/2011), Complication of anesthesia, Gout, Heart disease (12/06/2011), Hernia (12/06/2011), HIGH CHOLESTEROL (12/06/2011), Hyperlipidemia, Hypertension, Measles (12/06/2011), SUNDEEP (obstructive sleep apnea), Pancreatitis (2006), Persistent cough, Ringing in ears, Sinus disorder, and Sleep deprivation (12/06/2001).    Past surgical history:  has a past surgical history that includes Coronary angioplasty with stent (2001 and 2002); Cardiac surgery (01/01/2008); Cholecystectomy, laparoscopic (01/01/2006);

## 2025-07-26 NOTE — PLAN OF CARE
CHF Care Plan      Patient's EF (Ejection Fraction) is      Heart Failure Medications:  Diuretics:: Furosemide    (One of the following REQUIRED for EF </= 40%/SYSTOLIC FAILURE but MAY be used in EF% >40%/DIASTOLIC FAILURE)        ACE:: None        ARB:: None         ARNI:: None    (Beta Blockers)  NON- Evidenced Based Beta Blocker (for EF% >40%/DIASTOLIC FAILURE): None    Evidenced Based Beta Blocker::(REQUIRED for EF% <40%/SYSTOLIC FAILURE) None  ...................................................................................................................................................    Failed to redirect to the Timeline version of the [employer] SmartLink.      Patient's weights and intake/output reviewed    Patient's current weight today shows a difference of  lbs  than last documented weight.      Intake/Output Summary (Last 24 hours) at 7/26/2025 0728  Last data filed at 7/26/2025 0615  Gross per 24 hour   Intake 45.65 ml   Output 1100 ml   Net -1054.35 ml       Education Booklet Provided: yes    Comorbidities Reviewed Yes    Patient has a past medical history of Aortic aneurysm, abdominal, Appendicitis, Arthritis, Bruises easily, CAD (coronary artery disease), CHF (congestive heart failure) (HCC), Chronic back pain, Chronic pain of both shoulders, Complication of anesthesia, Gout, Heart disease, Hernia, HIGH CHOLESTEROL, Hyperlipidemia, Hypertension, Measles, SUNDEEP (obstructive sleep apnea), Pancreatitis, Persistent cough, Ringing in ears, Sinus disorder, and Sleep deprivation.     >>For CHF and Comorbidity documentation on Education Time and Topics, please see Education Tab      CHF Education    Learners:  Patient  Readineess:   Acceptance  Method:   Explanation  Response:   Verbalizes Understanding  Comments:     Time Spent: 5      Pt resting in bed at this time on 4 L O2. Pt denies shortness of breath. Pt with pitting lower extremity edema.     Patient and/or Family's stated Goal of Care this Admission:

## 2025-07-27 LAB
ALBUMIN SERPL-MCNC: 3.4 G/DL (ref 3.4–5)
ALP SERPL-CCNC: 82 U/L (ref 40–129)
ALT SERPL-CCNC: 37 U/L (ref 10–40)
ANION GAP SERPL CALCULATED.3IONS-SCNC: 15 MMOL/L (ref 3–16)
AST SERPL-CCNC: 38 U/L (ref 15–37)
BASOPHILS # BLD: 0 K/UL (ref 0–0.2)
BASOPHILS NFR BLD: 0.5 %
BILIRUB DIRECT SERPL-MCNC: 0.9 MG/DL (ref 0–0.3)
BILIRUB INDIRECT SERPL-MCNC: 1.2 MG/DL (ref 0–1)
BILIRUB SERPL-MCNC: 2.1 MG/DL (ref 0–1)
BUN SERPL-MCNC: 27 MG/DL (ref 7–20)
CALCIUM SERPL-MCNC: 8.2 MG/DL (ref 8.3–10.6)
CHLORIDE SERPL-SCNC: 101 MMOL/L (ref 99–110)
CO2 SERPL-SCNC: 27 MMOL/L (ref 21–32)
CREAT SERPL-MCNC: 1.4 MG/DL (ref 0.8–1.3)
DEPRECATED RDW RBC AUTO: 16.3 % (ref 12.4–15.4)
ECHO AO ASC DIAM: 3.8 CM
ECHO AO ASCENDING AORTA INDEX: 1.99 CM/M2
ECHO AO ROOT DIAM: 3.2 CM
ECHO AO ROOT INDEX: 1.68 CM/M2
ECHO AV AREA PEAK VELOCITY: 2 CM2
ECHO AV AREA VTI: 2 CM2
ECHO AV AREA/BSA PEAK VELOCITY: 1 CM2/M2
ECHO AV AREA/BSA VTI: 1 CM2/M2
ECHO AV CUSP MM: 1.4 CM
ECHO AV MEAN GRADIENT: 6 MMHG
ECHO AV MEAN VELOCITY: 1.1 M/S
ECHO AV PEAK GRADIENT: 12 MMHG
ECHO AV PEAK VELOCITY: 1.7 M/S
ECHO AV VELOCITY RATIO: 0.65
ECHO AV VTI: 36.5 CM
ECHO BSA: 1.92 M2
ECHO EST RA PRESSURE: 15 MMHG
ECHO IVC PROX: 2.2 CM
ECHO LA AREA 2C: 30 CM2
ECHO LA AREA 4C: 28.6 CM2
ECHO LA DIAMETER INDEX: 2.46 CM/M2
ECHO LA DIAMETER: 4.7 CM
ECHO LA MAJOR AXIS: 7.2 CM
ECHO LA MINOR AXIS: 7.2 CM
ECHO LA TO AORTIC ROOT RATIO: 1.47
ECHO LA VOL BP: 96 ML (ref 18–58)
ECHO LA VOL MOD A2C: 101 ML (ref 18–58)
ECHO LA VOL MOD A4C: 91 ML (ref 18–58)
ECHO LA VOL/BSA BIPLANE: 50 ML/M2 (ref 16–34)
ECHO LA VOLUME INDEX MOD A2C: 53 ML/M2 (ref 16–34)
ECHO LA VOLUME INDEX MOD A4C: 48 ML/M2 (ref 16–34)
ECHO LV E' SEPTAL VELOCITY: 5.8 CM/S
ECHO LV EDV A2C: 180 ML
ECHO LV EDV A4C: 131 ML
ECHO LV EDV INDEX A4C: 69 ML/M2
ECHO LV EDV NDEX A2C: 94 ML/M2
ECHO LV EF PHYSICIAN: 35 %
ECHO LV EJECTION FRACTION A2C: 37 %
ECHO LV EJECTION FRACTION A4C: 34 %
ECHO LV EJECTION FRACTION BIPLANE: 37 % (ref 55–100)
ECHO LV ESV A2C: 113 ML
ECHO LV ESV A4C: 87 ML
ECHO LV ESV INDEX A2C: 59 ML/M2
ECHO LV ESV INDEX A4C: 46 ML/M2
ECHO LV FRACTIONAL SHORTENING: 25 % (ref 28–44)
ECHO LV INTERNAL DIMENSION DIASTOLE INDEX: 2.72 CM/M2
ECHO LV INTERNAL DIMENSION DIASTOLIC: 5.2 CM (ref 4.2–5.9)
ECHO LV INTERNAL DIMENSION SYSTOLIC INDEX: 2.04 CM/M2
ECHO LV INTERNAL DIMENSION SYSTOLIC: 3.9 CM
ECHO LV ISOVOLUMETRIC RELAXATION TIME (IVRT): 63 MS
ECHO LV IVSD: 0.8 CM (ref 0.6–1)
ECHO LV MASS 2D: 145.2 G (ref 88–224)
ECHO LV MASS INDEX 2D: 76 G/M2 (ref 49–115)
ECHO LV POSTERIOR WALL DIASTOLIC: 0.8 CM (ref 0.6–1)
ECHO LV RELATIVE WALL THICKNESS RATIO: 0.31
ECHO LVOT AREA: 3.1 CM2
ECHO LVOT AV VTI INDEX: 0.62
ECHO LVOT DIAM: 2 CM
ECHO LVOT MEAN GRADIENT: 2 MMHG
ECHO LVOT PEAK GRADIENT: 5 MMHG
ECHO LVOT PEAK VELOCITY: 1.1 M/S
ECHO LVOT STROKE VOLUME INDEX: 37.3 ML/M2
ECHO LVOT SV: 71.3 ML
ECHO LVOT VTI: 22.7 CM
ECHO MV E VELOCITY: 1.36 M/S
ECHO MV E/E' SEPTAL: 23.45
ECHO MV REGURGITANT VTIA: 162 CM
ECHO RA AREA 4C: 21.5 CM2
ECHO RA END SYSTOLIC VOLUME APICAL 4 CHAMBER INDEX BSA: 32 ML/M2
ECHO RA VOLUME: 61 ML
ECHO RIGHT VENTRICULAR SYSTOLIC PRESSURE (RVSP): 56 MMHG
ECHO RV BASAL DIMENSION: 4.3 CM
ECHO RV EJECTION FRACTION 3D: 36 %
ECHO RV FRACTIONAL AREA CHANGE: 34 %
ECHO RV FREE WALL PEAK S': 10.7 CM/S
ECHO RV LONGITUDINAL DIMENSION: 7 CM
ECHO RV MID DIMENSION: 4.1 CM
ECHO RV TAPSE: 1.5 CM (ref 1.7–?)
ECHO TV REGURGITANT MAX VELOCITY: 3.19 M/S
ECHO TV REGURGITANT PEAK GRADIENT: 41 MMHG
EOSINOPHIL # BLD: 0 K/UL (ref 0–0.6)
EOSINOPHIL NFR BLD: 0.5 %
GFR SERPLBLD CREATININE-BSD FMLA CKD-EPI: 49 ML/MIN/{1.73_M2}
GLUCOSE SERPL-MCNC: 125 MG/DL (ref 70–99)
HCT VFR BLD AUTO: 31.4 % (ref 40.5–52.5)
HGB BLD-MCNC: 10.7 G/DL (ref 13.5–17.5)
LYMPHOCYTES # BLD: 0.7 K/UL (ref 1–5.1)
LYMPHOCYTES NFR BLD: 8.5 %
MAGNESIUM SERPL-MCNC: 1.86 MG/DL (ref 1.8–2.4)
MCH RBC QN AUTO: 31.5 PG (ref 26–34)
MCHC RBC AUTO-ENTMCNC: 34 G/DL (ref 31–36)
MCV RBC AUTO: 92.7 FL (ref 80–100)
MONOCYTES # BLD: 0.6 K/UL (ref 0–1.3)
MONOCYTES NFR BLD: 6.6 %
NEUTROPHILS # BLD: 7.3 K/UL (ref 1.7–7.7)
NEUTROPHILS NFR BLD: 83.9 %
PLATELET # BLD AUTO: 160 K/UL (ref 135–450)
PMV BLD AUTO: 8.8 FL (ref 5–10.5)
POTASSIUM SERPL-SCNC: 3.1 MMOL/L (ref 3.5–5.1)
PROT SERPL-MCNC: 6 G/DL (ref 6.4–8.2)
RBC # BLD AUTO: 3.38 M/UL (ref 4.2–5.9)
SODIUM SERPL-SCNC: 143 MMOL/L (ref 136–145)
WBC # BLD AUTO: 8.8 K/UL (ref 4–11)

## 2025-07-27 PROCEDURE — 36415 COLL VENOUS BLD VENIPUNCTURE: CPT

## 2025-07-27 PROCEDURE — 6360000002 HC RX W HCPCS: Performed by: INTERNAL MEDICINE

## 2025-07-27 PROCEDURE — 97166 OT EVAL MOD COMPLEX 45 MIN: CPT

## 2025-07-27 PROCEDURE — 97530 THERAPEUTIC ACTIVITIES: CPT

## 2025-07-27 PROCEDURE — 94660 CPAP INITIATION&MGMT: CPT

## 2025-07-27 PROCEDURE — 6370000000 HC RX 637 (ALT 250 FOR IP): Performed by: STUDENT IN AN ORGANIZED HEALTH CARE EDUCATION/TRAINING PROGRAM

## 2025-07-27 PROCEDURE — 6360000002 HC RX W HCPCS: Performed by: STUDENT IN AN ORGANIZED HEALTH CARE EDUCATION/TRAINING PROGRAM

## 2025-07-27 PROCEDURE — 2500000003 HC RX 250 WO HCPCS: Performed by: STUDENT IN AN ORGANIZED HEALTH CARE EDUCATION/TRAINING PROGRAM

## 2025-07-27 PROCEDURE — 2060000000 HC ICU INTERMEDIATE R&B

## 2025-07-27 PROCEDURE — 93306 TTE W/DOPPLER COMPLETE: CPT | Performed by: INTERNAL MEDICINE

## 2025-07-27 PROCEDURE — 80048 BASIC METABOLIC PNL TOTAL CA: CPT

## 2025-07-27 PROCEDURE — 94761 N-INVAS EAR/PLS OXIMETRY MLT: CPT

## 2025-07-27 PROCEDURE — 99222 1ST HOSP IP/OBS MODERATE 55: CPT | Performed by: INTERNAL MEDICINE

## 2025-07-27 PROCEDURE — 80076 HEPATIC FUNCTION PANEL: CPT

## 2025-07-27 PROCEDURE — 76376 3D RENDER W/INTRP POSTPROCES: CPT | Performed by: INTERNAL MEDICINE

## 2025-07-27 PROCEDURE — 6370000000 HC RX 637 (ALT 250 FOR IP): Performed by: INTERNAL MEDICINE

## 2025-07-27 PROCEDURE — 83735 ASSAY OF MAGNESIUM: CPT

## 2025-07-27 PROCEDURE — 97162 PT EVAL MOD COMPLEX 30 MIN: CPT

## 2025-07-27 PROCEDURE — 2700000000 HC OXYGEN THERAPY PER DAY

## 2025-07-27 PROCEDURE — 85025 COMPLETE CBC W/AUTO DIFF WBC: CPT

## 2025-07-27 PROCEDURE — 97535 SELF CARE MNGMENT TRAINING: CPT

## 2025-07-27 RX ORDER — FUROSEMIDE 10 MG/ML
80 INJECTION INTRAMUSCULAR; INTRAVENOUS 2 TIMES DAILY
Status: DISCONTINUED | OUTPATIENT
Start: 2025-07-27 | End: 2025-07-28

## 2025-07-27 RX ORDER — POTASSIUM CHLORIDE 1500 MG/1
40 TABLET, EXTENDED RELEASE ORAL 3 TIMES DAILY
Status: COMPLETED | OUTPATIENT
Start: 2025-07-27 | End: 2025-07-27

## 2025-07-27 RX ORDER — FUROSEMIDE 10 MG/ML
40 INJECTION INTRAMUSCULAR; INTRAVENOUS ONCE
Status: COMPLETED | OUTPATIENT
Start: 2025-07-27 | End: 2025-07-27

## 2025-07-27 RX ADMIN — FUROSEMIDE 40 MG: 10 INJECTION, SOLUTION INTRAMUSCULAR; INTRAVENOUS at 08:23

## 2025-07-27 RX ADMIN — ALLOPURINOL 100 MG: 100 TABLET ORAL at 08:23

## 2025-07-27 RX ADMIN — FUROSEMIDE 40 MG: 10 INJECTION, SOLUTION INTRAMUSCULAR; INTRAVENOUS at 11:07

## 2025-07-27 RX ADMIN — PANTOPRAZOLE SODIUM 40 MG: 40 TABLET, DELAYED RELEASE ORAL at 08:24

## 2025-07-27 RX ADMIN — CLOPIDOGREL BISULFATE 75 MG: 75 TABLET, FILM COATED ORAL at 08:23

## 2025-07-27 RX ADMIN — TAMSULOSIN HYDROCHLORIDE 0.4 MG: 0.4 CAPSULE ORAL at 08:23

## 2025-07-27 RX ADMIN — NIFEDIPINE 60 MG: 30 TABLET, FILM COATED, EXTENDED RELEASE ORAL at 08:23

## 2025-07-27 RX ADMIN — POTASSIUM CHLORIDE 40 MEQ: 1500 TABLET, EXTENDED RELEASE ORAL at 15:12

## 2025-07-27 RX ADMIN — ATORVASTATIN CALCIUM 40 MG: 40 TABLET, FILM COATED ORAL at 19:53

## 2025-07-27 RX ADMIN — APIXABAN 5 MG: 5 TABLET, FILM COATED ORAL at 08:23

## 2025-07-27 RX ADMIN — ENOXAPARIN SODIUM 40 MG: 100 INJECTION SUBCUTANEOUS at 08:23

## 2025-07-27 RX ADMIN — FUROSEMIDE 80 MG: 10 INJECTION, SOLUTION INTRAMUSCULAR; INTRAVENOUS at 18:07

## 2025-07-27 RX ADMIN — METOPROLOL SUCCINATE 50 MG: 50 TABLET, EXTENDED RELEASE ORAL at 08:23

## 2025-07-27 RX ADMIN — POTASSIUM CHLORIDE 40 MEQ: 1500 TABLET, EXTENDED RELEASE ORAL at 11:07

## 2025-07-27 RX ADMIN — APIXABAN 5 MG: 5 TABLET, FILM COATED ORAL at 19:53

## 2025-07-27 RX ADMIN — Medication 10 ML: at 19:53

## 2025-07-27 NOTE — PLAN OF CARE
Problem: Safety - Adult  Goal: Free from fall injury  7/27/2025 0049 by Jenn Sandoval RN  Outcome: Progressing   Pt will remain free from falls throughout hospital stay. Fall precautions in place, bed alarm on, bed in lowest position with wheels locked and side rails 2/4 up. Room door open and hourly rounding completed. Will continue to monitor throughout shift.     Problem: Chronic Conditions and Co-morbidities  Goal: Patient's chronic conditions and co-morbidity symptoms are monitored and maintained or improved  7/27/2025 0049 by Jenn Sandoval, RN  Outcome: Progressing   CHF Care Plan      Patient's EF (Ejection Fraction) is less than 40%    Heart Failure Medications:  Diuretics:: Furosemide    (One of the following REQUIRED for EF </= 40%/SYSTOLIC FAILURE but MAY be used in EF% >40%/DIASTOLIC FAILURE)        ACE:: None        ARB:: None         ARNI:: None    (Beta Blockers)  NON- Evidenced Based Beta Blocker (for EF% >40%/DIASTOLIC FAILURE): None    Evidenced Based Beta Blocker::(REQUIRED for EF% <40%/SYSTOLIC FAILURE) Metoprolol SUCCinate- Toprol XL  ...................................................................................................................................................    Failed to redirect to the Timeline version of the [employer] SmartLink.      Patient's weights and intake/output reviewed    Patient's current weight today shows a difference of 2 lbs less than last documented weight.      Intake/Output Summary (Last 24 hours) at 7/27/2025 0136  Last data filed at 7/26/2025 2312  Gross per 24 hour   Intake 405.65 ml   Output 5375 ml   Net -4969.35 ml       Education Booklet Provided: yes    Comorbidities Reviewed Yes    Patient has a past medical history of Aortic aneurysm, abdominal, Appendicitis, Arthritis, Bruises easily, CAD (coronary artery disease), CHF (congestive heart failure) ([facility]), Chronic back pain, Chronic pain of both shoulders, Complication of anesthesia, Gout, Heart

## 2025-07-28 LAB
ALBUMIN SERPL-MCNC: 3.1 G/DL (ref 3.4–5)
ALP SERPL-CCNC: 79 U/L (ref 40–129)
ALT SERPL-CCNC: 55 U/L (ref 10–40)
ANION GAP SERPL CALCULATED.3IONS-SCNC: 10 MMOL/L (ref 3–16)
AST SERPL-CCNC: 64 U/L (ref 15–37)
BASOPHILS # BLD: 0 K/UL (ref 0–0.2)
BASOPHILS NFR BLD: 0.9 %
BILIRUB DIRECT SERPL-MCNC: 0.8 MG/DL (ref 0–0.3)
BILIRUB INDIRECT SERPL-MCNC: 1 MG/DL (ref 0–1)
BILIRUB SERPL-MCNC: 1.8 MG/DL (ref 0–1)
BUN SERPL-MCNC: 27 MG/DL (ref 7–20)
CALCIUM SERPL-MCNC: 8 MG/DL (ref 8.3–10.6)
CHLORIDE SERPL-SCNC: 99 MMOL/L (ref 99–110)
CO2 SERPL-SCNC: 32 MMOL/L (ref 21–32)
CREAT SERPL-MCNC: 1.5 MG/DL (ref 0.8–1.3)
DEPRECATED RDW RBC AUTO: 15.8 % (ref 12.4–15.4)
EOSINOPHIL # BLD: 0.1 K/UL (ref 0–0.6)
EOSINOPHIL NFR BLD: 2.6 %
GFR SERPLBLD CREATININE-BSD FMLA CKD-EPI: 45 ML/MIN/{1.73_M2}
GLUCOSE SERPL-MCNC: 101 MG/DL (ref 70–99)
HCT VFR BLD AUTO: 28.1 % (ref 40.5–52.5)
HGB BLD-MCNC: 9.8 G/DL (ref 13.5–17.5)
LYMPHOCYTES # BLD: 0.7 K/UL (ref 1–5.1)
LYMPHOCYTES NFR BLD: 14.6 %
MAGNESIUM SERPL-MCNC: 1.67 MG/DL (ref 1.8–2.4)
MCH RBC QN AUTO: 32.2 PG (ref 26–34)
MCHC RBC AUTO-ENTMCNC: 35 G/DL (ref 31–36)
MCV RBC AUTO: 92.2 FL (ref 80–100)
MONOCYTES # BLD: 0.4 K/UL (ref 0–1.3)
MONOCYTES NFR BLD: 9.1 %
NEUTROPHILS # BLD: 3.5 K/UL (ref 1.7–7.7)
NEUTROPHILS NFR BLD: 72.8 %
NT-PROBNP SERPL-MCNC: 6916 PG/ML (ref 0–449)
PLATELET # BLD AUTO: 141 K/UL (ref 135–450)
PMV BLD AUTO: 8.7 FL (ref 5–10.5)
POTASSIUM SERPL-SCNC: 3 MMOL/L (ref 3.5–5.1)
PROT SERPL-MCNC: 5.6 G/DL (ref 6.4–8.2)
RBC # BLD AUTO: 3.05 M/UL (ref 4.2–5.9)
SODIUM SERPL-SCNC: 141 MMOL/L (ref 136–145)
WBC # BLD AUTO: 4.8 K/UL (ref 4–11)

## 2025-07-28 PROCEDURE — 80076 HEPATIC FUNCTION PANEL: CPT

## 2025-07-28 PROCEDURE — 6370000000 HC RX 637 (ALT 250 FOR IP): Performed by: INTERNAL MEDICINE

## 2025-07-28 PROCEDURE — 6360000002 HC RX W HCPCS: Performed by: INTERNAL MEDICINE

## 2025-07-28 PROCEDURE — 36415 COLL VENOUS BLD VENIPUNCTURE: CPT

## 2025-07-28 PROCEDURE — 6370000000 HC RX 637 (ALT 250 FOR IP): Performed by: STUDENT IN AN ORGANIZED HEALTH CARE EDUCATION/TRAINING PROGRAM

## 2025-07-28 PROCEDURE — 2500000003 HC RX 250 WO HCPCS: Performed by: STUDENT IN AN ORGANIZED HEALTH CARE EDUCATION/TRAINING PROGRAM

## 2025-07-28 PROCEDURE — 94660 CPAP INITIATION&MGMT: CPT

## 2025-07-28 PROCEDURE — 83880 ASSAY OF NATRIURETIC PEPTIDE: CPT

## 2025-07-28 PROCEDURE — 85025 COMPLETE CBC W/AUTO DIFF WBC: CPT

## 2025-07-28 PROCEDURE — 94761 N-INVAS EAR/PLS OXIMETRY MLT: CPT

## 2025-07-28 PROCEDURE — 80048 BASIC METABOLIC PNL TOTAL CA: CPT

## 2025-07-28 PROCEDURE — 6360000002 HC RX W HCPCS: Performed by: STUDENT IN AN ORGANIZED HEALTH CARE EDUCATION/TRAINING PROGRAM

## 2025-07-28 PROCEDURE — 83735 ASSAY OF MAGNESIUM: CPT

## 2025-07-28 PROCEDURE — 2700000000 HC OXYGEN THERAPY PER DAY

## 2025-07-28 PROCEDURE — 99232 SBSQ HOSP IP/OBS MODERATE 35: CPT | Performed by: NURSE PRACTITIONER

## 2025-07-28 PROCEDURE — 2060000000 HC ICU INTERMEDIATE R&B

## 2025-07-28 RX ORDER — MAGNESIUM SULFATE IN WATER 40 MG/ML
2000 INJECTION, SOLUTION INTRAVENOUS ONCE
Status: COMPLETED | OUTPATIENT
Start: 2025-07-28 | End: 2025-07-28

## 2025-07-28 RX ORDER — AMLODIPINE BESYLATE 5 MG/1
5 TABLET ORAL DAILY
Status: DISCONTINUED | OUTPATIENT
Start: 2025-07-29 | End: 2025-07-29

## 2025-07-28 RX ORDER — FUROSEMIDE 10 MG/ML
60 INJECTION INTRAMUSCULAR; INTRAVENOUS 2 TIMES DAILY
Status: DISCONTINUED | OUTPATIENT
Start: 2025-07-28 | End: 2025-07-29

## 2025-07-28 RX ORDER — SPIRONOLACTONE 25 MG/1
12.5 TABLET ORAL DAILY
Status: DISCONTINUED | OUTPATIENT
Start: 2025-07-28 | End: 2025-07-29

## 2025-07-28 RX ORDER — POTASSIUM CHLORIDE 1500 MG/1
40 TABLET, EXTENDED RELEASE ORAL 3 TIMES DAILY
Status: COMPLETED | OUTPATIENT
Start: 2025-07-28 | End: 2025-07-28

## 2025-07-28 RX ADMIN — ALLOPURINOL 100 MG: 100 TABLET ORAL at 10:52

## 2025-07-28 RX ADMIN — PANTOPRAZOLE SODIUM 40 MG: 40 TABLET, DELAYED RELEASE ORAL at 10:51

## 2025-07-28 RX ADMIN — METOPROLOL SUCCINATE 50 MG: 50 TABLET, EXTENDED RELEASE ORAL at 10:52

## 2025-07-28 RX ADMIN — Medication 10 ML: at 20:00

## 2025-07-28 RX ADMIN — POTASSIUM CHLORIDE 40 MEQ: 1500 TABLET, EXTENDED RELEASE ORAL at 10:51

## 2025-07-28 RX ADMIN — CLOPIDOGREL BISULFATE 75 MG: 75 TABLET, FILM COATED ORAL at 10:51

## 2025-07-28 RX ADMIN — FUROSEMIDE 60 MG: 10 INJECTION, SOLUTION INTRAMUSCULAR; INTRAVENOUS at 18:31

## 2025-07-28 RX ADMIN — Medication 10 ML: at 11:01

## 2025-07-28 RX ADMIN — ENOXAPARIN SODIUM 40 MG: 100 INJECTION SUBCUTANEOUS at 10:52

## 2025-07-28 RX ADMIN — FUROSEMIDE 80 MG: 10 INJECTION, SOLUTION INTRAMUSCULAR; INTRAVENOUS at 10:53

## 2025-07-28 RX ADMIN — TAMSULOSIN HYDROCHLORIDE 0.4 MG: 0.4 CAPSULE ORAL at 10:52

## 2025-07-28 RX ADMIN — SPIRONOLACTONE 12.5 MG: 25 TABLET ORAL at 14:37

## 2025-07-28 RX ADMIN — POTASSIUM CHLORIDE 40 MEQ: 1500 TABLET, EXTENDED RELEASE ORAL at 20:00

## 2025-07-28 RX ADMIN — APIXABAN 5 MG: 5 TABLET, FILM COATED ORAL at 10:52

## 2025-07-28 RX ADMIN — NIFEDIPINE 60 MG: 30 TABLET, FILM COATED, EXTENDED RELEASE ORAL at 10:51

## 2025-07-28 RX ADMIN — MAGNESIUM SULFATE HEPTAHYDRATE 2000 MG: 40 INJECTION, SOLUTION INTRAVENOUS at 10:59

## 2025-07-28 RX ADMIN — ATORVASTATIN CALCIUM 40 MG: 40 TABLET, FILM COATED ORAL at 20:00

## 2025-07-28 RX ADMIN — APIXABAN 5 MG: 5 TABLET, FILM COATED ORAL at 20:00

## 2025-07-28 RX ADMIN — POTASSIUM CHLORIDE 40 MEQ: 1500 TABLET, EXTENDED RELEASE ORAL at 14:37

## 2025-07-28 ASSESSMENT — PAIN SCALES - GENERAL
PAINLEVEL_OUTOF10: 0
PAINLEVEL_OUTOF10: 0

## 2025-07-28 NOTE — FLOWSHEET NOTE
Titrating pt oxygen down. Pt tolerating well.   07/28/25 1603 07/28/25 1830 07/28/25 1837   Oxygen Therapy   SpO2 97 % 96 % 97 %   O2 Device High flow nasal cannula High flow nasal cannula High flow nasal cannula   O2 Flow Rate (L/min) 10 L/min 8 L/min 7 L/min

## 2025-07-28 NOTE — PLAN OF CARE
Problem: Safety - Adult  Goal: Free from fall injury  7/28/2025 0018 by Jenn Sandoval, RN  Outcome: Progressing   Pt will remain free from falls throughout hospital stay. Fall precautions in place, bed alarm on, bed in lowest position with wheels locked and side rails 2/4 up. Room door open and hourly rounding completed. Will continue to monitor throughout shift.     Problem: Chronic Conditions and Co-morbidities  Goal: Patient's chronic conditions and co-morbidity symptoms are monitored and maintained or improved  7/28/2025 0018 by Jenn Sandoval, RN  Outcome: Progressing   CHF Care Plan      Patient's EF (Ejection Fraction) is less than 40%    Heart Failure Medications:  Diuretics:: Furosemide    (One of the following REQUIRED for EF </= 40%/SYSTOLIC FAILURE but MAY be used in EF% >40%/DIASTOLIC FAILURE)        ACE:: None        ARB:: None         ARNI:: None    (Beta Blockers)  NON- Evidenced Based Beta Blocker (for EF% >40%/DIASTOLIC FAILURE): None    Evidenced Based Beta Blocker::(REQUIRED for EF% <40%/SYSTOLIC FAILURE) Metoprolol SUCCinate- Toprol XL  ...................................................................................................................................................    Failed to redirect to the Timeline version of the [employer] SmartLink.      Patient's weights and intake/output reviewed    Patient's current weight today shows a difference of 1 lbs less than last documented weight.      Intake/Output Summary (Last 24 hours) at 7/28/2025 0024  Last data filed at 7/27/2025 1817  Gross per 24 hour   Intake 120 ml   Output 1500 ml   Net -1380 ml       Education Booklet Provided: yes    Comorbidities Reviewed Yes    Patient has a past medical history of Aortic aneurysm, abdominal, Appendicitis, Arthritis, Bruises easily, CAD (coronary artery disease), CHF (congestive heart failure) (HCC), Chronic back pain, Chronic pain of both shoulders, Complication of anesthesia, Gout, Heart disease,

## 2025-07-28 NOTE — PLAN OF CARE
CHF Care Plan      Patient's EF (Ejection Fraction) is less than 40%    Heart Failure Medications:  Diuretics:: Furosemide and Spironolactone    (One of the following REQUIRED for EF </= 40%/SYSTOLIC FAILURE but MAY be used in EF% >40%/DIASTOLIC FAILURE)        ACE:: None        ARB:: None         ARNI:: None    (Beta Blockers)  NON- Evidenced Based Beta Blocker (for EF% >40%/DIASTOLIC FAILURE): None    Evidenced Based Beta Blocker::(REQUIRED for EF% <40%/SYSTOLIC FAILURE) Metoprolol SUCCinate- Toprol XL  ...................................................................................................................................................    Failed to redirect to the Timeline version of the [employer] SmartLink.      Patient's weights and intake/output reviewed    Patient's current weight today shows a difference of 5 lbs less than last documented weight.      Intake/Output Summary (Last 24 hours) at 7/28/2025 1555  Last data filed at 7/28/2025 1340  Gross per 24 hour   Intake 1308 ml   Output 1500 ml   Net -192 ml       Education Booklet Provided: yes    Comorbidities Reviewed Yes    Patient has a past medical history of Aortic aneurysm, abdominal, Appendicitis, Arthritis, Bruises easily, CAD (coronary artery disease), CHF (congestive heart failure) (HCC), Chronic back pain, Chronic pain of both shoulders, Complication of anesthesia, Gout, Heart disease, Hernia, HIGH CHOLESTEROL, Hyperlipidemia, Hypertension, Measles, SUNDEEP (obstructive sleep apnea), Pancreatitis, Persistent cough, Ringing in ears, Sinus disorder, and Sleep deprivation.     >>For CHF and Comorbidity documentation on Education Time and Topics, please see Education Tab      CHF Education    Learners:  Patient  Readineess:   Acceptance  Method:   Explanation  Response:   Verbalizes Understanding  Comments: Educated pt on lifestyle changes that would assist in preventing another CHF exacerbation in the future.     Time Spent: 5 minutes      Pt

## 2025-07-28 NOTE — CONSULTS
Ph: [phone], Fax: [phone]           [facility].[place]               Reason for admission:                 Shortness of breath    Brief Summary :     Bill Crow is being seen by nephrology for KEON and fluid overload  .  He is known to us from past admission and presented with shortness of breath.      Interval History and plan:        Chest x-ray shows persistent right lower lung airspace disease/he also had pneumonia last hospitalization  Blood pressure is good  Currently on IV Lasix  Potassium low at 3  He says he was very edematous on presentation but now edema is gone in the legs    Creatinine 1.5 now  Was 1.6 upon discharge during last hospitalization  Has significant CKD and creatinine is not very far off baseline  Wt down by 8 pounds    Plan:    Since he has congestive heart failure and nifedipine is known to cause negative inotropic effect we will try to wean it off and replaced with amlodipine    Also goal is to optimize with GDMT's and try to taper off amlodipine  Start with spironolactone 12.5 mg daily and may need to go up to 25 mg tomorrow  Agree with metoprolol    Decrease the lasix to 60 bid - improving edema, low K, BP coming down ,                    Assessment :     Acute Kidney Injury  Creatinine 1.5 now  Lower than 1.6 during last hospitalization  During last hospitalization  Prior to that his creatinine today was 1-1.2 in 2023 and 2024        Hypertension   BP: (113-124)/(56-59)  Pulse:  [60-64]   BP goal inpatient 130-140 systolic inpatient     HFrEF  Echo from 7/25 shows EF of 35 to 40%, moderate tricuspid regurgitation, moderate pulmonary hypertension, severely dilated left atrium, also mildly dilated ascending aorta    [facility] would like to thank Albert Norton MD   for opportunity to serve this patient      Please call with questions at-   24 Hrs Answering service [phone] or  7 am- 5 pm via Perfect serve or cell phone  Dr.Sudhir Pruett, 
Consult Placed     Who: [facility]  Date: 7/28/25  Time: 0857     Electronically signed by Tammy Her RN on 7/28/2025 at 8:57 AM   
patient/family. Alternatives to my treatment were discussed. The note was completed using EMR. Every effort was made to ensure accuracy; however, inadvertent computerized transcription errors may be present.    Rm Benito MD, [place], [facility], [facility]  [phone] [facility]  [phone] [facility]  7/26/2025  8:18 AM

## 2025-07-28 NOTE — CARE COORDINATION
Case Management Assessment  Initial Evaluation    Date/Time of Evaluation: 7/28/2025 3:55 PM  Assessment Completed by: TAMIKO Doan    If patient is discharged prior to next notation, then this note serves as note for discharge by case management.    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 Yes    Chart Reviewed: Yes      History Provided by: Patient, Medical Record  Patient Orientation: Alert and Oriented, Person, Place, Situation, Self    Patient Cognition: Alert    Hospitalization in the last 30 days (Readmission):  Yes    If yes, Readmission Assessment in CM Navigator will be completed.    Advance Directives:      Code Status: Full Code   Patient's Primary Decision Maker is: Named in Scanned ACP Document    Primary Decision Maker (Active): Elma Crow - Spouse - [phone]    Discharge Planning:    Patient lives with: Spouse/Significant Other Type of Home: House  Primary Care Giver: Self  Patient Support Systems include: Spouse/Significant Other   Current Financial resources: Medicare  Current community resources: None  Current services prior to admission: None            Current DME: [employer]            Type of Home Care services:

## 2025-07-29 LAB
ALBUMIN SERPL-MCNC: 3.3 G/DL (ref 3.4–5)
ANION GAP SERPL CALCULATED.3IONS-SCNC: 8 MMOL/L (ref 3–16)
BUN SERPL-MCNC: 32 MG/DL (ref 7–20)
CALCIUM SERPL-MCNC: 8.2 MG/DL (ref 8.3–10.6)
CHLORIDE SERPL-SCNC: 100 MMOL/L (ref 99–110)
CO2 SERPL-SCNC: 31 MMOL/L (ref 21–32)
CREAT SERPL-MCNC: 1.5 MG/DL (ref 0.8–1.3)
DEPRECATED RDW RBC AUTO: 15.7 % (ref 12.4–15.4)
FERRITIN SERPL IA-MCNC: 881 NG/ML (ref 30–400)
GFR SERPLBLD CREATININE-BSD FMLA CKD-EPI: 45 ML/MIN/{1.73_M2}
GLUCOSE SERPL-MCNC: 131 MG/DL (ref 70–99)
HCT VFR BLD AUTO: 27.9 % (ref 40.5–52.5)
HGB BLD-MCNC: 9.5 G/DL (ref 13.5–17.5)
IRON SATN MFR SERPL: 21 % (ref 20–50)
IRON SERPL-MCNC: 36 UG/DL (ref 59–158)
MAGNESIUM SERPL-MCNC: 1.88 MG/DL (ref 1.8–2.4)
MCH RBC QN AUTO: 31.6 PG (ref 26–34)
MCHC RBC AUTO-ENTMCNC: 34.2 G/DL (ref 31–36)
MCV RBC AUTO: 92.6 FL (ref 80–100)
PHOSPHATE SERPL-MCNC: 2.2 MG/DL (ref 2.5–4.9)
PLATELET # BLD AUTO: 146 K/UL (ref 135–450)
PMV BLD AUTO: 8.5 FL (ref 5–10.5)
POTASSIUM SERPL-SCNC: 4 MMOL/L (ref 3.5–5.1)
RBC # BLD AUTO: 3.02 M/UL (ref 4.2–5.9)
SODIUM SERPL-SCNC: 139 MMOL/L (ref 136–145)
TIBC SERPL-MCNC: 171 UG/DL (ref 260–445)
WBC # BLD AUTO: 4 K/UL (ref 4–11)

## 2025-07-29 PROCEDURE — 2500000003 HC RX 250 WO HCPCS: Performed by: STUDENT IN AN ORGANIZED HEALTH CARE EDUCATION/TRAINING PROGRAM

## 2025-07-29 PROCEDURE — 83735 ASSAY OF MAGNESIUM: CPT

## 2025-07-29 PROCEDURE — 83540 ASSAY OF IRON: CPT

## 2025-07-29 PROCEDURE — 2060000000 HC ICU INTERMEDIATE R&B

## 2025-07-29 PROCEDURE — 99232 SBSQ HOSP IP/OBS MODERATE 35: CPT | Performed by: NURSE PRACTITIONER

## 2025-07-29 PROCEDURE — 2580000003 HC RX 258: Performed by: INTERNAL MEDICINE

## 2025-07-29 PROCEDURE — 2700000000 HC OXYGEN THERAPY PER DAY

## 2025-07-29 PROCEDURE — 94761 N-INVAS EAR/PLS OXIMETRY MLT: CPT

## 2025-07-29 PROCEDURE — 6360000002 HC RX W HCPCS: Performed by: INTERNAL MEDICINE

## 2025-07-29 PROCEDURE — 94660 CPAP INITIATION&MGMT: CPT

## 2025-07-29 PROCEDURE — 6370000000 HC RX 637 (ALT 250 FOR IP): Performed by: INTERNAL MEDICINE

## 2025-07-29 PROCEDURE — 6370000000 HC RX 637 (ALT 250 FOR IP): Performed by: NURSE PRACTITIONER

## 2025-07-29 PROCEDURE — 6370000000 HC RX 637 (ALT 250 FOR IP): Performed by: STUDENT IN AN ORGANIZED HEALTH CARE EDUCATION/TRAINING PROGRAM

## 2025-07-29 PROCEDURE — 97110 THERAPEUTIC EXERCISES: CPT

## 2025-07-29 PROCEDURE — 85027 COMPLETE CBC AUTOMATED: CPT

## 2025-07-29 PROCEDURE — 82728 ASSAY OF FERRITIN: CPT

## 2025-07-29 PROCEDURE — 2500000003 HC RX 250 WO HCPCS: Performed by: INTERNAL MEDICINE

## 2025-07-29 PROCEDURE — 36415 COLL VENOUS BLD VENIPUNCTURE: CPT

## 2025-07-29 PROCEDURE — 83550 IRON BINDING TEST: CPT

## 2025-07-29 PROCEDURE — 80069 RENAL FUNCTION PANEL: CPT

## 2025-07-29 PROCEDURE — 97530 THERAPEUTIC ACTIVITIES: CPT

## 2025-07-29 RX ORDER — SACUBITRIL AND VALSARTAN 24; 26 MG/1; MG/1
0.5 TABLET, FILM COATED ORAL 2 TIMES DAILY
Status: DISCONTINUED | OUTPATIENT
Start: 2025-07-29 | End: 2025-07-30

## 2025-07-29 RX ORDER — FUROSEMIDE 10 MG/ML
60 INJECTION INTRAMUSCULAR; INTRAVENOUS 3 TIMES DAILY
Status: DISCONTINUED | OUTPATIENT
Start: 2025-07-29 | End: 2025-07-30

## 2025-07-29 RX ORDER — SPIRONOLACTONE 25 MG/1
25 TABLET ORAL DAILY
Status: DISCONTINUED | OUTPATIENT
Start: 2025-07-30 | End: 2025-07-31 | Stop reason: HOSPADM

## 2025-07-29 RX ADMIN — SACUBITRIL AND VALSARTAN 0.5 TABLET: 24; 26 TABLET, FILM COATED ORAL at 10:21

## 2025-07-29 RX ADMIN — PANTOPRAZOLE SODIUM 40 MG: 40 TABLET, DELAYED RELEASE ORAL at 05:11

## 2025-07-29 RX ADMIN — APIXABAN 2.5 MG: 2.5 TABLET, FILM COATED ORAL at 10:21

## 2025-07-29 RX ADMIN — Medication 10 ML: at 10:22

## 2025-07-29 RX ADMIN — SACUBITRIL AND VALSARTAN 0.5 TABLET: 24; 26 TABLET, FILM COATED ORAL at 19:51

## 2025-07-29 RX ADMIN — TAMSULOSIN HYDROCHLORIDE 0.4 MG: 0.4 CAPSULE ORAL at 10:21

## 2025-07-29 RX ADMIN — METOPROLOL SUCCINATE 50 MG: 50 TABLET, EXTENDED RELEASE ORAL at 10:21

## 2025-07-29 RX ADMIN — ATORVASTATIN CALCIUM 40 MG: 40 TABLET, FILM COATED ORAL at 19:51

## 2025-07-29 RX ADMIN — CLOPIDOGREL BISULFATE 75 MG: 75 TABLET, FILM COATED ORAL at 10:21

## 2025-07-29 RX ADMIN — FUROSEMIDE 60 MG: 10 INJECTION, SOLUTION INTRAMUSCULAR; INTRAVENOUS at 15:21

## 2025-07-29 RX ADMIN — APIXABAN 2.5 MG: 2.5 TABLET, FILM COATED ORAL at 19:51

## 2025-07-29 RX ADMIN — POTASSIUM PHOSPHATE, MONOBASIC POTASSIUM PHOSPHATE, DIBASIC 20 MMOL: 224; 236 INJECTION, SOLUTION, CONCENTRATE INTRAVENOUS at 11:52

## 2025-07-29 RX ADMIN — Medication 10 ML: at 19:52

## 2025-07-29 RX ADMIN — FUROSEMIDE 60 MG: 10 INJECTION, SOLUTION INTRAMUSCULAR; INTRAVENOUS at 19:51

## 2025-07-29 RX ADMIN — ALLOPURINOL 100 MG: 100 TABLET ORAL at 10:21

## 2025-07-29 RX ADMIN — FUROSEMIDE 60 MG: 10 INJECTION, SOLUTION INTRAMUSCULAR; INTRAVENOUS at 10:20

## 2025-07-29 ASSESSMENT — PAIN SCALES - GENERAL
PAINLEVEL_OUTOF10: 0
PAINLEVEL_OUTOF10: 0

## 2025-07-29 NOTE — PLAN OF CARE
CHF Care Plan      Patient's EF (Ejection Fraction) is less than 40%    Heart Failure Medications:  Diuretics:: Furosemide    (One of the following REQUIRED for EF </= 40%/SYSTOLIC FAILURE but MAY be used in EF% >40%/DIASTOLIC FAILURE)        ACE:: None        ARB:: None         ARNI:: Sacubitril/Valsartan-Entresto    (Beta Blockers)  NON- Evidenced Based Beta Blocker (for EF% >40%/DIASTOLIC FAILURE): None    Evidenced Based Beta Blocker::(REQUIRED for EF% <40%/SYSTOLIC FAILURE) Metoprolol SUCCinate- Toprol XL  ...................................................................................................................................................    Failed to redirect to the Timeline version of the [employer] SmartLink.      Patient's weights and intake/output reviewed    Patient's current weight today shows a difference of 0 lbs less than last documented weight.      Intake/Output Summary (Last 24 hours) at 7/29/2025 1850  Last data filed at 7/29/2025 1828  Gross per 24 hour   Intake 1432 ml   Output 2300 ml   Net -868 ml       Education Booklet Provided: yes    Comorbidities Reviewed Yes    Patient has a past medical history of Aortic aneurysm, abdominal, Appendicitis, Arthritis, CAD (coronary artery disease), CHF (congestive heart failure) (HCC), Chronic back pain, Chronic pain of both shoulders, Complication of anesthesia, Gout, Hernia, Hyperlipidemia, Hypertension, Measles, SUNDEEP (obstructive sleep apnea), Pancreatitis, Sinus disorder, and Sleep deprivation.     >>For CHF and Comorbidity documentation on Education Time and Topics, please see Education Tab      CHF Education    Learners:  Patient  Readineess:   Acceptance  Method:   Explanation  Response:   Verbalizes Understanding  Comments: Educated pt on elevation of limbs to assist in reducing BLE edema    Time Spent: 5 minutes      Pt sitting in bed at this time on 5 L O2. Pt with complaints of shortness of breath. Pt without lower extremity edema.

## 2025-07-29 NOTE — CARE COORDINATION
Chart reviewed, pt discussed during huddle with primary RN/Tammy.  Cardio, Nephro, IM involved.  Pt on IV lasix, 5L/NC, baseline is room air.  Discharge plan has been to return home, adding [facility].  CM will continue to follow pt's progress and coordinate discharge arrangements as appropriate.  ARGELIA Groves-FRAN

## 2025-07-29 NOTE — PLAN OF CARE
CHF Care Plan      Patient's EF (Ejection Fraction) is less than 40%    Heart Failure Medications:  Diuretics:: Furosemide and Spironolactone    (One of the following REQUIRED for EF </= 40%/SYSTOLIC FAILURE but MAY be used in EF% >40%/DIASTOLIC FAILURE)        ACE:: None        ARB:: None         ARNI:: None    (Beta Blockers)  NON- Evidenced Based Beta Blocker (for EF% >40%/DIASTOLIC FAILURE): None    Evidenced Based Beta Blocker::(REQUIRED for EF% <40%/SYSTOLIC FAILURE) Metoprolol SUCCinate- Toprol XL  ...................................................................................................................................................    Failed to redirect to the Timeline version of the [employer] SmartLink.      Patient's weights and intake/output reviewed    Patient's current weight today shows a difference of 1 lbs less than last documented weight.      Intake/Output Summary (Last 24 hours) at 7/29/2025 0531  Last data filed at 7/29/2025 0500  Gross per 24 hour   Intake 1510 ml   Output 1800 ml   Net -290 ml       Education Booklet Provided: yes    Comorbidities Reviewed Yes    Patient has a past medical history of Aortic aneurysm, abdominal, Appendicitis, Arthritis, Bruises easily, CAD (coronary artery disease), CHF (congestive heart failure) (HCC), Chronic back pain, Chronic pain of both shoulders, Complication of anesthesia, Gout, Heart disease, Hernia, HIGH CHOLESTEROL, Hyperlipidemia, Hypertension, Measles, SUNDEEP (obstructive sleep apnea), Pancreatitis, Persistent cough, Ringing in ears, Sinus disorder, and Sleep deprivation.     >>For CHF and Comorbidity documentation on Education Time and Topics, please see Education Tab      CHF Education    Learners:  Patient  Readineess:   Acceptance  Method:   Explanation and Handout  Response:   Verbalizes Understanding  Comments:     Time Spent: 5 minutes      Pt resting in bed at this time on BiPAP. Pt with complaints of shortness of breath. Pt with pitting

## 2025-07-30 LAB
ALBUMIN SERPL-MCNC: 3.3 G/DL (ref 3.4–5)
ANION GAP SERPL CALCULATED.3IONS-SCNC: 13 MMOL/L (ref 3–16)
BUN SERPL-MCNC: 28 MG/DL (ref 7–20)
CALCIUM SERPL-MCNC: 8.6 MG/DL (ref 8.3–10.6)
CHLORIDE SERPL-SCNC: 98 MMOL/L (ref 99–110)
CO2 SERPL-SCNC: 31 MMOL/L (ref 21–32)
CREAT SERPL-MCNC: 1.2 MG/DL (ref 0.8–1.3)
DEPRECATED RDW RBC AUTO: 16 % (ref 12.4–15.4)
GFR SERPLBLD CREATININE-BSD FMLA CKD-EPI: 59 ML/MIN/{1.73_M2}
GLUCOSE SERPL-MCNC: 131 MG/DL (ref 70–99)
HCT VFR BLD AUTO: 32.7 % (ref 40.5–52.5)
HGB BLD-MCNC: 11.2 G/DL (ref 13.5–17.5)
MAGNESIUM SERPL-MCNC: 1.61 MG/DL (ref 1.8–2.4)
MCH RBC QN AUTO: 31.6 PG (ref 26–34)
MCHC RBC AUTO-ENTMCNC: 34.2 G/DL (ref 31–36)
MCV RBC AUTO: 92.5 FL (ref 80–100)
NT-PROBNP SERPL-MCNC: 6635 PG/ML (ref 0–449)
PHOSPHATE SERPL-MCNC: 3.4 MG/DL (ref 2.5–4.9)
PLATELET # BLD AUTO: 191 K/UL (ref 135–450)
PLATELET BLD QL SMEAR: ADEQUATE
PMV BLD AUTO: 8.4 FL (ref 5–10.5)
POTASSIUM SERPL-SCNC: 3.5 MMOL/L (ref 3.5–5.1)
RBC # BLD AUTO: 3.54 M/UL (ref 4.2–5.9)
SLIDE REVIEW: ABNORMAL
SODIUM SERPL-SCNC: 142 MMOL/L (ref 136–145)
TSH SERPL DL<=0.005 MIU/L-ACNC: 1.34 UIU/ML (ref 0.27–4.2)
WBC # BLD AUTO: 4.4 K/UL (ref 4–11)

## 2025-07-30 PROCEDURE — 36415 COLL VENOUS BLD VENIPUNCTURE: CPT

## 2025-07-30 PROCEDURE — 1200000000 HC SEMI PRIVATE

## 2025-07-30 PROCEDURE — 6360000002 HC RX W HCPCS: Performed by: INTERNAL MEDICINE

## 2025-07-30 PROCEDURE — 6370000000 HC RX 637 (ALT 250 FOR IP): Performed by: INTERNAL MEDICINE

## 2025-07-30 PROCEDURE — 94660 CPAP INITIATION&MGMT: CPT

## 2025-07-30 PROCEDURE — 97116 GAIT TRAINING THERAPY: CPT

## 2025-07-30 PROCEDURE — 2500000003 HC RX 250 WO HCPCS: Performed by: STUDENT IN AN ORGANIZED HEALTH CARE EDUCATION/TRAINING PROGRAM

## 2025-07-30 PROCEDURE — 2700000000 HC OXYGEN THERAPY PER DAY

## 2025-07-30 PROCEDURE — 2060000000 HC ICU INTERMEDIATE R&B

## 2025-07-30 PROCEDURE — 6370000000 HC RX 637 (ALT 250 FOR IP): Performed by: NURSE PRACTITIONER

## 2025-07-30 PROCEDURE — 80069 RENAL FUNCTION PANEL: CPT

## 2025-07-30 PROCEDURE — 6370000000 HC RX 637 (ALT 250 FOR IP): Performed by: STUDENT IN AN ORGANIZED HEALTH CARE EDUCATION/TRAINING PROGRAM

## 2025-07-30 PROCEDURE — 97530 THERAPEUTIC ACTIVITIES: CPT

## 2025-07-30 PROCEDURE — 94761 N-INVAS EAR/PLS OXIMETRY MLT: CPT

## 2025-07-30 PROCEDURE — 99232 SBSQ HOSP IP/OBS MODERATE 35: CPT | Performed by: NURSE PRACTITIONER

## 2025-07-30 PROCEDURE — 85027 COMPLETE CBC AUTOMATED: CPT

## 2025-07-30 PROCEDURE — 83735 ASSAY OF MAGNESIUM: CPT

## 2025-07-30 PROCEDURE — 83880 ASSAY OF NATRIURETIC PEPTIDE: CPT

## 2025-07-30 RX ORDER — SACUBITRIL AND VALSARTAN 24; 26 MG/1; MG/1
1 TABLET, FILM COATED ORAL 2 TIMES DAILY
Status: DISCONTINUED | OUTPATIENT
Start: 2025-07-30 | End: 2025-07-31 | Stop reason: HOSPADM

## 2025-07-30 RX ORDER — TORSEMIDE 20 MG/1
20 TABLET ORAL DAILY
Status: DISCONTINUED | OUTPATIENT
Start: 2025-07-31 | End: 2025-07-31 | Stop reason: HOSPADM

## 2025-07-30 RX ORDER — POTASSIUM CHLORIDE 1500 MG/1
40 TABLET, EXTENDED RELEASE ORAL 3 TIMES DAILY
Status: COMPLETED | OUTPATIENT
Start: 2025-07-30 | End: 2025-07-30

## 2025-07-30 RX ORDER — MAGNESIUM SULFATE IN WATER 40 MG/ML
2000 INJECTION, SOLUTION INTRAVENOUS ONCE
Status: COMPLETED | OUTPATIENT
Start: 2025-07-30 | End: 2025-07-30

## 2025-07-30 RX ORDER — FUROSEMIDE 10 MG/ML
40 INJECTION INTRAMUSCULAR; INTRAVENOUS 3 TIMES DAILY
Status: COMPLETED | OUTPATIENT
Start: 2025-07-30 | End: 2025-07-30

## 2025-07-30 RX ADMIN — PANTOPRAZOLE SODIUM 40 MG: 40 TABLET, DELAYED RELEASE ORAL at 04:11

## 2025-07-30 RX ADMIN — Medication 10 ML: at 11:24

## 2025-07-30 RX ADMIN — Medication 10 ML: at 20:15

## 2025-07-30 RX ADMIN — CLOPIDOGREL BISULFATE 75 MG: 75 TABLET, FILM COATED ORAL at 10:03

## 2025-07-30 RX ADMIN — ATORVASTATIN CALCIUM 40 MG: 40 TABLET, FILM COATED ORAL at 20:11

## 2025-07-30 RX ADMIN — APIXABAN 2.5 MG: 2.5 TABLET, FILM COATED ORAL at 20:11

## 2025-07-30 RX ADMIN — POTASSIUM CHLORIDE 40 MEQ: 1500 TABLET, EXTENDED RELEASE ORAL at 11:21

## 2025-07-30 RX ADMIN — TAMSULOSIN HYDROCHLORIDE 0.4 MG: 0.4 CAPSULE ORAL at 09:56

## 2025-07-30 RX ADMIN — ALLOPURINOL 100 MG: 100 TABLET ORAL at 09:57

## 2025-07-30 RX ADMIN — SACUBITRIL AND VALSARTAN 0.5 TABLET: 24; 26 TABLET, FILM COATED ORAL at 09:56

## 2025-07-30 RX ADMIN — FUROSEMIDE 60 MG: 10 INJECTION, SOLUTION INTRAMUSCULAR; INTRAVENOUS at 15:18

## 2025-07-30 RX ADMIN — FUROSEMIDE 40 MG: 10 INJECTION, SOLUTION INTRAMUSCULAR; INTRAVENOUS at 20:13

## 2025-07-30 RX ADMIN — SACUBITRIL AND VALSARTAN 1 TABLET: 24; 26 TABLET, FILM COATED ORAL at 20:11

## 2025-07-30 RX ADMIN — POTASSIUM CHLORIDE 40 MEQ: 1500 TABLET, EXTENDED RELEASE ORAL at 18:42

## 2025-07-30 RX ADMIN — SPIRONOLACTONE 25 MG: 25 TABLET, FILM COATED ORAL at 09:57

## 2025-07-30 RX ADMIN — MAGNESIUM SULFATE HEPTAHYDRATE 2000 MG: 40 INJECTION, SOLUTION INTRAVENOUS at 11:24

## 2025-07-30 RX ADMIN — FUROSEMIDE 60 MG: 10 INJECTION, SOLUTION INTRAMUSCULAR; INTRAVENOUS at 10:03

## 2025-07-30 RX ADMIN — METOPROLOL SUCCINATE 50 MG: 50 TABLET, EXTENDED RELEASE ORAL at 09:57

## 2025-07-30 RX ADMIN — APIXABAN 2.5 MG: 2.5 TABLET, FILM COATED ORAL at 09:57

## 2025-07-30 ASSESSMENT — PAIN SCALES - GENERAL
PAINLEVEL_OUTOF10: 0

## 2025-07-30 NOTE — FLOWSHEET NOTE
07/30/25 1003   Oxygen Therapy   SpO2 96 %   O2 Device None (Room air)     Patient weaned to room air at this time from 1L nasal cannula. Patient tolerating well at this time, denies SOB and O2 saturation remains 96%. Patient on continuous SPO2 and will monitor need to reapply O2.

## 2025-07-30 NOTE — PLAN OF CARE
CHF Care Plan      Patient's EF (Ejection Fraction) is less than 40%    Heart Failure Medications:  Diuretics:: Furosemide and Spironolactone    (One of the following REQUIRED for EF </= 40%/SYSTOLIC FAILURE but MAY be used in EF% >40%/DIASTOLIC FAILURE)        ACE:: None        ARB:: None         ARNI:: Sacubitril/Valsartan-Entresto    (Beta Blockers)  NON- Evidenced Based Beta Blocker (for EF% >40%/DIASTOLIC FAILURE): None    Evidenced Based Beta Blocker::(REQUIRED for EF% <40%/SYSTOLIC FAILURE) Metoprolol SUCCinate- Toprol XL  ...................................................................................................................................................    Failed to redirect to the Timeline version of the [employer] SmartLink.      Patient's weights and intake/output reviewed    Patient's current weight today shows a difference of 5 lbs less than last documented weight.      Intake/Output Summary (Last 24 hours) at 7/30/2025 0455  Last data filed at 7/30/2025 0450  Gross per 24 hour   Intake 2153.12 ml   Output 6400 ml   Net -4246.88 ml       Education Booklet Provided: yes    Comorbidities Reviewed Yes    Patient has a past medical history of Aortic aneurysm, abdominal, Appendicitis, Arthritis, CAD (coronary artery disease), CHF (congestive heart failure) (HCC), Chronic back pain, Chronic pain of both shoulders, Complication of anesthesia, Gout, Hernia, Hyperlipidemia, Hypertension, Measles, SUNDEEP (obstructive sleep apnea), Pancreatitis, Sinus disorder, and Sleep deprivation.     >>For CHF and Comorbidity documentation on Education Time and Topics, please see Education Tab      CHF Education    Learners:  Patient  Readineess:   Acceptance  Method:   Explanation and Handout  Response:   Verbalizes Understanding  Comments:     Time Spent: 5 minutes      Pt sitting in bed at this time on 1 L O2. Pt denies shortness of breath. Pt without lower extremity edema.     Patient and/or Family's stated Goal of

## 2025-07-30 NOTE — PLAN OF CARE
Problem: Chronic Conditions and Co-morbidities  Goal: Patient's chronic conditions and co-morbidity symptoms are monitored and maintained or improved  7/30/2025 1128 by Diamond Santos RN  Outcome: Progressing   CHF Care Plan      Patient's EF (Ejection Fraction) is less than 40%    Heart Failure Medications:  Diuretics:: Furosemide and Spironolactone    (One of the following REQUIRED for EF </= 40%/SYSTOLIC FAILURE but MAY be used in EF% >40%/DIASTOLIC FAILURE)        ACE:: None        ARB:: None         ARNI:: Sacubitril/Valsartan-Entresto    (Beta Blockers)  NON- Evidenced Based Beta Blocker (for EF% >40%/DIASTOLIC FAILURE): None    Evidenced Based Beta Blocker::(REQUIRED for EF% <40%/SYSTOLIC FAILURE) Metoprolol SUCCinate- Toprol XL  ...................................................................................................................................................    Failed to redirect to the Timeline version of the [employer] SmartLink.      Patient's weights and intake/output reviewed    Patient's current weight today shows a difference of 5 lbs less than last documented weight.      Intake/Output Summary (Last 24 hours) at 7/30/2025 1128  Last data filed at 7/30/2025 0450  Gross per 24 hour   Intake 2143.12 ml   Output 5300 ml   Net -3156.88 ml       Education Booklet Provided: yes    Comorbidities Reviewed Yes    Patient has a past medical history of Aortic aneurysm, abdominal, Appendicitis, Arthritis, CAD (coronary artery disease), CHF (congestive heart failure) (HCC), Chronic back pain, Chronic pain of both shoulders, Complication of anesthesia, Gout, Hernia, Hyperlipidemia, Hypertension, Measles, SUNDEEP (obstructive sleep apnea), Pancreatitis, Sinus disorder, and Sleep deprivation.     >>For CHF and Comorbidity documentation on Education Time and Topics, please see Education Tab      CHF Education    Learners:  Patient  Readineess:   Eager  Method:   Explanation  Response:   Verbalizes

## 2025-07-31 VITALS
RESPIRATION RATE: 18 BRPM | HEART RATE: 66 BPM | SYSTOLIC BLOOD PRESSURE: 127 MMHG | TEMPERATURE: 98.2 F | DIASTOLIC BLOOD PRESSURE: 71 MMHG | OXYGEN SATURATION: 93 % | WEIGHT: 150.4 LBS | HEIGHT: 69 IN | BODY MASS INDEX: 22.28 KG/M2

## 2025-07-31 LAB
ALBUMIN SERPL-MCNC: 3.6 G/DL (ref 3.4–5)
ANION GAP SERPL CALCULATED.3IONS-SCNC: 10 MMOL/L (ref 3–16)
BUN SERPL-MCNC: 30 MG/DL (ref 7–20)
CALCIUM SERPL-MCNC: 9 MG/DL (ref 8.3–10.6)
CHLORIDE SERPL-SCNC: 97 MMOL/L (ref 99–110)
CO2 SERPL-SCNC: 33 MMOL/L (ref 21–32)
CREAT SERPL-MCNC: 1.3 MG/DL (ref 0.8–1.3)
DEPRECATED RDW RBC AUTO: 15.8 % (ref 12.4–15.4)
GFR SERPLBLD CREATININE-BSD FMLA CKD-EPI: 54 ML/MIN/{1.73_M2}
GLUCOSE SERPL-MCNC: 134 MG/DL (ref 70–99)
HCT VFR BLD AUTO: 35.3 % (ref 40.5–52.5)
HGB BLD-MCNC: 11.8 G/DL (ref 13.5–17.5)
MAGNESIUM SERPL-MCNC: 1.92 MG/DL (ref 1.8–2.4)
MCH RBC QN AUTO: 31.1 PG (ref 26–34)
MCHC RBC AUTO-ENTMCNC: 33.5 G/DL (ref 31–36)
MCV RBC AUTO: 92.8 FL (ref 80–100)
PHOSPHATE SERPL-MCNC: 2.8 MG/DL (ref 2.5–4.9)
PLATELET # BLD AUTO: 246 K/UL (ref 135–450)
PMV BLD AUTO: 8.3 FL (ref 5–10.5)
POTASSIUM SERPL-SCNC: 3.8 MMOL/L (ref 3.5–5.1)
RBC # BLD AUTO: 3.8 M/UL (ref 4.2–5.9)
SODIUM SERPL-SCNC: 140 MMOL/L (ref 136–145)
WBC # BLD AUTO: 5.9 K/UL (ref 4–11)

## 2025-07-31 PROCEDURE — 99232 SBSQ HOSP IP/OBS MODERATE 35: CPT | Performed by: NURSE PRACTITIONER

## 2025-07-31 PROCEDURE — 80069 RENAL FUNCTION PANEL: CPT

## 2025-07-31 PROCEDURE — 36415 COLL VENOUS BLD VENIPUNCTURE: CPT

## 2025-07-31 PROCEDURE — 6370000000 HC RX 637 (ALT 250 FOR IP): Performed by: STUDENT IN AN ORGANIZED HEALTH CARE EDUCATION/TRAINING PROGRAM

## 2025-07-31 PROCEDURE — 2500000003 HC RX 250 WO HCPCS: Performed by: STUDENT IN AN ORGANIZED HEALTH CARE EDUCATION/TRAINING PROGRAM

## 2025-07-31 PROCEDURE — 6370000000 HC RX 637 (ALT 250 FOR IP): Performed by: NURSE PRACTITIONER

## 2025-07-31 PROCEDURE — 85027 COMPLETE CBC AUTOMATED: CPT

## 2025-07-31 PROCEDURE — 83735 ASSAY OF MAGNESIUM: CPT

## 2025-07-31 PROCEDURE — 6370000000 HC RX 637 (ALT 250 FOR IP): Performed by: INTERNAL MEDICINE

## 2025-07-31 RX ORDER — TORSEMIDE 20 MG/1
20 TABLET ORAL DAILY
Qty: 30 TABLET | Refills: 3 | Status: SHIPPED | OUTPATIENT
Start: 2025-08-01

## 2025-07-31 RX ORDER — SPIRONOLACTONE 25 MG/1
25 TABLET ORAL DAILY
Qty: 30 TABLET | Refills: 3 | Status: SHIPPED | OUTPATIENT
Start: 2025-08-01

## 2025-07-31 RX ORDER — SACUBITRIL AND VALSARTAN 24; 26 MG/1; MG/1
1 TABLET, FILM COATED ORAL 2 TIMES DAILY
Qty: 180 TABLET | Refills: 3 | Status: SHIPPED | OUTPATIENT
Start: 2025-07-31

## 2025-07-31 RX ADMIN — TAMSULOSIN HYDROCHLORIDE 0.4 MG: 0.4 CAPSULE ORAL at 09:17

## 2025-07-31 RX ADMIN — CLOPIDOGREL BISULFATE 75 MG: 75 TABLET, FILM COATED ORAL at 09:17

## 2025-07-31 RX ADMIN — TORSEMIDE 20 MG: 20 TABLET ORAL at 09:19

## 2025-07-31 RX ADMIN — SPIRONOLACTONE 25 MG: 25 TABLET, FILM COATED ORAL at 09:18

## 2025-07-31 RX ADMIN — METOPROLOL SUCCINATE 50 MG: 50 TABLET, EXTENDED RELEASE ORAL at 09:18

## 2025-07-31 RX ADMIN — APIXABAN 5 MG: 5 TABLET, FILM COATED ORAL at 09:17

## 2025-07-31 RX ADMIN — SACUBITRIL AND VALSARTAN 1 TABLET: 24; 26 TABLET, FILM COATED ORAL at 09:17

## 2025-07-31 RX ADMIN — PANTOPRAZOLE SODIUM 40 MG: 40 TABLET, DELAYED RELEASE ORAL at 09:22

## 2025-07-31 RX ADMIN — ALLOPURINOL 100 MG: 100 TABLET ORAL at 09:18

## 2025-07-31 RX ADMIN — Medication 10 ML: at 09:19

## 2025-07-31 NOTE — PROGRESS NOTES
Ph: [phone], Fax: [phone]           [facility]               Reason for admission:                 Shortness of breath    Brief Summary :     Bill Crow is being seen by nephrology for KEON and fluid overload  .  He is known to us from past admission and presented with shortness of breath.      Interval History and plan:        Chest x-ray shows persistent right lower lung airspace disease/he also had pneumonia last hospitalization  Seen in room  Blood pressure is excellent  Oxygen requirement down to between 90 to 3 L/min was 14 L/min on admission  Weight came down significantly today by 5 pounds since yesterday  Getting IV Lasix 60 mg 3 times a day,  With that potassium 3.5    Has no pedal edema  On RA when I saw  And made 1.2 L already today  Plan:    Discontinued nifedipine from outpatient medication list as well  Was transition to amlodipine now discontinued to enable GDMT is to be used  Continue spironolactone  Increase the Entresto to full dose  Agree with  potassium supplement    Will change iv lasix to 40 mg   And for am switch to torsemide    He used to be on furosemide 20 mg a day  Held last admission due to KEON, pneumonia    He will need at least 20 mg of torsemide at DC  Daily wt  Close follow up upon DC                          Assessment :     Acute Kidney Injury  Creatinine 1.5 now  Lower than 1.6 during last hospitalization  During last hospitalization  Prior to that his creatinine today was 1-1.2 in 2023 and 2024        Hypertension   BP: (127-134)/(51-56)  Pulse:  [62-63]   BP goal inpatient 130-140 systolic inpatient     HFrEF  Echo from 7/25 shows EF of 35 to 40%, moderate tricuspid regurgitation, moderate pulmonary hypertension, severely dilated left atrium, also mildly dilated ascending aorta    [facility] Nephrology would like to thank Albert Norton MD   for opportunity to serve this patient      Please call with questions at-   24 Hrs Answering service 
       Ph: [phone], Fax: [phone]           [facility] [place]               Reason for admission:                 Shortness of breath    Brief Summary :     Bill Crow is being seen by nephrology for KEON and fluid overload  .  He is known to us from past admission and presented with shortness of breath.      Interval History and plan:        Chest x-ray shows persistent right lower lung airspace disease/he also had pneumonia last hospitalization  Seen in room  Blood pressure is excellent  Still on 5 L of oxygen per minute  Peak was 14 L on arrival  Weight came down by 1 pound since yesterday but not sure if accurate since it is a great weight    Has bilateral lungs crackles more on the left side    Has a male [employer]ick admit only 1.8 L of urine yesterday    Plan:    Discontinued nifedipine  On amlodipine-will discontinue now since goal is to optimize on GDMT s   Start on Entresto only half pill twice daily for today and gradually go up  Increase the dose of spironolactone to 25 mg  a day     Need to check a standing weight since bed weight can be inaccurate    Due to significant requirement in the oxygen and no significant improvement in weight will increase the Lasix to 3 times daily  If blood pressure drops with boluses will need to switch to drip                   Assessment :     Acute Kidney Injury  Creatinine 1.5 now  Lower than 1.6 during last hospitalization  During last hospitalization  Prior to that his creatinine today was 1-1.2 in 2023 and 2024        Hypertension   BP: (125-130)/(70)  Pulse:  [62-72]   BP goal inpatient 130-140 systolic inpatient     HFrEF  Echo from 7/25 shows EF of 35 to 40%, moderate tricuspid regurgitation, moderate pulmonary hypertension, severely dilated left atrium, also mildly dilated ascending aorta    [facility] Nephrology would like to thank Albert Norton MD   for opportunity to serve this patient      Please call with questions at-   24 Hrs Answering 
      [facility] Medicine Progress Note  V 7.24      Date of Admission: 7/25/2025    Hospital Day: 3      Chief Admission Complaint:  \"SOB\"    Subjective:  no new c/o    Presenting Admission History:       \"84 y.o. male who presented to [facility] with shortness of breath. Patient reports he was recently admitted to the hospital for pneumonia and treated and discharged. He states that at home he was feeling worsening shortness of breath particularly when he would lay down flat. This is the reason for presentation today. He denies any fevers or chest pain. No nausea, vomiting or diarrhea. Presents [facility] private residence.  PMHx significant for HTN, HLD, DM2, CAD s/p CABG in 2008 and stenting (most recently to his distal LAD by way of his LIMA in 8/2023), ischemic cardiomyopathy with EF 40-45%, CHF, SUNDEEP on CPAP, severe pulmonary HTN, Afib/flutter, SSS s/p pacer in 8/2023, endovascular AAA repair in 2011, and CVA in 9/2022\"     Assessment/Plan:        CHF - acute on chronic systolic w/ reduced EF 35% by Echo dated July 2023.  Likely due to Ischemic heart disease. Continue diuresis as currently ordered w/ close monitoring of BUN/Creatinine and electrolytes for ADR.  Continue current medical management and follow input and output as well as daily weights as recorded and clinical response. Estimated dry weight 160 pounds.      Acute Respiratory Failure - w/ hypoxia, progressive since admission, likely 2nd to above.  Presence of clinical respiratory distress w/ tachypnea/dyspnea/SOB and wheezing w/ use of accessory muscles to breath - Started on BiPap 26 July.  On supplemental O2 as ordered and wean as tolerated.     HTN w/ CAD - w/ known CAD s/p prior CABG 2008 but no evidence of or active signs and/or symptoms of ischemia and/or failure. Currently controlled on home meds w/ vitals documented and reviewed.      HypoKalemia - etiology clinically unable to determine.  Followed serial Potassium, personally 
      [facility] Medicine Progress Note  V 7.24      Date of Admission: 7/25/2025    Hospital Day: 4      Chief Admission Complaint:  \"SOB\"    Subjective:  no new c/o    Presenting Admission History:       \"84 y.o. male who presented to [facility] with shortness of breath. Patient reports he was recently admitted to the hospital for pneumonia and treated and discharged. He states that at home he was feeling worsening shortness of breath particularly when he would lay down flat. This is the reason for presentation today. He denies any fevers or chest pain. No nausea, vomiting or diarrhea. Presents [facility] private residence.  PMHx significant for HTN, HLD, DM2, CAD s/p CABG in 2008 and stenting (most recently to his distal LAD by way of his LIMA in 8/2023), ischemic cardiomyopathy with EF 40-45%, CHF, SUNDEEP on CPAP, severe pulmonary HTN, Afib/flutter, SSS s/p pacer in 8/2023, endovascular AAA repair in 2011, and CVA in 9/2022\"     Assessment/Plan:        CHF - acute on chronic systolic w/ reduced EF 35% by Echo dated July 2023.  Likely due to Ischemic heart disease. Continue diuresis as currently ordered w/ close monitoring of BUN/Creatinine and electrolytes for ADR.  Continue current medical management and follow input and output as well as daily weights as recorded and clinical response. Estimated dry weight 160 pounds.  Cardiology CONSULTED and SEEN w/ recs for - increased diuretic use    Acute Respiratory Failure - w/ hypoxia, progressive since admission, likely 2nd to above.  Presence of clinical respiratory distress w/ tachypnea/dyspnea/SOB and wheezing w/ use of accessory muscles to breath - Started on BiPap 26 July.  On supplemental O2 as ordered and wean as tolerated.     HTN w/ CAD - w/ known CAD s/p prior CABG 2008 but no evidence of or active signs and/or symptoms of ischemia and/or failure. Currently controlled on home meds w/ vitals documented and reviewed.      HypoKalemia - etiology 
      [facility] Medicine Progress Note  V 7.24      Date of Admission: 7/25/2025    Hospital Day: 2      Chief Admission Complaint:  \"SOB\"    Subjective:  no new c/o    Presenting Admission History:       \"84 y.o. male who presented to [facility] with shortness of breath. Patient reports he was recently admitted to the hospital for pneumonia and treated and discharged. He states that at home he was feeling worsening shortness of breath particularly when he would lay down flat. This is the reason for presentation today. He denies any fevers or chest pain. No nausea, vomiting or diarrhea. Presents [facility] private residence.  PMHx significant for HTN, HLD, DM2, CAD s/p CABG in 2008 and stenting (most recently to his distal LAD by way of his LIMA in 8/2023), ischemic cardiomyopathy with EF 40-45%, CHF, SUNDEEP on CPAP, severe pulmonary HTN, Afib/flutter, SSS s/p pacer in 8/2023, endovascular AAA repair in 2011, and CVA in 9/2022\"     Assessment/Plan:        CHF - acute on chronic systolic w/ reduced EF 35% by Echo dated July 2023.  Likely due to Ischemic heart disease. Continue diuresis as currently ordered w/ close monitoring of BUN/Creatinine and electrolytes for ADR.  Continue current medical management and follow input and output as well as daily weights as recorded and clinical response. Estimated dry weight 160 pounds.      HTN w/ CAD - w/ known CAD s/p prior CABG 2008 but no evidence of or active signs and/or symptoms of ischemia and/or failure. Currently controlled on home meds w/ vitals documented and reviewed.      HypoKalemia - etiology clinically unable to determine.  Followed serial Potassium, personally reviewed and documented in this note. Replaced PRN.      HypoMagnesemia - etiology clinically unable to determine.  Followed serial Magnesium, personally reviewed and documented in this note. Replaced PRN.       Troponin elevation -  due to      [] STEMI  [] NSTEMI  [x] Acute/Chronic Myocardial 
      [facility] Medicine Progress Note  V 7.24      Date of Admission: 7/25/2025    Hospital Day: 5      Chief Admission Complaint:  \"SOB\"    Subjective:  no new c/o    Presenting Admission History:       \"84 y.o. male who presented to [facility] with shortness of breath. Patient reports he was recently admitted to the hospital for pneumonia and treated and discharged. He states that at home he was feeling worsening shortness of breath particularly when he would lay down flat. This is the reason for presentation today. He denies any fevers or chest pain. No nausea, vomiting or diarrhea. Presents [facility] private residence.  PMHx significant for HTN, HLD, DM2, CAD s/p CABG in 2008 and stenting (most recently to his distal LAD by way of his LIMA in 8/2023), ischemic cardiomyopathy with EF 40-45%, CHF, SUNDEEP on CPAP, severe pulmonary HTN, Afib/flutter, SSS s/p pacer in 8/2023, endovascular AAA repair in 2011, and CVA in 9/2022\"     Assessment/Plan:        CHF - acute on chronic systolic w/ reduced EF 35% by Echo dated July 2023.  Likely due to Ischemic heart disease. Continue diuresis as currently ordered w/ close monitoring of BUN/Creatinine and electrolytes for ADR.  Continue current medical management and follow input and output as well as daily weights as recorded and clinical response. Estimated dry weight 155 pounds.  Cardiology CONSULTED and SEEN w/ recs for - increased diuretic use    Acute Respiratory Failure - w/ hypoxia, progressive since admission, likely 2nd to above.  Presence of clinical respiratory distress w/ tachypnea/dyspnea/SOB and wheezing w/ use of accessory muscles to breath - Started on BiPap 26 July, now slowly weaning off.  On supplemental O2 as ordered and wean as tolerated.     HTN w/ CAD - w/ known CAD s/p prior CABG 2008 but no evidence of or active signs and/or symptoms of ischemia and/or failure. Currently controlled on home meds w/ vitals documented and reviewed.  
     [facility]   Heart Failure Daily Progress Note    Admit Date:  7/25/2025  HPI:    Chief Complaint   Patient presents with    Shortness of Breath     Pt arrives via squad from home for SOB. He has a recent admission for pneumonia. This patient also has recent abnormal labs.         Bill Crow is being followed for shortness of breath and orthopnea.     Hx of PAF, Bradycardia with intermittent complete heart block (follows with EP), Dm, CAD s/p CABG 2008, S/P LCI of distal SVT to OM2 with sequential to OM2 1/2023, HTN, DM, HLD,  SUNDEEP on CPAP, AAA repair 2011, CVA 9/2022, GI bleed. s/p LHC with PCI to Distal LAD on 8/2/23. 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   Oxygen weaned off.     Subjective:  Mr. Crow is feeling better. No chest pain. Breathing is improved.       Objective:   /63   Pulse 64   Temp 97.7 °F (36.5 °C) (Oral)   Resp 18   Ht 1.753 m (5' 9\")   Wt 68.2 kg (150 lb 6.4 oz)   SpO2 95%   BMI 22.21 kg/m²     Intake/Output Summary (Last 24 hours) at 7/31/2025 0812  Last data filed at 7/31/2025 0416  Gross per 24 hour   Intake 1200 ml   Output 3800 ml   Net -2600 ml       NYHA: IV    Physical Exam:  General:  Awake, alert, NAD  Chest:  dim with few crackles in the left base, no wheezes/rhonchi  Cardiovascular:  RRR S1S2, no m/r/g   Abdomen:  Soft, nontender, +bowel sounds  Extremities:  no bilateral lower extremity edema    Medications:    sacubitril-valsartan  1 tablet Oral BID    torsemide  20 mg Oral Daily    apixaban  2.5 mg Oral BID    spironolactone  25 mg Oral Daily    allopurinol  100 mg Oral Daily    atorvastatin  40 mg Oral Nightly    clopidogrel  75 mg Oral Daily    metoprolol succinate  50 mg Oral Daily    pantoprazole  40 mg Oral QAM AC    tamsulosin  0.4 mg Oral Daily    sodium chloride flush  5-40 mL IntraVENous 2 times per day      sodium chloride 
     [facility]   Heart Failure Daily Progress Note    Admit Date:  7/25/2025  HPI:    Chief Complaint   Patient presents with    Shortness of Breath     Pt arrives via squad from home for SOB. He has a recent admission for pneumonia. This patient also has recent abnormal labs.         Bill Crow is being followed for shortness of breath and orthopnea.     Hx of PAF, Bradycardia with intermittent complete heart block (follows with EP), Dm, CAD s/p CABG 2008, S/P LCI of distal SVT to OM2 with sequential to OM2 1/2023, HTN, DM, HLD,  SUNDEEP on CPAP, AAA repair 2011, CVA 9/2022, GI bleed. s/p LHC with PCI to Distal LAD on 8/2/23. 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     Subjective:  Mr. Crow + cough- productive brown sputum.     Objective:   BP (!) 124/59   Pulse 60   Temp 97.9 °F (36.6 °C)   Resp 16   Ht 1.753 m (5' 9\")   Wt 72.7 kg (160 lb 4.4 oz)   SpO2 99%   BMI 23.67 kg/m²     Intake/Output Summary (Last 24 hours) at 7/28/2025 1319  Last data filed at 7/28/2025 1124  Gross per 24 hour   Intake 832 ml   Output 2300 ml   Net -1468 ml       NYHA: IV    Physical Exam:  General:  Awake, alert, NAD  Chest:  dim and wheezing in the bases with crackles  to auscultation, no wheezes/rhonchi/rales  Cardiovascular:  RRR S1S2, no m/r/g   Abdomen:  Soft, nontender, +bowel sounds  Extremities:  no bilateral lower extremity edema    Medications:    potassium chloride  40 mEq Oral TID    [START ON 7/29/2025] amLODIPine  5 mg Oral Daily    furosemide  80 mg IntraVENous BID    allopurinol  100 mg Oral Daily    atorvastatin  40 mg Oral Nightly    clopidogrel  75 mg Oral Daily    apixaban  5 mg Oral BID    metoprolol succinate  50 mg Oral Daily    pantoprazole  40 mg Oral QAM AC    tamsulosin  0.4 mg Oral Daily    sodium chloride flush  5-40 mL IntraVENous 2 times per day    enoxaparin  40 mg 
     [facility]   Heart Failure Daily Progress Note    Admit Date:  7/25/2025  HPI:    Chief Complaint   Patient presents with    Shortness of Breath     Pt arrives via squad from home for SOB. He has a recent admission for pneumonia. This patient also has recent abnormal labs.         Bill Crow is being followed for shortness of breath and orthopnea.     Hx of PAF, Bradycardia with intermittent complete heart block (follows with EP), Dm, CAD s/p CABG 2008, S/P LCI of distal SVT to OM2 with sequential to OM2 1/2023, HTN, DM, HLD,  SUNDEEP on CPAP, AAA repair 2011, CVA 9/2022, GI bleed. s/p LHC with PCI to Distal LAD on 8/2/23. 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 Crow feels better today.   Able to get sleep overnight with bipap.   Oxygen requirements down to 5L NC.   Not sure if he is short of breath as he hasn't gotten out of bed.   No pain       Objective:   /70   Pulse 72   Temp 98.4 °F (36.9 °C) (Axillary)   Resp 20   Ht 1.753 m (5' 9\")   Wt 72.5 kg (159 lb 13.3 oz)   SpO2 93%   BMI 23.60 kg/m²     Intake/Output Summary (Last 24 hours) at 7/29/2025 0713  Last data filed at 7/29/2025 0500  Gross per 24 hour   Intake 1510 ml   Output 1800 ml   Net -290 ml       NYHA: IV    Physical Exam:  General:  Awake, alert, NAD  Chest:  dim and wheezing in the bases left more than the right  with crackles  to auscultation, no wheezes/rhonchi  Cardiovascular:  RRR S1S2, no m/r/g   Abdomen:  Soft, nontender, +bowel sounds  Extremities:  no bilateral lower extremity edema    Medications:    amLODIPine  5 mg Oral Daily    furosemide  60 mg IntraVENous BID    spironolactone  12.5 mg Oral Daily    allopurinol  100 mg Oral Daily    atorvastatin  40 mg Oral Nightly    clopidogrel  75 mg Oral Daily    apixaban  5 mg Oral BID    metoprolol succinate  50 mg Oral Daily    pantoprazole  40 mg Oral QAM AC 
     [facility]   Heart Failure Daily Progress Note    Admit Date:  7/25/2025  HPI:    Chief Complaint   Patient presents with    Shortness of Breath     Pt arrives via squad from home for SOB. He has a recent admission for pneumonia. This patient also has recent abnormal labs.         Bill Crwo is being followed for shortness of breath and orthopnea.     Hx of PAF, Bradycardia with intermittent complete heart block (follows with EP), Dm, CAD s/p CABG 2008, S/P LCI of distal SVT to OM2 with sequential to OM2 1/2023, HTN, DM, HLD,  SUNDEEP on CPAP, AAA repair 2011, CVA 9/2022, GI bleed. s/p LHC with PCI to Distal LAD on 8/2/23. 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 Crow       Objective:   /62   Pulse 64   Temp 98.4 °F (36.9 °C) (Axillary)   Resp 22   Ht 1.753 m (5' 9\")   Wt 69.9 kg (154 lb 3.2 oz)   SpO2 97%   BMI 22.77 kg/m²     Intake/Output Summary (Last 24 hours) at 7/30/2025 0735  Last data filed at 7/30/2025 0450  Gross per 24 hour   Intake 2153.12 ml   Output 5300 ml   Net -3146.88 ml       NYHA: IV    Physical Exam:  General:  Awake, alert, NAD  Chest:  dim and wheezing in the bases left more than the right  with crackles  to auscultation, no wheezes/rhonchi  Cardiovascular:  RRR S1S2, no m/r/g   Abdomen:  Soft, nontender, +bowel sounds  Extremities:  no bilateral lower extremity edema    Medications:    apixaban  2.5 mg Oral BID    spironolactone  25 mg Oral Daily    sacubitril-valsartan  0.5 tablet Oral BID    furosemide  60 mg IntraVENous TID    allopurinol  100 mg Oral Daily    atorvastatin  40 mg Oral Nightly    clopidogrel  75 mg Oral Daily    metoprolol succinate  50 mg Oral Daily    pantoprazole  40 mg Oral QAM AC    tamsulosin  0.4 mg Oral Daily    sodium chloride flush  5-40 mL IntraVENous 2 times per day      sodium chloride         Lab Data:  CBC:   Recent Labs 
    [facility]   Cardiovascular Evaluation    PATIENT: Bill Crow  DATE: 2025  MRN: [id number]  CSN: [id number]  : 1940    Primary Care Doctor/Referring provider: Foreign Gonzalez DO, Sheila S Reddy, MD     Reason for evaluation/Chief complaint:   Shortness of Breath (Pt arrives via squad from home for SOB. He has a recent admission for pneumonia. This patient also has recent abnormal labs. )      Subjective: Increase o2 requirement.    History of present illness on initial date of evaluation:   Bill Crow is a 84 y.o. patient who presents with SOB.  The patient states that they developed SOB that worsened since previous admission.    The patient presented to the ER and noted to have clinical symptoms suggestive of acute congestive heart failure. Eventually, the patient was treated with diuretics and noted significant improvement in their symptoms. The patient has had significant diuresis since admission.             Patient Active Problem List   Diagnosis    Hypertension    CAD (coronary artery disease)    Hyperlipidemia    CHF (congestive heart failure) ([facility])    Aortic aneurysm, abdominal    Sleep deprivation    Chronic pain of both shoulders    Arthritis    Hernia    Ringing in ears    GERD (gastroesophageal reflux disease)    Leg length inequality    DM (diabetes mellitus) ([facility])    Hypokalemia    TIA (transient ischemic attack)    CVA (cerebral vascular accident) ([facility])    Bradycardia    Stroke-like symptoms    PAF (paroxysmal atrial fibrillation) ([facility])    Nuclear senile cataract    SUNDEEP on CPAP    Overweight (BMI 25.0-29.9)    CAD in native artery    Chest pain    Shortness of breath    Abnormal stress test    UGI bleed    Acute systolic HF (heart failure) ([facility])    CAP (community acquired pneumonia)    Reactive arthritis ([facility])    Ischemic cardiomyopathy    Varicose veins of both legs with edema    Tarsal tunnel syndrome of both lower extremities    Intermittent complete 
   07/26/25 1102   Encounter Summary   Encounter Overview/Reason Spiritual/Emotional Needs   Service Provided For Patient   Referral/Consult From Nurse   Last Encounter  07/26/25  ( visited and prayed with pt)   Complexity of Encounter Low   Begin Time 1045   End Time  1100   Total Time Calculated 15 min   Spiritual/Emotional needs   Type Spiritual Support   Assessment/Intervention/Outcome   Assessment Calm;Coping   Intervention Active listening;Nurtured Hope;Prayer (assurance of)/[place]   Outcome Expressed feelings, needs, and concerns;Comfort;Expressed Gratitude       
   07/26/25 1702   NIV Type   $NIV $Daily Charge   NIV Started/Stopped On   Equipment Type v60   Mode Bilevel   Mask Type Full face mask   Mask Size Medium   Assessment   Respirations 24   SpO2 95 %   Comfort Level Good   Using Accessory Muscles No   Mask Compliance Good   Skin Assessment Clean, dry, & intact   Settings/Measurements   PIP Observed 11 cm H20   IPAP 10 cmH20   CPAP/EPAP 5 cmH2O   Vt (Measured) 664 mL   Rate Ordered 12   Insp Rise Time (%) 3 %   FiO2  80 %   I Time/ I Time % 1 s   Minute Volume (L/min) 15.9 Liters   Mask Leak (lpm) 0 lpm   Patient's Home Machine No   Alarm Settings   Alarms On Y   Low Pressure (cmH2O) 6 cmH2O   High Pressure (cmH2O) 30 cmH2O   RR Low (bpm) 6   RR High (bpm) 40 br/min       
   07/26/25 1702   Oxygen Therapy/Pulse Ox   O2 Device PAP (positive airway pressure)   FiO2  80 %   Respirations 24   SpO2 95 %   Skin Assessment Clean, dry, & intact   Blood Gas  Performed? Yes   $ABG $Arterial Puncture   Dave's Test #1 Pos   Site #1 Right Brachial   Site Prepped #1 Yes   Number of Attempts #1 1   Pressure Held #1 Yes   Complications #1 None   Post-procedure #1 Standard   Specimen Status #1 To lab   How Tolerated? Tolerated well       
   07/26/25 2055   NIV Type   Equipment Type V60   Mode Bilevel   Mask Type Full face mask   Mask Size Medium   Assessment   Pulse 70   Respirations 17   SpO2 100 %   Comfort Level Good   Using Accessory Muscles No   Mask Compliance Good   Settings/Measurements   PIP Observed 10 cm H20   IPAP 10 cmH20   CPAP/EPAP 5 cmH2O   Vt (Measured) 712 mL   FiO2  75 %   Minute Volume (L/min) 11 Liters   Mask Leak (lpm) 58 lpm   Patient's Home Machine No   Alarm Settings   Alarms On Y       
   07/27/25 0013   NIV Type   $NIV $Daily Charge   Equipment Type V60   Mode Bilevel   Mask Type Full face mask   Mask Size Medium   Assessment   Pulse 64   Respirations 26   SpO2 99 %   Comfort Level Good   Using Accessory Muscles No   Mask Compliance Good   Skin Assessment Clean, dry, & intact   Skin Protection for O2 Device Yes   Location Nose   Settings/Measurements   IPAP 10 cmH20   CPAP/EPAP 5 cmH2O   Vt (Measured) 406 mL   FiO2  75 %   Minute Volume (L/min) 10 Liters   Mask Leak (lpm) 15 lpm   Patient's Home Machine No   Alarm Settings   Alarms On Y       
   07/27/25 0407   NIV Type   Equipment Type V60   Mode Bilevel   Mask Type Full face mask   Mask Size Medium   Assessment   Pulse 62   Respirations 23   SpO2 100 %   Comfort Level Good   Using Accessory Muscles No   Mask Compliance Good   Skin Assessment Clean, dry, & intact   Skin Protection for O2 Device Yes   Location Nose   Settings/Measurements   PIP Observed 10 cm H20   IPAP 10 cmH20   CPAP/EPAP 5 cmH2O   Vt (Measured) 460 mL   FiO2  70 %   Minute Volume (L/min) 11 Liters   Mask Leak (lpm) 38 lpm   Patient's Home Machine No   Alarm Settings   Alarms On Y       
   07/27/25 2330   NIV Type   NIV Started/Stopped On   Equipment Type v60   Mode Bilevel   Mask Type Full face mask   Mask Size Medium   Assessment   Respirations 21   SpO2 100 %   Comfort Level Good   Using Accessory Muscles No   Mask Compliance Good   Skin Assessment Clean, dry, & intact   Skin Protection for O2 Device Yes   Orientation Middle   Location Nose   Intervention(s) Skin Barrier   Settings/Measurements   IPAP 10 cmH20   CPAP/EPAP 5 cmH2O   Vt (Measured) 647 mL   Rate Ordered 12   FiO2  (S)  60 %   Minute Volume (L/min) 11.5 Liters   Mask Leak (lpm) 0 lpm   Patient's Home Machine No   Alarm Settings   Alarms On Y   Low Pressure (cmH2O) 6 cmH2O   High Pressure (cmH2O) 30 cmH2O   Delay Alarm 20 sec(s)   RR Low (bpm) 6   RR High (bpm) 40 br/min       
   07/28/25 0356   NIV Type   Equipment Type v60   Mode Bilevel   Mask Type Full face mask   Mask Size Medium   Assessment   Respirations 28   SpO2 99 %   Comfort Level Good   Using Accessory Muscles No   Mask Compliance Good   Skin Assessment Clean, dry, & intact   Skin Protection for O2 Device Yes   Orientation Middle   Location Nose   Intervention(s) Skin Barrier   Settings/Measurements   IPAP 10 cmH20   CPAP/EPAP 5 cmH2O   Vt (Measured) 364 mL   Rate Ordered 12   FiO2  (S)  50 %   Minute Volume (L/min) 10.2 Liters   Mask Leak (lpm) 54 lpm   Patient's Home Machine No   Alarm Settings   Alarms On Y   Low Pressure (cmH2O) 6 cmH2O   High Pressure (cmH2O) 30 cmH2O   Delay Alarm 20 sec(s)   RR Low (bpm) 6   RR High (bpm) 40 br/min       
   07/28/25 2224   NIV Type   NIV Started/Stopped On   Equipment Type v60   Mode Bilevel   Mask Type Full face mask   Mask Size Medium   Assessment   Respirations 17   Comfort Level Good   Using Accessory Muscles No   Mask Compliance Good   Skin Assessment Clean, dry, & intact   Skin Protection for O2 Device Yes   Orientation Middle   Location Nose   Intervention(s) Skin Barrier   Settings/Measurements   PIP Observed 10 cm H20   IPAP 10 cmH20   CPAP/EPAP 5 cmH2O   Vt (Measured) 781 mL   Rate Ordered 12   FiO2  50 %   I Time/ I Time % 1 s   Minute Volume (L/min) 15.4 Liters   Mask Leak (lpm) 1 lpm   Patient's Home Machine No   Alarm Settings   Alarms On Y   Low Pressure (cmH2O) 6 cmH2O   High Pressure (cmH2O) 30 cmH2O   Apnea (secs) 20 secs   RR Low (bpm) 6   RR High (bpm) 40 br/min       
   07/28/25 2343   NIV Type   NIV Started/Stopped On   Equipment Type v60   Mode Bilevel   Mask Type Full face mask   Mask Size Medium   Settings/Measurements   IPAP 10 cmH20   CPAP/EPAP 5 cmH2O   Vt (Measured) 652 mL   Rate Ordered 12   FiO2  (S)  45 %   Mask Leak (lpm) 13 lpm   Patient's Home Machine No   Alarm Settings   Alarms On Y   Low Pressure (cmH2O) 6 cmH2O   High Pressure (cmH2O) 30 cmH2O       
   07/29/25 0333   NIV Type   $NIV $Daily Charge   NIV Started/Stopped On   Equipment Type v60   Mode Bilevel   Mask Type Full face mask   Mask Size Medium   Assessment   Respirations 19   Settings/Measurements   IPAP 10 cmH20   CPAP/EPAP 5 cmH2O   Vt (Measured) 620 mL   Rate Ordered 12   FiO2  (S)  40 %   Mask Leak (lpm) 15 lpm   Patient's Home Machine No   Alarm Settings   Alarms On Y   Low Pressure (cmH2O) 6 cmH2O   High Pressure (cmH2O) 30 cmH2O       
   07/29/25 1928   Oxygen Therapy/Pulse Ox   O2 Device (S)  Nasal cannula   O2 Flow Rate (L/min) (S)  1 L/min       
   07/29/25 2337   NIV Type   NIV Started/Stopped On   Equipment Type v60   Mode Bilevel   Mask Type Full face mask   Mask Size Medium   Assessment   Respirations 19   SpO2 96 %   Settings/Measurements   IPAP 10 cmH20   CPAP/EPAP 5 cmH2O   Vt (Measured) 468 mL   Rate Ordered 12   FiO2  24 %   Mask Leak (lpm) 12 lpm   Patient's Home Machine No   Alarm Settings   Alarms On Y   Low Pressure (cmH2O) 6 cmH2O   High Pressure (cmH2O) 30 cmH2O   Patient Observation   Patient Observations mepilex on nose       
   07/29/25 2768   NIV Type   NIV Started/Stopped On   Equipment Type v60   Mode Bilevel   Mask Type Full face mask   Mask Size Medium   Assessment   Respirations 20   Settings/Measurements   IPAP 10 cmH20   CPAP/EPAP 5 cmH2O   Vt (Measured) 502 mL   Rate Ordered 12   FiO2  (S)  24 %   Mask Leak (lpm) 7 lpm   Patient's Home Machine No   Alarm Settings   Alarms On Y   Low Pressure (cmH2O) 6 cmH2O   High Pressure (cmH2O) 30 cmH2O       
   07/30/25 0335   NIV Type   $NIV $Daily Charge   NIV Started/Stopped On   Equipment Type v60   Mode Bilevel   Mask Type Full face mask   Mask Size Medium   Assessment   Respirations 22   Settings/Measurements   IPAP 10 cmH20   CPAP/EPAP 5 cmH2O   Vt (Measured) 515 mL   Rate Ordered 12   FiO2  24 %   Mask Leak (lpm) 10 lpm   Patient's Home Machine No   Alarm Settings   Alarms On Y   Low Pressure (cmH2O) 6 cmH2O   High Pressure (cmH2O) 30 cmH2O   Patient Observation   Patient Observations mepilex on nose       
Discharge instructions reviewed w/patient. Pt verbalized understanding and denies questions. PIV removed - see flowsheet. Pt dc'd via WC to home w/ family .    
Occupational Therapy  Facility/Department: [facility] A2 CARD TELEMETRY  Occupational Therapy Initial Assessment    Name: Bill Crow  : 1940  MRN: [id number]  Date of Service: 2025    Discharge Recommendations:  24 hour supervision or assist  OT Equipment Recommendations  Equipment Needed: No     If pt is unable to be seen after this session, please let this note serve as discharge summary.  Please see case management note for discharge disposition.  Thank you.    Patient Diagnosis(es): The primary encounter diagnosis was Acute on chronic congestive heart failure, unspecified heart failure type (HCC). Diagnoses of Hypokalemia, Hypomagnesemia, Chronic kidney disease, unspecified CKD stage, Elevated troponin, Cardiac resynchronization therapy defibrillator (CRT-D) in place, Complete heart block (HCC), and Acute on chronic systolic congestive heart failure (HCC) were also pertinent to this visit.  Past Medical History:  has a past medical history of Aortic aneurysm, abdominal, Appendicitis, Arthritis, Bruises easily, CAD (coronary artery disease), CHF (congestive heart failure) (HCC), Chronic back pain, Chronic pain of both shoulders, Complication of anesthesia, Gout, Heart disease, Hernia, HIGH CHOLESTEROL, Hyperlipidemia, Hypertension, Measles, SUNDEEP (obstructive sleep apnea), Pancreatitis, Persistent cough, Ringing in ears, Sinus disorder, and Sleep deprivation.  Past Surgical History:  has a past surgical history that includes Coronary angioplasty with stent ( and ); Cardiac surgery (2008); Cholecystectomy, laparoscopic (2006); Appendectomy; Tonsillectomy; hernia repair; AAA repair, endovascular (2011); Pancreas surgery; Gallbladder surgery; Coronary artery bypass graft; Abdominal aortic aneurysm repair; pr xcapsl ctrc rmvl insj io lens prosth w/o ecp (Left, 2018); Intracapsular cataract extraction (Right, 2018); Upper gastrointestinal endoscopy (N/A, 2023); and 
Occupational Therapy  Facility/Department: [facility] A2 CARD TELEMETRY  Daily Treatment Note  NAME: Bill Crow  : 1940  MRN: [id number]    Date of Service: 2025    Discharge Recommendations:  24 hour supervision or assist  OT Equipment Recommendations  Equipment Needed: No      Patient Diagnosis(es): The primary encounter diagnosis was Acute on chronic congestive heart failure, unspecified heart failure type (HCC). Diagnoses of Hypokalemia, Hypomagnesemia, Chronic kidney disease, unspecified CKD stage, Elevated troponin, Cardiac resynchronization therapy defibrillator (CRT-D) in place, Complete heart block (HCC), and Acute on chronic systolic congestive heart failure (HCC) were also pertinent to this visit.     Assessment   Assessment: Pt seen for follow up OT session this date. Pt agreeable to BUE AROM exercises seated on EOB for increased UE strength for performance in I/ADLs. Pt completed 2x15 reps, see exercise section for details. Pt needed PRN breaks throughout due to SOB, O2 levels never dropping below 93%. Pt was agreeable to transitioning to recliner after exercises, completing bed mobility with SPV, STS transfer up to RW with  SBA and functional mobility to bedside chair with SBA. Pt at EOS left with all needs met, call light in reach, and RN on way to give meds. Continue POC.  Activity Tolerance: Patient tolerated evaluation without incident;Patient limited by endurance  Discharge Recommendations: 24 hour supervision or assist  Equipment Needed: No     Plan  Occupational Therapy Plan  Times Per Week: 2-3x while in acute care  Current Treatment Recommendations: Strengthening;ROM;Balance training;Functional mobility training;Endurance training;Safety education & training;Patient/Caregiver education & training;Equipment evaluation, education, & procurement;Positioning;Self-Care / ADL;Home management training    Restrictions  Restrictions/Precautions  Restrictions/Precautions: NPO  Activity Level: 
Paged cardiology for orders for diuretics. Patient is requiring more O2 since this AM. He is now on 7L O2 sat 90-93%.  
Physical Therapy  Facility/Department: [facility] A2 CARD TELEMETRY  Daily Treatment Note  NAME: Bill Crow  : 1940  MRN: [id number]    Date of Service: 2025    Discharge Recommendations:  Home with Home health PT, Home with assist PRN   PT Equipment Recommendations  Equipment Needed: No  Other: pt owns RW and SPC    Patient Diagnosis(es): The primary encounter diagnosis was Acute on chronic congestive heart failure, unspecified heart failure type (HCC). Diagnoses of Hypokalemia, Hypomagnesemia, Chronic kidney disease, unspecified CKD stage, Elevated troponin, Cardiac resynchronization therapy defibrillator (CRT-D) in place, Complete heart block (HCC), and Acute on chronic systolic congestive heart failure (HCC) were also pertinent to this visit.    Assessment  Assessment: Patient seen for gait and transfers training.  Patient cleared by RN for therapy participation this date.  Patient agreeable to therapy. Patient completed transfers and gait with RW and SBA. Pt on 1 L O2 throughout session and tolerates increased ambulation distances this date without increased SOB or SPO2% desaturation. P.T .will continue to follow throughout LOS.  Recommending DC to home with assist prn and home PT at d/c.  Activity Tolerance: Patient limited by endurance;Patient tolerated treatment well  Equipment Needed: No  Other: pt owns RW and SPC    Plan  Physical Therapy Plan  General Plan: 3-4 times per week (in acute care)  Current Treatment Recommendations: Strengthening;Balance training;Functional mobility training;Transfer training;Gait training;Endurance training;Stair training;Home exercise program;Safety education & training;Therapeutic activities    Restrictions  Restrictions/Precautions  Restrictions/Precautions: Fall Risk  Activity Level: Up with Assist  Position Activity Restriction  Other Position/Activity Restrictions: nasal cannula     Subjective   Subjective  Subjective: Pt in bed, agreeable to therapy  Pain: Pt 
Physical Therapy  Facility/Department: [facility] A2 CARD TELEMETRY  Physical Therapy Initial Assessment    Name: Bill Crow  : 1940  MRN: [id number]  Date of Service: 2025    Discharge Recommendations:  Home with Home health PT, 24 hour supervision or assist   PT Equipment Recommendations  Equipment Needed: No      Patient Diagnosis(es): The primary encounter diagnosis was Acute on chronic congestive heart failure, unspecified heart failure type (HCC). Diagnoses of Hypokalemia, Hypomagnesemia, Chronic kidney disease, unspecified CKD stage, Elevated troponin, Cardiac resynchronization therapy defibrillator (CRT-D) in place, Complete heart block (HCC), and Acute on chronic systolic congestive heart failure (HCC) were also pertinent to this visit.  Past Medical History:  has a past medical history of Aortic aneurysm, abdominal, Appendicitis, Arthritis, Bruises easily, CAD (coronary artery disease), CHF (congestive heart failure) (HCC), Chronic back pain, Chronic pain of both shoulders, Complication of anesthesia, Gout, Heart disease, Hernia, HIGH CHOLESTEROL, Hyperlipidemia, Hypertension, Measles, SUNDEEP (obstructive sleep apnea), Pancreatitis, Persistent cough, Ringing in ears, Sinus disorder, and Sleep deprivation.  Past Surgical History:  has a past surgical history that includes Coronary angioplasty with stent ( and ); Cardiac surgery (2008); Cholecystectomy, laparoscopic (2006); Appendectomy; Tonsillectomy; hernia repair; AAA repair, endovascular (2011); Pancreas surgery; Gallbladder surgery; Coronary artery bypass graft; Abdominal aortic aneurysm repair; pr xcapsl ctrc rmvl insj io lens prosth w/o ecp (Left, 2018); Intracapsular cataract extraction (Right, 2018); Upper gastrointestinal endoscopy (N/A, 2023); and Diagnostic Cardiac Cath Lab Procedure (2023).    Assessment  Body Structures, Functions, Activity Limitations Requiring Skilled Therapeutic 
Sent page to cardiology d/t patient increase in o2 demand. 8L HFNC 88% o2 sat.  
Shift assessment completed.  Pt A&O, VSS.  Denies any needs at this time. Bed locked and in lowest position.  Call light within reach. Will continue to monitor.   
So here is the same thing I       Ph: [phone], Fax: [phone]           [facility]               Reason for admission:                 Shortness of breath    Brief Summary :     Bill Crow is being seen by nephrology for KEON and fluid overload  .  He is known to us from past admission and presented with shortness of breath.      Interval History and plan:        He feels a lot better  Edema of the legs is completely gone  CO2 is also going up significantly to 33 indicating that he has contraction  of volume  BUN higher than before    He is currently on multiple GDMT's    Creatinine better today at 1.3  But pressure is good  No dizziness on sitting up      Plan:    Due to December 20 mg a day  Follow-up with heart failure clinic in a week after discharge  Follow-up with PCP in a  week  Okay to discharge from renal perspective                   Assessment :     Acute Kidney Injury  Creatinine 1.5 on consult   Lower than 1.6 during last hospitalization  During last hospitalization  Prior to that his creatinine 1-1.2 in 2023 and 2024        Hypertension   BP: (125-128)/(58-63)  Pulse:  [63-64]   BP goal inpatient 130-140 systolic inpatient     HFrEF  Echo from 7/25 shows EF of 35 to 40%, moderate tricuspid regurgitation, moderate pulmonary hypertension, severely dilated left atrium, also mildly dilated ascending aorta    [facility] would like to thank Aj Lepe MD   for opportunity to serve this patient      Please call with questions at-   24 Hrs Answering service [phone] or  7 am- 5 pm via Perfect serve or cell phone  Dr.Sudhir Flynn MD       HPI :     Bill Crow is a 84 y.o. male presented to   the hospital on 7/25/2025 with shortness of breath.  He was recently in the hospital treated for pneumonia and was discharged.  He is known to have coronary artery disease status post CABG, stenting, ischemic cardiomyopathy with EF of 40 to 45%, CHF with systolic 
    HypoKalemia - etiology clinically unable to determine.  Followed serial Potassium, personally reviewed and documented in this note. Replaced PRN - ordered PO 27/28/30 July.  Ordered IV 29 July    HypoMagnesemia - etiology clinically unable to determine.  Followed serial Magnesium, personally reviewed and documented in this note. Replaced PRN - ordered IV 28/30 July.       HypoPhosphatemia - etiology clinically unable to determine.  Followed serial Phosphorus, personally reviewed and documented in this note. Replaced PRN - ordered IV 29 July.      Troponin elevation - with myocardial injury due to Acute/Chronic Myocardial Injury 2nd to CHF        Atrial Fibrillation - chronic paroxysmal, of unspecified and clinically unable to determine etiology, w/ CVR .  Normally rate controlled on BBlocker - continued.  Anticoagulated at baseline on Eliquis - continued . Patient w/ hypercoagulable state secondary to Atrial Fibrillation.    BPH - without acute obstructive uropathy, normally controlled on home medications as ordered - continued.        Ongoing threat to life and/or bodily function without ongoing treatment due to:  CHF requiring aggressive IV diuresis.    Consults:      IP CONSULT TO HOSPITALIST  IP CONSULT TO CARDIOLOGY  IP CONSULT TO HEART FAILURE NURSE/COORDINATOR  IP CONSULT TO NEPHROLOGY  IP CONSULT TO SPIRITUAL SERVICES      The following subspecialty service(s) Cardiology and Nephrology was/were consulted and any/all available notes from yesterday and today were reviewed on 7/30/2025, w/ plan for continued inpatient w/up and management as noted above in the assessment and plan     --------------------------------------------------      Medications:        Infusion Medications    sodium chloride       Scheduled Medications    apixaban  2.5 mg Oral BID    spironolactone  25 mg Oral Daily    sacubitril-valsartan  0.5 tablet Oral BID    furosemide  60 mg IntraVENous TID    allopurinol  100 mg Oral Daily

## 2025-07-31 NOTE — PLAN OF CARE
Problem: Safety - Adult  Goal: Free from fall injury  Outcome: Progressing     Problem: Chronic Conditions and Co-morbidities  Goal: Patient's chronic conditions and co-morbidity symptoms are monitored and maintained or improved  7/30/2025 2045 by Akash Barber RN  Outcome: Progressing   CHF Care Plan      Patient's EF (Ejection Fraction) is less than 40%    Heart Failure Medications:  Diuretics:: Furosemide and Spironolactone    (One of the following REQUIRED for EF </= 40%/SYSTOLIC FAILURE but MAY be used in EF% >40%/DIASTOLIC FAILURE)        ACE:: None        ARB:: None         ARNI:: Sacubitril/Valsartan-Entresto    (Beta Blockers)  NON- Evidenced Based Beta Blocker (for EF% >40%/DIASTOLIC FAILURE): None    Evidenced Based Beta Blocker::(REQUIRED for EF% <40%/SYSTOLIC FAILURE) Metoprolol SUCCinate- Toprol XL  ...................................................................................................................................................    Failed to redirect to the Timeline version of the [employer] SmartLink.      Patient's weights and intake/output reviewed    Patient's current weight today shows a difference of 5 lbs less than last documented weight.      Intake/Output Summary (Last 24 hours) at 7/30/2025 2045  Last data filed at 7/30/2025 2040  Gross per 24 hour   Intake 1481.12 ml   Output 4800 ml   Net -3318.88 ml       Education Booklet Provided: yes    Comorbidities Reviewed Yes    Patient has a past medical history of Aortic aneurysm, abdominal, Appendicitis, Arthritis, CAD (coronary artery disease), CHF (congestive heart failure) (HCC), Chronic back pain, Chronic pain of both shoulders, Complication of anesthesia, Gout, Hernia, Hyperlipidemia, Hypertension, Measles, SUNDEEP (obstructive sleep apnea), Pancreatitis, Sinus disorder, and Sleep deprivation.     >>For CHF and Comorbidity documentation on Education Time and Topics, please see Education Tab      CHF Education    Learners:

## 2025-07-31 NOTE — DISCHARGE INSTR - DIET
Good nutrition is important when healing from an illness, injury, or surgery.  Follow any nutrition recommendations given to you during your hospital stay.   If you were given an oral nutrition supplement while in the hospital, continue to take this supplement at home.  You can take it with meals, in-between meals, and/or before bedtime. These supplements can be purchased at most local grocery stores, pharmacies, and chain super-stores.   If you have any questions about your diet or nutrition, call the hospital and ask for the dietitian.    Eating with Taste and Smell Changes    Taste Changes  Try new food and drinks to find flavors that taste good to you.  Try a new mouth care routine. See “Mouthcare” for details.  Make a list of foods that taste good. Eat these foods more often.  If you have a bad taste in your mouth, choose soft foods that need less chewing. They will be in your mouth for a shorter time.  Don't smoke cigarettes or use tobacco.  Eat in a setting where you feel comfortable.  Tell your family or caregiver about the kinds of foods you enjoy. This may help them understand your likes and dislikes.    What to Try If Food Tastes Metallic   Use plastic or wooden cutlery or chopsticks instead of metal utensils.  Use glass or ceramic cookware and bakeware instead of metal pots and pans.  Choose fresh, frozen, or homemade foods instead of canned foods.  Before eating, try sugar-fee candies, mints, chewing gum, or mouth rinses. See “Mouthcare” for details.  Choose poultry, fish, or seafood instead of red meat.  If meat tastes metallic, try:  Adding sauces like mint jelly or chutney  Marinades like citrus juice or teriyaki sauce    Mouthcare  Good mouthcare may help with taste changes.  Rinse your mouth before and after you eat. This may help get rid of bad mouth tastes. Spit out the rinse after.  Rinse with water, club soda, or a mixture of 1 tsp (5 mL) baking soda in 1 cup (250 mL)

## 2025-07-31 NOTE — DISCHARGE SUMMARY
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 male who presented to [facility] with shortness of breath. Patient reports he was recently admitted to the hospital for pneumonia and treated and discharged. He states that at home he was feeling worsening shortness of breath particularly when he would lay down flat. This is the reason for presentation today. He denies any fevers or chest pain. No nausea, vomiting or diarrhea. Presents [facility] residence.  PMHx significant for HTN, HLD, DM2, CAD s/p CABG in  and stenting (most recently to his distal LAD by way of his LIMA in 2023), ischemic cardiomyopathy with EF 40-45%, CHF, SUNDEEP on CPAP, severe pulmonary HTN, Afib/flutter, SSS s/p pacer in 2023, endovascular AAA repair in , and CVA in 2022\"      Assessment/Plan:           CHF - acute on chronic systolic w/ reduced EF 35% by Echo dated 2023.  Likely due to Ischemic heart disease. Continued diuresis as currently ordered w/ close monitoring of BUN/Creatinine and electrolytes for ADR.  Continued current medical management and follow input and output as well as daily weights as recorded and clinical response. Estimated dry weight 155 pounds.  Cardiology CONSULTED and SEEN w/ recs for - increased diuretic use   -on entresto, bb, jardiance, torsemide on

## 2025-07-31 NOTE — CARE COORDINATION
Social work following for care plan. Chart reviewed; discussed with interdisciplinary team in floor huddle + MD rounds.  Explained levels of care for dc. Pt has plan in place for HHC at dc.  Pt is active , able to f/u with PCP and obtain all meds at dc.      DC plan at this time: home with family support and MD order for HHC; referral in place with [facility] to f/u at dc; [facility] aware of dc order.     Alicia Harris MSW LSW

## 2025-07-31 NOTE — PLAN OF CARE
Problem: Safety - Adult  Goal: Free from fall injury  7/31/2025 0929 by Nelia Low, RN  Outcome: Progressing  7/30/2025 2045 by Akash Barber, RN  Outcome: Progressing     Problem: Chronic Conditions and Co-morbidities  Goal: Patient's chronic conditions and co-morbidity symptoms are monitored and maintained or improved  7/31/2025 0929 by Nelia Low, RN  Outcome: Progressing  7/30/2025 2045 by Akash Barber, RN  Outcome: Progressing     Problem: Discharge Planning  Goal: Discharge to home or other facility with appropriate resources  Outcome: Progressing

## 2025-07-31 NOTE — DISCHARGE INSTR - COC
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    Tarsal tunnel syndrome of both lower extremities G57.53    Intermittent complete heart block (HCC) I44.2    Complete heart block (HCC) I44.2    Acute on chronic systolic heart failure ([facility]) I50.23    Cardiac resynchronization therapy defibrillator (CRT-D) in place Z95.810    Acute respiratory failure with hypoxia ([facility]) J96.01    History of stroke Z86.73    Bilateral carotid artery stenosis I65.23    Pneumonia due to infectious agent J18.9    Moderate protein-calorie malnutrition E44.0    S/P AAA repair Z98.890, Z86.79    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    Acute on chronic combined systolic (congestive) and diastolic (congestive) heart failure ([facility]) I50.43    Chronic kidney disease N18.9    Elevated troponin R79.89       Isolation/Infection:   Isolation            No Isolation          Patient Infection Status    None to display              Nurse Assessment:  Last Vital Signs: /71   Pulse 66   Temp 98.2 °F (36.8 °C) (Oral)   Resp 18   Ht 1.753 m (5' 9\")   Wt 68.2 kg (150 lb 6.4 oz)   SpO2 93%   BMI 22.21 kg/m²     Last documented pain score (0-10 scale): Pain Level: 0  Last Weight:   Wt Readings from Last 1 Encounters:   07/31/25 68.2 kg (150 lb 6.4 oz)     Mental Status:  {IP PT MENTAL STATUS:13663}    IV Access:  { ADOLFO IV ACCESS:627073479}    Nursing Mobility/ADLs:  Walking   {CHP DME ADLs:742247980}  Transfer  {CHP DME ADLs:953552772}  Bathing  {CHP DME ADLs:494323200}  Dressing  {CHP DME ADLs:754508900}  Toileting  {CHP DME ADLs:083629374}  Feeding  {CHP DME ADLs:812974869}  Med Admin  {P DME ADLs:923713461}  Med Delivery   { ADOLFO MED Delivery:063098399}    Wound Care Documentation and Therapy:  Incision 01/28/11 Groin Right (Active)   Number of days: 5298       Incision 01/28/11 Groin Left (Active)

## 2025-08-01 ENCOUNTER — FOLLOWUP TELEPHONE ENCOUNTER (OUTPATIENT)
Dept: TELEMETRY | Age: 85
End: 2025-08-01

## 2025-08-01 ENCOUNTER — TELEPHONE (OUTPATIENT)
Dept: FAMILY MEDICINE CLINIC | Age: 85
End: 2025-08-01

## 2025-08-01 NOTE — TELEPHONE ENCOUNTER
2nd Attempt; No Answer- Left HIPAA compliant voicemail with Non-Urgent Heart Failure Resource Line number for call back.     Linsey Mcginnis RN

## 2025-08-01 NOTE — TELEPHONE ENCOUNTER
3rd Attempt; No Answer- Left HIPAA compliant voicemail with Non-Urgent Heart Failure Resource Line number for call back.     Linsey Mcginnis RN

## 2025-08-01 NOTE — TELEPHONE ENCOUNTER
1st Attempt; No Answer- Left HIPAA compliant voicemail with Non-Urgent Heart Failure Resource Line number for call back.      Linsey Mcginnis RN

## 2025-08-04 ENCOUNTER — TELEPHONE (OUTPATIENT)
Dept: FAMILY MEDICINE CLINIC | Age: 85
End: 2025-08-04

## 2025-08-05 ENCOUNTER — TELEPHONE (OUTPATIENT)
Dept: FAMILY MEDICINE CLINIC | Age: 85
End: 2025-08-05

## 2025-08-05 DIAGNOSIS — R06.02 SHORTNESS OF BREATH: Primary | ICD-10-CM

## 2025-08-07 ENCOUNTER — HOSPITAL ENCOUNTER (OUTPATIENT)
Dept: GENERAL RADIOLOGY | Age: 85
Discharge: HOME | End: 2025-08-07
Payer: MEDICARE

## 2025-08-07 ENCOUNTER — TELEPHONE (OUTPATIENT)
Dept: FAMILY MEDICINE CLINIC | Age: 85
End: 2025-08-07

## 2025-08-07 DIAGNOSIS — R06.02 SHORTNESS OF BREATH: ICD-10-CM

## 2025-08-07 LAB
ANION GAP SERPL CALCULATED.3IONS-SCNC: 16 MMOL/L (ref 7–16)
B-TYPE NATRIURETIC PEPTIDE: 154 PG/ML (ref 0–77)
BUN BLDV-MCNC: 46 MG/DL (ref 8–23)
CALCIUM SERPL-MCNC: 9.6 MG/DL (ref 8.8–10.4)
CHLORIDE BLD-SCNC: 94 MMOL/L (ref 98–107)
CO2: 27 MMOL/L (ref 22–29)
CREAT SERPL-MCNC: 1.53 MG/DL (ref 0.67–1.3)
EGFR (CKD-EPI): 45 ML/MIN/1.73 M2
GLUCOSE BLD-MCNC: 243 MG/DL (ref 70–99)
POTASSIUM SERPL-SCNC: 3.9 MMOL/L (ref 3.5–5)
SODIUM BLD-SCNC: 137 MMOL/L (ref 136–145)

## 2025-08-07 PROCEDURE — 71046 X-RAY EXAM CHEST 2 VIEWS: CPT

## 2025-08-07 RX ORDER — ALBUTEROL SULFATE 90 UG/1
2 INHALANT RESPIRATORY (INHALATION) 4 TIMES DAILY PRN
Qty: 18 G | Refills: 0 | Status: SHIPPED | OUTPATIENT
Start: 2025-08-07

## 2025-08-09 RX ORDER — ALBUTEROL SULFATE 90 UG/1
2 INHALANT RESPIRATORY (INHALATION) 4 TIMES DAILY PRN
Qty: 18 G | Refills: 0 | Status: SHIPPED | OUTPATIENT
Start: 2025-08-09

## 2025-08-11 ENCOUNTER — TELEPHONE (OUTPATIENT)
Dept: FAMILY MEDICINE CLINIC | Age: 85
End: 2025-08-11

## 2025-08-25 PROBLEM — R79.89 ELEVATED TROPONIN: Status: RESOLVED | Noted: 2025-07-26 | Resolved: 2025-08-25

## 2025-09-01 PROCEDURE — 93297 REM INTERROG DEV EVAL ICPMS: CPT | Performed by: NURSE PRACTITIONER

## (undated) DEVICE — SOLUTION IV 1000ML LAC RINGERS PH 6.5 INJ USP VIAFLX PLAS

## (undated) DEVICE — SURGICAL PROCEDURE PACK EYE ANDRSN

## (undated) DEVICE — Device

## (undated) DEVICE — CATHETER IV 20GA L1.25IN PNK FEP SFTY STR HUB RADPQ DISP

## (undated) DEVICE — SILICONE I/A TIP STRAIGHT: Brand: ALCON

## (undated) DEVICE — SET GRAV VENT NVENT CK VLV 3 NDL FREE PRT 10 GTT

## (undated) DEVICE — ELECTRODE ECG MONITR FOAM TEAR DROP ADLT RED

## (undated) DEVICE — GLOVE SURG SZ 75 L12IN FNGR THK94MIL STD WHT LTX FREE

## (undated) DEVICE — CANNULA NSL AD TBNG L7FT PVC STR NONFLARED PRNG O2 DEL W STD

## (undated) DEVICE — 3M™ TEGADERM™ TRANSPARENT FILM DRESSING FRAME STYLE, 1624W, 2-3/8 IN X 2-3/4 IN (6 CM X 7 CM), 100/CT 4CT/CASE: Brand: 3M™ TEGADERM™

## (undated) DEVICE — AIRLIFE™ NASAL OXYGEN CANNULA CURVED, FLARED TIP, WITH 7 FEET (2.1 M) CRUSH RESISTANT TUBING, OVER-THE-EAR STYLE: Brand: AIRLIFE™

## (undated) DEVICE — GLOVE,SURG,SENSICARE,ALOE,LF,PF,7: Brand: MEDLINE

## (undated) DEVICE — MICROSURGICAL INSTRUMENT ANTERIOR CHAMBER CANNULA 30GA: Brand: ALCON

## (undated) DEVICE — PATIENT CARE KIT EYE POST OP

## (undated) DEVICE — SET ADMIN PRIMING 7ML L30IN 7.35LB 20 GTT 2ND RLER CLMP

## (undated) DEVICE — SOLUTION IRRIG 250ML STRL H2O PLAS POUR BTL USP

## (undated) DEVICE — ELECTRODE,ECG,STRESS,FOAM,3PK: Brand: MEDLINE

## (undated) DEVICE — CONMED SCOPE SAVER BITE BLOCK, 20X27 MM: Brand: SCOPE SAVER

## (undated) DEVICE — ENDOSCOPIC KIT 2 12 FT OP4 DE2 GWN SYR